# Patient Record
Sex: MALE | Race: WHITE | NOT HISPANIC OR LATINO | URBAN - METROPOLITAN AREA
[De-identification: names, ages, dates, MRNs, and addresses within clinical notes are randomized per-mention and may not be internally consistent; named-entity substitution may affect disease eponyms.]

---

## 2016-02-02 RX ORDER — RISPERIDONE 4 MG/1
1 TABLET ORAL
Qty: 0 | Refills: 0 | COMMUNITY
Start: 2016-02-02

## 2017-06-05 ENCOUNTER — INPATIENT (INPATIENT)
Facility: HOSPITAL | Age: 67
LOS: 13 days | Discharge: NURSING, REHAB OR LONG-TERM CARE | DRG: 190 | End: 2017-06-19
Attending: HOSPITALIST | Admitting: EMERGENCY MEDICINE
Payer: MEDICAID

## 2017-06-05 VITALS
RESPIRATION RATE: 26 BRPM | HEIGHT: 69 IN | HEART RATE: 84 BPM | WEIGHT: 153 LBS | SYSTOLIC BLOOD PRESSURE: 138 MMHG | OXYGEN SATURATION: 92 % | DIASTOLIC BLOOD PRESSURE: 88 MMHG | TEMPERATURE: 98 F

## 2017-06-05 LAB
ALBUMIN SERPL ELPH-MCNC: 4 G/DL — SIGNIFICANT CHANGE UP (ref 3.3–5.2)
ALP SERPL-CCNC: 64 U/L — SIGNIFICANT CHANGE UP (ref 40–120)
ALT FLD-CCNC: 24 U/L — SIGNIFICANT CHANGE UP
ANION GAP SERPL CALC-SCNC: 8 MMOL/L — SIGNIFICANT CHANGE UP (ref 5–17)
APTT BLD: 34.9 SEC — SIGNIFICANT CHANGE UP (ref 27.5–37.4)
AST SERPL-CCNC: 33 U/L — SIGNIFICANT CHANGE UP
BASE EXCESS BLDA CALC-SCNC: 4.6 MMOL/L — HIGH (ref -3–3)
BILIRUB SERPL-MCNC: 0.4 MG/DL — SIGNIFICANT CHANGE UP (ref 0.4–2)
BLOOD GAS COMMENTS ARTERIAL: SIGNIFICANT CHANGE UP
BUN SERPL-MCNC: 11 MG/DL — SIGNIFICANT CHANGE UP (ref 8–20)
CALCIUM SERPL-MCNC: 9.2 MG/DL — SIGNIFICANT CHANGE UP (ref 8.6–10.2)
CHLORIDE SERPL-SCNC: 95 MMOL/L — LOW (ref 98–107)
CO2 SERPL-SCNC: 35 MMOL/L — HIGH (ref 22–29)
CREAT SERPL-MCNC: 0.6 MG/DL — SIGNIFICANT CHANGE UP (ref 0.5–1.3)
GAS PNL BLDA: SIGNIFICANT CHANGE UP
GLUCOSE SERPL-MCNC: 109 MG/DL — SIGNIFICANT CHANGE UP (ref 70–115)
HCO3 BLDA-SCNC: 28 MMOL/L — HIGH (ref 20–26)
HCT VFR BLD CALC: 45.3 % — SIGNIFICANT CHANGE UP (ref 42–52)
HGB BLD-MCNC: 15.2 G/DL — SIGNIFICANT CHANGE UP (ref 14–18)
HOROWITZ INDEX BLDA+IHG-RTO: SIGNIFICANT CHANGE UP
INR BLD: 1.36 RATIO — HIGH (ref 0.88–1.16)
MCHC RBC-ENTMCNC: 33.6 G/DL — SIGNIFICANT CHANGE UP (ref 32–36)
MCHC RBC-ENTMCNC: 34.3 PG — HIGH (ref 27–31)
MCV RBC AUTO: 102.3 FL — HIGH (ref 80–94)
NT-PROBNP SERPL-SCNC: 182 PG/ML — SIGNIFICANT CHANGE UP (ref 0–300)
PCO2 BLDA: 88 MMHG — CRITICAL HIGH (ref 35–45)
PH BLDA: 7.23 — LOW (ref 7.35–7.45)
PLATELET # BLD AUTO: 121 K/UL — LOW (ref 150–400)
PO2 BLDA: 101 MMHG — SIGNIFICANT CHANGE UP (ref 83–108)
POTASSIUM SERPL-MCNC: 4.4 MMOL/L — SIGNIFICANT CHANGE UP (ref 3.5–5.3)
POTASSIUM SERPL-SCNC: 4.4 MMOL/L — SIGNIFICANT CHANGE UP (ref 3.5–5.3)
PROT SERPL-MCNC: 7 G/DL — SIGNIFICANT CHANGE UP (ref 6.6–8.7)
PROTHROM AB SERPL-ACNC: 15 SEC — HIGH (ref 9.8–12.7)
RBC # BLD: 4.43 M/UL — LOW (ref 4.6–6.2)
RBC # FLD: 13.1 % — SIGNIFICANT CHANGE UP (ref 11–15.6)
SAO2 % BLDA: 97 % — SIGNIFICANT CHANGE UP (ref 95–99)
SODIUM SERPL-SCNC: 138 MMOL/L — SIGNIFICANT CHANGE UP (ref 135–145)
WBC # BLD: 4.5 K/UL — LOW (ref 4.8–10.8)
WBC # FLD AUTO: 4.5 K/UL — LOW (ref 4.8–10.8)

## 2017-06-05 PROCEDURE — 71010: CPT | Mod: 26

## 2017-06-05 PROCEDURE — 93010 ELECTROCARDIOGRAM REPORT: CPT

## 2017-06-05 PROCEDURE — 99285 EMERGENCY DEPT VISIT HI MDM: CPT

## 2017-06-05 RX ORDER — MAGNESIUM SULFATE 500 MG/ML
2 VIAL (ML) INJECTION ONCE
Qty: 0 | Refills: 0 | Status: COMPLETED | OUTPATIENT
Start: 2017-06-05 | End: 2017-06-05

## 2017-06-05 RX ORDER — AZITHROMYCIN 500 MG/1
500 TABLET, FILM COATED ORAL ONCE
Qty: 0 | Refills: 0 | Status: COMPLETED | OUTPATIENT
Start: 2017-06-05 | End: 2017-06-05

## 2017-06-05 RX ORDER — SODIUM CHLORIDE 9 MG/ML
1000 INJECTION INTRAMUSCULAR; INTRAVENOUS; SUBCUTANEOUS
Qty: 0 | Refills: 0 | Status: DISCONTINUED | OUTPATIENT
Start: 2017-06-05 | End: 2017-06-06

## 2017-06-05 RX ORDER — IPRATROPIUM BROMIDE 0.2 MG/ML
500 SOLUTION, NON-ORAL INHALATION ONCE
Qty: 0 | Refills: 0 | Status: COMPLETED | OUTPATIENT
Start: 2017-06-05 | End: 2017-06-05

## 2017-06-05 RX ORDER — CEFTRIAXONE 500 MG/1
1 INJECTION, POWDER, FOR SOLUTION INTRAMUSCULAR; INTRAVENOUS ONCE
Qty: 0 | Refills: 0 | Status: COMPLETED | OUTPATIENT
Start: 2017-06-05 | End: 2017-06-05

## 2017-06-05 RX ORDER — ALBUTEROL 90 UG/1
2.5 AEROSOL, METERED ORAL ONCE
Qty: 0 | Refills: 0 | Status: COMPLETED | OUTPATIENT
Start: 2017-06-05 | End: 2017-06-05

## 2017-06-05 RX ADMIN — SODIUM CHLORIDE 500 MILLILITER(S): 9 INJECTION INTRAMUSCULAR; INTRAVENOUS; SUBCUTANEOUS at 22:32

## 2017-06-05 RX ADMIN — AZITHROMYCIN 255 MILLIGRAM(S): 500 TABLET, FILM COATED ORAL at 23:20

## 2017-06-05 RX ADMIN — CEFTRIAXONE 100 GRAM(S): 500 INJECTION, POWDER, FOR SOLUTION INTRAMUSCULAR; INTRAVENOUS at 22:42

## 2017-06-05 RX ADMIN — ALBUTEROL 2.5 MILLIGRAM(S): 90 AEROSOL, METERED ORAL at 22:50

## 2017-06-05 RX ADMIN — Medication 500 MICROGRAM(S): at 22:50

## 2017-06-05 RX ADMIN — Medication 125 MILLIGRAM(S): at 22:13

## 2017-06-05 RX ADMIN — Medication 50 GRAM(S): at 22:13

## 2017-06-05 NOTE — ED ADULT NURSE NOTE - OBJECTIVE STATEMENT
pt with respiratory distress, labored breathing using accessory muscles, abd soft non tender , no edema, contact dermatitis to buttocks

## 2017-06-05 NOTE — ED PROVIDER NOTE - CARE PLAN
Principal Discharge DX:	Chronic obstructive pulmonary disease, unspecified COPD type  Secondary Diagnosis:	Respiratory acidosis

## 2017-06-05 NOTE — ED PROVIDER NOTE - NS ED MD SCRIBE ATTENDING SCRIBE SECTIONS
PROGRESS NOTE/PAST MEDICAL/SURGICAL/SOCIAL HISTORY/VITAL SIGNS( Pullset)/PHYSICAL EXAM/INTAKE ASSESSMENT/SCREENINGS/REVIEW OF SYSTEMS/RESULTS/DISPOSITION/HISTORY OF PRESENT ILLNESS/CONSULTATIONS/SHIFT CHANGE/HIV

## 2017-06-05 NOTE — ED PROVIDER NOTE - OBJECTIVE STATEMENT
67 y/o male, current smoker with a hx of COPD and schizophrenia presents to the ED c/o difficulty  breathing that onset today. Notes that he has had similar sx in the past. Notes productive cough. States that he ran out of his Albuterol. Denies HA, numbness, tingling, fever, chills, CP or leg swelling. No further complaints at this time.

## 2017-06-06 DIAGNOSIS — J96.92 RESPIRATORY FAILURE, UNSPECIFIED WITH HYPERCAPNIA: ICD-10-CM

## 2017-06-06 DIAGNOSIS — G40.909 EPILEPSY, UNSPECIFIED, NOT INTRACTABLE, WITHOUT STATUS EPILEPTICUS: ICD-10-CM

## 2017-06-06 DIAGNOSIS — J44.9 CHRONIC OBSTRUCTIVE PULMONARY DISEASE, UNSPECIFIED: ICD-10-CM

## 2017-06-06 DIAGNOSIS — F20.9 SCHIZOPHRENIA, UNSPECIFIED: ICD-10-CM

## 2017-06-06 LAB
BASE EXCESS BLDA CALC-SCNC: 5 MMOL/L — HIGH (ref -3–3)
BLOOD GAS COMMENTS ARTERIAL: SIGNIFICANT CHANGE UP
GAS PNL BLDA: SIGNIFICANT CHANGE UP
GAS PNL BLDA: SIGNIFICANT CHANGE UP
HCO3 BLDA-SCNC: 29 MMOL/L — HIGH (ref 20–26)
HOROWITZ INDEX BLDA+IHG-RTO: 0.4 — SIGNIFICANT CHANGE UP
HPIV3 RNA SPEC QL NAA+PROBE: DETECTED
PCO2 BLDA: 86 MMHG — CRITICAL HIGH (ref 35–45)
PH BLDA: 7.24 — LOW (ref 7.35–7.45)
PO2 BLDA: 110 MMHG — HIGH (ref 83–108)
RAPID RVP RESULT: DETECTED
SAO2 % BLDA: 98 % — SIGNIFICANT CHANGE UP (ref 95–99)

## 2017-06-06 PROCEDURE — 99233 SBSQ HOSP IP/OBS HIGH 50: CPT

## 2017-06-06 RX ORDER — LISINOPRIL 2.5 MG/1
10 TABLET ORAL DAILY
Qty: 0 | Refills: 0 | Status: DISCONTINUED | OUTPATIENT
Start: 2017-06-06 | End: 2017-06-16

## 2017-06-06 RX ORDER — BENZTROPINE MESYLATE 1 MG
1 TABLET ORAL
Qty: 0 | Refills: 0 | Status: DISCONTINUED | OUTPATIENT
Start: 2017-06-06 | End: 2017-06-19

## 2017-06-06 RX ORDER — DIVALPROEX SODIUM 500 MG/1
1 TABLET, DELAYED RELEASE ORAL
Qty: 0 | Refills: 0 | COMMUNITY

## 2017-06-06 RX ORDER — HALOPERIDOL DECANOATE 100 MG/ML
10 INJECTION INTRAMUSCULAR AT BEDTIME
Qty: 0 | Refills: 0 | Status: DISCONTINUED | OUTPATIENT
Start: 2017-06-06 | End: 2017-06-19

## 2017-06-06 RX ORDER — NYSTATIN CREAM 100000 [USP'U]/G
1 CREAM TOPICAL DAILY
Qty: 0 | Refills: 0 | Status: DISCONTINUED | OUTPATIENT
Start: 2017-06-06 | End: 2017-06-11

## 2017-06-06 RX ORDER — LEVETIRACETAM 250 MG/1
500 TABLET, FILM COATED ORAL EVERY 12 HOURS
Qty: 0 | Refills: 0 | Status: DISCONTINUED | OUTPATIENT
Start: 2017-06-06 | End: 2017-06-09

## 2017-06-06 RX ORDER — ENOXAPARIN SODIUM 100 MG/ML
40 INJECTION SUBCUTANEOUS DAILY
Qty: 0 | Refills: 0 | Status: DISCONTINUED | OUTPATIENT
Start: 2017-06-06 | End: 2017-06-19

## 2017-06-06 RX ORDER — CARVEDILOL PHOSPHATE 80 MG/1
3.12 CAPSULE, EXTENDED RELEASE ORAL EVERY 12 HOURS
Qty: 0 | Refills: 0 | Status: DISCONTINUED | OUTPATIENT
Start: 2017-06-06 | End: 2017-06-19

## 2017-06-06 RX ORDER — ROPINIROLE 8 MG/1
0.5 TABLET, FILM COATED, EXTENDED RELEASE ORAL
Qty: 0 | Refills: 0 | Status: DISCONTINUED | OUTPATIENT
Start: 2017-06-06 | End: 2017-06-19

## 2017-06-06 RX ORDER — AZITHROMYCIN 500 MG/1
500 TABLET, FILM COATED ORAL EVERY 24 HOURS
Qty: 0 | Refills: 0 | Status: DISCONTINUED | OUTPATIENT
Start: 2017-06-06 | End: 2017-06-06

## 2017-06-06 RX ORDER — IPRATROPIUM/ALBUTEROL SULFATE 18-103MCG
3 AEROSOL WITH ADAPTER (GRAM) INHALATION EVERY 6 HOURS
Qty: 0 | Refills: 0 | Status: DISCONTINUED | OUTPATIENT
Start: 2017-06-06 | End: 2017-06-12

## 2017-06-06 RX ORDER — AMLODIPINE BESYLATE 2.5 MG/1
5 TABLET ORAL DAILY
Qty: 0 | Refills: 0 | Status: DISCONTINUED | OUTPATIENT
Start: 2017-06-06 | End: 2017-06-19

## 2017-06-06 RX ORDER — RISPERIDONE 4 MG/1
4 TABLET ORAL DAILY
Qty: 0 | Refills: 0 | Status: DISCONTINUED | OUTPATIENT
Start: 2017-06-06 | End: 2017-06-19

## 2017-06-06 RX ADMIN — Medication 40 MILLIGRAM(S): at 05:31

## 2017-06-06 RX ADMIN — AMLODIPINE BESYLATE 5 MILLIGRAM(S): 2.5 TABLET ORAL at 17:10

## 2017-06-06 RX ADMIN — Medication 40 MILLIGRAM(S): at 21:10

## 2017-06-06 RX ADMIN — Medication 3 MILLILITER(S): at 03:31

## 2017-06-06 RX ADMIN — Medication 40 MILLIGRAM(S): at 15:06

## 2017-06-06 RX ADMIN — LEVETIRACETAM 420 MILLIGRAM(S): 250 TABLET, FILM COATED ORAL at 05:31

## 2017-06-06 RX ADMIN — NYSTATIN CREAM 1 APPLICATION(S): 100000 CREAM TOPICAL at 15:05

## 2017-06-06 RX ADMIN — ENOXAPARIN SODIUM 40 MILLIGRAM(S): 100 INJECTION SUBCUTANEOUS at 11:26

## 2017-06-06 RX ADMIN — Medication 3 MILLILITER(S): at 15:00

## 2017-06-06 RX ADMIN — LEVETIRACETAM 420 MILLIGRAM(S): 250 TABLET, FILM COATED ORAL at 17:14

## 2017-06-06 RX ADMIN — CARVEDILOL PHOSPHATE 3.12 MILLIGRAM(S): 80 CAPSULE, EXTENDED RELEASE ORAL at 17:10

## 2017-06-06 RX ADMIN — Medication 3 MILLILITER(S): at 20:06

## 2017-06-06 RX ADMIN — HALOPERIDOL DECANOATE 10 MILLIGRAM(S): 100 INJECTION INTRAMUSCULAR at 21:10

## 2017-06-06 RX ADMIN — Medication 3 MILLILITER(S): at 08:29

## 2017-06-06 RX ADMIN — ROPINIROLE 0.5 MILLIGRAM(S): 8 TABLET, FILM COATED, EXTENDED RELEASE ORAL at 17:11

## 2017-06-06 RX ADMIN — LISINOPRIL 10 MILLIGRAM(S): 2.5 TABLET ORAL at 17:10

## 2017-06-06 RX ADMIN — Medication 1 MILLIGRAM(S): at 17:10

## 2017-06-06 RX ADMIN — RISPERIDONE 4 MILLIGRAM(S): 4 TABLET ORAL at 17:10

## 2017-06-06 NOTE — H&P ADULT - ATTENDING COMMENTS
I have seen and evaluated the patietn and agree with the assessment and plan with the following additions:    - COPD excerbation secondary to viral pna  - steriods, nebs, bipap prn and bedtime  - restart home psych meds  - stable for transfer to the floor

## 2017-06-06 NOTE — H&P ADULT - PROBLEM SELECTOR PLAN 4
Patient has keppra as a med listed.  ?? sz disorder hx  ?? if used  for his schizophrenia.  Will order for now

## 2017-06-06 NOTE — H&P ADULT - HISTORY OF PRESENT ILLNESS
66M COPD schizophrenia ?? SZ disorder  from adult home admit with SOB.  Was extremely agitated, valium 5mg IV given in ED.  ABG revealed hypercapnia.  Placed on BIPAP, no improvent in ABG.  RVP +

## 2017-06-06 NOTE — PROGRESS NOTE ADULT - SUBJECTIVE AND OBJECTIVE BOX
DAVY HOLDEN Patient is a 66y old  Male who presents with a chief complaint of SOB (06 Jun 2017 02:04)     HPI:  66M COPD schizophrenia ?? SZ disorder  from adult home admit with SOB.  Was extremely agitated, valium 5mg IV given in ED.  ABG revealed hypercapnia.  Placed on BIPAP, no improvent in ABG.  RVP + (06 Jun 2017 02:04)    The patient was seen and evaluated   The patient is in no acute distress.        Height (cm): 175.3 (06-05 @ 21:32)  Weight (kg): 69.4 (06-05 @ 21:32)  BMI (kg/m2): 22.6 (06-05 @ 21:32)  BSA (m2): 1.84 (06-05 @ 21:32)I&O's Summary  I & Os for 24h ending 06 Jun 2017 07:00  =============================================  IN: 100 ml / OUT: 0 ml / NET: 100 ml    I & Os for current day (as of 06 Jun 2017 15:54)  =============================================  IN: 0 ml / OUT: 120 ml / NET: -120 ml    Allergies    No Known Allergies    Intolerances      HEALTH ISSUES - PROBLEM Dx:  Seizure disorder: Seizure disorder  Schizophrenia, unspecified type: Schizophrenia, unspecified type  Chronic obstructive pulmonary disease, unspecified COPD type: Chronic obstructive pulmonary disease, unspecified COPD type  Hypercapnic respiratory failure: Hypercapnic respiratory failure        PAST MEDICAL & SURGICAL HISTORY:  Schizophrenia, unspecified type  Chronic obstructive pulmonary disease, unspecified COPD type  No significant past surgical history          Vital Signs Last 24 Hrs  T(C): 36.5, Max: 36.8 (06-06 @ 02:50)  T(F): 97.7, Max: 98.2 (06-06 @ 02:50)  HR: 128 (56 - 128)  BP: 87/45 (85/55 - 141/82)  BP(mean): 61 (61 - 95)  RR: 30 (23 - 53)  SpO2: 100% (92% - 100%)T(C): 36.5, Max: 36.8 (06-06 @ 02:50)  HR: 128 (56 - 128)  BP: 87/45 (85/55 - 141/82)  RR: 30 (23 - 53)  SpO2: 100% (92% - 100%)  Wt(kg): --    PHYSICAL EXAM:    GENERAL: NAD ,ill appearing   HEAD:  Atraumatic, Normocephalic  EYES: EOMI,  conjunctiva and sclera clear  ENMT:  dry mucous membranes,  No lesions  NECK: Supple,   NERVOUS SYSTEM:  Alert & Oriented ,  Moves upper and lower extremities; CNS-II-XII  CHEST/LUNG: Clear to auscultation bilaterally, wheezing,   HEART: Regular rate and rhythm; No murmurs,   ABDOMEN: Soft, Nontender, Nondistended; Bowel sounds present  EXTREMITIES:  Peripheral Pulses, No  cyanosis, or edema  psychiatry-cant assess  Insight and judgement intact     enoxaparin Injectable 40milliGRAM(s) SubCutaneous daily  methylPREDNISolone sodium succinate Injectable 40milliGRAM(s) IV Push every 8 hours  ALBUTerol/ipratropium for Nebulization 3milliLiter(s) Nebulizer every 6 hours  levETIRAcetam  IVPB 500milliGRAM(s) IV Intermittent every 12 hours  nystatin Cream 1Application(s) Topical daily  lisinopril 10milliGRAM(s) Oral daily  benztropine 1milliGRAM(s) Oral two times a day  rOPINIRole 0.5milliGRAM(s) Oral two times a day  haloperidol     Tablet 10milliGRAM(s) Oral at bedtime  risperiDONE   Tablet 4milliGRAM(s) Oral daily  carvedilol 3.125milliGRAM(s) Oral every 12 hours  amLODIPine   Tablet 5milliGRAM(s) Oral daily      LABS:  ABG - ( 06 Jun 2017 05:00 )  pH: 7.32  /  pCO2: 72    /  pO2: 75    / HCO3: 32    / Base Excess: 8.1   /  SaO2: 95                                      15.2   4.5   )-----------( 121      ( 05 Jun 2017 21:57 )             45.3     06-05    138  |  95<L>  |  11.0  ----------------------------<  109  4.4   |  35.0<H>  |  0.60    Ca    9.2      05 Jun 2017 21:57    TPro  7.0  /  Alb  4.0  /  TBili  0.4  /  DBili  x   /  AST  33  /  ALT  24  /  AlkPhos  64  06-05    LIVER FUNCTIONS - ( 05 Jun 2017 21:57 )  Alb: 4.0 g/dL / Pro: 7.0 g/dL / ALK PHOS: 64 U/L / ALT: 24 U/L / AST: 33 U/L / GGT: x           PT/INR - ( 05 Jun 2017 21:57 )   PT: 15.0 sec;   INR: 1.36 ratio         PTT - ( 05 Jun 2017 21:57 )  PTT:34.9 sec        CAPILLARY BLOOD GLUCOSE      RADIOLOGY & ADDITIONAL TESTS:      Consultant notes reviewed    Case discussed with consultant/provider/ family /patient

## 2017-06-06 NOTE — H&P ADULT - NSHPPHYSICALEXAM_GEN_ALL_CORE
HEENT no JVD  Lungs bilateral BS rhonchi on R bilateral wheezes  CV s1 s2 reg  Abd soft  ext no edema  Neuro moves 4 ext tremors (on cogentin in adult home)  Skin  erythematous scaling rash on buttocks and perineum.  ??? fungal

## 2017-06-06 NOTE — ED ADULT NURSE REASSESSMENT NOTE - NS ED NURSE REASSESS COMMENT FT1
Pt not tolerating his bipap refusing it does not want to keep it on, explained medical benefits still uncooperative with bipap tx, MD came assess pt will give valium 5 mg, will continue to closely monitor.
Pt using all his accessory muscles to breath, coughing, respiratory therapist at bedside putting bipap on, pt with tachy new ekg obtained.
pt after valium appears asleep he is arousable to gentle tactile and verbal stimulation, repiratory therapist at bedside will reapply bipap and continue to monitor closely.
pt tolerating bipap at this time, respiratory viral panel sent, will continue to closely monitor.
Pt belongings, clothing, shoes, and house whitt given to MICU RN.

## 2017-06-07 PROCEDURE — 99233 SBSQ HOSP IP/OBS HIGH 50: CPT

## 2017-06-07 RX ORDER — IPRATROPIUM/ALBUTEROL SULFATE 18-103MCG
3 AEROSOL WITH ADAPTER (GRAM) INHALATION EVERY 4 HOURS
Qty: 0 | Refills: 0 | Status: DISCONTINUED | OUTPATIENT
Start: 2017-06-07 | End: 2017-06-19

## 2017-06-07 RX ADMIN — AMLODIPINE BESYLATE 5 MILLIGRAM(S): 2.5 TABLET ORAL at 05:13

## 2017-06-07 RX ADMIN — Medication 1 MILLIGRAM(S): at 17:56

## 2017-06-07 RX ADMIN — ROPINIROLE 0.5 MILLIGRAM(S): 8 TABLET, FILM COATED, EXTENDED RELEASE ORAL at 05:12

## 2017-06-07 RX ADMIN — Medication 1 MILLIGRAM(S): at 05:12

## 2017-06-07 RX ADMIN — CARVEDILOL PHOSPHATE 3.12 MILLIGRAM(S): 80 CAPSULE, EXTENDED RELEASE ORAL at 05:12

## 2017-06-07 RX ADMIN — HALOPERIDOL DECANOATE 10 MILLIGRAM(S): 100 INJECTION INTRAMUSCULAR at 20:55

## 2017-06-07 RX ADMIN — CARVEDILOL PHOSPHATE 3.12 MILLIGRAM(S): 80 CAPSULE, EXTENDED RELEASE ORAL at 17:56

## 2017-06-07 RX ADMIN — Medication 40 MILLIGRAM(S): at 23:15

## 2017-06-07 RX ADMIN — Medication 3 MILLILITER(S): at 14:57

## 2017-06-07 RX ADMIN — Medication 3 MILLILITER(S): at 03:06

## 2017-06-07 RX ADMIN — NYSTATIN CREAM 1 APPLICATION(S): 100000 CREAM TOPICAL at 14:07

## 2017-06-07 RX ADMIN — ENOXAPARIN SODIUM 40 MILLIGRAM(S): 100 INJECTION SUBCUTANEOUS at 11:59

## 2017-06-07 RX ADMIN — Medication 3 MILLILITER(S): at 07:36

## 2017-06-07 RX ADMIN — Medication 40 MILLIGRAM(S): at 05:13

## 2017-06-07 RX ADMIN — Medication 40 MILLIGRAM(S): at 14:08

## 2017-06-07 RX ADMIN — ROPINIROLE 0.5 MILLIGRAM(S): 8 TABLET, FILM COATED, EXTENDED RELEASE ORAL at 17:55

## 2017-06-07 RX ADMIN — RISPERIDONE 4 MILLIGRAM(S): 4 TABLET ORAL at 12:02

## 2017-06-07 RX ADMIN — LEVETIRACETAM 420 MILLIGRAM(S): 250 TABLET, FILM COATED ORAL at 05:12

## 2017-06-07 RX ADMIN — LISINOPRIL 10 MILLIGRAM(S): 2.5 TABLET ORAL at 05:36

## 2017-06-07 NOTE — PROGRESS NOTE ADULT - SUBJECTIVE AND OBJECTIVE BOX
Patient: DAVY HOLDEN [id number] 66y Male  Internal Medicine Hospitalist Progress Note - Dr. Serg Raymundo    Chief Complaint: Patient is a 66y old  Male who presents with a chief complaint of SOB (2017 02:04)    HPI:  66M COPD schizophrenia, ?seizure disorder, admitted from group home with SOB.  He was extremely agitated, valium 5mg IV given in ED.  ABG revealed hypercapnic respiratory failure, placed on BIPAP.  RVP was positive.  Pt clinically improved, off BIPAP.      He denies specific complaints.  No CP / SOB / Palp.  No fever / chills.     ____________________PHYSICAL EXAM:  Vitals reviewed as indicated below  GENERAL:  NAD Alert and Oriented x 3   HEENT: NCAT  CARDIOVASCULAR:  S1, S2  LUNGS: coarse BS b/l.   ABDOMEN:  soft, (-) tenderness, (-) distension, (+) bowel sounds, (-) guarding, (-) rebound (-) rigidity  EXTREMITIES:  no cyanosis / clubbing / edema.   ____________________    BACKGROUND:  HEALTH ISSUES - PROBLEM Dx:  Seizure disorder: Seizure disorder  Schizophrenia, unspecified type: Schizophrenia, unspecified type  Chronic obstructive pulmonary disease, unspecified COPD type: Chronic obstructive pulmonary disease, unspecified COPD type  Hypercapnic respiratory failure: Hypercapnic respiratory failure        MEDICATIONS  (STANDING):  enoxaparin Injectable 40milliGRAM(s) SubCutaneous daily  methylPREDNISolone sodium succinate Injectable 40milliGRAM(s) IV Push every 8 hours  ALBUTerol/ipratropium for Nebulization 3milliLiter(s) Nebulizer every 6 hours  levETIRAcetam  IVPB 500milliGRAM(s) IV Intermittent every 12 hours  nystatin Cream 1Application(s) Topical daily  lisinopril 10milliGRAM(s) Oral daily  benztropine 1milliGRAM(s) Oral two times a day  rOPINIRole 0.5milliGRAM(s) Oral two times a day  haloperidol     Tablet 10milliGRAM(s) Oral at bedtime  risperiDONE   Tablet 4milliGRAM(s) Oral daily  carvedilol 3.125milliGRAM(s) Oral every 12 hours  amLODIPine   Tablet 5milliGRAM(s) Oral daily    MEDICATIONS  (PRN):  ALBUTerol/ipratropium for Nebulization 3milliLiter(s) Nebulizer every 4 hours PRN Shortness of Breath and/or Wheezing    Allergies    No Known Allergies    Intolerances      PAST MEDICAL & SURGICAL HISTORY:  Schizophrenia, unspecified type  Chronic obstructive pulmonary disease, unspecified COPD type  No significant past surgical history      VITALS:  Vital Signs Last 24 Hrs  T(C): 37.6, Max: 38 ( @ 23:00)  T(F): 99.7, Max: 100.4 ( @ 23:00)  HR: 83 (75 - 128)  BP: 133/96 (87/45 - 166/91)  BP(mean): 107 (61 - 123)  RR: 39 (27 - 57)  SpO2: 94% (88% - 100%) Daily     Daily Weight in k.1 (2017 07:30)  CAPILLARY BLOOD GLUCOSE    I&O's Summary  I & Os for 24h ending 2017 07:00  =============================================  IN: 325 ml / OUT: 820 ml / NET: -495 ml    I & Os for current day (as of 2017 13:37)  =============================================  IN: 0 ml / OUT: 675 ml / NET: -675 ml      LABS:                        15.2   4.5   )-----------( 121      ( 2017 21:57 )             45.3     06-    138  |  95<L>  |  11.0  ----------------------------<  109  4.4   |  35.0<H>  |  0.60    Ca    9.2      2017 21:57    TPro  7.0  /  Alb  4.0  /  TBili  0.4  /  DBili  x   /  AST  33  /  ALT  24  /  AlkPhos  64  06-05    PT/INR - ( 2017 21:57 )   PT: 15.0 sec;   INR: 1.36 ratio         PTT - ( 2017 21:57 )  PTT:34.9 sec  RECENT CULTURES:      LIVER FUNCTIONS - ( 2017 21:57 )  Alb: 4.0 g/dL / Pro: 7.0 g/dL / ALK PHOS: 64 U/L / ALT: 24 U/L / AST: 33 U/L / GGT: x

## 2017-06-08 LAB
ANION GAP SERPL CALC-SCNC: 7 MMOL/L — SIGNIFICANT CHANGE UP (ref 5–17)
BUN SERPL-MCNC: 25 MG/DL — HIGH (ref 8–20)
CALCIUM SERPL-MCNC: 9 MG/DL — SIGNIFICANT CHANGE UP (ref 8.6–10.2)
CHLORIDE SERPL-SCNC: 89 MMOL/L — LOW (ref 98–107)
CO2 SERPL-SCNC: 41 MMOL/L — HIGH (ref 22–29)
CREAT SERPL-MCNC: 0.59 MG/DL — SIGNIFICANT CHANGE UP (ref 0.5–1.3)
GLUCOSE SERPL-MCNC: 104 MG/DL — SIGNIFICANT CHANGE UP (ref 70–115)
HCT VFR BLD CALC: 41 % — LOW (ref 42–52)
HGB BLD-MCNC: 13.3 G/DL — LOW (ref 14–18)
MCHC RBC-ENTMCNC: 32.4 G/DL — SIGNIFICANT CHANGE UP (ref 32–36)
MCHC RBC-ENTMCNC: 33.3 PG — HIGH (ref 27–31)
MCV RBC AUTO: 102.8 FL — HIGH (ref 80–94)
PLATELET # BLD AUTO: 119 K/UL — LOW (ref 150–400)
POTASSIUM SERPL-MCNC: 4.9 MMOL/L — SIGNIFICANT CHANGE UP (ref 3.5–5.3)
POTASSIUM SERPL-SCNC: 4.9 MMOL/L — SIGNIFICANT CHANGE UP (ref 3.5–5.3)
RBC # BLD: 3.99 M/UL — LOW (ref 4.6–6.2)
RBC # FLD: 12.8 % — SIGNIFICANT CHANGE UP (ref 11–15.6)
SODIUM SERPL-SCNC: 137 MMOL/L — SIGNIFICANT CHANGE UP (ref 135–145)
WBC # BLD: 3.2 K/UL — LOW (ref 4.8–10.8)
WBC # FLD AUTO: 3.2 K/UL — LOW (ref 4.8–10.8)

## 2017-06-08 PROCEDURE — 99233 SBSQ HOSP IP/OBS HIGH 50: CPT

## 2017-06-08 RX ADMIN — CARVEDILOL PHOSPHATE 3.12 MILLIGRAM(S): 80 CAPSULE, EXTENDED RELEASE ORAL at 18:52

## 2017-06-08 RX ADMIN — ROPINIROLE 0.5 MILLIGRAM(S): 8 TABLET, FILM COATED, EXTENDED RELEASE ORAL at 18:51

## 2017-06-08 RX ADMIN — Medication 1 MILLIGRAM(S): at 18:52

## 2017-06-08 RX ADMIN — NYSTATIN CREAM 1 APPLICATION(S): 100000 CREAM TOPICAL at 11:30

## 2017-06-08 RX ADMIN — Medication 3 MILLILITER(S): at 08:22

## 2017-06-08 RX ADMIN — Medication 3 MILLILITER(S): at 04:20

## 2017-06-08 RX ADMIN — HALOPERIDOL DECANOATE 10 MILLIGRAM(S): 100 INJECTION INTRAMUSCULAR at 21:31

## 2017-06-08 RX ADMIN — RISPERIDONE 4 MILLIGRAM(S): 4 TABLET ORAL at 11:30

## 2017-06-08 RX ADMIN — Medication 3 MILLILITER(S): at 21:55

## 2017-06-08 RX ADMIN — Medication 3 MILLILITER(S): at 15:16

## 2017-06-08 NOTE — CONSULT NOTE ADULT - ASSESSMENT
-AECOPD  -Parainfluenza infection  -Hypercarbic resp failure  -Hypoxic resp failure  -Current smoking    RECC:  IV steroids. Nebs. O2. BPAP qhs and prn. Smoking cessation. Psych f/u.

## 2017-06-08 NOTE — PROGRESS NOTE ADULT - SUBJECTIVE AND OBJECTIVE BOX
Patient: DAVY HOLDEN [id number] 66y Male  Internal Medicine Hospitalist Progress Note - Dr. Serg Raymundo    Chief Complaint: Patient is a 66y old  Male who presents with a chief complaint of SOB (06 Jun 2017 02:04)    HPI:  66M COPD schizophrenia, ?seizure disorder, admitted from group home with SOB.  He was extremely agitated, valium 5mg IV given in ED.  ABG revealed hypercapnic respiratory failure, placed on BIPAP.  RVP was positive.  Pt clinically improved, off BIPAP.      Seen with 1:1 aide at bedside.  Per RN and aide, pt has refused medications, monitoring.  Pt states "I don't need it".  Periodic agitation, attempting to pull IVs.  Redirectable at times.  Denies specific complaints.      ____________________PHYSICAL EXAM:  Vitals reviewed as indicated below  GENERAL:  NAD Alert and Oriented to person, place.  Tremulous.    HEENT: NCAT  CARDIOVASCULAR:  S1, S2  LUNGS: coarse BS b/l.   ABDOMEN:  soft, (-) tenderness, (-) distension, (+) bowel sounds, (-) guarding, (-) rebound (-) rigidity  EXTREMITIES:  no cyanosis / clubbing / edema.   NEURO: Strength symmetric, + resting tremor.    ____________________      MEDICATIONS:  enoxaparin Injectable 40milliGRAM(s) SubCutaneous daily  methylPREDNISolone sodium succinate Injectable 40milliGRAM(s) IV Push every 8 hours  ALBUTerol/ipratropium for Nebulization 3milliLiter(s) Nebulizer every 6 hours  levETIRAcetam  IVPB 500milliGRAM(s) IV Intermittent every 12 hours  nystatin Cream 1Application(s) Topical daily  lisinopril 10milliGRAM(s) Oral daily  benztropine 1milliGRAM(s) Oral two times a day  rOPINIRole 0.5milliGRAM(s) Oral two times a day  haloperidol     Tablet 10milliGRAM(s) Oral at bedtime  risperiDONE   Tablet 4milliGRAM(s) Oral daily  carvedilol 3.125milliGRAM(s) Oral every 12 hours  amLODIPine   Tablet 5milliGRAM(s) Oral daily  ALBUTerol/ipratropium for Nebulization 3milliLiter(s) Nebulizer every 4 hours PRN      VITALS:  Vital Signs Last 24 Hrs  T(C): 36.3, Max: 37.6 (06-07 @ 11:52)  T(F): 97.3, Max: 99.7 (06-07 @ 11:52)  HR: 74 (67 - 100)  BP: 111/76 (90/62 - 140/82)  BP(mean): 71 (71 - 107)  RR: 20 (19 - 44)  SpO2: 91% (73% - 100%) Daily     Daily   CAPILLARY BLOOD GLUCOSE    I&O's Summary    I & Os for current day (as of 08 Jun 2017 10:46)  =============================================  IN: 0 ml / OUT: 675 ml / NET: -675 ml      LABS:                        13.3   3.2   )-----------( 119      ( 08 Jun 2017 06:43 )             41.0     06-08    137  |  89<L>  |  25.0<H>  ----------------------------<  104  4.9   |  41.0<H>  |  0.59    Ca    9.0      08 Jun 2017 06:43        RECENT CULTURES:  06-05 .Blood Blood-Peripheral XXXX XXXX   No growth at 48 hours

## 2017-06-08 NOTE — CONSULT NOTE ADULT - SUBJECTIVE AND OBJECTIVE BOX
PULMONARY CONSULT NOTE      [id number]    Patient is a 66y old  Male who presents with a chief complaint of SOB (06 Jun 2017 02:04)      HISTORY OF PRESENT ILLNESS: hx COPD, schizophrenia. Not seen in our office. pt poor historian. He says he has a cold. Some cough. Current smoker. No cp, no hemoptysis. Found to have hypercarbia in ER; started on bpap.    MEDICATIONS  (STANDING):  enoxaparin Injectable 40milliGRAM(s) SubCutaneous daily  methylPREDNISolone sodium succinate Injectable 40milliGRAM(s) IV Push every 8 hours  ALBUTerol/ipratropium for Nebulization 3milliLiter(s) Nebulizer every 6 hours  levETIRAcetam  IVPB 500milliGRAM(s) IV Intermittent every 12 hours  nystatin Cream 1Application(s) Topical daily  lisinopril 10milliGRAM(s) Oral daily  benztropine 1milliGRAM(s) Oral two times a day  rOPINIRole 0.5milliGRAM(s) Oral two times a day  haloperidol     Tablet 10milliGRAM(s) Oral at bedtime  risperiDONE   Tablet 4milliGRAM(s) Oral daily  carvedilol 3.125milliGRAM(s) Oral every 12 hours  amLODIPine   Tablet 5milliGRAM(s) Oral daily      MEDICATIONS  (PRN):  ALBUTerol/ipratropium for Nebulization 3milliLiter(s) Nebulizer every 4 hours PRN Shortness of Breath and/or Wheezing      Allergies    No Known Allergies    Intolerances        PAST MEDICAL & SURGICAL HISTORY:  Schizophrenia, unspecified type  Chronic obstructive pulmonary disease, unspecified COPD type  No significant past surgical history      FAMILY HISTORY:  Family history of stroke: as per pt. mother had stroke.      SOCIAL HISTORY  Smoking History: current smoker    REVIEW OF SYSTEMS:    unavailable    Vital Signs Last 24 Hrs  T(C): 36.3, Max: 37 (06-08 @ 06:00)  T(F): 97.3, Max: 98.6 (06-08 @ 06:00)  HR: 74 (67 - 100)  BP: 111/76 (90/62 - 140/82)  BP(mean): 71 (71 - 107)  RR: 20 (19 - 44)  SpO2: 94% (73% - 100%)    PHYSICAL EXAMINATION:    GENERAL: The patient is  in no apparent distress.     HEENT: Head is normocephalic and atraumatic. Extraocular muscles are intact. Mucous membranes are moist.     NECK: Supple.     LUNGS: Decreased at apices, no wheeze now, no crackles, Respirations unlabored    HEART: Regular rate and rhythm      ABDOMEN: Soft, nontender, and nondistended.  No hepatosplenomegaly is noted.    EXTREMITIES: Without any cyanosis, clubbing, rash, lesions or edema.    NEUROLOGIC: Grossly intact. Awake and alert.       LABS:                        13.3   3.2   )-----------( 119      ( 08 Jun 2017 06:43 )             41.0     06-08    137  |  89<L>  |  25.0<H>  ----------------------------<  104  4.9   |  41.0<H>  |  0.59    Ca    9.0      08 Jun 2017 06:43    Rapid Respiratory Viral Panel (06.06.17 @ 00:10)    Parainfluenza 3 (RapRVP): Detected: TYPE:(C=Critical, N=Notification, A=Abnormal) c            RADIOLOGY & ADDITIONAL STUDIES:   EXAM:  CHEST SINGLE VIEW FRONTAL                          PROCEDURE DATE:  06/05/2017        INTERPRETATION:  Portable chest radiograph        CLINICAL INFORMATION:   Short of breath.    TECHNIQUE:  Portable  AP view of the chest was obtained.    COMPARISON: 2/16/2016 available for review.    FINDINGS:   The lungs  are clear of gross airspace consolidations. Chronic right lung   base linear discoid atelectasis noted..  No pleural abnormality is seen.      The heart and mediastinum are within normal limits.         Visualized osseous structures are intact.        IMPRESSION:   No evidence of active chest disease.              LASHELL DOUGLAS M.D., ATTENDING RADIOLOGIST

## 2017-06-09 PROCEDURE — 99223 1ST HOSP IP/OBS HIGH 75: CPT

## 2017-06-09 PROCEDURE — 99233 SBSQ HOSP IP/OBS HIGH 50: CPT

## 2017-06-09 RX ORDER — LEVETIRACETAM 250 MG/1
500 TABLET, FILM COATED ORAL
Qty: 0 | Refills: 0 | Status: DISCONTINUED | OUTPATIENT
Start: 2017-06-09 | End: 2017-06-12

## 2017-06-09 RX ADMIN — Medication 40 MILLIGRAM(S): at 17:31

## 2017-06-09 RX ADMIN — CARVEDILOL PHOSPHATE 3.12 MILLIGRAM(S): 80 CAPSULE, EXTENDED RELEASE ORAL at 17:31

## 2017-06-09 RX ADMIN — Medication 3 MILLILITER(S): at 14:56

## 2017-06-09 RX ADMIN — Medication 1 MILLIGRAM(S): at 17:31

## 2017-06-09 RX ADMIN — Medication 3 MILLILITER(S): at 09:23

## 2017-06-09 RX ADMIN — LISINOPRIL 10 MILLIGRAM(S): 2.5 TABLET ORAL at 05:19

## 2017-06-09 RX ADMIN — NYSTATIN CREAM 1 APPLICATION(S): 100000 CREAM TOPICAL at 11:50

## 2017-06-09 RX ADMIN — Medication 1 MILLIGRAM(S): at 05:20

## 2017-06-09 RX ADMIN — ROPINIROLE 0.5 MILLIGRAM(S): 8 TABLET, FILM COATED, EXTENDED RELEASE ORAL at 17:31

## 2017-06-09 RX ADMIN — CARVEDILOL PHOSPHATE 3.12 MILLIGRAM(S): 80 CAPSULE, EXTENDED RELEASE ORAL at 05:19

## 2017-06-09 RX ADMIN — Medication 3 MILLILITER(S): at 20:28

## 2017-06-09 RX ADMIN — AMLODIPINE BESYLATE 5 MILLIGRAM(S): 2.5 TABLET ORAL at 05:21

## 2017-06-09 RX ADMIN — LEVETIRACETAM 500 MILLIGRAM(S): 250 TABLET, FILM COATED ORAL at 18:57

## 2017-06-09 RX ADMIN — HALOPERIDOL DECANOATE 10 MILLIGRAM(S): 100 INJECTION INTRAMUSCULAR at 22:10

## 2017-06-09 RX ADMIN — ENOXAPARIN SODIUM 40 MILLIGRAM(S): 100 INJECTION SUBCUTANEOUS at 11:50

## 2017-06-09 RX ADMIN — RISPERIDONE 4 MILLIGRAM(S): 4 TABLET ORAL at 11:51

## 2017-06-09 RX ADMIN — ROPINIROLE 0.5 MILLIGRAM(S): 8 TABLET, FILM COATED, EXTENDED RELEASE ORAL at 05:19

## 2017-06-09 NOTE — PHYSICAL THERAPY INITIAL EVALUATION ADULT - GENERAL OBSERVATIONS, REHAB EVAL
pt received supine in bed, O2 line, 1:1 present, cardiac monitor, RN discussion states kept 4L of O2 intact due to pt desaturation on room air

## 2017-06-09 NOTE — BEHAVIORAL HEALTH ASSESSMENT NOTE - RISK ASSESSMENT
high - patient mildly confused attempting to ambulate with unsteady gait and tries to remove O2 requiring redirection.

## 2017-06-09 NOTE — PROGRESS NOTE ADULT - SUBJECTIVE AND OBJECTIVE BOX
PULMONARY PROGRESS NOTE      [id number]    Patient is a 66y old  Male who presents with a chief complaint of SOB (06 Jun 2017 02:04)      INTERVAL HPI/OVERNIGHT EVENTS:  Not cooperative  +cough  Lost IV access    MEDICATIONS  (STANDING):  enoxaparin Injectable 40milliGRAM(s) SubCutaneous daily  methylPREDNISolone sodium succinate Injectable 40milliGRAM(s) IV Push every 8 hours  ALBUTerol/ipratropium for Nebulization 3milliLiter(s) Nebulizer every 6 hours  levETIRAcetam  IVPB 500milliGRAM(s) IV Intermittent every 12 hours  nystatin Cream 1Application(s) Topical daily  lisinopril 10milliGRAM(s) Oral daily  benztropine 1milliGRAM(s) Oral two times a day  rOPINIRole 0.5milliGRAM(s) Oral two times a day  haloperidol     Tablet 10milliGRAM(s) Oral at bedtime  risperiDONE   Tablet 4milliGRAM(s) Oral daily  carvedilol 3.125milliGRAM(s) Oral every 12 hours  amLODIPine   Tablet 5milliGRAM(s) Oral daily      MEDICATIONS  (PRN):  ALBUTerol/ipratropium for Nebulization 3milliLiter(s) Nebulizer every 4 hours PRN Shortness of Breath and/or Wheezing      Allergies    No Known Allergies    Intolerances        PAST MEDICAL & SURGICAL HISTORY:  Schizophrenia, unspecified type  Chronic obstructive pulmonary disease, unspecified COPD type  No significant past surgical history      SOCIAL HISTORY  Smoking History:       REVIEW OF SYSTEMS:    CONSTITUTIONAL:  No distress    HEENT:  Eyes:  No diplopia or blurred vision. ENT:  No earache, sore throat or runny nose.    CARDIOVASCULAR:  No pressure, squeezing, tightness, heaviness or aching about the chest; no palpitations.    RESPIRATORY:  +cough    GASTROINTESTINAL:  No nausea, vomiting or diarrhea.    GENITOURINARY:  No dysuria, frequency or urgency.    MUSCULOSKELETAL:  No joint pain    SKIN:  No new lesions.    NEUROLOGIC:  No paresthesias, fasciculations, seizures or weakness.    PSYCHIATRIC:  No disorder of thought or mood.    ENDOCRINE:  No heat or cold intolerance, polyuria or polydipsia.    HEMATOLOGICAL:  No easy bruising or bleeding.     Vital Signs Last 24 Hrs  T(C): 36.8, Max: 37 (06-08 @ 16:12)  T(F): 98.2, Max: 98.6 (06-08 @ 16:12)  HR: 76 (76 - 88)  BP: 112/86 (112/86 - 137/86)  BP(mean): --  RR: 20 (18 - 20)  SpO2: 92% (89% - 99%)    PHYSICAL EXAMINATION:    GENERAL: The patient is awake and alert in no apparent distress.     HEENT: Head is normocephalic and atraumatic. Extraocular muscles are intact. Mucous membranes are moist.    NECK: Supple.    LUNGS: Clear to auscultation without wheezing, rales or rhonchi; respirations unlabored,decreased diffusely, increasesd E/I    HEART: Regular rate and rhythm without murmur.    ABDOMEN: Soft, nontender, and nondistended.      EXTREMITIES: Without any cyanosis, clubbing, rash, lesions or edema.    NEUROLOGIC: Grossly intact.    SKIN: No ulceration or induration present.      LABS:                        13.3   3.2   )-----------( 119      ( 08 Jun 2017 06:43 )             41.0     06-08    137  |  89<L>  |  25.0<H>  ----------------------------<  104  4.9   |  41.0<H>  |  0.59    Ca    9.0      08 Jun 2017 06:43                          MICROBIOLOGY:    RADIOLOGY & ADDITIONAL STUDIES:

## 2017-06-09 NOTE — PHYSICAL THERAPY INITIAL EVALUATION ADULT - GAIT PATTERN USED, PT EVAL
contact A for safety during turns and obstacle negotiation, decreased gait velocity and desmond step length, max verbal cues to re-direct to task, pt refusing further ambulation without rationale.

## 2017-06-09 NOTE — BEHAVIORAL HEALTH ASSESSMENT NOTE - HPI (INCLUDE ILLNESS QUALITY, SEVERITY, DURATION, TIMING, CONTEXT, MODIFYING FACTORS, ASSOCIATED SIGNS AND SYMPTOMS)
65yo swm, hx of COPD, schizophrenia, SZ disorder from  [facility] adult home admitted to hospital with SOB. During stay he has been non compliant with certain medications but will accept psych meds; also non complaint with oxygen and keeps removing it and he will try and ambulate to bathroom by himself and needs constant redirection. He has a resting tremor in his hands and appears anxious. He denies si/hi/avh or perceptual disturbances and denies that he has refused any meds/treatment. He is not oriented to time or place but can tell his . Recent and remote memory is poor. 1:1 staff reports that he needs redirection at times but denies bizarre behavior.    Staff roberto at [facility] home reports that patient has been stable for the past few months but remains slightly paranoid at baseline refusing meds at times if he doesn't recognize them. He has no hx of behavioral disturbances or aggression.

## 2017-06-09 NOTE — BEHAVIORAL HEALTH ASSESSMENT NOTE - SUMMARY
67yo swm, hx of schizophrenia admitted s/p copd exacerbation refusing some meds but continues to take psych meds. He is not delusional or psychotic at this time. A&Ox1. He is on 1:1 supervision for safety due to trying to ambulate and removing O2 but responds well to redirection. Patient appears to be functioning close to baseline as per group home staff. No changes at this time.    recommend:  1) continue haldol 10mg hs, risperdal 4mg daily, cogentin 1mg bid  2) continue 1:1 supervision for safety  3) psych f/u prn

## 2017-06-09 NOTE — PHYSICAL THERAPY INITIAL EVALUATION ADULT - ADDITIONAL COMMENTS
pt a questionable historian, pt lives in adult home, owns no DME, as per medical chart: 14 steps 1 rail to bedroom, needs to be independent without DME to return back to adult home

## 2017-06-09 NOTE — PROGRESS NOTE ADULT - SUBJECTIVE AND OBJECTIVE BOX
Patient: DAVY HOLDEN [id number] 66y Male  Internal Medicine Hospitalist Progress Note - Dr. Serg Raymundo    Chief Complaint: Patient is a 66y old  Male who presents with a chief complaint of SOB (2017 02:04)    HPI:  66M COPD schizophrenia, ?seizure disorder, admitted from group home with SOB.  He was extremely agitated, valium 5mg IV given in ED.  ABG revealed hypercapnic respiratory failure, placed on BIPAP.  RVP was positive.  Pt clinically improved, off BIPAP.  Treated for COPD exacerbation.  He has refused IV access and medical treatments, placed on 1:1 observation.  Awaiting psychiatry evaluation.     Seen with 1:1 aide at bedside.  He has continued to refuse medications.  Denies specific complaints.  No SOB.  + cough, no fever/ chills.      ____________________PHYSICAL EXAM:  Vitals reviewed as indicated below  GENERAL:  NAD Alert and Oriented to person, place.  Tremulous.    HEENT: NCAT  CARDIOVASCULAR:  S1, S2  LUNGS: coarse BS b/l.   ABDOMEN:  soft, (-) tenderness, (-) distension, (+) bowel sounds, (-) guarding, (-) rebound (-) rigidity  EXTREMITIES:  no cyanosis / clubbing / edema.   NEURO: Strength symmetric, + resting tremor.    ____________________      MEDICATIONS:  enoxaparin Injectable 40milliGRAM(s) SubCutaneous daily  methylPREDNISolone sodium succinate Injectable 40milliGRAM(s) IV Push every 8 hours  ALBUTerol/ipratropium for Nebulization 3milliLiter(s) Nebulizer every 6 hours  levETIRAcetam  IVPB 500milliGRAM(s) IV Intermittent every 12 hours  nystatin Cream 1Application(s) Topical daily  lisinopril 10milliGRAM(s) Oral daily  benztropine 1milliGRAM(s) Oral two times a day  rOPINIRole 0.5milliGRAM(s) Oral two times a day  haloperidol     Tablet 10milliGRAM(s) Oral at bedtime  risperiDONE   Tablet 4milliGRAM(s) Oral daily  carvedilol 3.125milliGRAM(s) Oral every 12 hours  amLODIPine   Tablet 5milliGRAM(s) Oral daily  ALBUTerol/ipratropium for Nebulization 3milliLiter(s) Nebulizer every 4 hours PRN      VITALS:  Vital Signs Last 24 Hrs  T(C): 36.8, Max: 37 ( @ 16:12)  T(F): 98.2, Max: 98.6 ( @ 16:12)  HR: 76 (76 - 88)  BP: 112/86 (112/86 - 137/86)  BP(mean): --  RR: 20 (18 - 20)  SpO2: 92% (89% - 99%) Daily     Daily Weight in k.6 (2017 04:27)  CAPILLARY BLOOD GLUCOSE    I&O's Summary      LABS:                        13.3   3.2   )-----------( 119      ( 2017 06:43 )             41.0         137  |  89<L>  |  25.0<H>  ----------------------------<  104  4.9   |  41.0<H>  |  0.59    Ca    9.0      2017 06:43        RECENT CULTURES:  06-05 .Blood Blood-Peripheral XXXX XXXX   No growth at 48 hours

## 2017-06-10 LAB
CULTURE RESULTS: SIGNIFICANT CHANGE UP
CULTURE RESULTS: SIGNIFICANT CHANGE UP
SPECIMEN SOURCE: SIGNIFICANT CHANGE UP
SPECIMEN SOURCE: SIGNIFICANT CHANGE UP

## 2017-06-10 PROCEDURE — 99233 SBSQ HOSP IP/OBS HIGH 50: CPT

## 2017-06-10 RX ADMIN — NYSTATIN CREAM 1 APPLICATION(S): 100000 CREAM TOPICAL at 11:40

## 2017-06-10 RX ADMIN — Medication 40 MILLIGRAM(S): at 05:46

## 2017-06-10 RX ADMIN — ROPINIROLE 0.5 MILLIGRAM(S): 8 TABLET, FILM COATED, EXTENDED RELEASE ORAL at 17:47

## 2017-06-10 RX ADMIN — LEVETIRACETAM 500 MILLIGRAM(S): 250 TABLET, FILM COATED ORAL at 17:42

## 2017-06-10 RX ADMIN — Medication 1 MILLIGRAM(S): at 05:46

## 2017-06-10 RX ADMIN — CARVEDILOL PHOSPHATE 3.12 MILLIGRAM(S): 80 CAPSULE, EXTENDED RELEASE ORAL at 17:42

## 2017-06-10 RX ADMIN — LISINOPRIL 10 MILLIGRAM(S): 2.5 TABLET ORAL at 05:46

## 2017-06-10 RX ADMIN — Medication 3 MILLILITER(S): at 08:43

## 2017-06-10 RX ADMIN — ENOXAPARIN SODIUM 40 MILLIGRAM(S): 100 INJECTION SUBCUTANEOUS at 11:41

## 2017-06-10 RX ADMIN — RISPERIDONE 4 MILLIGRAM(S): 4 TABLET ORAL at 11:41

## 2017-06-10 RX ADMIN — CARVEDILOL PHOSPHATE 3.12 MILLIGRAM(S): 80 CAPSULE, EXTENDED RELEASE ORAL at 05:46

## 2017-06-10 RX ADMIN — Medication 3 MILLILITER(S): at 20:33

## 2017-06-10 RX ADMIN — HALOPERIDOL DECANOATE 10 MILLIGRAM(S): 100 INJECTION INTRAMUSCULAR at 22:16

## 2017-06-10 RX ADMIN — Medication 3 MILLILITER(S): at 15:34

## 2017-06-10 RX ADMIN — AMLODIPINE BESYLATE 5 MILLIGRAM(S): 2.5 TABLET ORAL at 05:46

## 2017-06-10 RX ADMIN — ROPINIROLE 0.5 MILLIGRAM(S): 8 TABLET, FILM COATED, EXTENDED RELEASE ORAL at 05:46

## 2017-06-10 RX ADMIN — Medication 1 MILLIGRAM(S): at 17:47

## 2017-06-10 NOTE — PROGRESS NOTE ADULT - SUBJECTIVE AND OBJECTIVE BOX
DAVY HOLDEN   ------------------------------  The patient was seen and evaluated for COPD.  The patient is in no acute distress.  Offers no complaints. Noted to be ambulating without difficulty.    Vital Signs Last 24 Hrs  T(C): 37.2, Max: 37.2 (06-10 @ 05:37)  T(F): 99, Max: 99 (06-10 @ 05:37)  HR: 76 (76 - 103)  BP: 99/67 (99/67 - 116/79)  BP(mean): --  RR: 17 (17 - 19)  SpO2: 99% (91% - 99%)    PHYSICAL EXAMINATION:  ----------------------------------------  General appearance: NAD, Awake, Alert  HEENT: NCAT, Conjunctiva clear, EOMI, Pupils reactive  Neck: Supple, No JVD, No tenderness  Lungs: Clear to auscultation, Breath sound equal bilaterally, No rales, Minimal expiratory wheezing  Cardiovascular: S1S2, Regular rhythm  Abdomen: Soft, Nontender, Nondistended, No guarding/rebound, Positive bowel sounds  Extremities: No clubbing, No cyanosis, No edema, No calf tenderness  Neuro: Strength equal bilaterally, Minimal tremors    RECENT CULTURES:  06-05 .Blood Blood-Peripheral XXXX XXXX   No growth at 48 hours    MEDICATIONS  (STANDING):  enoxaparin Injectable 40milliGRAM(s) SubCutaneous daily  ALBUTerol/ipratropium for Nebulization 3milliLiter(s) Nebulizer every 6 hours  nystatin Cream 1Application(s) Topical daily  lisinopril 10milliGRAM(s) Oral daily  benztropine 1milliGRAM(s) Oral two times a day  rOPINIRole 0.5milliGRAM(s) Oral two times a day  haloperidol     Tablet 10milliGRAM(s) Oral at bedtime  risperiDONE   Tablet 4milliGRAM(s) Oral daily  carvedilol 3.125milliGRAM(s) Oral every 12 hours  amLODIPine   Tablet 5milliGRAM(s) Oral daily  predniSONE   Tablet 40milliGRAM(s) Oral daily  levETIRAcetam 500milliGRAM(s) Oral two times a day    MEDICATIONS  (PRN):  ALBUTerol/ipratropium for Nebulization 3milliLiter(s) Nebulizer every 4 hours PRN Shortness of Breath and/or Wheezing      ASSESSMENT / PLAN:  -----------------------------------  COPD exacerbation - Pulmonary follow up noted. On prednisone and inhaled albuterol/ipratropium. Noted to have mild hypoxia with ambulation. Oxygen saturation was 95% at rest on room air.    Parainfluenza - Supportive care.    Schizophrenia - On ropinirole, haloperidol, risperidone, and benztropine.    Seizure disorder - DAVY HOLDEN   ------------------------------  The patient was seen and evaluated for COPD.  The patient is in no acute distress.  Offers no complaints. Noted to be ambulating without difficulty.    Vital Signs Last 24 Hrs  T(C): 37.2, Max: 37.2 (06-10 @ 05:37)  T(F): 99, Max: 99 (06-10 @ 05:37)  HR: 76 (76 - 103)  BP: 99/67 (99/67 - 116/79)  BP(mean): --  RR: 17 (17 - 19)  SpO2: 99% (91% - 99%)    PHYSICAL EXAMINATION:  ----------------------------------------  General appearance: NAD, Awake, Alert  HEENT: NCAT, Conjunctiva clear, EOMI, Pupils reactive  Neck: Supple, No JVD, No tenderness  Lungs: Clear to auscultation, Breath sound equal bilaterally, No rales, Minimal expiratory wheezing  Cardiovascular: S1S2, Regular rhythm  Abdomen: Soft, Nontender, Nondistended, No guarding/rebound, Positive bowel sounds  Extremities: No clubbing, No cyanosis, No edema, No calf tenderness  Neuro: Strength equal bilaterally, Minimal tremors    RECENT CULTURES:  06-05 .Blood Blood-Peripheral XXXX XXXX   No growth at 48 hours    MEDICATIONS  (STANDING):  enoxaparin Injectable 40milliGRAM(s) SubCutaneous daily  ALBUTerol/ipratropium for Nebulization 3milliLiter(s) Nebulizer every 6 hours  nystatin Cream 1Application(s) Topical daily  lisinopril 10milliGRAM(s) Oral daily  benztropine 1milliGRAM(s) Oral two times a day  rOPINIRole 0.5milliGRAM(s) Oral two times a day  haloperidol     Tablet 10milliGRAM(s) Oral at bedtime  risperiDONE   Tablet 4milliGRAM(s) Oral daily  carvedilol 3.125milliGRAM(s) Oral every 12 hours  amLODIPine   Tablet 5milliGRAM(s) Oral daily  predniSONE   Tablet 40milliGRAM(s) Oral daily  levETIRAcetam 500milliGRAM(s) Oral two times a day    MEDICATIONS  (PRN):  ALBUTerol/ipratropium for Nebulization 3milliLiter(s) Nebulizer every 4 hours PRN Shortness of Breath and/or Wheezing      ASSESSMENT / PLAN:  -----------------------------------  COPD exacerbation - Pulmonary follow up noted. On prednisone and inhaled albuterol/ipratropium. Noted to have mild hypoxia with ambulation. Oxygen saturation was 95% at rest on room air.    Parainfluenza - Supportive care.    Schizophrenia - On ropinirole, haloperidol, risperidone, and benztropine. Psychiatry consultation noted. On constant observation.    Seizure disorder - On levetiracetam

## 2017-06-10 NOTE — PROGRESS NOTE ADULT - SUBJECTIVE AND OBJECTIVE BOX
PULMONARY PROGRESS NOTE      [id number]    Patient is a 66y old  Male who presents with a chief complaint of SOB (06 Jun 2017 02:04)      INTERVAL HPI/OVERNIGHT EVENTS:  No distress off O2  Ambulating  Minimal cough    MEDICATIONS  (STANDING):  enoxaparin Injectable 40milliGRAM(s) SubCutaneous daily  ALBUTerol/ipratropium for Nebulization 3milliLiter(s) Nebulizer every 6 hours  nystatin Cream 1Application(s) Topical daily  lisinopril 10milliGRAM(s) Oral daily  benztropine 1milliGRAM(s) Oral two times a day  rOPINIRole 0.5milliGRAM(s) Oral two times a day  haloperidol     Tablet 10milliGRAM(s) Oral at bedtime  risperiDONE   Tablet 4milliGRAM(s) Oral daily  carvedilol 3.125milliGRAM(s) Oral every 12 hours  amLODIPine   Tablet 5milliGRAM(s) Oral daily  predniSONE   Tablet 40milliGRAM(s) Oral daily  levETIRAcetam 500milliGRAM(s) Oral two times a day      MEDICATIONS  (PRN):  ALBUTerol/ipratropium for Nebulization 3milliLiter(s) Nebulizer every 4 hours PRN Shortness of Breath and/or Wheezing      Allergies    No Known Allergies    Intolerances        PAST MEDICAL & SURGICAL HISTORY:  Schizophrenia, unspecified type  Chronic obstructive pulmonary disease, unspecified COPD type  No significant past surgical history      SOCIAL HISTORY  Smoking History:       REVIEW OF SYSTEMS:    CONSTITUTIONAL:  No distress    HEENT:  Eyes:  No diplopia or blurred vision. ENT:  No earache, sore throat or runny nose.    CARDIOVASCULAR:  No pressure, squeezing, tightness, heaviness or aching about the chest; no palpitations.    RESPIRATORY: Per HPI    GASTROINTESTINAL:  No nausea, vomiting or diarrhea.    GENITOURINARY:  No dysuria, frequency or urgency.    MUSCULOSKELETAL:  No joint pain    SKIN:  No new lesions.    NEUROLOGIC:  No paresthesias, fasciculations, seizures or weakness.    PSYCHIATRIC:  Schizophrenia.    ENDOCRINE:  No heat or cold intolerance, polyuria or polydipsia.    HEMATOLOGICAL:  No easy bruising or bleeding.     Vital Signs Last 24 Hrs  T(C): 37.2, Max: 37.2 (06-10 @ 05:37)  T(F): 99, Max: 99 (06-10 @ 05:37)  HR: 76 (76 - 103)  BP: 99/67 (99/67 - 116/79)  BP(mean): --  RR: 17 (17 - 19)  SpO2: 94% (91% - 99%)    PHYSICAL EXAMINATION:    GENERAL: The patient is awake and alert in no apparent distress.     HEENT: Head is normocephalic and atraumatic. Extraocular muscles are intact. Mucous membranes are moist.    NECK: Supple.    LUNGS: Decreased but clear; increased E/I ratio    HEART: Regular rate and rhythm without murmur.    ABDOMEN: Soft, nontender, and nondistended.      EXTREMITIES: Without any cyanosis, clubbing, rash, lesions or edema.    NEUROLOGIC: Grossly intact.    SKIN: No ulceration or induration present.      LABS:                              MICROBIOLOGY:    RADIOLOGY & ADDITIONAL STUDIES:

## 2017-06-11 PROCEDURE — 99233 SBSQ HOSP IP/OBS HIGH 50: CPT

## 2017-06-11 RX ORDER — ZOLPIDEM TARTRATE 10 MG/1
5 TABLET ORAL AT BEDTIME
Qty: 0 | Refills: 0 | Status: DISCONTINUED | OUTPATIENT
Start: 2017-06-11 | End: 2017-06-18

## 2017-06-11 RX ORDER — DIVALPROEX SODIUM 500 MG/1
500 TABLET, DELAYED RELEASE ORAL
Qty: 0 | Refills: 0 | Status: DISCONTINUED | OUTPATIENT
Start: 2017-06-11 | End: 2017-06-12

## 2017-06-11 RX ORDER — SENNA PLUS 8.6 MG/1
1 TABLET ORAL DAILY
Qty: 0 | Refills: 0 | Status: DISCONTINUED | OUTPATIENT
Start: 2017-06-11 | End: 2017-06-19

## 2017-06-11 RX ORDER — KETOCONAZOLE 20 MG/G
1 AEROSOL, FOAM TOPICAL DAILY
Qty: 0 | Refills: 0 | Status: DISCONTINUED | OUTPATIENT
Start: 2017-06-11 | End: 2017-06-19

## 2017-06-11 RX ADMIN — ZOLPIDEM TARTRATE 5 MILLIGRAM(S): 10 TABLET ORAL at 23:01

## 2017-06-11 RX ADMIN — KETOCONAZOLE 1 APPLICATION(S): 20 AEROSOL, FOAM TOPICAL at 11:49

## 2017-06-11 RX ADMIN — RISPERIDONE 4 MILLIGRAM(S): 4 TABLET ORAL at 11:49

## 2017-06-11 RX ADMIN — Medication 1 MILLIGRAM(S): at 18:05

## 2017-06-11 RX ADMIN — LISINOPRIL 10 MILLIGRAM(S): 2.5 TABLET ORAL at 05:32

## 2017-06-11 RX ADMIN — CARVEDILOL PHOSPHATE 3.12 MILLIGRAM(S): 80 CAPSULE, EXTENDED RELEASE ORAL at 05:30

## 2017-06-11 RX ADMIN — LEVETIRACETAM 500 MILLIGRAM(S): 250 TABLET, FILM COATED ORAL at 18:05

## 2017-06-11 RX ADMIN — Medication 3 MILLILITER(S): at 08:42

## 2017-06-11 RX ADMIN — ROPINIROLE 0.5 MILLIGRAM(S): 8 TABLET, FILM COATED, EXTENDED RELEASE ORAL at 18:06

## 2017-06-11 RX ADMIN — Medication 3 MILLILITER(S): at 18:10

## 2017-06-11 RX ADMIN — Medication 200 MILLIGRAM(S): at 20:28

## 2017-06-11 RX ADMIN — Medication 200 MILLIGRAM(S): at 07:56

## 2017-06-11 RX ADMIN — Medication 1 MILLIGRAM(S): at 05:33

## 2017-06-11 RX ADMIN — ROPINIROLE 0.5 MILLIGRAM(S): 8 TABLET, FILM COATED, EXTENDED RELEASE ORAL at 05:33

## 2017-06-11 RX ADMIN — CARVEDILOL PHOSPHATE 3.12 MILLIGRAM(S): 80 CAPSULE, EXTENDED RELEASE ORAL at 18:05

## 2017-06-11 RX ADMIN — SENNA PLUS 1 TABLET(S): 8.6 TABLET ORAL at 11:48

## 2017-06-11 RX ADMIN — DIVALPROEX SODIUM 500 MILLIGRAM(S): 500 TABLET, DELAYED RELEASE ORAL at 18:05

## 2017-06-11 RX ADMIN — HALOPERIDOL DECANOATE 10 MILLIGRAM(S): 100 INJECTION INTRAMUSCULAR at 23:01

## 2017-06-11 RX ADMIN — Medication 40 MILLIGRAM(S): at 05:34

## 2017-06-12 PROCEDURE — 99233 SBSQ HOSP IP/OBS HIGH 50: CPT

## 2017-06-12 RX ORDER — DIVALPROEX SODIUM 500 MG/1
1250 TABLET, DELAYED RELEASE ORAL AT BEDTIME
Qty: 0 | Refills: 0 | Status: DISCONTINUED | OUTPATIENT
Start: 2017-06-12 | End: 2017-06-19

## 2017-06-12 RX ORDER — TIOTROPIUM BROMIDE 18 UG/1
1 CAPSULE ORAL; RESPIRATORY (INHALATION) DAILY
Qty: 0 | Refills: 0 | Status: DISCONTINUED | OUTPATIENT
Start: 2017-06-12 | End: 2017-06-19

## 2017-06-12 RX ORDER — BUDESONIDE AND FORMOTEROL FUMARATE DIHYDRATE 160; 4.5 UG/1; UG/1
2 AEROSOL RESPIRATORY (INHALATION)
Qty: 0 | Refills: 0 | Status: DISCONTINUED | OUTPATIENT
Start: 2017-06-12 | End: 2017-06-19

## 2017-06-12 RX ORDER — DIAZEPAM 5 MG
5 TABLET ORAL DAILY
Qty: 0 | Refills: 0 | Status: DISCONTINUED | OUTPATIENT
Start: 2017-06-12 | End: 2017-06-19

## 2017-06-12 RX ADMIN — ROPINIROLE 0.5 MILLIGRAM(S): 8 TABLET, FILM COATED, EXTENDED RELEASE ORAL at 17:58

## 2017-06-12 RX ADMIN — Medication 1 MILLIGRAM(S): at 05:29

## 2017-06-12 RX ADMIN — CARVEDILOL PHOSPHATE 3.12 MILLIGRAM(S): 80 CAPSULE, EXTENDED RELEASE ORAL at 17:58

## 2017-06-12 RX ADMIN — Medication 200 MILLIGRAM(S): at 22:14

## 2017-06-12 RX ADMIN — Medication 1 MILLIGRAM(S): at 17:58

## 2017-06-12 RX ADMIN — ROPINIROLE 0.5 MILLIGRAM(S): 8 TABLET, FILM COATED, EXTENDED RELEASE ORAL at 05:30

## 2017-06-12 RX ADMIN — HALOPERIDOL DECANOATE 10 MILLIGRAM(S): 100 INJECTION INTRAMUSCULAR at 22:14

## 2017-06-12 RX ADMIN — ENOXAPARIN SODIUM 40 MILLIGRAM(S): 100 INJECTION SUBCUTANEOUS at 11:32

## 2017-06-12 RX ADMIN — RISPERIDONE 4 MILLIGRAM(S): 4 TABLET ORAL at 11:32

## 2017-06-12 RX ADMIN — Medication 40 MILLIGRAM(S): at 05:36

## 2017-06-12 RX ADMIN — KETOCONAZOLE 1 APPLICATION(S): 20 AEROSOL, FOAM TOPICAL at 11:32

## 2017-06-12 RX ADMIN — DIVALPROEX SODIUM 1250 MILLIGRAM(S): 500 TABLET, DELAYED RELEASE ORAL at 22:14

## 2017-06-12 RX ADMIN — SENNA PLUS 1 TABLET(S): 8.6 TABLET ORAL at 11:32

## 2017-06-12 RX ADMIN — Medication 5 MILLIGRAM(S): at 09:40

## 2017-06-12 RX ADMIN — Medication 200 MILLIGRAM(S): at 05:29

## 2017-06-12 RX ADMIN — ZOLPIDEM TARTRATE 5 MILLIGRAM(S): 10 TABLET ORAL at 22:14

## 2017-06-12 NOTE — DIETITIAN INITIAL EVALUATION ADULT. - OTHER INFO
Pt with good po intake at most meals per nsg notes, however pt refused breakfast this morning per 1:1 and is now asleep.

## 2017-06-12 NOTE — PROGRESS NOTE ADULT - SUBJECTIVE AND OBJECTIVE BOX
DAVY HOLDEN   ------------------------------  The patient was seen and evaluated for COPD.  The patient is in no acute distress.  Denied any chest pain, palpitations, or abdominal pain.  Reports minimal dyspnea and persistent cough.    Vital Signs Last 24 Hrs  T(C): 36.4, Max: 36.7 (06-11 @ 15:18)  T(F): 97.6, Max: 98.1 (06-11 @ 15:18)  HR: 84 (63 - 99)  BP: 110/77 (103/69 - 110/77)  BP(mean): --  RR: 22 (20 - 24)  SpO2: 90% (90% - 92%)    PHYSICAL EXAMINATION:  ----------------------------------------  General appearance: NAD, Awake, Alert  HEENT: NCAT, Conjunctiva clear, EOMI, Pupils reactive  Neck: Supple, No JVD, No tenderness  Lungs: Clear to auscultation, Breath sound equal bilaterally, No rales, No wheeze  Cardiovascular: S1S2, Regular rhythm  Abdomen: Soft, Nontender, Nondistended, No guarding/rebound, Positive bowel sounds  Extremities: No clubbing, No cyanosis, No edema, No calf tenderness  Neuro: Strength equal bilaterally, Minimal tremors    RECENT CULTURES:  06-05 .Blood Blood-Peripheral XXXX XXXX   No growth at 5 days.    MEDICATIONS  (STANDING):  enoxaparin Injectable 40milliGRAM(s) SubCutaneous daily  ALBUTerol/ipratropium for Nebulization 3milliLiter(s) Nebulizer every 6 hours  lisinopril 10milliGRAM(s) Oral daily  benztropine 1milliGRAM(s) Oral two times a day  rOPINIRole 0.5milliGRAM(s) Oral two times a day  haloperidol     Tablet 10milliGRAM(s) Oral at bedtime  risperiDONE   Tablet 4milliGRAM(s) Oral daily  carvedilol 3.125milliGRAM(s) Oral every 12 hours  amLODIPine   Tablet 5milliGRAM(s) Oral daily  predniSONE   Tablet 40milliGRAM(s) Oral daily  senna 1Tablet(s) Oral daily  ketoconazole 2% Cream 1Application(s) Topical daily  diVALproex ER 1250milliGRAM(s) Oral at bedtime  diazepam    Tablet 5milliGRAM(s) Oral daily    MEDICATIONS  (PRN):  ALBUTerol/ipratropium for Nebulization 3milliLiter(s) Nebulizer every 4 hours PRN Shortness of Breath and/or Wheezing  guaiFENesin    Syrup 200milliGRAM(s) Oral every 6 hours PRN Cough  zolpidem 5milliGRAM(s) Oral at bedtime PRN Insomnia      ASSESSMENT / PLAN:  -----------------------------------  COPD exacerbation - On prednisone and inhaled albuterol/ipratropium. Oxygen saturation was 90% at rest on room air. The patient is not allowing continuous pulse oximetry monitoring. Encouraged compliance with treatment.    Parainfluenza - Supportive care.    Schizophrenia - On ropinirole, haloperidol, risperidone, and benztropine. On constant observation.    Seizure disorder - On divalproex.

## 2017-06-13 PROCEDURE — 99233 SBSQ HOSP IP/OBS HIGH 50: CPT

## 2017-06-13 RX ADMIN — Medication 200 MILLIGRAM(S): at 08:55

## 2017-06-13 RX ADMIN — CARVEDILOL PHOSPHATE 3.12 MILLIGRAM(S): 80 CAPSULE, EXTENDED RELEASE ORAL at 17:45

## 2017-06-13 RX ADMIN — HALOPERIDOL DECANOATE 10 MILLIGRAM(S): 100 INJECTION INTRAMUSCULAR at 22:06

## 2017-06-13 RX ADMIN — SENNA PLUS 1 TABLET(S): 8.6 TABLET ORAL at 22:06

## 2017-06-13 RX ADMIN — Medication 5 MILLIGRAM(S): at 12:18

## 2017-06-13 RX ADMIN — Medication 200 MILLIGRAM(S): at 22:09

## 2017-06-13 RX ADMIN — RISPERIDONE 4 MILLIGRAM(S): 4 TABLET ORAL at 12:18

## 2017-06-13 RX ADMIN — Medication 1 MILLIGRAM(S): at 17:45

## 2017-06-13 RX ADMIN — KETOCONAZOLE 1 APPLICATION(S): 20 AEROSOL, FOAM TOPICAL at 12:19

## 2017-06-13 RX ADMIN — ROPINIROLE 0.5 MILLIGRAM(S): 8 TABLET, FILM COATED, EXTENDED RELEASE ORAL at 17:45

## 2017-06-13 RX ADMIN — BUDESONIDE AND FORMOTEROL FUMARATE DIHYDRATE 2 PUFF(S): 160; 4.5 AEROSOL RESPIRATORY (INHALATION) at 20:16

## 2017-06-13 RX ADMIN — DIVALPROEX SODIUM 1250 MILLIGRAM(S): 500 TABLET, DELAYED RELEASE ORAL at 22:09

## 2017-06-13 NOTE — PROGRESS NOTE ADULT - SUBJECTIVE AND OBJECTIVE BOX
DAVY HOLDEN   ------------------------------  The patient was seen and evaluated for COPD.  The patient is in no acute distress.  Denied any chest pain, palpitations, or abdominal pain.  Reports minimal dyspnea. Intermittent non-productive cough.    Vital Signs Last 24 Hrs  T(C): 37, Max: 37 (06-13 @ 08:43)  T(F): 98.6, Max: 98.6 (06-13 @ 08:43)  HR: 75 (62 - 76)  BP: 134/69 (108/75 - 134/69)  BP(mean): --  RR: 20 (20 - 22)  SpO2: 95% (90% - 95%)    PHYSICAL EXAMINATION:  ----------------------------------------  General appearance: NAD, Awake, Alert  HEENT: NCAT, Conjunctiva clear, EOMI, Pupils reactive  Neck: Supple, No JVD, No tenderness  Lungs: Clear to auscultation, Breath sound equal bilaterally, No rales, No wheeze  Cardiovascular: S1S2, Regular rhythm  Abdomen: Soft, Nontender, Nondistended, No guarding/rebound, Positive bowel sounds  Extremities: No clubbing, No cyanosis, No edema, No calf tenderness  Neuro: Strength equal bilaterally, Minimal tremors    MEDICATIONS  (STANDING):  enoxaparin Injectable 40milliGRAM(s) SubCutaneous daily  lisinopril 10milliGRAM(s) Oral daily  benztropine 1milliGRAM(s) Oral two times a day  rOPINIRole 0.5milliGRAM(s) Oral two times a day  haloperidol     Tablet 10milliGRAM(s) Oral at bedtime  risperiDONE   Tablet 4milliGRAM(s) Oral daily  carvedilol 3.125milliGRAM(s) Oral every 12 hours  amLODIPine   Tablet 5milliGRAM(s) Oral daily  predniSONE   Tablet 40milliGRAM(s) Oral daily  senna 1Tablet(s) Oral daily  ketoconazole 2% Cream 1Application(s) Topical daily  diVALproex ER 1250milliGRAM(s) Oral at bedtime  diazepam    Tablet 5milliGRAM(s) Oral daily  tiotropium 18 MICROgram(s) Capsule 1Capsule(s) Inhalation daily  buDESOnide  80 MICROgram(s)/formoterol 4.5 MICROgram(s) Inhaler 2Puff(s) Inhalation two times a day    MEDICATIONS  (PRN):  ALBUTerol/ipratropium for Nebulization 3milliLiter(s) Nebulizer every 4 hours PRN Shortness of Breath and/or Wheezing  guaiFENesin    Syrup 200milliGRAM(s) Oral every 6 hours PRN Cough  zolpidem 5milliGRAM(s) Oral at bedtime PRN Insomnia      ASSESSMENT / PLAN:  -----------------------------------  COPD exacerbation - Appears improved. Oxygen saturation was 92% on ambient air at rest. On prednisone and inhaled albuterol/ipratropium. Encouraged compliance with treatment.    Schizophrenia - On ropinirole, haloperidol, risperidone, and benztropine. Constant observation discontinued, on enhanced supervision. No events noted.    Seizure disorder - On divalproex.    Parainfluenza - Supportive care.

## 2017-06-14 PROCEDURE — 99233 SBSQ HOSP IP/OBS HIGH 50: CPT

## 2017-06-14 RX ADMIN — Medication 1 MILLIGRAM(S): at 17:41

## 2017-06-14 RX ADMIN — AMLODIPINE BESYLATE 5 MILLIGRAM(S): 2.5 TABLET ORAL at 06:03

## 2017-06-14 RX ADMIN — Medication 5 MILLIGRAM(S): at 11:18

## 2017-06-14 RX ADMIN — RISPERIDONE 4 MILLIGRAM(S): 4 TABLET ORAL at 11:18

## 2017-06-14 RX ADMIN — ROPINIROLE 0.5 MILLIGRAM(S): 8 TABLET, FILM COATED, EXTENDED RELEASE ORAL at 06:03

## 2017-06-14 RX ADMIN — ENOXAPARIN SODIUM 40 MILLIGRAM(S): 100 INJECTION SUBCUTANEOUS at 11:18

## 2017-06-14 RX ADMIN — Medication 40 MILLIGRAM(S): at 17:40

## 2017-06-14 RX ADMIN — ZOLPIDEM TARTRATE 5 MILLIGRAM(S): 10 TABLET ORAL at 21:20

## 2017-06-14 RX ADMIN — KETOCONAZOLE 1 APPLICATION(S): 20 AEROSOL, FOAM TOPICAL at 11:18

## 2017-06-14 RX ADMIN — BUDESONIDE AND FORMOTEROL FUMARATE DIHYDRATE 2 PUFF(S): 160; 4.5 AEROSOL RESPIRATORY (INHALATION) at 21:04

## 2017-06-14 RX ADMIN — TIOTROPIUM BROMIDE 1 CAPSULE(S): 18 CAPSULE ORAL; RESPIRATORY (INHALATION) at 08:28

## 2017-06-14 RX ADMIN — ROPINIROLE 0.5 MILLIGRAM(S): 8 TABLET, FILM COATED, EXTENDED RELEASE ORAL at 17:40

## 2017-06-14 RX ADMIN — Medication 1 MILLIGRAM(S): at 06:03

## 2017-06-14 RX ADMIN — SENNA PLUS 1 TABLET(S): 8.6 TABLET ORAL at 21:20

## 2017-06-14 RX ADMIN — CARVEDILOL PHOSPHATE 3.12 MILLIGRAM(S): 80 CAPSULE, EXTENDED RELEASE ORAL at 06:04

## 2017-06-14 RX ADMIN — HALOPERIDOL DECANOATE 10 MILLIGRAM(S): 100 INJECTION INTRAMUSCULAR at 21:21

## 2017-06-14 RX ADMIN — LISINOPRIL 10 MILLIGRAM(S): 2.5 TABLET ORAL at 06:04

## 2017-06-14 RX ADMIN — Medication 200 MILLIGRAM(S): at 17:41

## 2017-06-14 RX ADMIN — Medication 40 MILLIGRAM(S): at 06:04

## 2017-06-14 RX ADMIN — DIVALPROEX SODIUM 1250 MILLIGRAM(S): 500 TABLET, DELAYED RELEASE ORAL at 21:20

## 2017-06-14 RX ADMIN — BUDESONIDE AND FORMOTEROL FUMARATE DIHYDRATE 2 PUFF(S): 160; 4.5 AEROSOL RESPIRATORY (INHALATION) at 08:28

## 2017-06-14 NOTE — PROGRESS NOTE ADULT - SUBJECTIVE AND OBJECTIVE BOX
DAVY HOLDEN   ------------------------------  The patient was seen and evaluated for COPD.  The patient is in no acute distress.  Offers no complaints. Not accepting supplemental oxygen therapy.    Vital Signs Last 24 Hrs  T(C): 36.6, Max: 36.6 (06-14 @ 00:00)  T(F): 97.8, Max: 97.8 (06-14 @ 00:00)  HR: 60 (59 - 99)  BP: 95/527 (95/527 - 124/75)  BP(mean): --  RR: 20 (16 - 21)  SpO2: 86% (86% - 87%)    PHYSICAL EXAMINATION:  ----------------------------------------  General appearance: NAD, Awake, Alert  HEENT: NCAT, Conjunctiva clear, EOMI, Pupils reactive  Neck: Supple, No JVD, No tenderness  Lungs: Breath sound equal bilaterally, No rales, Bilateral expiratory wheezes  Cardiovascular: S1S2, Regular rhythm  Abdomen: Soft, Nontender, Nondistended, No guarding/rebound, Positive bowel sounds  Extremities: No clubbing, No cyanosis, No edema, No calf tenderness  Neuro: Strength equal bilaterally, Minimal tremors    MEDICATIONS  (STANDING):  enoxaparin Injectable 40milliGRAM(s) SubCutaneous daily  lisinopril 10milliGRAM(s) Oral daily  benztropine 1milliGRAM(s) Oral two times a day  rOPINIRole 0.5milliGRAM(s) Oral two times a day  haloperidol     Tablet 10milliGRAM(s) Oral at bedtime  risperiDONE   Tablet 4milliGRAM(s) Oral daily  carvedilol 3.125milliGRAM(s) Oral every 12 hours  amLODIPine   Tablet 5milliGRAM(s) Oral daily  senna 1Tablet(s) Oral daily  ketoconazole 2% Cream 1Application(s) Topical daily  diVALproex ER 1250milliGRAM(s) Oral at bedtime  diazepam    Tablet 5milliGRAM(s) Oral daily  tiotropium 18 MICROgram(s) Capsule 1Capsule(s) Inhalation daily  buDESOnide  80 MICROgram(s)/formoterol 4.5 MICROgram(s) Inhaler 2Puff(s) Inhalation two times a day  predniSONE   Tablet 40milliGRAM(s) Oral two times a day    MEDICATIONS  (PRN):  ALBUTerol/ipratropium for Nebulization 3milliLiter(s) Nebulizer every 4 hours PRN Shortness of Breath and/or Wheezing  guaiFENesin    Syrup 200milliGRAM(s) Oral every 6 hours PRN Cough  zolpidem 5milliGRAM(s) Oral at bedtime PRN Insomnia      ASSESSMENT / PLAN:  -----------------------------------  COPD exacerbation - Initially improved but now with recurrent hypoxia. Oxygen saturation was 87% on ambient air at rest. Dose of prednisone increased and on budesonide/formoterol and tiotropium as the patient is not compliant with nebulizer treatments of intravenous access.    Schizophrenia - On ropinirole, haloperidol, risperidone, and benztropine. Constant observation discontinued, on enhanced supervision. No events noted. Continues to be non-compliant with care and medications.    Seizure disorder - On divalproex.    Parainfluenza - Supportive care.

## 2017-06-15 PROCEDURE — 99233 SBSQ HOSP IP/OBS HIGH 50: CPT

## 2017-06-15 RX ADMIN — BUDESONIDE AND FORMOTEROL FUMARATE DIHYDRATE 2 PUFF(S): 160; 4.5 AEROSOL RESPIRATORY (INHALATION) at 20:32

## 2017-06-15 RX ADMIN — ROPINIROLE 0.5 MILLIGRAM(S): 8 TABLET, FILM COATED, EXTENDED RELEASE ORAL at 17:30

## 2017-06-15 RX ADMIN — RISPERIDONE 4 MILLIGRAM(S): 4 TABLET ORAL at 11:53

## 2017-06-15 RX ADMIN — KETOCONAZOLE 1 APPLICATION(S): 20 AEROSOL, FOAM TOPICAL at 11:54

## 2017-06-15 RX ADMIN — Medication 200 MILLIGRAM(S): at 11:53

## 2017-06-15 RX ADMIN — LISINOPRIL 10 MILLIGRAM(S): 2.5 TABLET ORAL at 06:10

## 2017-06-15 RX ADMIN — HALOPERIDOL DECANOATE 10 MILLIGRAM(S): 100 INJECTION INTRAMUSCULAR at 21:36

## 2017-06-15 RX ADMIN — Medication 40 MILLIGRAM(S): at 17:30

## 2017-06-15 RX ADMIN — BUDESONIDE AND FORMOTEROL FUMARATE DIHYDRATE 2 PUFF(S): 160; 4.5 AEROSOL RESPIRATORY (INHALATION) at 08:28

## 2017-06-15 RX ADMIN — ZOLPIDEM TARTRATE 5 MILLIGRAM(S): 10 TABLET ORAL at 21:35

## 2017-06-15 RX ADMIN — Medication 40 MILLIGRAM(S): at 06:10

## 2017-06-15 RX ADMIN — Medication 1 MILLIGRAM(S): at 06:12

## 2017-06-15 RX ADMIN — Medication 1 MILLIGRAM(S): at 17:30

## 2017-06-15 RX ADMIN — DIVALPROEX SODIUM 1250 MILLIGRAM(S): 500 TABLET, DELAYED RELEASE ORAL at 21:36

## 2017-06-15 RX ADMIN — SENNA PLUS 1 TABLET(S): 8.6 TABLET ORAL at 21:36

## 2017-06-15 RX ADMIN — ROPINIROLE 0.5 MILLIGRAM(S): 8 TABLET, FILM COATED, EXTENDED RELEASE ORAL at 06:10

## 2017-06-15 RX ADMIN — Medication 200 MILLIGRAM(S): at 17:31

## 2017-06-15 RX ADMIN — TIOTROPIUM BROMIDE 1 CAPSULE(S): 18 CAPSULE ORAL; RESPIRATORY (INHALATION) at 08:28

## 2017-06-15 RX ADMIN — Medication 5 MILLIGRAM(S): at 11:52

## 2017-06-15 RX ADMIN — CARVEDILOL PHOSPHATE 3.12 MILLIGRAM(S): 80 CAPSULE, EXTENDED RELEASE ORAL at 06:10

## 2017-06-15 RX ADMIN — Medication 3 MILLILITER(S): at 20:31

## 2017-06-15 RX ADMIN — AMLODIPINE BESYLATE 5 MILLIGRAM(S): 2.5 TABLET ORAL at 07:44

## 2017-06-15 RX ADMIN — CARVEDILOL PHOSPHATE 3.12 MILLIGRAM(S): 80 CAPSULE, EXTENDED RELEASE ORAL at 17:31

## 2017-06-15 NOTE — PROGRESS NOTE ADULT - SUBJECTIVE AND OBJECTIVE BOX
DAVY HOLDEN   ------------------------------  The patient was seen and evaluated for COPD.  The patient is in no acute distress.  Denied any chest pain, palpitations, shortness of breath, or abdominal pain.  Not participating with Physical Therapy today despite encouragement.    Vital Signs Last 24 Hrs  T(C): 36.5, Max: 36.5 (06-15 @ 08:41)  T(F): 97.7, Max: 97.7 (06-15 @ 08:41)  HR: 57 (57 - 83)  BP: 110/69 (100/66 - 110/69)  BP(mean): --  RR: 20 (20 - 30)  SpO2: 90% (87% - 90%)    PHYSICAL EXAMINATION:  ----------------------------------------  General appearance: NAD, Awake, Alert  HEENT: NCAT, Conjunctiva clear, EOMI, Pupils reactive  Neck: Supple, No JVD, No tenderness  Lungs: Breath sound equal bilaterally, No rales, Wheezing improved  Cardiovascular: S1S2, Regular rhythm  Abdomen: Soft, Nontender, Nondistended, No guarding/rebound, Positive bowel sounds  Extremities: No clubbing, No cyanosis, No edema, No calf tenderness  Neuro: Strength equal bilaterally, Minimal tremors    MEDICATIONS  (STANDING):  enoxaparin Injectable 40milliGRAM(s) SubCutaneous daily  lisinopril 10milliGRAM(s) Oral daily  benztropine 1milliGRAM(s) Oral two times a day  rOPINIRole 0.5milliGRAM(s) Oral two times a day  haloperidol     Tablet 10milliGRAM(s) Oral at bedtime  risperiDONE   Tablet 4milliGRAM(s) Oral daily  carvedilol 3.125milliGRAM(s) Oral every 12 hours  amLODIPine   Tablet 5milliGRAM(s) Oral daily  senna 1Tablet(s) Oral daily  ketoconazole 2% Cream 1Application(s) Topical daily  diVALproex ER 1250milliGRAM(s) Oral at bedtime  diazepam    Tablet 5milliGRAM(s) Oral daily  tiotropium 18 MICROgram(s) Capsule 1Capsule(s) Inhalation daily  buDESOnide  80 MICROgram(s)/formoterol 4.5 MICROgram(s) Inhaler 2Puff(s) Inhalation two times a day  predniSONE   Tablet 40milliGRAM(s) Oral two times a day    MEDICATIONS  (PRN):  ALBUTerol/ipratropium for Nebulization 3milliLiter(s) Nebulizer every 4 hours PRN Shortness of Breath and/or Wheezing  guaiFENesin    Syrup 200milliGRAM(s) Oral every 6 hours PRN Cough  zolpidem 5milliGRAM(s) Oral at bedtime PRN Insomnia      ASSESSMENT / PLAN:  -----------------------------------  COPD exacerbation - Wheezing improved on increased dose of prednisone. Oxygen saturation 92% on ambient air at rest. Not willing to participate with physical therapy to evaluate oxygen saturation with ambulation. Inhaled bronchodilator therapy.    Schizophrenia - On enhanced supervision. On ropinirole, haloperidol, risperidone, and benztropine. No events noted. Continues to be non-compliant with care and medications.    Seizure disorder - On divalproex.    Parainfluenza - Supportive care.

## 2017-06-16 PROCEDURE — 99233 SBSQ HOSP IP/OBS HIGH 50: CPT

## 2017-06-16 RX ADMIN — ZOLPIDEM TARTRATE 5 MILLIGRAM(S): 10 TABLET ORAL at 22:24

## 2017-06-16 RX ADMIN — Medication 40 MILLIGRAM(S): at 17:10

## 2017-06-16 RX ADMIN — Medication 40 MILLIGRAM(S): at 05:39

## 2017-06-16 RX ADMIN — ENOXAPARIN SODIUM 40 MILLIGRAM(S): 100 INJECTION SUBCUTANEOUS at 11:39

## 2017-06-16 RX ADMIN — Medication 5 MILLIGRAM(S): at 11:39

## 2017-06-16 RX ADMIN — TIOTROPIUM BROMIDE 1 CAPSULE(S): 18 CAPSULE ORAL; RESPIRATORY (INHALATION) at 08:42

## 2017-06-16 RX ADMIN — KETOCONAZOLE 1 APPLICATION(S): 20 AEROSOL, FOAM TOPICAL at 11:39

## 2017-06-16 RX ADMIN — RISPERIDONE 4 MILLIGRAM(S): 4 TABLET ORAL at 11:39

## 2017-06-16 RX ADMIN — BUDESONIDE AND FORMOTEROL FUMARATE DIHYDRATE 2 PUFF(S): 160; 4.5 AEROSOL RESPIRATORY (INHALATION) at 08:40

## 2017-06-16 RX ADMIN — HALOPERIDOL DECANOATE 10 MILLIGRAM(S): 100 INJECTION INTRAMUSCULAR at 22:24

## 2017-06-16 RX ADMIN — CARVEDILOL PHOSPHATE 3.12 MILLIGRAM(S): 80 CAPSULE, EXTENDED RELEASE ORAL at 17:10

## 2017-06-16 RX ADMIN — SENNA PLUS 1 TABLET(S): 8.6 TABLET ORAL at 22:24

## 2017-06-16 RX ADMIN — Medication 1 MILLIGRAM(S): at 17:10

## 2017-06-16 RX ADMIN — ROPINIROLE 0.5 MILLIGRAM(S): 8 TABLET, FILM COATED, EXTENDED RELEASE ORAL at 05:39

## 2017-06-16 RX ADMIN — Medication 1 MILLIGRAM(S): at 05:39

## 2017-06-16 RX ADMIN — ROPINIROLE 0.5 MILLIGRAM(S): 8 TABLET, FILM COATED, EXTENDED RELEASE ORAL at 17:10

## 2017-06-16 RX ADMIN — BUDESONIDE AND FORMOTEROL FUMARATE DIHYDRATE 2 PUFF(S): 160; 4.5 AEROSOL RESPIRATORY (INHALATION) at 19:57

## 2017-06-16 RX ADMIN — DIVALPROEX SODIUM 1250 MILLIGRAM(S): 500 TABLET, DELAYED RELEASE ORAL at 22:24

## 2017-06-16 NOTE — PROGRESS NOTE ADULT - SUBJECTIVE AND OBJECTIVE BOX
DAVY HOLDEN   ------------------------------  The patient was seen and evaluated for COPD.  The patient is in no acute distress.  Denied any chest pain, palpitations, or abdominal pain.  Reports persistent non-productive cough and some dyspnea.    Vital Signs Last 24 Hrs  T(C): 36.1, Max: 36.8 (06-15 @ 23:02)  T(F): 97, Max: 98.3 (06-15 @ 23:02)  HR: 64 (64 - 104)  BP: 95/61 (95/61 - 129/69)  BP(mean): --  RR: 18 (16 - 20)  SpO2: 96% (96% - 96%)    PHYSICAL EXAMINATION:  ----------------------------------------  General appearance: NAD, Awake, Alert  HEENT: NCAT, Conjunctiva clear, EOMI, Pupils reactive  Neck: Supple, No JVD, No tenderness  Lungs: Breath sound equal bilaterally, No rales, Slight expiratory wheeze  Cardiovascular: S1S2, Regular rhythm  Abdomen: Soft, Nontender, Nondistended, No guarding/rebound, Positive bowel sounds  Extremities: No clubbing, No cyanosis, No edema, No calf tenderness  Neuro: Strength equal bilaterally, No tremors    MEDICATIONS  (STANDING):  enoxaparin Injectable 40milliGRAM(s) SubCutaneous daily  benztropine 1milliGRAM(s) Oral two times a day  rOPINIRole 0.5milliGRAM(s) Oral two times a day  haloperidol     Tablet 10milliGRAM(s) Oral at bedtime  risperiDONE   Tablet 4milliGRAM(s) Oral daily  carvedilol 3.125milliGRAM(s) Oral every 12 hours  amLODIPine   Tablet 5milliGRAM(s) Oral daily  senna 1Tablet(s) Oral daily  ketoconazole 2% Cream 1Application(s) Topical daily  diVALproex ER 1250milliGRAM(s) Oral at bedtime  diazepam    Tablet 5milliGRAM(s) Oral daily  tiotropium 18 MICROgram(s) Capsule 1Capsule(s) Inhalation daily  buDESOnide  80 MICROgram(s)/formoterol 4.5 MICROgram(s) Inhaler 2Puff(s) Inhalation two times a day  predniSONE   Tablet 40milliGRAM(s) Oral two times a day    MEDICATIONS  (PRN):  ALBUTerol/ipratropium for Nebulization 3milliLiter(s) Nebulizer every 4 hours PRN Shortness of Breath and/or Wheezing  guaiFENesin    Syrup 200milliGRAM(s) Oral every 6 hours PRN Cough  zolpidem 5milliGRAM(s) Oral at bedtime PRN Insomnia      ASSESSMENT / PLAN:  -----------------------------------  COPD exacerbation - On prednisone. Persistent wheeze with some improvement. Oxygen saturation 92% on ambient air at rest. Encouraged participation with physical therapy to evaluate oxygen saturation with ambulation. Inhaled bronchodilator therapy. Persistent cough noted, to discontinue lisinopril and monitor.    Schizophrenia - On enhanced supervision, no events noted. On ropinirole, haloperidol, risperidone, divalproex, and benztropine. No events noted. Continues to be non-compliant with care and medications.    Parainfluenza - Supportive care.

## 2017-06-17 DIAGNOSIS — J96.21 ACUTE AND CHRONIC RESPIRATORY FAILURE WITH HYPOXIA: ICD-10-CM

## 2017-06-17 DIAGNOSIS — B33.8 OTHER SPECIFIED VIRAL DISEASES: ICD-10-CM

## 2017-06-17 DIAGNOSIS — Z29.9 ENCOUNTER FOR PROPHYLACTIC MEASURES, UNSPECIFIED: ICD-10-CM

## 2017-06-17 PROCEDURE — 99233 SBSQ HOSP IP/OBS HIGH 50: CPT

## 2017-06-17 RX ADMIN — BUDESONIDE AND FORMOTEROL FUMARATE DIHYDRATE 2 PUFF(S): 160; 4.5 AEROSOL RESPIRATORY (INHALATION) at 19:52

## 2017-06-17 RX ADMIN — CARVEDILOL PHOSPHATE 3.12 MILLIGRAM(S): 80 CAPSULE, EXTENDED RELEASE ORAL at 05:12

## 2017-06-17 RX ADMIN — KETOCONAZOLE 1 APPLICATION(S): 20 AEROSOL, FOAM TOPICAL at 11:55

## 2017-06-17 RX ADMIN — ENOXAPARIN SODIUM 40 MILLIGRAM(S): 100 INJECTION SUBCUTANEOUS at 11:55

## 2017-06-17 RX ADMIN — ROPINIROLE 0.5 MILLIGRAM(S): 8 TABLET, FILM COATED, EXTENDED RELEASE ORAL at 17:25

## 2017-06-17 RX ADMIN — Medication 1 MILLIGRAM(S): at 17:25

## 2017-06-17 RX ADMIN — CARVEDILOL PHOSPHATE 3.12 MILLIGRAM(S): 80 CAPSULE, EXTENDED RELEASE ORAL at 17:25

## 2017-06-17 RX ADMIN — TIOTROPIUM BROMIDE 1 CAPSULE(S): 18 CAPSULE ORAL; RESPIRATORY (INHALATION) at 09:36

## 2017-06-17 RX ADMIN — HALOPERIDOL DECANOATE 10 MILLIGRAM(S): 100 INJECTION INTRAMUSCULAR at 21:13

## 2017-06-17 RX ADMIN — AMLODIPINE BESYLATE 5 MILLIGRAM(S): 2.5 TABLET ORAL at 05:12

## 2017-06-17 RX ADMIN — Medication 1 MILLIGRAM(S): at 05:12

## 2017-06-17 RX ADMIN — Medication 5 MILLIGRAM(S): at 11:55

## 2017-06-17 RX ADMIN — Medication 30 MILLIGRAM(S): at 17:25

## 2017-06-17 RX ADMIN — DIVALPROEX SODIUM 1250 MILLIGRAM(S): 500 TABLET, DELAYED RELEASE ORAL at 21:13

## 2017-06-17 RX ADMIN — Medication 40 MILLIGRAM(S): at 05:12

## 2017-06-17 RX ADMIN — ROPINIROLE 0.5 MILLIGRAM(S): 8 TABLET, FILM COATED, EXTENDED RELEASE ORAL at 05:12

## 2017-06-17 RX ADMIN — SENNA PLUS 1 TABLET(S): 8.6 TABLET ORAL at 21:13

## 2017-06-17 RX ADMIN — RISPERIDONE 4 MILLIGRAM(S): 4 TABLET ORAL at 11:55

## 2017-06-17 NOTE — PROGRESS NOTE ADULT - SUBJECTIVE AND OBJECTIVE BOX
HOSPITALIST PROGRESS NOTE    DAVY HOLDEN  [id number]  67yMale    Patient is a 67y old  Male who presents with a chief complaint of SOB (06 Jun 2017 02:04)      SUBJECTIVE:   Chart reviewed since admission. Discussed with Dr Acosta  Patient seen and examined at bedside.      OBJECTIVE:  Vital Signs Last 24 Hrs  T(C): 36.5, Max: 36.7 (06-16 @ 15:42)  T(F): 97.7, Max: 98 (06-16 @ 15:42)  HR: 75 (66 - 75)  BP: 115/76 (113/82 - 120/88)  BP(mean): --  RR: 20 (20 - 20)  SpO2: 96% (96% - 96%)    PHYSICAL EXAMINATION  General: NAD[]   nontoxic[]   ill-appearing[]  Obese[]  HEENT: AT/NC[]  PERRLA[]  EOMI[]   Moist oral mucosa[]  Pharyngeal exudates[]  NECK: Supple[]  JVD[] Carotid bruit[]  CVS: RRR[]  Irregular[]  S1+S2[]   Murmur[]  RESP: Fair air entry bilaterally[]   Clear sounds[]   poor effort []  wheeze[]   Crackles[]  GI: Soft[]  Nondistended[]   Nontender[]   Bowel Sounds[]  Mass[]   HSM[]   Ascites[]  : suprapubic tenderness[]   CVA Tenderness[]   Baird[]  MS: FROM[]   Edema[]  CNS: AAOx3[]    Motor strength /5     DTR +    Sensory    Coordination    Gait  INTEG: Skin is Warm[]  dry[] Lesion[] Decubitus[]  PSYCH: Mood, Affect    MONITOR:  CAPILLARY BLOOD GLUCOSE        I&O's Summary    I & Os for current day (as of 17 Jun 2017 15:17)  =============================================  IN: 1170 ml / OUT: 0 ml / NET: 1170 ml                      Culture:    TTE:    RADIOLOGY        MEDICATIONS  (STANDING):  enoxaparin Injectable 40milliGRAM(s) SubCutaneous daily  benztropine 1milliGRAM(s) Oral two times a day  rOPINIRole 0.5milliGRAM(s) Oral two times a day  haloperidol     Tablet 10milliGRAM(s) Oral at bedtime  risperiDONE   Tablet 4milliGRAM(s) Oral daily  carvedilol 3.125milliGRAM(s) Oral every 12 hours  amLODIPine   Tablet 5milliGRAM(s) Oral daily  senna 1Tablet(s) Oral daily  ketoconazole 2% Cream 1Application(s) Topical daily  diVALproex ER 1250milliGRAM(s) Oral at bedtime  diazepam    Tablet 5milliGRAM(s) Oral daily  tiotropium 18 MICROgram(s) Capsule 1Capsule(s) Inhalation daily  buDESOnide  80 MICROgram(s)/formoterol 4.5 MICROgram(s) Inhaler 2Puff(s) Inhalation two times a day  predniSONE   Tablet 40milliGRAM(s) Oral two times a day      MEDICATIONS  (PRN):  ALBUTerol/ipratropium for Nebulization 3milliLiter(s) Nebulizer every 4 hours PRN Shortness of Breath and/or Wheezing  guaiFENesin    Syrup 200milliGRAM(s) Oral every 6 hours PRN Cough  zolpidem 5milliGRAM(s) Oral at bedtime PRN Insomnia HOSPITALIST PROGRESS NOTE    DAVY HOLDEN  [id number]  67yMale    Patient is a 67y old  Male who presents with a chief complaint of SOB (06 Jun 2017 02:04)      SUBJECTIVE:   Chart reviewed since admission. Discussed with Dr Acosta  Patient seen and examined at bedside.  Denies any dyspnea, chest pain or fever.  Cough - productive of brown phlegm.  Per RN spo2  91-92%      OBJECTIVE:  Vital Signs Last 24 Hrs  T(C): 36.5, Max: 36.7 (06-16 @ 15:42)  T(F): 97.7, Max: 98 (06-16 @ 15:42)  HR: 75 (66 - 75)  BP: 115/76 (113/82 - 120/88)  RR: 20 (20 - 20)  SpO2: 96% (96% - 96%)    PHYSICAL EXAMINATION  General: NAD[+]   nontoxic[+]   ill-appearing[]  Obese[]  HEENT: AT/NC[+]  PERRLA[]  EOMI[]   Moist oral mucosa[+]  Pharyngeal exudates[]  NECK: Supple[+]  JVD[] Carotid bruit[]  CVS: RRR[+]    S1+S2[+]   Murmur[]  RESP: Fair air entry bilaterally[+]   Clear sounds[+]   poor effort []  wheeze[-]   Crackles[-]  GI: Soft[+]  Nondistended[+]   Nontender[+]   Bowel Sounds[+]  Mass[]   HSM[]   Ascites[]  : suprapubic tenderness[-]   CVA Tenderness[]   Baird[-]  MS: FROM[+]   Edema[-]  CNS: AAOx2, somewhat sluggish  INTEG: Skin is Warm[+]  dry[+] Lesion[] Decubitus[]  PSYCH: Fair Mood, Affect      MEDICATIONS  (STANDING):  enoxaparin Injectable 40milliGRAM(s) SubCutaneous daily  benztropine 1milliGRAM(s) Oral two times a day  rOPINIRole 0.5milliGRAM(s) Oral two times a day  haloperidol     Tablet 10milliGRAM(s) Oral at bedtime  risperiDONE   Tablet 4milliGRAM(s) Oral daily  carvedilol 3.125milliGRAM(s) Oral every 12 hours  amLODIPine   Tablet 5milliGRAM(s) Oral daily  senna 1Tablet(s) Oral daily  ketoconazole 2% Cream 1Application(s) Topical daily  diVALproex ER 1250milliGRAM(s) Oral at bedtime  diazepam    Tablet 5milliGRAM(s) Oral daily  tiotropium 18 MICROgram(s) Capsule 1Capsule(s) Inhalation daily  buDESOnide  80 MICROgram(s)/formoterol 4.5 MICROgram(s) Inhaler 2Puff(s) Inhalation two times a day  predniSONE   Tablet 40milliGRAM(s) Oral two times a day      MEDICATIONS  (PRN):  ALBUTerol/ipratropium for Nebulization 3milliLiter(s) Nebulizer every 4 hours PRN Shortness of Breath and/or Wheezing  guaiFENesin    Syrup 200milliGRAM(s) Oral every 6 hours PRN Cough  zolpidem 5milliGRAM(s) Oral at bedtime PRN Insomnia

## 2017-06-18 PROCEDURE — 99233 SBSQ HOSP IP/OBS HIGH 50: CPT

## 2017-06-18 RX ADMIN — ROPINIROLE 0.5 MILLIGRAM(S): 8 TABLET, FILM COATED, EXTENDED RELEASE ORAL at 17:14

## 2017-06-18 RX ADMIN — Medication 30 MILLIGRAM(S): at 06:04

## 2017-06-18 RX ADMIN — CARVEDILOL PHOSPHATE 3.12 MILLIGRAM(S): 80 CAPSULE, EXTENDED RELEASE ORAL at 06:05

## 2017-06-18 RX ADMIN — AMLODIPINE BESYLATE 5 MILLIGRAM(S): 2.5 TABLET ORAL at 06:05

## 2017-06-18 RX ADMIN — RISPERIDONE 4 MILLIGRAM(S): 4 TABLET ORAL at 12:11

## 2017-06-18 RX ADMIN — Medication 1 MILLIGRAM(S): at 17:15

## 2017-06-18 RX ADMIN — CARVEDILOL PHOSPHATE 3.12 MILLIGRAM(S): 80 CAPSULE, EXTENDED RELEASE ORAL at 17:15

## 2017-06-18 RX ADMIN — HALOPERIDOL DECANOATE 10 MILLIGRAM(S): 100 INJECTION INTRAMUSCULAR at 22:06

## 2017-06-18 RX ADMIN — ROPINIROLE 0.5 MILLIGRAM(S): 8 TABLET, FILM COATED, EXTENDED RELEASE ORAL at 06:04

## 2017-06-18 RX ADMIN — Medication 5 MILLIGRAM(S): at 12:11

## 2017-06-18 RX ADMIN — Medication 30 MILLIGRAM(S): at 17:14

## 2017-06-18 RX ADMIN — Medication 200 MILLIGRAM(S): at 22:07

## 2017-06-18 RX ADMIN — BUDESONIDE AND FORMOTEROL FUMARATE DIHYDRATE 2 PUFF(S): 160; 4.5 AEROSOL RESPIRATORY (INHALATION) at 20:46

## 2017-06-18 RX ADMIN — DIVALPROEX SODIUM 1250 MILLIGRAM(S): 500 TABLET, DELAYED RELEASE ORAL at 22:06

## 2017-06-18 RX ADMIN — KETOCONAZOLE 1 APPLICATION(S): 20 AEROSOL, FOAM TOPICAL at 12:11

## 2017-06-18 RX ADMIN — Medication 1 MILLIGRAM(S): at 06:05

## 2017-06-18 RX ADMIN — ENOXAPARIN SODIUM 40 MILLIGRAM(S): 100 INJECTION SUBCUTANEOUS at 12:11

## 2017-06-18 RX ADMIN — ZOLPIDEM TARTRATE 5 MILLIGRAM(S): 10 TABLET ORAL at 22:06

## 2017-06-18 NOTE — PROGRESS NOTE ADULT - SUBJECTIVE AND OBJECTIVE BOX
HOSPITALIST PROGRESS NOTE    DAVY HOLDEN  [id number]  67yMale    Patient is a 67y old  Male who presents with a chief complaint of SOB (06 Jun 2017 02:04)      SUBJECTIVE:   Chart reviewed since last visit.  Patient seen and examined at bedside earlier today for COPD.  Denies any dyspnea, chest pain.      OBJECTIVE:  Vital Signs Last 24 Hrs  T(C): 36.7, Max: 36.7 (06-18 @ 09:42)  T(F): 98.1, Max: 98.1 (06-18 @ 09:42)  HR: 61 (57 - 71)  BP: 109/74 (106/70 - 114/66)  RR: 17 (17 - 20)  SpO2: 96% (92% - 96%)    PHYSICAL EXAMINATION  General: NAD[+]   nontoxic[+]   ill-appearing[]  Obese[]  HEENT: AT/NC[+]  PERRLA[]  EOMI[]   Moist oral mucosa[+]  Pharyngeal exudates[]  NECK: Supple[+]  JVD[] Carotid bruit[]  CVS: RRR[+]    S1+S2[+]   Murmur[]  RESP: Fair air entry bilaterally[+]   Clear sounds[+]   poor effort []  wheeze[-]   Crackles[-]  GI: Soft[+]  Nondistended[+]   Nontender[+]   Bowel Sounds[+]  Mass[]   HSM[]   Ascites[]  : suprapubic tenderness[-]   CVA Tenderness[]   Baird[-]  MS: FROM[+]   Edema[-]  CNS: AAOx2, somewhat sluggish  INTEG: Skin is Warm[+]  dry[+] Lesion[] Decubitus[]  PSYCH: Fair Mood, Affect      I&O's Summary  I & Os for 24h ending 18 Jun 2017 07:00  =============================================  IN: 890 ml / OUT: 670 ml / NET: 220 ml    I & Os for current day (as of 18 Jun 2017 17:23)  =============================================  IN: 720 ml / OUT: 510 ml / NET: 210 ml              MEDICATIONS  (STANDING):  enoxaparin Injectable 40milliGRAM(s) SubCutaneous daily  benztropine 1milliGRAM(s) Oral two times a day  rOPINIRole 0.5milliGRAM(s) Oral two times a day  haloperidol     Tablet 10milliGRAM(s) Oral at bedtime  risperiDONE   Tablet 4milliGRAM(s) Oral daily  carvedilol 3.125milliGRAM(s) Oral every 12 hours  amLODIPine   Tablet 5milliGRAM(s) Oral daily  senna 1Tablet(s) Oral daily  ketoconazole 2% Cream 1Application(s) Topical daily  diVALproex ER 1250milliGRAM(s) Oral at bedtime  diazepam    Tablet 5milliGRAM(s) Oral daily  tiotropium 18 MICROgram(s) Capsule 1Capsule(s) Inhalation daily  buDESOnide  80 MICROgram(s)/formoterol 4.5 MICROgram(s) Inhaler 2Puff(s) Inhalation two times a day  predniSONE   Tablet 30milliGRAM(s) Oral two times a day      MEDICATIONS  (PRN):  ALBUTerol/ipratropium for Nebulization 3milliLiter(s) Nebulizer every 4 hours PRN Shortness of Breath and/or Wheezing  guaiFENesin    Syrup 200milliGRAM(s) Oral every 6 hours PRN Cough  zolpidem 5milliGRAM(s) Oral at bedtime PRN Insomnia

## 2017-06-18 NOTE — PROGRESS NOTE ADULT - PROBLEM SELECTOR PLAN 1
Decrease Prednisone  Continue oxygen, Nebs  Maintain spo2>90%  Patient asked to cooperate with RN, PT for oxygen saturation requirement determination
Decrease Prednisone  Continue oxygen, Nebs  Maintain spo2>90%

## 2017-06-18 NOTE — PROGRESS NOTE ADULT - ASSESSMENT
66M COPD  active smoker schizophrenia Hiatal hernia Seizure disorder with SOB due to COPD exacerbation.      COPD exacerbation - continue IV steroids / nebulizers.  Pulmonary evaluation.  S/p Acute on chronic hypercapnic respiratory failure, Parainfluenza infection - supportive care, now off BIPAP.  Schizophrenia - continue Haldol, mood stable  Seizure disorder - Keppra.    > DVT Prophylaxis - Lovenox subcut
66M presented from the group home for dyspnea. On presentation, WBC(4.5). ABG(7.23/88/101). Bipap was initiated for hypercapnic respiratory failure. Inhaled bronchodilator therapy and intravenous methylprednisolone were initiated for COPD exacerbation. Respiratory panel was positive for parainfluenza. The patient was seen by Pulmonary in consultation. The patient was non-compliant with medications and supplemental oxygen. The patient was seen by Psychiatry in consultation and continued on his psychiatric medications. The patient had improvement in his behavior and constant observation was discontinued. He continued to be poorly compliant with nebulizer treatments. The patient had recurrent episodes of hypoxia and wheezing requiring adjustment in the dose of prednisone.   Doing well on decreased dose of prednisone - however requires evaluation for home oxygen.
COPD  No obvious wheezing at this time    Plan:  Change to po prednisone  Routine nebs>currently PRN  Likely can d/c in next 48 hours >?O2 requirements
66M COPD  active smoker schizophrenia Hiatal hernia Seizure disorder unreliable history from patient      Acute on chronic hypercapnic respiratory failure positive  para- Influenza, initially  ventilator support at [facility] -BIPAP support Steroids/bronchodilator/Azithro/DVT prophylaxis repeat ABG. discussed with pharmacy- Haldol is 10 mg once a day       ·  Problem: Hypercapnic respiratory failure. /with COPD exacerbation      ·  Problem: Schizophrenia, unspecified type.  haldol and psych meds     ·  Problem: Seizure disorder.
66M COPD  active smoker schizophrenia Hiatal hernia Seizure disorder with SOB due to COPD exacerbation.      >COPD exacerbation - taper IV steroids / nebulizers.  Pulmonary evaluation appreciated.  Pt refusing medications.  Awaiting Psychiatry evaluation to assess capacity.    >S/p Acute on chronic hypercapnic respiratory failure, Parainfluenza infection - supportive care, now off BIPAP.  >Schizophrenia - continue Haldol.  Periodic agitation.  Psychiatry evaluation.  Continue Benztropine.   >Seizure disorder - Continue Keppra   > DVT Prophylaxis - Lovenox subcut
66M COPD  active smoker schizophrenia Hiatal hernia Seizure disorder with SOB due to COPD exacerbation.      COPD exacerbation - taper IV steroids / nebulizers.  Pulmonary evaluation.  S/p Acute on chronic hypercapnic respiratory failure, Parainfluenza infection - supportive care, now off BIPAP.  Schizophrenia - continue Haldol.  Periodic agitation.  Psychiatry evaluation.  Continue Benztropine.   Seizure disorder - Patient states no longer taking Keppra.  Will attempt to call group home to confirm.   > DVT Prophylaxis - Lovenox subcut
66M presented from the group home for dyspnea. On presentation, WBC(4.5). ABG(7.23/88/101). Bipap was initiated for hypercapnic respiratory failure. Inhaled bronchodilator therapy and intravenous methylprednisolone were initiated for COPD exacerbation. Respiratory panel was positive for parainfluenza. The patient was seen by Pulmonary in consultation. The patient was non-compliant with medications and supplemental oxygen. The patient was seen by Psychiatry in consultation and continued on his psychiatric medications. The patient had improvement in his behavior and constant observation was discontinued. He continued to be poorly compliant with nebulizer treatments. The patient had recurrent episodes of hypoxia and wheezing requiring adjustment in the dose of prednisone.
66M presented from the group home for dyspnea. On presentation, WBC(4.5). ABG(7.23/88/101). Bipap was initiated for hypercapnic respiratory failure. Inhaled bronchodilator therapy and intravenous methylprednisolone were initiated for COPD exacerbation. Respiratory panel was positive for parainfluenza. The patient was seen by Pulmonary in consultation. The patient was non-compliant with medications and supplemental oxygen. The patient was seen by Psychiatry in consultation and continued on his psychiatric medications. The patient had improvement in his behavior and constant observation was discontinued. He continued to be poorly compliant with nebulizer treatments. The patient had recurrent episodes of hypoxia and wheezing requiring adjustment in the dose of prednisone.
Exacerbation of COPD improved  Parainfluenza infection not an issue at this point  Not requiring O2    Plan:  D/C per medicine  Routine bronchodilators Symbicort/Spiriva with albuterol rescue as outpatient  Taper prednisone as outpatient  Will f/u PRN

## 2017-06-18 NOTE — PROGRESS NOTE ADULT - PROBLEM SELECTOR PROBLEM 3
Acute on chronic respiratory failure with hypoxia and hypercapnia
Acute on chronic respiratory failure with hypoxia and hypercapnia

## 2017-06-18 NOTE — PROGRESS NOTE ADULT - PROBLEM SELECTOR PLAN 4
Continue Risperidone and ?Haldol.  Psychiatry follow up
Continue Risperidone and ?Haldol.  Psychiatry follow up

## 2017-06-19 ENCOUNTER — TRANSCRIPTION ENCOUNTER (OUTPATIENT)
Age: 67
End: 2017-06-19

## 2017-06-19 VITALS
TEMPERATURE: 97 F | OXYGEN SATURATION: 92 % | SYSTOLIC BLOOD PRESSURE: 115 MMHG | DIASTOLIC BLOOD PRESSURE: 77 MMHG | HEART RATE: 106 BPM

## 2017-06-19 PROCEDURE — 94640 AIRWAY INHALATION TREATMENT: CPT

## 2017-06-19 PROCEDURE — 94760 N-INVAS EAR/PLS OXIMETRY 1: CPT

## 2017-06-19 PROCEDURE — 82435 ASSAY OF BLOOD CHLORIDE: CPT

## 2017-06-19 PROCEDURE — 85027 COMPLETE CBC AUTOMATED: CPT

## 2017-06-19 PROCEDURE — 87486 CHLMYD PNEUM DNA AMP PROBE: CPT

## 2017-06-19 PROCEDURE — 87798 DETECT AGENT NOS DNA AMP: CPT

## 2017-06-19 PROCEDURE — 82330 ASSAY OF CALCIUM: CPT

## 2017-06-19 PROCEDURE — 36415 COLL VENOUS BLD VENIPUNCTURE: CPT

## 2017-06-19 PROCEDURE — 82947 ASSAY GLUCOSE BLOOD QUANT: CPT

## 2017-06-19 PROCEDURE — 82803 BLOOD GASES ANY COMBINATION: CPT

## 2017-06-19 PROCEDURE — 93005 ELECTROCARDIOGRAM TRACING: CPT

## 2017-06-19 PROCEDURE — 96375 TX/PRO/DX INJ NEW DRUG ADDON: CPT

## 2017-06-19 PROCEDURE — 84295 ASSAY OF SERUM SODIUM: CPT

## 2017-06-19 PROCEDURE — 97530 THERAPEUTIC ACTIVITIES: CPT

## 2017-06-19 PROCEDURE — 80053 COMPREHEN METABOLIC PANEL: CPT

## 2017-06-19 PROCEDURE — 97116 GAIT TRAINING THERAPY: CPT

## 2017-06-19 PROCEDURE — 71045 X-RAY EXAM CHEST 1 VIEW: CPT

## 2017-06-19 PROCEDURE — 87633 RESP VIRUS 12-25 TARGETS: CPT

## 2017-06-19 PROCEDURE — 96374 THER/PROPH/DIAG INJ IV PUSH: CPT

## 2017-06-19 PROCEDURE — 84132 ASSAY OF SERUM POTASSIUM: CPT

## 2017-06-19 PROCEDURE — 87581 M.PNEUMON DNA AMP PROBE: CPT

## 2017-06-19 PROCEDURE — 85610 PROTHROMBIN TIME: CPT

## 2017-06-19 PROCEDURE — 99285 EMERGENCY DEPT VISIT HI MDM: CPT | Mod: 25

## 2017-06-19 PROCEDURE — 83605 ASSAY OF LACTIC ACID: CPT

## 2017-06-19 PROCEDURE — 83880 ASSAY OF NATRIURETIC PEPTIDE: CPT

## 2017-06-19 PROCEDURE — 99231 SBSQ HOSP IP/OBS SF/LOW 25: CPT

## 2017-06-19 PROCEDURE — 99239 HOSP IP/OBS DSCHRG MGMT >30: CPT

## 2017-06-19 PROCEDURE — 85730 THROMBOPLASTIN TIME PARTIAL: CPT

## 2017-06-19 PROCEDURE — 85014 HEMATOCRIT: CPT

## 2017-06-19 PROCEDURE — 87040 BLOOD CULTURE FOR BACTERIA: CPT

## 2017-06-19 PROCEDURE — 94660 CPAP INITIATION&MGMT: CPT

## 2017-06-19 PROCEDURE — 97110 THERAPEUTIC EXERCISES: CPT

## 2017-06-19 PROCEDURE — 80048 BASIC METABOLIC PNL TOTAL CA: CPT

## 2017-06-19 PROCEDURE — 97163 PT EVAL HIGH COMPLEX 45 MIN: CPT

## 2017-06-19 RX ORDER — FLUTICASONE PROPIONATE 220 MCG
1 AEROSOL WITH ADAPTER (GRAM) INHALATION
Qty: 0 | Refills: 0 | COMMUNITY

## 2017-06-19 RX ORDER — SENNA PLUS 8.6 MG/1
1 TABLET ORAL
Qty: 0 | Refills: 0 | COMMUNITY
Start: 2017-06-19

## 2017-06-19 RX ORDER — KETOCONAZOLE 20 MG/G
1 AEROSOL, FOAM TOPICAL
Qty: 0 | Refills: 0 | COMMUNITY
Start: 2017-06-19

## 2017-06-19 RX ORDER — CARVEDILOL PHOSPHATE 80 MG/1
1 CAPSULE, EXTENDED RELEASE ORAL
Qty: 0 | Refills: 0 | COMMUNITY
Start: 2017-06-19

## 2017-06-19 RX ORDER — CARVEDILOL PHOSPHATE 80 MG/1
1 CAPSULE, EXTENDED RELEASE ORAL
Qty: 0 | Refills: 0 | COMMUNITY

## 2017-06-19 RX ORDER — BUDESONIDE AND FORMOTEROL FUMARATE DIHYDRATE 160; 4.5 UG/1; UG/1
1 AEROSOL RESPIRATORY (INHALATION)
Qty: 0 | Refills: 0 | COMMUNITY
Start: 2017-06-19

## 2017-06-19 RX ORDER — BUDESONIDE AND FORMOTEROL FUMARATE DIHYDRATE 160; 4.5 UG/1; UG/1
2 AEROSOL RESPIRATORY (INHALATION)
Qty: 0 | Refills: 0 | COMMUNITY
Start: 2017-06-19

## 2017-06-19 RX ORDER — AMLODIPINE BESYLATE 2.5 MG/1
1 TABLET ORAL
Qty: 0 | Refills: 0 | COMMUNITY

## 2017-06-19 RX ADMIN — RISPERIDONE 4 MILLIGRAM(S): 4 TABLET ORAL at 11:16

## 2017-06-19 RX ADMIN — Medication 1 MILLIGRAM(S): at 05:42

## 2017-06-19 RX ADMIN — CARVEDILOL PHOSPHATE 3.12 MILLIGRAM(S): 80 CAPSULE, EXTENDED RELEASE ORAL at 05:42

## 2017-06-19 RX ADMIN — AMLODIPINE BESYLATE 5 MILLIGRAM(S): 2.5 TABLET ORAL at 05:42

## 2017-06-19 RX ADMIN — Medication 30 MILLIGRAM(S): at 05:42

## 2017-06-19 RX ADMIN — Medication 5 MILLIGRAM(S): at 11:16

## 2017-06-19 RX ADMIN — CARVEDILOL PHOSPHATE 3.12 MILLIGRAM(S): 80 CAPSULE, EXTENDED RELEASE ORAL at 17:02

## 2017-06-19 RX ADMIN — ROPINIROLE 0.5 MILLIGRAM(S): 8 TABLET, FILM COATED, EXTENDED RELEASE ORAL at 17:00

## 2017-06-19 RX ADMIN — Medication 1 MILLIGRAM(S): at 17:01

## 2017-06-19 RX ADMIN — KETOCONAZOLE 1 APPLICATION(S): 20 AEROSOL, FOAM TOPICAL at 11:16

## 2017-06-19 RX ADMIN — BUDESONIDE AND FORMOTEROL FUMARATE DIHYDRATE 2 PUFF(S): 160; 4.5 AEROSOL RESPIRATORY (INHALATION) at 09:24

## 2017-06-19 RX ADMIN — Medication 30 MILLIGRAM(S): at 17:02

## 2017-06-19 RX ADMIN — ROPINIROLE 0.5 MILLIGRAM(S): 8 TABLET, FILM COATED, EXTENDED RELEASE ORAL at 05:41

## 2017-06-19 NOTE — DISCHARGE NOTE ADULT - PLAN OF CARE
Medications as prescribed.  Follow up with Pulmonology in 2 weeks As above prevent recurrence Continue medications as prescribed.  Follow up with Psychiatry Continue medications as prescribed.  Follow up with Neurology

## 2017-06-19 NOTE — DISCHARGE NOTE ADULT - SECONDARY DIAGNOSIS.
Acute on chronic respiratory failure with hypoxia and hypercapnia Parainfluenza infection Schizophrenia, unspecified type Seizure disorder

## 2017-06-19 NOTE — DISCHARGE NOTE ADULT - CARE PROVIDER_API CALL
Kirill Tang), Internal Medicine; Pulmonary Disease  [place] [address]  Phone: [phone]  Fax: [phone]

## 2017-06-19 NOTE — DISCHARGE NOTE ADULT - PATIENT PORTAL LINK FT
“You can access the Follow[facility] Patient Portal, offered by [facility], by registering with the following website: [email or web address]”

## 2017-06-19 NOTE — DISCHARGE NOTE ADULT - MEDICATION SUMMARY - MEDICATIONS TO TAKE
I will START or STAY ON the medications listed below when I get home from the hospital:    predniSONE 10 mg oral tablet  -- 3 tab(s) by mouth 2 times a day x 5d then 2 tab(s) by mouth 2 times a day x 5d then 1 tab by mouth 2 times a day x 5d then stop  -- Indication: For Chronic obstructive pulmonary disease    lisinopril 10 mg oral tablet  -- 1 tab(s) by mouth once a day  -- Indication: For Hypertension    divalproex sodium 500 mg oral tablet, extended release  -- 2 tab(s) by mouth once a day (at bedtime)  -- Indication: For Schizophrenia, unspecified type    divalproex sodium 250 mg oral tablet, extended release  -- 1 tab(s) by mouth once a day (at bedtime)  -- Indication: For Schizophrenia, unspecified type    rOPINIRole 0.5 mg oral tablet  -- 1 tab(s) by mouth 2 times a day  -- Indication: For Schizophrenia, unspecified type    benztropine 1 mg oral tablet  -- 1 tab(s) by mouth 2 times a day  -- Patient was not taking at [facility] home since it was pending prior authorization  -- Indication: For Schizophrenia, unspecified type    haloperidol 10 mg oral tablet  -- 1 tab(s) by mouth once a day (at bedtime)  -- Indication: For Schizophrenia, unspecified type    zolpidem 5 mg oral tablet  -- 1 tab(s) by mouth once a day (at bedtime)  -- Indication: For Schizophrenia, unspecified type    carvedilol 3.125 mg oral tablet  -- 1 tab(s) by mouth every 12 hours  -- Indication: For Hypertension    budesonide-formoterol 80 mcg-4.5 mcg/inh inhalation aerosol  -- 1 puff(s) inhaled 2 times a day  -- Indication: For Chronic obstructive pulmonary disease    Combivent Respimat CFC free 20 mcg-100 mcg/inh inhalation aerosol  -- 1 puff(s) inhaled every 6 hours, As Needed  -- Indication: For Chronic obstructive pulmonary disease    Incruse Ellipta 62.5 mcg/inh inhalation powder  -- 1 puff(s) inhaled once a day  -- Indication: For Chronic obstructive pulmonary disease    ketoconazole 2% topical cream  -- 1 application on skin once a day  -- Indication: For fungal infection    senna oral tablet  -- 1 tab(s) by mouth once a day  -- Indication: For Constipation

## 2017-06-19 NOTE — DISCHARGE NOTE ADULT - HOSPITAL COURSE
6M presented from the group home for dyspnea. On presentation, WBC(4.5). ABG(7.23/88/101). Bipap was initiated for hypercapnic respiratory failure. Inhaled bronchodilator therapy and intravenous methylprednisolone were initiated for COPD exacerbation. Respiratory panel was positive for parainfluenza. The patient was seen by Pulmonary in consultation. The patient was non-compliant with medications and supplemental oxygen. The patient was seen by Psychiatry in consultation and continued on his psychiatric medications. The patient had improvement in his behavior and constant observation was discontinued. He continued to be poorly compliant with nebulizer treatments. The patient had recurrent episodes of hypoxia and wheezing requiring adjustment in the dose of prednisone.   Doing well on decreased dose of prednisone.    Patient was seen and examined on day of discharge. VSS, chest clear. Ambulated >200 ft with patient, no longer hypoxic requiring oxygen even with exertion.    Discharge home in stable condition.    Discharge time - 35 minutes

## 2017-06-19 NOTE — DISCHARGE NOTE ADULT - CARE PLAN
Principal Discharge DX:	Chronic obstructive pulmonary disease, unspecified COPD type  Goal:	prevent recurrence  Instructions for follow-up, activity and diet:	Medications as prescribed.  Follow up with Pulmonology in 2 weeks  Secondary Diagnosis:	Acute on chronic respiratory failure with hypoxia and hypercapnia  Instructions for follow-up, activity and diet:	As above  Secondary Diagnosis:	Parainfluenza infection  Secondary Diagnosis:	Schizophrenia, unspecified type  Instructions for follow-up, activity and diet:	Continue medications as prescribed.  Follow up with Psychiatry  Secondary Diagnosis:	Seizure disorder  Instructions for follow-up, activity and diet:	Continue medications as prescribed.  Follow up with Neurology

## 2017-06-29 ENCOUNTER — APPOINTMENT (OUTPATIENT)
Dept: PULMONOLOGY | Facility: CLINIC | Age: 67
End: 2017-06-29

## 2017-06-29 VITALS — DIASTOLIC BLOOD PRESSURE: 80 MMHG | SYSTOLIC BLOOD PRESSURE: 116 MMHG

## 2017-06-29 VITALS — WEIGHT: 139 LBS | HEIGHT: 65 IN | BODY MASS INDEX: 23.16 KG/M2

## 2017-06-29 VITALS — OXYGEN SATURATION: 98 %

## 2017-06-29 DIAGNOSIS — Z87.09 PERSONAL HISTORY OF OTHER DISEASES OF THE RESPIRATORY SYSTEM: ICD-10-CM

## 2017-06-29 DIAGNOSIS — F17.200 NICOTINE DEPENDENCE, UNSPECIFIED, UNCOMPLICATED: ICD-10-CM

## 2017-06-29 DIAGNOSIS — J44.9 CHRONIC OBSTRUCTIVE PULMONARY DISEASE, UNSPECIFIED: ICD-10-CM

## 2017-06-29 DIAGNOSIS — Z00.00 ENCOUNTER FOR GENERAL ADULT MEDICAL EXAMINATION W/OUT ABNORMAL FINDINGS: ICD-10-CM

## 2017-06-29 DIAGNOSIS — F99 MENTAL DISORDER, NOT OTHERWISE SPECIFIED: ICD-10-CM

## 2017-06-29 RX ORDER — CARVEDILOL 6.25 MG/1
6.25 TABLET, FILM COATED ORAL
Qty: 60 | Refills: 0 | Status: DISCONTINUED | COMMUNITY
Start: 2017-06-21 | End: 2017-06-29

## 2017-06-29 RX ORDER — PREDNISONE 10 MG/1
10 TABLET ORAL
Qty: 30 | Refills: 0 | Status: DISCONTINUED | COMMUNITY
Start: 2017-01-24

## 2017-06-29 RX ORDER — CARVEDILOL 3.12 MG/1
3.12 TABLET, FILM COATED ORAL
Qty: 60 | Refills: 0 | Status: ACTIVE | COMMUNITY
Start: 2017-01-25

## 2017-06-29 RX ORDER — ROPINIROLE 0.5 MG/1
0.5 TABLET, FILM COATED ORAL
Qty: 60 | Refills: 0 | Status: ACTIVE | COMMUNITY
Start: 2017-05-31

## 2017-06-29 RX ORDER — TRIAMCINOLONE ACETONIDE 1 MG/G
0.1 CREAM TOPICAL
Qty: 80 | Refills: 0 | Status: DISCONTINUED | COMMUNITY
Start: 2017-04-06

## 2017-06-29 RX ORDER — FINASTERIDE 5 MG/1
5 TABLET, FILM COATED ORAL
Qty: 30 | Refills: 0 | Status: DISCONTINUED | COMMUNITY
Start: 2017-01-04

## 2017-06-29 RX ORDER — BETAMETHASONE DIPROPIONATE 0.5 MG/G
0.05 CREAM TOPICAL
Qty: 45 | Refills: 0 | Status: ACTIVE | COMMUNITY
Start: 2017-05-01

## 2017-06-29 RX ORDER — LISINOPRIL 10 MG/1
10 TABLET ORAL
Qty: 30 | Refills: 0 | Status: ACTIVE | COMMUNITY
Start: 2017-01-24

## 2017-06-29 RX ORDER — RANITIDINE 150 MG/1
150 TABLET ORAL
Qty: 30 | Refills: 0 | Status: DISCONTINUED | COMMUNITY
Start: 2017-01-11

## 2017-06-29 RX ORDER — AMLODIPINE BESYLATE 5 MG/1
5 TABLET ORAL
Qty: 30 | Refills: 0 | Status: ACTIVE | COMMUNITY
Start: 2017-01-11

## 2017-06-29 RX ORDER — DIVALPROEX SODIUM 500 MG/1
500 TABLET, DELAYED RELEASE ORAL
Qty: 60 | Refills: 0 | Status: ACTIVE | COMMUNITY
Start: 2017-01-10

## 2017-06-29 RX ORDER — CLOTRIMAZOLE 10 MG/G
1 CREAM TOPICAL
Qty: 30 | Refills: 0 | Status: ACTIVE | COMMUNITY
Start: 2017-05-01

## 2017-06-29 RX ORDER — BUDESONIDE 0.5 MG/2ML
0.5 INHALANT ORAL TWICE DAILY
Qty: 2 | Refills: 5 | Status: ACTIVE | COMMUNITY
Start: 2017-06-29 | End: 1900-01-01

## 2017-06-29 RX ORDER — ALBUTEROL SULFATE 2.5 MG/3ML
(2.5 MG/3ML) SOLUTION RESPIRATORY (INHALATION)
Qty: 90 | Refills: 0 | Status: ACTIVE | COMMUNITY
Start: 2017-01-27

## 2017-06-29 RX ORDER — ZOLPIDEM TARTRATE 5 MG/1
5 TABLET ORAL
Qty: 30 | Refills: 0 | Status: ACTIVE | COMMUNITY
Start: 2017-05-28

## 2017-06-29 RX ORDER — RISPERIDONE 4 MG/1
4 TABLET, FILM COATED ORAL
Qty: 30 | Refills: 0 | Status: DISCONTINUED | COMMUNITY
Start: 2017-01-24

## 2017-06-29 RX ORDER — LEVOFLOXACIN 500 MG/1
500 TABLET, FILM COATED ORAL
Qty: 3 | Refills: 0 | Status: DISCONTINUED | COMMUNITY
Start: 2017-01-24

## 2017-06-29 RX ORDER — RISPERIDONE 4 MG/1
4 TABLET, ORALLY DISINTEGRATING ORAL
Qty: 60 | Refills: 0 | Status: ACTIVE | COMMUNITY
Start: 2017-01-10

## 2017-06-29 RX ORDER — DIVALPROEX SODIUM 250 MG/1
250 TABLET, EXTENDED RELEASE ORAL
Qty: 30 | Refills: 0 | Status: ACTIVE | COMMUNITY
Start: 2017-02-17

## 2017-06-29 RX ORDER — DIVALPROEX SODIUM 500 1/1
500 TABLET, EXTENDED RELEASE ORAL
Qty: 60 | Refills: 0 | Status: DISCONTINUED | COMMUNITY
Start: 2017-02-17 | End: 2017-06-29

## 2017-06-29 RX ORDER — FLUTICASONE FUROATE 100 UG/1
100 POWDER RESPIRATORY (INHALATION)
Qty: 30 | Refills: 0 | Status: DISCONTINUED | COMMUNITY
Start: 2017-01-24 | End: 2017-06-29

## 2017-06-29 RX ORDER — UMECLIDINIUM 62.5 UG/1
62.5 AEROSOL, POWDER ORAL
Qty: 30 | Refills: 0 | Status: ACTIVE | COMMUNITY
Start: 2017-01-24

## 2017-06-29 RX ORDER — IPRATROPIUM BROMIDE AND ALBUTEROL 20; 100 UG/1; UG/1
20-100 SPRAY, METERED RESPIRATORY (INHALATION)
Qty: 4 | Refills: 0 | Status: ACTIVE | COMMUNITY
Start: 2017-05-29

## 2017-06-29 RX ORDER — CARVEDILOL 6.25 MG/1
6.25 TABLET, FILM COATED ORAL
Refills: 0 | Status: ACTIVE | COMMUNITY
Start: 2017-06-29

## 2017-06-29 RX ORDER — HALOPERIDOL 10 MG/1
10 TABLET ORAL
Qty: 30 | Refills: 0 | Status: ACTIVE | COMMUNITY
Start: 2017-01-27

## 2017-06-29 RX ORDER — AMANTADINE HYDROCHLORIDE 100 MG/1
100 CAPSULE ORAL
Qty: 60 | Refills: 0 | Status: DISCONTINUED | COMMUNITY
Start: 2017-01-11

## 2017-08-04 ENCOUNTER — INPATIENT (INPATIENT)
Facility: HOSPITAL | Age: 67
LOS: 9 days | Discharge: NURSING, REHAB OR LONG-TERM CARE | DRG: 192 | End: 2017-08-14
Attending: INTERNAL MEDICINE | Admitting: HOSPITALIST
Payer: MEDICARE

## 2017-08-04 VITALS
WEIGHT: 138.89 LBS | SYSTOLIC BLOOD PRESSURE: 99 MMHG | DIASTOLIC BLOOD PRESSURE: 67 MMHG | TEMPERATURE: 99 F | HEART RATE: 79 BPM | OXYGEN SATURATION: 90 % | HEIGHT: 69 IN | RESPIRATION RATE: 19 BRPM

## 2017-08-04 DIAGNOSIS — R41.82 ALTERED MENTAL STATUS, UNSPECIFIED: ICD-10-CM

## 2017-08-04 DIAGNOSIS — J44.1 CHRONIC OBSTRUCTIVE PULMONARY DISEASE WITH (ACUTE) EXACERBATION: ICD-10-CM

## 2017-08-04 DIAGNOSIS — G20 PARKINSON'S DISEASE: ICD-10-CM

## 2017-08-04 LAB
ALBUMIN SERPL ELPH-MCNC: 3 G/DL — LOW (ref 3.3–5.2)
ALP SERPL-CCNC: 59 U/L — SIGNIFICANT CHANGE UP (ref 40–120)
ALT FLD-CCNC: 19 U/L — SIGNIFICANT CHANGE UP
ANION GAP SERPL CALC-SCNC: 13 MMOL/L — SIGNIFICANT CHANGE UP (ref 5–17)
AST SERPL-CCNC: 62 U/L — HIGH
BASE EXCESS BLDA CALC-SCNC: 5.7 MMOL/L — HIGH (ref -3–3)
BILIRUB SERPL-MCNC: 1.3 MG/DL — SIGNIFICANT CHANGE UP (ref 0.4–2)
BUN SERPL-MCNC: 13 MG/DL — SIGNIFICANT CHANGE UP (ref 8–20)
CALCIUM SERPL-MCNC: 8.5 MG/DL — LOW (ref 8.6–10.2)
CHLORIDE SERPL-SCNC: 96 MMOL/L — LOW (ref 98–107)
CO2 SERPL-SCNC: 28 MMOL/L — SIGNIFICANT CHANGE UP (ref 22–29)
CREAT SERPL-MCNC: 0.79 MG/DL — SIGNIFICANT CHANGE UP (ref 0.5–1.3)
GAS PNL BLDA: SIGNIFICANT CHANGE UP
GLUCOSE SERPL-MCNC: 87 MG/DL — SIGNIFICANT CHANGE UP (ref 70–115)
HCO3 BLDA-SCNC: 30 MMOL/L — HIGH (ref 20–26)
HCT VFR BLD CALC: 37.4 % — LOW (ref 42–52)
HGB BLD-MCNC: 12.5 G/DL — LOW (ref 14–18)
HOROWITZ INDEX BLDA+IHG-RTO: SIGNIFICANT CHANGE UP
LYMPHOCYTES # BLD AUTO: 2.1 K/UL — SIGNIFICANT CHANGE UP (ref 1–4.8)
LYMPHOCYTES # BLD AUTO: 26 % — SIGNIFICANT CHANGE UP (ref 20–55)
MCHC RBC-ENTMCNC: 32.6 PG — HIGH (ref 27–31)
MCHC RBC-ENTMCNC: 33.4 G/DL — SIGNIFICANT CHANGE UP (ref 32–36)
MCV RBC AUTO: 97.4 FL — HIGH (ref 80–94)
MONOCYTES # BLD AUTO: 1.6 K/UL — HIGH (ref 0–0.8)
MONOCYTES NFR BLD AUTO: 16 % — HIGH (ref 3–10)
NEUTROPHILS # BLD AUTO: 4.2 K/UL — SIGNIFICANT CHANGE UP (ref 1.8–8)
NEUTROPHILS NFR BLD AUTO: 54 % — SIGNIFICANT CHANGE UP (ref 37–73)
NEUTS BAND # BLD: 4 % — SIGNIFICANT CHANGE UP (ref 0–8)
PCO2 BLDA: 40 MMHG — SIGNIFICANT CHANGE UP (ref 35–45)
PH BLDA: 7.48 — HIGH (ref 7.35–7.45)
PLAT MORPH BLD: NORMAL — SIGNIFICANT CHANGE UP
PLATELET # BLD AUTO: 100 K/UL — LOW (ref 150–400)
PO2 BLDA: 130 MMHG — HIGH (ref 83–108)
POTASSIUM SERPL-MCNC: 3.9 MMOL/L — SIGNIFICANT CHANGE UP (ref 3.5–5.3)
POTASSIUM SERPL-SCNC: 3.9 MMOL/L — SIGNIFICANT CHANGE UP (ref 3.5–5.3)
PROT SERPL-MCNC: 5.5 G/DL — LOW (ref 6.6–8.7)
RBC # BLD: 3.84 M/UL — LOW (ref 4.6–6.2)
RBC # FLD: 17 % — HIGH (ref 11–15.6)
RBC BLD AUTO: SIGNIFICANT CHANGE UP
SAO2 % BLDA: 100 % — HIGH (ref 95–99)
SODIUM SERPL-SCNC: 137 MMOL/L — SIGNIFICANT CHANGE UP (ref 135–145)
TROPONIN T SERPL-MCNC: <0.01 NG/ML — SIGNIFICANT CHANGE UP (ref 0–0.06)
WBC # BLD: 7.9 K/UL — SIGNIFICANT CHANGE UP (ref 4.8–10.8)
WBC # FLD AUTO: 7.9 K/UL — SIGNIFICANT CHANGE UP (ref 4.8–10.8)

## 2017-08-04 PROCEDURE — 99285 EMERGENCY DEPT VISIT HI MDM: CPT

## 2017-08-04 PROCEDURE — 99223 1ST HOSP IP/OBS HIGH 75: CPT

## 2017-08-04 PROCEDURE — 93010 ELECTROCARDIOGRAM REPORT: CPT

## 2017-08-04 PROCEDURE — 71010: CPT | Mod: 26

## 2017-08-04 PROCEDURE — 70450 CT HEAD/BRAIN W/O DYE: CPT | Mod: 26

## 2017-08-04 RX ORDER — NICOTINE POLACRILEX 2 MG
1 GUM BUCCAL DAILY
Qty: 0 | Refills: 0 | Status: DISCONTINUED | OUTPATIENT
Start: 2017-08-04 | End: 2017-08-14

## 2017-08-04 RX ORDER — DIVALPROEX SODIUM 500 MG/1
1000 TABLET, DELAYED RELEASE ORAL AT BEDTIME
Qty: 0 | Refills: 0 | Status: DISCONTINUED | OUTPATIENT
Start: 2017-08-04 | End: 2017-08-14

## 2017-08-04 RX ORDER — MAGNESIUM SULFATE 500 MG/ML
2 VIAL (ML) INJECTION ONCE
Qty: 0 | Refills: 0 | Status: COMPLETED | OUTPATIENT
Start: 2017-08-04 | End: 2017-08-04

## 2017-08-04 RX ORDER — ENOXAPARIN SODIUM 100 MG/ML
40 INJECTION SUBCUTANEOUS DAILY
Qty: 0 | Refills: 0 | Status: DISCONTINUED | OUTPATIENT
Start: 2017-08-04 | End: 2017-08-14

## 2017-08-04 RX ORDER — MAGNESIUM SULFATE 500 MG/ML
2 VIAL (ML) INJECTION ONCE
Qty: 0 | Refills: 0 | Status: DISCONTINUED | OUTPATIENT
Start: 2017-08-04 | End: 2017-08-04

## 2017-08-04 RX ORDER — AMLODIPINE BESYLATE 2.5 MG/1
5 TABLET ORAL DAILY
Qty: 0 | Refills: 0 | Status: DISCONTINUED | OUTPATIENT
Start: 2017-08-04 | End: 2017-08-12

## 2017-08-04 RX ORDER — ROPINIROLE 8 MG/1
0.5 TABLET, FILM COATED, EXTENDED RELEASE ORAL
Qty: 0 | Refills: 0 | Status: DISCONTINUED | OUTPATIENT
Start: 2017-08-04 | End: 2017-08-14

## 2017-08-04 RX ORDER — IPRATROPIUM/ALBUTEROL SULFATE 18-103MCG
3 AEROSOL WITH ADAPTER (GRAM) INHALATION ONCE
Qty: 0 | Refills: 0 | Status: COMPLETED | OUTPATIENT
Start: 2017-08-04 | End: 2017-08-04

## 2017-08-04 RX ORDER — DIVALPROEX SODIUM 500 MG/1
250 TABLET, DELAYED RELEASE ORAL AT BEDTIME
Qty: 0 | Refills: 0 | Status: DISCONTINUED | OUTPATIENT
Start: 2017-08-04 | End: 2017-08-14

## 2017-08-04 RX ORDER — CARVEDILOL PHOSPHATE 80 MG/1
6.25 CAPSULE, EXTENDED RELEASE ORAL EVERY 12 HOURS
Qty: 0 | Refills: 0 | Status: DISCONTINUED | OUTPATIENT
Start: 2017-08-04 | End: 2017-08-07

## 2017-08-04 RX ORDER — ZOLPIDEM TARTRATE 10 MG/1
1 TABLET ORAL
Qty: 0 | Refills: 0 | COMMUNITY

## 2017-08-04 RX ORDER — NALOXONE HYDROCHLORIDE 4 MG/.1ML
0.4 SPRAY NASAL ONCE
Qty: 0 | Refills: 0 | Status: COMPLETED | OUTPATIENT
Start: 2017-08-04 | End: 2017-08-04

## 2017-08-04 RX ORDER — SODIUM CHLORIDE 9 MG/ML
3 INJECTION INTRAMUSCULAR; INTRAVENOUS; SUBCUTANEOUS ONCE
Qty: 0 | Refills: 0 | Status: COMPLETED | OUTPATIENT
Start: 2017-08-04 | End: 2017-08-04

## 2017-08-04 RX ORDER — LISINOPRIL 2.5 MG/1
1 TABLET ORAL
Qty: 0 | Refills: 0 | COMMUNITY

## 2017-08-04 RX ORDER — IPRATROPIUM/ALBUTEROL SULFATE 18-103MCG
3 AEROSOL WITH ADAPTER (GRAM) INHALATION EVERY 6 HOURS
Qty: 0 | Refills: 0 | Status: DISCONTINUED | OUTPATIENT
Start: 2017-08-04 | End: 2017-08-14

## 2017-08-04 RX ORDER — KETOCONAZOLE 20 MG/G
1 AEROSOL, FOAM TOPICAL DAILY
Qty: 0 | Refills: 0 | Status: DISCONTINUED | OUTPATIENT
Start: 2017-08-04 | End: 2017-08-14

## 2017-08-04 RX ORDER — HALOPERIDOL DECANOATE 100 MG/ML
10 INJECTION INTRAMUSCULAR AT BEDTIME
Qty: 0 | Refills: 0 | Status: DISCONTINUED | OUTPATIENT
Start: 2017-08-04 | End: 2017-08-14

## 2017-08-04 RX ADMIN — Medication 60 MILLIGRAM(S): at 13:17

## 2017-08-04 RX ADMIN — Medication 50 GRAM(S): at 15:56

## 2017-08-04 RX ADMIN — Medication 3 MILLILITER(S): at 19:47

## 2017-08-04 RX ADMIN — NALOXONE HYDROCHLORIDE 0.4 MILLIGRAM(S): 4 SPRAY NASAL at 19:20

## 2017-08-04 RX ADMIN — Medication 3 MILLILITER(S): at 13:18

## 2017-08-04 RX ADMIN — NALOXONE HYDROCHLORIDE 0.4 MILLIGRAM(S): 4 SPRAY NASAL at 16:30

## 2017-08-04 RX ADMIN — Medication 3 MILLILITER(S): at 13:19

## 2017-08-04 NOTE — ED ADULT NURSE NOTE - ED STAT RN HANDOFF DETAILS
Pt alert and oriented, no apparent distress noted at this time. Pt handed off to RN valine in stable condition.

## 2017-08-04 NOTE — ED ADULT NURSE NOTE - OBJECTIVE STATEMENT
67 yom present to ed with difficulty breathing. phx copd, schizophrenia aox3 at this time. wheezes noted bilaterally abd soft nontender moves all extremities. refuses IV

## 2017-08-04 NOTE — ED PROVIDER NOTE - NS ED ROS FT
Review of Systems:  	•	CONSTITUTIONAL - no fever, no diaphoresis, no weight change  	•	SKIN - no rash  	•	HEMATOLOGIC - no bleeding, no bruising  	•	EYES - no eye pain, no blurred vision  	•	ENT - no change in hearing, no pain  	•	RESPIRATORY - + sob/wheezing  	•	CARDIAC - no chest pain  	•	GI - no abd pain, no nausea, no vomiting, no diarrhea, no constipation, no bleeding  	•	GENITO-URINARY - no vaginal bleeding, no discharge, no dysuria; no hematuria, LMP-   	•	ENDO - no polydypsia, no polyurea, no heat/no cold intolerance  	•	MUSCULOSKELETAL - no joint paint, no swelling, no redness  	•	NEUROLOGIC - no weakness, no headache, no anesthesia, no paresthesias  	•	PSYCH - no anxiety, non suicidal, non homicidal, no hallucination, no depression  	•	ALLERGY - no rhinitis

## 2017-08-04 NOTE — ED ADULT TRIAGE NOTE - CHIEF COMPLAINT QUOTE
patient c/o difficulty breathing earlier today, patient is alert and oriented x3. as per NH patient was confused. patient stated with hx of emphysema and COPD. patient in no respiratory distress. mild wheezing b/l

## 2017-08-04 NOTE — H&P ADULT - HISTORY OF PRESENT ILLNESS
66 y/o male with h/o COPD, Parkinson's disease, and schizophrenia was found in bed confused. patient was transferred from [facility] to ER by ambulance. in the ER patient has O2 sat. 88%. constricted pupils. patient was treated for COPD. breathing improved but became more confused. patient came back to normal mentation and alert after 2 doses of narcan. patient is taking no narcotic analgesia or medications.

## 2017-08-05 LAB
AMPHET UR-MCNC: NEGATIVE — SIGNIFICANT CHANGE UP
BARBITURATES UR SCN-MCNC: NEGATIVE — SIGNIFICANT CHANGE UP
BENZODIAZ UR-MCNC: NEGATIVE — SIGNIFICANT CHANGE UP
COCAINE METAB.OTHER UR-MCNC: NEGATIVE — SIGNIFICANT CHANGE UP
METHADONE UR-MCNC: NEGATIVE — SIGNIFICANT CHANGE UP
OPIATES UR-MCNC: NEGATIVE — SIGNIFICANT CHANGE UP
PCP SPEC-MCNC: SIGNIFICANT CHANGE UP
PCP UR-MCNC: NEGATIVE — SIGNIFICANT CHANGE UP
THC UR QL: NEGATIVE — SIGNIFICANT CHANGE UP

## 2017-08-05 PROCEDURE — 99233 SBSQ HOSP IP/OBS HIGH 50: CPT

## 2017-08-05 RX ORDER — BUDESONIDE AND FORMOTEROL FUMARATE DIHYDRATE 160; 4.5 UG/1; UG/1
2 AEROSOL RESPIRATORY (INHALATION)
Qty: 0 | Refills: 0 | Status: DISCONTINUED | OUTPATIENT
Start: 2017-08-05 | End: 2017-08-14

## 2017-08-05 RX ORDER — PANTOPRAZOLE SODIUM 20 MG/1
40 TABLET, DELAYED RELEASE ORAL
Qty: 0 | Refills: 0 | Status: DISCONTINUED | OUTPATIENT
Start: 2017-08-05 | End: 2017-08-14

## 2017-08-05 RX ADMIN — Medication 600 MILLIGRAM(S): at 18:25

## 2017-08-05 RX ADMIN — Medication 1 PATCH: at 13:35

## 2017-08-05 RX ADMIN — Medication 3 MILLILITER(S): at 15:28

## 2017-08-05 RX ADMIN — Medication 40 MILLIGRAM(S): at 21:22

## 2017-08-05 RX ADMIN — ROPINIROLE 0.5 MILLIGRAM(S): 8 TABLET, FILM COATED, EXTENDED RELEASE ORAL at 18:25

## 2017-08-05 RX ADMIN — Medication 3 MILLILITER(S): at 03:39

## 2017-08-05 RX ADMIN — BUDESONIDE AND FORMOTEROL FUMARATE DIHYDRATE 2 PUFF(S): 160; 4.5 AEROSOL RESPIRATORY (INHALATION) at 20:41

## 2017-08-05 RX ADMIN — Medication 3 MILLILITER(S): at 08:09

## 2017-08-05 RX ADMIN — DIVALPROEX SODIUM 1000 MILLIGRAM(S): 500 TABLET, DELAYED RELEASE ORAL at 21:21

## 2017-08-05 RX ADMIN — CARVEDILOL PHOSPHATE 6.25 MILLIGRAM(S): 80 CAPSULE, EXTENDED RELEASE ORAL at 18:25

## 2017-08-05 RX ADMIN — Medication 3 MILLILITER(S): at 20:40

## 2017-08-05 RX ADMIN — DIVALPROEX SODIUM 250 MILLIGRAM(S): 500 TABLET, DELAYED RELEASE ORAL at 21:21

## 2017-08-05 RX ADMIN — CARVEDILOL PHOSPHATE 6.25 MILLIGRAM(S): 80 CAPSULE, EXTENDED RELEASE ORAL at 05:59

## 2017-08-05 RX ADMIN — KETOCONAZOLE 1 APPLICATION(S): 20 AEROSOL, FOAM TOPICAL at 13:35

## 2017-08-05 RX ADMIN — ROPINIROLE 0.5 MILLIGRAM(S): 8 TABLET, FILM COATED, EXTENDED RELEASE ORAL at 05:59

## 2017-08-05 RX ADMIN — Medication 0.5 MILLIGRAM(S): at 22:12

## 2017-08-05 RX ADMIN — Medication 40 MILLIGRAM(S): at 13:43

## 2017-08-05 RX ADMIN — HALOPERIDOL DECANOATE 10 MILLIGRAM(S): 100 INJECTION INTRAMUSCULAR at 21:21

## 2017-08-05 NOTE — PROGRESS NOTE ADULT - SUBJECTIVE AND OBJECTIVE BOX
DAVY HOLDEN    [id number]    67y      Male    Patient is a 67y old  Male who presents with a chief complaint of Confused. Difficulty to breath (04 Aug 2017 21:10)      INTERVAL HPI/OVERNIGHT EVENTS:    Patient is having cough with sob and wheezing, he denies fever, chills, chest pain, nausea, vomiting.     REVIEW OF SYSTEMS:    CONSTITUTIONAL: No fever, no fatigue  RESPIRATORY: has cough, wheezing and shortness of breath  CARDIOVASCULAR: No chest pain, palpitations  GASTROINTESTINAL: No abdominal, No nausea, vomiting  NEUROLOGICAL: No headaches,  loss of strength.  MISCELLANEOUS: No joint swelling or pain       Vital Signs Last 24 Hrs  T(C): 36.6 (05 Aug 2017 09:10), Max: 37.1 (04 Aug 2017 15:20)  T(F): 97.8 (05 Aug 2017 09:10), Max: 98.7 (04 Aug 2017 15:20)  HR: 66 (05 Aug 2017 09:10) (56 - 85)  BP: 97/64 (05 Aug 2017 09:10) (93/6 - 111/72)  RR: 19 (05 Aug 2017 09:10) (19 - 20)  SpO2: 98% (05 Aug 2017 09:10) (88% - 99%)    PHYSICAL EXAM:    GENERAL: Elderly male looking anxious  HEENT: PERRL, +EOMI  NECK: soft, Supple, No JVD,   CHEST/LUNG: b/l decrease air entry with exp wheezing  HEART: S1S2+, Regular rate and rhythm; No murmurs  ABDOMEN: Soft, Nontender, Nondistended; Bowel sounds present  EXTREMITIES:  2+ Peripheral Pulses, No edema  SKIN: No rashes or lesions  NEURO: AAOX3, no focal deficits, no motor r sensory loss has tremors  PSYCH: anxious mood      LABS:                        12.5   7.9   )-----------( 100      ( 04 Aug 2017 13:39 )             37.4     08-04    137  |  96<L>  |  13.0  ----------------------------<  87  3.9   |  28.0  |  0.79    Ca    8.5<L>      04 Aug 2017 13:39    TPro  5.5<L>  /  Alb  3.0<L>  /  TBili  1.3  /  DBili  x   /  AST  62<H>  /  ALT  19  /  AlkPhos  59  08-04      MEDICATIONS  (STANDING):  diVALproex  milliGRAM(s) Oral at bedtime  diVALproex ER 1000 milliGRAM(s) Oral at bedtime  rOPINIRole 0.5 milliGRAM(s) Oral two times a day  haloperidol     Tablet 10 milliGRAM(s) Oral at bedtime  carvedilol 6.25 milliGRAM(s) Oral every 12 hours  amLODIPine   Tablet 5 milliGRAM(s) Oral daily  ketoconazole 2% Cream 1 Application(s) Topical daily  nicotine -  14 mG/24Hr(s) Patch 1 patch Transdermal daily  levoFLOXacin IVPB 750 milliGRAM(s) IV Intermittent every 24 hours  ALBUTerol/ipratropium for Nebulization 3 milliLiter(s) Nebulizer every 6 hours  enoxaparin Injectable 40 milliGRAM(s) SubCutaneous daily  guaiFENesin  milliGRAM(s) Oral every 12 hours  methylPREDNISolone sodium succinate Injectable 40 milliGRAM(s) IV Push every 8 hours  pantoprazole    Tablet 40 milliGRAM(s) Oral before breakfast  buDESOnide  80 MICROgram(s)/formoterol 4.5 MICROgram(s) Inhaler 2 Puff(s) Inhalation two times a day    MEDICATIONS  (PRN):  LORazepam     Tablet 0.5 milliGRAM(s) Oral two times a day PRN Anxiety

## 2017-08-05 NOTE — ED ADULT NURSE REASSESSMENT NOTE - NS ED NURSE REASSESS COMMENT FT1
MD Villalta advised pt is noncompliant with keeping cardiac monitor and pulse ox monitor. awaiting further orders, will continue to monitor.
narcan provided for pinpoint pupils r/o narcotic overdose
pt became increasingly lethargic, originally was refusing iv access, but with explaination and md reassessment iv placed lactate sent abg done per RT.
pt care assumed at 2215, no apparent distress noted at this time, charting as noted. Pt denies any complaints at this time. HR is regular, lung sounds are wheezes b/l with diminished breath sounds in the right lower lobe. pt is 98% oxygen sat on 2L NC. abd is soft and nontender with positive bowel sounds in all four quadrants, skin is warm, dry and intact and appropriate for age and race. plan of care explained, will continue to monitor.
pt is being noncompliant and taking off his pulse ox monitor. pt  is refusing to wear it at this time. will continue to monitor.
received patient at this time awake alert given albuterol tx at this time Dr Villalta @ bedside will be admitted to regular bed with  will continue to follow

## 2017-08-05 NOTE — PROGRESS NOTE ADULT - PROBLEM SELECTOR PLAN 1
Urine drug screening pending, CT brain came negative, looks like mentation is better and back to base line, will continue to monitor

## 2017-08-06 LAB — OB PNL STL: NEGATIVE — SIGNIFICANT CHANGE UP

## 2017-08-06 PROCEDURE — 99233 SBSQ HOSP IP/OBS HIGH 50: CPT

## 2017-08-06 RX ADMIN — Medication 40 MILLIGRAM(S): at 17:46

## 2017-08-06 RX ADMIN — Medication 40 MILLIGRAM(S): at 14:47

## 2017-08-06 RX ADMIN — BUDESONIDE AND FORMOTEROL FUMARATE DIHYDRATE 2 PUFF(S): 160; 4.5 AEROSOL RESPIRATORY (INHALATION) at 20:45

## 2017-08-06 RX ADMIN — CARVEDILOL PHOSPHATE 6.25 MILLIGRAM(S): 80 CAPSULE, EXTENDED RELEASE ORAL at 17:46

## 2017-08-06 RX ADMIN — CARVEDILOL PHOSPHATE 6.25 MILLIGRAM(S): 80 CAPSULE, EXTENDED RELEASE ORAL at 05:03

## 2017-08-06 RX ADMIN — HALOPERIDOL DECANOATE 10 MILLIGRAM(S): 100 INJECTION INTRAMUSCULAR at 21:48

## 2017-08-06 RX ADMIN — Medication 3 MILLILITER(S): at 03:16

## 2017-08-06 RX ADMIN — ROPINIROLE 0.5 MILLIGRAM(S): 8 TABLET, FILM COATED, EXTENDED RELEASE ORAL at 05:03

## 2017-08-06 RX ADMIN — Medication 1 PATCH: at 12:27

## 2017-08-06 RX ADMIN — Medication 600 MILLIGRAM(S): at 17:46

## 2017-08-06 RX ADMIN — Medication 1 PATCH: at 14:46

## 2017-08-06 RX ADMIN — ROPINIROLE 0.5 MILLIGRAM(S): 8 TABLET, FILM COATED, EXTENDED RELEASE ORAL at 17:46

## 2017-08-06 RX ADMIN — Medication 3 MILLILITER(S): at 08:11

## 2017-08-06 RX ADMIN — Medication 0.5 MILLIGRAM(S): at 05:03

## 2017-08-06 RX ADMIN — Medication 40 MILLIGRAM(S): at 05:03

## 2017-08-06 RX ADMIN — BUDESONIDE AND FORMOTEROL FUMARATE DIHYDRATE 2 PUFF(S): 160; 4.5 AEROSOL RESPIRATORY (INHALATION) at 08:12

## 2017-08-06 RX ADMIN — Medication 0.5 MILLIGRAM(S): at 23:19

## 2017-08-06 RX ADMIN — KETOCONAZOLE 1 APPLICATION(S): 20 AEROSOL, FOAM TOPICAL at 12:28

## 2017-08-06 RX ADMIN — Medication 3 MILLILITER(S): at 20:45

## 2017-08-06 RX ADMIN — PANTOPRAZOLE SODIUM 40 MILLIGRAM(S): 20 TABLET, DELAYED RELEASE ORAL at 07:59

## 2017-08-06 RX ADMIN — Medication 600 MILLIGRAM(S): at 05:03

## 2017-08-06 RX ADMIN — Medication 3 MILLILITER(S): at 15:32

## 2017-08-06 RX ADMIN — AMLODIPINE BESYLATE 5 MILLIGRAM(S): 2.5 TABLET ORAL at 07:59

## 2017-08-06 NOTE — PROGRESS NOTE ADULT - ASSESSMENT
68 y/o male with AMS, COPD exacerbation and parkinson's disease  Patient altered mentation is getting better, ct head shows no acute findings, for his acute COPD exacerbation he is on IV steroids and antibiotics, wheezing improving, if feeling better tomorrow steroids could be switched to PO.

## 2017-08-06 NOTE — PROGRESS NOTE ADULT - PROBLEM SELECTOR PLAN 2
Solu medrol. Levofloxacin. Duoneb, inhaler, will continue to monitor, if has no more wheezing could switch steroids to PO.

## 2017-08-06 NOTE — PROGRESS NOTE ADULT - PROBLEM SELECTOR PLAN 1
Urine drug screening pending, CT brain came negative, looks like mentation is better and back to base line, will continue to monitor, could be related to  hypoxemia

## 2017-08-06 NOTE — PROGRESS NOTE ADULT - SUBJECTIVE AND OBJECTIVE BOX
DAVY HOLDEN    [id number]    67y      Male    Patient is a 67y old  Male who presents with a chief complaint of Confused. Difficulty to breath (04 Aug 2017 21:10)      INTERVAL HPI/OVERNIGHT EVENTS:    Patient's cough is better with some improvement of sob and wheezing, he denies fever, chills, chest pain, nausea, vomiting.     REVIEW OF SYSTEMS:    CONSTITUTIONAL: No fever, no fatigue  RESPIRATORY: has cough, wheezing and shortness of breath  CARDIOVASCULAR: No chest pain, palpitations  GASTROINTESTINAL: No abdominal, No nausea, vomiting  NEUROLOGICAL: No headaches,  loss of strength.  MISCELLANEOUS: No joint swelling or pain       Vital Signs Last 24 Hrs  T(C): 36.6 (06 Aug 2017 07:56), Max: 36.7 (05 Aug 2017 21:03)  T(F): 97.8 (06 Aug 2017 07:56), Max: 98.1 (06 Aug 2017 04:20)  HR: 62 (06 Aug 2017 07:56) (62 - 72)  BP: 122/62 (06 Aug 2017 07:56) (110/66 - 122/62)  RR: 18 (06 Aug 2017 07:56) (18 - 18)  SpO2: 94% (06 Aug 2017 07:56) (94% - 97%)    PHYSICAL EXAM:    GENERAL: Elderly male looking comfortable  HEENT: PERRL, +EOMI  NECK: soft, Supple, No JVD,   CHEST/LUNG: b/l decrease air entry with exp wheezing are little better  HEART: S1S2+, Regular rate and rhythm; No murmurs  ABDOMEN: Soft, Nontender, Nondistended; Bowel sounds present  EXTREMITIES:  2+ Peripheral Pulses, No edema  SKIN: No rashes or lesions  NEURO: AAOX2, no focal deficits, no motor r sensory loss has tremors  PSYCH: normal mood      I&O's Summary    05 Aug 2017 07:01  -  06 Aug 2017 07:00  --------------------------------------------------------  IN: 280 mL / OUT: 1250 mL / NET: -970 mL        MEDICATIONS  (STANDING):  diVALproex  milliGRAM(s) Oral at bedtime  diVALproex ER 1000 milliGRAM(s) Oral at bedtime  rOPINIRole 0.5 milliGRAM(s) Oral two times a day  haloperidol     Tablet 10 milliGRAM(s) Oral at bedtime  carvedilol 6.25 milliGRAM(s) Oral every 12 hours  amLODIPine   Tablet 5 milliGRAM(s) Oral daily  ketoconazole 2% Cream 1 Application(s) Topical daily  nicotine -  14 mG/24Hr(s) Patch 1 patch Transdermal daily  levoFLOXacin IVPB 750 milliGRAM(s) IV Intermittent every 24 hours  ALBUTerol/ipratropium for Nebulization 3 milliLiter(s) Nebulizer every 6 hours  enoxaparin Injectable 40 milliGRAM(s) SubCutaneous daily  guaiFENesin  milliGRAM(s) Oral every 12 hours  methylPREDNISolone sodium succinate Injectable 40 milliGRAM(s) IV Push every 8 hours  pantoprazole    Tablet 40 milliGRAM(s) Oral before breakfast  buDESOnide  80 MICROgram(s)/formoterol 4.5 MICROgram(s) Inhaler 2 Puff(s) Inhalation two times a day    MEDICATIONS  (PRN):  LORazepam     Tablet 0.5 milliGRAM(s) Oral two times a day PRN Anxiety

## 2017-08-07 ENCOUNTER — MOBILE ON CALL (OUTPATIENT)
Age: 67
End: 2017-08-07

## 2017-08-07 DIAGNOSIS — F20.9 SCHIZOPHRENIA, UNSPECIFIED: ICD-10-CM

## 2017-08-07 DIAGNOSIS — I10 ESSENTIAL (PRIMARY) HYPERTENSION: ICD-10-CM

## 2017-08-07 PROCEDURE — 99233 SBSQ HOSP IP/OBS HIGH 50: CPT

## 2017-08-07 RX ADMIN — Medication 3 MILLILITER(S): at 15:23

## 2017-08-07 RX ADMIN — Medication 0.5 MILLIGRAM(S): at 06:12

## 2017-08-07 RX ADMIN — Medication 3 MILLILITER(S): at 21:28

## 2017-08-07 RX ADMIN — Medication 1 PATCH: at 17:15

## 2017-08-07 RX ADMIN — Medication 3 MILLILITER(S): at 09:05

## 2017-08-07 RX ADMIN — KETOCONAZOLE 1 APPLICATION(S): 20 AEROSOL, FOAM TOPICAL at 11:47

## 2017-08-07 RX ADMIN — Medication 40 MILLIGRAM(S): at 21:49

## 2017-08-07 RX ADMIN — Medication 40 MILLIGRAM(S): at 13:12

## 2017-08-07 RX ADMIN — Medication 600 MILLIGRAM(S): at 17:15

## 2017-08-07 RX ADMIN — Medication 600 MILLIGRAM(S): at 05:53

## 2017-08-07 RX ADMIN — Medication 0.5 MILLIGRAM(S): at 22:54

## 2017-08-07 RX ADMIN — AMLODIPINE BESYLATE 5 MILLIGRAM(S): 2.5 TABLET ORAL at 05:53

## 2017-08-07 RX ADMIN — CARVEDILOL PHOSPHATE 6.25 MILLIGRAM(S): 80 CAPSULE, EXTENDED RELEASE ORAL at 05:53

## 2017-08-07 RX ADMIN — Medication 1 PATCH: at 11:45

## 2017-08-07 RX ADMIN — DIVALPROEX SODIUM 250 MILLIGRAM(S): 500 TABLET, DELAYED RELEASE ORAL at 21:49

## 2017-08-07 RX ADMIN — ENOXAPARIN SODIUM 40 MILLIGRAM(S): 100 INJECTION SUBCUTANEOUS at 11:47

## 2017-08-07 RX ADMIN — ROPINIROLE 0.5 MILLIGRAM(S): 8 TABLET, FILM COATED, EXTENDED RELEASE ORAL at 17:15

## 2017-08-07 RX ADMIN — Medication 40 MILLIGRAM(S): at 05:53

## 2017-08-07 RX ADMIN — PANTOPRAZOLE SODIUM 40 MILLIGRAM(S): 20 TABLET, DELAYED RELEASE ORAL at 05:53

## 2017-08-07 RX ADMIN — DIVALPROEX SODIUM 1000 MILLIGRAM(S): 500 TABLET, DELAYED RELEASE ORAL at 21:49

## 2017-08-07 RX ADMIN — ROPINIROLE 0.5 MILLIGRAM(S): 8 TABLET, FILM COATED, EXTENDED RELEASE ORAL at 05:53

## 2017-08-07 RX ADMIN — HALOPERIDOL DECANOATE 10 MILLIGRAM(S): 100 INJECTION INTRAMUSCULAR at 21:49

## 2017-08-07 RX ADMIN — BUDESONIDE AND FORMOTEROL FUMARATE DIHYDRATE 2 PUFF(S): 160; 4.5 AEROSOL RESPIRATORY (INHALATION) at 09:05

## 2017-08-07 RX ADMIN — BUDESONIDE AND FORMOTEROL FUMARATE DIHYDRATE 2 PUFF(S): 160; 4.5 AEROSOL RESPIRATORY (INHALATION) at 21:28

## 2017-08-07 NOTE — PROGRESS NOTE ADULT - PROBLEM SELECTOR PLAN 1
still wheezing on exam, cont, soluemdrol, duoneb, oxygen, IS  cont, Levofloxacin   pulmo consult requested

## 2017-08-07 NOTE — CONSULT NOTE ADULT - ASSESSMENT
Severe COPD, now smoke free with exacerbation  complicated by Parkinson's and schizophrenia  no focal infiltrate on CXR  mental status improved  continue medrol, nebs, abx  repeat non contrast CT chest  per nursing , no swallowing issues  prognosis guarded Severe COPD, now smoke free with exacerbation  complicated by Parkinson's and schizophrenia  no focal infiltrate on CXR  mental status improved  continue medrol, nebs, abx  repeat non contrast CT chest  per nursing , no swallowing issues  prognosis guarded  unclear on coreg, no hx CHF or CAD, will discuss with PMD

## 2017-08-07 NOTE — CONSULT NOTE ADULT - SUBJECTIVE AND OBJECTIVE BOX
PULMONARY CONSULT NOTE      [id number]    Patient is a 67y old  Male who presents with a chief complaint of Confused. Difficulty to breath (04 Aug 2017 21:10)  68 y/o male with h/o COPD, Parkinson's disease, and schizophrenia was found in bed confused. patient was transferred from [facility] to ER by ambulance. in the ER patient has O2 sat. 88%. constricted pupils. patient was treated for COPD. breathing improved but became more confused. patient came back to normal mentation and alert after 2 doses of narcan    HISTORY OF PRESENT ILLNESS:    MEDICATIONS  (STANDING):  diVALproex  milliGRAM(s) Oral at bedtime  diVALproex ER 1000 milliGRAM(s) Oral at bedtime  rOPINIRole 0.5 milliGRAM(s) Oral two times a day  haloperidol     Tablet 10 milliGRAM(s) Oral at bedtime  carvedilol 6.25 milliGRAM(s) Oral every 12 hours  amLODIPine   Tablet 5 milliGRAM(s) Oral daily  ketoconazole 2% Cream 1 Application(s) Topical daily  nicotine -  14 mG/24Hr(s) Patch 1 patch Transdermal daily  levoFLOXacin IVPB 750 milliGRAM(s) IV Intermittent every 24 hours  ALBUTerol/ipratropium for Nebulization 3 milliLiter(s) Nebulizer every 6 hours  enoxaparin Injectable 40 milliGRAM(s) SubCutaneous daily  guaiFENesin  milliGRAM(s) Oral every 12 hours  pantoprazole    Tablet 40 milliGRAM(s) Oral before breakfast  buDESOnide  80 MICROgram(s)/formoterol 4.5 MICROgram(s) Inhaler 2 Puff(s) Inhalation two times a day  methylPREDNISolone sodium succinate Injectable 40 milliGRAM(s) IV Push three times a day      MEDICATIONS  (PRN):  LORazepam     Tablet 0.5 milliGRAM(s) Oral two times a day PRN Anxiety      Allergies    No Known Allergies    Intolerances        PAST MEDICAL & SURGICAL HISTORY:  Schizophrenia, unspecified type  Chronic obstructive pulmonary disease, unspecified COPD type  No significant past surgical history      FAMILY HISTORY:  No pertinent family history in first degree relatives      SOCIAL HISTORY  Smoking History:     REVIEW OF SYSTEMS:    CONSTITUTIONAL:  No fevers, chills, sweats    HEENT:  Eyes:  No diplopia or blurred vision. ENT:  No earache, sore throat or runny nose.    CARDIOVASCULAR:  No pressure, squeezing, tightness, or heaviness about the chest; no palpitations.    RESPIRATORY:  No cough, shortness of breath, PND or orthopnea. Mild SOBOE    GASTROINTESTINAL:  No abdominal pain, nausea, vomiting or diarrhea.    GENITOURINARY:  No dysuria, frequency or urgency.    NEUROLOGIC:  No paresthesias, fasciculations, seizures or weakness.    PSYCHIATRIC:  No disorder of thought or mood.    Vital Signs Last 24 Hrs  T(C): 36.8 (07 Aug 2017 09:37), Max: 36.8 (07 Aug 2017 09:37)  T(F): 98.2 (07 Aug 2017 09:37), Max: 98.2 (07 Aug 2017 09:37)  HR: 80 (07 Aug 2017 09:37) (80 - 91)  BP: 117/80 (07 Aug 2017 09:37) (117/80 - 123/78)  BP(mean): --  RR: 20 (07 Aug 2017 09:37) (19 - 20)  SpO2: 98% (07 Aug 2017 04:15) (98% - 99%)    PHYSICAL EXAMINATION:    GENERAL: The patient is a well-developed, well-nourished _____in no apparent distress.     HEENT: Head is normocephalic and atraumatic. Extraocular muscles are intact. Mucous membranes are moist.     NECK: Supple.     LUNGS: Clear to auscultation without wheezing, rales, or rhonchi. Respirations unlabored    HEART: Regular rate and rhythm without murmur.    ABDOMEN: Soft, nontender, and nondistended.  No hepatosplenomegaly is noted.    EXTREMITIES: Without any cyanosis, clubbing, rash, lesions or edema.    NEUROLOGIC: Grossly intact.      LABS:                              MICROBIOLOGY:    RADIOLOGY & ADDITIONAL STUDIES: PULMONARY CONSULT NOTE      DAVY HOLDENTRISTEN-[id number]    Patient is a 67y old  Male who presents with a chief complaint of Confused. Difficulty to breath (04 Aug 2017 21:10)  68 y/o male with h/o COPD, Parkinson's disease, and schizophrenia was found in bed confused. patient was transferred from [facility] to ER by ambulance. in the ER patient has O2 sat. 88%. constricted pupils. patient was treated for COPD. breathing improved but became more confused. patient came back to normal mentation and alert after 2 doses of narcan    HISTORY OF PRESENT ILLNESS:  feels better  no fever, chills,  per pt stopped smoking 3 months ago  MEDICATIONS  (STANDING):  diVALproex  milliGRAM(s) Oral at bedtime  diVALproex ER 1000 milliGRAM(s) Oral at bedtime  rOPINIRole 0.5 milliGRAM(s) Oral two times a day  haloperidol     Tablet 10 milliGRAM(s) Oral at bedtime  carvedilol 6.25 milliGRAM(s) Oral every 12 hours  amLODIPine   Tablet 5 milliGRAM(s) Oral daily  ketoconazole 2% Cream 1 Application(s) Topical daily  nicotine -  14 mG/24Hr(s) Patch 1 patch Transdermal daily  levoFLOXacin IVPB 750 milliGRAM(s) IV Intermittent every 24 hours  ALBUTerol/ipratropium for Nebulization 3 milliLiter(s) Nebulizer every 6 hours  enoxaparin Injectable 40 milliGRAM(s) SubCutaneous daily  guaiFENesin  milliGRAM(s) Oral every 12 hours  pantoprazole    Tablet 40 milliGRAM(s) Oral before breakfast  buDESOnide  80 MICROgram(s)/formoterol 4.5 MICROgram(s) Inhaler 2 Puff(s) Inhalation two times a day  methylPREDNISolone sodium succinate Injectable 40 milliGRAM(s) IV Push three times a day      MEDICATIONS  (PRN):  LORazepam     Tablet 0.5 milliGRAM(s) Oral two times a day PRN Anxiety      Allergies    No Known Allergies    Intolerances        PAST MEDICAL & SURGICAL HISTORY:  Schizophrenia, unspecified type  Chronic obstructive pulmonary disease, unspecified COPD type  No significant past surgical history      FAMILY HISTORY:  No pertinent family history in first degree relatives      SOCIAL HISTORY  Smoking History:     REVIEW OF SYSTEMS:    CONSTITUTIONAL:  No fevers, chills, sweats    HEENT:  Eyes:  No diplopia or blurred vision. ENT:  No earache, sore throat or runny nose.    CARDIOVASCULAR:  No pressure, squeezing, tightness, or heaviness about the chest; no palpitations.    RESPIRATORY:  see hpi    GASTROINTESTINAL:  No abdominal pain, nausea, vomiting or diarrhea.    GENITOURINARY:  No dysuria, frequency or urgency.    NEUROLOGIC:  No paresthesias, fasciculations, seizures or weakness.    PSYCHIATRIC:  No disorder of thought or mood.    Vital Signs Last 24 Hrs  T(C): 36.8 (07 Aug 2017 09:37), Max: 36.8 (07 Aug 2017 09:37)  T(F): 98.2 (07 Aug 2017 09:37), Max: 98.2 (07 Aug 2017 09:37)  HR: 80 (07 Aug 2017 09:37) (80 - 91)  BP: 117/80 (07 Aug 2017 09:37) (117/80 - 123/78)  BP(mean): --  RR: 20 (07 Aug 2017 09:37) (19 - 20)  SpO2: 98% (07 Aug 2017 04:15) (98% - 99%)    PHYSICAL EXAMINATION:    GENERAL: The patient is a well-developed, well-nourished in no apparent distress.     HEENT: Head is normocephalic and atraumatic. Extraocular muscles are intact. Mucous membranes are moist.     NECK: Supple.     LUNGS: moderate air entry bilat with exp rhonchi. Respirations unlabored    HEART: Regular rate and rhythm without murmur.    ABDOMEN: Soft, nontender, and nondistended.  No hepatosplenomegaly is noted.    EXTREMITIES: Without any cyanosis, clubbing, rash, lesions or edema, rest tremor.    NEUROLOGIC: Grossly intact.      LABS:                              MICROBIOLOGY:    RADIOLOGY & ADDITIONAL STUDIES:  cxr and previous CT scan reviewed

## 2017-08-07 NOTE — PROGRESS NOTE ADULT - SUBJECTIVE AND OBJECTIVE BOX
Internal Medicine Hospitalist - Dr. Francesco HOLDEN    [id number]    67y      Male    Patient is a 67y old  Male who presents with a chief complaint of Confused. Difficulty to breath (04 Aug 2017 21:10)    INTERVAL HPI/ OVERNIGHT EVENTS: Patient is seen and examined, pt is still wheezing on exam, requiring additional oxygen to maintain saturation, c/o cough and SOB     REVIEW OF SYSTEMS:    Denied fever, chills, abd. pain, nausea, vomiting, chest pain,  headache, dizziness    PHYSICAL EXAM:    Vital Signs Last 24 Hrs  T(C): 36.6 (06 Aug 2017 21:46), Max: 36.6 (06 Aug 2017 21:46)  T(F): 97.8 (06 Aug 2017 21:46), Max: 97.8 (06 Aug 2017 21:46)  HR: 91 (07 Aug 2017 04:15) (90 - 91)  BP: 123/70 (07 Aug 2017 04:15) (123/70 - 123/78)  BP(mean): --  RR: 19 (07 Aug 2017 04:15) (19 - 20)  SpO2: 98% (07 Aug 2017 04:15) (98% - 99%)    GENERAL: NAD  HEENT: EOMI  Neck: supple  CHEST/LUNG: positive wheezing on exam   HEART: S1S2+ audible  ABDOMEN: Soft, Nontender, Nondistended; Bowel sounds present  EXTREMITIES:  no edema  CNS: Awake   Psychiatry: normal mood    LABS:       MEDICATIONS  (STANDING):  diVALproex  milliGRAM(s) Oral at bedtime  diVALproex ER 1000 milliGRAM(s) Oral at bedtime  rOPINIRole 0.5 milliGRAM(s) Oral two times a day  haloperidol     Tablet 10 milliGRAM(s) Oral at bedtime  carvedilol 6.25 milliGRAM(s) Oral every 12 hours  amLODIPine   Tablet 5 milliGRAM(s) Oral daily  ketoconazole 2% Cream 1 Application(s) Topical daily  nicotine -  14 mG/24Hr(s) Patch 1 patch Transdermal daily  levoFLOXacin IVPB 750 milliGRAM(s) IV Intermittent every 24 hours  ALBUTerol/ipratropium for Nebulization 3 milliLiter(s) Nebulizer every 6 hours  enoxaparin Injectable 40 milliGRAM(s) SubCutaneous daily  guaiFENesin  milliGRAM(s) Oral every 12 hours  pantoprazole    Tablet 40 milliGRAM(s) Oral before breakfast  buDESOnide  80 MICROgram(s)/formoterol 4.5 MICROgram(s) Inhaler 2 Puff(s) Inhalation two times a day  methylPREDNISolone sodium succinate Injectable 40 milliGRAM(s) IV Push three times a day    MEDICATIONS  (PRN):  LORazepam     Tablet 0.5 milliGRAM(s) Oral two times a day PRN Anxiety      RADIOLOGY & ADDITIONAL TEST    < from: CT Head No Cont (08.04.17 @ 22:38) >    IMPRESSION:   No acute territorial infarct, hemorrhage, mass or mass effect.    < end of copied text >

## 2017-08-07 NOTE — PROGRESS NOTE ADULT - PROBLEM SELECTOR PLAN 5
stable, cont, home medication stable, pt has no h/o CAD  will stop coreg due to COPD and wheezing  cont. amlodipine, monitor BP

## 2017-08-07 NOTE — PROGRESS NOTE ADULT - ASSESSMENT
This is 68 y/o male with h/o COPD, Parkinson's disease, and schizophrenia was found in bed confused. patient was transferred from [facility], admitted  for COPD exacerbation, mentation is back to baseline, ct head negative, still wheezing on exam, will cont, soluemdrol, pulmo consult requested

## 2017-08-07 NOTE — PROGRESS NOTE ADULT - PROBLEM SELECTOR PLAN 2
back to baseline   Urine drug screening negative  CT brain negative, mentation improved,  back to base line

## 2017-08-08 LAB
ANION GAP SERPL CALC-SCNC: 10 MMOL/L — SIGNIFICANT CHANGE UP (ref 5–17)
BUN SERPL-MCNC: 21 MG/DL — HIGH (ref 8–20)
CALCIUM SERPL-MCNC: 8.6 MG/DL — SIGNIFICANT CHANGE UP (ref 8.6–10.2)
CHLORIDE SERPL-SCNC: 93 MMOL/L — LOW (ref 98–107)
CO2 SERPL-SCNC: 33 MMOL/L — HIGH (ref 22–29)
CREAT SERPL-MCNC: 0.67 MG/DL — SIGNIFICANT CHANGE UP (ref 0.5–1.3)
GLUCOSE SERPL-MCNC: 92 MG/DL — SIGNIFICANT CHANGE UP (ref 70–115)
HCT VFR BLD CALC: 36.4 % — LOW (ref 42–52)
HGB BLD-MCNC: 12 G/DL — LOW (ref 14–18)
MCHC RBC-ENTMCNC: 32.5 PG — HIGH (ref 27–31)
MCHC RBC-ENTMCNC: 33 G/DL — SIGNIFICANT CHANGE UP (ref 32–36)
MCV RBC AUTO: 98.6 FL — HIGH (ref 80–94)
PLATELET # BLD AUTO: 146 K/UL — LOW (ref 150–400)
POTASSIUM SERPL-MCNC: 4.4 MMOL/L — SIGNIFICANT CHANGE UP (ref 3.5–5.3)
POTASSIUM SERPL-SCNC: 4.4 MMOL/L — SIGNIFICANT CHANGE UP (ref 3.5–5.3)
RBC # BLD: 3.69 M/UL — LOW (ref 4.6–6.2)
RBC # FLD: 15.3 % — SIGNIFICANT CHANGE UP (ref 11–15.6)
SODIUM SERPL-SCNC: 136 MMOL/L — SIGNIFICANT CHANGE UP (ref 135–145)
WBC # BLD: 6 K/UL — SIGNIFICANT CHANGE UP (ref 4.8–10.8)
WBC # FLD AUTO: 6 K/UL — SIGNIFICANT CHANGE UP (ref 4.8–10.8)

## 2017-08-08 PROCEDURE — 71250 CT THORAX DX C-: CPT | Mod: 26

## 2017-08-08 PROCEDURE — 99233 SBSQ HOSP IP/OBS HIGH 50: CPT

## 2017-08-08 RX ADMIN — Medication 40 MILLIGRAM(S): at 13:58

## 2017-08-08 RX ADMIN — ROPINIROLE 0.5 MILLIGRAM(S): 8 TABLET, FILM COATED, EXTENDED RELEASE ORAL at 05:50

## 2017-08-08 RX ADMIN — PANTOPRAZOLE SODIUM 40 MILLIGRAM(S): 20 TABLET, DELAYED RELEASE ORAL at 05:55

## 2017-08-08 RX ADMIN — ENOXAPARIN SODIUM 40 MILLIGRAM(S): 100 INJECTION SUBCUTANEOUS at 11:53

## 2017-08-08 RX ADMIN — Medication 40 MILLIGRAM(S): at 05:50

## 2017-08-08 RX ADMIN — AMLODIPINE BESYLATE 5 MILLIGRAM(S): 2.5 TABLET ORAL at 05:51

## 2017-08-08 RX ADMIN — HALOPERIDOL DECANOATE 10 MILLIGRAM(S): 100 INJECTION INTRAMUSCULAR at 21:47

## 2017-08-08 RX ADMIN — Medication 600 MILLIGRAM(S): at 05:51

## 2017-08-08 RX ADMIN — DIVALPROEX SODIUM 250 MILLIGRAM(S): 500 TABLET, DELAYED RELEASE ORAL at 21:47

## 2017-08-08 RX ADMIN — Medication 3 MILLILITER(S): at 21:40

## 2017-08-08 RX ADMIN — Medication 100 MILLIGRAM(S): at 17:51

## 2017-08-08 RX ADMIN — Medication 1 PATCH: at 11:52

## 2017-08-08 RX ADMIN — BUDESONIDE AND FORMOTEROL FUMARATE DIHYDRATE 2 PUFF(S): 160; 4.5 AEROSOL RESPIRATORY (INHALATION) at 21:40

## 2017-08-08 RX ADMIN — KETOCONAZOLE 1 APPLICATION(S): 20 AEROSOL, FOAM TOPICAL at 11:53

## 2017-08-08 RX ADMIN — Medication 40 MILLIGRAM(S): at 21:47

## 2017-08-08 RX ADMIN — ROPINIROLE 0.5 MILLIGRAM(S): 8 TABLET, FILM COATED, EXTENDED RELEASE ORAL at 17:51

## 2017-08-08 RX ADMIN — Medication 1 PATCH: at 13:58

## 2017-08-08 RX ADMIN — DIVALPROEX SODIUM 1000 MILLIGRAM(S): 500 TABLET, DELAYED RELEASE ORAL at 21:47

## 2017-08-08 RX ADMIN — Medication 3 MILLILITER(S): at 03:54

## 2017-08-08 RX ADMIN — Medication 600 MILLIGRAM(S): at 17:51

## 2017-08-08 NOTE — PROGRESS NOTE ADULT - ASSESSMENT
Severe COPD, now smoke free with improving exacerbation  complicated by Parkinson's and schizophrenia  no focal infiltrate on CXR  mental status improved  continue medrol, nebs, abx  repeat non contrast CT chest pending  per nursing , no swallowing issues  will obtain speech swallow eval  prognosis guarded  now off coreg

## 2017-08-08 NOTE — SWALLOW BEDSIDE ASSESSMENT ADULT - SLP GENERAL OBSERVATIONS
Ptreceived & seen seated at edge of bed, +O2 nasal canula, +awake/alert, +grossly oriented, +baseline cough/wet upper airway breath sounds

## 2017-08-08 NOTE — PROGRESS NOTE ADULT - ASSESSMENT
This is 66 y/o male with h/o COPD, Parkinson's disease, and schizophrenia was found in bed confused. patient was transferred from [facility], admitted  for COPD exacerbation, mentation is back to baseline, ct head negative, still wheezing on exam, will cont, soluemdrol, seen by pulmo, suggest CT chest without contrast

## 2017-08-08 NOTE — SWALLOW BEDSIDE ASSESSMENT ADULT - COMMENTS
Chest x-ray: 8/4/17: clear lungs, no evidence of a pleural effusion NO OVERT s/s of penetration/aspiration observed. Bedside swallow evaluation, however, cannot rule out silent aspiration.

## 2017-08-08 NOTE — PROGRESS NOTE ADULT - SUBJECTIVE AND OBJECTIVE BOX
Internal Medicine Hospitalist - Dr. Francesco HOLDEN    [id number]    67y      Male    Patient is a 67y old  Male who presents with a chief complaint of Confused. Difficulty to breath (04 Aug 2017 21:10)    INTERVAL HPI/ OVERNIGHT EVENTS: Patient is seen and examined, feeling better, still c/o cough, exertional SOB, requiring additional oxygen to maintain saturation    REVIEW OF SYSTEMS:    Denied fever, chills, abd. pain, nausea, vomiting, chest pain, headache, dizziness    PHYSICAL EXAM:    Vital Signs Last 24 Hrs  T(C): 36.6 (08 Aug 2017 04:43), Max: 36.6 (07 Aug 2017 16:34)  T(F): 97.8 (08 Aug 2017 04:43), Max: 97.9 (07 Aug 2017 16:34)  HR: 67 (08 Aug 2017 04:43) (66 - 75)  BP: 102/70 (08 Aug 2017 04:43) (100/65 - 102/70)  BP(mean): --  RR: 19 (08 Aug 2017 04:43) (19 - 20)  SpO2: 99% (08 Aug 2017 04:43) (94% - 99%)    GENERAL: NAD  HEENT: EOMI  Neck: supple  CHEST/LUNG: positive air entry, positive wheezing   HEART: S1S2+ audible  ABDOMEN: Soft, Nontender, Nondistended; Bowel sounds present  EXTREMITIES:  hand tremors noted   CNS: Awake, moving all 4 extremities       LABS:                        12.0   6.0   )-----------( 146      ( 08 Aug 2017 07:10 )             36.4     08-08    136  |  93<L>  |  21.0<H>  ----------------------------<  92  4.4   |  33.0<H>  |  0.67    Ca    8.6      08 Aug 2017 07:10              MEDICATIONS  (STANDING):  diVALproex  milliGRAM(s) Oral at bedtime  diVALproex ER 1000 milliGRAM(s) Oral at bedtime  rOPINIRole 0.5 milliGRAM(s) Oral two times a day  haloperidol     Tablet 10 milliGRAM(s) Oral at bedtime  amLODIPine   Tablet 5 milliGRAM(s) Oral daily  ketoconazole 2% Cream 1 Application(s) Topical daily  nicotine -  14 mG/24Hr(s) Patch 1 patch Transdermal daily  levoFLOXacin IVPB 750 milliGRAM(s) IV Intermittent every 24 hours  ALBUTerol/ipratropium for Nebulization 3 milliLiter(s) Nebulizer every 6 hours  enoxaparin Injectable 40 milliGRAM(s) SubCutaneous daily  guaiFENesin  milliGRAM(s) Oral every 12 hours  pantoprazole    Tablet 40 milliGRAM(s) Oral before breakfast  buDESOnide  80 MICROgram(s)/formoterol 4.5 MICROgram(s) Inhaler 2 Puff(s) Inhalation two times a day  methylPREDNISolone sodium succinate Injectable 40 milliGRAM(s) IV Push three times a day    MEDICATIONS  (PRN):  LORazepam     Tablet 0.5 milliGRAM(s) Oral two times a day PRN Anxiety      RADIOLOGY & ADDITIONAL TEST

## 2017-08-08 NOTE — SWALLOW BEDSIDE ASSESSMENT ADULT - ASR SWALLOW ASPIRATION MONITOR
fever/pneumonia/upper respiratory infection/throat clearing/cough/gurgly voice/oral hygiene/change of breathing pattern/position upright (89Y)

## 2017-08-08 NOTE — PROGRESS NOTE ADULT - SUBJECTIVE AND OBJECTIVE BOX
PULMONARY PROGRESS NOTE      [id number]    Patient is a 67y old  Male who presents with a chief complaint of Confused. Difficulty to breath (04 Aug 2017 21:10)      INTERVAL HPI/OVERNIGHT EVENTS:  difficult by pts hx to appreciate any sig change  appears comfortable, speaking with family, eating  MEDICATIONS  (STANDING):  diVALproex  milliGRAM(s) Oral at bedtime  diVALproex ER 1000 milliGRAM(s) Oral at bedtime  rOPINIRole 0.5 milliGRAM(s) Oral two times a day  haloperidol     Tablet 10 milliGRAM(s) Oral at bedtime  amLODIPine   Tablet 5 milliGRAM(s) Oral daily  ketoconazole 2% Cream 1 Application(s) Topical daily  nicotine -  14 mG/24Hr(s) Patch 1 patch Transdermal daily  ALBUTerol/ipratropium for Nebulization 3 milliLiter(s) Nebulizer every 6 hours  enoxaparin Injectable 40 milliGRAM(s) SubCutaneous daily  guaiFENesin  milliGRAM(s) Oral every 12 hours  pantoprazole    Tablet 40 milliGRAM(s) Oral before breakfast  buDESOnide  80 MICROgram(s)/formoterol 4.5 MICROgram(s) Inhaler 2 Puff(s) Inhalation two times a day  methylPREDNISolone sodium succinate Injectable 40 milliGRAM(s) IV Push three times a day      MEDICATIONS  (PRN):  LORazepam     Tablet 0.5 milliGRAM(s) Oral two times a day PRN Anxiety  guaiFENesin    [place]yrup 100 milliGRAM(s) Oral every 6 hours PRN Cough      Allergies    No Known Allergies    Intolerances        PAST MEDICAL & SURGICAL HISTORY:  Schizophrenia, unspecified type  Chronic obstructive pulmonary disease, unspecified COPD type  No significant past surgical history      SOCIAL HISTORY  Smoking History:       REVIEW OF SYSTEMS:    CONSTITUTIONAL:  No distress    HEENT:  Eyes:  No diplopia or blurred vision. ENT:  No earache, sore throat or runny nose.    CARDIOVASCULAR:  No pressure, squeezing, tightness, heaviness or aching about the chest; no palpitations.    RESPIRATORY:  see hpi    GASTROINTESTINAL:  No nausea, vomiting or diarrhea.    GENITOURINARY:  No dysuria, frequency or urgency.    NEUROLOGIC:  No paresthesias, fasciculations, seizures or weakness.    PSYCHIATRIC:  No disorder of thought or mood.    Vital Signs Last 24 Hrs  T(C): 36.7 (08 Aug 2017 11:23), Max: 36.7 (08 Aug 2017 11:23)  T(F): 98.1 (08 Aug 2017 11:23), Max: 98.1 (08 Aug 2017 11:23)  HR: 70 (08 Aug 2017 11:23) (66 - 75)  BP: 118/77 (08 Aug 2017 11:23) (100/65 - 118/77)  BP(mean): --  RR: 18 (08 Aug 2017 11:23) (18 - 20)  SpO2: 99% (08 Aug 2017 04:43) (94% - 99%)    PHYSICAL EXAMINATION:    GENERAL: The patient is awake and alert in no apparent distress.     HEENT: Head is normocephalic and atraumatic. Extraocular muscles are intact. Mucous membranes are moist.    NECK: Supple.    LUNGS: moderate air entry bilat, faint rhonchi; respirations unlabored    HEART: Regular rate and rhythm without murmur.    ABDOMEN: Soft, nontender, and nondistended.      EXTREMITIES: Without any cyanosis, clubbing, rash, lesions or edema.    NEUROLOGIC: Grossly intact.    LABS:                        12.0   6.0   )-----------( 146      ( 08 Aug 2017 07:10 )             36.4     08-08    136  |  93<L>  |  21.0<H>  ----------------------------<  92  4.4   |  33.0<H>  |  0.67    Ca    8.6      08 Aug 2017 07:10                          MICROBIOLOGY:    RADIOLOGY & ADDITIONAL STUDIES:

## 2017-08-08 NOTE — SWALLOW BEDSIDE ASSESSMENT ADULT - SWALLOW EVAL: DIAGNOSIS
Oral & pharyngeal stage clinically judged to be WFL with NO overt s/s of penetration/aspiration at bedside. Please note that bedside swallow cannot rule out silent aspiration, if suspected

## 2017-08-08 NOTE — SWALLOW BEDSIDE ASSESSMENT ADULT - PHARYNGEAL PHASE
Within functional limits Within functional limits/no change in wet upper airway breath sounds noted at baseline

## 2017-08-08 NOTE — PROGRESS NOTE ADULT - PROBLEM SELECTOR PLAN 1
appreciate pulmo input, slowly improving   still wheezing on exam, cont, soluemdrol, duoneb, oxygen, IS  cont, Levofloxacin   CT chest

## 2017-08-09 PROCEDURE — 99233 SBSQ HOSP IP/OBS HIGH 50: CPT

## 2017-08-09 RX ADMIN — Medication 40 MILLIGRAM(S): at 12:49

## 2017-08-09 RX ADMIN — Medication 600 MILLIGRAM(S): at 06:39

## 2017-08-09 RX ADMIN — ROPINIROLE 0.5 MILLIGRAM(S): 8 TABLET, FILM COATED, EXTENDED RELEASE ORAL at 17:11

## 2017-08-09 RX ADMIN — Medication 1 PATCH: at 10:57

## 2017-08-09 RX ADMIN — BUDESONIDE AND FORMOTEROL FUMARATE DIHYDRATE 2 PUFF(S): 160; 4.5 AEROSOL RESPIRATORY (INHALATION) at 09:22

## 2017-08-09 RX ADMIN — Medication 3 MILLILITER(S): at 20:02

## 2017-08-09 RX ADMIN — KETOCONAZOLE 1 APPLICATION(S): 20 AEROSOL, FOAM TOPICAL at 10:56

## 2017-08-09 RX ADMIN — HALOPERIDOL DECANOATE 10 MILLIGRAM(S): 100 INJECTION INTRAMUSCULAR at 21:42

## 2017-08-09 RX ADMIN — DIVALPROEX SODIUM 1000 MILLIGRAM(S): 500 TABLET, DELAYED RELEASE ORAL at 21:41

## 2017-08-09 RX ADMIN — Medication 3 MILLILITER(S): at 02:40

## 2017-08-09 RX ADMIN — Medication 0.5 MILLIGRAM(S): at 10:53

## 2017-08-09 RX ADMIN — ROPINIROLE 0.5 MILLIGRAM(S): 8 TABLET, FILM COATED, EXTENDED RELEASE ORAL at 06:39

## 2017-08-09 RX ADMIN — BUDESONIDE AND FORMOTEROL FUMARATE DIHYDRATE 2 PUFF(S): 160; 4.5 AEROSOL RESPIRATORY (INHALATION) at 20:04

## 2017-08-09 RX ADMIN — DIVALPROEX SODIUM 250 MILLIGRAM(S): 500 TABLET, DELAYED RELEASE ORAL at 21:42

## 2017-08-09 RX ADMIN — Medication 3 MILLILITER(S): at 15:02

## 2017-08-09 RX ADMIN — Medication 1 PATCH: at 10:53

## 2017-08-09 RX ADMIN — Medication 3 MILLILITER(S): at 09:22

## 2017-08-09 RX ADMIN — Medication 100 MILLIGRAM(S): at 21:42

## 2017-08-09 RX ADMIN — Medication 600 MILLIGRAM(S): at 17:11

## 2017-08-09 RX ADMIN — PANTOPRAZOLE SODIUM 40 MILLIGRAM(S): 20 TABLET, DELAYED RELEASE ORAL at 06:39

## 2017-08-09 RX ADMIN — AMLODIPINE BESYLATE 5 MILLIGRAM(S): 2.5 TABLET ORAL at 06:39

## 2017-08-09 RX ADMIN — Medication 100 MILLIGRAM(S): at 00:58

## 2017-08-09 RX ADMIN — Medication 40 MILLIGRAM(S): at 06:39

## 2017-08-09 RX ADMIN — ENOXAPARIN SODIUM 40 MILLIGRAM(S): 100 INJECTION SUBCUTANEOUS at 10:57

## 2017-08-09 NOTE — PROGRESS NOTE ADULT - ASSESSMENT
This is 66 y/o male with h/o COPD, Parkinson's disease, and schizophrenia was found in bed confused. patient was transferred from [facility], admitted  for COPD exacerbation, started on solumedrol, Duoneb, Abx, mentation is back to baseline, ct head negative, still wheezing on exam, will cont, soluemdrol, seen by pulmo, CT chest showed Small bilateral pleural effusions and mild bibasilar atelectasis. Emphysematous changes.  No evidence of infiltrate/pneumonia.

## 2017-08-09 NOTE — PROGRESS NOTE ADULT - SUBJECTIVE AND OBJECTIVE BOX
Internal Medicine Hospitalist - Dr. Francesco HOLDEN    [id number]    67y      Male    Patient is a 67y old  Male who presents with a chief complaint of Confused. Difficulty to breath (04 Aug 2017 21:10)    INTERVAL HPI/ OVERNIGHT EVENTS: Patient is seen and examined, slowly improving, still wheezing on exam, requiring additional oxygen, denied fever, chills, chest pain.     REVIEW OF SYSTEMS:    Denied fever, chills, abd. pain, nausea, vomiting, chest pain, headache, dizziness    PHYSICAL EXAM:    Vital Signs Last 24 Hrs  T(C): 36.6 (09 Aug 2017 05:00), Max: 36.7 (08 Aug 2017 11:23)  T(F): 97.8 (09 Aug 2017 05:00), Max: 98.1 (08 Aug 2017 11:23)  HR: 67 (08 Aug 2017 21:43) (67 - 81)  BP: 124/77 (09 Aug 2017 05:00) (118/77 - 126/77)  BP(mean): --  RR: 18 (09 Aug 2017 05:00) (18 - 20)  SpO2: 95% (09 Aug 2017 05:00) (95% - 96%)    GENERAL: NAD  HEENT: EOMI  Neck: supple  CHEST/LUNG: improving air entry, positive wheezing   HEART: S1S2+ audible  ABDOMEN: Soft, Nontender, Nondistended; Bowel sounds present  EXTREMITIES:  no edema  CNS: Awake     LABS:                        12.0   6.0   )-----------( 146      ( 08 Aug 2017 07:10 )             36.4     08-08    136  |  93<L>  |  21.0<H>  ----------------------------<  92  4.4   |  33.0<H>  |  0.67    Ca    8.6      08 Aug 2017 07:10    MEDICATIONS  (STANDING):  diVALproex  milliGRAM(s) Oral at bedtime  diVALproex ER 1000 milliGRAM(s) Oral at bedtime  rOPINIRole 0.5 milliGRAM(s) Oral two times a day  haloperidol     Tablet 10 milliGRAM(s) Oral at bedtime  amLODIPine   Tablet 5 milliGRAM(s) Oral daily  ketoconazole 2% Cream 1 Application(s) Topical daily  nicotine -  14 mG/24Hr(s) Patch 1 patch Transdermal daily  ALBUTerol/ipratropium for Nebulization 3 milliLiter(s) Nebulizer every 6 hours  enoxaparin Injectable 40 milliGRAM(s) SubCutaneous daily  guaiFENesin  milliGRAM(s) Oral every 12 hours  pantoprazole    Tablet 40 milliGRAM(s) Oral before breakfast  buDESOnide  80 MICROgram(s)/formoterol 4.5 MICROgram(s) Inhaler 2 Puff(s) Inhalation two times a day  methylPREDNISolone sodium succinate Injectable 40 milliGRAM(s) IV Push three times a day    MEDICATIONS  (PRN):  LORazepam     Tablet 0.5 milliGRAM(s) Oral two times a day PRN Anxiety  guaiFENesin    Syrup 100 milliGRAM(s) Oral every 6 hours PRN Cough      RADIOLOGY & ADDITIONAL TEST  < from: CT Chest No Cont (08.08.17 @ 15:53) >  IMPRESSION:     Small bilateral pleural effusions and mild bibasilar atelectasis.    Emphysematous changes.    No evidence of infiltrate/pneumonia.    Large hiatus hernia with air and fluid-filled dilatation of the thoracic   esophagus..     < end of copied text >

## 2017-08-09 NOTE — PROGRESS NOTE ADULT - SUBJECTIVE AND OBJECTIVE BOX
PULMONARY PROGRESS NOTE      [id number]    Patient is a 67y old  Male who presents with a chief complaint of Confused. Difficulty to breath (04 Aug 2017 21:10)      INTERVAL HPI/OVERNIGHT EVENTS:NAD on NCO2    MEDICATIONS  (STANDING):  diVALproex  milliGRAM(s) Oral at bedtime  diVALproex ER 1000 milliGRAM(s) Oral at bedtime  rOPINIRole 0.5 milliGRAM(s) Oral two times a day  haloperidol     Tablet 10 milliGRAM(s) Oral at bedtime  amLODIPine   Tablet 5 milliGRAM(s) Oral daily  ketoconazole 2% Cream 1 Application(s) Topical daily  nicotine -  14 mG/24Hr(s) Patch 1 patch Transdermal daily  ALBUTerol/ipratropium for Nebulization 3 milliLiter(s) Nebulizer every 6 hours  enoxaparin Injectable 40 milliGRAM(s) SubCutaneous daily  guaiFENesin  milliGRAM(s) Oral every 12 hours  pantoprazole    Tablet 40 milliGRAM(s) Oral before breakfast  buDESOnide  80 MICROgram(s)/formoterol 4.5 MICROgram(s) Inhaler 2 Puff(s) Inhalation two times a day  methylPREDNISolone sodium succinate Injectable 40 milliGRAM(s) IV Push three times a day      MEDICATIONS  (PRN):  LORazepam     Tablet 0.5 milliGRAM(s) Oral two times a day PRN Anxiety  guaiFENesin    Syrup 100 milliGRAM(s) Oral every 6 hours PRN Cough      Allergies    No Known Allergies    Intolerances        PAST MEDICAL & SURGICAL HISTORY:  Schizophrenia, unspecified type  Chronic obstructive pulmonary disease, unspecified COPD type  No significant past surgical history      SOCIAL HISTORY  Smoking History: former      REVIEW OF SYSTEMS:    unable to obtain due to psych disorder    Vital Signs Last 24 Hrs  T(C): 36.6 (09 Aug 2017 10:35), Max: 36.7 (08 Aug 2017 11:23)  T(F): 97.8 (09 Aug 2017 10:35), Max: 98.1 (08 Aug 2017 11:23)  HR: 82 (09 Aug 2017 10:35) (67 - 82)  BP: 119/81 (09 Aug 2017 10:35) (118/77 - 126/77)  BP(mean): --  RR: 18 (09 Aug 2017 10:35) (18 - 20)  SpO2: 95% (09 Aug 2017 05:00) (95% - 96%)    PHYSICAL EXAMINATION:    GENERAL: The patient is awake and alert in no apparent distress.     HEENT: Head is normocephalic and atraumatic. Extraocular muscles are intact. Mucous membranes are moist.    NECK: Supple.    LUNGS: diminished bs bilat    HEART: Regular rate and rhythm without murmur.    ABDOMEN: Soft, nontender, and nondistended.      EXTREMITIES: Without any cyanosis, clubbing, rash, lesions or edema.    NEUROLOGIC: Grossly intact.    SKIN: No ulceration or induration present.      LABS:                        12.0   6.0   )-----------( 146      ( 08 Aug 2017 07:10 )             36.4     08-08    136  |  93<L>  |  21.0<H>  ----------------------------<  92  4.4   |  33.0<H>  |  0.67    Ca    8.6      08 Aug 2017 07:10                          MICROBIOLOGY:    RADIOLOGY & ADDITIONAL STUDIES:< from: CT Chest No Cont (08.08.17 @ 15:53) >     EXAM:  CT CHEST                          PROCEDURE DATE:  08/08/2017          INTERPRETATION:  HISTORY: Shortness of breath. COPD with exacerbation..    Date and Time of Exam: 8/8/2017 3:38 PM    TECHNIQUE:  Sections were obtained from the apices to the diaphragm   without intravenous contrast.    COMPARISON EXAMINATION: 2/5/2016    FINDINGS: No evidence of mediastinal or hilar lymphadenopathy. Small   bilateral pleural effusions. No evidence of a pericardial effusion. No   evidence of axillary adenopathy.    There is a large hiatus hernia. Diffuse air and fluid-filled dilatation   of the thoracic esophagus.    Emphysematous changes noted bilaterally. Calcified granuloma in the right   upper lobe. Bibasilar atelectasis.    Degenerative changes in the spine. Old, healed right rib fractures.      IMPRESSION:     Small bilateral pleural effusions and mild bibasilar atelectasis.    Emphysematous changes.    No evidence of infiltrate/pneumonia.    Large hiatus hernia with air and fluid-filled dilatation of the thoracic   esophagus..                   VALERIANO TEJADA M.D., ATTENDING RADIOLOGIST  This document has been electronically signed. Aug  8 2017  4:09PM        < end of copied text >

## 2017-08-09 NOTE — PROGRESS NOTE ADULT - ASSESSMENT
Imp--COPD PD schizophrenia.  Resp status appears improved.  No evidence pneumonia.  Plan--Lower steroids, continue nebs.  check RA pulse ox.

## 2017-08-09 NOTE — PROGRESS NOTE ADULT - PROBLEM SELECTOR PLAN 1
appreciate pulmo input, still wheezing on exam   cont, soluemdrol, duoneb, oxygen, IS  completed Abx for 5 days, no pneumonia on CT chest

## 2017-08-10 DIAGNOSIS — K21.9 GASTRO-ESOPHAGEAL REFLUX DISEASE WITHOUT ESOPHAGITIS: ICD-10-CM

## 2017-08-10 LAB
ANION GAP SERPL CALC-SCNC: 10 MMOL/L — SIGNIFICANT CHANGE UP (ref 5–17)
BUN SERPL-MCNC: 23 MG/DL — HIGH (ref 8–20)
CALCIUM SERPL-MCNC: 8.4 MG/DL — LOW (ref 8.6–10.2)
CHLORIDE SERPL-SCNC: 94 MMOL/L — LOW (ref 98–107)
CO2 SERPL-SCNC: 34 MMOL/L — HIGH (ref 22–29)
CREAT SERPL-MCNC: 0.56 MG/DL — SIGNIFICANT CHANGE UP (ref 0.5–1.3)
GLUCOSE SERPL-MCNC: 79 MG/DL — SIGNIFICANT CHANGE UP (ref 70–115)
HCT VFR BLD CALC: 35.8 % — LOW (ref 42–52)
HGB BLD-MCNC: 12 G/DL — LOW (ref 14–18)
MCHC RBC-ENTMCNC: 32.5 PG — HIGH (ref 27–31)
MCHC RBC-ENTMCNC: 33.5 G/DL — SIGNIFICANT CHANGE UP (ref 32–36)
MCV RBC AUTO: 97 FL — HIGH (ref 80–94)
PLATELET # BLD AUTO: 139 K/UL — LOW (ref 150–400)
POTASSIUM SERPL-MCNC: 4.4 MMOL/L — SIGNIFICANT CHANGE UP (ref 3.5–5.3)
POTASSIUM SERPL-SCNC: 4.4 MMOL/L — SIGNIFICANT CHANGE UP (ref 3.5–5.3)
RBC # BLD: 3.69 M/UL — LOW (ref 4.6–6.2)
RBC # FLD: 15.2 % — SIGNIFICANT CHANGE UP (ref 11–15.6)
SODIUM SERPL-SCNC: 138 MMOL/L — SIGNIFICANT CHANGE UP (ref 135–145)
WBC # BLD: 5.6 K/UL — SIGNIFICANT CHANGE UP (ref 4.8–10.8)
WBC # FLD AUTO: 5.6 K/UL — SIGNIFICANT CHANGE UP (ref 4.8–10.8)

## 2017-08-10 PROCEDURE — 99222 1ST HOSP IP/OBS MODERATE 55: CPT

## 2017-08-10 PROCEDURE — 99233 SBSQ HOSP IP/OBS HIGH 50: CPT

## 2017-08-10 RX ORDER — FAMOTIDINE 10 MG/ML
40 INJECTION INTRAVENOUS AT BEDTIME
Qty: 0 | Refills: 0 | Status: DISCONTINUED | OUTPATIENT
Start: 2017-08-10 | End: 2017-08-14

## 2017-08-10 RX ADMIN — Medication 0.5 MILLIGRAM(S): at 08:28

## 2017-08-10 RX ADMIN — KETOCONAZOLE 1 APPLICATION(S): 20 AEROSOL, FOAM TOPICAL at 13:40

## 2017-08-10 RX ADMIN — DIVALPROEX SODIUM 250 MILLIGRAM(S): 500 TABLET, DELAYED RELEASE ORAL at 22:29

## 2017-08-10 RX ADMIN — Medication 3 MILLILITER(S): at 15:58

## 2017-08-10 RX ADMIN — Medication 100 MILLIGRAM(S): at 08:32

## 2017-08-10 RX ADMIN — Medication 1 PATCH: at 08:29

## 2017-08-10 RX ADMIN — FAMOTIDINE 40 MILLIGRAM(S): 10 INJECTION INTRAVENOUS at 22:29

## 2017-08-10 RX ADMIN — Medication 600 MILLIGRAM(S): at 17:43

## 2017-08-10 RX ADMIN — ENOXAPARIN SODIUM 40 MILLIGRAM(S): 100 INJECTION SUBCUTANEOUS at 08:31

## 2017-08-10 RX ADMIN — Medication 3 MILLILITER(S): at 08:37

## 2017-08-10 RX ADMIN — ROPINIROLE 0.5 MILLIGRAM(S): 8 TABLET, FILM COATED, EXTENDED RELEASE ORAL at 17:43

## 2017-08-10 RX ADMIN — Medication 1 PATCH: at 08:30

## 2017-08-10 RX ADMIN — BUDESONIDE AND FORMOTEROL FUMARATE DIHYDRATE 2 PUFF(S): 160; 4.5 AEROSOL RESPIRATORY (INHALATION) at 08:37

## 2017-08-10 RX ADMIN — Medication 0.5 MILLIGRAM(S): at 22:30

## 2017-08-10 RX ADMIN — DIVALPROEX SODIUM 1000 MILLIGRAM(S): 500 TABLET, DELAYED RELEASE ORAL at 22:29

## 2017-08-10 RX ADMIN — HALOPERIDOL DECANOATE 10 MILLIGRAM(S): 100 INJECTION INTRAMUSCULAR at 22:29

## 2017-08-10 RX ADMIN — Medication 3 MILLILITER(S): at 03:52

## 2017-08-10 NOTE — CONSULT NOTE ADULT - PROBLEM SELECTOR RECOMMENDATION 9
Patient with hiatal  hernia. NO symptoms. No need for EGD or further work up at this time. Will sign off.

## 2017-08-10 NOTE — PROGRESS NOTE ADULT - SUBJECTIVE AND OBJECTIVE BOX
seen for COPD exacerbation     feels well minimal cough. minimal coughing.  + reflux symptoms  ROS otherwise negative     MEDICATIONS  (STANDING):  diVALproex  milliGRAM(s) Oral at bedtime  diVALproex ER 1000 milliGRAM(s) Oral at bedtime  rOPINIRole 0.5 milliGRAM(s) Oral two times a day  haloperidol     Tablet 10 milliGRAM(s) Oral at bedtime  amLODIPine   Tablet 5 milliGRAM(s) Oral daily  ketoconazole 2% Cream 1 Application(s) Topical daily  nicotine -  14 mG/24Hr(s) Patch 1 patch Transdermal daily  ALBUTerol/ipratropium for Nebulization 3 milliLiter(s) Nebulizer every 6 hours  enoxaparin Injectable 40 milliGRAM(s) SubCutaneous daily  guaiFENesin  milliGRAM(s) Oral every 12 hours  pantoprazole    Tablet 40 milliGRAM(s) Oral before breakfast  buDESOnide  80 MICROgram(s)/formoterol 4.5 MICROgram(s) Inhaler 2 Puff(s) Inhalation two times a day  predniSONE   Tablet   Oral   predniSONE   Tablet 40 milliGRAM(s) Oral daily  famotidine    Tablet 40 milliGRAM(s) Oral at bedtime    MEDICATIONS  (PRN):  LORazepam     Tablet 0.5 milliGRAM(s) Oral two times a day PRN Anxiety  guaiFENesin    Syrup 100 milliGRAM(s) Oral every 6 hours PRN Cough      Allergies    No Known Allergies    Vital Signs Last 24 Hrs  T(C): 36.7 (10 Aug 2017 04:30), Max: 36.9 (09 Aug 2017 16:29)  T(F): 98 (10 Aug 2017 04:30), Max: 98.4 (09 Aug 2017 16:29)  HR: 76 (10 Aug 2017 04:30) (69 - 91)  BP: 114/70 (10 Aug 2017 04:30) (114/70 - 128/79)  BP(mean): --  RR: 18 (10 Aug 2017 04:30) (18 - 19)  SpO2: 96% (10 Aug 2017 04:30) (96% - 96%)    PHYSICAL EXAM:    GENERAL: NAD  CHEST/LUNG: exp wheeze   HEART: Regular rate and rhythm; S1 S2; no murmurs noted  ABDOMEN: Soft, Nontender, Nondistended; Bowel sounds present  EXTREMITIES:  no edema   NERVOUS SYSTEM:   nonfocal, responsive, alert    LABS:                        12.0   5.6   )-----------( 139      ( 10 Aug 2017 08:55 )             35.8     08-10    138  |  94<L>  |  23.0<H>  ----------------------------<  79  4.4   |  34.0<H>  |  0.56    Ca    8.4<L>      10 Aug 2017 08:55            CAPILLARY BLOOD GLUCOSE            RADIOLOGY & ADDITIONAL TESTS:

## 2017-08-10 NOTE — PROGRESS NOTE ADULT - ASSESSMENT
68 y/o male with h/o COPD, Parkinson's disease, and schizophrenia was found in bed confused. patient was transferred from [facility], admitted  for COPD exacerbation, started on solumedrol, Duoneb, Abx, mentation is back to baseline, ct head negative

## 2017-08-10 NOTE — CONSULT NOTE ADULT - SUBJECTIVE AND OBJECTIVE BOX
Patient is a 67y old  Male who presents with a chief complaint of Confused. Difficulty to breath (04 Aug 2017 21:10)      HPI:  66 y/o male with h/o COPD, Parkinson's disease, and schizophrenia admitted with mental status change  and SOB. Admitted from a group home. Head Ct and urine drug screen were negative as per primary MD note. MS now a baseline as per primary MD.      Pt  found to have COPD exacerbation. On steroids, nebs, O2 and levafloxcin. . No PNA.  CT showed large intra thorasic hiatal hernia.  Patient denies abdominal pain, regurgitation, difficulty swallowing, weight loss. No constipation , no diarrhea        REVIEW OF SYSTEMS:  Constitutional: No fever, weight loss or fatigue  ENMT:  No difficulty hearing, tinnitus, vertigo; No sinus or throat pain  Respiratory: No cough, wheezing, chills or hemoptysis  Cardiovascular: No chest pain, palpitations, dizziness or leg swelling  Gastrointestinal: No abdominal or epigastric pain. No nausea, vomiting or hematemesis; No diarrhea or constipation. No melena or hematochezia.  Skin: No itching, burning, rashes or lesions   Musculoskeletal: No joint pain or swelling; No muscle, back or extremity pain    PAST MEDICAL & SURGICAL HISTORY:  Schizophrenia, unspecified type  Chronic obstructive pulmonary disease, unspecified COPD type  No significant past surgical history      FAMILY HISTORY:  No pertinent family history in first degree relatives      SOCIAL HISTORY:  Smoking Status: [ ] Current, [ ] Former, [ ] Never  Pack Years:  [  ] EtOH- denies  [  ] IVDA- denies    MEDICATIONS:  MEDICATIONS  (STANDING):  diVALproex  milliGRAM(s) Oral at bedtime  diVALproex ER 1000 milliGRAM(s) Oral at bedtime  rOPINIRole 0.5 milliGRAM(s) Oral two times a day  haloperidol     Tablet 10 milliGRAM(s) Oral at bedtime  amLODIPine   Tablet 5 milliGRAM(s) Oral daily  ketoconazole 2% Cream 1 Application(s) Topical daily  nicotine -  14 mG/24Hr(s) Patch 1 patch Transdermal daily  ALBUTerol/ipratropium for Nebulization 3 milliLiter(s) Nebulizer every 6 hours  enoxaparin Injectable 40 milliGRAM(s) SubCutaneous daily  guaiFENesin  milliGRAM(s) Oral every 12 hours  pantoprazole    Tablet 40 milliGRAM(s) Oral before breakfast  buDESOnide  80 MICROgram(s)/formoterol 4.5 MICROgram(s) Inhaler 2 Puff(s) Inhalation two times a day  predniSONE   Tablet   Oral   predniSONE   Tablet 40 milliGRAM(s) Oral daily  famotidine    Tablet 40 milliGRAM(s) Oral at bedtime    MEDICATIONS  (PRN):  LORazepam     Tablet 0.5 milliGRAM(s) Oral two times a day PRN Anxiety  guaiFENesin    Syrup 100 milliGRAM(s) Oral every 6 hours PRN Cough      Allergies    No Known Allergies    Intolerances        Vital Signs Last 24 Hrs  T(C): 36.7 (10 Aug 2017 04:30), Max: 36.9 (09 Aug 2017 16:29)  T(F): 98 (10 Aug 2017 04:30), Max: 98.4 (09 Aug 2017 16:29)  HR: 76 (10 Aug 2017 04:30) (69 - 91)  BP: 114/70 (10 Aug 2017 04:30) (114/70 - 128/79)  BP(mean): --  RR: 18 (10 Aug 2017 04:30) (18 - 19)  SpO2: 96% (10 Aug 2017 04:30) (96% - 96%)    08-09 @ 07:01  -  08-10 @ 07:00  --------------------------------------------------------  IN: 960 mL / OUT: 3 mL / NET: 957 mL          PHYSICAL EXAM:    General: Well developed; well nourished; in no acute distress  HEENT: MMM, conjunctiva and sclera clear  H- RRR  L- wheezing  Gastrointestinal: Soft, non-tender non-distended; Normal bowel sounds; No rebound or guarding  Extremities: Normal range of motion, No clubbing, cyanosis or edema  Neurological: Alert and oriented x3  Skin: Warm and dry. No obvious rash      LABS:                        12.0   5.6   )-----------( 139      ( 10 Aug 2017 08:55 )             35.8     10 Aug 2017 08:55    138    |  94     |  23.0   ----------------------------<  79     4.4     |  34.0   |  0.56     Ca    8.4        10 Aug 2017 08:55                RADIOLOGY & ADDITIONAL STUDIES:     < from: CT Chest No Cont (08.08.17 @ 15:53) >  IMPRESSION:     Small bilateral pleural effusions and mild bibasilar atelectasis.    Emphysematous changes.    No evidence of infiltrate/pneumonia.    Large hiatus hernia with air and fluid-filled dilatation of the thoracic   esophagus..     < end of copied text >

## 2017-08-11 ENCOUNTER — TRANSCRIPTION ENCOUNTER (OUTPATIENT)
Age: 67
End: 2017-08-11

## 2017-08-11 LAB
ALBUMIN SERPL ELPH-MCNC: 3.7 G/DL — SIGNIFICANT CHANGE UP (ref 3.3–5.2)
ALP SERPL-CCNC: 71 U/L — SIGNIFICANT CHANGE UP (ref 40–120)
ALT FLD-CCNC: 74 U/L — HIGH
ANION GAP SERPL CALC-SCNC: 10 MMOL/L — SIGNIFICANT CHANGE UP (ref 5–17)
AST SERPL-CCNC: 86 U/L — HIGH
BILIRUB SERPL-MCNC: 1 MG/DL — SIGNIFICANT CHANGE UP (ref 0.4–2)
BUN SERPL-MCNC: 25 MG/DL — HIGH (ref 8–20)
CALCIUM SERPL-MCNC: 8.9 MG/DL — SIGNIFICANT CHANGE UP (ref 8.6–10.2)
CHLORIDE SERPL-SCNC: 88 MMOL/L — LOW (ref 98–107)
CO2 SERPL-SCNC: 35 MMOL/L — HIGH (ref 22–29)
CREAT SERPL-MCNC: 0.71 MG/DL — SIGNIFICANT CHANGE UP (ref 0.5–1.3)
EOSINOPHIL # BLD AUTO: 0 K/UL — SIGNIFICANT CHANGE UP (ref 0–0.5)
EOSINOPHIL NFR BLD AUTO: 0.2 % — SIGNIFICANT CHANGE UP (ref 0–6)
GAS PNL BLDA: SIGNIFICANT CHANGE UP
GLUCOSE SERPL-MCNC: 76 MG/DL — SIGNIFICANT CHANGE UP (ref 70–115)
HCT VFR BLD CALC: 42.2 % — SIGNIFICANT CHANGE UP (ref 42–52)
HGB BLD-MCNC: 13.9 G/DL — LOW (ref 14–18)
LACTATE BLDV-MCNC: 1.5 MMOL/L — SIGNIFICANT CHANGE UP (ref 0.5–2)
LYMPHOCYTES # BLD AUTO: 1.1 K/UL — SIGNIFICANT CHANGE UP (ref 1–4.8)
LYMPHOCYTES # BLD AUTO: 23.8 % — SIGNIFICANT CHANGE UP (ref 20–55)
MAGNESIUM SERPL-MCNC: 2.2 MG/DL — SIGNIFICANT CHANGE UP (ref 1.6–2.6)
MCHC RBC-ENTMCNC: 32.2 PG — HIGH (ref 27–31)
MCHC RBC-ENTMCNC: 32.9 G/DL — SIGNIFICANT CHANGE UP (ref 32–36)
MCV RBC AUTO: 97.7 FL — HIGH (ref 80–94)
MONOCYTES # BLD AUTO: 1 K/UL — HIGH (ref 0–0.8)
MONOCYTES NFR BLD AUTO: 22.1 % — HIGH (ref 3–10)
NEUTROPHILS # BLD AUTO: 2.4 K/UL — SIGNIFICANT CHANGE UP (ref 1.8–8)
NEUTROPHILS NFR BLD AUTO: 52.6 % — SIGNIFICANT CHANGE UP (ref 37–73)
PHOSPHATE SERPL-MCNC: 3.3 MG/DL — SIGNIFICANT CHANGE UP (ref 2.4–4.7)
PLATELET # BLD AUTO: 163 K/UL — SIGNIFICANT CHANGE UP (ref 150–400)
POTASSIUM SERPL-MCNC: 4.8 MMOL/L — SIGNIFICANT CHANGE UP (ref 3.5–5.3)
POTASSIUM SERPL-SCNC: 4.8 MMOL/L — SIGNIFICANT CHANGE UP (ref 3.5–5.3)
PROT SERPL-MCNC: 6.6 G/DL — SIGNIFICANT CHANGE UP (ref 6.6–8.7)
RBC # BLD: 4.32 M/UL — LOW (ref 4.6–6.2)
RBC # FLD: 14.9 % — SIGNIFICANT CHANGE UP (ref 11–15.6)
SODIUM SERPL-SCNC: 133 MMOL/L — LOW (ref 135–145)
TROPONIN T SERPL-MCNC: <0.01 NG/ML — SIGNIFICANT CHANGE UP (ref 0–0.06)
WBC # BLD: 4.6 K/UL — LOW (ref 4.8–10.8)
WBC # FLD AUTO: 4.6 K/UL — LOW (ref 4.8–10.8)

## 2017-08-11 PROCEDURE — 71010: CPT | Mod: 26

## 2017-08-11 PROCEDURE — 99239 HOSP IP/OBS DSCHRG MGMT >30: CPT

## 2017-08-11 PROCEDURE — 93880 EXTRACRANIAL BILAT STUDY: CPT | Mod: 26

## 2017-08-11 PROCEDURE — 70450 CT HEAD/BRAIN W/O DYE: CPT | Mod: 26

## 2017-08-11 PROCEDURE — 93010 ELECTROCARDIOGRAM REPORT: CPT

## 2017-08-11 RX ORDER — CARVEDILOL PHOSPHATE 80 MG/1
1 CAPSULE, EXTENDED RELEASE ORAL
Qty: 0 | Refills: 0 | COMMUNITY

## 2017-08-11 RX ORDER — FAMOTIDINE 10 MG/ML
1 INJECTION INTRAVENOUS
Qty: 0 | Refills: 0 | COMMUNITY
Start: 2017-08-11

## 2017-08-11 RX ORDER — RISPERIDONE 4 MG/1
1 TABLET ORAL
Qty: 0 | Refills: 0 | COMMUNITY
Start: 2017-08-11

## 2017-08-11 RX ORDER — PANTOPRAZOLE SODIUM 20 MG/1
1 TABLET, DELAYED RELEASE ORAL
Qty: 0 | Refills: 0 | COMMUNITY
Start: 2017-08-11

## 2017-08-11 RX ORDER — CARBIDOPA AND LEVODOPA 25; 100 MG/1; MG/1
1 TABLET ORAL
Qty: 0 | Refills: 0 | COMMUNITY
Start: 2017-08-11

## 2017-08-11 RX ORDER — IPRATROPIUM/ALBUTEROL SULFATE 18-103MCG
1 AEROSOL WITH ADAPTER (GRAM) INHALATION
Qty: 0 | Refills: 0 | COMMUNITY

## 2017-08-11 RX ORDER — MIDAZOLAM HYDROCHLORIDE 1 MG/ML
10 INJECTION, SOLUTION INTRAMUSCULAR; INTRAVENOUS ONCE
Qty: 0 | Refills: 0 | Status: DISCONTINUED | OUTPATIENT
Start: 2017-08-11 | End: 2017-08-11

## 2017-08-11 RX ORDER — IPRATROPIUM/ALBUTEROL SULFATE 18-103MCG
3 AEROSOL WITH ADAPTER (GRAM) INHALATION ONCE
Qty: 0 | Refills: 0 | Status: COMPLETED | OUTPATIENT
Start: 2017-08-11 | End: 2017-08-11

## 2017-08-11 RX ORDER — CARBIDOPA AND LEVODOPA 25; 100 MG/1; MG/1
1 TABLET ORAL THREE TIMES A DAY
Qty: 0 | Refills: 0 | Status: DISCONTINUED | OUTPATIENT
Start: 2017-08-11 | End: 2017-08-14

## 2017-08-11 RX ORDER — RISPERIDONE 4 MG/1
4 TABLET ORAL DAILY
Qty: 0 | Refills: 0 | Status: DISCONTINUED | OUTPATIENT
Start: 2017-08-11 | End: 2017-08-14

## 2017-08-11 RX ADMIN — PANTOPRAZOLE SODIUM 40 MILLIGRAM(S): 20 TABLET, DELAYED RELEASE ORAL at 08:09

## 2017-08-11 RX ADMIN — Medication 600 MILLIGRAM(S): at 05:41

## 2017-08-11 RX ADMIN — BUDESONIDE AND FORMOTEROL FUMARATE DIHYDRATE 2 PUFF(S): 160; 4.5 AEROSOL RESPIRATORY (INHALATION) at 21:15

## 2017-08-11 RX ADMIN — Medication 3 MILLILITER(S): at 18:02

## 2017-08-11 RX ADMIN — BUDESONIDE AND FORMOTEROL FUMARATE DIHYDRATE 2 PUFF(S): 160; 4.5 AEROSOL RESPIRATORY (INHALATION) at 08:34

## 2017-08-11 RX ADMIN — Medication 100 MILLIGRAM(S): at 08:09

## 2017-08-11 RX ADMIN — DIVALPROEX SODIUM 250 MILLIGRAM(S): 500 TABLET, DELAYED RELEASE ORAL at 23:09

## 2017-08-11 RX ADMIN — ENOXAPARIN SODIUM 40 MILLIGRAM(S): 100 INJECTION SUBCUTANEOUS at 12:10

## 2017-08-11 RX ADMIN — AMLODIPINE BESYLATE 5 MILLIGRAM(S): 2.5 TABLET ORAL at 05:42

## 2017-08-11 RX ADMIN — Medication 100 MILLIGRAM(S): at 00:24

## 2017-08-11 RX ADMIN — Medication 0.25 MILLIGRAM(S): at 14:29

## 2017-08-11 RX ADMIN — FAMOTIDINE 40 MILLIGRAM(S): 10 INJECTION INTRAVENOUS at 23:10

## 2017-08-11 RX ADMIN — Medication 1 PATCH: at 12:11

## 2017-08-11 RX ADMIN — DIVALPROEX SODIUM 1000 MILLIGRAM(S): 500 TABLET, DELAYED RELEASE ORAL at 23:09

## 2017-08-11 RX ADMIN — KETOCONAZOLE 1 APPLICATION(S): 20 AEROSOL, FOAM TOPICAL at 12:11

## 2017-08-11 RX ADMIN — Medication 3 MILLILITER(S): at 21:13

## 2017-08-11 RX ADMIN — CARBIDOPA AND LEVODOPA 1 TABLET(S): 25; 100 TABLET ORAL at 23:11

## 2017-08-11 RX ADMIN — RISPERIDONE 4 MILLIGRAM(S): 4 TABLET ORAL at 12:10

## 2017-08-11 RX ADMIN — CARBIDOPA AND LEVODOPA 1 TABLET(S): 25; 100 TABLET ORAL at 12:17

## 2017-08-11 RX ADMIN — ROPINIROLE 0.5 MILLIGRAM(S): 8 TABLET, FILM COATED, EXTENDED RELEASE ORAL at 05:42

## 2017-08-11 RX ADMIN — Medication 40 MILLIGRAM(S): at 05:41

## 2017-08-11 RX ADMIN — HALOPERIDOL DECANOATE 10 MILLIGRAM(S): 100 INJECTION INTRAMUSCULAR at 23:08

## 2017-08-11 RX ADMIN — Medication 1 PATCH: at 08:08

## 2017-08-11 RX ADMIN — Medication 0.5 MILLIGRAM(S): at 23:10

## 2017-08-11 NOTE — PROGRESS NOTE ADULT - SUBJECTIVE AND OBJECTIVE BOX
seen for COPD exacerbation    wants ativan.  no cough, sob  ROS negative     MEDICATIONS  (STANDING):  diVALproex  milliGRAM(s) Oral at bedtime  diVALproex ER 1000 milliGRAM(s) Oral at bedtime  rOPINIRole 0.5 milliGRAM(s) Oral two times a day  haloperidol     Tablet 10 milliGRAM(s) Oral at bedtime  amLODIPine   Tablet 5 milliGRAM(s) Oral daily  ketoconazole 2% Cream 1 Application(s) Topical daily  nicotine -  14 mG/24Hr(s) Patch 1 patch Transdermal daily  ALBUTerol/ipratropium for Nebulization 3 milliLiter(s) Nebulizer every 6 hours  enoxaparin Injectable 40 milliGRAM(s) SubCutaneous daily  guaiFENesin  milliGRAM(s) Oral every 12 hours  pantoprazole    Tablet 40 milliGRAM(s) Oral before breakfast  buDESOnide  80 MICROgram(s)/formoterol 4.5 MICROgram(s) Inhaler 2 Puff(s) Inhalation two times a day  predniSONE   Tablet   Oral   famotidine    Tablet 40 milliGRAM(s) Oral at bedtime  risperiDONE   Tablet 4 milliGRAM(s) Oral daily  carbidopa/levodopa  25/100 1 Tablet(s) Oral three times a day    MEDICATIONS  (PRN):  LORazepam     Tablet 0.5 milliGRAM(s) Oral two times a day PRN Anxiety  guaiFENesin    Syrup 100 milliGRAM(s) Oral every 6 hours PRN Cough      Allergies    No Known Allergies    Intolerances  Vital Signs Last 24 Hrs  T(C): 36.7 (11 Aug 2017 08:13), Max: 37 (10 Aug 2017 15:57)  T(F): 98 (11 Aug 2017 08:13), Max: 98.6 (10 Aug 2017 15:57)  HR: 75 (11 Aug 2017 08:13) (75 - 86)  BP: 107/67 (11 Aug 2017 08:13) (107/67 - 125/84)  BP(mean): --  RR: 19 (11 Aug 2017 08:13) (18 - 19)  SpO2: 100% (11 Aug 2017 08:13) (95% - 100%)    PHYSICAL EXAM:    GENERAL: NAD  CHEST/LUNG: no wheezing, dec bs diffusely.  HEART: Regular rate and rhythm; S1 S2  ABDOMEN: Soft, Nontender, Nondistended; Bowel sounds present  EXTREMITIES:  nonfocal  NERVOUS SYSTEM:  Alert & responsive, ambulatory. resting tremor.     LABS:                        12.0   5.6   )-----------( 139      ( 10 Aug 2017 08:55 )             35.8     08-10    138  |  94<L>  |  23.0<H>  ----------------------------<  79  4.4   |  34.0<H>  |  0.56    Ca    8.4<L>      10 Aug 2017 08:55            CAPILLARY BLOOD GLUCOSE            RADIOLOGY & ADDITIONAL TESTS:

## 2017-08-11 NOTE — DISCHARGE NOTE ADULT - MEDICATION SUMMARY - MEDICATIONS TO STOP TAKING
I will STOP taking the medications listed below when I get home from the hospital:    Combivent Respimat CFC free 20 mcg-100 mcg/inh inhalation aerosol  -- 1 puff(s) inhaled every 6 hours, As Needed    carvedilol 6.25 mg oral tablet  -- 1 tab(s) by mouth 2 times a day I will STOP taking the medications listed below when I get home from the hospital:    Combivent Respimat CFC free 20 mcg-100 mcg/inh inhalation aerosol  -- 1 puff(s) inhaled every 6 hours, As Needed    carvedilol 6.25 mg oral tablet  -- 1 tab(s) by mouth 2 times a day    amLODIPine 5 mg oral tablet  -- 1 tab(s) by mouth once a day

## 2017-08-11 NOTE — DISCHARGE NOTE ADULT - CARE PROVIDER_API CALL
Jd Leos), Critical Care Medicine; Internal Medicine; Pulmonary Disease  [address] [place] [address]  Phone: [phone]  Fax: [phone] Jd Leos), Critical Care Medicine; Internal Medicine; Pulmonary Disease  [address] [facility] [address]  [place] [address]  Phone: [phone]  Fax: [phone]    Cindy Waldrop), Gastroenterology; Internal Medicine  [address] [place] [address] [address]  [place] [address]  Phone: [phone]  Fax: [phone]

## 2017-08-11 NOTE — DISCHARGE NOTE ADULT - CARE PLAN
Principal Discharge DX:	Altered mental status, unspecified altered mental status type  Goal:	resolved.  Instructions for follow-up, activity and diet:	improved after COPD treatment.  back to baseline  Secondary Diagnosis:	COPD exacerbation  Instructions for follow-up, activity and diet:	resolving  continue inhalers, and nebulizers as needed  finish course of prednisone  follow up with pulmonology within 1-2 weeks  Secondary Diagnosis:	Hiatal hernia with GERD  Instructions for follow-up, activity and diet:	no intervention per GI.  continue PPI  Secondary Diagnosis:	Schizophrenia, unspecified type  Instructions for follow-up, activity and diet:	home medications  Secondary Diagnosis:	Parkinson disease  Instructions for follow-up, activity and diet:	home medications  Secondary Diagnosis:	Essential hypertension  Instructions for follow-up, activity and diet:	coreg held, continue amlodipine.  Secondary Diagnosis:	Thrombocytopenia  Instructions for follow-up, activity and diet:	chronic, monitor. Principal Discharge DX:	Altered mental status, unspecified altered mental status type  Goal:	resolved.  Instructions for follow-up, activity and diet:	improved after COPD treatment.  back to baseline  Secondary Diagnosis:	COPD exacerbation  Instructions for follow-up, activity and diet:	resolving  continue inhalers, and nebulizers as needed  finish course of prednisone  follow up with pulmonology within 1-2 weeks  Secondary Diagnosis:	Hiatal hernia with GERD  Instructions for follow-up, activity and diet:	no intervention per GI.  continue PPI  Secondary Diagnosis:	Schizophrenia, unspecified type  Instructions for follow-up, activity and diet:	home medications  Secondary Diagnosis:	Parkinson disease  Instructions for follow-up, activity and diet:	home medications  Secondary Diagnosis:	Essential hypertension  Instructions for follow-up, activity and diet:	coreg and norvasc held due to orthostatic hypotension leading to syncope  can resume once stable  Secondary Diagnosis:	Thrombocytopenia  Instructions for follow-up, activity and diet:	chronic, monitor. Principal Discharge DX:	Altered mental status, unspecified altered mental status type  Goal:	resolved.  Instructions for follow-up, activity and diet:	improved after COPD treatment.  back to baseline  Secondary Diagnosis:	COPD exacerbation  Instructions for follow-up, activity and diet:	resolving  continue inhalers, and nebulizers as needed  finish course of prednisone  follow up with pulmonology within 1-2 weeks  Secondary Diagnosis:	Hiatal hernia with GERD  Instructions for follow-up, activity and diet:	no intervention per GI.  continue PPI  Secondary Diagnosis:	Schizophrenia, unspecified type  Instructions for follow-up, activity and diet:	home medications  Secondary Diagnosis:	Parkinson disease  Instructions for follow-up, activity and diet:	home medications  Secondary Diagnosis:	Essential hypertension  Instructions for follow-up, activity and diet:	coreg and norvasc held due to orthostatic hypotension leading to syncope  toprol started  Secondary Diagnosis:	Thrombocytopenia  Instructions for follow-up, activity and diet:	chronic, monitor.

## 2017-08-11 NOTE — PROGRESS NOTE ADULT - ASSESSMENT
68 y/o male with h/o COPD, Parkinson's disease, and schizophrenia was found in bed confused. patient was transferred from [facility], admitted  for COPD exacerbation, started on solumedrol, Duoneb, Abx, mentation is back to baseline, ct head negative

## 2017-08-11 NOTE — DISCHARGE NOTE ADULT - OTHER SIGNIFICANT FINDINGS
ct chest non contrast:  IMPRESSION:     Small bilateral pleural effusions and mild bibasilar atelectasis.    Emphysematous changes.    No evidence of infiltrate/pneumonia.    Large hiatus hernia with air and fluid-filled dilatation of the thoracic   esophagus..

## 2017-08-11 NOTE — DISCHARGE NOTE ADULT - MEDICATION SUMMARY - MEDICATIONS TO TAKE
I will START or STAY ON the medications listed below when I get home from the hospital:    predniSONE  -- TAPER  30mg PO daily x 2 days then  20mg PO daily x 2 days then  10mg PO daily x 2 days then stop     -- Indication: For COPD exacerbation    LORazepam 0.5 mg oral tablet  -- 1 tab(s) by mouth 2 times a day, As needed, Anxiety  -- Indication: For Schizophrenia, unspecified type    divalproex sodium 500 mg oral tablet, extended release  -- 2 tab(s) by mouth once a day (at bedtime)  -- Indication: For Schizophrenia, unspecified type    divalproex sodium 250 mg oral tablet, extended release  -- 1 tab(s) by mouth once a day (at bedtime)  -- Indication: For Schizophrenia, unspecified type    rOPINIRole 0.5 mg oral tablet  -- 1 tab(s) by mouth 2 times a day  -- Indication: For Parkinson disease    carbidopa-levodopa 25 mg-100 mg oral tablet  -- 1 tab(s) by mouth 3 times a day  -- Indication: For Parkinson disease    risperiDONE 4 mg oral tablet  -- 1 tab(s) by mouth once a day  -- Indication: For Schizophrenia, unspecified type    haloperidol 10 mg oral tablet  -- 1 tab(s) by mouth once a day (at bedtime)  -- Indication: For Schizophrenia, unspecified type    Incruse Ellipta 62.5 mcg/inh inhalation powder  -- 1 puff(s) inhaled once a day  -- Indication: For Chronic obstructive pulmonary disease with acute exacerbation    budesonide-formoterol 80 mcg-4.5 mcg/inh inhalation aerosol  -- 1 puff(s) inhaled 2 times a day  -- Indication: For Chronic obstructive pulmonary disease with acute exacerbation    albuterol 2.5 mg/3 mL (0.083%) inhalation solution  -- 3 milliliter(s) inhaled every 6 hours, As Needed  -- Indication: For Chronic obstructive pulmonary disease with acute exacerbation    amLODIPine 5 mg oral tablet  -- 1 tab(s) by mouth once a day  -- Indication: For Hypertension    ketoconazole 2% topical cream  -- 1 application on skin once a day  -- Indication: For fungal rash    famotidine 40 mg oral tablet  -- 1 tab(s) by mouth once a day (at bedtime)  -- Indication: For Hiatal hernia with GERD    pantoprazole 40 mg oral delayed release tablet  -- 1 tab(s) by mouth once a day (before a meal)  -- Indication: For Hiatal hernia with GERD    nicotine 14 mg/24 hr transdermal film, extended release  -- 1 patch by transdermal patch once a day  -- Indication: For Smoker I will START or STAY ON the medications listed below when I get home from the hospital:    predniSONE  -- TAPER  30mg PO daily x 2 days then  20mg PO daily x 2 days then  10mg PO daily x 2 days then stop     -- Indication: For COPD exacerbation    LORazepam 0.5 mg oral tablet  -- 1 tab(s) by mouth 2 times a day, As needed, Anxiety  -- Indication: For Schizophrenia, unspecified type    divalproex sodium 500 mg oral tablet, extended release  -- 2 tab(s) by mouth once a day (at bedtime)  -- Indication: For Schizophrenia, unspecified type    divalproex sodium 250 mg oral tablet, extended release  -- 1 tab(s) by mouth once a day (at bedtime)  -- Indication: For Schizophrenia, unspecified type    rOPINIRole 0.5 mg oral tablet  -- 1 tab(s) by mouth 2 times a day  -- Indication: For Parkinson disease    carbidopa-levodopa 25 mg-100 mg oral tablet  -- 1 tab(s) by mouth 3 times a day  -- Indication: For Parkinson disease    risperiDONE 4 mg oral tablet  -- 1 tab(s) by mouth once a day  -- Indication: For Schizophrenia, unspecified type    haloperidol 10 mg oral tablet  -- 1 tab(s) by mouth once a day (at bedtime)  -- Indication: For Schizophrenia, unspecified type    Incruse Ellipta 62.5 mcg/inh inhalation powder  -- 1 puff(s) inhaled once a day  -- Indication: For Chronic obstructive pulmonary disease with acute exacerbation    budesonide-formoterol 80 mcg-4.5 mcg/inh inhalation aerosol  -- 1 puff(s) inhaled 2 times a day  -- Indication: For Chronic obstructive pulmonary disease with acute exacerbation    albuterol 2.5 mg/3 mL (0.083%) inhalation solution  -- 3 milliliter(s) inhaled every 6 hours, As Needed  -- Indication: For Chronic obstructive pulmonary disease with acute exacerbation    ketoconazole 2% topical cream  -- 1 application on skin once a day  -- Indication: For fungal rash    famotidine 40 mg oral tablet  -- 1 tab(s) by mouth once a day (at bedtime)  -- Indication: For Hiatal hernia with GERD    pantoprazole 40 mg oral delayed release tablet  -- 1 tab(s) by mouth once a day (before a meal)  -- Indication: For Hiatal hernia with GERD    nicotine 14 mg/24 hr transdermal film, extended release  -- 1 patch by transdermal patch once a day  -- Indication: For Smoker I will START or STAY ON the medications listed below when I get home from the hospital:    predniSONE 10 mg oral tablet  -- TAPER 20mg PO daily x 2 days then 10mg PO daily x 2 days   -- It is very important that you take or use this exactly as directed.  Do not skip doses or discontinue unless directed by your doctor.  Obtain medical advice before taking any non-prescription drugs as some may affect the action of this medication.  Take with food or milk.    -- Indication: For COPD exacerbation    LORazepam 0.5 mg oral tablet  -- 1 tab(s) by mouth 2 times a day, As needed, Anxiety  -- Indication: For Schizophrenia, unspecified type    divalproex sodium 500 mg oral tablet, extended release  -- 2 tab(s) by mouth once a day (at bedtime)  -- Indication: For Schizophrenia, unspecified type    divalproex sodium 250 mg oral tablet, extended release  -- 1 tab(s) by mouth once a day (at bedtime)  -- Indication: For Schizophrenia, unspecified type    rOPINIRole 0.5 mg oral tablet  -- 1 tab(s) by mouth 2 times a day  -- Indication: For Parkinson disease    carbidopa-levodopa 25 mg-100 mg oral tablet  -- 1 tab(s) by mouth 3 times a day  -- Indication: For Parkinson disease    risperiDONE 4 mg oral tablet  -- 1 tab(s) by mouth once a day  -- Indication: For Schizophrenia, unspecified type    haloperidol 10 mg oral tablet  -- 1 tab(s) by mouth once a day (at bedtime)  -- Indication: For Schizophrenia, unspecified type    metoprolol succinate 25 mg oral tablet, extended release  -- 1 tab(s) by mouth once a day  -- Indication: For Hypertension    budesonide-formoterol 80 mcg-4.5 mcg/inh inhalation aerosol  -- 2 puff(s) inhaled 2 times a day  -- Indication: For COPD    albuterol 2.5 mg/3 mL (0.083%) inhalation solution  -- 3 milliliter(s) inhaled every 6 hours, As Needed  -- Indication: For Chronic obstructive pulmonary disease with acute exacerbation    Incruse Ellipta 62.5 mcg/inh inhalation powder  -- 1 puff(s) inhaled once a day  -- Indication: For Chronic obstructive pulmonary disease with acute exacerbation    ketoconazole 2% topical cream  -- 1 application on skin once a day  APPLY TO BODY RASH   -- Indication: For fungal rash    famotidine 40 mg oral tablet  -- 1 tab(s) by mouth once a day (at bedtime)  -- Indication: For Hiatal hernia with GERD    pantoprazole 40 mg oral delayed release tablet  -- 1 tab(s) by mouth once a day (before a meal)  -- Indication: For Hiatal hernia with GERD    nicotine 14 mg/24 hr transdermal film, extended release  -- 1 patch by transdermal patch once a day  -- Indication: For Smoker

## 2017-08-11 NOTE — PROGRESS NOTE ADULT - PROBLEM SELECTOR PLAN 1
resolving  c/w nebs, prednisone taper. s/p azithromycin  at rest 92%RA, after ambulation 85% RA improved to 95 on NC 2L (~100ft)

## 2017-08-11 NOTE — DISCHARGE NOTE ADULT - PLAN OF CARE
resolved. improved after COPD treatment.  back to baseline resolving  continue inhalers, and nebulizers as needed  finish course of prednisone  follow up with pulmonology within 1-2 weeks no intervention per GI.  continue PPI home medications coreg held, continue amlodipine. chronic, monitor. coreg and norvasc held due to orthostatic hypotension leading to syncope  can resume once stable coreg and norvasc held due to orthostatic hypotension leading to syncope  toprol started

## 2017-08-11 NOTE — DISCHARGE NOTE ADULT - CARE PROVIDERS DIRECT ADDRESSES
[email or web address]

## 2017-08-11 NOTE — DISCHARGE NOTE ADULT - SECONDARY DIAGNOSIS.
COPD exacerbation Hiatal hernia with GERD Schizophrenia, unspecified type Parkinson disease Essential hypertension Thrombocytopenia

## 2017-08-11 NOTE — PROGRESS NOTE ADULT - ASSESSMENT
Imp--COPD PD schizophrenia.  Resp status appears improved - and at baseline.  No evidence pneumonia.  Plan--Steroid Taper (po OK), continue nebs.  check RA pulse ox.  Back to facility soon - today if arrangements adequate

## 2017-08-11 NOTE — PROGRESS NOTE ADULT - SUBJECTIVE AND OBJECTIVE BOX
PULMONARY PROGRESS NOTE      [id number]    Patient is a 67y old  Male who presents with a chief complaint of Confused. Difficulty to breath (04 Aug 2017 21:10)      INTERVAL HPI/OVERNIGHT EVENTS:  NAD on NCO2  Near baseline    MEDICATIONS  (STANDING):  diVALproex  milliGRAM(s) Oral at bedtime  diVALproex ER 1000 milliGRAM(s) Oral at bedtime  rOPINIRole 0.5 milliGRAM(s) Oral two times a day  haloperidol     Tablet 10 milliGRAM(s) Oral at bedtime  amLODIPine   Tablet 5 milliGRAM(s) Oral daily  ketoconazole 2% Cream 1 Application(s) Topical daily  nicotine -  14 mG/24Hr(s) Patch 1 patch Transdermal daily  ALBUTerol/ipratropium for Nebulization 3 milliLiter(s) Nebulizer every 6 hours  enoxaparin Injectable 40 milliGRAM(s) SubCutaneous daily  guaiFENesin  milliGRAM(s) Oral every 12 hours  pantoprazole    Tablet 40 milliGRAM(s) Oral before breakfast  buDESOnide  80 MICROgram(s)/formoterol 4.5 MICROgram(s) Inhaler 2 Puff(s) Inhalation two times a day  methylPREDNISolone sodium succinate Injectable 40 milliGRAM(s) IV Push three times a day      MEDICATIONS  (PRN):  LORazepam     Tablet 0.5 milliGRAM(s) Oral two times a day PRN Anxiety  guaiFENesin    Syrup 100 milliGRAM(s) Oral every 6 hours PRN Cough      Allergies    No Known Allergies    Intolerances        PAST MEDICAL & SURGICAL HISTORY:  Schizophrenia, unspecified type  Chronic obstructive pulmonary disease, unspecified COPD type  No significant past surgical history      SOCIAL HISTORY  Smoking History: former      REVIEW OF SYSTEMS:    unable to obtain due to psych disorder    Vital Signs Last 24 Hrs  T(C): 36.6 (09 Aug 2017 10:35), Max: 36.7 (08 Aug 2017 11:23)  T(F): 97.8 (09 Aug 2017 10:35), Max: 98.1 (08 Aug 2017 11:23)  HR: 82 (09 Aug 2017 10:35) (67 - 82)  BP: 119/81 (09 Aug 2017 10:35) (118/77 - 126/77)  BP(mean): --  RR: 18 (09 Aug 2017 10:35) (18 - 20)  SpO2: 95% (09 Aug 2017 05:00) (95% - 96%)    PHYSICAL EXAMINATION:    GENERAL: The patient is awake and alert in no apparent distress.     HEENT: Head is normocephalic and atraumatic. Extraocular muscles are intact. Mucous membranes are moist.    NECK: Supple.    LUNGS: diminished bs bilat    HEART: Regular rate and rhythm without murmur.    ABDOMEN: Soft, nontender, and nondistended.      EXTREMITIES: Without any cyanosis, clubbing, rash, lesions or edema.    NEUROLOGIC: Grossly intact.    SKIN: No ulceration or induration present.      LABS:                        12.0   6.0   )-----------( 146      ( 08 Aug 2017 07:10 )             36.4     08-08    136  |  93<L>  |  21.0<H>  ----------------------------<  92  4.4   |  33.0<H>  |  0.67    Ca    8.6      08 Aug 2017 07:10                          MICROBIOLOGY:    RADIOLOGY & ADDITIONAL STUDIES:< from: CT Chest No Cont (08.08.17 @ 15:53) >     EXAM:  CT CHEST                          PROCEDURE DATE:  08/08/2017          INTERPRETATION:  HISTORY: Shortness of breath. COPD with exacerbation..    Date and Time of Exam: 8/8/2017 3:38 PM    TECHNIQUE:  Sections were obtained from the apices to the diaphragm   without intravenous contrast.    COMPARISON EXAMINATION: 2/5/2016    FINDINGS: No evidence of mediastinal or hilar lymphadenopathy. Small   bilateral pleural effusions. No evidence of a pericardial effusion. No   evidence of axillary adenopathy.    There is a large hiatus hernia. Diffuse air and fluid-filled dilatation   of the thoracic esophagus.    Emphysematous changes noted bilaterally. Calcified granuloma in the right   upper lobe. Bibasilar atelectasis.    Degenerative changes in the spine. Old, healed right rib fractures.      IMPRESSION:     Small bilateral pleural effusions and mild bibasilar atelectasis.    Emphysematous changes.    No evidence of infiltrate/pneumonia.    Large hiatus hernia with air and fluid-filled dilatation of the thoracic   esophagus..                   VALERIANO TEJADA M.D., ATTENDING RADIOLOGIST  This document has been electronically signed. Aug  8 2017  4:09PM        < end of copied text >

## 2017-08-11 NOTE — CHART NOTE - NSCHARTNOTEFT_GEN_A_CORE
Went to reassess patient, says he is feeling well. Patient's vitals are stable, no further episodes of syncope. Went to reassess patient, says he is feeling well. Patient is tachypneic and has diffuse wheezing,  no further episodes of syncope. Will inform hospitalist on call.

## 2017-08-11 NOTE — DISCHARGE NOTE ADULT - HOSPITAL COURSE
68 y/o male with h/o COPD, Parkinson's disease, and schizophrenia was found in bed confused. patient was transferred from [facility], admitted  for COPD exacerbation, started on solumedrol, Duoneb, Abx, mentation is back to baseline, ct head negative.  Respiratory status improved with steroids, and transitioned to PO prednisone per pulmonary.  Patient needs home o2 as desaturates to 85% o2 on RA after ambulation Will discharge to [facility] with transition to group home once not requiring oxygen.     d/c planning 35 min. 66 y/o male with h/o COPD, Parkinson's disease, and schizophrenia was found in bed confused. patient was transferred from [facility], admitted  for COPD exacerbation, started on solumedrol, Duoneb, Abx, mentation is back to baseline, ct head negative.  Respiratory status improved with steroids, and transitioned to PO prednisone per pulmonary.  Patient needs home o2 as desaturates to 85% o2 on RA after ambulation Will discharge to [facility] with transition to group home once not requiring oxygen.   patient has episode of syncope while ambulating, negative CT head and carotid ultrasound. no seizure like activity noted. orthostatic positive as HR increased significantly, IVF given and bp meds suspended.   d/c planning 35 min. 68 y/o male with h/o COPD, Parkinson's disease, and schizophrenia was found in bed confused. patient was transferred from [facility], admitted  for COPD exacerbation, started on solumedrol, Duoneb, Abx, mentation is back to baseline, ct head negative.  Respiratory status improved with steroids, and transitioned to PO prednisone per pulmonary.  patient has episode of syncope while ambulating, negative CT head and carotid ultrasound. no seizure like activity noted. orthostatic positive as HR increased significantly, IVF given and bp meds suspended. Patient refusing IVF at times, wearing telemetry and orthostatics.  Patient initially significantly hypoxic post ambulation requiring supplemental oxygen but improved with times. On discharge, patient is not in need for Oxygen therapy and ambulating without difficulties. Also found to have large hiatal hernia on Ct chest, seen by GI with no intervention recommended as patient denies any symptoms    d/c planning 35 min. stable for discharge home with outpatient follow up.

## 2017-08-11 NOTE — CHART NOTE - NSCHARTNOTEFT_GEN_A_CORE
68 y/o male with h/o COPD, Parkinson's disease, and schizophrenia was found in bed confused. patient was transferred from [facility] to ER by ambulance.   Rapid response was called because patient "passed out".   Patient was walking down the hallway by himself when he slumped down to the floor and hit the back of his head. The fall was witnessed by a family member of the patient next door. The patient was unconscious for 1 minute, he did not have tongue biting,  he did not have loss of bowel or bladder control, however he did have slight trembling movements as per nurs (however patient has Parkinson disease). 66 y/o male with h/o COPD, Parkinson's disease, and schizophrenia was found in bed confused. patient was transferred from [facility] to ER by ambulance.   Rapid response was called because patient "passed out".   Patient was walking down the hallway by himself when he slumped down to the floor and hit the back of his head. The fall was witnessed by a family member of the patient next door. The patient was unconscious for 1 minute, he did not have tongue biting,  he did not have loss of bowel or bladder control, however he did have slight trembling movements as per nurs (however patient has Parkinson disease).    Vitals:   BP: 140/80   RR: 16   HR: 87   SpO2: 97% on 2 L of O2   T: 98.7  Blood glucose: 76    Physical exam:   General: NAD, lying comofrtably in bed   HEENT: EOMI/BEVERLY, throat no exudates, no erythema    Neck: No JVD, supple, normal thyroid gland  Cardiac: s1/s2/rrr/no s4 or s3, no murmurs  Pulmonary:   bilateral wheezing expiratory  Abdominal: soft, no guarding or rebound, bowel sounds positive  Extremities: no calf tednerness, no edema, no cyanosis   Neurological: alert and oriented x 3, cn 2-12 intact, motor 5/5, sensations intact       Labs:                         13.9   4.6   )-----------( 163      ( 11 Aug 2017 17:59 )             42.2     08-11    133<L>  |  88<L>  |  25.0<H>  ----------------------------<  76  4.8   |  35.0<H>  |  0.71    Ca    8.9      11 Aug 2017 17:59  Phos  3.3     08-11  Mg     2.2     08-11    TPro  6.6  /  Alb  3.7  /  TBili  1.0  /  DBili  x   /  AST  86<H>  /  ALT  74<H>  /  AlkPhos  71  08-11    Troponins are negative    A/P: 66 y/o male with h/o COPD, Parkinson's disease, and schizophrenia was found in bed confused. patient was transferred from [facility] to ER by ambulance.   Rapid response was called because patient "passed out".    - cause of syncope is most likely either due desaturation while patient is walking (nurses mentioned that patient was desating to 86%) while ambulating  vs. orthostatic syncope. Seizure is unlikely since patient had no postical period, no loss of bowel or bladder control, no tongue biting and no previous history of seizure. he had some trembling movement during episode however he has a history of seizure.   -  CBC/CMP/MAgnesium/Phosphorus  - ecg without acute signs of ischemia   - fall precautions  - may not ambulate without O2   - patient's parkinson medication can cause orthostasis, decreasing dose will be discussed with attending  - ativan prn anxiety will be discontinued  -  CT scan of the head is negative for bleeding  -  TTE/carotid dopplers are pending  - case discussed with Dr. Gilbert and Camila who agree with management

## 2017-08-12 PROCEDURE — 93010 ELECTROCARDIOGRAM REPORT: CPT

## 2017-08-12 PROCEDURE — 99233 SBSQ HOSP IP/OBS HIGH 50: CPT

## 2017-08-12 RX ORDER — SODIUM CHLORIDE 9 MG/ML
1000 INJECTION INTRAMUSCULAR; INTRAVENOUS; SUBCUTANEOUS ONCE
Qty: 0 | Refills: 0 | Status: COMPLETED | OUTPATIENT
Start: 2017-08-12 | End: 2017-08-12

## 2017-08-12 RX ORDER — AMLODIPINE BESYLATE 2.5 MG/1
1 TABLET ORAL
Qty: 0 | Refills: 0 | COMMUNITY

## 2017-08-12 RX ORDER — SODIUM CHLORIDE 9 MG/ML
1000 INJECTION INTRAMUSCULAR; INTRAVENOUS; SUBCUTANEOUS
Qty: 0 | Refills: 0 | Status: COMPLETED | OUTPATIENT
Start: 2017-08-12 | End: 2017-08-12

## 2017-08-12 RX ADMIN — CARBIDOPA AND LEVODOPA 1 TABLET(S): 25; 100 TABLET ORAL at 22:56

## 2017-08-12 RX ADMIN — Medication 0.5 MILLIGRAM(S): at 11:04

## 2017-08-12 RX ADMIN — CARBIDOPA AND LEVODOPA 1 TABLET(S): 25; 100 TABLET ORAL at 17:42

## 2017-08-12 RX ADMIN — Medication 600 MILLIGRAM(S): at 05:52

## 2017-08-12 RX ADMIN — PANTOPRAZOLE SODIUM 40 MILLIGRAM(S): 20 TABLET, DELAYED RELEASE ORAL at 06:59

## 2017-08-12 RX ADMIN — KETOCONAZOLE 1 APPLICATION(S): 20 AEROSOL, FOAM TOPICAL at 11:05

## 2017-08-12 RX ADMIN — Medication 600 MILLIGRAM(S): at 17:48

## 2017-08-12 RX ADMIN — AMLODIPINE BESYLATE 5 MILLIGRAM(S): 2.5 TABLET ORAL at 05:53

## 2017-08-12 RX ADMIN — Medication 1 PATCH: at 12:50

## 2017-08-12 RX ADMIN — Medication 3 MILLILITER(S): at 15:26

## 2017-08-12 RX ADMIN — HALOPERIDOL DECANOATE 10 MILLIGRAM(S): 100 INJECTION INTRAMUSCULAR at 22:54

## 2017-08-12 RX ADMIN — BUDESONIDE AND FORMOTEROL FUMARATE DIHYDRATE 2 PUFF(S): 160; 4.5 AEROSOL RESPIRATORY (INHALATION) at 20:51

## 2017-08-12 RX ADMIN — DIVALPROEX SODIUM 250 MILLIGRAM(S): 500 TABLET, DELAYED RELEASE ORAL at 22:55

## 2017-08-12 RX ADMIN — SODIUM CHLORIDE 1000 MILLILITER(S): 9 INJECTION INTRAMUSCULAR; INTRAVENOUS; SUBCUTANEOUS at 10:55

## 2017-08-12 RX ADMIN — Medication 100 MILLIGRAM(S): at 10:35

## 2017-08-12 RX ADMIN — RISPERIDONE 4 MILLIGRAM(S): 4 TABLET ORAL at 11:06

## 2017-08-12 RX ADMIN — SODIUM CHLORIDE 100 MILLILITER(S): 9 INJECTION INTRAMUSCULAR; INTRAVENOUS; SUBCUTANEOUS at 17:00

## 2017-08-12 RX ADMIN — BUDESONIDE AND FORMOTEROL FUMARATE DIHYDRATE 2 PUFF(S): 160; 4.5 AEROSOL RESPIRATORY (INHALATION) at 08:33

## 2017-08-12 RX ADMIN — ROPINIROLE 0.5 MILLIGRAM(S): 8 TABLET, FILM COATED, EXTENDED RELEASE ORAL at 05:52

## 2017-08-12 RX ADMIN — Medication 100 MILLIGRAM(S): at 01:31

## 2017-08-12 RX ADMIN — CARBIDOPA AND LEVODOPA 1 TABLET(S): 25; 100 TABLET ORAL at 05:52

## 2017-08-12 RX ADMIN — DIVALPROEX SODIUM 1000 MILLIGRAM(S): 500 TABLET, DELAYED RELEASE ORAL at 22:55

## 2017-08-12 RX ADMIN — Medication 30 MILLIGRAM(S): at 05:55

## 2017-08-12 RX ADMIN — Medication 3 MILLILITER(S): at 08:33

## 2017-08-12 RX ADMIN — FAMOTIDINE 40 MILLIGRAM(S): 10 INJECTION INTRAVENOUS at 22:56

## 2017-08-12 RX ADMIN — Medication 3 MILLILITER(S): at 20:49

## 2017-08-12 RX ADMIN — Medication 100 MILLIGRAM(S): at 23:58

## 2017-08-12 RX ADMIN — ROPINIROLE 0.5 MILLIGRAM(S): 8 TABLET, FILM COATED, EXTENDED RELEASE ORAL at 17:48

## 2017-08-12 RX ADMIN — Medication 0.5 MILLIGRAM(S): at 22:56

## 2017-08-12 NOTE — PROGRESS NOTE ADULT - SUBJECTIVE AND OBJECTIVE BOX
PULMONARY PROGRESS NOTE      [id number]    Patient is a 67y old  Male who presents with a chief complaint of Confused. Difficulty to breath (11 Aug 2017 10:26)      INTERVAL HPI/OVERNIGHT EVENTS:    MEDICATIONS  (STANDING):  diVALproex  milliGRAM(s) Oral at bedtime  diVALproex ER 1000 milliGRAM(s) Oral at bedtime  rOPINIRole 0.5 milliGRAM(s) Oral two times a day  haloperidol     Tablet 10 milliGRAM(s) Oral at bedtime  amLODIPine   Tablet 5 milliGRAM(s) Oral daily  ketoconazole 2% Cream 1 Application(s) Topical daily  nicotine -  14 mG/24Hr(s) Patch 1 patch Transdermal daily  ALBUTerol/ipratropium for Nebulization 3 milliLiter(s) Nebulizer every 6 hours  enoxaparin Injectable 40 milliGRAM(s) SubCutaneous daily  guaiFENesin  milliGRAM(s) Oral every 12 hours  pantoprazole    Tablet 40 milliGRAM(s) Oral before breakfast  buDESOnide  80 MICROgram(s)/formoterol 4.5 MICROgram(s) Inhaler 2 Puff(s) Inhalation two times a day  predniSONE   Tablet   Oral   predniSONE   Tablet 30 milliGRAM(s) Oral daily  famotidine    Tablet 40 milliGRAM(s) Oral at bedtime  risperiDONE   Tablet 4 milliGRAM(s) Oral daily  carbidopa/levodopa  25/100 1 Tablet(s) Oral three times a day      MEDICATIONS  (PRN):  LORazepam     Tablet 0.5 milliGRAM(s) Oral two times a day PRN Anxiety  guaiFENesin    Syrup 100 milliGRAM(s) Oral every 6 hours PRN Cough      Allergies    No Known Allergies    Intolerances        PAST MEDICAL & SURGICAL HISTORY:  Schizophrenia, unspecified type  Chronic obstructive pulmonary disease, unspecified COPD type  No significant past surgical history      SOCIAL HISTORY  Smoking History:       REVIEW OF SYSTEMS:    CONSTITUTIONAL:  No distress    HEENT:  Eyes:  No diplopia or blurred vision. ENT:  No earache, sore throat or runny nose.    CARDIOVASCULAR:  No pressure, squeezing, tightness, heaviness or aching about the chest; no palpitations.    RESPIRATORY:  No cough, shortness of breath, PND or orthopnea. Mild SOBOE    GASTROINTESTINAL:  No nausea, vomiting or diarrhea.    GENITOURINARY:  No dysuria, frequency or urgency.    NEUROLOGIC:  No paresthesias, fasciculations, seizures or weakness.    PSYCHIATRIC:  No disorder of thought or mood.    Vital Signs Last 24 Hrs  T(C): 36.6 (12 Aug 2017 05:50), Max: 36.9 (11 Aug 2017 16:27)  T(F): 97.8 (12 Aug 2017 05:50), Max: 98.4 (11 Aug 2017 16:27)  HR: 80 (12 Aug 2017 05:50) (75 - 110)  BP: 120/78 (12 Aug 2017 05:50) (100/73 - 120/78)  BP(mean): --  RR: 18 (12 Aug 2017 05:50) (18 - 18)  SpO2: 100% (12 Aug 2017 05:50) (91% - 100%)    PHYSICAL EXAMINATION:    GENERAL: The patient is awake and alert in no apparent distress.     HEENT: Head is normocephalic and atraumatic. Extraocular muscles are intact. Mucous membranes are moist.    NECK: Supple.    LUNGS: Clear to auscultation without wheezing, rales or rhonchi; respirations unlabored    HEART: Regular rate and rhythm without murmur.    ABDOMEN: Soft, nontender, and nondistended.      EXTREMITIES: Without any cyanosis, clubbing, rash, lesions or edema.    NEUROLOGIC: Grossly intact.    LABS:                        13.9   4.6   )-----------( 163      ( 11 Aug 2017 17:59 )             42.2     08-11    133<L>  |  88<L>  |  25.0<H>  ----------------------------<  76  4.8   |  35.0<H>  |  0.71    Ca    8.9      11 Aug 2017 17:59  Phos  3.3     08-11  Mg     2.2     08-11    TPro  6.6  /  Alb  3.7  /  TBili  1.0  /  DBili  x   /  AST  86<H>  /  ALT  74<H>  /  AlkPhos  71  08-11        ABG - ( 11 Aug 2017 17:54 )  pH: 7.47  /  pCO2: 51    /  pO2: 64    / HCO3: 35    / Base Excess: 11.7  /  SaO2: 93                CARDIAC MARKERS ( 11 Aug 2017 17:59 )  x     / <0.01 ng/mL / x     / x     / x

## 2017-08-12 NOTE — PROGRESS NOTE ADULT - ASSESSMENT
Imp--COPD PD schizophrenia.  Resp status appears improved - Near baseline.  No evidence pneumonia. Still requiring 02 for exertion  Plan--Steroid Taper (po OK), continue nebs.  02 as Needed  To [facility]  Back to facility when off 02 Imp--COPD PD schizophrenia.  Resp status appears improved - Near baseline.  No evidence pneumonia. Still requiring 02 for exertion  Plan--Steroid Taper (po OK), continue nebs.  02 as Needed  To [facility]  Back to facility when off 02  OP FU with Dr Tang

## 2017-08-12 NOTE — PROGRESS NOTE ADULT - ASSESSMENT
66 y/o male with h/o COPD, Parkinson's disease, and schizophrenia was found in bed confused. patient was transferred from [facility], admitted  for COPD exacerbation, started on solumedrol, Duoneb, Abx, mentation is back to baseline, ct head negative  patient has episode of syncope, likely orthostatic hypotension induced (+ orthostatics based on HR).  IVF given and norvasc discontinued.

## 2017-08-12 NOTE — PROGRESS NOTE ADULT - SUBJECTIVE AND OBJECTIVE BOX
seen for COPD exacerbation    patient has syncopal episode yesterday, d/c cancelled  currently patient has no complaints.  ROS negative     MEDICATIONS  (STANDING):  diVALproex  milliGRAM(s) Oral at bedtime  diVALproex ER 1000 milliGRAM(s) Oral at bedtime  rOPINIRole 0.5 milliGRAM(s) Oral two times a day  haloperidol     Tablet 10 milliGRAM(s) Oral at bedtime  ketoconazole 2% Cream 1 Application(s) Topical daily  nicotine -  14 mG/24Hr(s) Patch 1 patch Transdermal daily  ALBUTerol/ipratropium for Nebulization 3 milliLiter(s) Nebulizer every 6 hours  enoxaparin Injectable 40 milliGRAM(s) SubCutaneous daily  guaiFENesin  milliGRAM(s) Oral every 12 hours  pantoprazole    Tablet 40 milliGRAM(s) Oral before breakfast  buDESOnide  80 MICROgram(s)/formoterol 4.5 MICROgram(s) Inhaler 2 Puff(s) Inhalation two times a day  predniSONE   Tablet   Oral   predniSONE   Tablet 30 milliGRAM(s) Oral daily  famotidine    Tablet 40 milliGRAM(s) Oral at bedtime  risperiDONE   Tablet 4 milliGRAM(s) Oral daily  carbidopa/levodopa  25/100 1 Tablet(s) Oral three times a day    MEDICATIONS  (PRN):  LORazepam     Tablet 0.5 milliGRAM(s) Oral two times a day PRN Anxiety  guaiFENesin    Syrup 100 milliGRAM(s) Oral every 6 hours PRN Cough      Allergies    No Known Allergies    Vital Signs Last 24 Hrs  T(C): 36.2 (12 Aug 2017 10:02), Max: 36.9 (11 Aug 2017 16:27)  T(F): 97.2 (12 Aug 2017 10:02), Max: 98.4 (11 Aug 2017 16:27)  HR: 129 (12 Aug 2017 10:07) (75 - 129)  BP: 107/72 (12 Aug 2017 10:07) (98/67 - 120/78)  BP(mean): --  RR: 18 (12 Aug 2017 10:02) (18 - 18)  SpO2: 100% (12 Aug 2017 05:50) (91% - 100%)    PHYSICAL EXAM:    GENERAL: NAD  CHEST/LUNG: ctab  HEART: Regular rate and rhythm; S1 S2; no murmurs noted  ABDOMEN: Soft, Nontender, Nondistended; Bowel sounds present  EXTREMITIES:  no edema       LABS:                        13.9   4.6   )-----------( 163      ( 11 Aug 2017 17:59 )             42.2     08-11    133<L>  |  88<L>  |  25.0<H>  ----------------------------<  76  4.8   |  35.0<H>  |  0.71    Ca    8.9      11 Aug 2017 17:59  Phos  3.3     08-11  Mg     2.2     08-11    TPro  6.6  /  Alb  3.7  /  TBili  1.0  /  DBili  x   /  AST  86<H>  /  ALT  74<H>  /  AlkPhos  71  08-11          CAPILLARY BLOOD GLUCOSE            RADIOLOGY & ADDITIONAL TESTS:

## 2017-08-13 LAB — PROLACTIN SERPL-MCNC: 31.6 NG/ML — HIGH (ref 4.1–18.4)

## 2017-08-13 PROCEDURE — 99233 SBSQ HOSP IP/OBS HIGH 50: CPT

## 2017-08-13 RX ORDER — SODIUM CHLORIDE 9 MG/ML
1000 INJECTION INTRAMUSCULAR; INTRAVENOUS; SUBCUTANEOUS
Qty: 0 | Refills: 0 | Status: DISCONTINUED | OUTPATIENT
Start: 2017-08-13 | End: 2017-08-14

## 2017-08-13 RX ORDER — METOPROLOL TARTRATE 50 MG
25 TABLET ORAL DAILY
Qty: 0 | Refills: 0 | Status: DISCONTINUED | OUTPATIENT
Start: 2017-08-13 | End: 2017-08-14

## 2017-08-13 RX ORDER — ALPRAZOLAM 0.25 MG
0.25 TABLET ORAL EVERY 12 HOURS
Qty: 0 | Refills: 0 | Status: DISCONTINUED | OUTPATIENT
Start: 2017-08-13 | End: 2017-08-14

## 2017-08-13 RX ADMIN — CARBIDOPA AND LEVODOPA 1 TABLET(S): 25; 100 TABLET ORAL at 14:41

## 2017-08-13 RX ADMIN — Medication 100 MILLIGRAM(S): at 05:26

## 2017-08-13 RX ADMIN — Medication 600 MILLIGRAM(S): at 05:26

## 2017-08-13 RX ADMIN — HALOPERIDOL DECANOATE 10 MILLIGRAM(S): 100 INJECTION INTRAMUSCULAR at 21:25

## 2017-08-13 RX ADMIN — ROPINIROLE 0.5 MILLIGRAM(S): 8 TABLET, FILM COATED, EXTENDED RELEASE ORAL at 18:08

## 2017-08-13 RX ADMIN — Medication 600 MILLIGRAM(S): at 18:08

## 2017-08-13 RX ADMIN — PANTOPRAZOLE SODIUM 40 MILLIGRAM(S): 20 TABLET, DELAYED RELEASE ORAL at 06:46

## 2017-08-13 RX ADMIN — Medication 25 MILLIGRAM(S): at 18:08

## 2017-08-13 RX ADMIN — Medication 3 MILLILITER(S): at 20:33

## 2017-08-13 RX ADMIN — Medication 3 MILLILITER(S): at 02:59

## 2017-08-13 RX ADMIN — KETOCONAZOLE 1 APPLICATION(S): 20 AEROSOL, FOAM TOPICAL at 15:13

## 2017-08-13 RX ADMIN — DIVALPROEX SODIUM 250 MILLIGRAM(S): 500 TABLET, DELAYED RELEASE ORAL at 21:25

## 2017-08-13 RX ADMIN — ROPINIROLE 0.5 MILLIGRAM(S): 8 TABLET, FILM COATED, EXTENDED RELEASE ORAL at 05:26

## 2017-08-13 RX ADMIN — RISPERIDONE 4 MILLIGRAM(S): 4 TABLET ORAL at 12:20

## 2017-08-13 RX ADMIN — BUDESONIDE AND FORMOTEROL FUMARATE DIHYDRATE 2 PUFF(S): 160; 4.5 AEROSOL RESPIRATORY (INHALATION) at 07:35

## 2017-08-13 RX ADMIN — Medication 0.25 MILLIGRAM(S): at 14:41

## 2017-08-13 RX ADMIN — DIVALPROEX SODIUM 1000 MILLIGRAM(S): 500 TABLET, DELAYED RELEASE ORAL at 21:25

## 2017-08-13 RX ADMIN — Medication 1 PATCH: at 12:20

## 2017-08-13 RX ADMIN — FAMOTIDINE 40 MILLIGRAM(S): 10 INJECTION INTRAVENOUS at 21:25

## 2017-08-13 RX ADMIN — Medication 30 MILLIGRAM(S): at 05:25

## 2017-08-13 RX ADMIN — CARBIDOPA AND LEVODOPA 1 TABLET(S): 25; 100 TABLET ORAL at 21:25

## 2017-08-13 RX ADMIN — Medication 100 MILLIGRAM(S): at 21:24

## 2017-08-13 RX ADMIN — Medication 100 MILLIGRAM(S): at 12:30

## 2017-08-13 RX ADMIN — BUDESONIDE AND FORMOTEROL FUMARATE DIHYDRATE 2 PUFF(S): 160; 4.5 AEROSOL RESPIRATORY (INHALATION) at 20:33

## 2017-08-13 RX ADMIN — Medication 3 MILLILITER(S): at 15:36

## 2017-08-13 RX ADMIN — CARBIDOPA AND LEVODOPA 1 TABLET(S): 25; 100 TABLET ORAL at 06:46

## 2017-08-13 RX ADMIN — Medication 3 MILLILITER(S): at 07:35

## 2017-08-13 NOTE — PROGRESS NOTE ADULT - PROBLEM SELECTOR PLAN 1
c/w nebs, prednisone taper. s/p azithromycin  at rest 92%RA, after ambulation 85% RA improved to 95 on NC 2L (~100ft)

## 2017-08-13 NOTE — PROGRESS NOTE ADULT - ATTENDING COMMENTS
d/c to [facility] as requires oxygen, likely short term o2 .    d/c to [facility] if no longer orthostatic
d/c to [facility] as requires oxygen, likely short term o2 .    d/c to [facility] if no longer orthostatic
d/c to [facility] as requires oxygen, likely short term o2 .

## 2017-08-13 NOTE — PROGRESS NOTE ADULT - SUBJECTIVE AND OBJECTIVE BOX
seen for COPD exac, orthostatic hypotension syncope    no acute complaints   ROS negative.  refusing monitor and certain meds/tests    MEDICATIONS  (STANDING):  diVALproex  milliGRAM(s) Oral at bedtime  diVALproex ER 1000 milliGRAM(s) Oral at bedtime  rOPINIRole 0.5 milliGRAM(s) Oral two times a day  haloperidol     Tablet 10 milliGRAM(s) Oral at bedtime  ketoconazole 2% Cream 1 Application(s) Topical daily  nicotine -  14 mG/24Hr(s) Patch 1 patch Transdermal daily  ALBUTerol/ipratropium for Nebulization 3 milliLiter(s) Nebulizer every 6 hours  enoxaparin Injectable 40 milliGRAM(s) SubCutaneous daily  guaiFENesin  milliGRAM(s) Oral every 12 hours  pantoprazole    Tablet 40 milliGRAM(s) Oral before breakfast  buDESOnide  80 MICROgram(s)/formoterol 4.5 MICROgram(s) Inhaler 2 Puff(s) Inhalation two times a day  predniSONE   Tablet   Oral   famotidine    Tablet 40 milliGRAM(s) Oral at bedtime  risperiDONE   Tablet 4 milliGRAM(s) Oral daily  carbidopa/levodopa  25/100 1 Tablet(s) Oral three times a day  sodium chloride 0.9%. 1000 milliLiter(s) (75 mL/Hr) IV Continuous <Continuous>    MEDICATIONS  (PRN):  guaiFENesin    Syrup 100 milliGRAM(s) Oral every 6 hours PRN Cough  ALPRAZolam 0.25 milliGRAM(s) Oral every 12 hours PRN anxiety      Allergies    No Known Allergies    Vital Signs Last 24 Hrs  T(C): 36.5 (13 Aug 2017 08:00), Max: 36.6 (12 Aug 2017 17:29)  T(F): 97.7 (13 Aug 2017 08:00), Max: 97.8 (12 Aug 2017 17:29)  HR: 90 (13 Aug 2017 08:00) (78 - 104)  BP: 113/74 (13 Aug 2017 08:00) (103/70 - 131/70)  BP(mean): --  RR: 20 (13 Aug 2017 08:00) (17 - 20)  SpO2: 94% (13 Aug 2017 08:15) (94% - 99%)    PHYSICAL EXAM:    GENERAL: NAD  CHEST/LUNG: ctab  HEART: Regular rate and rhythm; S1 S2; no murmurs noted  ABDOMEN: Soft, Nontender, Nondistended; Bowel sounds present  EXTREMITIES:  no edema.     LABS:                        13.9   4.6   )-----------( 163      ( 11 Aug 2017 17:59 )             42.2     08-11    133<L>  |  88<L>  |  25.0<H>  ----------------------------<  76  4.8   |  35.0<H>  |  0.71    Ca    8.9      11 Aug 2017 17:59  Phos  3.3     08-11  Mg     2.2     08-11    TPro  6.6  /  Alb  3.7  /  TBili  1.0  /  DBili  x   /  AST  86<H>  /  ALT  74<H>  /  AlkPhos  71  08-11          CAPILLARY BLOOD GLUCOSE            RADIOLOGY & ADDITIONAL TESTS:

## 2017-08-13 NOTE — PROGRESS NOTE ADULT - ASSESSMENT
68 y/o male with h/o COPD, Parkinson's disease, and schizophrenia was found in bed confused. patient was transferred from [facility], admitted  for COPD exacerbation, started on solumedrol, Duoneb, Abx, mentation is back to baseline, ct head negative  patient has episode of syncope, likely orthostatic hypotension induced (+ orthostatics based on HR).  IVF given and norvasc discontinued.

## 2017-08-14 VITALS — TEMPERATURE: 98 F | SYSTOLIC BLOOD PRESSURE: 109 MMHG | HEART RATE: 75 BPM | DIASTOLIC BLOOD PRESSURE: 74 MMHG

## 2017-08-14 DIAGNOSIS — Z29.9 ENCOUNTER FOR PROPHYLACTIC MEASURES, UNSPECIFIED: ICD-10-CM

## 2017-08-14 DIAGNOSIS — J90 PLEURAL EFFUSION, NOT ELSEWHERE CLASSIFIED: ICD-10-CM

## 2017-08-14 DIAGNOSIS — R09.02 HYPOXEMIA: ICD-10-CM

## 2017-08-14 DIAGNOSIS — F17.200 NICOTINE DEPENDENCE, UNSPECIFIED, UNCOMPLICATED: ICD-10-CM

## 2017-08-14 PROCEDURE — 82435 ASSAY OF BLOOD CHLORIDE: CPT

## 2017-08-14 PROCEDURE — 82330 ASSAY OF CALCIUM: CPT

## 2017-08-14 PROCEDURE — 36415 COLL VENOUS BLD VENIPUNCTURE: CPT

## 2017-08-14 PROCEDURE — 82272 OCCULT BLD FECES 1-3 TESTS: CPT

## 2017-08-14 PROCEDURE — 84100 ASSAY OF PHOSPHORUS: CPT

## 2017-08-14 PROCEDURE — 96375 TX/PRO/DX INJ NEW DRUG ADDON: CPT

## 2017-08-14 PROCEDURE — 99285 EMERGENCY DEPT VISIT HI MDM: CPT | Mod: 25

## 2017-08-14 PROCEDURE — 82947 ASSAY GLUCOSE BLOOD QUANT: CPT

## 2017-08-14 PROCEDURE — 85014 HEMATOCRIT: CPT

## 2017-08-14 PROCEDURE — 71250 CT THORAX DX C-: CPT

## 2017-08-14 PROCEDURE — 96374 THER/PROPH/DIAG INJ IV PUSH: CPT

## 2017-08-14 PROCEDURE — 70450 CT HEAD/BRAIN W/O DYE: CPT

## 2017-08-14 PROCEDURE — 80307 DRUG TEST PRSMV CHEM ANLYZR: CPT

## 2017-08-14 PROCEDURE — 83605 ASSAY OF LACTIC ACID: CPT

## 2017-08-14 PROCEDURE — 96376 TX/PRO/DX INJ SAME DRUG ADON: CPT

## 2017-08-14 PROCEDURE — 71045 X-RAY EXAM CHEST 1 VIEW: CPT

## 2017-08-14 PROCEDURE — 80053 COMPREHEN METABOLIC PANEL: CPT

## 2017-08-14 PROCEDURE — 93880 EXTRACRANIAL BILAT STUDY: CPT

## 2017-08-14 PROCEDURE — 92610 EVALUATE SWALLOWING FUNCTION: CPT

## 2017-08-14 PROCEDURE — 83735 ASSAY OF MAGNESIUM: CPT

## 2017-08-14 PROCEDURE — 80048 BASIC METABOLIC PNL TOTAL CA: CPT

## 2017-08-14 PROCEDURE — 94640 AIRWAY INHALATION TREATMENT: CPT

## 2017-08-14 PROCEDURE — 84295 ASSAY OF SERUM SODIUM: CPT

## 2017-08-14 PROCEDURE — 36600 WITHDRAWAL OF ARTERIAL BLOOD: CPT

## 2017-08-14 PROCEDURE — 93005 ELECTROCARDIOGRAM TRACING: CPT

## 2017-08-14 PROCEDURE — 97163 PT EVAL HIGH COMPLEX 45 MIN: CPT

## 2017-08-14 PROCEDURE — 84146 ASSAY OF PROLACTIN: CPT

## 2017-08-14 PROCEDURE — 94760 N-INVAS EAR/PLS OXIMETRY 1: CPT

## 2017-08-14 PROCEDURE — 84132 ASSAY OF SERUM POTASSIUM: CPT

## 2017-08-14 PROCEDURE — 82803 BLOOD GASES ANY COMBINATION: CPT

## 2017-08-14 PROCEDURE — 99232 SBSQ HOSP IP/OBS MODERATE 35: CPT

## 2017-08-14 PROCEDURE — 85027 COMPLETE CBC AUTOMATED: CPT

## 2017-08-14 PROCEDURE — 84484 ASSAY OF TROPONIN QUANT: CPT

## 2017-08-14 RX ORDER — ALPRAZOLAM 0.25 MG
0.25 TABLET ORAL ONCE
Qty: 0 | Refills: 0 | Status: DISCONTINUED | OUTPATIENT
Start: 2017-08-14 | End: 2017-08-14

## 2017-08-14 RX ORDER — METOPROLOL TARTRATE 50 MG
1 TABLET ORAL
Qty: 30 | Refills: 0 | OUTPATIENT
Start: 2017-08-14 | End: 2017-09-13

## 2017-08-14 RX ORDER — FAMOTIDINE 10 MG/ML
1 INJECTION INTRAVENOUS
Qty: 30 | Refills: 0 | OUTPATIENT
Start: 2017-08-14 | End: 2017-09-13

## 2017-08-14 RX ORDER — BUDESONIDE AND FORMOTEROL FUMARATE DIHYDRATE 160; 4.5 UG/1; UG/1
2 AEROSOL RESPIRATORY (INHALATION)
Qty: 1 | Refills: 0 | OUTPATIENT
Start: 2017-08-14 | End: 2017-09-13

## 2017-08-14 RX ADMIN — ROPINIROLE 0.5 MILLIGRAM(S): 8 TABLET, FILM COATED, EXTENDED RELEASE ORAL at 17:00

## 2017-08-14 RX ADMIN — Medication 600 MILLIGRAM(S): at 17:01

## 2017-08-14 RX ADMIN — Medication 1 PATCH: at 12:41

## 2017-08-14 RX ADMIN — ROPINIROLE 0.5 MILLIGRAM(S): 8 TABLET, FILM COATED, EXTENDED RELEASE ORAL at 05:41

## 2017-08-14 RX ADMIN — ENOXAPARIN SODIUM 40 MILLIGRAM(S): 100 INJECTION SUBCUTANEOUS at 12:44

## 2017-08-14 RX ADMIN — BUDESONIDE AND FORMOTEROL FUMARATE DIHYDRATE 2 PUFF(S): 160; 4.5 AEROSOL RESPIRATORY (INHALATION) at 08:38

## 2017-08-14 RX ADMIN — Medication 600 MILLIGRAM(S): at 05:40

## 2017-08-14 RX ADMIN — RISPERIDONE 4 MILLIGRAM(S): 4 TABLET ORAL at 12:43

## 2017-08-14 RX ADMIN — Medication 20 MILLIGRAM(S): at 05:41

## 2017-08-14 RX ADMIN — PANTOPRAZOLE SODIUM 40 MILLIGRAM(S): 20 TABLET, DELAYED RELEASE ORAL at 05:41

## 2017-08-14 RX ADMIN — KETOCONAZOLE 1 APPLICATION(S): 20 AEROSOL, FOAM TOPICAL at 12:44

## 2017-08-14 RX ADMIN — Medication 0.25 MILLIGRAM(S): at 15:11

## 2017-08-14 RX ADMIN — Medication 0.25 MILLIGRAM(S): at 05:41

## 2017-08-14 RX ADMIN — Medication 100 MILLIGRAM(S): at 05:40

## 2017-08-14 RX ADMIN — Medication 1 PATCH: at 12:30

## 2017-08-14 RX ADMIN — Medication 3 MILLILITER(S): at 08:38

## 2017-08-14 RX ADMIN — CARBIDOPA AND LEVODOPA 1 TABLET(S): 25; 100 TABLET ORAL at 13:48

## 2017-08-14 RX ADMIN — Medication 25 MILLIGRAM(S): at 05:41

## 2017-08-14 RX ADMIN — Medication 3 MILLILITER(S): at 15:19

## 2017-08-14 RX ADMIN — CARBIDOPA AND LEVODOPA 1 TABLET(S): 25; 100 TABLET ORAL at 05:40

## 2017-08-14 NOTE — PROGRESS NOTE ADULT - PROBLEM SELECTOR PROBLEM 3
Altered mental status, unspecified altered mental status type
Parkinson disease
Hypoxia

## 2017-08-14 NOTE — PROGRESS NOTE ADULT - PROBLEM SELECTOR PROBLEM 2
Altered mental status, unspecified altered mental status type
COPD exacerbation
COPD exacerbation
Hiatal hernia with GERD
Schizophrenia, unspecified type

## 2017-08-14 NOTE — PROGRESS NOTE ADULT - PROBLEM SELECTOR PROBLEM 4
Parkinson disease
Schizophrenia, unspecified type
Schizophrenia, unspecified type
Pleural effusion, bilateral
Schizophrenia, unspecified type

## 2017-08-14 NOTE — PROGRESS NOTE ADULT - PROBLEM SELECTOR PROBLEM 1
Altered mental status, unspecified altered mental status type
Altered mental status, unspecified altered mental status type
R/O COPD exacerbation

## 2017-08-14 NOTE — PROGRESS NOTE ADULT - PROBLEM SELECTOR PROBLEM 5
HTN (hypertension)
Schizophrenia, unspecified type
HTN (hypertension)
Prophylactic measure
HTN (hypertension)

## 2017-08-14 NOTE — PROGRESS NOTE ADULT - SUBJECTIVE AND OBJECTIVE BOX
PULMONARY PROGRESS NOTE      DAVY HOLDEN  MRN-[id number]    Patient is a 67y old  Male who presents with a chief complaint of Confused. Difficulty to breath (11 Aug 2017 10:26)      INTERVAL HPI/OVERNIGHT EVENTS:    Patient is awake and alert  Comfortable    MEDICATIONS  (STANDING):  diVALproex  milliGRAM(s) Oral at bedtime  diVALproex ER 1000 milliGRAM(s) Oral at bedtime  rOPINIRole 0.5 milliGRAM(s) Oral two times a day  haloperidol     Tablet 10 milliGRAM(s) Oral at bedtime  ketoconazole 2% Cream 1 Application(s) Topical daily  nicotine -  14 mG/24Hr(s) Patch 1 patch Transdermal daily  ALBUTerol/ipratropium for Nebulization 3 milliLiter(s) Nebulizer every 6 hours  enoxaparin Injectable 40 milliGRAM(s) SubCutaneous daily  guaiFENesin  milliGRAM(s) Oral every 12 hours  pantoprazole    Tablet 40 milliGRAM(s) Oral before breakfast  buDESOnide  80 MICROgram(s)/formoterol 4.5 MICROgram(s) Inhaler 2 Puff(s) Inhalation two times a day  predniSONE   Tablet   Oral   predniSONE   Tablet 20 milliGRAM(s) Oral daily  famotidine    Tablet 40 milliGRAM(s) Oral at bedtime  risperiDONE   Tablet 4 milliGRAM(s) Oral daily  carbidopa/levodopa  25/100 1 Tablet(s) Oral three times a day  sodium chloride 0.9%. 1000 milliLiter(s) (75 mL/Hr) IV Continuous <Continuous>  metoprolol succinate ER 25 milliGRAM(s) Oral daily      MEDICATIONS  (PRN):  guaiFENesin    Syrup 100 milliGRAM(s) Oral every 6 hours PRN Cough  ALPRAZolam 0.25 milliGRAM(s) Oral every 12 hours PRN anxiety      Allergies    No Known Allergies    Intolerances        PAST MEDICAL & SURGICAL HISTORY:  Schizophrenia, unspecified type  Chronic obstructive pulmonary disease, unspecified COPD type  No significant past surgical history        REVIEW OF SYSTEMS:    CONSTITUTIONAL:  No distress    HEENT:  Eyes:  No diplopia or blurred vision. ENT:  No earache, sore throat or runny nose.    CARDIOVASCULAR:  No pressure, squeezing, tightness, heaviness or aching about the chest; no palpitations.    RESPIRATORY:  No cough, shortness of breath, PND or orthopnea. Mild SOBOE    GASTROINTESTINAL:  No nausea, vomiting or diarrhea.    GENITOURINARY:  No dysuria, frequency or urgency.    NEUROLOGIC:  No paresthesias, fasciculations, seizures or weakness.    PSYCHIATRIC:  No disorder of thought or mood.    Vital Signs Last 24 Hrs  T(C): 36.4 (14 Aug 2017 08:51), Max: 36.8 (13 Aug 2017 16:49)  T(F): 97.6 (14 Aug 2017 08:51), Max: 98.3 (14 Aug 2017 04:57)  HR: 74 (14 Aug 2017 08:51) (54 - 99)  BP: 118/74 (14 Aug 2017 08:51) (98/62 - 128/82)  BP(mean): --  RR: 18 (14 Aug 2017 08:51) (18 - 20)  SpO2: 96% (14 Aug 2017 08:54) (96% - 99%)    PHYSICAL EXAMINATION:    GENERAL: The patient is awake and alert in no apparent distress.     HEENT: Head is normocephalic and atraumatic. Extraocular muscles are intact. Mucous membranes are moist.    NECK: Supple.    LUNGS: Clear to auscultation without wheezing, rales or rhonchi; respirations unlabored    HEART: Regular rate and rhythm without murmur.    ABDOMEN: Soft, nontender, and nondistended.      EXTREMITIES: Without any cyanosis, clubbing, rash, lesions or edema.    NEUROLOGIC: Grossly intact.      RADIOLOGY & ADDITIONAL STUDIES:     EXAM:  CT CHEST                          PROCEDURE DATE:  08/08/2017          INTERPRETATION:  HISTORY: Shortness of breath. COPD with exacerbation..    Date and Time of Exam: 8/8/2017 3:38 PM    TECHNIQUE:  Sections were obtained from the apices to the diaphragm   without intravenous contrast.    COMPARISON EXAMINATION: 2/5/2016    FINDINGS: No evidence of mediastinal or hilar lymphadenopathy. Small   bilateral pleural effusions. No evidence of a pericardial effusion. No   evidence of axillary adenopathy.    There is a large hiatus hernia. Diffuse air and fluid-filled dilatation   of the thoracic esophagus.    Emphysematous changes noted bilaterally. Calcified granuloma in the right   upper lobe. Bibasilar atelectasis.    Degenerative changes in the spine. Old, healed right rib fractures.      IMPRESSION:     Small bilateral pleural effusions and mild bibasilar atelectasis.    Emphysematous changes.    No evidence of infiltrate/pneumonia.    Large hiatus hernia with air and fluid-filled dilatation of the thoracic   esophagus..           VALERIANO FAGELMAN M.D., ATTENDING RADIOLOGIST  This document has been electronically signed. Aug  8 2017  4:09PM

## 2017-08-14 NOTE — PROGRESS NOTE ADULT - PROBLEM SELECTOR PLAN 6
Smoking cessation  On NRT
d/c norvasc as + orthostatics
d/c norvasc as + orthostatics  IV F   monitor orthostatics
stable, pt has no h/o CAD stop coreg due to COPD and wheezing  cont. amlodipine, monitor BP
stable, pt has no h/o CAD stop coreg due to COPD and wheezing  cont. amlodipine, monitor BP

## 2017-08-14 NOTE — PROGRESS NOTE ADULT - PROBLEM SELECTOR PLAN 4
Follow for resolution; too small to drain at this point
cont. home medication
cont. home medication
continue Levodopa/carbidopa
cont. home medication

## 2017-08-22 ENCOUNTER — INPATIENT (INPATIENT)
Facility: HOSPITAL | Age: 67
LOS: 5 days | Discharge: NURSING, REHAB OR LONG-TERM CARE | DRG: 190 | End: 2017-08-28
Attending: INTERNAL MEDICINE | Admitting: FAMILY MEDICINE
Payer: MEDICARE

## 2017-08-22 VITALS
WEIGHT: 139.99 LBS | HEIGHT: 65 IN | DIASTOLIC BLOOD PRESSURE: 75 MMHG | RESPIRATION RATE: 34 BRPM | HEART RATE: 140 BPM | SYSTOLIC BLOOD PRESSURE: 106 MMHG | OXYGEN SATURATION: 99 %

## 2017-08-22 LAB
ALBUMIN SERPL ELPH-MCNC: 3.4 G/DL — SIGNIFICANT CHANGE UP (ref 3.3–5.2)
ALP SERPL-CCNC: 61 U/L — SIGNIFICANT CHANGE UP (ref 40–120)
ALT FLD-CCNC: 14 U/L — SIGNIFICANT CHANGE UP
ANION GAP SERPL CALC-SCNC: 17 MMOL/L — SIGNIFICANT CHANGE UP (ref 5–17)
ANISOCYTOSIS BLD QL: SLIGHT — SIGNIFICANT CHANGE UP
AST SERPL-CCNC: 63 U/L — HIGH
BASE EXCESS BLDA CALC-SCNC: 1.6 MMOL/L — SIGNIFICANT CHANGE UP (ref -3–3)
BILIRUB SERPL-MCNC: 1.1 MG/DL — SIGNIFICANT CHANGE UP (ref 0.4–2)
BLOOD GAS COMMENTS ARTERIAL: SIGNIFICANT CHANGE UP
BUN SERPL-MCNC: 13 MG/DL — SIGNIFICANT CHANGE UP (ref 8–20)
CALCIUM SERPL-MCNC: 8.4 MG/DL — LOW (ref 8.6–10.2)
CHLORIDE SERPL-SCNC: 96 MMOL/L — LOW (ref 98–107)
CK SERPL-CCNC: 74 U/L — SIGNIFICANT CHANGE UP (ref 30–200)
CO2 SERPL-SCNC: 21 MMOL/L — LOW (ref 22–29)
CREAT SERPL-MCNC: 0.77 MG/DL — SIGNIFICANT CHANGE UP (ref 0.5–1.3)
ELLIPTOCYTES BLD QL SMEAR: SLIGHT — SIGNIFICANT CHANGE UP
GAS PNL BLDA: SIGNIFICANT CHANGE UP
GLUCOSE SERPL-MCNC: 86 MG/DL — SIGNIFICANT CHANGE UP (ref 70–115)
HCO3 BLDA-SCNC: 26 MMOL/L — SIGNIFICANT CHANGE UP (ref 20–26)
HCT VFR BLD CALC: 37.7 % — LOW (ref 42–52)
HGB BLD-MCNC: 12.6 G/DL — LOW (ref 14–18)
HOROWITZ INDEX BLDA+IHG-RTO: 0.21 — SIGNIFICANT CHANGE UP
LIDOCAIN IGE QN: 19 U/L — LOW (ref 22–51)
LYMPHOCYTES # BLD AUTO: 4 % — LOW (ref 20–55)
MACROCYTES BLD QL: SLIGHT — SIGNIFICANT CHANGE UP
MCHC RBC-ENTMCNC: 33.4 G/DL — SIGNIFICANT CHANGE UP (ref 32–36)
MCHC RBC-ENTMCNC: 33.4 PG — HIGH (ref 27–31)
MCV RBC AUTO: 100 FL — HIGH (ref 80–94)
MICROCYTES BLD QL: SLIGHT — SIGNIFICANT CHANGE UP
MONOCYTES NFR BLD AUTO: 4 % — SIGNIFICANT CHANGE UP (ref 3–10)
NEUTROPHILS NFR BLD AUTO: 84 % — HIGH (ref 37–73)
NEUTS BAND # BLD: 8 % — SIGNIFICANT CHANGE UP (ref 0–8)
OVALOCYTES BLD QL SMEAR: SLIGHT — SIGNIFICANT CHANGE UP
PCO2 BLDA: 36 MMHG — SIGNIFICANT CHANGE UP (ref 35–45)
PH BLDA: 7.45 — SIGNIFICANT CHANGE UP (ref 7.35–7.45)
PLAT MORPH BLD: NORMAL — SIGNIFICANT CHANGE UP
PLATELET # BLD AUTO: 189 K/UL — SIGNIFICANT CHANGE UP (ref 150–400)
PO2 BLDA: 66 MMHG — LOW (ref 83–108)
POIKILOCYTOSIS BLD QL AUTO: SLIGHT — SIGNIFICANT CHANGE UP
POLYCHROMASIA BLD QL SMEAR: SLIGHT — SIGNIFICANT CHANGE UP
POTASSIUM SERPL-MCNC: 5.1 MMOL/L — SIGNIFICANT CHANGE UP (ref 3.5–5.3)
POTASSIUM SERPL-SCNC: 5.1 MMOL/L — SIGNIFICANT CHANGE UP (ref 3.5–5.3)
PROT SERPL-MCNC: 6.2 G/DL — LOW (ref 6.6–8.7)
RBC # BLD: 3.77 M/UL — LOW (ref 4.6–6.2)
RBC # FLD: 17.2 % — HIGH (ref 11–15.6)
RBC BLD AUTO: ABNORMAL
SAO2 % BLDA: 93 % — LOW (ref 95–99)
SODIUM SERPL-SCNC: 134 MMOL/L — LOW (ref 135–145)
TROPONIN T SERPL-MCNC: <0.01 NG/ML — SIGNIFICANT CHANGE UP (ref 0–0.06)
WBC # BLD: 9.4 K/UL — SIGNIFICANT CHANGE UP (ref 4.8–10.8)
WBC # FLD AUTO: 9.4 K/UL — SIGNIFICANT CHANGE UP (ref 4.8–10.8)

## 2017-08-22 PROCEDURE — 93010 ELECTROCARDIOGRAM REPORT: CPT

## 2017-08-22 PROCEDURE — 71010: CPT | Mod: 26

## 2017-08-22 RX ORDER — SODIUM CHLORIDE 9 MG/ML
500 INJECTION INTRAMUSCULAR; INTRAVENOUS; SUBCUTANEOUS
Qty: 0 | Refills: 0 | Status: COMPLETED | OUTPATIENT
Start: 2017-08-22 | End: 2017-08-22

## 2017-08-22 RX ORDER — VANCOMYCIN HCL 1 G
1000 VIAL (EA) INTRAVENOUS ONCE
Qty: 0 | Refills: 0 | Status: COMPLETED | OUTPATIENT
Start: 2017-08-22 | End: 2017-08-22

## 2017-08-22 RX ORDER — SODIUM CHLORIDE 9 MG/ML
3 INJECTION INTRAMUSCULAR; INTRAVENOUS; SUBCUTANEOUS ONCE
Qty: 0 | Refills: 0 | Status: COMPLETED | OUTPATIENT
Start: 2017-08-22 | End: 2017-08-22

## 2017-08-22 RX ORDER — CEFEPIME 1 G/1
2000 INJECTION, POWDER, FOR SOLUTION INTRAMUSCULAR; INTRAVENOUS ONCE
Qty: 0 | Refills: 0 | Status: COMPLETED | OUTPATIENT
Start: 2017-08-22 | End: 2017-08-22

## 2017-08-22 RX ADMIN — Medication 125 MILLIGRAM(S): at 22:20

## 2017-08-22 RX ADMIN — SODIUM CHLORIDE 2000 MILLILITER(S): 9 INJECTION INTRAMUSCULAR; INTRAVENOUS; SUBCUTANEOUS at 22:15

## 2017-08-22 RX ADMIN — SODIUM CHLORIDE 2000 MILLILITER(S): 9 INJECTION INTRAMUSCULAR; INTRAVENOUS; SUBCUTANEOUS at 23:20

## 2017-08-22 RX ADMIN — CEFEPIME 100 MILLIGRAM(S): 1 INJECTION, POWDER, FOR SOLUTION INTRAMUSCULAR; INTRAVENOUS at 22:28

## 2017-08-22 RX ADMIN — SODIUM CHLORIDE 2000 MILLILITER(S): 9 INJECTION INTRAMUSCULAR; INTRAVENOUS; SUBCUTANEOUS at 23:00

## 2017-08-22 RX ADMIN — Medication 250 MILLIGRAM(S): at 23:23

## 2017-08-22 RX ADMIN — SODIUM CHLORIDE 2000 MILLILITER(S): 9 INJECTION INTRAMUSCULAR; INTRAVENOUS; SUBCUTANEOUS at 22:21

## 2017-08-22 RX ADMIN — SODIUM CHLORIDE 3 MILLILITER(S): 9 INJECTION INTRAMUSCULAR; INTRAVENOUS; SUBCUTANEOUS at 22:21

## 2017-08-22 NOTE — ED ADULT TRIAGE NOTE - CHIEF COMPLAINT QUOTE
pt BIBA from [facility] for respiratory distress, pt found by EMS in bed with diff breathing, pt awake and alert on arrival with CPAP in place by EMP. rec'd 1 inline resp treatment en route, pt with hx COPD. RT and ER MD brought to bedside immediately on arrival.

## 2017-08-23 DIAGNOSIS — J44.9 CHRONIC OBSTRUCTIVE PULMONARY DISEASE, UNSPECIFIED: ICD-10-CM

## 2017-08-23 DIAGNOSIS — J18.9 PNEUMONIA, UNSPECIFIED ORGANISM: ICD-10-CM

## 2017-08-23 DIAGNOSIS — F20.3 UNDIFFERENTIATED SCHIZOPHRENIA: ICD-10-CM

## 2017-08-23 DIAGNOSIS — F20.9 SCHIZOPHRENIA, UNSPECIFIED: ICD-10-CM

## 2017-08-23 DIAGNOSIS — G20 PARKINSON'S DISEASE: ICD-10-CM

## 2017-08-23 DIAGNOSIS — J44.1 CHRONIC OBSTRUCTIVE PULMONARY DISEASE WITH (ACUTE) EXACERBATION: ICD-10-CM

## 2017-08-23 LAB
APPEARANCE UR: CLEAR — SIGNIFICANT CHANGE UP
BILIRUB UR-MCNC: NEGATIVE — SIGNIFICANT CHANGE UP
COLOR SPEC: YELLOW — SIGNIFICANT CHANGE UP
DIFF PNL FLD: NEGATIVE — SIGNIFICANT CHANGE UP
GLUCOSE UR QL: NEGATIVE MG/DL — SIGNIFICANT CHANGE UP
KETONES UR-MCNC: ABNORMAL
LACTATE BLDV-MCNC: 1.5 MMOL/L — SIGNIFICANT CHANGE UP (ref 0.5–2)
LEUKOCYTE ESTERASE UR-ACNC: NEGATIVE — SIGNIFICANT CHANGE UP
NITRITE UR-MCNC: NEGATIVE — SIGNIFICANT CHANGE UP
PH UR: 5 — SIGNIFICANT CHANGE UP (ref 5–8)
PROT UR-MCNC: NEGATIVE MG/DL — SIGNIFICANT CHANGE UP
SP GR SPEC: 1.01 — SIGNIFICANT CHANGE UP (ref 1.01–1.02)
UROBILINOGEN FLD QL: NEGATIVE MG/DL — SIGNIFICANT CHANGE UP

## 2017-08-23 PROCEDURE — 99223 1ST HOSP IP/OBS HIGH 75: CPT

## 2017-08-23 PROCEDURE — 12345: CPT | Mod: NC

## 2017-08-23 PROCEDURE — 99285 EMERGENCY DEPT VISIT HI MDM: CPT

## 2017-08-23 RX ORDER — ALBUTEROL 90 UG/1
2.5 AEROSOL, METERED ORAL
Qty: 0 | Refills: 0 | Status: DISCONTINUED | OUTPATIENT
Start: 2017-08-23 | End: 2017-08-28

## 2017-08-23 RX ORDER — IPRATROPIUM/ALBUTEROL SULFATE 18-103MCG
3 AEROSOL WITH ADAPTER (GRAM) INHALATION EVERY 6 HOURS
Qty: 0 | Refills: 0 | Status: DISCONTINUED | OUTPATIENT
Start: 2017-08-23 | End: 2017-08-28

## 2017-08-23 RX ORDER — HALOPERIDOL DECANOATE 100 MG/ML
10 INJECTION INTRAMUSCULAR AT BEDTIME
Qty: 0 | Refills: 0 | Status: DISCONTINUED | OUTPATIENT
Start: 2017-08-23 | End: 2017-08-28

## 2017-08-23 RX ORDER — ALBUTEROL 90 UG/1
1 AEROSOL, METERED ORAL EVERY 4 HOURS
Qty: 0 | Refills: 0 | Status: DISCONTINUED | OUTPATIENT
Start: 2017-08-23 | End: 2017-08-23

## 2017-08-23 RX ORDER — PIPERACILLIN AND TAZOBACTAM 4; .5 G/20ML; G/20ML
3.38 INJECTION, POWDER, LYOPHILIZED, FOR SOLUTION INTRAVENOUS ONCE
Qty: 0 | Refills: 0 | Status: COMPLETED | OUTPATIENT
Start: 2017-08-23 | End: 2017-08-23

## 2017-08-23 RX ORDER — RISPERIDONE 4 MG/1
4 TABLET ORAL DAILY
Qty: 0 | Refills: 0 | Status: DISCONTINUED | OUTPATIENT
Start: 2017-08-23 | End: 2017-08-28

## 2017-08-23 RX ORDER — ENOXAPARIN SODIUM 100 MG/ML
40 INJECTION SUBCUTANEOUS DAILY
Qty: 0 | Refills: 0 | Status: DISCONTINUED | OUTPATIENT
Start: 2017-08-23 | End: 2017-08-28

## 2017-08-23 RX ORDER — METOPROLOL TARTRATE 50 MG
25 TABLET ORAL DAILY
Qty: 0 | Refills: 0 | Status: DISCONTINUED | OUTPATIENT
Start: 2017-08-23 | End: 2017-08-28

## 2017-08-23 RX ORDER — VANCOMYCIN HCL 1 G
1000 VIAL (EA) INTRAVENOUS EVERY 12 HOURS
Qty: 0 | Refills: 0 | Status: DISCONTINUED | OUTPATIENT
Start: 2017-08-23 | End: 2017-08-23

## 2017-08-23 RX ORDER — PIPERACILLIN AND TAZOBACTAM 4; .5 G/20ML; G/20ML
3.38 INJECTION, POWDER, LYOPHILIZED, FOR SOLUTION INTRAVENOUS EVERY 8 HOURS
Qty: 0 | Refills: 0 | Status: DISCONTINUED | OUTPATIENT
Start: 2017-08-23 | End: 2017-08-28

## 2017-08-23 RX ORDER — CARBIDOPA AND LEVODOPA 25; 100 MG/1; MG/1
1 TABLET ORAL THREE TIMES A DAY
Qty: 0 | Refills: 0 | Status: DISCONTINUED | OUTPATIENT
Start: 2017-08-23 | End: 2017-08-28

## 2017-08-23 RX ORDER — IPRATROPIUM/ALBUTEROL SULFATE 18-103MCG
3 AEROSOL WITH ADAPTER (GRAM) INHALATION EVERY 4 HOURS
Qty: 0 | Refills: 0 | Status: DISCONTINUED | OUTPATIENT
Start: 2017-08-23 | End: 2017-08-23

## 2017-08-23 RX ORDER — DIVALPROEX SODIUM 500 MG/1
1000 TABLET, DELAYED RELEASE ORAL AT BEDTIME
Qty: 0 | Refills: 0 | Status: DISCONTINUED | OUTPATIENT
Start: 2017-08-23 | End: 2017-08-28

## 2017-08-23 RX ORDER — TIOTROPIUM BROMIDE 18 UG/1
1 CAPSULE ORAL; RESPIRATORY (INHALATION) DAILY
Qty: 0 | Refills: 0 | Status: DISCONTINUED | OUTPATIENT
Start: 2017-08-23 | End: 2017-08-23

## 2017-08-23 RX ORDER — VANCOMYCIN HCL 1 G
1250 VIAL (EA) INTRAVENOUS EVERY 12 HOURS
Qty: 0 | Refills: 0 | Status: DISCONTINUED | OUTPATIENT
Start: 2017-08-23 | End: 2017-08-24

## 2017-08-23 RX ORDER — ROPINIROLE 8 MG/1
0.5 TABLET, FILM COATED, EXTENDED RELEASE ORAL
Qty: 0 | Refills: 0 | Status: DISCONTINUED | OUTPATIENT
Start: 2017-08-23 | End: 2017-08-28

## 2017-08-23 RX ORDER — ACETAMINOPHEN 500 MG
650 TABLET ORAL EVERY 6 HOURS
Qty: 0 | Refills: 0 | Status: DISCONTINUED | OUTPATIENT
Start: 2017-08-23 | End: 2017-08-28

## 2017-08-23 RX ADMIN — Medication 3 MILLILITER(S): at 03:24

## 2017-08-23 RX ADMIN — CARBIDOPA AND LEVODOPA 1 TABLET(S): 25; 100 TABLET ORAL at 23:24

## 2017-08-23 RX ADMIN — Medication 40 MILLIGRAM(S): at 17:22

## 2017-08-23 RX ADMIN — CARBIDOPA AND LEVODOPA 1 TABLET(S): 25; 100 TABLET ORAL at 11:33

## 2017-08-23 RX ADMIN — Medication 3 MILLILITER(S): at 08:27

## 2017-08-23 RX ADMIN — DIVALPROEX SODIUM 1000 MILLIGRAM(S): 500 TABLET, DELAYED RELEASE ORAL at 23:24

## 2017-08-23 RX ADMIN — ROPINIROLE 0.5 MILLIGRAM(S): 8 TABLET, FILM COATED, EXTENDED RELEASE ORAL at 05:40

## 2017-08-23 RX ADMIN — ROPINIROLE 0.5 MILLIGRAM(S): 8 TABLET, FILM COATED, EXTENDED RELEASE ORAL at 17:25

## 2017-08-23 RX ADMIN — Medication 166.67 MILLIGRAM(S): at 17:23

## 2017-08-23 RX ADMIN — ALBUTEROL 2.5 MILLIGRAM(S): 90 AEROSOL, METERED ORAL at 16:09

## 2017-08-23 RX ADMIN — CARBIDOPA AND LEVODOPA 1 TABLET(S): 25; 100 TABLET ORAL at 05:40

## 2017-08-23 RX ADMIN — HALOPERIDOL DECANOATE 10 MILLIGRAM(S): 100 INJECTION INTRAMUSCULAR at 23:24

## 2017-08-23 RX ADMIN — SODIUM CHLORIDE 2000 MILLILITER(S): 9 INJECTION INTRAMUSCULAR; INTRAVENOUS; SUBCUTANEOUS at 00:55

## 2017-08-23 RX ADMIN — PIPERACILLIN AND TAZOBACTAM 200 GRAM(S): 4; .5 INJECTION, POWDER, LYOPHILIZED, FOR SOLUTION INTRAVENOUS at 16:26

## 2017-08-23 RX ADMIN — RISPERIDONE 4 MILLIGRAM(S): 4 TABLET ORAL at 11:29

## 2017-08-23 RX ADMIN — Medication 40 MILLIGRAM(S): at 11:28

## 2017-08-23 RX ADMIN — Medication 40 MILLIGRAM(S): at 05:35

## 2017-08-23 NOTE — SWALLOW BEDSIDE ASSESSMENT ADULT - SWALLOW EVAL: RECOMMENDED FEEDING/EATING TECHNIQUES
position upright (90 degrees)/maintain upright posture during/after eating for 30 mins/small sips/bites

## 2017-08-23 NOTE — H&P ADULT - NSHPLABSRESULTS_GEN_ALL_CORE
cxr reviewed by me, possible left lower lobe infiltrate, no pl. effusion noted.  ekg is sinus tach 140, no st elevation noted.

## 2017-08-23 NOTE — H&P ADULT - PROBLEM SELECTOR PLAN 1
pt. was recently discharged from [facility] so will treat as HCAP. will keep on vanco and levaquin. folow blood and sputum cx.

## 2017-08-23 NOTE — CONSULT NOTE ADULT - ASSESSMENT
severe COPD complicated by Parkinsons disease and schizophrenia  Dilated esophagus on previous CT scan  new RUL infiltrate on CXR, rx as HCAP given recent admission  IV medrol/nebs/abx  keep HOB elevated  swallow evaluation   prognosis guarded

## 2017-08-23 NOTE — BEHAVIORAL HEALTH ASSESSMENT NOTE - SUMMARY
67 year old man, history of schizophrenia, domiciled in group home, remote hx of ETOH abuse and marijuana use, admitted with pneumonia. Patient appears to be at his psychiatric baseline

## 2017-08-23 NOTE — SWALLOW BEDSIDE ASSESSMENT ADULT - SWALLOW EVAL: DIAGNOSIS
Overall functional oral & pharyngeal stage of swallow for puree, solids & thin fluids with NO overt s/s of penetration/aspiration. Please note that a bedside swallow evaluation cannot rule out silent aspiration. Therefore, if suspected, would recommend objective assessment via MBS

## 2017-08-23 NOTE — BEHAVIORAL HEALTH ASSESSMENT NOTE - RISK ASSESSMENT
Patient is at chronic risk due to history of schizophrenia and hx of substance abuse. Acute risk factors include worsened medical illness and sleep difficulty. However patient denies current suicidal ideation plan or intent, denies prior suicide attempts, is not acutely psychotic, is compliant with treatment. Patient assessed to not be at imminent risk of harm to self or others.

## 2017-08-23 NOTE — SWALLOW BEDSIDE ASSESSMENT ADULT - ASR SWALLOW ASPIRATION MONITOR
cough/gurgly voice/pneumonia/upper respiratory infection/change of breathing pattern/position upright (90Y)/throat clearing/oral hygiene/fever

## 2017-08-23 NOTE — BEHAVIORAL HEALTH ASSESSMENT NOTE - NSBHCONSULTRECOMMENDOTHER_PSY_A_CORE FT
continue medications as prescribed at group home depakote ER 1250 mg qhs, haldol 10 mg qhs,  nicotine patch 21 mg daily  risperidone 4 mg daily  ropinrole 0.5 mg BID

## 2017-08-23 NOTE — H&P ADULT - HISTORY OF PRESENT ILLNESS
pt. was BIBA  from his group home ( [facility] in [place] ) for sob. pt. was put on cpap initially but later came off and feeling much better. pt.reports that his copd, cough / phlegm was increased during last few days. pt. denies any sick contact. T amx in .2. No cp, no abd. pain,  no n/v reported. pt. was BIBA  from his group home ( [facility] in [place] ) for sob. pt. was put on cpap initially but later came off and feeling much better. pt.reports that his copd, cough / phlegm was increased during last few days. pt. denies any sick contact. T amx in .2. No cp, no abd. pain,  no n/v reported.  pt. was discharged from [facility] on august 8th, 2017 after being admitted for copd exac. That chart is coming under Mr # [id number].

## 2017-08-23 NOTE — PROGRESS NOTE ADULT - SUBJECTIVE AND OBJECTIVE BOX
INTERVAL HPI/OVERNIGHT EVENTS:    CC: COPD exacerbation, schizophrenia, Parkinson's disease    Chart and course reviewed. Less short of breath at present.    Vital Signs Last 24 Hrs  T(C): 36.5 (23 Aug 2017 08:51), Max: 39 (22 Aug 2017 22:09)  T(F): 97.7 (23 Aug 2017 08:51), Max: 102.2 (22 Aug 2017 22:09)  HR: 83 (23 Aug 2017 08:51) (66 - 140)  BP: 93/68 (23 Aug 2017 08:51) (93/68 - 124/85)  BP(mean): --  RR: 18 (23 Aug 2017 08:51) (18 - 38)  SpO2: 97% (23 Aug 2017 08:51) (93% - 100%)    PHYSICAL EXAM:    GENERAL: Not in distress, alert  CHEST/LUNG: b/l coarse breath sounds  HEART: Regular   ABDOMEN: Soft, BS+  EXTREMITIES: no edema, tenderness    MEDICATIONS  (STANDING):  haloperidol     Tablet 10 milliGRAM(s) Oral at bedtime  diVALproex ER 1000 milliGRAM(s) Oral at bedtime  carbidopa/levodopa  25/100 1 Tablet(s) Oral three times a day  rOPINIRole 0.5 milliGRAM(s) Oral two times a day  risperiDONE   Tablet 4 milliGRAM(s) Oral daily  metoprolol succinate ER 25 milliGRAM(s) Oral daily  methylPREDNISolone sodium succinate Injectable 40 milliGRAM(s) IV Push every 6 hours  enoxaparin Injectable 40 milliGRAM(s) SubCutaneous daily  vancomycin  IVPB 1250 milliGRAM(s) IV Intermittent every 12 hours  ALBUTerol/ipratropium for Nebulization 3 milliLiter(s) Nebulizer every 6 hours  piperacillin/tazobactam IVPB. 3.375 Gram(s) IV Intermittent once  piperacillin/tazobactam IVPB. 3.375 Gram(s) IV Intermittent every 8 hours    MEDICATIONS  (PRN):  ALBUTerol    0.083% 2.5 milliGRAM(s) Nebulizer every 2 hours PRN Shortness of Breath and/or Wheezing  acetaminophen   Tablet 650 milliGRAM(s) Oral every 6 hours PRN For Temp greater than or equal to 38 C (100.4 F)      Allergies    Allergy Status Unknown    Intolerances          LABS:                          12.6   9.4   )-----------( 189      ( 22 Aug 2017 22:07 )             37.7     08-    134<L>  |  96<L>  |  13.0  ----------------------------<  86  5.1   |  21.0<L>  |  0.77    Ca    8.4<L>      22 Aug 2017 22:07    TPro  6.2<L>  /  Alb  3.4  /  TBili  1.1  /  DBili  x   /  AST  63<H>  /  ALT  14  /  AlkPhos  61  08-      Urinalysis Basic - ( 23 Aug 2017 00:14 )    Color: Yellow / Appearance: Clear / S.010 / pH: x  Gluc: x / Ketone: Trace  / Bili: Negative / Urobili: Negative mg/dL   Blood: x / Protein: Negative mg/dL / Nitrite: Negative   Leuk Esterase: Negative / RBC: x / WBC x   Sq Epi: x / Non Sq Epi: x / Bacteria: x        RADIOLOGY & ADDITIONAL TESTS:

## 2017-08-23 NOTE — SWALLOW BEDSIDE ASSESSMENT ADULT - SLP GENERAL OBSERVATIONS
Pt received & seen seated upright in bed, +awake/alert, +oriented, +reduced cognition, +baseline cough

## 2017-08-23 NOTE — ED PROVIDER NOTE - OBJECTIVE STATEMENT
67 year old male, with hx of COPD and emphysema as per EMS, presenting to the ED BIB EMS for difficulty breathing. As per EMS, pt is in a group assisted home and was sitting in bed refusing to visit the hospital today. EMS states that the pt was found have diminished breath sounds, was coughing, and was using his accessory muscles to breath. As per EMS, pt is able to talk at baseline but is unable to hold full conversations. CPAP placed by EMS. No further complaints at this time. 67 year old male, with hx of COPD and emphysema as per EMS, presenting to the ED BIB EMS for difficulty breathing. As per EMS, pt is in a group assisted home and was sitting in bed refusing to visit the hospital today. EMS states that the pt was found have diminished breath sounds, was coughing,  hypoxic, and was using his accessory muscles to breath. As per EMS, pt is able to talk at baseline but is unable to hold full conversations. CPAP placed by EMS. No further complaints at this time.

## 2017-08-23 NOTE — BEHAVIORAL HEALTH ASSESSMENT NOTE - HPI (INCLUDE ILLNESS QUALITY, SEVERITY, DURATION, TIMING, CONTEXT, MODIFYING FACTORS, ASSOCIATED SIGNS AND SYMPTOMS)
67 year old man, history of schizophrenia, domiciled in group home, remote hx of ETOH abuse and marijuana use, admitted with pneumonia.  Spoke to patient who states he has felt depressed ever  since living in his group home. He denies suicidal and homicidal ideation, paranoia, AH and VH. He reports he does not think being in the hospital is helpful and wants to go home, does not think treatment he is getting is beneficial  because he does not feel any better after the medication he was given today.  He reports “stable mood” reports being anxious about being “locked up “ in the hospital and requests Ativan.  He reports he has not been sleeping well but eats well.  •	Contacted [facility]  [phone], staff states patient has been at his psychiatric baseline recently, is seen by Shima Bauer NP who comes to the home. Staff states patient “gets nasty when his oxygen gets low”, that patient should  have gone to [place] rehab after recent medical hospitalization but was told his insurance was not accepted. Staff states patient has not appeared medically well recently.

## 2017-08-23 NOTE — CONSULT NOTE ADULT - SUBJECTIVE AND OBJECTIVE BOX
PULMONARY CONSULT NOTE      [id number]    Patient is a 67y old  Male who presents with a chief complaint of cough, sob (23 Aug 2017 03:10)  pt. was BIBA  from his group home ( [facility] in [place] ) for sob. pt. was put on cpap initially but later came off and feeling much better. pt.reports that his copd, cough / phlegm was increased during last few days. pt. denies any sick contact. T amx in .2. No cp, no abd. pain,  no n/v reported.  pt. was discharged from [facility] on 2017 after being admitted for copd exac.    HISTORY OF PRESENT ILLNESS:    MEDICATIONS  (STANDING):  ALBUTerol/ipratropium for Nebulization 3 milliLiter(s) Nebulizer every 4 hours  ALBUTerol    90 MICROgram(s) HFA Inhaler 1 Puff(s) Inhalation every 4 hours  tiotropium 18 MICROgram(s) Capsule 1 Capsule(s) Inhalation daily  haloperidol     Tablet 10 milliGRAM(s) Oral at bedtime  diVALproex ER 1000 milliGRAM(s) Oral at bedtime  carbidopa/levodopa  25/100 1 Tablet(s) Oral three times a day  rOPINIRole 0.5 milliGRAM(s) Oral two times a day  risperiDONE   Tablet 4 milliGRAM(s) Oral daily  metoprolol succinate ER 25 milliGRAM(s) Oral daily  levoFLOXacin IVPB 500 milliGRAM(s) IV Intermittent every 24 hours  methylPREDNISolone sodium succinate Injectable 40 milliGRAM(s) IV Push every 6 hours  enoxaparin Injectable 40 milliGRAM(s) SubCutaneous daily  vancomycin  IVPB 1250 milliGRAM(s) IV Intermittent every 12 hours      MEDICATIONS  (PRN):  ALBUTerol    0.083% 2.5 milliGRAM(s) Nebulizer every 2 hours PRN Shortness of Breath and/or Wheezing  acetaminophen   Tablet 650 milliGRAM(s) Oral every 6 hours PRN For Temp greater than or equal to 38 C (100.4 F)      Allergies    Allergy Status Unknown    Intolerances        PAST MEDICAL & SURGICAL HISTORY:  Parkinson disease  Chronic obstructive pulmonary disease, unspecified COPD type  Schizophrenia  No significant past surgical history      FAMILY HISTORY:  No pertinent family history      SOCIAL HISTORY  Smoking History:     REVIEW OF SYSTEMS:    CONSTITUTIONAL:  No fevers, chills, sweats    HEENT:  Eyes:  No diplopia or blurred vision. ENT:  No earache, sore throat or runny nose.    CARDIOVASCULAR:  No pressure, squeezing, tightness, or heaviness about the chest; no palpitations.    RESPIRATORY:  No cough, shortness of breath, PND or orthopnea. Mild SOBOE    GASTROINTESTINAL:  No abdominal pain, nausea, vomiting or diarrhea.    GENITOURINARY:  No dysuria, frequency or urgency.    NEUROLOGIC:  No paresthesias, fasciculations, seizures or weakness.    PSYCHIATRIC:  No disorder of thought or mood.    Vital Signs Last 24 Hrs  T(C): 36.5 (23 Aug 2017 08:51), Max: 39 (22 Aug 2017 22:09)  T(F): 97.7 (23 Aug 2017 08:51), Max: 102.2 (22 Aug 2017 22:09)  HR: 83 (23 Aug 2017 08:51) (66 - 140)  BP: 93/68 (23 Aug 2017 08:51) (93/68 - 124/85)  BP(mean): --  RR: 18 (23 Aug 2017 08:51) (18 - 38)  SpO2: 97% (23 Aug 2017 08:51) (93% - 100%)    PHYSICAL EXAMINATION:    GENERAL: The patient is a well-developed, well-nourished _____in no apparent distress.     HEENT: Head is normocephalic and atraumatic. Extraocular muscles are intact. Mucous membranes are moist.     NECK: Supple.     LUNGS: Clear to auscultation without wheezing, rales, or rhonchi. Respirations unlabored    HEART: Regular rate and rhythm without murmur.    ABDOMEN: Soft, nontender, and nondistended.  No hepatosplenomegaly is noted.    EXTREMITIES: Without any cyanosis, clubbing, rash, lesions or edema.    NEUROLOGIC: Grossly intact.      LABS:                        12.6   9.4   )-----------( 189      ( 22 Aug 2017 22:07 )             37.7     08-22    134<L>  |  96<L>  |  13.0  ----------------------------<  86  5.1   |  21.0<L>  |  0.77    Ca    8.4<L>      22 Aug 2017 22:07    TPro  6.2<L>  /  Alb  3.4  /  TBili  1.1  /  DBili  x   /  AST  63<H>  /  ALT  14  /  AlkPhos  61  08-22      Urinalysis Basic - ( 23 Aug 2017 00:14 )    Color: Yellow / Appearance: Clear / S.010 / pH: x  Gluc: x / Ketone: Trace  / Bili: Negative / Urobili: Negative mg/dL   Blood: x / Protein: Negative mg/dL / Nitrite: Negative   Leuk Esterase: Negative / RBC: x / WBC x   Sq Epi: x / Non Sq Epi: x / Bacteria: x      ABG - ( 22 Aug 2017 22:13 )  pH: 7.45  /  pCO2: 36    /  pO2: 66    / HCO3: 26    / Base Excess: 1.6   /  SaO2: 93                CARDIAC MARKERS ( 22 Aug 2017 22:07 )  x     / <0.01 ng/mL / 74 U/L / x     / x                    MICROBIOLOGY:    RADIOLOGY & ADDITIONAL STUDIES: PULMONARY CONSULT NOTE      [facility][id number]    Patient is a 67y old  Male who presents with a chief complaint of cough, sob (23 Aug 2017 03:10)  pt. was BIBA  from his group home ( [facility] in [place] ) for sob. pt. was put on cpap initially but later came off and feeling much better. pt.reports that his copd, cough / phlegm was increased during last few days. pt. denies any sick contact. T amx in .2. No cp, no abd. pain,  no n/v reported.  pt. was discharged from [facility] on 2017 after being admitted for copd exac.    HISTORY OF PRESENT ILLNESS:  no reports of cp or sob  MEDICATIONS  (STANDING):  ALBUTerol/ipratropium for Nebulization 3 milliLiter(s) Nebulizer every 4 hours  ALBUTerol    90 MICROgram(s) HFA Inhaler 1 Puff(s) Inhalation every 4 hours  tiotropium 18 MICROgram(s) Capsule 1 Capsule(s) Inhalation daily  haloperidol     Tablet 10 milliGRAM(s) Oral at bedtime  diVALproex ER 1000 milliGRAM(s) Oral at bedtime  carbidopa/levodopa  25/100 1 Tablet(s) Oral three times a day  rOPINIRole 0.5 milliGRAM(s) Oral two times a day  risperiDONE   Tablet 4 milliGRAM(s) Oral daily  metoprolol succinate ER 25 milliGRAM(s) Oral daily  levoFLOXacin IVPB 500 milliGRAM(s) IV Intermittent every 24 hours  methylPREDNISolone sodium succinate Injectable 40 milliGRAM(s) IV Push every 6 hours  enoxaparin Injectable 40 milliGRAM(s) SubCutaneous daily  vancomycin  IVPB 1250 milliGRAM(s) IV Intermittent every 12 hours      MEDICATIONS  (PRN):  ALBUTerol    0.083% 2.5 milliGRAM(s) Nebulizer every 2 hours PRN Shortness of Breath and/or Wheezing  acetaminophen   Tablet 650 milliGRAM(s) Oral every 6 hours PRN For Temp greater than or equal to 38 C (100.4 F)      Allergies    Allergy Status Unknown    Intolerances        PAST MEDICAL & SURGICAL HISTORY:  Parkinson disease  Chronic obstructive pulmonary disease, unspecified COPD type  Schizophrenia  No significant past surgical history      FAMILY HISTORY:  No pertinent family history      SOCIAL HISTORY  Smoking History:     REVIEW OF SYSTEMS:    CONSTITUTIONAL:  No fevers, chills, sweats    HEENT:  Eyes:  No diplopia or blurred vision. ENT:  No earache, sore throat or runny nose.    CARDIOVASCULAR:  No pressure, squeezing, tightness, or heaviness about the chest; no palpitations.    RESPIRATORY:  see hpi    GASTROINTESTINAL:  No abdominal pain, nausea, vomiting or diarrhea.    GENITOURINARY:  No dysuria, frequency or urgency.    NEUROLOGIC:  No paresthesias, fasciculations, seizures or weakness.    PSYCHIATRIC:  No disorder of thought or mood.    Vital Signs Last 24 Hrs  T(C): 36.5 (23 Aug 2017 08:51), Max: 39 (22 Aug 2017 22:09)  T(F): 97.7 (23 Aug 2017 08:51), Max: 102.2 (22 Aug 2017 22:09)  HR: 83 (23 Aug 2017 08:51) (66 - 140)  BP: 93/68 (23 Aug 2017 08:51) (93/68 - 124/85)  BP(mean): --  RR: 18 (23 Aug 2017 08:51) (18 - 38)  SpO2: 97% (23 Aug 2017 08:51) (93% - 100%)    PHYSICAL EXAMINATION:    GENERAL: The patient is a well-developed, well-nourished _in no apparent distress.     HEENT: Head is normocephalic and atraumatic. Extraocular muscles are intact. Mucous membranes are moist.     NECK: Supple.     LUNGS: moderate air entry with few crackles R base . Respirations unlabored    HEART: Regular rate and rhythm without murmur.    ABDOMEN: Soft, nontender, and nondistended.  No hepatosplenomegaly is noted.    EXTREMITIES: Without any cyanosis, clubbing, rash, lesions or edema.    NEUROLOGIC: Grossly intact, rest tremor      LABS:                        12.6   9.4   )-----------( 189      ( 22 Aug 2017 22:07 )             37.7     08-22    134<L>  |  96<L>  |  13.0  ----------------------------<  86  5.1   |  21.0<L>  |  0.77    Ca    8.4<L>      22 Aug 2017 22:07    TPro  6.2<L>  /  Alb  3.4  /  TBili  1.1  /  DBili  x   /  AST  63<H>  /  ALT  14  /  AlkPhos  61  08-22      Urinalysis Basic - ( 23 Aug 2017 00:14 )    Color: Yellow / Appearance: Clear / S.010 / pH: x  Gluc: x / Ketone: Trace  / Bili: Negative / Urobili: Negative mg/dL   Blood: x / Protein: Negative mg/dL / Nitrite: Negative   Leuk Esterase: Negative / RBC: x / WBC x   Sq Epi: x / Non Sq Epi: x / Bacteria: x      ABG - ( 22 Aug 2017 22:13 )  pH: 7.45  /  pCO2: 36    /  pO2: 66    / HCO3: 26    / Base Excess: 1.6   /  SaO2: 93                CARDIAC MARKERS ( 22 Aug 2017 22:07 )  x     / <0.01 ng/mL / 74 U/L / x     / x                    MICROBIOLOGY:    RADIOLOGY & ADDITIONAL STUDIES:  cxr and previous CT scans reviewed

## 2017-08-23 NOTE — H&P ADULT - NEUROLOGICAL DETAILS
alert and oriented x 3/responds to verbal commands/deep reflexes intact/sensation intact/cranial nerves intact

## 2017-08-23 NOTE — ED PROVIDER NOTE - CARE PLAN
Principal Discharge DX:	PNA (pneumonia)  Secondary Diagnosis:	COPD (chronic obstructive pulmonary disease)

## 2017-08-23 NOTE — ED ADULT NURSE REASSESSMENT NOTE - NS ED NURSE REASSESS COMMENT FT1
CODE SEPSIS initiated, pt BIBA altered mental status, placed on cm, vitals per flowsheet. Pt tachypneic RR34 saO2 on bipap 97%, received in resus room on BiPap with RT on standby. Pt agitated, and uncooperative, MAEx4. Abd soft, nontender. Bruising noted to BUE, pt reports recent stay @ [facility] for PNA. #18G IV placed to Right forearm, bloodwork drawn per protocol and ran to lab, IVF infusing per md orders. #20G IV noted to Left forearm by EMS, IVF infusing per MD orders. Rectal temp 102.2F. Code team 1 bedside, MD Garcia bedside. Pt updated on POC, will continue to monitor and reassess
Pt baseline mental status, more alert and awake, vitals per flowsheet, PO fluids encouraged. Pt medicated per MD orders, updated on POC, awaiting bed assignment @ this time, will continue to monitor and reassess
Pt reports improvement in breathing s/p duoneb tx per MD Peres orders, saO2 98% on room air. Warm blankets provided, awaiting bed assignment @ this time, will continue to monitor and reassess
Pt received @ 0730, A&OX3, denies any pain.  VSS.  CM in place.  Diminished bsb, productive cough noted, yellow mucous noted.  95% on RA.  Abd soft nondistended, nontender, moving all ext well, mild tremors noted (baseline).  Report given to Leida KIDD @ 0800.

## 2017-08-24 LAB
CULTURE RESULTS: NO GROWTH — SIGNIFICANT CHANGE UP
SPECIMEN SOURCE: SIGNIFICANT CHANGE UP

## 2017-08-24 PROCEDURE — 99233 SBSQ HOSP IP/OBS HIGH 50: CPT

## 2017-08-24 RX ORDER — ALBUTEROL 90 UG/1
1 AEROSOL, METERED ORAL EVERY 4 HOURS
Qty: 0 | Refills: 0 | Status: COMPLETED | OUTPATIENT
Start: 2017-08-24 | End: 2018-07-23

## 2017-08-24 RX ORDER — VANCOMYCIN HCL 1 G
1250 VIAL (EA) INTRAVENOUS
Qty: 0 | Refills: 0 | Status: DISCONTINUED | OUTPATIENT
Start: 2017-08-24 | End: 2017-08-25

## 2017-08-24 RX ORDER — ALBUTEROL 90 UG/1
1 AEROSOL, METERED ORAL EVERY 4 HOURS
Qty: 0 | Refills: 0 | Status: DISCONTINUED | OUTPATIENT
Start: 2017-08-24 | End: 2017-08-28

## 2017-08-24 RX ORDER — BUDESONIDE AND FORMOTEROL FUMARATE DIHYDRATE 160; 4.5 UG/1; UG/1
2 AEROSOL RESPIRATORY (INHALATION)
Qty: 0 | Refills: 0 | Status: DISCONTINUED | OUTPATIENT
Start: 2017-08-24 | End: 2017-08-28

## 2017-08-24 RX ADMIN — Medication 3 MILLILITER(S): at 08:23

## 2017-08-24 RX ADMIN — Medication 3 MILLILITER(S): at 03:23

## 2017-08-24 RX ADMIN — Medication 50 MILLIGRAM(S): at 17:03

## 2017-08-24 RX ADMIN — DIVALPROEX SODIUM 1000 MILLIGRAM(S): 500 TABLET, DELAYED RELEASE ORAL at 22:55

## 2017-08-24 RX ADMIN — CARBIDOPA AND LEVODOPA 1 TABLET(S): 25; 100 TABLET ORAL at 22:55

## 2017-08-24 RX ADMIN — CARBIDOPA AND LEVODOPA 1 TABLET(S): 25; 100 TABLET ORAL at 06:10

## 2017-08-24 RX ADMIN — ROPINIROLE 0.5 MILLIGRAM(S): 8 TABLET, FILM COATED, EXTENDED RELEASE ORAL at 17:02

## 2017-08-24 RX ADMIN — RISPERIDONE 4 MILLIGRAM(S): 4 TABLET ORAL at 13:34

## 2017-08-24 RX ADMIN — CARBIDOPA AND LEVODOPA 1 TABLET(S): 25; 100 TABLET ORAL at 13:35

## 2017-08-24 RX ADMIN — PIPERACILLIN AND TAZOBACTAM 25 GRAM(S): 4; .5 INJECTION, POWDER, LYOPHILIZED, FOR SOLUTION INTRAVENOUS at 00:26

## 2017-08-24 RX ADMIN — ROPINIROLE 0.5 MILLIGRAM(S): 8 TABLET, FILM COATED, EXTENDED RELEASE ORAL at 06:06

## 2017-08-24 RX ADMIN — Medication 3 MILLILITER(S): at 20:54

## 2017-08-24 RX ADMIN — BUDESONIDE AND FORMOTEROL FUMARATE DIHYDRATE 2 PUFF(S): 160; 4.5 AEROSOL RESPIRATORY (INHALATION) at 08:24

## 2017-08-24 RX ADMIN — Medication 166.67 MILLIGRAM(S): at 10:27

## 2017-08-24 RX ADMIN — HALOPERIDOL DECANOATE 10 MILLIGRAM(S): 100 INJECTION INTRAMUSCULAR at 22:55

## 2017-08-24 RX ADMIN — Medication 3 MILLILITER(S): at 15:37

## 2017-08-24 RX ADMIN — Medication 25 MILLIGRAM(S): at 06:06

## 2017-08-24 RX ADMIN — Medication 40 MILLIGRAM(S): at 00:30

## 2017-08-24 NOTE — PROGRESS NOTE ADULT - SUBJECTIVE AND OBJECTIVE BOX
PULMONARY PROGRESS NOTE      [id number]    Patient is a 67y old  Male who presents with a chief complaint of cough, sob (23 Aug 2017 03:10)      INTERVAL HPI/OVERNIGHT EVENTS:    MEDICATIONS  (STANDING):  haloperidol     Tablet 10 milliGRAM(s) Oral at bedtime  diVALproex ER 1000 milliGRAM(s) Oral at bedtime  carbidopa/levodopa  25/100 1 Tablet(s) Oral three times a day  rOPINIRole 0.5 milliGRAM(s) Oral two times a day  risperiDONE   Tablet 4 milliGRAM(s) Oral daily  metoprolol succinate ER 25 milliGRAM(s) Oral daily  methylPREDNISolone sodium succinate Injectable 40 milliGRAM(s) IV Push every 6 hours  enoxaparin Injectable 40 milliGRAM(s) SubCutaneous daily  vancomycin  IVPB 1250 milliGRAM(s) IV Intermittent every 12 hours  ALBUTerol/ipratropium for Nebulization 3 milliLiter(s) Nebulizer every 6 hours  piperacillin/tazobactam IVPB. 3.375 Gram(s) IV Intermittent every 8 hours  buDESOnide 160 MICROgram(s)/formoterol 4.5 MICROgram(s) Inhaler 2 Puff(s) Inhalation two times a day      MEDICATIONS  (PRN):  ALBUTerol    0.083% 2.5 milliGRAM(s) Nebulizer every 2 hours PRN Shortness of Breath and/or Wheezing  acetaminophen   Tablet 650 milliGRAM(s) Oral every 6 hours PRN For Temp greater than or equal to 38 C (100.4 F)  ALBUTerol    90 MICROgram(s) HFA Inhaler 1 Puff(s) Inhalation every 4 hours PRN Shortness of Breath and/or Wheezing      Allergies    Allergy Status Unknown    Intolerances        PAST MEDICAL & SURGICAL HISTORY:  Parkinson disease  Chronic obstructive pulmonary disease, unspecified COPD type  Schizophrenia  No significant past surgical history      SOCIAL HISTORY  Smoking History:       REVIEW OF SYSTEMS:    CONSTITUTIONAL:  No distress    HEENT:  Eyes:  No diplopia or blurred vision. ENT:  No earache, sore throat or runny nose.    CARDIOVASCULAR:  No pressure, squeezing, tightness, heaviness or aching about the chest; no palpitations.    RESPIRATORY:  No cough, shortness of breath, PND or orthopnea. Mild SOBOE    GASTROINTESTINAL:  No nausea, vomiting or diarrhea.    GENITOURINARY:  No dysuria, frequency or urgency.    NEUROLOGIC:  No paresthesias, fasciculations, seizures or weakness.    PSYCHIATRIC:  No disorder of thought or mood.    Vital Signs Last 24 Hrs  T(C): 36.8 (24 Aug 2017 08:55), Max: 36.8 (24 Aug 2017 05:18)  T(F): 98.2 (24 Aug 2017 08:55), Max: 98.2 (24 Aug 2017 05:18)  HR: 66 (24 Aug 2017 08:55) (66 - 90)  BP: 129/76 (24 Aug 2017 08:55) (109/72 - 129/76)  BP(mean): --  RR: 18 (24 Aug 2017 08:55) (18 - 19)  SpO2: 92% (24 Aug 2017 08:55) (92% - 96%)    PHYSICAL EXAMINATION:    GENERAL: The patient is awake and alert in no apparent distress.     HEENT: Head is normocephalic and atraumatic. Extraocular muscles are intact. Mucous membranes are moist.    NECK: Supple.    LUNGS: Clear to auscultation without wheezing, rales or rhonchi; respirations unlabored    HEART: Regular rate and rhythm without murmur.    ABDOMEN: Soft, nontender, and nondistended.      EXTREMITIES: Without any cyanosis, clubbing, rash, lesions or edema.    NEUROLOGIC: Grossly intact.    LABS:                        12.6   9.4   )-----------( 189      ( 22 Aug 2017 22:07 )             37.7     08-22    134<L>  |  96<L>  |  13.0  ----------------------------<  86  5.1   |  21.0<L>  |  0.77    Ca    8.4<L>      22 Aug 2017 22:07    TPro  6.2<L>  /  Alb  3.4  /  TBili  1.1  /  DBili  x   /  AST  63<H>  /  ALT  14  /  AlkPhos  61  08-22      Urinalysis Basic - ( 23 Aug 2017 00:14 )    Color: Yellow / Appearance: Clear / S.010 / pH: x  Gluc: x / Ketone: Trace  / Bili: Negative / Urobili: Negative mg/dL   Blood: x / Protein: Negative mg/dL / Nitrite: Negative   Leuk Esterase: Negative / RBC: x / WBC x   Sq Epi: x / Non Sq Epi: x / Bacteria: x      ABG - ( 22 Aug 2017 22:13 )  pH: 7.45  /  pCO2: 36    /  pO2: 66    / HCO3: 26    / Base Excess: 1.6   /  SaO2: 93                CARDIAC MARKERS ( 22 Aug 2017 22:07 )  x     / <0.01 ng/mL / 74 U/L / x     / x                    MICROBIOLOGY:    RADIOLOGY & ADDITIONAL STUDIES: PULMONARY PROGRESS NOTE      [id number]    Patient is a 67y old  Male who presents with a chief complaint of cough, sob (23 Aug 2017 03:10)      INTERVAL HPI/OVERNIGHT EVENTS:  feels better  no chest pain  cough improved per pt  eating without a problem  MEDICATIONS  (STANDING):  haloperidol     Tablet 10 milliGRAM(s) Oral at bedtime  diVALproex ER 1000 milliGRAM(s) Oral at bedtime  carbidopa/levodopa  25/100 1 Tablet(s) Oral three times a day  rOPINIRole 0.5 milliGRAM(s) Oral two times a day  risperiDONE   Tablet 4 milliGRAM(s) Oral daily  metoprolol succinate ER 25 milliGRAM(s) Oral daily  methylPREDNISolone sodium succinate Injectable 40 milliGRAM(s) IV Push every 6 hours  enoxaparin Injectable 40 milliGRAM(s) SubCutaneous daily  vancomycin  IVPB 1250 milliGRAM(s) IV Intermittent every 12 hours  ALBUTerol/ipratropium for Nebulization 3 milliLiter(s) Nebulizer every 6 hours  piperacillin/tazobactam IVPB. 3.375 Gram(s) IV Intermittent every 8 hours  buDESOnide 160 MICROgram(s)/formoterol 4.5 MICROgram(s) Inhaler 2 Puff(s) Inhalation two times a day      MEDICATIONS  (PRN):  ALBUTerol    0.083% 2.5 milliGRAM(s) Nebulizer every 2 hours PRN Shortness of Breath and/or Wheezing  acetaminophen   Tablet 650 milliGRAM(s) Oral every 6 hours PRN For Temp greater than or equal to 38 C (100.4 F)  ALBUTerol    90 MICROgram(s) HFA Inhaler 1 Puff(s) Inhalation every 4 hours PRN Shortness of Breath and/or Wheezing      Allergies    Allergy Status Unknown    Intolerances        PAST MEDICAL & SURGICAL HISTORY:  Parkinson disease  Chronic obstructive pulmonary disease, unspecified COPD type  Schizophrenia  No significant past surgical history      SOCIAL HISTORY  Smoking History:       REVIEW OF SYSTEMS:    CONSTITUTIONAL:  No distress    HEENT:  Eyes:  No diplopia or blurred vision. ENT:  No earache, sore throat or runny nose.    CARDIOVASCULAR:  No pressure, squeezing, tightness, heaviness or aching about the chest; no palpitations.    RESPIRATORY:  see hpi    GASTROINTESTINAL:  No nausea, vomiting or diarrhea.    GENITOURINARY:  No dysuria, frequency or urgency.    NEUROLOGIC:  No paresthesias, fasciculations, seizures or weakness.    PSYCHIATRIC:  No disorder of thought or mood.    Vital Signs Last 24 Hrs  T(C): 36.8 (24 Aug 2017 08:55), Max: 36.8 (24 Aug 2017 05:18)  T(F): 98.2 (24 Aug 2017 08:55), Max: 98.2 (24 Aug 2017 05:18)  HR: 66 (24 Aug 2017 08:55) (66 - 90)  BP: 129/76 (24 Aug 2017 08:55) (109/72 - 129/76)  BP(mean): --  RR: 18 (24 Aug 2017 08:55) (18 - 19)  SpO2: 92% (24 Aug 2017 08:55) (92% - 96%)    PHYSICAL EXAMINATION:    GENERAL: The patient is awake and alert in no apparent distress.     HEENT: Head is normocephalic and atraumatic. Extraocular muscles are intact. Mucous membranes are moist.    NECK: Supple.    LUNGS: moderate air entry with mild exp rhonchi; respirations unlabored    HEART: Regular rate and rhythm without murmur.    ABDOMEN: Soft, nontender, and nondistended.      EXTREMITIES: Without any cyanosis, clubbing, rash, lesions or edema.    NEUROLOGIC: Grossly intact.    LABS:                        12.6   9.4   )-----------( 189      ( 22 Aug 2017 22:07 )             37.7     08-22    134<L>  |  96<L>  |  13.0  ----------------------------<  86  5.1   |  21.0<L>  |  0.77    Ca    8.4<L>      22 Aug 2017 22:07    TPro  6.2<L>  /  Alb  3.4  /  TBili  1.1  /  DBili  x   /  AST  63<H>  /  ALT  14  /  AlkPhos  61  08-22      Urinalysis Basic - ( 23 Aug 2017 00:14 )    Color: Yellow / Appearance: Clear / S.010 / pH: x  Gluc: x / Ketone: Trace  / Bili: Negative / Urobili: Negative mg/dL   Blood: x / Protein: Negative mg/dL / Nitrite: Negative   Leuk Esterase: Negative / RBC: x / WBC x   Sq Epi: x / Non Sq Epi: x / Bacteria: x      ABG - ( 22 Aug 2017 22:13 )  pH: 7.45  /  pCO2: 36    /  pO2: 66    / HCO3: 26    / Base Excess: 1.6   /  SaO2: 93                CARDIAC MARKERS ( 22 Aug 2017 22:07 )  x     / <0.01 ng/mL / 74 U/L / x     / x                    MICROBIOLOGY:    RADIOLOGY & ADDITIONAL STUDIES:  CXR reviewed

## 2017-08-24 NOTE — PROGRESS NOTE ADULT - SUBJECTIVE AND OBJECTIVE BOX
INTERVAL HPI/OVERNIGHT EVENTS:    CC: COPD exacerbation, schizophrenia, Parkinson's disease    No overnight events, non compliant with medications occasionally. Less short of breath.     Vital Signs Last 24 Hrs  T(C): 36.6 (24 Aug 2017 12:23), Max: 36.8 (24 Aug 2017 05:18)  T(F): 97.8 (24 Aug 2017 12:23), Max: 98.2 (24 Aug 2017 05:18)  HR: 77 (24 Aug 2017 12:23) (66 - 90)  BP: 120/85 (24 Aug 2017 12:23) (109/72 - 129/76)  BP(mean): --  RR: 18 (24 Aug 2017 12:23) (18 - 19)  SpO2: 96% (24 Aug 2017 12:23) (92% - 96%)    PHYSICAL EXAM:    GENERAL: Not in distress, alert  CHEST/LUNG: b/l air entry, wheezing.  HEART: Regular   ABDOMEN: Soft, BS+  EXTREMITIES:  No edema, tenderness    MEDICATIONS  (STANDING):  haloperidol     Tablet 10 milliGRAM(s) Oral at bedtime  diVALproex ER 1000 milliGRAM(s) Oral at bedtime  carbidopa/levodopa  25/100 1 Tablet(s) Oral three times a day  rOPINIRole 0.5 milliGRAM(s) Oral two times a day  risperiDONE   Tablet 4 milliGRAM(s) Oral daily  metoprolol succinate ER 25 milliGRAM(s) Oral daily  enoxaparin Injectable 40 milliGRAM(s) SubCutaneous daily  ALBUTerol/ipratropium for Nebulization 3 milliLiter(s) Nebulizer every 6 hours  piperacillin/tazobactam IVPB. 3.375 Gram(s) IV Intermittent every 8 hours  buDESOnide 160 MICROgram(s)/formoterol 4.5 MICROgram(s) Inhaler 2 Puff(s) Inhalation two times a day  vancomycin  IVPB 1250 milliGRAM(s) IV Intermittent <User Schedule>  predniSONE   Tablet 50 milliGRAM(s) Oral daily    MEDICATIONS  (PRN):  ALBUTerol    0.083% 2.5 milliGRAM(s) Nebulizer every 2 hours PRN Shortness of Breath and/or Wheezing  acetaminophen   Tablet 650 milliGRAM(s) Oral every 6 hours PRN For Temp greater than or equal to 38 C (100.4 F)  ALBUTerol    90 MICROgram(s) HFA Inhaler 1 Puff(s) Inhalation every 4 hours PRN Shortness of Breath and/or Wheezing      Allergies    Allergy Status Unknown    Intolerances          LABS:                          12.6   9.4   )-----------( 189      ( 22 Aug 2017 22:07 )             37.7     08-    134<L>  |  96<L>  |  13.0  ----------------------------<  86  5.1   |  21.0<L>  |  0.77    Ca    8.4<L>      22 Aug 2017 22:07    TPro  6.2<L>  /  Alb  3.4  /  TBili  1.1  /  DBili  x   /  AST  63<H>  /  ALT  14  /  AlkPhos  61        Urinalysis Basic - ( 23 Aug 2017 00:14 )    Color: Yellow / Appearance: Clear / S.010 / pH: x  Gluc: x / Ketone: Trace  / Bili: Negative / Urobili: Negative mg/dL   Blood: x / Protein: Negative mg/dL / Nitrite: Negative   Leuk Esterase: Negative / RBC: x / WBC x   Sq Epi: x / Non Sq Epi: x / Bacteria: x        RADIOLOGY & ADDITIONAL TESTS:

## 2017-08-25 PROCEDURE — 99233 SBSQ HOSP IP/OBS HIGH 50: CPT

## 2017-08-25 PROCEDURE — 74230 X-RAY XM SWLNG FUNCJ C+: CPT | Mod: 26

## 2017-08-25 RX ORDER — HALOPERIDOL DECANOATE 100 MG/ML
1 INJECTION INTRAMUSCULAR EVERY 6 HOURS
Qty: 0 | Refills: 0 | Status: DISCONTINUED | OUTPATIENT
Start: 2017-08-25 | End: 2017-08-28

## 2017-08-25 RX ORDER — LANOLIN ALCOHOL/MO/W.PET/CERES
3 CREAM (GRAM) TOPICAL AT BEDTIME
Qty: 0 | Refills: 0 | Status: DISCONTINUED | OUTPATIENT
Start: 2017-08-25 | End: 2017-08-28

## 2017-08-25 RX ADMIN — Medication 40 MILLIGRAM(S): at 22:55

## 2017-08-25 RX ADMIN — ROPINIROLE 0.5 MILLIGRAM(S): 8 TABLET, FILM COATED, EXTENDED RELEASE ORAL at 18:15

## 2017-08-25 RX ADMIN — HALOPERIDOL DECANOATE 10 MILLIGRAM(S): 100 INJECTION INTRAMUSCULAR at 22:57

## 2017-08-25 RX ADMIN — Medication 3 MILLIGRAM(S): at 22:57

## 2017-08-25 RX ADMIN — PIPERACILLIN AND TAZOBACTAM 25 GRAM(S): 4; .5 INJECTION, POWDER, LYOPHILIZED, FOR SOLUTION INTRAVENOUS at 09:55

## 2017-08-25 RX ADMIN — CARBIDOPA AND LEVODOPA 1 TABLET(S): 25; 100 TABLET ORAL at 15:30

## 2017-08-25 RX ADMIN — ROPINIROLE 0.5 MILLIGRAM(S): 8 TABLET, FILM COATED, EXTENDED RELEASE ORAL at 06:42

## 2017-08-25 RX ADMIN — CARBIDOPA AND LEVODOPA 1 TABLET(S): 25; 100 TABLET ORAL at 22:57

## 2017-08-25 RX ADMIN — Medication 3 MILLILITER(S): at 20:17

## 2017-08-25 RX ADMIN — Medication 25 MILLIGRAM(S): at 06:42

## 2017-08-25 RX ADMIN — Medication 3 MILLILITER(S): at 15:43

## 2017-08-25 RX ADMIN — RISPERIDONE 4 MILLIGRAM(S): 4 TABLET ORAL at 12:49

## 2017-08-25 RX ADMIN — Medication 40 MILLIGRAM(S): at 14:30

## 2017-08-25 RX ADMIN — DIVALPROEX SODIUM 1000 MILLIGRAM(S): 500 TABLET, DELAYED RELEASE ORAL at 22:57

## 2017-08-25 RX ADMIN — Medication 3 MILLILITER(S): at 02:06

## 2017-08-25 RX ADMIN — Medication 50 MILLIGRAM(S): at 06:42

## 2017-08-25 RX ADMIN — BUDESONIDE AND FORMOTEROL FUMARATE DIHYDRATE 2 PUFF(S): 160; 4.5 AEROSOL RESPIRATORY (INHALATION) at 08:09

## 2017-08-25 RX ADMIN — Medication 3 MILLILITER(S): at 08:08

## 2017-08-25 RX ADMIN — PIPERACILLIN AND TAZOBACTAM 25 GRAM(S): 4; .5 INJECTION, POWDER, LYOPHILIZED, FOR SOLUTION INTRAVENOUS at 18:15

## 2017-08-25 RX ADMIN — CARBIDOPA AND LEVODOPA 1 TABLET(S): 25; 100 TABLET ORAL at 06:42

## 2017-08-25 NOTE — SWALLOW VFSS/MBS ASSESSMENT ADULT - SLP PERTINENT HISTORY OF CURRENT PROBLEM
Pt seen for bedside swallow eavl 8/23/17: Overall functional oral & pharyngeal stage of swallow for puree, solids & thin fluids with NO overt s/s of penetration/aspiration. Please note that a bedside swallow evaluation cannot rule out silent aspiration. Therefore, if suspected, would recommend objective assessment via MBS.  Dr. Shah suspecting silent aspiration.  Chest xray 8/22: "Patchy consolidation in the right lung, suspicious for pneumonia.  No pleural effusion or pneumothorax."

## 2017-08-25 NOTE — SWALLOW VFSS/MBS ASSESSMENT ADULT - NS SWALLOW VFSS REC ASPIR MON
change of breathing pattern/oral hygiene/upper respiratory infection/position upright (90Y)/cough/gurgly voice/fever/pneumonia/throat clearing

## 2017-08-25 NOTE — PROGRESS NOTE ADULT - SUBJECTIVE AND OBJECTIVE BOX
INTERVAL HPI/OVERNIGHT EVENTS:    CC: pneumonia, COPD exacerbation, schizophrenia, Parkinson's disease    No overnight events, occasionally refusing treatment.       Vital Signs Last 24 Hrs  T(C): 36.8 (25 Aug 2017 05:02), Max: 36.8 (25 Aug 2017 05:02)  T(F): 98.3 (25 Aug 2017 05:02), Max: 98.3 (25 Aug 2017 05:02)  HR: 83 (25 Aug 2017 05:02) (77 - 91)  BP: 120/74 (25 Aug 2017 05:02) (116/76 - 126/85)  BP(mean): --  RR: 18 (25 Aug 2017 05:02) (18 - 18)  SpO2: 93% (25 Aug 2017 07:41) (93% - 96%)    PHYSICAL EXAM:    GENERAL: Not in distress  CHEST/LUNG: b/l air entry, coarse, wheezing  HEART: regular  ABDOMEN: Soft, BS+  EXTREMITIES:  No edema    MEDICATIONS  (STANDING):  haloperidol     Tablet 10 milliGRAM(s) Oral at bedtime  diVALproex ER 1000 milliGRAM(s) Oral at bedtime  carbidopa/levodopa  25/100 1 Tablet(s) Oral three times a day  rOPINIRole 0.5 milliGRAM(s) Oral two times a day  risperiDONE   Tablet 4 milliGRAM(s) Oral daily  metoprolol succinate ER 25 milliGRAM(s) Oral daily  enoxaparin Injectable 40 milliGRAM(s) SubCutaneous daily  ALBUTerol/ipratropium for Nebulization 3 milliLiter(s) Nebulizer every 6 hours  piperacillin/tazobactam IVPB. 3.375 Gram(s) IV Intermittent every 8 hours  buDESOnide 160 MICROgram(s)/formoterol 4.5 MICROgram(s) Inhaler 2 Puff(s) Inhalation two times a day  vancomycin  IVPB 1250 milliGRAM(s) IV Intermittent <User Schedule>  methylPREDNISolone sodium succinate Injectable 40 milliGRAM(s) IV Push every 8 hours    MEDICATIONS  (PRN):  ALBUTerol    0.083% 2.5 milliGRAM(s) Nebulizer every 2 hours PRN Shortness of Breath and/or Wheezing  acetaminophen   Tablet 650 milliGRAM(s) Oral every 6 hours PRN For Temp greater than or equal to 38 C (100.4 F)  ALBUTerol    90 MICROgram(s) HFA Inhaler 1 Puff(s) Inhalation every 4 hours PRN Shortness of Breath and/or Wheezing      Allergies    Allergy Status Unknown    Intolerances          LABS:                  RADIOLOGY & ADDITIONAL TESTS:

## 2017-08-25 NOTE — SWALLOW VFSS/MBS ASSESSMENT ADULT - ORAL PHASE
Uncontrolled bolus / spillover in aris-pharynx Uncontrolled bolus / spillover in aris-pharynx/Uncontrolled bolus / spillover in hypopharynx

## 2017-08-25 NOTE — SWALLOW VFSS/MBS ASSESSMENT ADULT - DIAGNOSTIC IMPRESSIONS
Mild oral dysphagia marked by reduced lingual coordination; Pharyngeal stage WFL, no penetration, no aspiration observed.  Pt noted with +coughing during thin trials, however, no PO observed in airway.

## 2017-08-26 DIAGNOSIS — J18.9 PNEUMONIA, UNSPECIFIED ORGANISM: ICD-10-CM

## 2017-08-26 DIAGNOSIS — F20.0 PARANOID SCHIZOPHRENIA: ICD-10-CM

## 2017-08-26 LAB
GRAM STN FLD: SIGNIFICANT CHANGE UP
MRSA PCR RESULT.: SIGNIFICANT CHANGE UP
S AUREUS DNA NOSE QL NAA+PROBE: SIGNIFICANT CHANGE UP
SPECIMEN SOURCE: SIGNIFICANT CHANGE UP

## 2017-08-26 PROCEDURE — 99232 SBSQ HOSP IP/OBS MODERATE 35: CPT

## 2017-08-26 PROCEDURE — 99233 SBSQ HOSP IP/OBS HIGH 50: CPT

## 2017-08-26 PROCEDURE — 71020: CPT | Mod: 26

## 2017-08-26 RX ORDER — BUDESONIDE AND FORMOTEROL FUMARATE DIHYDRATE 160; 4.5 UG/1; UG/1
1 AEROSOL RESPIRATORY (INHALATION)
Qty: 0 | Refills: 0 | COMMUNITY
Start: 2017-08-26

## 2017-08-26 RX ORDER — IPRATROPIUM/ALBUTEROL SULFATE 18-103MCG
3 AEROSOL WITH ADAPTER (GRAM) INHALATION
Qty: 0 | Refills: 0 | COMMUNITY
Start: 2017-08-26

## 2017-08-26 RX ORDER — LANOLIN ALCOHOL/MO/W.PET/CERES
1 CREAM (GRAM) TOPICAL
Qty: 0 | Refills: 0 | COMMUNITY
Start: 2017-08-26

## 2017-08-26 RX ADMIN — CARBIDOPA AND LEVODOPA 1 TABLET(S): 25; 100 TABLET ORAL at 23:31

## 2017-08-26 RX ADMIN — PIPERACILLIN AND TAZOBACTAM 25 GRAM(S): 4; .5 INJECTION, POWDER, LYOPHILIZED, FOR SOLUTION INTRAVENOUS at 01:03

## 2017-08-26 RX ADMIN — Medication 25 MILLIGRAM(S): at 05:06

## 2017-08-26 RX ADMIN — Medication 3 MILLIGRAM(S): at 23:31

## 2017-08-26 RX ADMIN — CARBIDOPA AND LEVODOPA 1 TABLET(S): 25; 100 TABLET ORAL at 14:25

## 2017-08-26 RX ADMIN — ALBUTEROL 2.5 MILLIGRAM(S): 90 AEROSOL, METERED ORAL at 05:23

## 2017-08-26 RX ADMIN — Medication 40 MILLIGRAM(S): at 05:06

## 2017-08-26 RX ADMIN — HALOPERIDOL DECANOATE 10 MILLIGRAM(S): 100 INJECTION INTRAMUSCULAR at 23:31

## 2017-08-26 RX ADMIN — RISPERIDONE 4 MILLIGRAM(S): 4 TABLET ORAL at 12:46

## 2017-08-26 RX ADMIN — PIPERACILLIN AND TAZOBACTAM 25 GRAM(S): 4; .5 INJECTION, POWDER, LYOPHILIZED, FOR SOLUTION INTRAVENOUS at 09:54

## 2017-08-26 RX ADMIN — BUDESONIDE AND FORMOTEROL FUMARATE DIHYDRATE 2 PUFF(S): 160; 4.5 AEROSOL RESPIRATORY (INHALATION) at 21:08

## 2017-08-26 RX ADMIN — ROPINIROLE 0.5 MILLIGRAM(S): 8 TABLET, FILM COATED, EXTENDED RELEASE ORAL at 18:04

## 2017-08-26 RX ADMIN — Medication 40 MILLIGRAM(S): at 23:31

## 2017-08-26 RX ADMIN — Medication 3 MILLILITER(S): at 08:30

## 2017-08-26 RX ADMIN — Medication 3 MILLILITER(S): at 21:07

## 2017-08-26 RX ADMIN — DIVALPROEX SODIUM 1000 MILLIGRAM(S): 500 TABLET, DELAYED RELEASE ORAL at 23:31

## 2017-08-26 RX ADMIN — PIPERACILLIN AND TAZOBACTAM 25 GRAM(S): 4; .5 INJECTION, POWDER, LYOPHILIZED, FOR SOLUTION INTRAVENOUS at 18:03

## 2017-08-26 RX ADMIN — CARBIDOPA AND LEVODOPA 1 TABLET(S): 25; 100 TABLET ORAL at 05:06

## 2017-08-26 RX ADMIN — Medication 3 MILLILITER(S): at 14:27

## 2017-08-26 RX ADMIN — ROPINIROLE 0.5 MILLIGRAM(S): 8 TABLET, FILM COATED, EXTENDED RELEASE ORAL at 05:06

## 2017-08-26 NOTE — PROGRESS NOTE BEHAVIORAL HEALTH - NSBHFUPINTERVALHXFT_PSY_A_CORE
Patient a 66 y/o  male, domiciled in an Group/Adult Home, PPH of Schizophrenia, medically has Parkinson's Disease was BIB/EMS due to Pneumonia with COPD Exacerbation. Patient is compliant with meds, knows his meds, willing to take meds and taking his meds now. Mood is cheerful and at times cracks jokes that he needs meds for stability. He has fair sleep for which he is on Melatonin, takes his parkinson's meds as prescribed and also on IV antibiotic for Pneumonia. he denied A/H or other paranoid beliefs. He is somewhat pre-occupied to go home, other wise improving.

## 2017-08-26 NOTE — PROGRESS NOTE ADULT - SUBJECTIVE AND OBJECTIVE BOX
Patient: DAVY HOLDEN [id number] 67y Male                 Internal Medicine Hospitalist Progress Note - Dr. Serg Raymundo    Chief Complaint: Patient is a 67y old  Male who presents with a chief complaint of cough, sob (23 Aug 2017 03:10)    HPI:  67 yr old male with COPD, schizophrenia and Parkinson's disease admitted with complaints of worsening shortness of breath and cough for few days. In ED, initially required BiPAP, which was later discontinued. He was started on steroids and antibiotics, pulmonary consulted. Speech and swallow consulted as he was noted to have dilated esophagus on prior CT scan. Speech and swallow evaluation was done, advised regular with thin. Given concerns for silent aspiration, MBS was ordered, which was negative for aspiration.  Per S&S recommendations, resumed on regular consistency diet.    Denies complaints.  Denies SOB / COugh/ fever / chills.  Wished to leave AMA.    ____________________PHYSICAL EXAM:  Vitals reviewed as indicated below  GENERAL:  NAD Alert and Oriented x 3   HEENT: NCAT  CARDIOVASCULAR:  S1, S2  LUNGS: coarse BS b/l, + wheeze.   ABDOMEN:  soft, (-) tenderness, (-) distension, (+) bowel sounds, (-) guarding, (-) rebound (-) rigidity  EXTREMITIES:  no cyanosis / clubbing / edema.   ____________________    BACKGROUND:  HEALTH ISSUES - PROBLEM Dx:  Undifferentiated schizophrenia  Schizophrenia: Schizophrenia  Chronic obstructive pulmonary disease, unspecified COPD type: Chronic obstructive pulmonary disease, unspecified COPD type  Schizophrenia, unspecified type: Schizophrenia, unspecified type  Parkinson disease: Parkinson disease  COPD exacerbation: COPD exacerbation  Pneumonia due to infectious organism, unspecified laterality, unspecified part of lung: Pneumonia due to infectious organism, unspecified laterality, unspecified part of lung        Allergies    Allergy Status Unknown    Intolerances      PAST MEDICAL & SURGICAL HISTORY:  Parkinson disease  Chronic obstructive pulmonary disease, unspecified COPD type  Schizophrenia  No significant past surgical history      VITALS:  Vital Signs Last 24 Hrs  T(C): 36.6 (26 Aug 2017 08:10), Max: 36.7 (25 Aug 2017 12:35)  T(F): 97.8 (26 Aug 2017 08:10), Max: 98.1 (25 Aug 2017 12:35)  HR: 79 (26 Aug 2017 08:10) (66 - 81)  BP: 128/77 (26 Aug 2017 08:10) (112/68 - 128/77)  BP(mean): --  RR: 16 (26 Aug 2017 08:10) (16 - 18)  SpO2: 98% (26 Aug 2017 04:38) (96% - 98%) Daily     Daily   CAPILLARY BLOOD GLUCOSE        I&O's Summary    25 Aug 2017 07:01  -  26 Aug 2017 07:00  --------------------------------------------------------  IN: 280 mL / OUT: 350 mL / NET: -70 mL        LABS:                      MEDICATIONS:  MEDICATIONS  (STANDING):  haloperidol     Tablet 10 milliGRAM(s) Oral at bedtime  diVALproex ER 1000 milliGRAM(s) Oral at bedtime  carbidopa/levodopa  25/100 1 Tablet(s) Oral three times a day  rOPINIRole 0.5 milliGRAM(s) Oral two times a day  risperiDONE   Tablet 4 milliGRAM(s) Oral daily  metoprolol succinate ER 25 milliGRAM(s) Oral daily  enoxaparin Injectable 40 milliGRAM(s) SubCutaneous daily  ALBUTerol/ipratropium for Nebulization 3 milliLiter(s) Nebulizer every 6 hours  piperacillin/tazobactam IVPB. 3.375 Gram(s) IV Intermittent every 8 hours  buDESOnide 160 MICROgram(s)/formoterol 4.5 MICROgram(s) Inhaler 2 Puff(s) Inhalation two times a day  melatonin 3 milliGRAM(s) Oral at bedtime  methylPREDNISolone sodium succinate Injectable 40 milliGRAM(s) IV Push every 12 hours    MEDICATIONS  (PRN):  ALBUTerol    0.083% 2.5 milliGRAM(s) Nebulizer every 2 hours PRN Shortness of Breath and/or Wheezing  acetaminophen   Tablet 650 milliGRAM(s) Oral every 6 hours PRN For Temp greater than or equal to 38 C (100.4 F)  ALBUTerol    90 MICROgram(s) HFA Inhaler 1 Puff(s) Inhalation every 4 hours PRN Shortness of Breath and/or Wheezing  haloperidol    Injectable 1 milliGRAM(s) IntraMuscular every 6 hours PRN Agitation

## 2017-08-26 NOTE — PROGRESS NOTE BEHAVIORAL HEALTH - SUMMARY
Patient is alert, oriented to time and place, knows his meds and has no meds induced s/e. He has hx of parkinson's disease and taking his meds as prescribed. He has no issues and wants to get better soon so he can go home fast.    1. Patient has Capacity and willing to take meds as prescribed  2. Patient has capacity to understand the risks and Benefits

## 2017-08-27 PROCEDURE — 99233 SBSQ HOSP IP/OBS HIGH 50: CPT

## 2017-08-27 RX ADMIN — BUDESONIDE AND FORMOTEROL FUMARATE DIHYDRATE 2 PUFF(S): 160; 4.5 AEROSOL RESPIRATORY (INHALATION) at 20:24

## 2017-08-27 RX ADMIN — PIPERACILLIN AND TAZOBACTAM 25 GRAM(S): 4; .5 INJECTION, POWDER, LYOPHILIZED, FOR SOLUTION INTRAVENOUS at 10:21

## 2017-08-27 RX ADMIN — Medication 40 MILLIGRAM(S): at 05:22

## 2017-08-27 RX ADMIN — Medication 3 MILLIGRAM(S): at 21:56

## 2017-08-27 RX ADMIN — Medication 40 MILLIGRAM(S): at 18:20

## 2017-08-27 RX ADMIN — CARBIDOPA AND LEVODOPA 1 TABLET(S): 25; 100 TABLET ORAL at 05:22

## 2017-08-27 RX ADMIN — RISPERIDONE 4 MILLIGRAM(S): 4 TABLET ORAL at 13:05

## 2017-08-27 RX ADMIN — CARBIDOPA AND LEVODOPA 1 TABLET(S): 25; 100 TABLET ORAL at 21:56

## 2017-08-27 RX ADMIN — BUDESONIDE AND FORMOTEROL FUMARATE DIHYDRATE 2 PUFF(S): 160; 4.5 AEROSOL RESPIRATORY (INHALATION) at 08:52

## 2017-08-27 RX ADMIN — CARBIDOPA AND LEVODOPA 1 TABLET(S): 25; 100 TABLET ORAL at 13:05

## 2017-08-27 RX ADMIN — DIVALPROEX SODIUM 1000 MILLIGRAM(S): 500 TABLET, DELAYED RELEASE ORAL at 21:56

## 2017-08-27 RX ADMIN — HALOPERIDOL DECANOATE 10 MILLIGRAM(S): 100 INJECTION INTRAMUSCULAR at 21:56

## 2017-08-27 RX ADMIN — PIPERACILLIN AND TAZOBACTAM 25 GRAM(S): 4; .5 INJECTION, POWDER, LYOPHILIZED, FOR SOLUTION INTRAVENOUS at 01:27

## 2017-08-27 RX ADMIN — Medication 3 MILLILITER(S): at 08:35

## 2017-08-27 RX ADMIN — Medication 25 MILLIGRAM(S): at 05:22

## 2017-08-27 RX ADMIN — Medication 3 MILLILITER(S): at 20:23

## 2017-08-27 RX ADMIN — Medication 30 MILLILITER(S): at 18:13

## 2017-08-27 RX ADMIN — ROPINIROLE 0.5 MILLIGRAM(S): 8 TABLET, FILM COATED, EXTENDED RELEASE ORAL at 18:11

## 2017-08-27 RX ADMIN — ROPINIROLE 0.5 MILLIGRAM(S): 8 TABLET, FILM COATED, EXTENDED RELEASE ORAL at 05:22

## 2017-08-27 NOTE — PROGRESS NOTE ADULT - SUBJECTIVE AND OBJECTIVE BOX
Patient: DAVY HOLDEN [id number] 67y Male                 Internal Medicine Hospitalist Progress Note - Dr. Serg Raymundo    Chief Complaint: Patient is a 67y old  Male who presents with a chief complaint of cough, sob (23 Aug 2017 03:10)    HPI:  67 yr old male with COPD, schizophrenia and Parkinson's disease admitted with complaints of worsening shortness of breath and cough for few days. In ED, initially required BiPAP, which was later discontinued. He was started on steroids and antibiotics, pulmonary consulted. Speech and swallow consulted as he was noted to have dilated esophagus on prior CT scan. Speech and swallow evaluation was done, advised regular with thin. Given concerns for silent aspiration, MBS was ordered, which was negative for aspiration.  Per S&S recommendations, resumed on regular consistency diet.    Denies complaints.  Reports feeling better.  Denies SOB / COugh/ fever / chills.      ____________________PHYSICAL EXAM:  Vitals reviewed as indicated below  GENERAL:  NAD Alert and Oriented x 3   HEENT: NCAT  CARDIOVASCULAR:  S1, S2  LUNGS: coarse BS b/l, + wheeze.   ABDOMEN:  soft, (-) tenderness, (-) distension, (+) bowel sounds, (-) guarding, (-) rebound (-) rigidity  EXTREMITIES:  no cyanosis / clubbing / edema.   ____________________    VITALS:  Vital Signs Last 24 Hrs  T(C): 36.8 (27 Aug 2017 08:15), Max: 36.9 (27 Aug 2017 04:48)  T(F): 98.2 (27 Aug 2017 08:15), Max: 98.4 (27 Aug 2017 04:48)  HR: 68 (27 Aug 2017 08:15) (63 - 91)  BP: 123/76 (27 Aug 2017 08:15) (117/71 - 131/82)  BP(mean): --  RR: 14 (27 Aug 2017 08:15) (14 - 20)  SpO2: 88% (27 Aug 2017 04:48) (88% - 97%) Daily     Daily   CAPILLARY BLOOD GLUCOSE        I&O's Summary    26 Aug 2017 07:01  -  27 Aug 2017 07:00  --------------------------------------------------------  IN: 390 mL / OUT: 0 mL / NET: 390 mL        LABS:                      MEDICATIONS:  ALBUTerol    0.083% 2.5 milliGRAM(s) Nebulizer every 2 hours PRN  haloperidol     Tablet 10 milliGRAM(s) Oral at bedtime  diVALproex ER 1000 milliGRAM(s) Oral at bedtime  carbidopa/levodopa  25/100 1 Tablet(s) Oral three times a day  rOPINIRole 0.5 milliGRAM(s) Oral two times a day  risperiDONE   Tablet 4 milliGRAM(s) Oral daily  metoprolol succinate ER 25 milliGRAM(s) Oral daily  acetaminophen   Tablet 650 milliGRAM(s) Oral every 6 hours PRN  enoxaparin Injectable 40 milliGRAM(s) SubCutaneous daily  ALBUTerol/ipratropium for Nebulization 3 milliLiter(s) Nebulizer every 6 hours  piperacillin/tazobactam IVPB. 3.375 Gram(s) IV Intermittent every 8 hours  buDESOnide 160 MICROgram(s)/formoterol 4.5 MICROgram(s) Inhaler 2 Puff(s) Inhalation two times a day  ALBUTerol    90 MICROgram(s) HFA Inhaler 1 Puff(s) Inhalation every 4 hours PRN  melatonin 3 milliGRAM(s) Oral at bedtime  haloperidol    Injectable 1 milliGRAM(s) IntraMuscular every 6 hours PRN  methylPREDNISolone sodium succinate Injectable 40 milliGRAM(s) IV Push every 12 hours

## 2017-08-28 ENCOUNTER — TRANSCRIPTION ENCOUNTER (OUTPATIENT)
Age: 67
End: 2017-08-28

## 2017-08-28 ENCOUNTER — APPOINTMENT (OUTPATIENT)
Dept: PULMONOLOGY | Facility: CLINIC | Age: 67
End: 2017-08-28

## 2017-08-28 VITALS
RESPIRATION RATE: 20 BRPM | DIASTOLIC BLOOD PRESSURE: 80 MMHG | SYSTOLIC BLOOD PRESSURE: 125 MMHG | TEMPERATURE: 98 F | HEART RATE: 73 BPM | OXYGEN SATURATION: 91 %

## 2017-08-28 LAB
CULTURE RESULTS: SIGNIFICANT CHANGE UP
SPECIMEN SOURCE: SIGNIFICANT CHANGE UP

## 2017-08-28 PROCEDURE — 99233 SBSQ HOSP IP/OBS HIGH 50: CPT

## 2017-08-28 RX ADMIN — Medication 30 MILLILITER(S): at 11:52

## 2017-08-28 RX ADMIN — CARBIDOPA AND LEVODOPA 1 TABLET(S): 25; 100 TABLET ORAL at 06:11

## 2017-08-28 RX ADMIN — Medication 3 MILLILITER(S): at 09:14

## 2017-08-28 RX ADMIN — Medication 3 MILLILITER(S): at 03:30

## 2017-08-28 RX ADMIN — Medication 30 MILLILITER(S): at 02:24

## 2017-08-28 RX ADMIN — Medication 3 MILLILITER(S): at 14:40

## 2017-08-28 RX ADMIN — BUDESONIDE AND FORMOTEROL FUMARATE DIHYDRATE 2 PUFF(S): 160; 4.5 AEROSOL RESPIRATORY (INHALATION) at 09:16

## 2017-08-28 RX ADMIN — ROPINIROLE 0.5 MILLIGRAM(S): 8 TABLET, FILM COATED, EXTENDED RELEASE ORAL at 06:12

## 2017-08-28 RX ADMIN — RISPERIDONE 4 MILLIGRAM(S): 4 TABLET ORAL at 11:52

## 2017-08-28 RX ADMIN — CARBIDOPA AND LEVODOPA 1 TABLET(S): 25; 100 TABLET ORAL at 15:02

## 2017-08-28 RX ADMIN — Medication 25 MILLIGRAM(S): at 06:12

## 2017-08-28 RX ADMIN — PIPERACILLIN AND TAZOBACTAM 25 GRAM(S): 4; .5 INJECTION, POWDER, LYOPHILIZED, FOR SOLUTION INTRAVENOUS at 02:24

## 2017-08-28 NOTE — PROGRESS NOTE ADULT - PROBLEM SELECTOR PLAN 1
Continue IV Abx.  CXR PA / Lat for f/u.
Continue IV Abx.  CXR PA / Lat for f/u.
Repeat CXR showing improvement.  Pt refusing IV medications despite acknowledging risks / benefits / alternatives.  Change to po antibiotics.
Restart IV steroids given wheezing, continue Duoneb, supplemental oxygen.
Will start PO steroids as patient refusing IV, continue Duoneb, pulmonary following.
Continue IV steroids, Duoneb, supplemental oxygen as needed.

## 2017-08-28 NOTE — PROGRESS NOTE ADULT - ASSESSMENT
66 yr old male with COPD, schizophrenia and Parkinson's disease admitted with complaints of worsening shortness of breath and cough for few days. In ED, initially required BiPAP, which was later discontinued. He was started on steroids and antibiotics, pulmonary consulted. Speech and swallow consulted as he was noted to have dilated esophagus on prior CT scan.
66 yr old male with COPD, schizophrenia and Parkinson's disease admitted with complaints of worsening shortness of breath and cough for few days. In ED, initially required BiPAP, which was later discontinued. He was started on steroids and antibiotics, pulmonary consulted. Speech and swallow consulted as he was noted to have dilated esophagus on prior CT scan. Speech and swallow evaluation was done, advised regular with thin. Given concerns for silent aspiration, MBS was ordered, which was negative for aspiration
66 yr old male with COPD, schizophrenia and Parkinson's disease admitted with complaints of worsening shortness of breath and cough for few days. In ED, initially required BiPAP, which was later discontinued. He was started on steroids and antibiotics, pulmonary consulted. Speech and swallow consulted as he was noted to have dilated esophagus on prior CT scan. Speech and swallow evaluation was done, advised regular with thin. Given concerns for silent aspiration, MBS was ordered, which was negative for aspiration.
66 yr old male with COPD, schizophrenia and Parkinson's disease admitted with complaints of worsening shortness of breath and cough for few days. In ED, initially required BiPAP, which was later discontinued. He was started on steroids and antibiotics, pulmonary consulted. Speech and swallow consulted as he was noted to have dilated esophagus on prior CT scan. Speech and swallow evaluation was done, advised regular with thin. Given concerns for silent aspiration, MBS was ordered, which was negative for aspiration.
66 yr old male with COPD, schizophrenia and Parkinson's disease admitted with complaints of worsening shortness of breath and cough for few days. In ED, initially required BiPAP, which was later discontinued. He was started on steroids and antibiotics, pulmonary consulted. Speech and swallow consulted as he was noted to have dilated esophagus on prior CT scan. Speech and swallow evaluation was done, advised regular with thin. Given concerns for silent aspiration, MBS was ordered, which was negative for aspiration.    Discussed risks / benefits / alternatives with patient including, but not limited to, further deterioration, disability and death.  Patient acknowledges understanding, and able to demonstrate insight into leaving.  He agreed to remain hospitalized at present.  D/w his sister Cindy [phone] in agreement.
severe COPD complicated by Parkinsons disease and schizophrenia  Dilated esophagus on previous CT scan ( pt logistics called to fix old medical record)  new RUL infiltrate on CXR, rx as HCAP given recent admission  improved  wean medrol to po prednisone  has neb in home, duo neb 3x daily as out pt  check MRSA  nebs  continue abx, de escalate as stabilizes  keep HOB elevated  swallow evaluation   repeat CXR  prognosis guarded  will follow up prn
66 yr old male with COPD, schizophrenia and Parkinson's disease admitted with complaints of worsening shortness of breath and cough for few days. In ED, initially required BiPAP, which was later discontinued. He was started on steroids and antibiotics, pulmonary consulted. Speech and swallow consulted as he was noted to have dilated esophagus on prior CT scan. Speech and swallow evaluation was done, advised regular with thin. Given concerns for silent aspiration, MBS was ordered.

## 2017-08-28 NOTE — DISCHARGE NOTE ADULT - PLAN OF CARE
stable for discharge It is important to see your primary physician as well as the physicians noted below within the next week to perform a comprehensive medical review.  Call their offices for an appointment as soon as you leave the hospital.  If you do not have a primary physician, contact the [facility] [place] [phone] located on [address] [place] [address].  Your medical issues appear to be stable at this time, but if your symptoms recur or worsen, contact your physicians and/or return to the hospital if necessary.  If you encounter any issues or questions with your medication, call your physicians before stopping the medication.  Do not drive.  Limit your diet to 2 grams of sodium daily.

## 2017-08-28 NOTE — PROGRESS NOTE ADULT - SUBJECTIVE AND OBJECTIVE BOX
Patient: DAVY HOLDEN [id number] 67y Male                 Internal Medicine Hospitalist Progress Note - Dr. Serg Raymundo    Chief Complaint: Patient is a 67y old  Male who presents with a chief complaint of cough, sob (23 Aug 2017 03:10)    HPI:  67 yr old male with COPD, schizophrenia and Parkinson's disease admitted with complaints of worsening shortness of breath and cough for few days. In ED, initially required BiPAP, which was later discontinued. He was started on steroids and antibiotics, pulmonary consulted. Speech and swallow consulted as he was noted to have dilated esophagus on prior CT scan. Speech and swallow evaluation was done, advised regular with thin. Given concerns for silent aspiration, MBS was ordered, which was negative for aspiration.  Per S&S recommendations, resumed on regular consistency diet.    Denies complaints.  Denies SOB / COugh/ fever / chills.  Has been refusing IV medications, wishes to take only po med.      ____________________PHYSICAL EXAM:  Vitals reviewed as indicated below  GENERAL:  NAD Alert and Oriented x 3   HEENT: NCAT  CARDIOVASCULAR:  S1, S2  LUNGS: coarse BS b/l, + wheeze.   ABDOMEN:  soft, (-) tenderness, (-) distension, (+) bowel sounds, (-) guarding, (-) rebound (-) rigidity  EXTREMITIES:  no cyanosis / clubbing / edema.   ____________________    VITALS:  Vital Signs Last 24 Hrs  T(C): 36.8 (27 Aug 2017 08:15), Max: 36.9 (27 Aug 2017 04:48)  T(F): 98.2 (27 Aug 2017 08:15), Max: 98.4 (27 Aug 2017 04:48)  HR: 68 (27 Aug 2017 08:15) (63 - 91)  BP: 123/76 (27 Aug 2017 08:15) (117/71 - 131/82)  BP(mean): --  RR: 14 (27 Aug 2017 08:15) (14 - 20)  SpO2: 88% (27 Aug 2017 04:48) (88% - 97%) Daily     Daily   CAPILLARY BLOOD GLUCOSE        I&O's Summary    26 Aug 2017 07:01  -  27 Aug 2017 07:00  --------------------------------------------------------  IN: 390 mL / OUT: 0 mL / NET: 390 mL        LABS:                      MEDICATIONS:  ALBUTerol    0.083% 2.5 milliGRAM(s) Nebulizer every 2 hours PRN  haloperidol     Tablet 10 milliGRAM(s) Oral at bedtime  diVALproex ER 1000 milliGRAM(s) Oral at bedtime  carbidopa/levodopa  25/100 1 Tablet(s) Oral three times a day  rOPINIRole 0.5 milliGRAM(s) Oral two times a day  risperiDONE   Tablet 4 milliGRAM(s) Oral daily  metoprolol succinate ER 25 milliGRAM(s) Oral daily  acetaminophen   Tablet 650 milliGRAM(s) Oral every 6 hours PRN  enoxaparin Injectable 40 milliGRAM(s) SubCutaneous daily  ALBUTerol/ipratropium for Nebulization 3 milliLiter(s) Nebulizer every 6 hours  piperacillin/tazobactam IVPB. 3.375 Gram(s) IV Intermittent every 8 hours  buDESOnide 160 MICROgram(s)/formoterol 4.5 MICROgram(s) Inhaler 2 Puff(s) Inhalation two times a day  ALBUTerol    90 MICROgram(s) HFA Inhaler 1 Puff(s) Inhalation every 4 hours PRN  melatonin 3 milliGRAM(s) Oral at bedtime  haloperidol    Injectable 1 milliGRAM(s) IntraMuscular every 6 hours PRN  methylPREDNISolone sodium succinate Injectable 40 milliGRAM(s) IV Push every 12 hours

## 2017-08-28 NOTE — PROGRESS NOTE ADULT - PROBLEM SELECTOR PLAN 2
Clinically improving.  Change to oral steroids.
Clinically improving.  Taper IV Steroids.
Not ready for IV steroid taper.  Discussed need for continued hospitalization.    I extensively discussed with patient the continued medical issues.  The patient is not stable for discharge home and requires further hospitalization and monitoring.
On Haldol and Risperidone. Psychiatry evaluation appreciated.
On Haldol and Risperidone. Psychiatry evaluation appreciated.
On Haldol and Risperidone. Psychiatry evaluation.

## 2017-08-28 NOTE — PROGRESS NOTE ADULT - PROBLEM SELECTOR PLAN 3
On Carbidopa-levodopa. MBS noted.
On Carbidopa-levodopa. Will check MBS.
On Haldol and Risperidone.
On Carbidopa-levodopa.

## 2017-08-28 NOTE — DISCHARGE NOTE ADULT - MEDICATION SUMMARY - MEDICATIONS TO TAKE
I will START or STAY ON the medications listed below when I get home from the hospital:    predniSONE 10 mg oral tablet  -- 4 tab(s) by mouth once a day. Taper by 1 tab every 3 days  -- Indication: For COPD (chronic obstructive pulmonary disease)    divalproex sodium 500 mg oral tablet, extended release  -- 1000 milligram(s) by mouth once a day (at bedtime)  -- Indication: For Schizophrenia    rOPINIRole 0.5 mg oral tablet  -- 1 tab(s) by mouth 2 times a day  -- Indication: For Parkinson disease    carbidopa-levodopa 25 mg-100 mg oral tablet  -- 1 tab(s) by mouth 3 times a day  -- Indication: For Parkinson disease    haloperidol 10 mg oral tablet  -- 1 tab(s) by mouth once a day  -- Indication: For Schizophrenia    risperiDONE 4 mg oral tablet  -- 4 milligram(s) by mouth once a day  -- Indication: For Schizophrenia    Toprol-XL 25 mg oral tablet, extended release  -- 1 tab(s) by mouth once a day  -- Indication: For Hypertension    albuterol-ipratropium 2.5 mg-0.5 mg/3 mL inhalation solution  -- 3 milliliter(s) inhaled every 6 hours  -- Indication: For Chronic obstructive pulmonary disease, unspecified COPD type    budesonide-formoterol 160 mcg-4.5 mcg/inh inhalation aerosol  -- 1 dose(s) inhaled every 6 hours, As Needed SOB  -- Indication: For Chronic obstructive pulmonary disease, unspecified COPD type    melatonin 3 mg oral tablet  -- 1 tab(s) by mouth once a day (at bedtime)  -- Indication: For Supplement    Augmentin 875 mg-125 mg oral tablet  -- 1 tab(s) by mouth every 12 hours Stop after 9/2/17  -- Indication: For Pneumonia    Protonix 40 mg oral delayed release tablet  -- 1 tab(s) by mouth once a day  -- Indication: For Gastritis

## 2017-08-28 NOTE — DISCHARGE NOTE ADULT - PATIENT PORTAL LINK FT
“You can access the [employer] Patient Portal, offered by [facility], by registering with the following website: [email or web address]”

## 2017-08-28 NOTE — PROGRESS NOTE ADULT - PROBLEM SELECTOR PLAN 4
On Carbidopa-levodopa. MBS noted. Tolerating po

## 2017-08-28 NOTE — PROGRESS NOTE ADULT - PROBLEM SELECTOR PROBLEM 1
COPD exacerbation
COPD exacerbation
PNA (pneumonia)
COPD exacerbation

## 2017-08-28 NOTE — DISCHARGE NOTE ADULT - HOSPITAL COURSE
Patient: DAVY HOLDEN [id number]                 Internal Medicine Hospitalist Discharge Note - Dr. Serg Raymundo      67 yr old male with COPD, schizophrenia and Parkinson's disease admitted with complaints of worsening shortness of breath and cough for few days. In ED, initially required BiPAP, which was later discontinued. He was started on steroids and antibiotics, pulmonary consulted. Speech and swallow consulted as he was noted to have dilated esophagus on prior CT scan. Speech and swallow evaluation was done, advised regular with thin. Given concerns for silent aspiration, MBS was ordered, which was negative for aspiration.  Per S&S recommendations, resumed on regular consistency diet.    Denies complaints.  Denies SOB / COugh/ fever / chills.  Has been refusing IV medications, wishes to take only po med.    66 yr old male with COPD, schizophrenia and Parkinson's disease admitted with complaints of worsening shortness of breath and cough for few days. In ED, initially required BiPAP, which was later discontinued. He was started on steroids and antibiotics, pulmonary consulted. Speech and swallow consulted as he was noted to have dilated esophagus on prior CT scan. Speech and swallow evaluation was done, advised regular with thin. Given concerns for silent aspiration, MBS was ordered, which was negative for aspiration.      Problem/Plan - 1:  ·  Problem: PNA (pneumonia).  Plan: Repeat CXR showing improvement.  Pt refusing IV medications despite acknowledging risks / benefits / alternatives.  Change to po antibiotics.     Problem/Plan - 2:  ·  Problem: COPD exacerbation.  Plan: Clinically improving.  Change to oral steroids.     Problem/Plan - 3:  ·  Problem: Schizophrenia.  Plan: On Haldol and Risperidone.     Problem/Plan - 4:  ·  Problem: Parkinson disease.  Plan: On Carbidopa-levodopa. MBS noted. Tolerating po.         Disposition: stable for discharge.  Outpatient followup as above.  Patient was advised to return if they experience any recurrence or worsening of symptoms.  Total time spent on discharge was 35 minutes.  -Serg Raymundo D.O., Hospitalist, [facility]

## 2017-08-28 NOTE — DISCHARGE NOTE ADULT - CARE PLAN
Principal Discharge DX:	Pneumonia due to infectious organism, unspecified laterality, unspecified part of lung  Goal:	stable for discharge  Instructions for follow-up, activity and diet:	It is important to see your primary physician as well as the physicians noted below within the next week to perform a comprehensive medical review.  Call their offices for an appointment as soon as you leave the hospital.  If you do not have a primary physician, contact the [facility] [phone] located on [address] [place] [address], [place] [address].  Your medical issues appear to be stable at this time, but if your symptoms recur or worsen, contact your physicians and/or return to the hospital if necessary.  If you encounter any issues or questions with your medication, call your physicians before stopping the medication.  Do not drive.  Limit your diet to 2 grams of sodium daily.  Secondary Diagnosis:	Other schizophrenia  Secondary Diagnosis:	Parkinson disease  Secondary Diagnosis:	COPD exacerbation Principal Discharge DX:	Pneumonia due to infectious organism, unspecified laterality, unspecified part of lung  Goal:	stable for discharge  Instructions for follow-up, activity and diet:	It is important to see your primary physician as well as the physicians noted below within the next week to perform a comprehensive medical review.  Call their offices for an appointment as soon as you leave the hospital.  If you do not have a primary physician, contact the [facility] [place] [phone] located on [address] [place] [address], [place] [address].  Your medical issues appear to be stable at this time, but if your symptoms recur or worsen, contact your physicians and/or return to the hospital if necessary.  If you encounter any issues or questions with your medication, call your physicians before stopping the medication.  Do not drive.  Limit your diet to 2 grams of sodium daily.  Secondary Diagnosis:	Other schizophrenia  Secondary Diagnosis:	Parkinson disease  Secondary Diagnosis:	COPD exacerbation

## 2017-10-05 ENCOUNTER — INPATIENT (INPATIENT)
Facility: HOSPITAL | Age: 67
LOS: 3 days | Discharge: NURSING, REHAB OR LONG-TERM CARE | DRG: 872 | End: 2017-10-09
Attending: INTERNAL MEDICINE | Admitting: HOSPITALIST
Payer: MEDICARE

## 2017-10-05 VITALS
HEART RATE: 126 BPM | HEIGHT: 68 IN | WEIGHT: 147.93 LBS | OXYGEN SATURATION: 92 % | DIASTOLIC BLOOD PRESSURE: 86 MMHG | TEMPERATURE: 102 F | RESPIRATION RATE: 24 BRPM | SYSTOLIC BLOOD PRESSURE: 141 MMHG

## 2017-10-05 DIAGNOSIS — J44.1 CHRONIC OBSTRUCTIVE PULMONARY DISEASE WITH (ACUTE) EXACERBATION: ICD-10-CM

## 2017-10-05 LAB
ALBUMIN SERPL ELPH-MCNC: 3.5 G/DL — SIGNIFICANT CHANGE UP (ref 3.3–5.2)
ALP SERPL-CCNC: 76 U/L — SIGNIFICANT CHANGE UP (ref 40–120)
ALT FLD-CCNC: 14 U/L — SIGNIFICANT CHANGE UP
ANION GAP SERPL CALC-SCNC: 12 MMOL/L — SIGNIFICANT CHANGE UP (ref 5–17)
APTT BLD: 30.8 SEC — SIGNIFICANT CHANGE UP (ref 27.5–37.4)
AST SERPL-CCNC: 46 U/L — HIGH
BILIRUB SERPL-MCNC: 0.7 MG/DL — SIGNIFICANT CHANGE UP (ref 0.4–2)
BLD GP AB SCN SERPL QL: SIGNIFICANT CHANGE UP
BUN SERPL-MCNC: 11 MG/DL — SIGNIFICANT CHANGE UP (ref 8–20)
CALCIUM SERPL-MCNC: 9 MG/DL — SIGNIFICANT CHANGE UP (ref 8.6–10.2)
CHLORIDE SERPL-SCNC: 97 MMOL/L — LOW (ref 98–107)
CO2 SERPL-SCNC: 30 MMOL/L — HIGH (ref 22–29)
CREAT SERPL-MCNC: 0.69 MG/DL — SIGNIFICANT CHANGE UP (ref 0.5–1.3)
EOSINOPHIL # BLD AUTO: 0 K/UL — SIGNIFICANT CHANGE UP (ref 0–0.5)
EOSINOPHIL NFR BLD AUTO: 0.2 % — SIGNIFICANT CHANGE UP (ref 0–5)
GLUCOSE SERPL-MCNC: 90 MG/DL — SIGNIFICANT CHANGE UP (ref 70–115)
HCT VFR BLD CALC: 39.4 % — LOW (ref 42–52)
HGB BLD-MCNC: 13.3 G/DL — LOW (ref 14–18)
INR BLD: 1.45 RATIO — HIGH (ref 0.88–1.16)
LACTATE BLDV-MCNC: 1.1 MMOL/L — SIGNIFICANT CHANGE UP (ref 0.5–2)
LACTATE BLDV-MCNC: 2.6 MMOL/L — HIGH (ref 0.5–2)
LDH SERPL L TO P-CCNC: 239 U/L — HIGH (ref 98–192)
LYMPHOCYTES # BLD AUTO: 0.8 K/UL — LOW (ref 1–4.8)
LYMPHOCYTES # BLD AUTO: 14.4 % — LOW (ref 20–55)
MAGNESIUM SERPL-MCNC: 2 MG/DL — SIGNIFICANT CHANGE UP (ref 1.6–2.6)
MCHC RBC-ENTMCNC: 33.6 PG — HIGH (ref 27–31)
MCHC RBC-ENTMCNC: 33.8 G/DL — SIGNIFICANT CHANGE UP (ref 32–36)
MCV RBC AUTO: 99.5 FL — HIGH (ref 80–94)
MONOCYTES # BLD AUTO: 0.8 K/UL — SIGNIFICANT CHANGE UP (ref 0–0.8)
MONOCYTES NFR BLD AUTO: 14.6 % — HIGH (ref 3–10)
NEUTROPHILS # BLD AUTO: 4.1 K/UL — SIGNIFICANT CHANGE UP (ref 1.8–8)
NEUTROPHILS NFR BLD AUTO: 70.6 % — SIGNIFICANT CHANGE UP (ref 37–73)
NT-PROBNP SERPL-SCNC: 871 PG/ML — HIGH (ref 0–300)
PHOSPHATE SERPL-MCNC: 2.4 MG/DL — SIGNIFICANT CHANGE UP (ref 2.4–4.7)
PLATELET # BLD AUTO: 139 K/UL — LOW (ref 150–400)
POTASSIUM SERPL-MCNC: 4 MMOL/L — SIGNIFICANT CHANGE UP (ref 3.5–5.3)
POTASSIUM SERPL-SCNC: 4 MMOL/L — SIGNIFICANT CHANGE UP (ref 3.5–5.3)
PROT SERPL-MCNC: 7.1 G/DL — SIGNIFICANT CHANGE UP (ref 6.6–8.7)
PROTHROM AB SERPL-ACNC: 16.1 SEC — HIGH (ref 9.8–12.7)
RBC # BLD: 3.96 M/UL — LOW (ref 4.6–6.2)
RBC # FLD: 13.1 % — SIGNIFICANT CHANGE UP (ref 11–15.6)
SODIUM SERPL-SCNC: 139 MMOL/L — SIGNIFICANT CHANGE UP (ref 135–145)
TROPONIN T SERPL-MCNC: <0.01 NG/ML — SIGNIFICANT CHANGE UP (ref 0–0.06)
TSH SERPL-MCNC: 3.6 UIU/ML — SIGNIFICANT CHANGE UP (ref 0.27–4.2)
TYPE + AB SCN PNL BLD: SIGNIFICANT CHANGE UP
WBC # BLD: 5.8 K/UL — SIGNIFICANT CHANGE UP (ref 4.8–10.8)
WBC # FLD AUTO: 5.8 K/UL — SIGNIFICANT CHANGE UP (ref 4.8–10.8)

## 2017-10-05 PROCEDURE — 71010: CPT | Mod: 26

## 2017-10-05 PROCEDURE — 93010 ELECTROCARDIOGRAM REPORT: CPT

## 2017-10-05 PROCEDURE — 99223 1ST HOSP IP/OBS HIGH 75: CPT | Mod: AI

## 2017-10-05 PROCEDURE — 99285 EMERGENCY DEPT VISIT HI MDM: CPT

## 2017-10-05 RX ORDER — CEFTRIAXONE 500 MG/1
1 INJECTION, POWDER, FOR SOLUTION INTRAMUSCULAR; INTRAVENOUS EVERY 24 HOURS
Qty: 0 | Refills: 0 | Status: DISCONTINUED | OUTPATIENT
Start: 2017-10-06 | End: 2017-10-09

## 2017-10-05 RX ORDER — ENOXAPARIN SODIUM 100 MG/ML
40 INJECTION SUBCUTANEOUS DAILY
Qty: 0 | Refills: 0 | Status: DISCONTINUED | OUTPATIENT
Start: 2017-10-05 | End: 2017-10-09

## 2017-10-05 RX ORDER — DIVALPROEX SODIUM 500 MG/1
2 TABLET, DELAYED RELEASE ORAL
Qty: 0 | Refills: 0 | COMMUNITY

## 2017-10-05 RX ORDER — AZITHROMYCIN 500 MG/1
500 TABLET, FILM COATED ORAL EVERY 24 HOURS
Qty: 0 | Refills: 0 | Status: DISCONTINUED | OUTPATIENT
Start: 2017-10-06 | End: 2017-10-07

## 2017-10-05 RX ORDER — NICOTINE POLACRILEX 2 MG
1 GUM BUCCAL
Qty: 0 | Refills: 0 | COMMUNITY

## 2017-10-05 RX ORDER — SODIUM CHLORIDE 9 MG/ML
999 INJECTION INTRAMUSCULAR; INTRAVENOUS; SUBCUTANEOUS ONCE
Qty: 0 | Refills: 0 | Status: COMPLETED | OUTPATIENT
Start: 2017-10-05 | End: 2017-10-05

## 2017-10-05 RX ORDER — RISPERIDONE 4 MG/1
4 TABLET ORAL DAILY
Qty: 0 | Refills: 0 | Status: DISCONTINUED | OUTPATIENT
Start: 2017-10-05 | End: 2017-10-09

## 2017-10-05 RX ORDER — AZITHROMYCIN 500 MG/1
500 TABLET, FILM COATED ORAL ONCE
Qty: 0 | Refills: 0 | Status: COMPLETED | OUTPATIENT
Start: 2017-10-05 | End: 2017-10-05

## 2017-10-05 RX ORDER — ALBUTEROL 90 UG/1
3 AEROSOL, METERED ORAL
Qty: 0 | Refills: 0 | COMMUNITY

## 2017-10-05 RX ORDER — DIVALPROEX SODIUM 500 MG/1
1000 TABLET, DELAYED RELEASE ORAL AT BEDTIME
Qty: 0 | Refills: 0 | Status: DISCONTINUED | OUTPATIENT
Start: 2017-10-05 | End: 2017-10-09

## 2017-10-05 RX ORDER — METOPROLOL TARTRATE 50 MG
25 TABLET ORAL DAILY
Qty: 0 | Refills: 0 | Status: DISCONTINUED | OUTPATIENT
Start: 2017-10-05 | End: 2017-10-09

## 2017-10-05 RX ORDER — DIVALPROEX SODIUM 500 MG/1
1 TABLET, DELAYED RELEASE ORAL
Qty: 0 | Refills: 0 | COMMUNITY

## 2017-10-05 RX ORDER — SODIUM CHLORIDE 9 MG/ML
1000 INJECTION INTRAMUSCULAR; INTRAVENOUS; SUBCUTANEOUS
Qty: 0 | Refills: 0 | Status: DISCONTINUED | OUTPATIENT
Start: 2017-10-05 | End: 2017-10-06

## 2017-10-05 RX ORDER — ROPINIROLE 8 MG/1
0.5 TABLET, FILM COATED, EXTENDED RELEASE ORAL
Qty: 0 | Refills: 0 | Status: DISCONTINUED | OUTPATIENT
Start: 2017-10-05 | End: 2017-10-09

## 2017-10-05 RX ORDER — CEFTRIAXONE 500 MG/1
1 INJECTION, POWDER, FOR SOLUTION INTRAMUSCULAR; INTRAVENOUS ONCE
Qty: 0 | Refills: 0 | Status: COMPLETED | OUTPATIENT
Start: 2017-10-05 | End: 2017-10-05

## 2017-10-05 RX ORDER — HALOPERIDOL DECANOATE 100 MG/ML
10 INJECTION INTRAMUSCULAR AT BEDTIME
Qty: 0 | Refills: 0 | Status: DISCONTINUED | OUTPATIENT
Start: 2017-10-05 | End: 2017-10-09

## 2017-10-05 RX ORDER — BUDESONIDE AND FORMOTEROL FUMARATE DIHYDRATE 160; 4.5 UG/1; UG/1
2 AEROSOL RESPIRATORY (INHALATION)
Qty: 0 | Refills: 0 | Status: DISCONTINUED | OUTPATIENT
Start: 2017-10-05 | End: 2017-10-09

## 2017-10-05 RX ORDER — IPRATROPIUM/ALBUTEROL SULFATE 18-103MCG
3 AEROSOL WITH ADAPTER (GRAM) INHALATION EVERY 6 HOURS
Qty: 0 | Refills: 0 | Status: DISCONTINUED | OUTPATIENT
Start: 2017-10-05 | End: 2017-10-09

## 2017-10-05 RX ORDER — CARBIDOPA AND LEVODOPA 25; 100 MG/1; MG/1
1 TABLET ORAL THREE TIMES A DAY
Qty: 0 | Refills: 0 | Status: DISCONTINUED | OUTPATIENT
Start: 2017-10-05 | End: 2017-10-09

## 2017-10-05 RX ORDER — ACETAMINOPHEN 500 MG
650 TABLET ORAL ONCE
Qty: 0 | Refills: 0 | Status: COMPLETED | OUTPATIENT
Start: 2017-10-05 | End: 2017-10-05

## 2017-10-05 RX ORDER — FAMOTIDINE 10 MG/ML
20 INJECTION INTRAVENOUS
Qty: 0 | Refills: 0 | Status: DISCONTINUED | OUTPATIENT
Start: 2017-10-05 | End: 2017-10-09

## 2017-10-05 RX ORDER — ENOXAPARIN SODIUM 100 MG/ML
40 INJECTION SUBCUTANEOUS DAILY
Qty: 0 | Refills: 0 | Status: DISCONTINUED | OUTPATIENT
Start: 2017-10-05 | End: 2017-10-05

## 2017-10-05 RX ORDER — ACETAMINOPHEN 500 MG
650 TABLET ORAL EVERY 6 HOURS
Qty: 0 | Refills: 0 | Status: DISCONTINUED | OUTPATIENT
Start: 2017-10-05 | End: 2017-10-09

## 2017-10-05 RX ORDER — UMECLIDINIUM 62.5 UG/1
1 AEROSOL, POWDER ORAL
Qty: 0 | Refills: 0 | COMMUNITY

## 2017-10-05 RX ADMIN — ENOXAPARIN SODIUM 40 MILLIGRAM(S): 100 INJECTION SUBCUTANEOUS at 12:21

## 2017-10-05 RX ADMIN — Medication 125 MILLIGRAM(S): at 04:00

## 2017-10-05 RX ADMIN — AZITHROMYCIN 255 MILLIGRAM(S): 500 TABLET, FILM COATED ORAL at 05:20

## 2017-10-05 RX ADMIN — HALOPERIDOL DECANOATE 10 MILLIGRAM(S): 100 INJECTION INTRAMUSCULAR at 21:42

## 2017-10-05 RX ADMIN — CEFTRIAXONE 100 GRAM(S): 500 INJECTION, POWDER, FOR SOLUTION INTRAMUSCULAR; INTRAVENOUS at 04:00

## 2017-10-05 RX ADMIN — SODIUM CHLORIDE 999 MILLILITER(S): 9 INJECTION INTRAMUSCULAR; INTRAVENOUS; SUBCUTANEOUS at 04:00

## 2017-10-05 RX ADMIN — Medication 25 MILLIGRAM(S): at 17:33

## 2017-10-05 RX ADMIN — SODIUM CHLORIDE 200 MILLILITER(S): 9 INJECTION INTRAMUSCULAR; INTRAVENOUS; SUBCUTANEOUS at 16:26

## 2017-10-05 RX ADMIN — CARBIDOPA AND LEVODOPA 1 TABLET(S): 25; 100 TABLET ORAL at 12:20

## 2017-10-05 RX ADMIN — DIVALPROEX SODIUM 1000 MILLIGRAM(S): 500 TABLET, DELAYED RELEASE ORAL at 21:41

## 2017-10-05 RX ADMIN — SODIUM CHLORIDE 999 MILLILITER(S): 9 INJECTION INTRAMUSCULAR; INTRAVENOUS; SUBCUTANEOUS at 03:45

## 2017-10-05 RX ADMIN — RISPERIDONE 4 MILLIGRAM(S): 4 TABLET ORAL at 12:20

## 2017-10-05 RX ADMIN — Medication 650 MILLIGRAM(S): at 04:42

## 2017-10-05 RX ADMIN — BUDESONIDE AND FORMOTEROL FUMARATE DIHYDRATE 2 PUFF(S): 160; 4.5 AEROSOL RESPIRATORY (INHALATION) at 20:58

## 2017-10-05 RX ADMIN — SODIUM CHLORIDE 200 MILLILITER(S): 9 INJECTION INTRAMUSCULAR; INTRAVENOUS; SUBCUTANEOUS at 06:04

## 2017-10-05 RX ADMIN — CARBIDOPA AND LEVODOPA 1 TABLET(S): 25; 100 TABLET ORAL at 21:41

## 2017-10-05 RX ADMIN — ROPINIROLE 0.5 MILLIGRAM(S): 8 TABLET, FILM COATED, EXTENDED RELEASE ORAL at 21:42

## 2017-10-05 RX ADMIN — FAMOTIDINE 20 MILLIGRAM(S): 10 INJECTION INTRAVENOUS at 17:33

## 2017-10-05 NOTE — ED ADULT NURSE NOTE - OBJECTIVE STATEMENT
PAtient presents to ER complaining of difficulty breathing and fever, as per EMS patient received bombi TX and IV during transports,. patient tolerated well, wheezing noted in triage, code sepsis called at [phone], will monitor patient.

## 2017-10-05 NOTE — ED PROVIDER NOTE - CARE PLAN
Principal Discharge DX:	COPD exacerbation  Secondary Diagnosis:	SIRS (systemic inflammatory response syndrome)

## 2017-10-05 NOTE — H&P ADULT - NSHPPHYSICALEXAM_GEN_ALL_CORE
General appearance: NAD, somnolent but easily awakened  HEENT: NCAT, Conjunctiva clear, EOMI, Pupils reactive  Neck: Supple, No JVD, No tenderness  Lungs: Clear to auscultation, Breath sound equal bilaterally, No wheezes, Mild rales  Cardiovascular: S1S2, Regular rhythm  Abdomen: Soft, Nontender, Nondistended, No guarding/rebound, Positive bowel sounds  Extremities: No clubbing, No cyanosis, No edema, No calf tenderness  Neuro: Strength equal bilaterally, No tremors  Psychiatric: Somnolent, Cooperative

## 2017-10-05 NOTE — H&P ADULT - ASSESSMENT
67M with dyspnea, cough, and fever    Sepsis - Empiric antibiotics were initiated. Culture results to be followed.    COPD - Inhaled bronchodilator therapy. Monitoring oxygen saturation.    Schizophrenia - On haloperidol and risperidone.    Parkinson's disease - Ambulation with assistance. On cabidopa/levodopa and ropinirole.

## 2017-10-05 NOTE — ED PROVIDER NOTE - NS ED ROS FT
no weight change, no fever or chills  no rash, no bruises  no visual changes no eye discharge  has cough cold or congestion,   hpi  no orthopnea, no pnd  no abd pain, no n/v/d  no hematuria, no change in urinary habits  no joint pain, no deformity  no headache, no paresthesia, has gait instability

## 2017-10-05 NOTE — ED PROVIDER NOTE - MEDICAL DECISION MAKING DETAILS
67y odl with parkinsons, from assisted living, fever, sepsis , protocol followed, fluids, steroids, resp traetmenst, plan to admit

## 2017-10-05 NOTE — ED PROVIDER NOTE - PHYSICAL EXAMINATION
Constitutional : Appears uncomfortably, talking in short sentences  Head :NC AT , no swelling, tachypnea, retractions  Eyes :eomi, no swelling  Mouth :mm moist,   Neck : supple, trachea in midline  Chest :Yoel air entry, symm chest expansion, mild yo lod distress, wheezing  Heart :S1 S2 distant  Abdomen :abd soft, non tender  Musc/Skel :ext no swelling, no deformity, no spine tenderness, distal pulses present, resting tremors  Neuro  :AAO 3 difficult to access

## 2017-10-05 NOTE — H&P ADULT - HISTORY OF PRESENT ILLNESS
67M presented from [facility] with cough, dyspnea, and fevers. The patient was somnolent on interview but easily awakened by voice. He was unable to provide any further detail into his condition. He denied any chest pain or palpitations. There was no hemoptysis. He denied any abdominal pain, diarrhea, or dysuria. Review of the transfer documentation noted a history of Parkinson's and schizophrenia. Further information is not obtainable.

## 2017-10-05 NOTE — ED PROVIDER NOTE - OBJECTIVE STATEMENT
67y old with h/o parkinson, presents with fever, cough 67y old with h/o parkinson, presents with fever, cough, prof, gradual, constant, aggravated with sob, no releif with meds, previous h/o same, from facility

## 2017-10-05 NOTE — ED ADULT NURSE NOTE - PMH
Chronic obstructive pulmonary disease, unspecified COPD type    Parkinson disease    Schizophrenia, unspecified type

## 2017-10-06 LAB
ANION GAP SERPL CALC-SCNC: 10 MMOL/L — SIGNIFICANT CHANGE UP (ref 5–17)
BUN SERPL-MCNC: 9 MG/DL — SIGNIFICANT CHANGE UP (ref 8–20)
CALCIUM SERPL-MCNC: 7.9 MG/DL — LOW (ref 8.6–10.2)
CHLORIDE SERPL-SCNC: 103 MMOL/L — SIGNIFICANT CHANGE UP (ref 98–107)
CO2 SERPL-SCNC: 28 MMOL/L — SIGNIFICANT CHANGE UP (ref 22–29)
CREAT SERPL-MCNC: 0.45 MG/DL — LOW (ref 0.5–1.3)
GLUCOSE SERPL-MCNC: 84 MG/DL — SIGNIFICANT CHANGE UP (ref 70–115)
HCT VFR BLD CALC: 34.9 % — LOW (ref 42–52)
HGB BLD-MCNC: 10.9 G/DL — LOW (ref 14–18)
MCHC RBC-ENTMCNC: 31.2 G/DL — LOW (ref 32–36)
MCHC RBC-ENTMCNC: 32.2 PG — HIGH (ref 27–31)
MCV RBC AUTO: 102.9 FL — HIGH (ref 80–94)
PLATELET # BLD AUTO: 119 K/UL — LOW (ref 150–400)
POTASSIUM SERPL-MCNC: 3.6 MMOL/L — SIGNIFICANT CHANGE UP (ref 3.5–5.3)
POTASSIUM SERPL-SCNC: 3.6 MMOL/L — SIGNIFICANT CHANGE UP (ref 3.5–5.3)
RBC # BLD: 3.32 M/UL — LOW (ref 4.6–6.2)
RBC # FLD: 12.8 % — SIGNIFICANT CHANGE UP (ref 11–15.6)
SODIUM SERPL-SCNC: 141 MMOL/L — SIGNIFICANT CHANGE UP (ref 135–145)
WBC # BLD: 4.6 K/UL — LOW (ref 4.8–10.8)
WBC # FLD AUTO: 4.6 K/UL — LOW (ref 4.8–10.8)

## 2017-10-06 PROCEDURE — 99233 SBSQ HOSP IP/OBS HIGH 50: CPT

## 2017-10-06 RX ADMIN — Medication 25 MILLIGRAM(S): at 05:09

## 2017-10-06 RX ADMIN — ENOXAPARIN SODIUM 40 MILLIGRAM(S): 100 INJECTION SUBCUTANEOUS at 13:39

## 2017-10-06 RX ADMIN — BUDESONIDE AND FORMOTEROL FUMARATE DIHYDRATE 2 PUFF(S): 160; 4.5 AEROSOL RESPIRATORY (INHALATION) at 08:56

## 2017-10-06 RX ADMIN — Medication 3 MILLILITER(S): at 20:40

## 2017-10-06 RX ADMIN — RISPERIDONE 4 MILLIGRAM(S): 4 TABLET ORAL at 17:10

## 2017-10-06 RX ADMIN — ROPINIROLE 0.5 MILLIGRAM(S): 8 TABLET, FILM COATED, EXTENDED RELEASE ORAL at 05:10

## 2017-10-06 RX ADMIN — FAMOTIDINE 20 MILLIGRAM(S): 10 INJECTION INTRAVENOUS at 05:09

## 2017-10-06 RX ADMIN — FAMOTIDINE 20 MILLIGRAM(S): 10 INJECTION INTRAVENOUS at 17:09

## 2017-10-06 RX ADMIN — CARBIDOPA AND LEVODOPA 1 TABLET(S): 25; 100 TABLET ORAL at 13:39

## 2017-10-06 RX ADMIN — BUDESONIDE AND FORMOTEROL FUMARATE DIHYDRATE 2 PUFF(S): 160; 4.5 AEROSOL RESPIRATORY (INHALATION) at 20:40

## 2017-10-06 RX ADMIN — AZITHROMYCIN 255 MILLIGRAM(S): 500 TABLET, FILM COATED ORAL at 05:08

## 2017-10-06 RX ADMIN — CARBIDOPA AND LEVODOPA 1 TABLET(S): 25; 100 TABLET ORAL at 21:05

## 2017-10-06 RX ADMIN — Medication 3 MILLILITER(S): at 05:53

## 2017-10-06 RX ADMIN — Medication 3 MILLILITER(S): at 12:31

## 2017-10-06 RX ADMIN — CEFTRIAXONE 100 GRAM(S): 500 INJECTION, POWDER, FOR SOLUTION INTRAMUSCULAR; INTRAVENOUS at 05:08

## 2017-10-06 RX ADMIN — DIVALPROEX SODIUM 1000 MILLIGRAM(S): 500 TABLET, DELAYED RELEASE ORAL at 21:04

## 2017-10-06 RX ADMIN — CARBIDOPA AND LEVODOPA 1 TABLET(S): 25; 100 TABLET ORAL at 05:09

## 2017-10-06 RX ADMIN — HALOPERIDOL DECANOATE 10 MILLIGRAM(S): 100 INJECTION INTRAMUSCULAR at 21:05

## 2017-10-06 RX ADMIN — ROPINIROLE 0.5 MILLIGRAM(S): 8 TABLET, FILM COATED, EXTENDED RELEASE ORAL at 17:09

## 2017-10-06 RX ADMIN — SODIUM CHLORIDE 200 MILLILITER(S): 9 INJECTION INTRAMUSCULAR; INTRAVENOUS; SUBCUTANEOUS at 05:08

## 2017-10-06 NOTE — PROGRESS NOTE ADULT - SUBJECTIVE AND OBJECTIVE BOX
DAVY HOLDEN  ----------------------------------------  The patient was seen abd evalauted for pneumonia.  The patient is in no acute distress.  Denied any chest pain, palpitations, dyspnea, or abdominal pain.    Vital Signs Last 24 Hrs  T(C): 36.8 (06 Oct 2017 07:41), Max: 37.2 (05 Oct 2017 09:24)  T(F): 98.2 (06 Oct 2017 07:41), Max: 98.9 (05 Oct 2017 09:24)  HR: 57 (06 Oct 2017 07:41) (49 - 82)  BP: 108/69 (06 Oct 2017 07:41) (108/69 - 137/88)  BP(mean): --  RR: 18 (06 Oct 2017 07:41) (18 - 19)  SpO2: 98% (06 Oct 2017 00:05) (95% - 99%)    PHYSICAL EXAMINATION:  ----------------------------------------  General appearance: NAD, somnolent but easily awakened  HEENT: NCAT, Conjunctiva clear, EOMI, Pupils reactive  Neck: Supple, No JVD, No tenderness  Lungs: Clear to auscultation, Breath sound equal bilaterally, No wheezes, Mild rales  Cardiovascular: S1S2, Regular rhythm  Abdomen: Soft, Nontender, Nondistended, No guarding/rebound, Positive bowel sounds  Extremities: No cyanosis, No edema, No calf tenderness  Neuro: Strength equal bilaterally, Positive tremors  Psychiatric: Somnolent, Cooperative    LABORATORY STUDIES:  ----------------------------------------             13.3   5.8   )-----------( 139      ( 05 Oct 2017 04:16 )             39.4     10-05    139  |  97<L>  |  11.0  ----------------------------<  90  4.0   |  30.0<H>  |  0.69    Ca    9.0      05 Oct 2017 04:16  Phos  2.4     10-05  Mg     2.0     10-05    TPro  7.1  /  Alb  3.5  /  TBili  0.7  /  DBili  x   /  AST  46<H>  /  ALT  14  /  AlkPhos  76  10-05    LIVER FUNCTIONS - ( 05 Oct 2017 04:16 )  Alb: 3.5 g/dL / Pro: 7.1 g/dL / ALK PHOS: 76 U/L / ALT: 14 U/L / AST: 46 U/L / GGT: x           PT/INR - ( 05 Oct 2017 04:16 )   PT: 16.1 sec;   INR: 1.45 ratio       PTT - ( 05 Oct 2017 04:16 )  PTT:30.8 sec  CARDIAC MARKERS ( 05 Oct 2017 04:16 )  x     / <0.01 ng/mL / x     / x     / x        MEDICATIONS:  ----------------------------------------  MEDICATIONS  (STANDING):  azithromycin  IVPB 500 milliGRAM(s) IV Intermittent every 24 hours  buDESOnide 160 MICROgram(s)/formoterol 4.5 MICROgram(s) Inhaler 2 Puff(s) Inhalation two times a day  carbidopa/levodopa  25/100 1 Tablet(s) Oral three times a day  cefTRIAXone   IVPB 1 Gram(s) IV Intermittent every 24 hours  diVALproex ER 1000 milliGRAM(s) Oral at bedtime  enoxaparin Injectable 40 milliGRAM(s) SubCutaneous daily  famotidine    Tablet 20 milliGRAM(s) Oral two times a day  haloperidol     Tablet 10 milliGRAM(s) Oral at bedtime  metoprolol succinate ER 25 milliGRAM(s) Oral daily  risperiDONE   Tablet 4 milliGRAM(s) Oral daily  rOPINIRole 0.5 milliGRAM(s) Oral two times a day    MEDICATIONS  (PRN):  acetaminophen   Tablet 650 milliGRAM(s) Oral every 6 hours PRN For Temp greater than 38 C (100.4 F)  ALBUTerol/ipratropium for Nebulization 3 milliLiter(s) Nebulizer every 6 hours PRN Respiratory Distress      ASSESSMENT / PLAN:  ----------------------------------------  Sepsis - Afebrile. On intravenous antibiotics. Culture results to be followed.    COPD - Inhaled bronchodilator therapy. Monitoring oxygen saturation.    Schizophrenia - On haloperidol and risperidone. Cooperative with examination.    Parkinson's disease - Ambulation with assistance. On carbidopa/levodopa and ropinirole.

## 2017-10-07 LAB
ANION GAP SERPL CALC-SCNC: 8 MMOL/L — SIGNIFICANT CHANGE UP (ref 5–17)
BUN SERPL-MCNC: 10 MG/DL — SIGNIFICANT CHANGE UP (ref 8–20)
CALCIUM SERPL-MCNC: 8.3 MG/DL — LOW (ref 8.6–10.2)
CHLORIDE SERPL-SCNC: 100 MMOL/L — SIGNIFICANT CHANGE UP (ref 98–107)
CO2 SERPL-SCNC: 33 MMOL/L — HIGH (ref 22–29)
CREAT SERPL-MCNC: 0.55 MG/DL — SIGNIFICANT CHANGE UP (ref 0.5–1.3)
GLUCOSE SERPL-MCNC: 77 MG/DL — SIGNIFICANT CHANGE UP (ref 70–115)
HCT VFR BLD CALC: 36.3 % — LOW (ref 42–52)
HGB BLD-MCNC: 11.6 G/DL — LOW (ref 14–18)
MCHC RBC-ENTMCNC: 32 G/DL — SIGNIFICANT CHANGE UP (ref 32–36)
MCHC RBC-ENTMCNC: 32.8 PG — HIGH (ref 27–31)
MCV RBC AUTO: 102.5 FL — HIGH (ref 80–94)
PLATELET # BLD AUTO: 125 K/UL — LOW (ref 150–400)
POTASSIUM SERPL-MCNC: 3.6 MMOL/L — SIGNIFICANT CHANGE UP (ref 3.5–5.3)
POTASSIUM SERPL-SCNC: 3.6 MMOL/L — SIGNIFICANT CHANGE UP (ref 3.5–5.3)
RBC # BLD: 3.54 M/UL — LOW (ref 4.6–6.2)
RBC # FLD: 12.7 % — SIGNIFICANT CHANGE UP (ref 11–15.6)
SODIUM SERPL-SCNC: 141 MMOL/L — SIGNIFICANT CHANGE UP (ref 135–145)
WBC # BLD: 3.8 K/UL — LOW (ref 4.8–10.8)
WBC # FLD AUTO: 3.8 K/UL — LOW (ref 4.8–10.8)

## 2017-10-07 PROCEDURE — 99233 SBSQ HOSP IP/OBS HIGH 50: CPT

## 2017-10-07 RX ORDER — AZITHROMYCIN 500 MG/1
250 TABLET, FILM COATED ORAL DAILY
Qty: 0 | Refills: 0 | Status: DISCONTINUED | OUTPATIENT
Start: 2017-10-08 | End: 2017-10-09

## 2017-10-07 RX ADMIN — BUDESONIDE AND FORMOTEROL FUMARATE DIHYDRATE 2 PUFF(S): 160; 4.5 AEROSOL RESPIRATORY (INHALATION) at 20:30

## 2017-10-07 RX ADMIN — ROPINIROLE 0.5 MILLIGRAM(S): 8 TABLET, FILM COATED, EXTENDED RELEASE ORAL at 18:01

## 2017-10-07 RX ADMIN — DIVALPROEX SODIUM 1000 MILLIGRAM(S): 500 TABLET, DELAYED RELEASE ORAL at 21:04

## 2017-10-07 RX ADMIN — CARBIDOPA AND LEVODOPA 1 TABLET(S): 25; 100 TABLET ORAL at 21:04

## 2017-10-07 RX ADMIN — Medication 3 MILLILITER(S): at 15:39

## 2017-10-07 RX ADMIN — Medication 25 MILLIGRAM(S): at 05:06

## 2017-10-07 RX ADMIN — Medication 40 MILLIGRAM(S): at 12:37

## 2017-10-07 RX ADMIN — RISPERIDONE 4 MILLIGRAM(S): 4 TABLET ORAL at 12:38

## 2017-10-07 RX ADMIN — AZITHROMYCIN 255 MILLIGRAM(S): 500 TABLET, FILM COATED ORAL at 05:06

## 2017-10-07 RX ADMIN — CARBIDOPA AND LEVODOPA 1 TABLET(S): 25; 100 TABLET ORAL at 14:44

## 2017-10-07 RX ADMIN — BUDESONIDE AND FORMOTEROL FUMARATE DIHYDRATE 2 PUFF(S): 160; 4.5 AEROSOL RESPIRATORY (INHALATION) at 08:53

## 2017-10-07 RX ADMIN — FAMOTIDINE 20 MILLIGRAM(S): 10 INJECTION INTRAVENOUS at 18:01

## 2017-10-07 RX ADMIN — ENOXAPARIN SODIUM 40 MILLIGRAM(S): 100 INJECTION SUBCUTANEOUS at 12:38

## 2017-10-07 RX ADMIN — CARBIDOPA AND LEVODOPA 1 TABLET(S): 25; 100 TABLET ORAL at 05:06

## 2017-10-07 RX ADMIN — CEFTRIAXONE 100 GRAM(S): 500 INJECTION, POWDER, FOR SOLUTION INTRAMUSCULAR; INTRAVENOUS at 05:06

## 2017-10-07 RX ADMIN — FAMOTIDINE 20 MILLIGRAM(S): 10 INJECTION INTRAVENOUS at 05:06

## 2017-10-07 RX ADMIN — Medication 3 MILLILITER(S): at 20:30

## 2017-10-07 RX ADMIN — ROPINIROLE 0.5 MILLIGRAM(S): 8 TABLET, FILM COATED, EXTENDED RELEASE ORAL at 05:07

## 2017-10-07 RX ADMIN — HALOPERIDOL DECANOATE 10 MILLIGRAM(S): 100 INJECTION INTRAMUSCULAR at 21:04

## 2017-10-07 RX ADMIN — Medication 3 MILLILITER(S): at 08:53

## 2017-10-07 NOTE — PROGRESS NOTE ADULT - SUBJECTIVE AND OBJECTIVE BOX
DAVY JOHNSONJE  ----------------------------------------  The patient was seen abd evaluated for pneumonia.  The patient is in no acute distress.  Denied any chest pain, palpitations, or abdominal pain.  Reports some dyspnea and wheeazing.    Vital Signs Last 24 Hrs  T(C): 37.1 (07 Oct 2017 08:33), Max: 37.1 (07 Oct 2017 08:33)  T(F): 98.8 (07 Oct 2017 08:33), Max: 98.8 (07 Oct 2017 08:33)  HR: 80 (07 Oct 2017 08:33) (77 - 80)  BP: 137/80 (07 Oct 2017 08:33) (137/80 - 141/84)  BP(mean): --  RR: 18 (07 Oct 2017 08:33) (18 - 20)  SpO2: 97% (07 Oct 2017 08:33) (91% - 97%)    PHYSICAL EXAMINATION:  ----------------------------------------  General appearance: NAD, awake, Alert  HEENT: NCAT, Conjunctiva clear, EOMI, Pupils reactive  Neck: Supple, No JVD, No tenderness  Lungs: Clear to auscultation, Breath sound equal bilaterally, Mild rales, Expiratory wheezes  Cardiovascular: S1S2, Regular rhythm  Abdomen: Soft, Nontender, Nondistended, No guarding/rebound, Positive bowel sounds  Extremities: No cyanosis, No edema, No calf tenderness  Neuro: Strength equal bilaterally, Positive tremors  Psychiatric: Somnolent, Cooperative    LABORATORY STUDIES:  ----------------------------------------             11.6   3.8   )-----------( 125      ( 07 Oct 2017 07:46 )             36.3     10-07    141  |  100  |  10.0  ----------------------------<  77  3.6   |  33.0<H>  |  0.55    Ca    8.3<L>      07 Oct 2017 07:46    Culture - Blood (collected 05 Oct 2017 04:16)  Source: .Blood Blood-Peripheral  Preliminary Report (07 Oct 2017 05:01):    No growth at 48 hours    Culture - Blood (collected 05 Oct 2017 04:16)  Source: .Blood Blood-Peripheral  Preliminary Report (07 Oct 2017 05:01):    No growth at 48 hours    MEDICATIONS  (STANDING):  azithromycin  IVPB 500 milliGRAM(s) IV Intermittent every 24 hours  buDESOnide 160 MICROgram(s)/formoterol 4.5 MICROgram(s) Inhaler 2 Puff(s) Inhalation two times a day  carbidopa/levodopa  25/100 1 Tablet(s) Oral three times a day  cefTRIAXone   IVPB 1 Gram(s) IV Intermittent every 24 hours  diVALproex ER 1000 milliGRAM(s) Oral at bedtime  enoxaparin Injectable 40 milliGRAM(s) SubCutaneous daily  famotidine    Tablet 20 milliGRAM(s) Oral two times a day  haloperidol     Tablet 10 milliGRAM(s) Oral at bedtime  metoprolol succinate ER 25 milliGRAM(s) Oral daily  risperiDONE   Tablet 4 milliGRAM(s) Oral daily  rOPINIRole 0.5 milliGRAM(s) Oral two times a day    MEDICATIONS  (PRN):  acetaminophen   Tablet 650 milliGRAM(s) Oral every 6 hours PRN For Temp greater than 38 C (100.4 F)  ALBUTerol/ipratropium for Nebulization 3 milliLiter(s) Nebulizer every 6 hours PRN Respiratory Distress      ASSESSMENT / PLAN:  ----------------------------------------  Sepsis - Afebrile. On intravenous antibiotics. Culture results without growth so far.    COPD - Inhaled bronchodilator therapy. Monitoring oxygen saturation. Prednisone initiated.    Schizophrenia - On haloperidol and risperidone. Cooperative with examination.    Parkinson's disease - Ambulation with assistance. On carbidopa/levodopa and ropinirole. DAVY JOHNSONJE  ----------------------------------------  The patient was seen abd evaluated for pneumonia.  The patient is in no acute distress.  Denied any chest pain, palpitations, or abdominal pain.  Reports some dyspnea and wheeazing.    Vital Signs Last 24 Hrs  T(C): 37.1 (07 Oct 2017 08:33), Max: 37.1 (07 Oct 2017 08:33)  T(F): 98.8 (07 Oct 2017 08:33), Max: 98.8 (07 Oct 2017 08:33)  HR: 80 (07 Oct 2017 08:33) (77 - 80)  BP: 137/80 (07 Oct 2017 08:33) (137/80 - 141/84)  BP(mean): --  RR: 18 (07 Oct 2017 08:33) (18 - 20)  SpO2: 97% (07 Oct 2017 08:33) (91% - 97%)    PHYSICAL EXAMINATION:  ----------------------------------------  General appearance: NAD, awake, Alert  HEENT: NCAT, Conjunctiva clear, EOMI, Pupils reactive  Neck: Supple, No JVD, No tenderness  Lungs: Clear to auscultation, Breath sound equal bilaterally, Mild rales, Expiratory wheezes  Cardiovascular: S1S2, Regular rhythm  Abdomen: Soft, Nontender, Nondistended, No guarding/rebound, Positive bowel sounds  Extremities: No cyanosis, No edema, No calf tenderness  Neuro: Strength equal bilaterally, Positive tremors  Psychiatric: Somnolent, Cooperative    LABORATORY STUDIES:  ----------------------------------------             11.6   3.8   )-----------( 125      ( 07 Oct 2017 07:46 )             36.3     10-07    141  |  100  |  10.0  ----------------------------<  77  3.6   |  33.0<H>  |  0.55    Ca    8.3<L>      07 Oct 2017 07:46    Culture - Blood (collected 05 Oct 2017 04:16)  Source: .Blood Blood-Peripheral  Preliminary Report (07 Oct 2017 05:01):    No growth at 48 hours    Culture - Blood (collected 05 Oct 2017 04:16)  Source: .Blood Blood-Peripheral  Preliminary Report (07 Oct 2017 05:01):    No growth at 48 hours    MEDICATIONS  (STANDING):  azithromycin  IVPB 500 milliGRAM(s) IV Intermittent every 24 hours  buDESOnide 160 MICROgram(s)/formoterol 4.5 MICROgram(s) Inhaler 2 Puff(s) Inhalation two times a day  carbidopa/levodopa  25/100 1 Tablet(s) Oral three times a day  cefTRIAXone   IVPB 1 Gram(s) IV Intermittent every 24 hours  diVALproex ER 1000 milliGRAM(s) Oral at bedtime  enoxaparin Injectable 40 milliGRAM(s) SubCutaneous daily  famotidine    Tablet 20 milliGRAM(s) Oral two times a day  haloperidol     Tablet 10 milliGRAM(s) Oral at bedtime  metoprolol succinate ER 25 milliGRAM(s) Oral daily  risperiDONE   Tablet 4 milliGRAM(s) Oral daily  rOPINIRole 0.5 milliGRAM(s) Oral two times a day    MEDICATIONS  (PRN):  acetaminophen   Tablet 650 milliGRAM(s) Oral every 6 hours PRN For Temp greater than 38 C (100.4 F)  ALBUTerol/ipratropium for Nebulization 3 milliLiter(s) Nebulizer every 6 hours PRN Respiratory Distress      ASSESSMENT / PLAN:  ----------------------------------------  Sepsis - Afebrile. On intravenous antibiotics. Culture results without growth so far.    COPD - Inhaled bronchodilator therapy. Monitoring oxygen saturation. Prednisone initiated.    Schizophrenia - On haloperidol and risperidone. Cooperative with examination.    Parkinson's disease - Ambulation with assistance. On carbidopa/levodopa and ropinirole.    Pancytopenia - Monitoring CBC. DAVY JOHNSONJE  ----------------------------------------  The patient was seen abd evaluated for pneumonia.  The patient is in no acute distress.  Denied any chest pain, palpitations, or abdominal pain.  Reports some dyspnea and wheeazing.    Vital Signs Last 24 Hrs  T(C): 37.1 (07 Oct 2017 08:33), Max: 37.1 (07 Oct 2017 08:33)  T(F): 98.8 (07 Oct 2017 08:33), Max: 98.8 (07 Oct 2017 08:33)  HR: 80 (07 Oct 2017 08:33) (77 - 80)  BP: 137/80 (07 Oct 2017 08:33) (137/80 - 141/84)  BP(mean): --  RR: 18 (07 Oct 2017 08:33) (18 - 20)  SpO2: 97% (07 Oct 2017 08:33) (91% - 97%)    PHYSICAL EXAMINATION:  ----------------------------------------  General appearance: NAD, awake, Alert  HEENT: NCAT, Conjunctiva clear, EOMI, Pupils reactive  Neck: Supple, No JVD, No tenderness  Lungs: Clear to auscultation, Breath sound equal bilaterally, Mild rales, Expiratory wheezes  Cardiovascular: S1S2, Regular rhythm  Abdomen: Soft, Nontender, Nondistended, No guarding/rebound, Positive bowel sounds  Extremities: No cyanosis, No edema, No calf tenderness  Neuro: Strength equal bilaterally, Positive tremors  Psychiatric: Somnolent, Cooperative    LABORATORY STUDIES:  ----------------------------------------             11.6   3.8   )-----------( 125      ( 07 Oct 2017 07:46 )             36.3     10-07    141  |  100  |  10.0  ----------------------------<  77  3.6   |  33.0<H>  |  0.55    Ca    8.3<L>      07 Oct 2017 07:46    Culture - Blood (collected 05 Oct 2017 04:16)  Source: .Blood Blood-Peripheral  Preliminary Report (07 Oct 2017 05:01):    No growth at 48 hours    Culture - Blood (collected 05 Oct 2017 04:16)  Source: .Blood Blood-Peripheral  Preliminary Report (07 Oct 2017 05:01):    No growth at 48 hours    MEDICATIONS  (STANDING):  azithromycin  IVPB 500 milliGRAM(s) IV Intermittent every 24 hours  buDESOnide 160 MICROgram(s)/formoterol 4.5 MICROgram(s) Inhaler 2 Puff(s) Inhalation two times a day  carbidopa/levodopa  25/100 1 Tablet(s) Oral three times a day  cefTRIAXone   IVPB 1 Gram(s) IV Intermittent every 24 hours  diVALproex ER 1000 milliGRAM(s) Oral at bedtime  enoxaparin Injectable 40 milliGRAM(s) SubCutaneous daily  famotidine    Tablet 20 milliGRAM(s) Oral two times a day  haloperidol     Tablet 10 milliGRAM(s) Oral at bedtime  metoprolol succinate ER 25 milliGRAM(s) Oral daily  risperiDONE   Tablet 4 milliGRAM(s) Oral daily  rOPINIRole 0.5 milliGRAM(s) Oral two times a day    MEDICATIONS  (PRN):  acetaminophen   Tablet 650 milliGRAM(s) Oral every 6 hours PRN For Temp greater than 38 C (100.4 F)  ALBUTerol/ipratropium for Nebulization 3 milliLiter(s) Nebulizer every 6 hours PRN Respiratory Distress      ASSESSMENT / PLAN:  ----------------------------------------  67M with a prior admission for COPD presented with cough and fever. Empiric antibiotics were initiated for sepsis and the patient was also continued on inhaled bronchodilator therapy for COPD.    Sepsis - Afebrile. On intravenous antibiotics. Culture results without growth so far.    COPD - Inhaled bronchodilator therapy. Monitoring oxygen saturation. Prednisone initiated.    Schizophrenia - On haloperidol and risperidone. Cooperative with examination.    Parkinson's disease - Ambulation with assistance. On carbidopa/levodopa and ropinirole.    Pancytopenia - Monitoring CBC.    Lung nodule - Will require repeat imaging to follow.

## 2017-10-08 DIAGNOSIS — F20.9 SCHIZOPHRENIA, UNSPECIFIED: ICD-10-CM

## 2017-10-08 DIAGNOSIS — J44.1 CHRONIC OBSTRUCTIVE PULMONARY DISEASE WITH (ACUTE) EXACERBATION: ICD-10-CM

## 2017-10-08 DIAGNOSIS — G20 PARKINSON'S DISEASE: ICD-10-CM

## 2017-10-08 DIAGNOSIS — R65.10 SYSTEMIC INFLAMMATORY RESPONSE SYNDROME (SIRS) OF NON-INFECTIOUS ORIGIN WITHOUT ACUTE ORGAN DYSFUNCTION: ICD-10-CM

## 2017-10-08 PROCEDURE — 99233 SBSQ HOSP IP/OBS HIGH 50: CPT

## 2017-10-08 RX ADMIN — CARBIDOPA AND LEVODOPA 1 TABLET(S): 25; 100 TABLET ORAL at 21:47

## 2017-10-08 RX ADMIN — FAMOTIDINE 20 MILLIGRAM(S): 10 INJECTION INTRAVENOUS at 05:22

## 2017-10-08 RX ADMIN — Medication 650 MILLIGRAM(S): at 23:51

## 2017-10-08 RX ADMIN — Medication 25 MILLIGRAM(S): at 05:22

## 2017-10-08 RX ADMIN — Medication 3 MILLILITER(S): at 15:28

## 2017-10-08 RX ADMIN — BUDESONIDE AND FORMOTEROL FUMARATE DIHYDRATE 2 PUFF(S): 160; 4.5 AEROSOL RESPIRATORY (INHALATION) at 20:29

## 2017-10-08 RX ADMIN — RISPERIDONE 4 MILLIGRAM(S): 4 TABLET ORAL at 12:46

## 2017-10-08 RX ADMIN — BUDESONIDE AND FORMOTEROL FUMARATE DIHYDRATE 2 PUFF(S): 160; 4.5 AEROSOL RESPIRATORY (INHALATION) at 08:29

## 2017-10-08 RX ADMIN — CARBIDOPA AND LEVODOPA 1 TABLET(S): 25; 100 TABLET ORAL at 05:22

## 2017-10-08 RX ADMIN — FAMOTIDINE 20 MILLIGRAM(S): 10 INJECTION INTRAVENOUS at 17:56

## 2017-10-08 RX ADMIN — DIVALPROEX SODIUM 1000 MILLIGRAM(S): 500 TABLET, DELAYED RELEASE ORAL at 21:47

## 2017-10-08 RX ADMIN — CARBIDOPA AND LEVODOPA 1 TABLET(S): 25; 100 TABLET ORAL at 14:23

## 2017-10-08 RX ADMIN — Medication 3 MILLILITER(S): at 20:29

## 2017-10-08 RX ADMIN — ROPINIROLE 0.5 MILLIGRAM(S): 8 TABLET, FILM COATED, EXTENDED RELEASE ORAL at 17:56

## 2017-10-08 RX ADMIN — Medication 3 MILLILITER(S): at 08:26

## 2017-10-08 RX ADMIN — ROPINIROLE 0.5 MILLIGRAM(S): 8 TABLET, FILM COATED, EXTENDED RELEASE ORAL at 05:22

## 2017-10-08 RX ADMIN — HALOPERIDOL DECANOATE 10 MILLIGRAM(S): 100 INJECTION INTRAMUSCULAR at 21:47

## 2017-10-08 RX ADMIN — CEFTRIAXONE 100 GRAM(S): 500 INJECTION, POWDER, FOR SOLUTION INTRAMUSCULAR; INTRAVENOUS at 05:21

## 2017-10-08 RX ADMIN — AZITHROMYCIN 250 MILLIGRAM(S): 500 TABLET, FILM COATED ORAL at 12:46

## 2017-10-08 RX ADMIN — Medication 40 MILLIGRAM(S): at 05:22

## 2017-10-08 NOTE — PROGRESS NOTE ADULT - ASSESSMENT
67M with a prior admission for COPD presented with cough and fever. Empiric antibiotics were initiated for sepsis and the patient was also continued on inhaled bronchodilator therapy for COPD.

## 2017-10-08 NOTE — PROGRESS NOTE ADULT - SUBJECTIVE AND OBJECTIVE BOX
DAVY HOLDEN     Chief Complaint: Patient is a 67y old  Male who presents with a chief complaint of Coughing and shortness of breath (05 Oct 2017 08:32)      PAST MEDICAL & SURGICAL HISTORY:  Parkinson disease  Schizophrenia, unspecified type  Chronic obstructive pulmonary disease, unspecified COPD type  No significant past surgical history      HPI/OVERNIGHT EVENTS: Patient sleeping and he is arousable.    MEDICATIONS  (STANDING):  azithromycin   Tablet 250 milliGRAM(s) Oral daily  buDESOnide 160 MICROgram(s)/formoterol 4.5 MICROgram(s) Inhaler 2 Puff(s) Inhalation two times a day  carbidopa/levodopa  25/100 1 Tablet(s) Oral three times a day  cefTRIAXone   IVPB 1 Gram(s) IV Intermittent every 24 hours  diVALproex ER 1000 milliGRAM(s) Oral at bedtime  enoxaparin Injectable 40 milliGRAM(s) SubCutaneous daily  famotidine    Tablet 20 milliGRAM(s) Oral two times a day  haloperidol     Tablet 10 milliGRAM(s) Oral at bedtime  metoprolol succinate ER 25 milliGRAM(s) Oral daily  predniSONE   Tablet 40 milliGRAM(s) Oral daily  risperiDONE   Tablet 4 milliGRAM(s) Oral daily  rOPINIRole 0.5 milliGRAM(s) Oral two times a day      Vital Signs Last 24 Hrs  T(C): 36.4 (08 Oct 2017 07:59), Max: 37 (07 Oct 2017 23:31)  T(F): 97.6 (08 Oct 2017 07:59), Max: 98.6 (07 Oct 2017 23:31)  HR: 60 (08 Oct 2017 07:59) (60 - 113)  BP: 148/86 (08 Oct 2017 07:59) (129/91 - 148/86)  BP(mean): --  RR: 18 (08 Oct 2017 07:59) (18 - 18)  SpO2: 95% (08 Oct 2017 08:29) (95% - 99%)    PHYSICAL EXAM:  Constitutional: NAD, well-groomed, well-developed  HEENT: PERRLA, EOMI, Normal Hearing, MMM  Neck: No LAD, No JVD  Back: Normal spine flexure, No CVA tenderness  Respiratory: CTAB Cardiovascular: S1 and S2, RRR, no M/G/R  Gastrointestinal: BS+, soft, NT/ND  Extremities: No peripheral edema  Vascular: 2+ peripheral pulses  Neurological: A/O x 3, no focal deficits  Psychiatric: Normal mood, normal affect  Musculoskeletal: 5/5 strength b/l upper and lower extremities  Skin: No rashes    CAPILLARY BLOOD GLUCOSE    LABS:                        11.6   3.8   )-----------( 125      ( 07 Oct 2017 07:46 )             36.3     10-07    141  |  100  |  10.0  ----------------------------<  77  3.6   |  33.0<H>  |  0.55    Ca    8.3<L>      07 Oct 2017 07:46            RADIOLOGY & ADDITIONAL TESTS:

## 2017-10-09 ENCOUNTER — TRANSCRIPTION ENCOUNTER (OUTPATIENT)
Age: 67
End: 2017-10-09

## 2017-10-09 VITALS
TEMPERATURE: 98 F | DIASTOLIC BLOOD PRESSURE: 82 MMHG | HEART RATE: 82 BPM | RESPIRATION RATE: 18 BRPM | SYSTOLIC BLOOD PRESSURE: 138 MMHG | OXYGEN SATURATION: 95 %

## 2017-10-09 PROCEDURE — 83735 ASSAY OF MAGNESIUM: CPT

## 2017-10-09 PROCEDURE — 99239 HOSP IP/OBS DSCHRG MGMT >30: CPT

## 2017-10-09 PROCEDURE — 83605 ASSAY OF LACTIC ACID: CPT

## 2017-10-09 PROCEDURE — 71045 X-RAY EXAM CHEST 1 VIEW: CPT

## 2017-10-09 PROCEDURE — 97163 PT EVAL HIGH COMPLEX 45 MIN: CPT

## 2017-10-09 PROCEDURE — 96374 THER/PROPH/DIAG INJ IV PUSH: CPT

## 2017-10-09 PROCEDURE — 85027 COMPLETE CBC AUTOMATED: CPT

## 2017-10-09 PROCEDURE — 86850 RBC ANTIBODY SCREEN: CPT

## 2017-10-09 PROCEDURE — 84443 ASSAY THYROID STIM HORMONE: CPT

## 2017-10-09 PROCEDURE — 85610 PROTHROMBIN TIME: CPT

## 2017-10-09 PROCEDURE — 84484 ASSAY OF TROPONIN QUANT: CPT

## 2017-10-09 PROCEDURE — 96375 TX/PRO/DX INJ NEW DRUG ADDON: CPT

## 2017-10-09 PROCEDURE — 80048 BASIC METABOLIC PNL TOTAL CA: CPT

## 2017-10-09 PROCEDURE — 86900 BLOOD TYPING SEROLOGIC ABO: CPT

## 2017-10-09 PROCEDURE — 83615 LACTATE (LD) (LDH) ENZYME: CPT

## 2017-10-09 PROCEDURE — 87040 BLOOD CULTURE FOR BACTERIA: CPT

## 2017-10-09 PROCEDURE — 83880 ASSAY OF NATRIURETIC PEPTIDE: CPT

## 2017-10-09 PROCEDURE — 85730 THROMBOPLASTIN TIME PARTIAL: CPT

## 2017-10-09 PROCEDURE — 86901 BLOOD TYPING SEROLOGIC RH(D): CPT

## 2017-10-09 PROCEDURE — 84100 ASSAY OF PHOSPHORUS: CPT

## 2017-10-09 PROCEDURE — 80053 COMPREHEN METABOLIC PANEL: CPT

## 2017-10-09 PROCEDURE — 99285 EMERGENCY DEPT VISIT HI MDM: CPT | Mod: 25

## 2017-10-09 PROCEDURE — 93005 ELECTROCARDIOGRAM TRACING: CPT

## 2017-10-09 PROCEDURE — 94640 AIRWAY INHALATION TREATMENT: CPT

## 2017-10-09 PROCEDURE — 36415 COLL VENOUS BLD VENIPUNCTURE: CPT

## 2017-10-09 RX ORDER — IPRATROPIUM/ALBUTEROL SULFATE 18-103MCG
3 AEROSOL WITH ADAPTER (GRAM) INHALATION
Qty: 0 | Refills: 0 | DISCHARGE
Start: 2017-10-09

## 2017-10-09 RX ORDER — BUDESONIDE AND FORMOTEROL FUMARATE DIHYDRATE 160; 4.5 UG/1; UG/1
1 AEROSOL RESPIRATORY (INHALATION)
Qty: 0 | Refills: 0 | COMMUNITY

## 2017-10-09 RX ORDER — METOPROLOL TARTRATE 50 MG
1 TABLET ORAL
Qty: 0 | Refills: 0 | COMMUNITY
Start: 2017-10-09

## 2017-10-09 RX ORDER — BUDESONIDE AND FORMOTEROL FUMARATE DIHYDRATE 160; 4.5 UG/1; UG/1
2 AEROSOL RESPIRATORY (INHALATION)
Qty: 0 | Refills: 0 | DISCHARGE
Start: 2017-10-09

## 2017-10-09 RX ORDER — DIVALPROEX SODIUM 500 MG/1
2 TABLET, DELAYED RELEASE ORAL
Qty: 0 | Refills: 0 | COMMUNITY
Start: 2017-10-09

## 2017-10-09 RX ORDER — RISPERIDONE 4 MG/1
1 TABLET ORAL
Qty: 0 | Refills: 0 | COMMUNITY
Start: 2017-10-09

## 2017-10-09 RX ORDER — AZITHROMYCIN 500 MG/1
1 TABLET, FILM COATED ORAL
Qty: 0 | Refills: 0 | COMMUNITY
Start: 2017-10-09

## 2017-10-09 RX ORDER — HALOPERIDOL DECANOATE 100 MG/ML
1 INJECTION INTRAMUSCULAR
Qty: 0 | Refills: 0 | COMMUNITY

## 2017-10-09 RX ORDER — CARBIDOPA AND LEVODOPA 25; 100 MG/1; MG/1
1 TABLET ORAL
Qty: 0 | Refills: 0 | COMMUNITY
Start: 2017-10-09

## 2017-10-09 RX ORDER — DIVALPROEX SODIUM 500 MG/1
2 TABLET, DELAYED RELEASE ORAL
Qty: 0 | Refills: 0 | COMMUNITY

## 2017-10-09 RX ORDER — HALOPERIDOL DECANOATE 100 MG/ML
1 INJECTION INTRAMUSCULAR
Qty: 0 | Refills: 0 | COMMUNITY
Start: 2017-10-09

## 2017-10-09 RX ORDER — ROPINIROLE 8 MG/1
1 TABLET, FILM COATED, EXTENDED RELEASE ORAL
Qty: 0 | Refills: 0 | COMMUNITY

## 2017-10-09 RX ORDER — ROPINIROLE 8 MG/1
1 TABLET, FILM COATED, EXTENDED RELEASE ORAL
Qty: 0 | Refills: 0 | COMMUNITY
Start: 2017-10-09

## 2017-10-09 RX ADMIN — Medication 25 MILLIGRAM(S): at 06:59

## 2017-10-09 RX ADMIN — ROPINIROLE 0.5 MILLIGRAM(S): 8 TABLET, FILM COATED, EXTENDED RELEASE ORAL at 06:59

## 2017-10-09 RX ADMIN — FAMOTIDINE 20 MILLIGRAM(S): 10 INJECTION INTRAVENOUS at 06:59

## 2017-10-09 RX ADMIN — BUDESONIDE AND FORMOTEROL FUMARATE DIHYDRATE 2 PUFF(S): 160; 4.5 AEROSOL RESPIRATORY (INHALATION) at 08:45

## 2017-10-09 RX ADMIN — RISPERIDONE 4 MILLIGRAM(S): 4 TABLET ORAL at 12:33

## 2017-10-09 RX ADMIN — AZITHROMYCIN 250 MILLIGRAM(S): 500 TABLET, FILM COATED ORAL at 12:33

## 2017-10-09 RX ADMIN — Medication 40 MILLIGRAM(S): at 06:59

## 2017-10-09 RX ADMIN — CARBIDOPA AND LEVODOPA 1 TABLET(S): 25; 100 TABLET ORAL at 13:40

## 2017-10-09 RX ADMIN — CARBIDOPA AND LEVODOPA 1 TABLET(S): 25; 100 TABLET ORAL at 06:59

## 2017-10-09 RX ADMIN — ENOXAPARIN SODIUM 40 MILLIGRAM(S): 100 INJECTION SUBCUTANEOUS at 12:33

## 2017-10-09 RX ADMIN — Medication 3 MILLILITER(S): at 08:43

## 2017-10-09 NOTE — DISCHARGE NOTE ADULT - PATIENT PORTAL LINK FT
“You can access the [facility] Patient Portal, offered by [facility], by registering with the following website: [email or web address]”

## 2017-10-09 NOTE — PHYSICAL THERAPY INITIAL EVALUATION ADULT - ADDITIONAL COMMENTS
Pt lives in a group home. 8 steps to enter with handrails, no steps inside. Pt was independent PTA without assist device. Pt does not own DME.

## 2017-10-09 NOTE — DISCHARGE NOTE ADULT - HOSPITAL COURSE
67M with a prior admission for COPD presented with cough and fever. Empiric antibiotics were initiated for sepsis and the patient was also continued on inhaled bronchodilator therapy for COPD. On 10/9 patient is doing well, no shortness of breath.     Problem/Plan - 1:  ·  Problem: COPD exacerbation.  Plan: Respirations stable on 10/8. Continue prednisone.      Problem/Plan - 2:  ·  Problem: SIRS (systemic inflammatory response syndrome).  Plan: Continue antibiotics. Day four of rocephin and azithro. On 10/9 he may change to oral antibiotics.      Problem/Plan - 3:  ·  Problem: Parkinson disease.  Plan: Supportive care.      Problem/Plan - 4:  ·  Problem: Schizophrenia, unspecified type.  Plan: Continue risperidone.     Attending Attestation:   Stable for discharge.     I was physically present for the key portions of the evaluation and management (E/M) service provided.  I agree with the above history, physical, and plan which I have reviewed and edited where appropriate.     45 minutes spent on total encounter; more than 50% of the visit was spent counseling and/or coordinating care by the attending physician.

## 2017-10-09 NOTE — DISCHARGE NOTE ADULT - MEDICATION SUMMARY - MEDICATIONS TO TAKE
I will START or STAY ON the medications listed below when I get home from the hospital:    predniSONE 10 mg oral tablet  -- 4 tab(s) by mouth once a day for 7 days then  3 tabs a day for 7 days then  2 tabs a day for 7 days then  1 tab a day for 7 days  then stop  -- Indication: For Chronic obstructive pulmonary disease with acute exacerbation    divalproex sodium 500 mg oral tablet, extended release  -- 2 tab(s) by mouth once a day (at bedtime)  -- Indication: For Chronic obstructive pulmonary disease with acute exacerbation    rOPINIRole 0.5 mg oral tablet  -- 1 tab(s) by mouth 2 times a day  -- Indication: For Parkinson disease    Sinemet 25 mg-100 mg oral tablet  -- 1 tab(s) by mouth 3 times a day  -- Indication: For Parkinson disease    risperiDONE 4 mg oral tablet  -- 1 tab(s) by mouth once a day  -- Indication: For Schizophrenia, unspecified type    haloperidol 10 mg oral tablet  -- 1 tab(s) by mouth once a day (at bedtime)  -- Indication: For Schizophrenia, unspecified type    metoprolol succinate 25 mg oral tablet, extended release  -- 1 tab(s) by mouth once a day  -- Indication: For HTN    Ventolin HFA 90 mcg/inh inhalation aerosol  -- 1 puff(s) inhaled 4 times a day  -- Indication: For Chronic obstructive pulmonary disease with acute exacerbation    ipratropium-albuterol 0.5 mg-2.5 mg/3 mLinhalation solution  -- 3 milliliter(s) inhaled every 6 hours, As needed, Respiratory Distress  -- Indication: For COPD exacerbation    budesonide-formoterol 160 mcg-4.5 mcg/inh inhalation aerosol  -- 2 puff(s) inhaled 2 times a day  -- Indication: For COPD exacerbation    raNITIdine 150 mg oral tablet  -- 1 tab(s) by mouth once a day (at bedtime)  -- Indication: For GERD    azithromycin 250 mg oral tablet  -- 1 tab(s) by mouth once a day  -- Indication: For COPD exacerbation

## 2017-10-09 NOTE — PROGRESS NOTE ADULT - SUBJECTIVE AND OBJECTIVE BOX
DAVY MADYSONS     Chief Complaint: Patient is a 67y old  Male who presents with a chief complaint of Coughing and shortness of breath (05 Oct 2017 08:32)      PAST MEDICAL & SURGICAL HISTORY:  Parkinson disease  Schizophrenia, unspecified type  Chronic obstructive pulmonary disease, unspecified COPD type  No significant past surgical history      HPI/OVERNIGHT EVENTS: Patient is doing well. No shortness of breath.    MEDICATIONS  (STANDING):  azithromycin   Tablet 250 milliGRAM(s) Oral daily  buDESOnide 160 MICROgram(s)/formoterol 4.5 MICROgram(s) Inhaler 2 Puff(s) Inhalation two times a day  carbidopa/levodopa  25/100 1 Tablet(s) Oral three times a day  cefTRIAXone   IVPB 1 Gram(s) IV Intermittent every 24 hours  diVALproex ER 1000 milliGRAM(s) Oral at bedtime  enoxaparin Injectable 40 milliGRAM(s) SubCutaneous daily  famotidine    Tablet 20 milliGRAM(s) Oral two times a day  haloperidol     Tablet 10 milliGRAM(s) Oral at bedtime  metoprolol succinate ER 25 milliGRAM(s) Oral daily  predniSONE   Tablet 40 milliGRAM(s) Oral daily  risperiDONE   Tablet 4 milliGRAM(s) Oral daily  rOPINIRole 0.5 milliGRAM(s) Oral two times a day      Vital Signs Last 24 Hrs  T(C): 37 (09 Oct 2017 09:28), Max: 37 (09 Oct 2017 09:28)  T(F): 98.6 (09 Oct 2017 09:28), Max: 98.6 (09 Oct 2017 09:28)  HR: 85 (09 Oct 2017 09:28) (85 - 100)  BP: 143/87 (09 Oct 2017 09:28) (116/67 - 143/87)  BP(mean): --  RR: 18 (09 Oct 2017 09:28) (16 - 18)  SpO2: 95% (09 Oct 2017 09:28) (94% - 100%)    PHYSICAL EXAM:  Constitutional: NAD, well-groomed, well-developed  HEENT: PERRLA, EOMI, Normal Hearing, MMM  Neck: No LAD, No JVD  Back: Normal spine flexure, No CVA tenderness  Respiratory: CTAB Cardiovascular: S1 and S2, RRR, no M/G/R  Gastrointestinal: BS+, soft, NT/ND  Extremities: No peripheral edema  Vascular: 2+ peripheral pulses  Neurological: A/O x 3, no focal deficits  Psychiatric: Normal mood, normal affect  Musculoskeletal: 5/5 strength b/l upper and lower extremities  Skin: No rashes    CAPILLARY BLOOD GLUCOSE    LABS:                RADIOLOGY & ADDITIONAL TESTS:

## 2017-10-09 NOTE — DISCHARGE NOTE ADULT - CARE PLAN
Principal Discharge DX:	Chronic obstructive pulmonary disease, unspecified COPD type  Goal:	Stable respiration  Instructions for follow-up, activity and diet:	Long prednisone taper, continue nebulizer,  Complete course of azithromycin.  Secondary Diagnosis:	Parkinson disease  Goal:	Supportive care and Sinemet  Secondary Diagnosis:	Schizophrenia, unspecified type  Goal:	Continue supportive care  Secondary Diagnosis:	SIRS (systemic inflammatory response syndrome)  Goal:	Resolved

## 2017-10-09 NOTE — PROGRESS NOTE ADULT - ASSESSMENT
67M with a prior admission for COPD presented with cough and fever. Empiric antibiotics were initiated for sepsis and the patient was also continued on inhaled bronchodilator therapy for COPD. On 10/9 patient is doing well, no shortness of breath.

## 2017-10-09 NOTE — DISCHARGE NOTE ADULT - PLAN OF CARE
Stable respiration Long prednisone taper, continue nebulizer,  Complete course of azithromycin. Supportive care and Sinemet Continue supportive care Resolved

## 2017-10-09 NOTE — DISCHARGE NOTE ADULT - MEDICATION SUMMARY - MEDICATIONS TO STOP TAKING
I will STOP taking the medications listed below when I get home from the hospital:    betamethasone dipropionate 0.05% topical cream  -- Apply on skin to affected area 2 times a day    ketoconazole 2% topical cream  -- Apply on skin to affected area 2 times a day

## 2018-01-09 ENCOUNTER — INPATIENT (INPATIENT)
Facility: HOSPITAL | Age: 68
LOS: 6 days | Discharge: HOME | DRG: 190 | End: 2018-01-16
Attending: INTERNAL MEDICINE | Admitting: INTERNAL MEDICINE
Payer: MEDICARE

## 2018-01-09 VITALS
OXYGEN SATURATION: 91 % | TEMPERATURE: 100 F | RESPIRATION RATE: 30 BRPM | WEIGHT: 130.07 LBS | SYSTOLIC BLOOD PRESSURE: 144 MMHG | HEIGHT: 69 IN | HEART RATE: 134 BPM | DIASTOLIC BLOOD PRESSURE: 98 MMHG

## 2018-01-09 DIAGNOSIS — J44.9 CHRONIC OBSTRUCTIVE PULMONARY DISEASE, UNSPECIFIED: ICD-10-CM

## 2018-01-09 PROBLEM — G20 PARKINSON'S DISEASE: Chronic | Status: ACTIVE | Noted: 2017-10-05

## 2018-01-09 LAB
ALBUMIN SERPL ELPH-MCNC: 4 G/DL — SIGNIFICANT CHANGE UP (ref 3.3–5.2)
ALP SERPL-CCNC: 98 U/L — SIGNIFICANT CHANGE UP (ref 40–120)
ALT FLD-CCNC: 15 U/L — SIGNIFICANT CHANGE UP
ANION GAP SERPL CALC-SCNC: 16 MMOL/L — SIGNIFICANT CHANGE UP (ref 5–17)
APTT BLD: 33.8 SEC — SIGNIFICANT CHANGE UP (ref 27.5–37.4)
AST SERPL-CCNC: 27 U/L — SIGNIFICANT CHANGE UP
BILIRUB SERPL-MCNC: 0.7 MG/DL — SIGNIFICANT CHANGE UP (ref 0.4–2)
BUN SERPL-MCNC: 17 MG/DL — SIGNIFICANT CHANGE UP (ref 8–20)
CALCIUM SERPL-MCNC: 9.3 MG/DL — SIGNIFICANT CHANGE UP (ref 8.6–10.2)
CHLORIDE SERPL-SCNC: 98 MMOL/L — SIGNIFICANT CHANGE UP (ref 98–107)
CO2 SERPL-SCNC: 28 MMOL/L — SIGNIFICANT CHANGE UP (ref 22–29)
CREAT SERPL-MCNC: 0.9 MG/DL — SIGNIFICANT CHANGE UP (ref 0.5–1.3)
GLUCOSE SERPL-MCNC: 97 MG/DL — SIGNIFICANT CHANGE UP (ref 70–115)
HCT VFR BLD CALC: 41.8 % — LOW (ref 42–52)
HGB BLD-MCNC: 13.7 G/DL — LOW (ref 14–18)
INR BLD: 1.47 RATIO — HIGH (ref 0.88–1.16)
LACTATE BLDV-MCNC: 1.7 MMOL/L — SIGNIFICANT CHANGE UP (ref 0.5–2)
LIDOCAIN IGE QN: 13 U/L — LOW (ref 22–51)
LYMPHOCYTES # BLD AUTO: 0.8 K/UL — LOW (ref 1–4.8)
LYMPHOCYTES # BLD AUTO: 6 % — LOW (ref 20–55)
MAGNESIUM SERPL-MCNC: 2 MG/DL — SIGNIFICANT CHANGE UP (ref 1.8–2.6)
MCHC RBC-ENTMCNC: 32.4 PG — HIGH (ref 27–31)
MCHC RBC-ENTMCNC: 32.8 G/DL — SIGNIFICANT CHANGE UP (ref 32–36)
MCV RBC AUTO: 98.8 FL — HIGH (ref 80–94)
MONOCYTES # BLD AUTO: 2.3 K/UL — HIGH (ref 0–0.8)
MONOCYTES NFR BLD AUTO: 15 % — HIGH (ref 3–10)
NEUTROPHILS # BLD AUTO: 9 K/UL — HIGH (ref 1.8–8)
NEUTROPHILS NFR BLD AUTO: 75 % — HIGH (ref 37–73)
NEUTS BAND # BLD: 4 % — SIGNIFICANT CHANGE UP (ref 0–8)
NT-PROBNP SERPL-SCNC: 822 PG/ML — HIGH (ref 0–300)
PLAT MORPH BLD: NORMAL — SIGNIFICANT CHANGE UP
PLATELET # BLD AUTO: 203 K/UL — SIGNIFICANT CHANGE UP (ref 150–400)
POTASSIUM SERPL-MCNC: 4.3 MMOL/L — SIGNIFICANT CHANGE UP (ref 3.5–5.3)
POTASSIUM SERPL-SCNC: 4.3 MMOL/L — SIGNIFICANT CHANGE UP (ref 3.5–5.3)
PROT SERPL-MCNC: 7.7 G/DL — SIGNIFICANT CHANGE UP (ref 6.6–8.7)
PROTHROM AB SERPL-ACNC: 16.3 SEC — HIGH (ref 9.8–12.7)
RBC # BLD: 4.23 M/UL — LOW (ref 4.6–6.2)
RBC # FLD: 14.7 % — SIGNIFICANT CHANGE UP (ref 11–15.6)
RBC BLD AUTO: SIGNIFICANT CHANGE UP
SODIUM SERPL-SCNC: 142 MMOL/L — SIGNIFICANT CHANGE UP (ref 135–145)
TROPONIN T SERPL-MCNC: <0.01 NG/ML — SIGNIFICANT CHANGE UP (ref 0–0.06)
TSH SERPL-MCNC: 2.8 UIU/ML — SIGNIFICANT CHANGE UP (ref 0.27–4.2)
WBC # BLD: 12.1 K/UL — HIGH (ref 4.8–10.8)
WBC # FLD AUTO: 12.1 K/UL — HIGH (ref 4.8–10.8)

## 2018-01-09 PROCEDURE — 71045 X-RAY EXAM CHEST 1 VIEW: CPT | Mod: 26

## 2018-01-09 PROCEDURE — 99223 1ST HOSP IP/OBS HIGH 75: CPT | Mod: AI

## 2018-01-09 PROCEDURE — 99291 CRITICAL CARE FIRST HOUR: CPT

## 2018-01-09 PROCEDURE — 93010 ELECTROCARDIOGRAM REPORT: CPT

## 2018-01-09 RX ORDER — DIVALPROEX SODIUM 500 MG/1
1000 TABLET, DELAYED RELEASE ORAL AT BEDTIME
Qty: 0 | Refills: 0 | Status: DISCONTINUED | OUTPATIENT
Start: 2018-01-09 | End: 2018-01-16

## 2018-01-09 RX ORDER — BUDESONIDE AND FORMOTEROL FUMARATE DIHYDRATE 160; 4.5 UG/1; UG/1
2 AEROSOL RESPIRATORY (INHALATION)
Qty: 0 | Refills: 0 | Status: DISCONTINUED | OUTPATIENT
Start: 2018-01-09 | End: 2018-01-16

## 2018-01-09 RX ORDER — HALOPERIDOL DECANOATE 100 MG/ML
5 INJECTION INTRAMUSCULAR DAILY
Qty: 0 | Refills: 0 | Status: DISCONTINUED | OUTPATIENT
Start: 2018-01-09 | End: 2018-01-16

## 2018-01-09 RX ORDER — ALBUTEROL 90 UG/1
2.5 AEROSOL, METERED ORAL ONCE
Qty: 0 | Refills: 0 | Status: COMPLETED | OUTPATIENT
Start: 2018-01-09 | End: 2018-01-09

## 2018-01-09 RX ORDER — IPRATROPIUM/ALBUTEROL SULFATE 18-103MCG
3 AEROSOL WITH ADAPTER (GRAM) INHALATION EVERY 6 HOURS
Qty: 0 | Refills: 0 | Status: DISCONTINUED | OUTPATIENT
Start: 2018-01-09 | End: 2018-01-11

## 2018-01-09 RX ORDER — SODIUM CHLORIDE 9 MG/ML
2000 INJECTION INTRAMUSCULAR; INTRAVENOUS; SUBCUTANEOUS ONCE
Qty: 0 | Refills: 0 | Status: COMPLETED | OUTPATIENT
Start: 2018-01-09 | End: 2018-01-09

## 2018-01-09 RX ORDER — MAGNESIUM SULFATE 500 MG/ML
2 VIAL (ML) INJECTION ONCE
Qty: 0 | Refills: 0 | Status: COMPLETED | OUTPATIENT
Start: 2018-01-09 | End: 2018-01-09

## 2018-01-09 RX ORDER — METOPROLOL TARTRATE 50 MG
25 TABLET ORAL DAILY
Qty: 0 | Refills: 0 | Status: DISCONTINUED | OUTPATIENT
Start: 2018-01-09 | End: 2018-01-16

## 2018-01-09 RX ORDER — CEFTRIAXONE 500 MG/1
1 INJECTION, POWDER, FOR SOLUTION INTRAMUSCULAR; INTRAVENOUS ONCE
Qty: 0 | Refills: 0 | Status: COMPLETED | OUTPATIENT
Start: 2018-01-09 | End: 2018-01-09

## 2018-01-09 RX ORDER — IPRATROPIUM/ALBUTEROL SULFATE 18-103MCG
3 AEROSOL WITH ADAPTER (GRAM) INHALATION ONCE
Qty: 0 | Refills: 0 | Status: COMPLETED | OUTPATIENT
Start: 2018-01-09 | End: 2018-01-09

## 2018-01-09 RX ORDER — ROPINIROLE 8 MG/1
0.5 TABLET, FILM COATED, EXTENDED RELEASE ORAL
Qty: 0 | Refills: 0 | Status: DISCONTINUED | OUTPATIENT
Start: 2018-01-09 | End: 2018-01-16

## 2018-01-09 RX ORDER — HALOPERIDOL DECANOATE 100 MG/ML
1 INJECTION INTRAMUSCULAR
Qty: 0 | Refills: 0 | COMMUNITY

## 2018-01-09 RX ORDER — KETOROLAC TROMETHAMINE 0.5 %
1 DROPS OPHTHALMIC (EYE)
Qty: 0 | Refills: 0 | Status: DISCONTINUED | OUTPATIENT
Start: 2018-01-09 | End: 2018-01-16

## 2018-01-09 RX ORDER — ENOXAPARIN SODIUM 100 MG/ML
40 INJECTION SUBCUTANEOUS DAILY
Qty: 0 | Refills: 0 | Status: DISCONTINUED | OUTPATIENT
Start: 2018-01-09 | End: 2018-01-16

## 2018-01-09 RX ORDER — AZITHROMYCIN 500 MG/1
500 TABLET, FILM COATED ORAL ONCE
Qty: 0 | Refills: 0 | Status: COMPLETED | OUTPATIENT
Start: 2018-01-09 | End: 2018-01-09

## 2018-01-09 RX ORDER — AZITHROMYCIN 500 MG/1
500 TABLET, FILM COATED ORAL DAILY
Qty: 0 | Refills: 0 | Status: DISCONTINUED | OUTPATIENT
Start: 2018-01-10 | End: 2018-01-16

## 2018-01-09 RX ORDER — RISPERIDONE 4 MG/1
4 TABLET ORAL DAILY
Qty: 0 | Refills: 0 | Status: DISCONTINUED | OUTPATIENT
Start: 2018-01-09 | End: 2018-01-16

## 2018-01-09 RX ORDER — OFLOXACIN 0.3 %
1 DROPS OPHTHALMIC (EYE)
Qty: 0 | Refills: 0 | Status: DISCONTINUED | OUTPATIENT
Start: 2018-01-09 | End: 2018-01-16

## 2018-01-09 RX ORDER — CARBIDOPA AND LEVODOPA 25; 100 MG/1; MG/1
1 TABLET ORAL THREE TIMES A DAY
Qty: 0 | Refills: 0 | Status: DISCONTINUED | OUTPATIENT
Start: 2018-01-09 | End: 2018-01-16

## 2018-01-09 RX ORDER — HALOPERIDOL DECANOATE 100 MG/ML
10 INJECTION INTRAMUSCULAR AT BEDTIME
Qty: 0 | Refills: 0 | Status: DISCONTINUED | OUTPATIENT
Start: 2018-01-09 | End: 2018-01-16

## 2018-01-09 RX ORDER — PANTOPRAZOLE SODIUM 20 MG/1
40 TABLET, DELAYED RELEASE ORAL
Qty: 0 | Refills: 0 | Status: DISCONTINUED | OUTPATIENT
Start: 2018-01-09 | End: 2018-01-16

## 2018-01-09 RX ADMIN — Medication 3 MILLILITER(S): at 07:45

## 2018-01-09 RX ADMIN — RISPERIDONE 4 MILLIGRAM(S): 4 TABLET ORAL at 15:49

## 2018-01-09 RX ADMIN — Medication 3 MILLILITER(S): at 20:20

## 2018-01-09 RX ADMIN — ALBUTEROL 2.5 MILLIGRAM(S): 90 AEROSOL, METERED ORAL at 07:45

## 2018-01-09 RX ADMIN — Medication 40 MILLIGRAM(S): at 18:17

## 2018-01-09 RX ADMIN — AZITHROMYCIN 255 MILLIGRAM(S): 500 TABLET, FILM COATED ORAL at 08:45

## 2018-01-09 RX ADMIN — Medication 25 MILLIGRAM(S): at 15:49

## 2018-01-09 RX ADMIN — SODIUM CHLORIDE 4000 MILLILITER(S): 9 INJECTION INTRAMUSCULAR; INTRAVENOUS; SUBCUTANEOUS at 08:05

## 2018-01-09 RX ADMIN — BUDESONIDE AND FORMOTEROL FUMARATE DIHYDRATE 2 PUFF(S): 160; 4.5 AEROSOL RESPIRATORY (INHALATION) at 21:58

## 2018-01-09 RX ADMIN — CARBIDOPA AND LEVODOPA 1 TABLET(S): 25; 100 TABLET ORAL at 15:50

## 2018-01-09 RX ADMIN — CEFTRIAXONE 100 GRAM(S): 500 INJECTION, POWDER, FOR SOLUTION INTRAMUSCULAR; INTRAVENOUS at 07:19

## 2018-01-09 RX ADMIN — Medication 600 MILLIGRAM(S): at 18:16

## 2018-01-09 RX ADMIN — Medication 50 GRAM(S): at 08:07

## 2018-01-09 RX ADMIN — Medication 125 MILLIGRAM(S): at 08:07

## 2018-01-09 RX ADMIN — Medication 3 MILLILITER(S): at 15:10

## 2018-01-09 RX ADMIN — ROPINIROLE 0.5 MILLIGRAM(S): 8 TABLET, FILM COATED, EXTENDED RELEASE ORAL at 18:17

## 2018-01-09 RX ADMIN — ENOXAPARIN SODIUM 40 MILLIGRAM(S): 100 INJECTION SUBCUTANEOUS at 15:50

## 2018-01-09 NOTE — H&P ADULT - NSHPSOCIALHISTORY_GEN_ALL_CORE
Lives at [facility]   Quit smoking cigarettes few years ago, and also quit drinking alcohol few years ago  denies using IV Recreational drugs

## 2018-01-09 NOTE — INPATIENT CERTIFICATION FOR MEDICARE PATIENTS - RISKS OF ADVERSE EVENTS
Concern for cardiopulmonary deterioration Concern for cardiopulmonary deterioration/Concern for delay in diagnosis and treatment

## 2018-01-09 NOTE — ED PROVIDER NOTE - MEDICAL DECISION MAKING DETAILS
need for immediate NIPPV to prevent inutbation marked improvement with NIPPV admitted to hsopital for pulm toilet further treatment pt agrees to plan of care

## 2018-01-09 NOTE — ED ADULT NURSE NOTE - PMH
Chronic obstructive pulmonary disease, unspecified COPD type    Chronic obstructive pulmonary disease, unspecified COPD type    Parkinson disease    Parkinson disease    Schizophrenia    Schizophrenia, unspecified type

## 2018-01-09 NOTE — H&P ADULT - HISTORY OF PRESENT ILLNESS
67M presented from [facility] with cough, dyspnea, and fevers.  history of Parkinson's and schizophrenia. 67M presented from [facility] with PMH of COPD on Home Oxygen 2 L, Schizophrenia, Parkinson disease, Hypertension transferred to ER because of  cough, dyspnea, and fevers. In ER, he was founf to be in respiratory distress tachypneic requiring BIPAP initially. X ray chest Neg for PNA. RVP Pending. WBC counts 12.1, with Normal serum lactate levels. After gicing IV steroids, antibiotics, and few rounds of Nebulizing treatments, Hospitalist consulted for admission for COPD Exacerbation  Pt is very poor historian and no family at bed side to provide detailed history 67 M presented from [facility] with PMH of COPD on Home Oxygen 2 L, Schizophrenia, Parkinson disease, Hypertension transferred to ER because of  cough, dyspnea, and fevers. In ER, he was found to be in respiratory distress tachypneic requiring BiPAP initially. X ray chest Neg for PNA. RVP Pending. WBC counts 12.1, with Normal serum lactate levels. After giving IV steroids, antibiotics, and few rounds of Nebulizing treatments, Hospitalist consulted for admission for COPD Exacerbation  Pt is very poor historian and no family at bed side to provide detailed history

## 2018-01-09 NOTE — ED PROVIDER NOTE - CARE PLAN
1 WEEK  CHECK    Nursing notes reviewed and accepted.    Gerry Kinsey is a 4 day old female who presents for a 1 week well child exam.  Patient presents with Father and Mother.    Concerns raised today include: none    Feeding:  10 minutes every 2-3 hours  Umbilical stump: normal  Urinary stream unknown strong.  Water Supply:  None  Sleeping: parent's room, bassinet and on back  Elimination:  Normal wet diapers and bowel movements. Stools transitioning.    BIRTH HX:  Birth History   • Birth     Length: 20\" (50.8 cm)     Weight: 2.948 kg     HC 33 cm (12.99\")   • Apgar     One: 8     Five: 9   • Delivery Method: , Low Transverse   • Gestation Age: 37 2/7 wks     Hep B given:  Yes    FAMILY HX:  Family History   Problem Relation Age of Onset   • High blood pressure Mother    • Kidney disease Mother      Polycyctic Kidney    • Cancer Maternal Grandfather      Liver Cancer     Review of patient's family status indicates:    Mother                         Alive                     Father                         Alive                     Maternal Grandfather                                       SOCIAL:  Primary caretakers: mom and dad  : none  How many people live in your household? 3  Do you have any pets? No  What year was your home built?    Does your child use a car seat at all times?  Yes  Does your home have a smoke detector? Yes  Does your home contain a gun? Yes  Does anyone smoke in the home? No    DEVELOPMENT:  responds to bright light, responds to voice, moves arms and legs equally, raises head slightly when prone and cries to display discomfort    Birth history, medical history, surgical history, and family history reviewed and updated.    PHYSICAL EXAM:  Temperature 98.5 °F (36.9 °C), temperature source Axillary, height 19.88\" (50.5 cm), weight 2.835 kg, head circumference 32.5 cm (12.8\").  -4%  GEN:  Well appearing female infant , nontoxic, no acute distress.  Alert and  interactive.  SKIN: Warm, normal turgor.  No cyanosis.  No bruises or lesions.  HEAD:  Normocephalic, atraumatic.  Anterior fontanelle open, soft and flat.  EYES:  Conjunctiva appear normal with neither icterus nor subconjunctival hemorrhage.  PERRL, EOMI, positive red reflex bilaterally.  NOSE:  Appears normal, no flaring.  EARS:  Normal pinnae, no preauricular skin tags or pit.    THROAT:  Oropharynx with moist mucous membranes and no lesions.  NECK:  Supple, no lymphadenopathy or masses.  HEART:  Regular rate and rhythm.  Quiet precordium.  Normal S1, S2.  No murmurs, rubs, gallops. Equal femoral pulses.  LUNGS:  Clear to auscultation bilaterally.  No wheezes, rales, rhonchi.  Normal work of breathing.  ABD:  Umbilical stump is normal.  Soft, nontender.  No hepatosplenomegaly or masses.  :  normal female genitalia, no labial adhesions or lesions  MSK:  Hips within normal range of motion.  Negative Medina, Ortolani.  Spine straight.  Normal sacrum.  EXT:  Warm, dry, without abnormalities.  NEURO:  Normal tone, bulk, strength.    ASSESSMENT:  4 day old female well infant.    PLAN:  All parental concerns and questions discussed.  Anticipatory guidance provided, handout given.              Car seat use              Feeding   Vitamin D discussed:  recommended              Normal  behaviors   Umbilical/foreskin care   Support for caregivers/PPD              Accident prevention              SIDS prevention/back to sleep              Fever management  Questions addressed.  Return for 2 week well infant exam or sooner for illness/concerns.        Thais Ness MD, IBCLC  2017  9:46 AM           Principal Discharge DX:	Chronic obstructive pulmonary disease, unspecified COPD type Principal Discharge DX:	Chronic obstructive pulmonary disease, unspecified COPD type  Secondary Diagnosis:	Respiratory failure with hypoxia

## 2018-01-09 NOTE — ED PROVIDER NOTE - OBJECTIVE STATEMENT
67 M presented from [facility] with PMH of COPD on Home Oxygen 2 L, Schizophrenia, Parkinson disease, Hypertension transferred to ER because of  cough, dyspnea, and fevers. denies HA or neck pain + sob. no abd pain. no n/v/d. no urinary f/u/d. no back pain. no motor or sensory deficits. denies illicit drug use. no recent travel. no rash. no other acute issues symptoms or concerns

## 2018-01-09 NOTE — ED ADULT NURSE REASSESSMENT NOTE - NS ED NURSE REASSESS COMMENT FT1
Patient noted while in ambulance triage, accessory muscle use, tachypneic RR 36. Effort and work for breathing escalated to MD Morgan. recommend BIPAP. ED respiratory therapist to come to bedside to initiate.
Report recvd from off going RN, pt recvd sitting on stretcher, offer no complaints, tachypneic, sat 96% on 4L-NC, RT in route to bedside for respiratory treatment, POC discussed with pt who verbalized eye-drops order request are to begin one week prior to schedule cataract sx for 01/17/2018. Admitting physician made aware. Pt informed of admission status and awaiting bed assignment at this time, pt verbalize understanding, safety measures maintained.
admiting Md made aware ot patients request for haldol and his eye drops.
pt received on report as a sepsis patient, no sepsis sheet initiated. pt reportedly refusing rectal temp, is tachycardic and tachypneic. pt was on bipap settings 12/5 35%. pt tolerated bipap and ed interventions well. pt weaned off bipap and is breathing well. no acute distress noted at this time. Per Dr. herrera pt to be admitted. will continue to monitor awaiting admission orders

## 2018-01-10 DIAGNOSIS — B34.2 CORONAVIRUS INFECTION, UNSPECIFIED: ICD-10-CM

## 2018-01-10 DIAGNOSIS — J21.0 ACUTE BRONCHIOLITIS DUE TO RESPIRATORY SYNCYTIAL VIRUS: ICD-10-CM

## 2018-01-10 DIAGNOSIS — J44.9 CHRONIC OBSTRUCTIVE PULMONARY DISEASE, UNSPECIFIED: ICD-10-CM

## 2018-01-10 DIAGNOSIS — G20 PARKINSON'S DISEASE: ICD-10-CM

## 2018-01-10 DIAGNOSIS — F20.9 SCHIZOPHRENIA, UNSPECIFIED: ICD-10-CM

## 2018-01-10 DIAGNOSIS — Z29.9 ENCOUNTER FOR PROPHYLACTIC MEASURES, UNSPECIFIED: ICD-10-CM

## 2018-01-10 LAB
HCOV NL63 RNA SPEC QL NAA+PROBE: DETECTED
RAPID RVP RESULT: DETECTED
RSV RNA SPEC QL NAA+PROBE: DETECTED

## 2018-01-10 PROCEDURE — 99233 SBSQ HOSP IP/OBS HIGH 50: CPT

## 2018-01-10 RX ADMIN — Medication 40 MILLIGRAM(S): at 17:49

## 2018-01-10 RX ADMIN — Medication 1 DROP(S): at 13:53

## 2018-01-10 RX ADMIN — Medication 1 DROP(S): at 13:54

## 2018-01-10 RX ADMIN — Medication 600 MILLIGRAM(S): at 05:54

## 2018-01-10 RX ADMIN — Medication 3 MILLILITER(S): at 03:20

## 2018-01-10 RX ADMIN — HALOPERIDOL DECANOATE 10 MILLIGRAM(S): 100 INJECTION INTRAMUSCULAR at 00:22

## 2018-01-10 RX ADMIN — Medication 1 DROP(S): at 05:55

## 2018-01-10 RX ADMIN — DIVALPROEX SODIUM 1000 MILLIGRAM(S): 500 TABLET, DELAYED RELEASE ORAL at 21:34

## 2018-01-10 RX ADMIN — PANTOPRAZOLE SODIUM 40 MILLIGRAM(S): 20 TABLET, DELAYED RELEASE ORAL at 05:55

## 2018-01-10 RX ADMIN — Medication 40 MILLIGRAM(S): at 05:51

## 2018-01-10 RX ADMIN — ROPINIROLE 0.5 MILLIGRAM(S): 8 TABLET, FILM COATED, EXTENDED RELEASE ORAL at 17:49

## 2018-01-10 RX ADMIN — CARBIDOPA AND LEVODOPA 1 TABLET(S): 25; 100 TABLET ORAL at 13:52

## 2018-01-10 RX ADMIN — CARBIDOPA AND LEVODOPA 1 TABLET(S): 25; 100 TABLET ORAL at 05:54

## 2018-01-10 RX ADMIN — HALOPERIDOL DECANOATE 10 MILLIGRAM(S): 100 INJECTION INTRAMUSCULAR at 21:34

## 2018-01-10 RX ADMIN — RISPERIDONE 4 MILLIGRAM(S): 4 TABLET ORAL at 13:54

## 2018-01-10 RX ADMIN — Medication 1 DROP(S): at 00:24

## 2018-01-10 RX ADMIN — Medication 3 MILLILITER(S): at 21:49

## 2018-01-10 RX ADMIN — Medication 3 MILLILITER(S): at 08:37

## 2018-01-10 RX ADMIN — BUDESONIDE AND FORMOTEROL FUMARATE DIHYDRATE 2 PUFF(S): 160; 4.5 AEROSOL RESPIRATORY (INHALATION) at 21:49

## 2018-01-10 RX ADMIN — AZITHROMYCIN 500 MILLIGRAM(S): 500 TABLET, FILM COATED ORAL at 13:50

## 2018-01-10 RX ADMIN — ROPINIROLE 0.5 MILLIGRAM(S): 8 TABLET, FILM COATED, EXTENDED RELEASE ORAL at 05:54

## 2018-01-10 RX ADMIN — CARBIDOPA AND LEVODOPA 1 TABLET(S): 25; 100 TABLET ORAL at 21:34

## 2018-01-10 RX ADMIN — Medication 1 DROP(S): at 17:50

## 2018-01-10 RX ADMIN — HALOPERIDOL DECANOATE 5 MILLIGRAM(S): 100 INJECTION INTRAMUSCULAR at 13:53

## 2018-01-10 RX ADMIN — Medication 600 MILLIGRAM(S): at 17:50

## 2018-01-10 RX ADMIN — Medication 25 MILLIGRAM(S): at 05:54

## 2018-01-10 RX ADMIN — Medication 1 DROP(S): at 00:25

## 2018-01-10 RX ADMIN — BUDESONIDE AND FORMOTEROL FUMARATE DIHYDRATE 2 PUFF(S): 160; 4.5 AEROSOL RESPIRATORY (INHALATION) at 08:36

## 2018-01-10 NOTE — PROGRESS NOTE ADULT - ASSESSMENT
67 M presented from [facility] with PMH of COPD on Home Oxygen 2 L, Schizophrenia, Parkinson disease, admitted for further evaluation and treatment cough, dyspnea, and fevers. In ER, he was found to be in respiratory distress tachypneic requiring BiPAP initially. X ray chest Neg for PNA. RPV + for RSV and coronavirus.   COPD with acute exacerbation -   Acute Respiratory distress: secondary to COPD exacerbation  Culture results to be followed.  X ray chest Neg for PNA  Check RVP    Essential Hypertension : continue Home medications    Schizophrenia - On haloperidol and risperidone.    Parkinson's disease - Ambulation with assistance. On Carbidopa-Levodopa and ropinirole.    DVT Prophylaxis : SQ Lovenox 67 M presented from [facility] with PMH of COPD on Home Oxygen 2 L, Schizophrenia, Parkinson disease, admitted for further evaluation and treatment cough, dyspnea, and fevers. In ER, he was found to be in respiratory distress tachypneic requiring BiPAP initially. X ray chest Neg for PNA. RPV + for RSV and coronavirus.

## 2018-01-10 NOTE — PROGRESS NOTE ADULT - PROBLEM SELECTOR PLAN 1
present on admission  on Home Oxygen 2 L  started Azithromycin, Systemic and Inhaled steroids, DUO NEBS Q 6   bronchodilator therapy. Monitoring oxygen saturation.   Culture results to be followed.  X ray chest Neg for PNA  Check RVP Pt on Home Oxygen 2 L. acute exacerbation due to viral URI, +RSV and corona. Droplet precaution. Cont empiric Azithromycin for secondary infection, cont IV steroid 40 mg bid as pt still w sig wheezing, cont Duonebs q6h and Symbicort 160 mg bid.

## 2018-01-10 NOTE — PROGRESS NOTE ADULT - ATTENDING COMMENTS
I have seen and examined the patient, reviewed the charts, Labs and I agree with assessment and plan as above with physician assistant Cassy Crooks (PA)    still wheezing diffusely. continue systemic and Inhaled steroids, scheduled DUO NEBS  back to home Oxygen 2 L requirement  RSV and corona virus Positive on RVP    Continue all Home medications for schizophrenia and Parkinson's

## 2018-01-10 NOTE — PROGRESS NOTE ADULT - SUBJECTIVE AND OBJECTIVE BOX
CC:   HPI:  67 M presented from [facility] with PMH of COPD on Home Oxygen 2 L, Schizophrenia, Parkinson disease, Hypertension transferred to ER because of  cough, dyspnea, and fevers. In ER, he was found to be in respiratory distress tachypneic requiring BiPAP initially. X ray chest Neg for PNA. RVP Pending. WBC counts 12.1, with Normal serum lactate levels. After giving IV steroids, antibiotics, and few rounds of Nebulizing treatments, Hospitalist consulted for admission for COPD Exacerbation  Pt is very poor historian and no family at bed side to provide detailed history (09 Jan 2018 10:34)    REVIEW OF SYSTEMS:    Patient denied fever, chills, abdominal pain, nausea, vomiting, cough, shortness of breath, chest pain or palpitations    Vital Signs Last 24 Hrs  T(C): 36.4 (10 Maciej 2018 11:40), Max: 36.4 (10 Maciej 2018 04:14)  T(F): 97.5 (10 Maciej 2018 11:40), Max: 97.5 (10 Maciej 2018 04:14)  HR: 114 (10 Maciej 2018 11:40) (91 - 114)  BP: 146/85 (10 Maciej 2018 11:40) (132/68 - 150/96)  BP(mean): --  RR: 23 (10 Maciej 2018 11:40) (23 - 30)  SpO2: 95% (10 Maciej 2018 11:40) (91% - 97%)I&O's Summary  CAPILLARY BLOOD GLUCOSE    PHYSICAL EXAM:  GENERAL: NAD, well-groomed  HEENT: PERRL, +EOMI, anicteric, no [place]  NECK: Supple, No JVD   CHEST/LUNG: CTA bilaterally; Normal effort  HEART: S1S2 Normal intensity, no murmurs, gallops or rubs noted  ABDOMEN: Soft, BS Normoactive, NT, ND, no HSM noted  EXTREMITIES:  2+ radial and DP pulses noted, no clubbing, cyanosis, or edema noted, FROM x 4  SKIN: No rashes or lesions noted  NEURO: A&Ox3, no focal deficits noted, CN II-XII intact  PSYCH: normal mood and affect; insight/judgement appropriate  LABS:                        13.7   12.1  )-----------( 203      ( 09 Jan 2018 07:21 )             41.8     01-09    142  |  98  |  17.0  ----------------------------<  97  4.3   |  28.0  |  0.90    Ca    9.3      09 Jan 2018 07:21  Mg     2.0     01-09    TPro  7.7  /  Alb  4.0  /  TBili  0.7  /  DBili  x   /  AST  27  /  ALT  15  /  AlkPhos  98  01-09    PT/INR - ( 09 Jan 2018 07:21 )   PT: 16.3 sec;   INR: 1.47 ratio         PTT - ( 09 Jan 2018 07:21 )  PTT:33.8 sec    RADIOLOGY & ADDITIONAL TESTS:    MEDICATIONS:  MEDICATIONS  (STANDING):  ALBUTerol/ipratropium for Nebulization 3 milliLiter(s) Nebulizer every 6 hours  azithromycin   Tablet 500 milliGRAM(s) Oral daily  buDESOnide 160 MICROgram(s)/formoterol 4.5 MICROgram(s) Inhaler 2 Puff(s) Inhalation two times a day  carbidopa/levodopa  25/100 1 Tablet(s) Oral three times a day  diVALproex DR 1000 milliGRAM(s) Oral at bedtime  enoxaparin Injectable 40 milliGRAM(s) SubCutaneous daily  guaiFENesin  milliGRAM(s) Oral every 12 hours  haloperidol     Tablet 10 milliGRAM(s) Oral at bedtime  haloperidol     Tablet 5 milliGRAM(s) Oral daily  ketorolac 0.5% Ophthalmic Solution 1 Drop(s) Both EYES four times a day  methylPREDNISolone sodium succinate Injectable 40 milliGRAM(s) IV Push two times a day  metoprolol succinate ER 25 milliGRAM(s) Oral daily  ofloxacin 0.3% Solution 1 Drop(s) Both EYES four times a day  pantoprazole    Tablet 40 milliGRAM(s) Oral before breakfast  risperiDONE   Tablet 4 milliGRAM(s) Oral daily  rOPINIRole 0.5 milliGRAM(s) Oral two times a day    MEDICATIONS  (PRN): iNTERVAL/HPI: nO ACUTE OVERNIGHT EVENTS. 67 M  from [facility] with PMH of COPD on Home Oxygen 2 L, Schizophrenia, Parkinson disease, admitted for further evaluation/treatment cough, dyspnea, and fevers. In ER, he was found to be in respiratory distress tachypneic requiring BiPAP initially. X ray chest Neg for PNA. RVP + corona and RSV.  Pt seen/examined by me in ERHR. Pt is pleasant, alert and states he is feeling better. O2 sat now 97-98% on 2L O2 via NC. Pt denies chest pain, sob, palpitations, weakness, abdominal pain, N/V/D, headache dizziness.      REVIEW OF SYSTEMS:    As per HPI    Vital Signs Last 24 Hrs  T(C): 36.4 (10 Maciej 2018 11:40), Max: 36.4 (10 Maciej 2018 04:14)  T(F): 97.5 (10 Maciej 2018 11:40), Max: 97.5 (10 Maciej 2018 04:14)  HR: 114 (10 Maciej 2018 11:40) (91 - 114)  BP: 146/85 (10 Maciej 2018 11:40) (132/68 - 150/96)  BP(mean): --  RR: 23 (10 Maciej 2018 11:40) (23 - 30)  SpO2: 95% (10 Maciej 2018 11:40) (91% - 97%)I&O's Summary      PHYSICAL EXAM:  GENERAL: NAD, well-groomed  HEENT: PERRL, +EOMI, anicteric, no [place]  NECK: Supple, No JVD   CHEST/LUNG: b/l inspiratory and expiratory wheeze. Normal effort, no retractions/accessory muscle use.   HEART: S1S2 Normal intensity, no murmurs, gallops or rubs noted  ABDOMEN: Soft, BS Normoactive, NT, ND, no HSM noted  EXTREMITIES:  2+ radial and DP pulses noted, no clubbing, cyanosis, or edema noted, FROM x 4  SKIN: No rashes or lesions noted  NEURO: resting tremor, cogwheel rigidity. A&Ox3, CN II-XII intact  PSYCH: childllike affect; insight/judgement appropriate    LABS:                        13.7   12.1  )-----------( 203      ( 09 Jan 2018 07:21 )             41.8     01-09    142  |  98  |  17.0  ----------------------------<  97  4.3   |  28.0  |  0.90    Ca    9.3      09 Jan 2018 07:21  Mg     2.0     01-09    TPro  7.7  /  Alb  4.0  /  TBili  0.7  /  DBili  x   /  AST  27  /  ALT  15  /  AlkPhos  98  01-09    PT/INR - ( 09 Jan 2018 07:21 )   PT: 16.3 sec;   INR: 1.47 ratio         PTT - ( 09 Jan 2018 07:21 )  PTT:33.8 sec    RADIOLOGY & ADDITIONAL TESTS:    CXR 1/9/18  Impression: No acute pulmonary disease. No interval change.      MEDICATIONS:  MEDICATIONS  (STANDING):  ALBUTerol/ipratropium for Nebulization 3 milliLiter(s) Nebulizer every 6 hours  azithromycin   Tablet 500 milliGRAM(s) Oral daily  buDESOnide 160 MICROgram(s)/formoterol 4.5 MICROgram(s) Inhaler 2 Puff(s) Inhalation two times a day  carbidopa/levodopa  25/100 1 Tablet(s) Oral three times a day  diVALproex DR 1000 milliGRAM(s) Oral at bedtime  enoxaparin Injectable 40 milliGRAM(s) SubCutaneous daily  guaiFENesin  milliGRAM(s) Oral every 12 hours  haloperidol     Tablet 10 milliGRAM(s) Oral at bedtime  haloperidol     Tablet 5 milliGRAM(s) Oral daily  ketorolac 0.5% Ophthalmic Solution 1 Drop(s) Both EYES four times a day  methylPREDNISolone sodium succinate Injectable 40 milliGRAM(s) IV Push two times a day  metoprolol succinate ER 25 milliGRAM(s) Oral daily  ofloxacin 0.3% Solution 1 Drop(s) Both EYES four times a day  pantoprazole    Tablet 40 milliGRAM(s) Oral before breakfast  risperiDONE   Tablet 4 milliGRAM(s) Oral daily  rOPINIRole 0.5 milliGRAM(s) Oral two times a day    MEDICATIONS  (PRN):

## 2018-01-11 PROCEDURE — 99233 SBSQ HOSP IP/OBS HIGH 50: CPT

## 2018-01-11 PROCEDURE — 99222 1ST HOSP IP/OBS MODERATE 55: CPT

## 2018-01-11 RX ORDER — IPRATROPIUM/ALBUTEROL SULFATE 18-103MCG
3 AEROSOL WITH ADAPTER (GRAM) INHALATION EVERY 4 HOURS
Qty: 0 | Refills: 0 | Status: DISCONTINUED | OUTPATIENT
Start: 2018-01-11 | End: 2018-01-16

## 2018-01-11 RX ORDER — TIOTROPIUM BROMIDE 18 UG/1
1 CAPSULE ORAL; RESPIRATORY (INHALATION) DAILY
Qty: 0 | Refills: 0 | Status: DISCONTINUED | OUTPATIENT
Start: 2018-01-11 | End: 2018-01-16

## 2018-01-11 RX ORDER — ALBUTEROL 90 UG/1
1 AEROSOL, METERED ORAL EVERY 4 HOURS
Qty: 0 | Refills: 0 | Status: DISCONTINUED | OUTPATIENT
Start: 2018-01-11 | End: 2018-01-16

## 2018-01-11 RX ORDER — ACETAMINOPHEN 500 MG
650 TABLET ORAL ONCE
Qty: 0 | Refills: 0 | Status: COMPLETED | OUTPATIENT
Start: 2018-01-11 | End: 2018-01-11

## 2018-01-11 RX ADMIN — Medication 40 MILLIGRAM(S): at 23:04

## 2018-01-11 RX ADMIN — Medication 600 MILLIGRAM(S): at 05:33

## 2018-01-11 RX ADMIN — Medication 3 MILLILITER(S): at 19:48

## 2018-01-11 RX ADMIN — Medication 100 MILLIGRAM(S): at 20:21

## 2018-01-11 RX ADMIN — Medication 25 MILLIGRAM(S): at 05:34

## 2018-01-11 RX ADMIN — Medication 3 MILLILITER(S): at 23:55

## 2018-01-11 RX ADMIN — AZITHROMYCIN 500 MILLIGRAM(S): 500 TABLET, FILM COATED ORAL at 14:25

## 2018-01-11 RX ADMIN — Medication 40 MILLIGRAM(S): at 05:33

## 2018-01-11 RX ADMIN — Medication 3 MILLILITER(S): at 15:48

## 2018-01-11 RX ADMIN — CARBIDOPA AND LEVODOPA 1 TABLET(S): 25; 100 TABLET ORAL at 14:25

## 2018-01-11 RX ADMIN — ROPINIROLE 0.5 MILLIGRAM(S): 8 TABLET, FILM COATED, EXTENDED RELEASE ORAL at 05:34

## 2018-01-11 RX ADMIN — Medication 600 MILLIGRAM(S): at 18:29

## 2018-01-11 RX ADMIN — ROPINIROLE 0.5 MILLIGRAM(S): 8 TABLET, FILM COATED, EXTENDED RELEASE ORAL at 18:29

## 2018-01-11 RX ADMIN — PANTOPRAZOLE SODIUM 40 MILLIGRAM(S): 20 TABLET, DELAYED RELEASE ORAL at 05:34

## 2018-01-11 RX ADMIN — CARBIDOPA AND LEVODOPA 1 TABLET(S): 25; 100 TABLET ORAL at 23:04

## 2018-01-11 RX ADMIN — Medication 3 MILLILITER(S): at 05:07

## 2018-01-11 RX ADMIN — Medication 1 DROP(S): at 00:59

## 2018-01-11 RX ADMIN — RISPERIDONE 4 MILLIGRAM(S): 4 TABLET ORAL at 14:25

## 2018-01-11 RX ADMIN — DIVALPROEX SODIUM 1000 MILLIGRAM(S): 500 TABLET, DELAYED RELEASE ORAL at 23:04

## 2018-01-11 RX ADMIN — Medication 1 DROP(S): at 05:33

## 2018-01-11 RX ADMIN — HALOPERIDOL DECANOATE 5 MILLIGRAM(S): 100 INJECTION INTRAMUSCULAR at 14:25

## 2018-01-11 RX ADMIN — Medication 40 MILLIGRAM(S): at 18:02

## 2018-01-11 RX ADMIN — ENOXAPARIN SODIUM 40 MILLIGRAM(S): 100 INJECTION SUBCUTANEOUS at 14:25

## 2018-01-11 RX ADMIN — Medication 650 MILLIGRAM(S): at 14:25

## 2018-01-11 RX ADMIN — Medication 1 DROP(S): at 05:34

## 2018-01-11 RX ADMIN — HALOPERIDOL DECANOATE 10 MILLIGRAM(S): 100 INJECTION INTRAMUSCULAR at 23:03

## 2018-01-11 RX ADMIN — CARBIDOPA AND LEVODOPA 1 TABLET(S): 25; 100 TABLET ORAL at 05:33

## 2018-01-11 RX ADMIN — Medication 3 MILLILITER(S): at 09:50

## 2018-01-11 RX ADMIN — BUDESONIDE AND FORMOTEROL FUMARATE DIHYDRATE 2 PUFF(S): 160; 4.5 AEROSOL RESPIRATORY (INHALATION) at 09:50

## 2018-01-11 NOTE — PROGRESS NOTE ADULT - SUBJECTIVE AND OBJECTIVE BOX
DAVY HOLDEN  ----------------------------------------    CC : FOLLOW UP VISIT FOR RESPIRATORY FAILURE, COPD FLARE    C/O COUGH with sputum production  + headaches  Oxygen requirement Increased to 4 L this am, and so consulted Pulmonary team    Vital Signs Last 24 Hrs    T(C): 37.3 (11 Jan 2018 11:55), Max: 37.3 (11 Jan 2018 11:55)  T(F): 99.1 (11 Jan 2018 11:55), Max: 99.1 (11 Jan 2018 11:55)  HR: 96 (11 Jan 2018 11:55) (82 - 117)  BP: 129/82 (11 Jan 2018 11:55) (129/82 - 168/94)  BP(mean): --  RR: 20 (11 Jan 2018 11:55) (20 - 22)  SpO2: 97% (11 Jan 2018 11:55) (92% - 98%)    CAPILLARY BLOOD GLUCOSE        PHYSICAL EXAMINATION:  ----------------------------------------    General appearance: NAD, Awake, Alert  HEENT: NCAT, Conjunctiva clear, EOMI  Neck: Supple, No JVD  Lungs: decreased breath sounds  bilaterally with diffuse B/L WHEEZING  Cardiovascular: S1S2, Regular rhythm  Abdomen: Soft, Nontender, Positive bowel sounds  Extremities: No clubbing, No cyanosis, No edema  CNS: alert and oriented times 3      LABORATORY STUDIES:  ----------------------------------------    Culture - Blood (collected 09 Jan 2018 07:25)  Source: .Blood Blood  Preliminary Report (11 Jan 2018 09:02):    No growth at 48 hours    Culture - Blood (collected 09 Jan 2018 07:24)  Source: .Blood Blood  Preliminary Report (11 Jan 2018 09:02):    No growth at 48 hours      MEDICATIONS  (STANDING):  acetaminophen   Tablet 650 milliGRAM(s) Oral once  ALBUTerol/ipratropium for Nebulization 3 milliLiter(s) Nebulizer every 6 hours  azithromycin   Tablet 500 milliGRAM(s) Oral daily  buDESOnide 160 MICROgram(s)/formoterol 4.5 MICROgram(s) Inhaler 2 Puff(s) Inhalation two times a day  carbidopa/levodopa  25/100 1 Tablet(s) Oral three times a day  diVALproex DR 1000 milliGRAM(s) Oral at bedtime  enoxaparin Injectable 40 milliGRAM(s) SubCutaneous daily  guaiFENesin  milliGRAM(s) Oral every 12 hours  haloperidol     Tablet 10 milliGRAM(s) Oral at bedtime  haloperidol     Tablet 5 milliGRAM(s) Oral daily  ketorolac 0.5% Ophthalmic Solution 1 Drop(s) Both EYES four times a day  methylPREDNISolone sodium succinate Injectable 40 milliGRAM(s) IV Push two times a day  metoprolol succinate ER 25 milliGRAM(s) Oral daily  ofloxacin 0.3% Solution 1 Drop(s) Both EYES four times a day  pantoprazole    Tablet 40 milliGRAM(s) Oral before breakfast  risperiDONE   Tablet 4 milliGRAM(s) Oral daily  rOPINIRole 0.5 milliGRAM(s) Oral two times a day    MEDICATIONS  (PRN):      ASSESSMENT / PLAN:  ----------------------------------------

## 2018-01-11 NOTE — CONSULT NOTE ADULT - SUBJECTIVE AND OBJECTIVE BOX
PULMONARY CONSULT NOTE      [id number]    Patient is a 67y old  Male who presents with a chief complaint of shortness of breath (09 Jan 2018 10:34)      HISTORY OF PRESENT ILLNESS:  67 M presented from [facility] with PMH of COPD on Home Oxygen 2 L, Schizophrenia, Parkinson disease, Hypertension transferred to ER because of  cough, dyspnea, and fevers. In ER, he was found to be in respiratory distress tachypneic requiring BiPAP initially. X ray chest Neg for PNA. RVP Pending. WBC counts 12.1, with Normal serum lactate levels. After giving IV steroids, antibiotics, and few rounds of Nebulizing treatments, Hospitalist consulted for admission for COPD Exacerbation    Not seen in the office in some time  Still smoking in 2017  Severe COPD.  Good BD response.  FEV1 post BD = 1 liter        MEDICATIONS  (STANDING):  ALBUTerol/ipratropium for Nebulization 3 milliLiter(s) Nebulizer every 6 hours  azithromycin   Tablet 500 milliGRAM(s) Oral daily  buDESOnide 160 MICROgram(s)/formoterol 4.5 MICROgram(s) Inhaler 2 Puff(s) Inhalation two times a day  carbidopa/levodopa  25/100 1 Tablet(s) Oral three times a day  diVALproex DR 1000 milliGRAM(s) Oral at bedtime  enoxaparin Injectable 40 milliGRAM(s) SubCutaneous daily  guaiFENesin  milliGRAM(s) Oral every 12 hours  haloperidol     Tablet 10 milliGRAM(s) Oral at bedtime  haloperidol     Tablet 5 milliGRAM(s) Oral daily  ketorolac 0.5% Ophthalmic Solution 1 Drop(s) Both EYES four times a day  methylPREDNISolone sodium succinate Injectable 40 milliGRAM(s) IV Push two times a day  metoprolol succinate ER 25 milliGRAM(s) Oral daily  ofloxacin 0.3% Solution 1 Drop(s) Both EYES four times a day  pantoprazole    Tablet 40 milliGRAM(s) Oral before breakfast  risperiDONE   Tablet 4 milliGRAM(s) Oral daily  rOPINIRole 0.5 milliGRAM(s) Oral two times a day      MEDICATIONS  (PRN):  guaiFENesin    Syrup 100 milliGRAM(s) Oral every 6 hours PRN Cough      Allergies    Allergy Status Unknown  No Known Allergies    Intolerances        PAST MEDICAL & SURGICAL HISTORY:  Parkinson disease  Parkinson disease  Chronic obstructive pulmonary disease, unspecified COPD type  Schizophrenia  Schizophrenia, unspecified type  Chronic obstructive pulmonary disease, unspecified COPD type  No significant past surgical history  No significant past surgical history      FAMILY HISTORY:  No pertinent family history  No pertinent family history in first degree relatives      SOCIAL HISTORY  Smoking History:     REVIEW OF SYSTEMS:    Poor informant    Vital Signs Last 24 Hrs  T(C): 37.3 (11 Jan 2018 11:55), Max: 37.3 (11 Jan 2018 11:55)  T(F): 99.1 (11 Jan 2018 11:55), Max: 99.1 (11 Jan 2018 11:55)  HR: 96 (11 Jan 2018 11:55) (82 - 117)  BP: 129/82 (11 Jan 2018 11:55) (129/82 - 168/94)  BP(mean): --  RR: 20 (11 Jan 2018 11:55) (20 - 22)  SpO2: 97% (11 Jan 2018 11:55) (92% - 98%)    PHYSICAL EXAMINATION:    GENERAL: The patient is a well-developed, well-nourished male in no mild respiratory distress.     HEENT: Head is normocephalic and atraumatic. Extraocular muscles are intact. Mucous membranes are moist.     NECK: Supple.     LUNGS: Exp wz and rhonchi to auscultation. Respirations unlabored    HEART: Regular rate and rhythm without murmur.    ABDOMEN: Soft, nontender, and nondistended.  No hepatosplenomegaly is noted.    EXTREMITIES: Without any cyanosis, clubbing, rash, lesions or edema.    NEUROLOGIC: Grossly intact.      LABS:    No eosinophilia    Serum Pro-Brain Natriuretic Peptide: 822 pg/mL (01-09-18 @ 07:21)          MICROBIOLOGY: RVP pos    RADIOLOGY & ADDITIONAL STUDIES:    EXAM:  XR CHEST PORTABLE  ROUTINE 1V                          PROCEDURE DATE:  01/09/2018          INTERPRETATION:  History: Wheezing. Dyspnea    Technique:  AP portable    Comparisons:  Chest x-ray dated 10/5/2017    Findings: The lungs are clear. There are no infiltrates, congestion or   pleural effusions. The pulmonary vasculature and aorta are normal for   age. Heart size is unremarkable. The thorax is normal for age.    Impression: No acute pulmonary disease. No interval change.        JOSE MARTIN ACE M.D., ATTENDING RADIOLOGIST  This document has been electronically signed. Jan 9 2018  7:52AM

## 2018-01-11 NOTE — CONSULT NOTE ADULT - SUBJECTIVE AND OBJECTIVE BOX
PULMONARY CONSULT NOTE      [id number]    Patient is a 67y old  Male who presents with a chief complaint of shortness of breath (09 Jan 2018 10:34)      HISTORY OF PRESENT ILLNESS:      MEDICATIONS  (STANDING):  ALBUTerol    90 MICROgram(s) HFA Inhaler 1 Puff(s) Inhalation every 4 hours  ALBUTerol/ipratropium for Nebulization 3 milliLiter(s) Nebulizer every 4 hours  azithromycin   Tablet 500 milliGRAM(s) Oral daily  buDESOnide 160 MICROgram(s)/formoterol 4.5 MICROgram(s) Inhaler 2 Puff(s) Inhalation two times a day  carbidopa/levodopa  25/100 1 Tablet(s) Oral three times a day  diVALproex DR 1000 milliGRAM(s) Oral at bedtime  enoxaparin Injectable 40 milliGRAM(s) SubCutaneous daily  guaiFENesin  milliGRAM(s) Oral every 12 hours  haloperidol     Tablet 10 milliGRAM(s) Oral at bedtime  haloperidol     Tablet 5 milliGRAM(s) Oral daily  ketorolac 0.5% Ophthalmic Solution 1 Drop(s) Both EYES four times a day  methylPREDNISolone sodium succinate Injectable 40 milliGRAM(s) IV Push every 6 hours  metoprolol succinate ER 25 milliGRAM(s) Oral daily  ofloxacin 0.3% Solution 1 Drop(s) Both EYES four times a day  pantoprazole    Tablet 40 milliGRAM(s) Oral before breakfast  risperiDONE   Tablet 4 milliGRAM(s) Oral daily  rOPINIRole 0.5 milliGRAM(s) Oral two times a day  tiotropium 18 MICROgram(s) Capsule 1 Capsule(s) Inhalation daily      MEDICATIONS  (PRN):  guaiFENesin    Syrup 100 milliGRAM(s) Oral every 6 hours PRN Cough      Allergies    Allergy Status Unknown  No Known Allergies    Intolerances        PAST MEDICAL & SURGICAL HISTORY:  Parkinson disease  Parkinson disease  Chronic obstructive pulmonary disease, unspecified COPD type  Schizophrenia  Schizophrenia, unspecified type  Chronic obstructive pulmonary disease, unspecified COPD type  No significant past surgical history  No significant past surgical history      FAMILY HISTORY:  No pertinent family history  No pertinent family history in first degree relatives      SOCIAL HISTORY  Smoking History:     REVIEW OF SYSTEMS:    CONSTITUTIONAL:  No fevers, chills, sweats    HEENT:  Eyes:  No diplopia or blurred vision. ENT:  No earache, sore throat or runny nose.    CARDIOVASCULAR:  No pressure, squeezing, tightness, or heaviness about the chest; no palpitations.    RESPIRATORY:  No cough, shortness of breath, PND or orthopnea. Mild SOBOE    GASTROINTESTINAL:  No abdominal pain, nausea, vomiting or diarrhea.    GENITOURINARY:  No dysuria, frequency or urgency.    NEUROLOGIC:  No paresthesias, fasciculations, seizures or weakness.    PSYCHIATRIC:  No disorder of thought or mood.    Vital Signs Last 24 Hrs  T(C): 37.3 (11 Jan 2018 11:55), Max: 37.3 (11 Jan 2018 11:55)  T(F): 99.1 (11 Jan 2018 11:55), Max: 99.1 (11 Jan 2018 11:55)  HR: 96 (11 Jan 2018 11:55) (82 - 117)  BP: 129/82 (11 Jan 2018 11:55) (129/82 - 168/94)  BP(mean): --  RR: 20 (11 Jan 2018 11:55) (20 - 22)  SpO2: 97% (11 Jan 2018 11:55) (92% - 98%)    PHYSICAL EXAMINATION:    GENERAL: The patient is a well-developed, well-nourished _____in no apparent distress.     HEENT: Head is normocephalic and atraumatic. Extraocular muscles are intact. Mucous membranes are moist.     NECK: Supple.     LUNGS: Clear to auscultation without wheezing, rales, or rhonchi. Respirations unlabored    HEART: Regular rate and rhythm without murmur.    ABDOMEN: Soft, nontender, and nondistended.  No hepatosplenomegaly is noted.    EXTREMITIES: Without any cyanosis, clubbing, rash, lesions or edema.    NEUROLOGIC: Grossly intact.      LABS:                      Serum Pro-Brain Natriuretic Peptide: 822 pg/mL (01-09-18 @ 07:21)          MICROBIOLOGY:    RADIOLOGY & ADDITIONAL STUDIES:

## 2018-01-11 NOTE — PROGRESS NOTE ADULT - PROBLEM SELECTOR PLAN 1
Pt on Home Oxygen 2 L. acute exacerbation due to viral URI, +RSV and corona. Droplet precaution. Cont empiric Azithromycin   , cont IV steroid 40 mg bid as pt still wheezing DIFFUSELY  Consult Pulmonary, as Pt's symptoms didn't improve inspite of standard Rx in 48 hrs  cont Duoneb q6h and Symbicort 160 mg bid.  Blood cx Neg. sputum cx pending

## 2018-01-11 NOTE — CONSULT NOTE ADULT - ASSESSMENT
Assess    Severe COPD with an acute exacerbation likely due to viral URI  Nicotine dependence  Hypoxia but no eosinophilia or hypercarbia    Rec    Azithro  Methylprednisolone  DuoNeb and Budesonide  02  DVT prophylaxis

## 2018-01-11 NOTE — PROGRESS NOTE ADULT - ASSESSMENT
67 M presented from [facility] with PMH of COPD on Home Oxygen 2 L, Schizophrenia, Parkinson disease, admitted for further evaluation and treatment cough, dyspnea, and fevers. In ER, he was found to be in respiratory distress tachypneic requiring BiPAP initially. X ray chest Neg for PNA. RPV + for RSV and coronavirus. Pt on Home Oxygen 2 L. Acute exacerbation due to viral URI, +RSV and corona. Droplet precaution. Cont empiric Azithromycin  , cont IV steroid 40 mg bid as pt still wheezing DIFFUSELY  Consulted Pulmonary, as Pt's symptoms didn't improve in spite of standard Rx in 48 hrs. continue  Duoneb q6h and Symbicort 160 mg bid. Blood cx Neg. Sputum cx pending

## 2018-01-12 DIAGNOSIS — F20.9 SCHIZOPHRENIA, UNSPECIFIED: ICD-10-CM

## 2018-01-12 PROCEDURE — 99233 SBSQ HOSP IP/OBS HIGH 50: CPT

## 2018-01-12 RX ADMIN — Medication 100 MILLIGRAM(S): at 18:15

## 2018-01-12 RX ADMIN — CARBIDOPA AND LEVODOPA 1 TABLET(S): 25; 100 TABLET ORAL at 13:24

## 2018-01-12 RX ADMIN — Medication 600 MILLIGRAM(S): at 18:15

## 2018-01-12 RX ADMIN — HALOPERIDOL DECANOATE 10 MILLIGRAM(S): 100 INJECTION INTRAMUSCULAR at 22:53

## 2018-01-12 RX ADMIN — BUDESONIDE AND FORMOTEROL FUMARATE DIHYDRATE 2 PUFF(S): 160; 4.5 AEROSOL RESPIRATORY (INHALATION) at 20:10

## 2018-01-12 RX ADMIN — ROPINIROLE 0.5 MILLIGRAM(S): 8 TABLET, FILM COATED, EXTENDED RELEASE ORAL at 18:15

## 2018-01-12 RX ADMIN — DIVALPROEX SODIUM 1000 MILLIGRAM(S): 500 TABLET, DELAYED RELEASE ORAL at 22:53

## 2018-01-12 RX ADMIN — Medication 3 MILLILITER(S): at 12:45

## 2018-01-12 RX ADMIN — CARBIDOPA AND LEVODOPA 1 TABLET(S): 25; 100 TABLET ORAL at 05:42

## 2018-01-12 RX ADMIN — Medication 40 MILLIGRAM(S): at 05:42

## 2018-01-12 RX ADMIN — PANTOPRAZOLE SODIUM 40 MILLIGRAM(S): 20 TABLET, DELAYED RELEASE ORAL at 05:43

## 2018-01-12 RX ADMIN — Medication 3 MILLILITER(S): at 03:10

## 2018-01-12 RX ADMIN — AZITHROMYCIN 500 MILLIGRAM(S): 500 TABLET, FILM COATED ORAL at 13:23

## 2018-01-12 RX ADMIN — Medication 40 MILLIGRAM(S): at 18:15

## 2018-01-12 RX ADMIN — HALOPERIDOL DECANOATE 5 MILLIGRAM(S): 100 INJECTION INTRAMUSCULAR at 13:23

## 2018-01-12 RX ADMIN — Medication 3 MILLILITER(S): at 20:11

## 2018-01-12 RX ADMIN — Medication 40 MILLIGRAM(S): at 13:23

## 2018-01-12 RX ADMIN — CARBIDOPA AND LEVODOPA 1 TABLET(S): 25; 100 TABLET ORAL at 22:53

## 2018-01-12 RX ADMIN — ENOXAPARIN SODIUM 40 MILLIGRAM(S): 100 INJECTION SUBCUTANEOUS at 13:23

## 2018-01-12 RX ADMIN — RISPERIDONE 4 MILLIGRAM(S): 4 TABLET ORAL at 13:23

## 2018-01-12 RX ADMIN — ROPINIROLE 0.5 MILLIGRAM(S): 8 TABLET, FILM COATED, EXTENDED RELEASE ORAL at 05:43

## 2018-01-12 RX ADMIN — Medication 100 MILLIGRAM(S): at 05:44

## 2018-01-12 RX ADMIN — Medication 600 MILLIGRAM(S): at 05:43

## 2018-01-12 NOTE — PROGRESS NOTE ADULT - SUBJECTIVE AND OBJECTIVE BOX
DAVY HOLDEN     Chief Complaint: Patient is a 67y old  Male who presents with a chief complaint of shortness of breath (09 Jan 2018 10:34)      PAST MEDICAL & SURGICAL HISTORY:  Parkinson disease  Parkinson disease  Chronic obstructive pulmonary disease, unspecified COPD type  Schizophrenia  Schizophrenia, unspecified type  Chronic obstructive pulmonary disease, unspecified COPD type  No significant past surgical history  No significant past surgical history      HPI/OVERNIGHT EVENTS: Patient lying in bed. He is less short of breath but still rhonchorous.    MEDICATIONS  (STANDING):  ALBUTerol    90 MICROgram(s) HFA Inhaler 1 Puff(s) Inhalation every 4 hours  ALBUTerol/ipratropium for Nebulization 3 milliLiter(s) Nebulizer every 4 hours  azithromycin   Tablet 500 milliGRAM(s) Oral daily  buDESOnide 160 MICROgram(s)/formoterol 4.5 MICROgram(s) Inhaler 2 Puff(s) Inhalation two times a day  carbidopa/levodopa  25/100 1 Tablet(s) Oral three times a day  diVALproex DR 1000 milliGRAM(s) Oral at bedtime  enoxaparin Injectable 40 milliGRAM(s) SubCutaneous daily  guaiFENesin  milliGRAM(s) Oral every 12 hours  haloperidol     Tablet 10 milliGRAM(s) Oral at bedtime  haloperidol     Tablet 5 milliGRAM(s) Oral daily  ketorolac 0.5% Ophthalmic Solution 1 Drop(s) Both EYES four times a day  methylPREDNISolone sodium succinate Injectable 40 milliGRAM(s) IV Push two times a day  metoprolol succinate ER 25 milliGRAM(s) Oral daily  ofloxacin 0.3% Solution 1 Drop(s) Both EYES four times a day  pantoprazole    Tablet 40 milliGRAM(s) Oral before breakfast  risperiDONE   Tablet 4 milliGRAM(s) Oral daily  rOPINIRole 0.5 milliGRAM(s) Oral two times a day  tiotropium 18 MICROgram(s) Capsule 1 Capsule(s) Inhalation daily      Vital Signs Last 24 Hrs  T(C): 36.9 (12 Jan 2018 08:37), Max: 36.9 (12 Jan 2018 08:37)  T(F): 98.5 (12 Jan 2018 08:37), Max: 98.5 (12 Jan 2018 08:37)  HR: 77 (12 Jan 2018 08:37) (69 - 96)  BP: 146/72 (12 Jan 2018 08:37) (133/87 - 149/88)  BP(mean): --  RR: 18 (12 Jan 2018 08:37) (18 - 22)  SpO2: 90% (12 Jan 2018 13:44) (90% - 96%)    PHYSICAL EXAM:  Constitutional: NAD, well-groomed, well-developed  HEENT: PERRLA, EOMI, Normal Hearing, MMM  Neck: No LAD, No JVD  Back: Normal spine flexure, No CVA tenderness  Respiratory: scattered rhonchi Cardiovascular: S1 and S2, RRR, no M/G/R  Gastrointestinal: BS+, soft, NT/ND  Extremities: No peripheral edema  Vascular: 2+ peripheral pulses  Neurological: A/O x 3, no focal deficits  Psychiatric: Normal mood, normal affect  Musculoskeletal: 5/5 strength b/l upper and lower extremities  Skin: No rashes    CAPILLARY BLOOD GLUCOSE    LABS:                RADIOLOGY & ADDITIONAL TESTS:

## 2018-01-12 NOTE — PROGRESS NOTE ADULT - ASSESSMENT
67 M presented from [facility] with PMH of COPD on Home Oxygen 2 L, Schizophrenia, Parkinson disease, admitted for further evaluation and treatment cough, dyspnea, and fevers. In ER, he was found to be in respiratory distress tachypneic requiring BiPAP initially. X ray chest Neg for PNA. RPV + for RSV and coronavirus. Pt on Home Oxygen 2 L. Acute exacerbation due to viral URI, +RSV and corona. Droplet precaution. Cont empiric Azithromycin  , cont IV steroid 40 mg bid as pt still wheezing DIFFUSELY  Consulted Pulmonary, as Pt's symptoms didn't improve in spite of standard Rx in 48 hrs. continue  Duoneb q6h and Symbicort 160 mg bid. Blood cx Neg. Sputum cx pending

## 2018-01-12 NOTE — PROGRESS NOTE ADULT - SUBJECTIVE AND OBJECTIVE BOX
PULMONARY PROGRESS NOTE      [id number]    Patient is a 67y old  Male who presents with a chief complaint of shortness of breath (09 Jan 2018 10:34)      INTERVAL HPI/OVERNIGHT EVENTS:  still with cough and wheeze  no chest pain reported  MEDICATIONS  (STANDING):  ALBUTerol    90 MICROgram(s) HFA Inhaler 1 Puff(s) Inhalation every 4 hours  ALBUTerol/ipratropium for Nebulization 3 milliLiter(s) Nebulizer every 4 hours  azithromycin   Tablet 500 milliGRAM(s) Oral daily  buDESOnide 160 MICROgram(s)/formoterol 4.5 MICROgram(s) Inhaler 2 Puff(s) Inhalation two times a day  carbidopa/levodopa  25/100 1 Tablet(s) Oral three times a day  diVALproex DR 1000 milliGRAM(s) Oral at bedtime  enoxaparin Injectable 40 milliGRAM(s) SubCutaneous daily  guaiFENesin  milliGRAM(s) Oral every 12 hours  haloperidol     Tablet 10 milliGRAM(s) Oral at bedtime  haloperidol     Tablet 5 milliGRAM(s) Oral daily  ketorolac 0.5% Ophthalmic Solution 1 Drop(s) Both EYES four times a day  methylPREDNISolone sodium succinate Injectable 40 milliGRAM(s) IV Push two times a day  metoprolol succinate ER 25 milliGRAM(s) Oral daily  ofloxacin 0.3% Solution 1 Drop(s) Both EYES four times a day  pantoprazole    Tablet 40 milliGRAM(s) Oral before breakfast  risperiDONE   Tablet 4 milliGRAM(s) Oral daily  rOPINIRole 0.5 milliGRAM(s) Oral two times a day  tiotropium 18 MICROgram(s) Capsule 1 Capsule(s) Inhalation daily      MEDICATIONS  (PRN):  guaiFENesin    Syrup 100 milliGRAM(s) Oral every 6 hours PRN Cough      Allergies    Allergy Status Unknown  No Known Allergies    Intolerances        PAST MEDICAL & SURGICAL HISTORY:  Parkinson disease  Parkinson disease  Chronic obstructive pulmonary disease, unspecified COPD type  Schizophrenia  Schizophrenia, unspecified type  Chronic obstructive pulmonary disease, unspecified COPD type  No significant past surgical history  No significant past surgical history      SOCIAL HISTORY  Smoking History:       REVIEW OF SYSTEMS:    CONSTITUTIONAL:  No distress    HEENT:  Eyes:  No diplopia or blurred vision. ENT:  No earache, sore throat or runny nose.    CARDIOVASCULAR:  No pressure, squeezing, tightness, heaviness or aching about the chest; no palpitations.    RESPIRATORY:  see hpi    GASTROINTESTINAL:  No nausea, vomiting or diarrhea.    GENITOURINARY:  No dysuria, frequency or urgency.    NEUROLOGIC:  No paresthesias, fasciculations, seizures or weakness.    PSYCHIATRIC:  No disorder of thought or mood.    Vital Signs Last 24 Hrs  T(C): 36.9 (12 Jan 2018 08:37), Max: 36.9 (12 Jan 2018 08:37)  T(F): 98.5 (12 Jan 2018 08:37), Max: 98.5 (12 Jan 2018 08:37)  HR: 77 (12 Jan 2018 08:37) (69 - 96)  BP: 146/72 (12 Jan 2018 08:37) (133/87 - 149/88)  BP(mean): --  RR: 18 (12 Jan 2018 08:37) (18 - 22)  SpO2: 90% (12 Jan 2018 13:44) (90% - 96%)    PHYSICAL EXAMINATION:    GENERAL: The patient is awake and alert in no apparent distress.     HEENT: Head is normocephalic and atraumatic. Extraocular muscles are intact. Mucous membranes are moist.    NECK: Supple.    LUNGS: moderate air entry bilat with  rhonchi; respirations unlabored, no paradox    HEART: Regular rate and rhythm without murmur.    ABDOMEN: Soft, nontender, and nondistended.      EXTREMITIES: Without any cyanosis, clubbing, rash, lesions or edema.    NEUROLOGIC: Grossly intact.    LABS:                              MICROBIOLOGY:    RADIOLOGY & ADDITIONAL STUDIES:

## 2018-01-12 NOTE — PROGRESS NOTE ADULT - ASSESSMENT
severe  COPD with exacerbation, RSV/Coronavirus positive  dilated esophagus onn prior CT scan  schizophrenia and parkinson's  continue medrol, nebs, abx  HOB elevated  prognosis guarded  discussed with pt duo neb ATC once discharged to facility

## 2018-01-13 LAB
ANION GAP SERPL CALC-SCNC: 10 MMOL/L — SIGNIFICANT CHANGE UP (ref 5–17)
BUN SERPL-MCNC: 23 MG/DL — HIGH (ref 8–20)
CALCIUM SERPL-MCNC: 9.2 MG/DL — SIGNIFICANT CHANGE UP (ref 8.6–10.2)
CHLORIDE SERPL-SCNC: 97 MMOL/L — LOW (ref 98–107)
CO2 SERPL-SCNC: 34 MMOL/L — HIGH (ref 22–29)
CREAT SERPL-MCNC: 0.62 MG/DL — SIGNIFICANT CHANGE UP (ref 0.5–1.3)
GLUCOSE SERPL-MCNC: 98 MG/DL — SIGNIFICANT CHANGE UP (ref 70–115)
HCT VFR BLD CALC: 35.7 % — LOW (ref 42–52)
HGB BLD-MCNC: 11.5 G/DL — LOW (ref 14–18)
MAGNESIUM SERPL-MCNC: 2.2 MG/DL — SIGNIFICANT CHANGE UP (ref 1.6–2.6)
MCHC RBC-ENTMCNC: 32.1 PG — HIGH (ref 27–31)
MCHC RBC-ENTMCNC: 32.2 G/DL — SIGNIFICANT CHANGE UP (ref 32–36)
MCV RBC AUTO: 99.7 FL — HIGH (ref 80–94)
PHOSPHATE SERPL-MCNC: 3.9 MG/DL — SIGNIFICANT CHANGE UP (ref 2.4–4.7)
PLATELET # BLD AUTO: 213 K/UL — SIGNIFICANT CHANGE UP (ref 150–400)
POTASSIUM SERPL-MCNC: 4.6 MMOL/L — SIGNIFICANT CHANGE UP (ref 3.5–5.3)
POTASSIUM SERPL-SCNC: 4.6 MMOL/L — SIGNIFICANT CHANGE UP (ref 3.5–5.3)
RBC # BLD: 3.58 M/UL — LOW (ref 4.6–6.2)
RBC # FLD: 13.9 % — SIGNIFICANT CHANGE UP (ref 11–15.6)
SODIUM SERPL-SCNC: 141 MMOL/L — SIGNIFICANT CHANGE UP (ref 135–145)
WBC # BLD: 6.5 K/UL — SIGNIFICANT CHANGE UP (ref 4.8–10.8)
WBC # FLD AUTO: 6.5 K/UL — SIGNIFICANT CHANGE UP (ref 4.8–10.8)

## 2018-01-13 PROCEDURE — 99233 SBSQ HOSP IP/OBS HIGH 50: CPT

## 2018-01-13 RX ORDER — ACETAMINOPHEN 500 MG
650 TABLET ORAL EVERY 6 HOURS
Qty: 0 | Refills: 0 | Status: DISCONTINUED | OUTPATIENT
Start: 2018-01-13 | End: 2018-01-16

## 2018-01-13 RX ADMIN — Medication 3 MILLILITER(S): at 11:58

## 2018-01-13 RX ADMIN — Medication 100 MILLIGRAM(S): at 11:50

## 2018-01-13 RX ADMIN — Medication 100 MILLIGRAM(S): at 06:21

## 2018-01-13 RX ADMIN — Medication 3 MILLILITER(S): at 15:51

## 2018-01-13 RX ADMIN — CARBIDOPA AND LEVODOPA 1 TABLET(S): 25; 100 TABLET ORAL at 21:24

## 2018-01-13 RX ADMIN — Medication 3 MILLILITER(S): at 04:53

## 2018-01-13 RX ADMIN — HALOPERIDOL DECANOATE 5 MILLIGRAM(S): 100 INJECTION INTRAMUSCULAR at 11:48

## 2018-01-13 RX ADMIN — ROPINIROLE 0.5 MILLIGRAM(S): 8 TABLET, FILM COATED, EXTENDED RELEASE ORAL at 18:07

## 2018-01-13 RX ADMIN — PANTOPRAZOLE SODIUM 40 MILLIGRAM(S): 20 TABLET, DELAYED RELEASE ORAL at 06:21

## 2018-01-13 RX ADMIN — Medication 3 MILLILITER(S): at 09:35

## 2018-01-13 RX ADMIN — BUDESONIDE AND FORMOTEROL FUMARATE DIHYDRATE 2 PUFF(S): 160; 4.5 AEROSOL RESPIRATORY (INHALATION) at 09:38

## 2018-01-13 RX ADMIN — Medication 40 MILLIGRAM(S): at 18:07

## 2018-01-13 RX ADMIN — Medication 650 MILLIGRAM(S): at 21:39

## 2018-01-13 RX ADMIN — ROPINIROLE 0.5 MILLIGRAM(S): 8 TABLET, FILM COATED, EXTENDED RELEASE ORAL at 06:20

## 2018-01-13 RX ADMIN — AZITHROMYCIN 500 MILLIGRAM(S): 500 TABLET, FILM COATED ORAL at 11:48

## 2018-01-13 RX ADMIN — CARBIDOPA AND LEVODOPA 1 TABLET(S): 25; 100 TABLET ORAL at 06:21

## 2018-01-13 RX ADMIN — Medication 3 MILLILITER(S): at 20:12

## 2018-01-13 RX ADMIN — DIVALPROEX SODIUM 1000 MILLIGRAM(S): 500 TABLET, DELAYED RELEASE ORAL at 21:25

## 2018-01-13 RX ADMIN — Medication 650 MILLIGRAM(S): at 22:39

## 2018-01-13 RX ADMIN — Medication 40 MILLIGRAM(S): at 06:20

## 2018-01-13 RX ADMIN — HALOPERIDOL DECANOATE 10 MILLIGRAM(S): 100 INJECTION INTRAMUSCULAR at 21:24

## 2018-01-13 RX ADMIN — Medication 3 MILLILITER(S): at 00:04

## 2018-01-13 RX ADMIN — Medication 25 MILLIGRAM(S): at 06:20

## 2018-01-13 RX ADMIN — Medication 600 MILLIGRAM(S): at 06:21

## 2018-01-13 RX ADMIN — CARBIDOPA AND LEVODOPA 1 TABLET(S): 25; 100 TABLET ORAL at 11:48

## 2018-01-13 RX ADMIN — BUDESONIDE AND FORMOTEROL FUMARATE DIHYDRATE 2 PUFF(S): 160; 4.5 AEROSOL RESPIRATORY (INHALATION) at 20:12

## 2018-01-13 RX ADMIN — RISPERIDONE 4 MILLIGRAM(S): 4 TABLET ORAL at 11:47

## 2018-01-13 NOTE — PROGRESS NOTE ADULT - PROBLEM SELECTOR PLAN 1
Taper steroids to solu medrol 40 bid  continue nebulizers On 1/13 Patient doing well consider oral prednisone.

## 2018-01-13 NOTE — PROGRESS NOTE ADULT - SUBJECTIVE AND OBJECTIVE BOX
DAVY HOLDEN     Chief Complaint: Patient is a 67y old  Male who presents with a chief complaint of shortness of breath (09 Jan 2018 10:34)      PAST MEDICAL & SURGICAL HISTORY:  Parkinson disease  Parkinson disease  Chronic obstructive pulmonary disease, unspecified COPD type  Schizophrenia  Schizophrenia, unspecified type  Chronic obstructive pulmonary disease, unspecified COPD type  No significant past surgical history  No significant past surgical history      HPI/OVERNIGHT EVENTS: Patient sleeping, arousable.    MEDICATIONS  (STANDING):  ALBUTerol    90 MICROgram(s) HFA Inhaler 1 Puff(s) Inhalation every 4 hours  ALBUTerol/ipratropium for Nebulization 3 milliLiter(s) Nebulizer every 4 hours  azithromycin   Tablet 500 milliGRAM(s) Oral daily  buDESOnide 160 MICROgram(s)/formoterol 4.5 MICROgram(s) Inhaler 2 Puff(s) Inhalation two times a day  carbidopa/levodopa  25/100 1 Tablet(s) Oral three times a day  diVALproex DR 1000 milliGRAM(s) Oral at bedtime  enoxaparin Injectable 40 milliGRAM(s) SubCutaneous daily  haloperidol     Tablet 10 milliGRAM(s) Oral at bedtime  haloperidol     Tablet 5 milliGRAM(s) Oral daily  ketorolac 0.5% Ophthalmic Solution 1 Drop(s) Both EYES four times a day  methylPREDNISolone sodium succinate Injectable 40 milliGRAM(s) IV Push two times a day  metoprolol succinate ER 25 milliGRAM(s) Oral daily  ofloxacin 0.3% Solution 1 Drop(s) Both EYES four times a day  pantoprazole    Tablet 40 milliGRAM(s) Oral before breakfast  risperiDONE   Tablet 4 milliGRAM(s) Oral daily  rOPINIRole 0.5 milliGRAM(s) Oral two times a day  tiotropium 18 MICROgram(s) Capsule 1 Capsule(s) Inhalation daily      Vital Signs Last 24 Hrs  T(C): 36.6 (13 Jan 2018 11:41), Max: 36.9 (13 Jan 2018 06:15)  T(F): 97.9 (13 Jan 2018 11:41), Max: 98.5 (13 Jan 2018 06:15)  HR: 99 (13 Jan 2018 11:41) (79 - 118)  BP: 138/89 (13 Jan 2018 11:41) (133/78 - 145/96)  BP(mean): --  RR: 18 (13 Jan 2018 11:41) (18 - 20)  SpO2: 93% (13 Jan 2018 11:41) (89% - 95%)    PHYSICAL EXAM:  Constitutional: NAD, well-groomed, well-developed  HEENT: PERRLA, EOMI, Normal Hearing, MMM  Neck: No LAD, No JVD  Back: Normal spine flexure, No CVA tenderness  Respiratory: CTAB Cardiovascular: S1 and S2, RRR, no M/G/R  Gastrointestinal: BS+, soft, NT/ND  Extremities: No peripheral edema  Vascular: 2+ peripheral pulses  Neurological: A/O x 3, no focal deficits  Psychiatric: Normal mood, normal affect  Musculoskeletal: 5/5 strength b/l upper and lower extremities  Skin: No rashes    CAPILLARY BLOOD GLUCOSE    LABS:                        11.5   6.5   )-----------( 213      ( 13 Jan 2018 08:26 )             35.7     01-13    141  |  97<L>  |  23.0<H>  ----------------------------<  98  4.6   |  34.0<H>  |  0.62    Ca    9.2      13 Jan 2018 08:26  Phos  3.9     01-13  Mg     2.2     01-13            RADIOLOGY & ADDITIONAL TESTS:

## 2018-01-13 NOTE — PROGRESS NOTE ADULT - ASSESSMENT
67 M presented from [facility] with PMH of COPD on Home Oxygen 2 L, Schizophrenia, Parkinson disease, admitted for further evaluation and treatment cough, dyspnea, and fevers. In ER, he was found to be in respiratory distress tachypneic requiring BiPAP initially. X ray chest Neg for PNA. RPV + for RSV and coronavirus. Pt on Home Oxygen 2 L. Acute exacerbation due to viral URI, +RSV and corona. Droplet precaution. Cont empiric Azithromycin  , cont IV steroid 40 mg bid as pt still wheezing DIFFUSELY  Consulted Pulmonary, as Pt's symptoms didn't improve in spite of standard Rx in 48 hrs. continue  Duoneb q6h and Symbicort 160 mg bid. Blood cx Neg. Sputum cx pending

## 2018-01-14 LAB
ANION GAP SERPL CALC-SCNC: 10 MMOL/L — SIGNIFICANT CHANGE UP (ref 5–17)
APPEARANCE UR: CLEAR — SIGNIFICANT CHANGE UP
BACTERIA # UR AUTO: ABNORMAL
BILIRUB UR-MCNC: NEGATIVE — SIGNIFICANT CHANGE UP
BUN SERPL-MCNC: 22 MG/DL — HIGH (ref 8–20)
CALCIUM SERPL-MCNC: 9 MG/DL — SIGNIFICANT CHANGE UP (ref 8.6–10.2)
CHLORIDE SERPL-SCNC: 96 MMOL/L — LOW (ref 98–107)
CO2 SERPL-SCNC: 33 MMOL/L — HIGH (ref 22–29)
COLOR SPEC: YELLOW — SIGNIFICANT CHANGE UP
CREAT SERPL-MCNC: 0.58 MG/DL — SIGNIFICANT CHANGE UP (ref 0.5–1.3)
CULTURE RESULTS: SIGNIFICANT CHANGE UP
CULTURE RESULTS: SIGNIFICANT CHANGE UP
DIFF PNL FLD: NEGATIVE — SIGNIFICANT CHANGE UP
EPI CELLS # UR: SIGNIFICANT CHANGE UP
GLUCOSE SERPL-MCNC: 100 MG/DL — SIGNIFICANT CHANGE UP (ref 70–115)
GLUCOSE UR QL: NEGATIVE MG/DL — SIGNIFICANT CHANGE UP
HCT VFR BLD CALC: 35 % — LOW (ref 42–52)
HGB BLD-MCNC: 11.3 G/DL — LOW (ref 14–18)
KETONES UR-MCNC: NEGATIVE — SIGNIFICANT CHANGE UP
LEUKOCYTE ESTERASE UR-ACNC: ABNORMAL
MAGNESIUM SERPL-MCNC: 2 MG/DL — SIGNIFICANT CHANGE UP (ref 1.6–2.6)
MCHC RBC-ENTMCNC: 32 PG — HIGH (ref 27–31)
MCHC RBC-ENTMCNC: 32.3 G/DL — SIGNIFICANT CHANGE UP (ref 32–36)
MCV RBC AUTO: 99.2 FL — HIGH (ref 80–94)
NITRITE UR-MCNC: NEGATIVE — SIGNIFICANT CHANGE UP
PH UR: 7 — SIGNIFICANT CHANGE UP (ref 5–8)
PHOSPHATE SERPL-MCNC: 3.6 MG/DL — SIGNIFICANT CHANGE UP (ref 2.4–4.7)
PLATELET # BLD AUTO: 195 K/UL — SIGNIFICANT CHANGE UP (ref 150–400)
POTASSIUM SERPL-MCNC: 4.4 MMOL/L — SIGNIFICANT CHANGE UP (ref 3.5–5.3)
POTASSIUM SERPL-SCNC: 4.4 MMOL/L — SIGNIFICANT CHANGE UP (ref 3.5–5.3)
PROT UR-MCNC: NEGATIVE MG/DL — SIGNIFICANT CHANGE UP
RBC # BLD: 3.53 M/UL — LOW (ref 4.6–6.2)
RBC # FLD: 13.7 % — SIGNIFICANT CHANGE UP (ref 11–15.6)
RBC CASTS # UR COMP ASSIST: SIGNIFICANT CHANGE UP /HPF (ref 0–4)
SODIUM SERPL-SCNC: 139 MMOL/L — SIGNIFICANT CHANGE UP (ref 135–145)
SP GR SPEC: 1 — LOW (ref 1.01–1.02)
SPECIMEN SOURCE: SIGNIFICANT CHANGE UP
SPECIMEN SOURCE: SIGNIFICANT CHANGE UP
UROBILINOGEN FLD QL: 1 MG/DL
WBC # BLD: 5.6 K/UL — SIGNIFICANT CHANGE UP (ref 4.8–10.8)
WBC # FLD AUTO: 5.6 K/UL — SIGNIFICANT CHANGE UP (ref 4.8–10.8)
WBC UR QL: SIGNIFICANT CHANGE UP

## 2018-01-14 PROCEDURE — 99233 SBSQ HOSP IP/OBS HIGH 50: CPT

## 2018-01-14 RX ADMIN — CARBIDOPA AND LEVODOPA 1 TABLET(S): 25; 100 TABLET ORAL at 23:22

## 2018-01-14 RX ADMIN — HALOPERIDOL DECANOATE 10 MILLIGRAM(S): 100 INJECTION INTRAMUSCULAR at 23:22

## 2018-01-14 RX ADMIN — BUDESONIDE AND FORMOTEROL FUMARATE DIHYDRATE 2 PUFF(S): 160; 4.5 AEROSOL RESPIRATORY (INHALATION) at 10:44

## 2018-01-14 RX ADMIN — PANTOPRAZOLE SODIUM 40 MILLIGRAM(S): 20 TABLET, DELAYED RELEASE ORAL at 06:00

## 2018-01-14 RX ADMIN — Medication 1 DROP(S): at 06:02

## 2018-01-14 RX ADMIN — Medication 3 MILLILITER(S): at 00:08

## 2018-01-14 RX ADMIN — BUDESONIDE AND FORMOTEROL FUMARATE DIHYDRATE 2 PUFF(S): 160; 4.5 AEROSOL RESPIRATORY (INHALATION) at 21:13

## 2018-01-14 RX ADMIN — AZITHROMYCIN 500 MILLIGRAM(S): 500 TABLET, FILM COATED ORAL at 13:17

## 2018-01-14 RX ADMIN — Medication 40 MILLIGRAM(S): at 05:59

## 2018-01-14 RX ADMIN — Medication 20 MILLIGRAM(S): at 18:04

## 2018-01-14 RX ADMIN — HALOPERIDOL DECANOATE 5 MILLIGRAM(S): 100 INJECTION INTRAMUSCULAR at 13:17

## 2018-01-14 RX ADMIN — CARBIDOPA AND LEVODOPA 1 TABLET(S): 25; 100 TABLET ORAL at 13:17

## 2018-01-14 RX ADMIN — Medication 650 MILLIGRAM(S): at 18:04

## 2018-01-14 RX ADMIN — CARBIDOPA AND LEVODOPA 1 TABLET(S): 25; 100 TABLET ORAL at 06:00

## 2018-01-14 RX ADMIN — ENOXAPARIN SODIUM 40 MILLIGRAM(S): 100 INJECTION SUBCUTANEOUS at 13:17

## 2018-01-14 RX ADMIN — Medication 3 MILLILITER(S): at 10:44

## 2018-01-14 RX ADMIN — Medication 3 MILLILITER(S): at 13:22

## 2018-01-14 RX ADMIN — Medication 25 MILLIGRAM(S): at 05:59

## 2018-01-14 RX ADMIN — Medication 100 MILLIGRAM(S): at 18:04

## 2018-01-14 RX ADMIN — ROPINIROLE 0.5 MILLIGRAM(S): 8 TABLET, FILM COATED, EXTENDED RELEASE ORAL at 06:00

## 2018-01-14 RX ADMIN — Medication 3 MILLILITER(S): at 16:47

## 2018-01-14 RX ADMIN — ROPINIROLE 0.5 MILLIGRAM(S): 8 TABLET, FILM COATED, EXTENDED RELEASE ORAL at 18:04

## 2018-01-14 RX ADMIN — DIVALPROEX SODIUM 1000 MILLIGRAM(S): 500 TABLET, DELAYED RELEASE ORAL at 23:22

## 2018-01-14 RX ADMIN — RISPERIDONE 4 MILLIGRAM(S): 4 TABLET ORAL at 13:17

## 2018-01-14 RX ADMIN — Medication 650 MILLIGRAM(S): at 06:52

## 2018-01-14 RX ADMIN — Medication 3 MILLILITER(S): at 21:12

## 2018-01-14 NOTE — PROGRESS NOTE ADULT - ASSESSMENT
67 M presented from [facility] with PMH of COPD on Home Oxygen 2 L, Schizophrenia, Parkinson disease, admitted for further evaluation and treatment cough, dyspnea, and fevers. In ER, he was found to be in respiratory distress tachypneic requiring BiPAP initially. X ray chest Neg for PNA. RPV + for RSV and coronavirus. Pt on Home Oxygen 2 L. Acute exacerbation due to viral URI, +RSV and corona. Droplet precaution. Cont empiric Azithromycin  , cont IV steroid 40 mg bid as pt still wheezing DIFFUSELY  Consulted Pulmonary, as Pt's symptoms didn't improve in spite of standard Rx in 48 hrs. continue  Duoneb q6h and Symbicort 160 mg bid. Blood cx Neg. Sputum cx pending. Patient stable.

## 2018-01-14 NOTE — PROGRESS NOTE ADULT - PROBLEM SELECTOR PLAN 1
Taper steroids to solu medrol 40 bid  continue nebulizers On 1/13 Patient doing well consider oral prednisone. 1/14 Patient stable convert to oral agents.

## 2018-01-14 NOTE — PROGRESS NOTE ADULT - SUBJECTIVE AND OBJECTIVE BOX
DAVY HOLDEN     Chief Complaint: Patient is a 67y old  Male who presents with a chief complaint of shortness of breath (2018 10:34)      PAST MEDICAL & SURGICAL HISTORY:  Parkinson disease  Parkinson disease  Chronic obstructive pulmonary disease, unspecified COPD type  Schizophrenia  Schizophrenia, unspecified type  Chronic obstructive pulmonary disease, unspecified COPD type  No significant past surgical history  No significant past surgical history      HPI/OVERNIGHT EVENTS: Patient in no distress    MEDICATIONS  (STANDING):  ALBUTerol    90 MICROgram(s) HFA Inhaler 1 Puff(s) Inhalation every 4 hours  ALBUTerol/ipratropium for Nebulization 3 milliLiter(s) Nebulizer every 4 hours  azithromycin   Tablet 500 milliGRAM(s) Oral daily  buDESOnide 160 MICROgram(s)/formoterol 4.5 MICROgram(s) Inhaler 2 Puff(s) Inhalation two times a day  carbidopa/levodopa  25/100 1 Tablet(s) Oral three times a day  diVALproex DR 1000 milliGRAM(s) Oral at bedtime  enoxaparin Injectable 40 milliGRAM(s) SubCutaneous daily  haloperidol     Tablet 10 milliGRAM(s) Oral at bedtime  haloperidol     Tablet 5 milliGRAM(s) Oral daily  ketorolac 0.5% Ophthalmic Solution 1 Drop(s) Both EYES four times a day  methylPREDNISolone sodium succinate Injectable 40 milliGRAM(s) IV Push two times a day  metoprolol succinate ER 25 milliGRAM(s) Oral daily  ofloxacin 0.3% Solution 1 Drop(s) Both EYES four times a day  pantoprazole    Tablet 40 milliGRAM(s) Oral before breakfast  risperiDONE   Tablet 4 milliGRAM(s) Oral daily  rOPINIRole 0.5 milliGRAM(s) Oral two times a day  tiotropium 18 MICROgram(s) Capsule 1 Capsule(s) Inhalation daily      Vital Signs Last 24 Hrs  T(C): 36.9 (2018 10:03), Max: 36.9 (2018 10:03)  T(F): 98.5 (2018 10:03), Max: 98.5 (2018 10:03)  HR: 65 (2018 10:03) (65 - 115)  BP: 144/74 (2018 10:03) (130/89 - 151/89)  BP(mean): --  RR: 16 (2018 10:03) (16 - 20)  SpO2: 97% (2018 10:03) (91% - 97%)    PHYSICAL EXAM:  Constitutional: NAD, well-groomed, well-developed  HEENT: PERRLA, EOMI, Normal Hearing, MMM  Neck: No LAD, No JVD  Back: Normal spine flexure, No CVA tenderness  Respiratory: CTAB Cardiovascular: S1 and S2, RRR, no M/G/R  Gastrointestinal: BS+, soft, NT/ND  Extremities: No peripheral edema  Vascular: 2+ peripheral pulses  Neurological: A/O x 3, no focal deficits  Psychiatric: Normal mood, normal affect  Musculoskeletal: 5/5 strength b/l upper and lower extremities  Skin: No rashes    CAPILLARY BLOOD GLUCOSE    LABS:                        11.3   5.6   )-----------( 195      ( 2018 08:15 )             35.0     01-14    139  |  96<L>  |  22.0<H>  ----------------------------<  100  4.4   |  33.0<H>  |  0.58    Ca    9.0      2018 08:15  Phos  3.6     -14  Mg     2.0     -14        Urinalysis Basic - ( 2018 15:18 )    Color: Yellow / Appearance: Clear / S.005 / pH: x  Gluc: x / Ketone: Negative  / Bili: Negative / Urobili: 1 mg/dL   Blood: x / Protein: Negative mg/dL / Nitrite: Negative   Leuk Esterase: Trace / RBC: x / WBC x   Sq Epi: x / Non Sq Epi: x / Bacteria: x        RADIOLOGY & ADDITIONAL TESTS:

## 2018-01-15 PROCEDURE — 99233 SBSQ HOSP IP/OBS HIGH 50: CPT

## 2018-01-15 PROCEDURE — 99232 SBSQ HOSP IP/OBS MODERATE 35: CPT

## 2018-01-15 RX ADMIN — Medication 650 MILLIGRAM(S): at 23:40

## 2018-01-15 RX ADMIN — Medication 20 MILLIGRAM(S): at 05:53

## 2018-01-15 RX ADMIN — RISPERIDONE 4 MILLIGRAM(S): 4 TABLET ORAL at 12:46

## 2018-01-15 RX ADMIN — HALOPERIDOL DECANOATE 5 MILLIGRAM(S): 100 INJECTION INTRAMUSCULAR at 12:46

## 2018-01-15 RX ADMIN — Medication 25 MILLIGRAM(S): at 05:54

## 2018-01-15 RX ADMIN — ROPINIROLE 0.5 MILLIGRAM(S): 8 TABLET, FILM COATED, EXTENDED RELEASE ORAL at 16:55

## 2018-01-15 RX ADMIN — PANTOPRAZOLE SODIUM 40 MILLIGRAM(S): 20 TABLET, DELAYED RELEASE ORAL at 05:53

## 2018-01-15 RX ADMIN — DIVALPROEX SODIUM 1000 MILLIGRAM(S): 500 TABLET, DELAYED RELEASE ORAL at 22:40

## 2018-01-15 RX ADMIN — Medication 3 MILLILITER(S): at 16:33

## 2018-01-15 RX ADMIN — CARBIDOPA AND LEVODOPA 1 TABLET(S): 25; 100 TABLET ORAL at 12:46

## 2018-01-15 RX ADMIN — Medication 650 MILLIGRAM(S): at 22:40

## 2018-01-15 RX ADMIN — Medication 100 MILLIGRAM(S): at 12:47

## 2018-01-15 RX ADMIN — BUDESONIDE AND FORMOTEROL FUMARATE DIHYDRATE 2 PUFF(S): 160; 4.5 AEROSOL RESPIRATORY (INHALATION) at 21:15

## 2018-01-15 RX ADMIN — AZITHROMYCIN 500 MILLIGRAM(S): 500 TABLET, FILM COATED ORAL at 12:46

## 2018-01-15 RX ADMIN — ROPINIROLE 0.5 MILLIGRAM(S): 8 TABLET, FILM COATED, EXTENDED RELEASE ORAL at 05:54

## 2018-01-15 RX ADMIN — Medication 100 MILLIGRAM(S): at 05:54

## 2018-01-15 RX ADMIN — Medication 3 MILLILITER(S): at 21:12

## 2018-01-15 RX ADMIN — Medication 3 MILLILITER(S): at 12:27

## 2018-01-15 RX ADMIN — CARBIDOPA AND LEVODOPA 1 TABLET(S): 25; 100 TABLET ORAL at 05:54

## 2018-01-15 RX ADMIN — Medication 3 MILLILITER(S): at 08:42

## 2018-01-15 RX ADMIN — BUDESONIDE AND FORMOTEROL FUMARATE DIHYDRATE 2 PUFF(S): 160; 4.5 AEROSOL RESPIRATORY (INHALATION) at 08:43

## 2018-01-15 RX ADMIN — HALOPERIDOL DECANOATE 10 MILLIGRAM(S): 100 INJECTION INTRAMUSCULAR at 22:40

## 2018-01-15 RX ADMIN — ENOXAPARIN SODIUM 40 MILLIGRAM(S): 100 INJECTION SUBCUTANEOUS at 12:46

## 2018-01-15 RX ADMIN — Medication 100 MILLIGRAM(S): at 16:55

## 2018-01-15 NOTE — PROGRESS NOTE ADULT - ASSESSMENT
67 M presented from [facility] Home with PMH of COPD on Home Oxygen 2 L, Schizophrenia, Parkinson disease, admitted for further evaluation and treatment cough, dyspnea, and fevers. In ER, he was found to be in respiratory distress tachypneic requiring BiPAP initially. X ray chest Neg for PNA. RPV + for RSV and coronavirus. Pt on Home Oxygen 2 L. Acute exacerbation due to viral URI, +RSV and corona. Droplet precaution. Cont empiric Azithromycin  , cont IV steroid 40 mg bid as pt still wheezing DIFFUSELY  Consulted Pulmonary, as Pt's symptoms didn't improve in spite of standard Rx in 48 hrs. continue  Duoneb q6h and Symbicort 160 mg bid. Blood cx Neg. Sputum cx pending. Patient stable. Anticipate transfer back to home on 1/16.

## 2018-01-15 NOTE — PROGRESS NOTE ADULT - SUBJECTIVE AND OBJECTIVE BOX
DAVY HOLDEN     Chief Complaint: Patient is a 67y old  Male who presents with a chief complaint of shortness of breath (2018 10:34)      PAST MEDICAL & SURGICAL HISTORY:  Parkinson disease  Parkinson disease  Chronic obstructive pulmonary disease, unspecified COPD type  Schizophrenia  Schizophrenia, unspecified type  Chronic obstructive pulmonary disease, unspecified COPD type  No significant past surgical history  No significant past surgical history      HPI/OVERNIGHT EVENTS: patient in no distress. He makes multiple requests for x ray chest and x ray head. I tried to educate him that he is improving and does not need these tests at this time.    MEDICATIONS  (STANDING):  ALBUTerol    90 MICROgram(s) HFA Inhaler 1 Puff(s) Inhalation every 4 hours  ALBUTerol/ipratropium for Nebulization 3 milliLiter(s) Nebulizer every 4 hours  azithromycin   Tablet 500 milliGRAM(s) Oral daily  buDESOnide 160 MICROgram(s)/formoterol 4.5 MICROgram(s) Inhaler 2 Puff(s) Inhalation two times a day  carbidopa/levodopa  25/100 1 Tablet(s) Oral three times a day  diVALproex DR 1000 milliGRAM(s) Oral at bedtime  enoxaparin Injectable 40 milliGRAM(s) SubCutaneous daily  haloperidol     Tablet 10 milliGRAM(s) Oral at bedtime  haloperidol     Tablet 5 milliGRAM(s) Oral daily  ketorolac 0.5% Ophthalmic Solution 1 Drop(s) Both EYES four times a day  metoprolol succinate ER 25 milliGRAM(s) Oral daily  ofloxacin 0.3% Solution 1 Drop(s) Both EYES four times a day  pantoprazole    Tablet 40 milliGRAM(s) Oral before breakfast  predniSONE   Tablet 20 milliGRAM(s) Oral daily  risperiDONE   Tablet 4 milliGRAM(s) Oral daily  rOPINIRole 0.5 milliGRAM(s) Oral two times a day  tiotropium 18 MICROgram(s) Capsule 1 Capsule(s) Inhalation daily      Vital Signs Last 24 Hrs  T(C): 36.7 (15 Maciej 2018 09:38), Max: 36.7 (15 Maciej 2018 05:49)  T(F): 98 (15 Maciej 2018 09:38), Max: 98.1 (15 Maciej 2018 05:49)  HR: 79 (15 Maciej 2018 09:38) (79 - 90)  BP: 118/75 (15 Maciej 2018 09:38) (112/70 - 138/76)  BP(mean): --  RR: 18 (15 Maciej 2018 09:38) (18 - 20)  SpO2: 92% (15 Maciej 2018 09:38) (92% - 95%)    PHYSICAL EXAM:  Constitutional: NAD, well-groomed, well-developed  HEENT: PERRLA, EOMI, Normal Hearing, MMM  Neck: No LAD, No JVD  Back: Normal spine flexure, No CVA tenderness  Respiratory: CTAB Cardiovascular: S1 and S2, RRR, no M/G/R  Gastrointestinal: BS+, soft, NT/ND  Extremities: No peripheral edema  Vascular: 2+ peripheral pulses  Neurological:  mentally challenged.  Psychiatric: Normal mood, normal affect  Musculoskeletal: 5/5 strength b/l upper and lower extremities  Skin: No rashes    CAPILLARY BLOOD GLUCOSE    LABS:                        11.3   5.6   )-----------( 195      ( 2018 08:15 )             35.0     01-14    139  |  96<L>  |  22.0<H>  ----------------------------<  100  4.4   |  33.0<H>  |  0.58    Ca    9.0      2018 08:15  Phos  3.6     01-14  Mg     2.0     01-14        Urinalysis Basic - ( 2018 15:18 )    Color: Yellow / Appearance: Clear / S.005 / pH: x  Gluc: x / Ketone: Negative  / Bili: Negative / Urobili: 1 mg/dL   Blood: x / Protein: Negative mg/dL / Nitrite: Negative   Leuk Esterase: Trace / RBC: 0-2 /HPF / WBC 0-2   Sq Epi: x / Non Sq Epi: Occasional / Bacteria: Occasional        RADIOLOGY & ADDITIONAL TESTS:

## 2018-01-15 NOTE — PROGRESS NOTE ADULT - SUBJECTIVE AND OBJECTIVE BOX
PULMONARY PROGRESS NOTE      [id number]    Patient is a 67y old  Male who presents with a chief complaint of shortness of breath (2018 10:34)      INTERVAL HPI/OVERNIGHT EVENTS:  AECOPD  comfortable at rest  No cough or sputum    MEDICATIONS  (STANDING):  ALBUTerol    90 MICROgram(s) HFA Inhaler 1 Puff(s) Inhalation every 4 hours  ALBUTerol/ipratropium for Nebulization 3 milliLiter(s) Nebulizer every 4 hours  azithromycin   Tablet 500 milliGRAM(s) Oral daily  buDESOnide 160 MICROgram(s)/formoterol 4.5 MICROgram(s) Inhaler 2 Puff(s) Inhalation two times a day  carbidopa/levodopa  25/100 1 Tablet(s) Oral three times a day  diVALproex DR 1000 milliGRAM(s) Oral at bedtime  enoxaparin Injectable 40 milliGRAM(s) SubCutaneous daily  haloperidol     Tablet 10 milliGRAM(s) Oral at bedtime  haloperidol     Tablet 5 milliGRAM(s) Oral daily  ketorolac 0.5% Ophthalmic Solution 1 Drop(s) Both EYES four times a day  metoprolol succinate ER 25 milliGRAM(s) Oral daily  ofloxacin 0.3% Solution 1 Drop(s) Both EYES four times a day  pantoprazole    Tablet 40 milliGRAM(s) Oral before breakfast  predniSONE   Tablet 20 milliGRAM(s) Oral daily  risperiDONE   Tablet 4 milliGRAM(s) Oral daily  rOPINIRole 0.5 milliGRAM(s) Oral two times a day  tiotropium 18 MICROgram(s) Capsule 1 Capsule(s) Inhalation daily      MEDICATIONS  (PRN):  acetaminophen   Tablet. 650 milliGRAM(s) Oral every 6 hours PRN Mild Pain (1 - 3)  guaiFENesin    Syrup 100 milliGRAM(s) Oral every 6 hours PRN Cough      Allergies    Allergy Status Unknown  No Known Allergies    Intolerances        PAST MEDICAL & SURGICAL HISTORY:  Parkinson disease  Parkinson disease  Chronic obstructive pulmonary disease, unspecified COPD type  Schizophrenia  Schizophrenia, unspecified type  Chronic obstructive pulmonary disease, unspecified COPD type  No significant past surgical history  No significant past surgical history      SOCIAL HISTORY  Smoking History:       REVIEW OF SYSTEMS:    CONSTITUTIONAL:  No distress    HEENT:  Eyes:  No diplopia or blurred vision. ENT:  No earache, sore throat or runny nose.    CARDIOVASCULAR:  No pressure, squeezing, tightness, heaviness or aching about the chest; no palpitations.    RESPIRATORY:  above    GASTROINTESTINAL:  No nausea, vomiting or diarrhea.    GENITOURINARY:  No dysuria, frequency or urgency.    NEUROLOGIC:  No paresthesias, fasciculations, seizures or weakness.    Extremities: No cyanosis, clubbing or edema    PSYCHIATRIC:  No disorder of thought or mood.    Vital Signs Last 24 Hrs  T(C): 36.7 (15 Maciej 2018 09:38), Max: 36.7 (15 Maciej 2018 05:49)  T(F): 98 (15 Maciej 2018 09:38), Max: 98.1 (15 Maciej 2018 05:49)  HR: 79 (15 Maciej 2018 09:38) (79 - 90)  BP: 118/75 (15 Maciej 2018 09:38) (112/70 - 138/76)  BP(mean): --  RR: 18 (15 Maciej 2018 09:38) (18 - 20)  SpO2: 92% (15 Maciej 2018 09:38) (92% - 95%)    PHYSICAL EXAMINATION:    GENERAL: The patient is awake and alert in no apparent distress.     HEENT: Head is normocephalic and atraumatic. Extraocular muscles are intact. Mucous membranes are moist.    NECK: Supple.    LUNGS: end-expiratory wheezes desmond respirations unlabored    HEART: Regular rate and rhythm without murmur.    ABDOMEN: Soft, nontender, and nondistended.      EXTREMITIES: Without any cyanosis, clubbing, rash, lesions or edema.    NEUROLOGIC: Grossly intact.    LABS:                        11.3   5.6   )-----------( 195      ( 2018 08:15 )             35.0     -14    139  |  96<L>  |  22.0<H>  ----------------------------<  100  4.4   |  33.0<H>  |  0.58    Ca    9.0      2018 08:15  Phos  3.6     -14  Mg     2.0     -14        Urinalysis Basic - ( 2018 15:18 )    Color: Yellow / Appearance: Clear / S.005 / pH: x  Gluc: x / Ketone: Negative  / Bili: Negative / Urobili: 1 mg/dL   Blood: x / Protein: Negative mg/dL / Nitrite: Negative   Leuk Esterase: Trace / RBC: 0-2 /HPF / WBC 0-2   Sq Epi: x / Non Sq Epi: Occasional / Bacteria: Occasional                      MICROBIOLOGY:    RADIOLOGY & ADDITIONAL STUDIES:  < from: Xray Chest 1 View AP -PORTABLE-Routine (18 @ 07:44) >   EXAM:  XR CHEST PORTABLE  ROUTINE 1V                          PROCEDURE DATE:  2018          INTERPRETATION:  History: Wheezing. Dyspnea    Technique:  AP portable    Comparisons:  Chest x-ray dated 10/5/2017    Findings: The lungs are clear. There are no infiltrates, congestion or   pleural effusions. The pulmonary vasculature and aorta are normal for   age. Heart size is unremarkable. The thorax is normal for age.    Impression: No acute pulmonary disease. No interval change.                JOSE MARTIN ACE M.D., ATTENDING RADIOLOGIST  This document has been electronically signed. 2018  7:52AM    < end of copied text >

## 2018-01-16 ENCOUNTER — TRANSCRIPTION ENCOUNTER (OUTPATIENT)
Age: 68
End: 2018-01-16

## 2018-01-16 VITALS
RESPIRATION RATE: 18 BRPM | HEART RATE: 74 BPM | TEMPERATURE: 98 F | DIASTOLIC BLOOD PRESSURE: 78 MMHG | OXYGEN SATURATION: 96 % | SYSTOLIC BLOOD PRESSURE: 126 MMHG

## 2018-01-16 PROCEDURE — 87633 RESP VIRUS 12-25 TARGETS: CPT

## 2018-01-16 PROCEDURE — 83735 ASSAY OF MAGNESIUM: CPT

## 2018-01-16 PROCEDURE — 87581 M.PNEUMON DNA AMP PROBE: CPT

## 2018-01-16 PROCEDURE — 80307 DRUG TEST PRSMV CHEM ANLYZR: CPT

## 2018-01-16 PROCEDURE — 84484 ASSAY OF TROPONIN QUANT: CPT

## 2018-01-16 PROCEDURE — 85027 COMPLETE CBC AUTOMATED: CPT

## 2018-01-16 PROCEDURE — 93005 ELECTROCARDIOGRAM TRACING: CPT

## 2018-01-16 PROCEDURE — 96374 THER/PROPH/DIAG INJ IV PUSH: CPT

## 2018-01-16 PROCEDURE — 87486 CHLMYD PNEUM DNA AMP PROBE: CPT

## 2018-01-16 PROCEDURE — 96375 TX/PRO/DX INJ NEW DRUG ADDON: CPT

## 2018-01-16 PROCEDURE — 84100 ASSAY OF PHOSPHORUS: CPT

## 2018-01-16 PROCEDURE — 99291 CRITICAL CARE FIRST HOUR: CPT | Mod: 25

## 2018-01-16 PROCEDURE — 83880 ASSAY OF NATRIURETIC PEPTIDE: CPT

## 2018-01-16 PROCEDURE — 85730 THROMBOPLASTIN TIME PARTIAL: CPT

## 2018-01-16 PROCEDURE — 87798 DETECT AGENT NOS DNA AMP: CPT

## 2018-01-16 PROCEDURE — 87040 BLOOD CULTURE FOR BACTERIA: CPT

## 2018-01-16 PROCEDURE — 80053 COMPREHEN METABOLIC PANEL: CPT

## 2018-01-16 PROCEDURE — 71045 X-RAY EXAM CHEST 1 VIEW: CPT

## 2018-01-16 PROCEDURE — 36415 COLL VENOUS BLD VENIPUNCTURE: CPT

## 2018-01-16 PROCEDURE — 85610 PROTHROMBIN TIME: CPT

## 2018-01-16 PROCEDURE — 84443 ASSAY THYROID STIM HORMONE: CPT

## 2018-01-16 PROCEDURE — 99239 HOSP IP/OBS DSCHRG MGMT >30: CPT

## 2018-01-16 PROCEDURE — 94640 AIRWAY INHALATION TREATMENT: CPT

## 2018-01-16 PROCEDURE — 81001 URINALYSIS AUTO W/SCOPE: CPT

## 2018-01-16 PROCEDURE — 83605 ASSAY OF LACTIC ACID: CPT

## 2018-01-16 PROCEDURE — 83690 ASSAY OF LIPASE: CPT

## 2018-01-16 PROCEDURE — 80048 BASIC METABOLIC PNL TOTAL CA: CPT

## 2018-01-16 PROCEDURE — 94660 CPAP INITIATION&MGMT: CPT

## 2018-01-16 RX ORDER — OFLOXACIN 0.3 %
1 DROPS OPHTHALMIC (EYE)
Qty: 0 | Refills: 0 | DISCHARGE
Start: 2018-01-16

## 2018-01-16 RX ORDER — ACETAMINOPHEN 500 MG
0 TABLET ORAL
Qty: 0 | Refills: 0 | COMMUNITY

## 2018-01-16 RX ORDER — KETOROLAC TROMETHAMINE 0.5 %
1 DROPS OPHTHALMIC (EYE)
Qty: 0 | Refills: 0 | COMMUNITY

## 2018-01-16 RX ORDER — KETOROLAC TROMETHAMINE 0.5 %
1 DROPS OPHTHALMIC (EYE)
Qty: 0 | Refills: 0 | DISCHARGE
Start: 2018-01-16

## 2018-01-16 RX ORDER — CARBIDOPA AND LEVODOPA 25; 100 MG/1; MG/1
1 TABLET ORAL
Qty: 0 | Refills: 0 | COMMUNITY
Start: 2018-01-16

## 2018-01-16 RX ORDER — OFLOXACIN 0.3 %
1 DROPS OPHTHALMIC (EYE)
Qty: 0 | Refills: 0 | COMMUNITY

## 2018-01-16 RX ORDER — ROPINIROLE 8 MG/1
0 TABLET, FILM COATED, EXTENDED RELEASE ORAL
Qty: 0 | Refills: 0 | COMMUNITY

## 2018-01-16 RX ORDER — RISPERIDONE 4 MG/1
1 TABLET ORAL
Qty: 0 | Refills: 0 | COMMUNITY
Start: 2018-01-16

## 2018-01-16 RX ORDER — KETOCONAZOLE 20 MG/G
0 AEROSOL, FOAM TOPICAL
Qty: 0 | Refills: 0 | COMMUNITY

## 2018-01-16 RX ORDER — HALOPERIDOL DECANOATE 100 MG/ML
1 INJECTION INTRAMUSCULAR
Qty: 0 | Refills: 0 | COMMUNITY

## 2018-01-16 RX ORDER — ALBUTEROL 90 UG/1
1 AEROSOL, METERED ORAL
Qty: 0 | Refills: 0 | COMMUNITY

## 2018-01-16 RX ORDER — PANTOPRAZOLE SODIUM 20 MG/1
1 TABLET, DELAYED RELEASE ORAL
Qty: 0 | Refills: 0 | COMMUNITY
Start: 2018-01-16

## 2018-01-16 RX ORDER — METOPROLOL TARTRATE 50 MG
1 TABLET ORAL
Qty: 0 | Refills: 0 | COMMUNITY
Start: 2018-01-16

## 2018-01-16 RX ORDER — HALOPERIDOL DECANOATE 100 MG/ML
1 INJECTION INTRAMUSCULAR
Qty: 0 | Refills: 0 | COMMUNITY
Start: 2018-01-16

## 2018-01-16 RX ORDER — CARBIDOPA AND LEVODOPA 25; 100 MG/1; MG/1
1 TABLET ORAL
Qty: 0 | Refills: 0 | COMMUNITY

## 2018-01-16 RX ORDER — RISPERIDONE 4 MG/1
4 TABLET ORAL
Qty: 0 | Refills: 0 | COMMUNITY

## 2018-01-16 RX ORDER — METOPROLOL TARTRATE 50 MG
1 TABLET ORAL
Qty: 0 | Refills: 0 | COMMUNITY

## 2018-01-16 RX ORDER — DIVALPROEX SODIUM 500 MG/1
1000 TABLET, DELAYED RELEASE ORAL
Qty: 0 | Refills: 0 | COMMUNITY

## 2018-01-16 RX ORDER — TIOTROPIUM BROMIDE 18 UG/1
1 CAPSULE ORAL; RESPIRATORY (INHALATION)
Qty: 0 | Refills: 0 | DISCHARGE
Start: 2018-01-16

## 2018-01-16 RX ORDER — PANTOPRAZOLE SODIUM 20 MG/1
1 TABLET, DELAYED RELEASE ORAL
Qty: 0 | Refills: 0 | COMMUNITY

## 2018-01-16 RX ORDER — RANITIDINE HYDROCHLORIDE 150 MG/1
1 TABLET, FILM COATED ORAL
Qty: 0 | Refills: 0 | COMMUNITY

## 2018-01-16 RX ORDER — DIVALPROEX SODIUM 500 MG/1
2 TABLET, DELAYED RELEASE ORAL
Qty: 0 | Refills: 0 | COMMUNITY
Start: 2018-01-16

## 2018-01-16 RX ADMIN — BUDESONIDE AND FORMOTEROL FUMARATE DIHYDRATE 2 PUFF(S): 160; 4.5 AEROSOL RESPIRATORY (INHALATION) at 08:26

## 2018-01-16 RX ADMIN — PANTOPRAZOLE SODIUM 40 MILLIGRAM(S): 20 TABLET, DELAYED RELEASE ORAL at 05:48

## 2018-01-16 RX ADMIN — Medication 3 MILLILITER(S): at 16:35

## 2018-01-16 RX ADMIN — RISPERIDONE 4 MILLIGRAM(S): 4 TABLET ORAL at 12:06

## 2018-01-16 RX ADMIN — Medication 3 MILLILITER(S): at 08:25

## 2018-01-16 RX ADMIN — HALOPERIDOL DECANOATE 5 MILLIGRAM(S): 100 INJECTION INTRAMUSCULAR at 12:06

## 2018-01-16 RX ADMIN — CARBIDOPA AND LEVODOPA 1 TABLET(S): 25; 100 TABLET ORAL at 05:48

## 2018-01-16 RX ADMIN — AZITHROMYCIN 500 MILLIGRAM(S): 500 TABLET, FILM COATED ORAL at 12:05

## 2018-01-16 RX ADMIN — ROPINIROLE 0.5 MILLIGRAM(S): 8 TABLET, FILM COATED, EXTENDED RELEASE ORAL at 17:20

## 2018-01-16 RX ADMIN — ROPINIROLE 0.5 MILLIGRAM(S): 8 TABLET, FILM COATED, EXTENDED RELEASE ORAL at 05:48

## 2018-01-16 RX ADMIN — Medication 20 MILLIGRAM(S): at 05:48

## 2018-01-16 RX ADMIN — Medication 100 MILLIGRAM(S): at 03:56

## 2018-01-16 RX ADMIN — Medication 25 MILLIGRAM(S): at 05:48

## 2018-01-16 RX ADMIN — Medication 100 MILLIGRAM(S): at 12:06

## 2018-01-16 NOTE — PROGRESS NOTE ADULT - PROBLEM SELECTOR PLAN 4
Supportive care
Ambulation with assistance.   Cont at home dosing Carbidopa-Levodopa and ropinirole.
Supportive care
Ambulation with assistance. Cont at home dosing Carbidopa-Levodopa and ropinirole.

## 2018-01-16 NOTE — PROGRESS NOTE ADULT - PROBLEM SELECTOR PROBLEM 3
Parkinson disease
Coronavirus infection
Coronavirus infection
Parkinson disease

## 2018-01-16 NOTE — DISCHARGE NOTE ADULT - MEDICATION SUMMARY - MEDICATIONS TO CHANGE
I will SWITCH the dose or number of times a day I take the medications listed below when I get home from the hospital:    predniSONE 10 mg oral tablet  -- 4 tab(s) by mouth once a day. Taper by 1 tab every 3 days

## 2018-01-16 NOTE — DIETITIAN INITIAL EVALUATION ADULT. - OTHER INFO
BMI 19.2. Pt does not know of any weight changes. Pt with some confusion. As per assessment in August pt was 150#.

## 2018-01-16 NOTE — PROGRESS NOTE ADULT - PROVIDER SPECIALTY LIST ADULT
Hospitalist
Pulmonology
Pulmonology
Hospitalist

## 2018-01-16 NOTE — DISCHARGE NOTE ADULT - MEDICATION SUMMARY - MEDICATIONS TO TAKE
I will START or STAY ON the medications listed below when I get home from the hospital:    predniSONE 5 mg oral tablet  -- 4 tab(s) by mouth once a day for 7 days  3 tab a day for 7 days  2 tabs a day for 7 days  1 tab a day for 7 days then stop  -- Indication: For Chronic obstructive pulmonary disease    divalproex sodium 500 mg oral delayed release tablet  -- 2 tab(s) by mouth once a day (at bedtime)  -- Indication: For Seizure    rOPINIRole 0.5 mg oral tablet  -- 1 tab(s) by mouth 2 times a day  -- Indication: For Parkinson's    carbidopa-levodopa 25 mg-100 mg oral tablet  -- 1 tab(s) by mouth 3 times a day  -- Indication: For Parkinsons    risperiDONE 4 mg oral tablet  -- 1 tab(s) by mouth once a day  -- Indication: For Schizophrenia    haloperidol 5 mg oral tablet  -- 1 tab(s) by mouth once a day  -- Indication: For Schizophrenia    metoprolol succinate 25 mg oral tablet, extended release  -- 1 tab(s) by mouth once a day  -- Indication: For Hypertension    ipratropium-albuterol 0.5 mg-2.5 mg/3 mLinhalation solution  -- 3 milliliter(s) inhaled every 6 hours, As needed, Respiratory Distress  -- Indication: For Chronic obstructive pulmonary disease    budesonide-formoterol 160 mcg-4.5 mcg/inh inhalation aerosol  -- 2 puff(s) inhaled 2 times a day  -- Indication: For Chronic obstructive pulmonary disease    tiotropium 18 mcg inhalation capsule  -- 1 cap(s) inhaled once a day  -- Indication: For Chronic obstructive pulmonary disease    melatonin 3 mg oral tablet  -- 1 tab(s) by mouth once a day (at bedtime)  -- Indication: For Insomnia    ofloxacin 0.3% ophthalmic solution  -- 1 drop(s) to each affected eye once a day  -- Indication: For glaucoma    ketorolac 0.5% ophthalmic solution  -- 1 drop(s) to each affected eye 4 times a day  -- Indication: For glaucoma    pantoprazole 40 mg oral delayed release tablet  -- 1 tab(s) by mouth once a day (before a meal)  -- Indication: For gerd

## 2018-01-16 NOTE — DISCHARGE NOTE ADULT - CARE PROVIDER_API CALL
Kirill Tang), Internal Medicine; Pulmonary Disease  [place] [address]  Phone: [phone]  Fax: [phone]

## 2018-01-16 NOTE — PROGRESS NOTE ADULT - PROBLEM SELECTOR PROBLEM 1
Chronic obstructive pulmonary disease, unspecified COPD type

## 2018-01-16 NOTE — PROGRESS NOTE ADULT - PROBLEM SELECTOR PLAN 2
Supportive care
Plan as above
Supportive care
Plan as above

## 2018-01-16 NOTE — DISCHARGE NOTE ADULT - PLAN OF CARE
Stable respiration Patient completed cycle of antibiotics and was prescribed a slow prednisone taper Stable neural function Supportive care

## 2018-01-16 NOTE — DISCHARGE NOTE ADULT - PATIENT PORTAL LINK FT
“You can access the Follow[facility] Patient Portal, offered by [facility], by registering with the following website: [email or web address]”

## 2018-01-16 NOTE — PROGRESS NOTE ADULT - SUBJECTIVE AND OBJECTIVE BOX
DAVY HOLDEN     Chief Complaint: Patient is a 67y old  Male who presents with a chief complaint of shortness of breath (09 Jan 2018 10:34)      PAST MEDICAL & SURGICAL HISTORY:  Parkinson disease  Parkinson disease  Chronic obstructive pulmonary disease, unspecified COPD type  Schizophrenia  Schizophrenia, unspecified type  Chronic obstructive pulmonary disease, unspecified COPD type  No significant past surgical history  No significant past surgical history      HPI/OVERNIGHT EVENTS: patient in no distress    MEDICATIONS  (STANDING):  ALBUTerol    90 MICROgram(s) HFA Inhaler 1 Puff(s) Inhalation every 4 hours  ALBUTerol/ipratropium for Nebulization 3 milliLiter(s) Nebulizer every 4 hours  azithromycin   Tablet 500 milliGRAM(s) Oral daily  buDESOnide 160 MICROgram(s)/formoterol 4.5 MICROgram(s) Inhaler 2 Puff(s) Inhalation two times a day  carbidopa/levodopa  25/100 1 Tablet(s) Oral three times a day  diVALproex DR 1000 milliGRAM(s) Oral at bedtime  enoxaparin Injectable 40 milliGRAM(s) SubCutaneous daily  haloperidol     Tablet 10 milliGRAM(s) Oral at bedtime  haloperidol     Tablet 5 milliGRAM(s) Oral daily  ketorolac 0.5% Ophthalmic Solution 1 Drop(s) Both EYES four times a day  metoprolol succinate ER 25 milliGRAM(s) Oral daily  ofloxacin 0.3% Solution 1 Drop(s) Both EYES four times a day  pantoprazole    Tablet 40 milliGRAM(s) Oral before breakfast  predniSONE   Tablet 20 milliGRAM(s) Oral daily  risperiDONE   Tablet 4 milliGRAM(s) Oral daily  rOPINIRole 0.5 milliGRAM(s) Oral two times a day  tiotropium 18 MICROgram(s) Capsule 1 Capsule(s) Inhalation daily      Vital Signs Last 24 Hrs  T(C): 36.8 (16 Jan 2018 15:30), Max: 36.9 (16 Jan 2018 04:47)  T(F): 98.2 (16 Jan 2018 15:30), Max: 98.5 (16 Jan 2018 04:47)  HR: 88 (16 Jan 2018 15:30) (67 - 88)  BP: 116/88 (16 Jan 2018 15:30) (116/88 - 124/81)  BP(mean): --  RR: 18 (16 Jan 2018 04:47) (18 - 20)  SpO2: 93% (15 Maciej 2018 21:05) (93% - 93%)    PHYSICAL EXAM:  Constitutional: NAD, well-groomed, well-developed  HEENT: PERRLA, EOMI, Normal Hearing, MMM  Neck: No LAD, No JVD  Back: Normal spine flexure, No CVA tenderness  Respiratory: CTAB Cardiovascular: S1 and S2, RRR, no M/G/R  Gastrointestinal: BS+, soft, NT/ND  Extremities: No peripheral edema  Vascular: 2+ peripheral pulses  Neurological: A/O x 3, no focal deficits  Psychiatric: Normal mood, normal affect  Musculoskeletal: 5/5 strength b/l upper and lower extremities  Skin: No rashes    CAPILLARY BLOOD GLUCOSE    LABS:                RADIOLOGY & ADDITIONAL TESTS:

## 2018-01-16 NOTE — PROGRESS NOTE ADULT - PROBLEM SELECTOR PROBLEM 4
RSV (acute bronchiolitis due to respiratory syncytial virus)
Parkinson disease
Parkinson disease
RSV (acute bronchiolitis due to respiratory syncytial virus)

## 2018-01-16 NOTE — CHART NOTE - NSCHARTNOTEFT_GEN_A_CORE
Upon Nutritional Assessment by the Registered Dietitian your patient was determined to meet criteria / has evidence of the following diagnosis/diagnoses:          [ ]  Mild Protein Calorie Malnutrition        [ ]  Moderate Protein Calorie Malnutrition        [x ] Severe Protein Calorie Malnutrition        [ ] Unspecified Protein Calorie Malnutrition        [ ] Underweight / BMI <19        [ ] Morbid Obesity / BMI > 40      Findings as based on:  •  Comprehensive nutrition assessment and consultation  •  Calorie counts (nutrient intake analysis)  •  Food acceptance and intake status from observations by staff  •  Follow up  •  Patient education  •  Intervention secondary to interdisciplinary rounds  •   concerns      Treatment:    The following diet has been recommended:  Rec Ensure TID      PROVIDER Section:     By signing this assessment you are acknowledging and agree with the diagnosis/diagnoses assigned by the Registered Dietitian    Comments:

## 2018-01-16 NOTE — DISCHARGE NOTE ADULT - CARE PLAN
Principal Discharge DX:	Chronic obstructive pulmonary disease, unspecified COPD type  Goal:	Stable respiration  Assessment and plan of treatment:	Patient completed cycle of antibiotics and was prescribed a slow prednisone taper  Secondary Diagnosis:	Parkinson disease  Goal:	Stable neural function  Secondary Diagnosis:	Schizophrenia, unspecified type  Goal:	Supportive care

## 2018-01-16 NOTE — PROGRESS NOTE ADULT - ASSESSMENT
67 M presented from [facility] with PMH of COPD on Home Oxygen 2 L, Schizophrenia, Parkinson disease, admitted for further evaluation and treatment cough, dyspnea, and fevers. In ER, he was found to be in respiratory distress tachypneic requiring BiPAP initially. X ray chest Neg for PNA. RPV + for RSV and coronavirus. Pt on Home Oxygen 2 L. Acute exacerbation due to viral URI, +RSV and corona. Droplet precaution. Cont empiric Azithromycin  , cont IV steroid 40 mg bid as pt still wheezing DIFFUSELY  Consulted Pulmonary, as Pt's symptoms didn't improve in spite of standard Rx in 48 hrs. continue  Duoneb q6h and Symbicort 160 mg bid. Blood cx Neg. Sputum cx pending. Patient stable. Anticipate transfer back to home on 1/16. dc to group [place] on 1/16.

## 2018-01-16 NOTE — PROGRESS NOTE ADULT - PROBLEM SELECTOR PLAN 3
Continue sinemet
Plan as above
Plan as above

## 2018-01-16 NOTE — DISCHARGE NOTE ADULT - MEDICATION SUMMARY - MEDICATIONS TO STOP TAKING
I will STOP taking the medications listed below when I get home from the hospital:    Augmentin 875 mg-125 mg oral tablet  -- 1 tab(s) by mouth every 12 hours Stop after 9/2/17    azithromycin 250 mg oral tablet  -- 1 tab(s) by mouth once a day

## 2018-01-16 NOTE — DISCHARGE NOTE ADULT - HOSPITAL COURSE
67 M presented from [facility] with PMH of COPD on Home Oxygen 2 L, Schizophrenia, Parkinson disease, admitted for further evaluation and treatment cough, dyspnea, and fevers. In ER, he was found to be in respiratory distress tachypneic requiring BiPAP initially. X ray chest Neg for PNA. RPV + for RSV and coronavirus. Pt on Home Oxygen 2 L. Acute exacerbation due to viral URI, +RSV and corona. Droplet precaution. Cont empiric Azithromycin  , cont IV steroid 40 mg bid as pt still wheezing DIFFUSELY  Consulted Pulmonary, as Pt's symptoms didn't improve in spite of standard Rx in 48 hrs. continue  Duoneb q6h and Symbicort 160 mg bid. Blood cx Neg. Sputum cx pending. Patient stable. Anticipate transfer back to home on 1/16. dc to group [place] on 1/16.     Problem/Plan - 1:  ·  Problem: Chronic obstructive pulmonary disease, unspecified COPD type.  Plan: Taper steroids to solu medrol 40 bid  continue nebulizers On 1/13 Patient doing well consider oral prednisone. 1/14 Patient stable convert to oral agents.      Problem/Plan - 2:  ·  Problem: Schizophrenia, unspecified type.  Plan: Supportive care.      Problem/Plan - 3:  ·  Problem: Parkinson disease.  Plan: Continue sinemet.      Problem/Plan - 4:  ·  Problem: RSV (acute bronchiolitis due to respiratory syncytial virus).  Plan: Supportive care.     Attending Attestation:   I was physically present for the key portions of the evaluation and management (E/M) service provided.  I agree with the above history, physical, and plan which I have reviewed and edited where appropriate.     45 minutes spent on total encounter; more than 50% of the visit was spent counseling and/or coordinating care by the attending physician.

## 2018-01-16 NOTE — PROGRESS NOTE ADULT - PROBLEM SELECTOR PROBLEM 2
Schizophrenia, unspecified type
RSV (acute bronchiolitis due to respiratory syncytial virus)
Schizophrenia, unspecified type
RSV (acute bronchiolitis due to respiratory syncytial virus)

## 2018-02-15 ENCOUNTER — EMERGENCY (EMERGENCY)
Facility: HOSPITAL | Age: 68
LOS: 1 days | Discharge: HOME | End: 2018-02-15
Attending: EMERGENCY MEDICINE
Payer: MEDICARE

## 2018-02-15 VITALS
DIASTOLIC BLOOD PRESSURE: 91 MMHG | SYSTOLIC BLOOD PRESSURE: 145 MMHG | HEART RATE: 79 BPM | RESPIRATION RATE: 18 BRPM | OXYGEN SATURATION: 97 %

## 2018-02-15 VITALS
TEMPERATURE: 98 F | OXYGEN SATURATION: 100 % | DIASTOLIC BLOOD PRESSURE: 80 MMHG | RESPIRATION RATE: 16 BRPM | HEART RATE: 86 BPM | WEIGHT: 169.98 LBS | SYSTOLIC BLOOD PRESSURE: 119 MMHG

## 2018-02-15 LAB
ALBUMIN SERPL ELPH-MCNC: 3.4 G/DL — SIGNIFICANT CHANGE UP (ref 3.3–5.2)
ALP SERPL-CCNC: 90 U/L — SIGNIFICANT CHANGE UP (ref 40–120)
ALT FLD-CCNC: 10 U/L — SIGNIFICANT CHANGE UP
AMPHET UR-MCNC: NEGATIVE — SIGNIFICANT CHANGE UP
ANION GAP SERPL CALC-SCNC: 10 MMOL/L — SIGNIFICANT CHANGE UP (ref 5–17)
APAP SERPL-MCNC: <7.5 UG/ML — LOW (ref 10–26)
APPEARANCE UR: CLEAR — SIGNIFICANT CHANGE UP
AST SERPL-CCNC: 21 U/L — SIGNIFICANT CHANGE UP
BARBITURATES UR SCN-MCNC: NEGATIVE — SIGNIFICANT CHANGE UP
BASE EXCESS BLDA CALC-SCNC: 5.3 MMOL/L — HIGH (ref -2–2)
BASOPHILS # BLD AUTO: 0 K/UL — SIGNIFICANT CHANGE UP (ref 0–0.2)
BASOPHILS NFR BLD AUTO: 0.5 % — SIGNIFICANT CHANGE UP (ref 0–2)
BENZODIAZ UR-MCNC: NEGATIVE — SIGNIFICANT CHANGE UP
BILIRUB SERPL-MCNC: 0.4 MG/DL — SIGNIFICANT CHANGE UP (ref 0.4–2)
BILIRUB UR-MCNC: NEGATIVE — SIGNIFICANT CHANGE UP
BLOOD GAS COMMENTS ARTERIAL: SIGNIFICANT CHANGE UP
BUN SERPL-MCNC: 12 MG/DL — SIGNIFICANT CHANGE UP (ref 8–20)
CALCIUM SERPL-MCNC: 8.8 MG/DL — SIGNIFICANT CHANGE UP (ref 8.6–10.2)
CHLORIDE SERPL-SCNC: 100 MMOL/L — SIGNIFICANT CHANGE UP (ref 98–107)
CO2 SERPL-SCNC: 29 MMOL/L — SIGNIFICANT CHANGE UP (ref 22–29)
COCAINE METAB.OTHER UR-MCNC: NEGATIVE — SIGNIFICANT CHANGE UP
COLOR SPEC: YELLOW — SIGNIFICANT CHANGE UP
COMMENT - URINE: SIGNIFICANT CHANGE UP
CREAT SERPL-MCNC: 0.6 MG/DL — SIGNIFICANT CHANGE UP (ref 0.5–1.3)
DIFF PNL FLD: ABNORMAL
EOSINOPHIL # BLD AUTO: 0 K/UL — SIGNIFICANT CHANGE UP (ref 0–0.5)
EOSINOPHIL NFR BLD AUTO: 1.2 % — SIGNIFICANT CHANGE UP (ref 0–6)
ETHANOL SERPL-MCNC: <10 MG/DL — SIGNIFICANT CHANGE UP
GAS PNL BLDA: SIGNIFICANT CHANGE UP
GAS PNL BLDV: SIGNIFICANT CHANGE UP
GLUCOSE SERPL-MCNC: 87 MG/DL — SIGNIFICANT CHANGE UP (ref 70–115)
GLUCOSE UR QL: NEGATIVE MG/DL — SIGNIFICANT CHANGE UP
HCO3 BLDA-SCNC: 29 MMOL/L — HIGH (ref 20–26)
HCT VFR BLD CALC: 38.6 % — LOW (ref 42–52)
HGB BLD-MCNC: 12.4 G/DL — LOW (ref 14–18)
HOROWITZ INDEX BLDA+IHG-RTO: SIGNIFICANT CHANGE UP
KETONES UR-MCNC: NEGATIVE — SIGNIFICANT CHANGE UP
LEUKOCYTE ESTERASE UR-ACNC: NEGATIVE — SIGNIFICANT CHANGE UP
LYMPHOCYTES # BLD AUTO: 1.7 K/UL — SIGNIFICANT CHANGE UP (ref 1–4.8)
LYMPHOCYTES # BLD AUTO: 41.4 % — SIGNIFICANT CHANGE UP (ref 20–55)
MCHC RBC-ENTMCNC: 32 PG — HIGH (ref 27–31)
MCHC RBC-ENTMCNC: 32.1 G/DL — SIGNIFICANT CHANGE UP (ref 32–36)
MCV RBC AUTO: 99.7 FL — HIGH (ref 80–94)
METHADONE UR-MCNC: NEGATIVE — SIGNIFICANT CHANGE UP
MONOCYTES # BLD AUTO: 0.6 K/UL — SIGNIFICANT CHANGE UP (ref 0–0.8)
MONOCYTES NFR BLD AUTO: 14 % — HIGH (ref 3–10)
NEUTROPHILS # BLD AUTO: 1.7 K/UL — LOW (ref 1.8–8)
NEUTROPHILS NFR BLD AUTO: 42.7 % — SIGNIFICANT CHANGE UP (ref 37–73)
NITRITE UR-MCNC: NEGATIVE — SIGNIFICANT CHANGE UP
OPIATES UR-MCNC: NEGATIVE — SIGNIFICANT CHANGE UP
PCO2 BLDA: 50 MMHG — HIGH (ref 35–45)
PCP SPEC-MCNC: SIGNIFICANT CHANGE UP
PCP UR-MCNC: NEGATIVE — SIGNIFICANT CHANGE UP
PH BLDA: 7.4 — SIGNIFICANT CHANGE UP (ref 7.35–7.45)
PH UR: 6.5 — SIGNIFICANT CHANGE UP (ref 5–8)
PLATELET # BLD AUTO: 238 K/UL — SIGNIFICANT CHANGE UP (ref 150–400)
PO2 BLDA: 81 MMHG — LOW (ref 83–108)
POTASSIUM SERPL-MCNC: 3.7 MMOL/L — SIGNIFICANT CHANGE UP (ref 3.5–5.3)
POTASSIUM SERPL-SCNC: 3.7 MMOL/L — SIGNIFICANT CHANGE UP (ref 3.5–5.3)
PROT SERPL-MCNC: 6.3 G/DL — LOW (ref 6.6–8.7)
PROT UR-MCNC: NEGATIVE MG/DL — SIGNIFICANT CHANGE UP
RBC # BLD: 3.87 M/UL — LOW (ref 4.6–6.2)
RBC # FLD: 13.5 % — SIGNIFICANT CHANGE UP (ref 11–15.6)
RBC CASTS # UR COMP ASSIST: SIGNIFICANT CHANGE UP /HPF (ref 0–4)
SALICYLATES SERPL-MCNC: <0.6 MG/DL — LOW (ref 10–20)
SAO2 % BLDA: 96 % — SIGNIFICANT CHANGE UP (ref 95–99)
SODIUM SERPL-SCNC: 139 MMOL/L — SIGNIFICANT CHANGE UP (ref 135–145)
SP GR SPEC: 1.01 — SIGNIFICANT CHANGE UP (ref 1.01–1.02)
THC UR QL: NEGATIVE — SIGNIFICANT CHANGE UP
TSH SERPL-MCNC: 3.46 UIU/ML — SIGNIFICANT CHANGE UP (ref 0.27–4.2)
UROBILINOGEN FLD QL: 4 MG/DL
VALPROATE SERPL-MCNC: 68.5 UG/ML — SIGNIFICANT CHANGE UP (ref 50–100)
WBC # BLD: 4.1 K/UL — LOW (ref 4.8–10.8)
WBC # FLD AUTO: 4.1 K/UL — LOW (ref 4.8–10.8)
WBC UR QL: SIGNIFICANT CHANGE UP

## 2018-02-15 PROCEDURE — 82947 ASSAY GLUCOSE BLOOD QUANT: CPT

## 2018-02-15 PROCEDURE — 99284 EMERGENCY DEPT VISIT MOD MDM: CPT

## 2018-02-15 PROCEDURE — 93010 ELECTROCARDIOGRAM REPORT: CPT

## 2018-02-15 PROCEDURE — 71045 X-RAY EXAM CHEST 1 VIEW: CPT | Mod: 26

## 2018-02-15 PROCEDURE — 36415 COLL VENOUS BLD VENIPUNCTURE: CPT

## 2018-02-15 PROCEDURE — 81001 URINALYSIS AUTO W/SCOPE: CPT

## 2018-02-15 PROCEDURE — 82435 ASSAY OF BLOOD CHLORIDE: CPT

## 2018-02-15 PROCEDURE — 83605 ASSAY OF LACTIC ACID: CPT

## 2018-02-15 PROCEDURE — 70450 CT HEAD/BRAIN W/O DYE: CPT

## 2018-02-15 PROCEDURE — 93005 ELECTROCARDIOGRAM TRACING: CPT

## 2018-02-15 PROCEDURE — 85014 HEMATOCRIT: CPT

## 2018-02-15 PROCEDURE — 82803 BLOOD GASES ANY COMBINATION: CPT

## 2018-02-15 PROCEDURE — 51701 INSERT BLADDER CATHETER: CPT

## 2018-02-15 PROCEDURE — 71045 X-RAY EXAM CHEST 1 VIEW: CPT

## 2018-02-15 PROCEDURE — 82962 GLUCOSE BLOOD TEST: CPT

## 2018-02-15 PROCEDURE — 80307 DRUG TEST PRSMV CHEM ANLYZR: CPT

## 2018-02-15 PROCEDURE — 82330 ASSAY OF CALCIUM: CPT

## 2018-02-15 PROCEDURE — 84443 ASSAY THYROID STIM HORMONE: CPT

## 2018-02-15 PROCEDURE — 84132 ASSAY OF SERUM POTASSIUM: CPT

## 2018-02-15 PROCEDURE — 80053 COMPREHEN METABOLIC PANEL: CPT

## 2018-02-15 PROCEDURE — 70450 CT HEAD/BRAIN W/O DYE: CPT | Mod: 26

## 2018-02-15 PROCEDURE — 84295 ASSAY OF SERUM SODIUM: CPT

## 2018-02-15 PROCEDURE — 85027 COMPLETE CBC AUTOMATED: CPT

## 2018-02-15 PROCEDURE — 99284 EMERGENCY DEPT VISIT MOD MDM: CPT | Mod: 25

## 2018-02-15 PROCEDURE — 80164 ASSAY DIPROPYLACETIC ACD TOT: CPT

## 2018-02-15 NOTE — ED PROVIDER NOTE - OBJECTIVE STATEMENT
67 y/o m with hx of psych disease lives at  group home presents with AMS/confusion was given narcan 2 mg pre-hospital no sign change pinpoint pupils fixed upward gaze does respond to painful stim but not following commands very groggy per EMS found by group home staff to be acutely altered this am does have hx in the past of etoh use no external signs of trauma

## 2018-02-15 NOTE — ED ADULT NURSE REASSESSMENT NOTE - NS ED NURSE REASSESS COMMENT FT1
sleeping,arousable to voice, nonverbal at this time, unable to follow commands, no family or visitor with patient,hob up 30 degrees,sr up, placed on cm in sr with vr=70's, no ectopy noted at this time, patient has hx of slurred speech for several days pta, resp even and unlabored with po=97% on 2L o2 by nc, stretcher in lowest position with wheels locked for safety, abd soft,nontender, positive bs noted, patient safety maintained,  will continue to monitor
straight cathed to obtain urine samples, obtained and sent to lab, urine is clear yellow in appearance
awake and conversant,  called to arrange return transportation
patient remains drowsy but able to answer questions, hob up 30 degrees,sr up,states he is willing to try to eat lunch, made aware that he is being discharged,no family or visitor with patient

## 2018-02-15 NOTE — ED PROVIDER NOTE - MEDICAL DECISION MAKING DETAILS
presents with AMS hx of prior OD/AMS secondary to ETOH and or drug use - cleared sensorium here w/u wnl will d/c back to facility with report of w/u done here

## 2018-02-15 NOTE — ED PROVIDER NOTE - PROGRESS NOTE DETAILS
pt feeling well - awake and alert nad will have pt seen by sw for transport back to group home - appears to be somewhat close to baseline - called attempted to facility-  no answer

## 2018-02-15 NOTE — ED ADULT TRIAGE NOTE - CHIEF COMPLAINT QUOTE
late entry triage: patient comes to ed from [facility]. ems reports that patient was found unresponsive. patient was given 2 mg narcan with minimal effect per ems. per ems patient with garbled speech x 3 days. dr muro to bedside; patient minimally responsive on o2 at time of arrival. priority ct per bhaskar

## 2018-02-15 NOTE — ED ADULT NURSE NOTE - CHIEF COMPLAINT QUOTE
late entry triage: patient comes to ed from [facility]. ems reports that patient was found unresponsive. patient was given 2 mg narcan with minimal effect per ems. per ems patient with garbled speech x 3 days. dr muro to bedside; patient minimally responsive on o2 at time of arrival. priority ct per bhaskar

## 2018-02-16 RX ORDER — HALOPERIDOL DECANOATE 100 MG/ML
0 INJECTION INTRAMUSCULAR
Qty: 0 | Refills: 0 | COMMUNITY

## 2018-06-14 NOTE — DISCHARGE NOTE ADULT - IF YOU ARE A SMOKER, IT IS IMPORTANT FOR YOUR HEALTH TO STOP SMOKING. PLEASE BE AWARE THAT SECOND HAND SMOKE IS ALSO HARMFUL.
Schedule appointment with Dr. Oliveros to discuss surgical options.    It was a pleasure meeting with you today.  Thank you for allowing me and my team the privilege of caring for you today.  YOU are the reason we are here, and I truly hope we provided you with the excellent service you deserve.  Please let us know if there is anything else we can do for you so that we can be sure you are leaving completely satisfied with your care experience.        OVIDIO Boswell  
Statement Selected

## 2018-06-26 NOTE — ED ADULT NURSE NOTE - NEURO MENTATION
Potassium reflex Magnesium 4.4 3.5 - 5.0 mmol/L    Chloride 106 98 - 107 mmol/L    CO2 17 (L) 22 - 29 mmol/L    Anion Gap 16 7 - 16 mmol/L    Glucose 149 (H) 74 - 109 mg/dL    BUN 76 (H) 8 - 23 mg/dL    CREATININE 3.7 (H) 0.7 - 1.2 mg/dL    GFR Non-African American 16 >=60 mL/min/1.73    GFR African American 19     Calcium 6.5 (L) 8.6 - 10.2 mg/dL   CBC   Result Value Ref Range    WBC 7.9 4.5 - 11.5 E9/L    RBC 3.55 (L) 3.80 - 5.80 E12/L    Hemoglobin 9.6 (L) 12.5 - 16.5 g/dL    Hematocrit 31.6 (L) 37.0 - 54.0 %    MCV 89.0 80.0 - 99.9 fL    MCH 27.0 26.0 - 35.0 pg    MCHC 30.4 (L) 32.0 - 34.5 %    RDW 17.2 (H) 11.5 - 15.0 fL    Platelets 044 061 - 560 E9/L    MPV 10.0 7.0 - 12.0 fL   Troponin   Result Value Ref Range    Troponin 0.05 (H) 0.00 - 0.03 ng/mL   CEA   Result Value Ref Range    CEA 59.8 (H) 0.0 - 5.2 ng/mL   Urine electrolytes   Result Value Ref Range    Sodium, Ur 42 Not Established mmol/L    Potassium, Ur 19.7 Not Established mmol/L    Chloride 29 Not Established mmol/L   Urea nitrogen, urine   Result Value Ref Range    Urea Nitrogen, Ur 585 (L) 800 - 1666 mg/dL   CK   Result Value Ref Range    Total  20 - 200 U/L   CK   Result Value Ref Range    Total CK 31 20 - 200 U/L   CK-MB   Result Value Ref Range    CK-MB 2.2 0.0 - 7.7 ng/mL   Troponin   Result Value Ref Range    Troponin 0.08 (H) 0.00 - 0.03 ng/mL   EKG 12 Lead   Result Value Ref Range    Ventricular Rate 72 BPM    Atrial Rate 72 BPM    QRS Duration 64 ms    Q-T Interval 428 ms    QTc Calculation (Bazett) 468 ms    R Axis 20 degrees    T Axis 6 degrees       Radiology  US ABDOMEN COMPLETE   Final Result   Solid mass in the left kidney neoplasm cannot be excluded   Dilated biliary ducts   Cholelithiasis, I cannot exclude cholecystitis although the   gallbladder appears to be completely filled with calculi         US DUP ABD PEL RETRO SCROT COMPLETE   Final Result   Solid mass in the left kidney neoplasm cannot be excluded   Dilated biliary disposition: Admit to telemetry  Patient's condition is serious.        Rut Tarylor DO  Resident  06/28/18 9138 confused

## 2018-06-28 NOTE — ED ADULT NURSE NOTE - CAS ELECT INFOMATION PROVIDED
----- Message from Randall Castaneda sent at 6/28/2018  7:02 AM CDT -----  Regarding: Rx  Contact: [phone]  Hello,  The pharmacist said that the last script was missing the karen code. I just wanted to make sure that they had contacted your office on this issue.    Randall BEVERLY    
DC instructions

## 2018-08-21 LAB
BASE EXCESS BLDV CALC-SCNC: 5.3 MMOL/L — HIGH (ref -2–2)
CA-I SERPL-SCNC: 0.96 MMOL/L — LOW (ref 1.15–1.33)
CHLORIDE BLDV-SCNC: 103 MMOL/L — SIGNIFICANT CHANGE UP (ref 98–107)
GAS PNL BLDV: 138 MMOL/L — SIGNIFICANT CHANGE UP (ref 135–145)
GAS PNL BLDV: SIGNIFICANT CHANGE UP
GLUCOSE BLDV-MCNC: 100 MG/DL — HIGH (ref 70–99)
HCO3 BLDV-SCNC: 29 MMOL/L — SIGNIFICANT CHANGE UP (ref 21–29)
HCT VFR BLDA CALC: 38 — LOW (ref 39–50)
HGB BLD CALC-MCNC: 12.4 G/DL — LOW (ref 13–17)
LACTATE BLDV-MCNC: 1 MMOL/L — SIGNIFICANT CHANGE UP (ref 0.5–2)
OTHER CELLS CSF MANUAL: 17 ML/DL — LOW (ref 18–22)
PCO2 BLDV: 50 MMHG — SIGNIFICANT CHANGE UP (ref 35–50)
PH BLDV: 7.4 — SIGNIFICANT CHANGE UP (ref 7.32–7.43)
PO2 BLDV: 81 MMHG — HIGH (ref 25–45)
POTASSIUM BLDV-SCNC: 3.4 MMOL/L — SIGNIFICANT CHANGE UP (ref 3.4–4.5)
SAO2 % BLDV: 96 % — SIGNIFICANT CHANGE UP

## 2018-11-09 NOTE — BEHAVIORAL HEALTH ASSESSMENT NOTE - NSBHLEGAL_PSY_A_CORE
pts daughter returned the call. The patient has been notified of this information and all questions answered.     No

## 2019-02-12 NOTE — ED PROVIDER NOTE - PMH
Chronic obstructive pulmonary disease, unspecified COPD type    Schizophrenia, unspecified type 82 84

## 2019-04-06 ENCOUNTER — INPATIENT (INPATIENT)
Facility: HOSPITAL | Age: 69
LOS: 3 days | Discharge: HOME | DRG: 189 | End: 2019-04-10
Attending: HOSPITALIST | Admitting: HOSPITALIST
Payer: MEDICARE

## 2019-04-06 VITALS
WEIGHT: 171.96 LBS | DIASTOLIC BLOOD PRESSURE: 85 MMHG | SYSTOLIC BLOOD PRESSURE: 120 MMHG | OXYGEN SATURATION: 95 % | TEMPERATURE: 100 F | HEIGHT: 69 IN | HEART RATE: 115 BPM | RESPIRATION RATE: 34 BRPM

## 2019-04-06 DIAGNOSIS — J44.9 CHRONIC OBSTRUCTIVE PULMONARY DISEASE, UNSPECIFIED: ICD-10-CM

## 2019-04-06 DIAGNOSIS — R06.02 SHORTNESS OF BREATH: ICD-10-CM

## 2019-04-06 DIAGNOSIS — F20.0 PARANOID SCHIZOPHRENIA: ICD-10-CM

## 2019-04-06 LAB
ALBUMIN SERPL ELPH-MCNC: 3.5 G/DL — SIGNIFICANT CHANGE UP (ref 3.3–5.2)
ALP SERPL-CCNC: 74 U/L — SIGNIFICANT CHANGE UP (ref 40–120)
ALT FLD-CCNC: 24 U/L — SIGNIFICANT CHANGE UP
ANION GAP SERPL CALC-SCNC: 13 MMOL/L — SIGNIFICANT CHANGE UP (ref 5–17)
AST SERPL-CCNC: 42 U/L — HIGH
BASE EXCESS BLDA CALC-SCNC: 9.3 MMOL/L — HIGH (ref -2–2)
BASOPHILS # BLD AUTO: 0 K/UL — SIGNIFICANT CHANGE UP (ref 0–0.2)
BASOPHILS NFR BLD AUTO: 0 % — SIGNIFICANT CHANGE UP (ref 0–2)
BILIRUB SERPL-MCNC: 0.7 MG/DL — SIGNIFICANT CHANGE UP (ref 0.4–2)
BLOOD GAS COMMENTS ARTERIAL: SIGNIFICANT CHANGE UP
BUN SERPL-MCNC: 17 MG/DL — SIGNIFICANT CHANGE UP (ref 8–20)
CALCIUM SERPL-MCNC: 9.1 MG/DL — SIGNIFICANT CHANGE UP (ref 8.6–10.2)
CHLORIDE SERPL-SCNC: 97 MMOL/L — LOW (ref 98–107)
CK SERPL-CCNC: 61 U/L — SIGNIFICANT CHANGE UP (ref 30–200)
CO2 SERPL-SCNC: 33 MMOL/L — HIGH (ref 22–29)
CREAT SERPL-MCNC: 0.72 MG/DL — SIGNIFICANT CHANGE UP (ref 0.5–1.3)
EOSINOPHIL # BLD AUTO: 0 K/UL — SIGNIFICANT CHANGE UP (ref 0–0.5)
EOSINOPHIL NFR BLD AUTO: 1 % — SIGNIFICANT CHANGE UP (ref 0–6)
GAS PNL BLDA: SIGNIFICANT CHANGE UP
GLUCOSE BLDC GLUCOMTR-MCNC: 124 MG/DL — HIGH (ref 70–99)
GLUCOSE SERPL-MCNC: 97 MG/DL — SIGNIFICANT CHANGE UP (ref 70–115)
HCO3 BLDA-SCNC: 33 MMOL/L — HIGH (ref 20–26)
HCT VFR BLD CALC: 38.7 % — LOW (ref 42–52)
HGB BLD-MCNC: 12.4 G/DL — LOW (ref 14–18)
HOROWITZ INDEX BLDA+IHG-RTO: 40 — SIGNIFICANT CHANGE UP
LYMPHOCYTES # BLD AUTO: 1 K/UL — SIGNIFICANT CHANGE UP (ref 1–4.8)
LYMPHOCYTES # BLD AUTO: 11 % — LOW (ref 20–55)
MCHC RBC-ENTMCNC: 32 G/DL — SIGNIFICANT CHANGE UP (ref 32–36)
MCHC RBC-ENTMCNC: 32 PG — HIGH (ref 27–31)
MCV RBC AUTO: 100 FL — HIGH (ref 80–94)
MONOCYTES # BLD AUTO: 1.9 K/UL — HIGH (ref 0–0.8)
MONOCYTES NFR BLD AUTO: 22 % — HIGH (ref 3–10)
NEUTROPHILS # BLD AUTO: 4.8 K/UL — SIGNIFICANT CHANGE UP (ref 1.8–8)
NEUTROPHILS NFR BLD AUTO: 64 % — SIGNIFICANT CHANGE UP (ref 37–73)
PCO2 BLDA: 63 MMHG — HIGH (ref 35–45)
PH BLDA: 7.37 — SIGNIFICANT CHANGE UP (ref 7.35–7.45)
PLATELET # BLD AUTO: 212 K/UL — SIGNIFICANT CHANGE UP (ref 150–400)
PO2 BLDA: 90 MMHG — SIGNIFICANT CHANGE UP (ref 83–108)
POTASSIUM SERPL-MCNC: 4 MMOL/L — SIGNIFICANT CHANGE UP (ref 3.5–5.3)
POTASSIUM SERPL-SCNC: 4 MMOL/L — SIGNIFICANT CHANGE UP (ref 3.5–5.3)
PROT SERPL-MCNC: 7.2 G/DL — SIGNIFICANT CHANGE UP (ref 6.6–8.7)
RAPID RVP RESULT: SIGNIFICANT CHANGE UP
RBC # BLD: 3.87 M/UL — LOW (ref 4.6–6.2)
RBC # FLD: 14.1 % — SIGNIFICANT CHANGE UP (ref 11–15.6)
SAO2 % BLDA: 97 % — SIGNIFICANT CHANGE UP (ref 95–99)
SODIUM SERPL-SCNC: 143 MMOL/L — SIGNIFICANT CHANGE UP (ref 135–145)
TROPONIN T SERPL-MCNC: <0.01 NG/ML — SIGNIFICANT CHANGE UP (ref 0–0.06)
WBC # BLD: 7.8 K/UL — SIGNIFICANT CHANGE UP (ref 4.8–10.8)
WBC # FLD AUTO: 7.8 K/UL — SIGNIFICANT CHANGE UP (ref 4.8–10.8)

## 2019-04-06 PROCEDURE — 71045 X-RAY EXAM CHEST 1 VIEW: CPT | Mod: 26

## 2019-04-06 PROCEDURE — 93010 ELECTROCARDIOGRAM REPORT: CPT

## 2019-04-06 PROCEDURE — 99291 CRITICAL CARE FIRST HOUR: CPT

## 2019-04-06 PROCEDURE — 99223 1ST HOSP IP/OBS HIGH 75: CPT | Mod: AI

## 2019-04-06 PROCEDURE — 99292 CRITICAL CARE ADDL 30 MIN: CPT

## 2019-04-06 RX ORDER — IPRATROPIUM/ALBUTEROL SULFATE 18-103MCG
3 AEROSOL WITH ADAPTER (GRAM) INHALATION ONCE
Qty: 0 | Refills: 0 | Status: DISCONTINUED | OUTPATIENT
Start: 2019-04-06 | End: 2019-04-06

## 2019-04-06 RX ORDER — ROPINIROLE 8 MG/1
0.5 TABLET, FILM COATED, EXTENDED RELEASE ORAL
Qty: 0 | Refills: 0 | Status: DISCONTINUED | OUTPATIENT
Start: 2019-04-06 | End: 2019-04-10

## 2019-04-06 RX ORDER — PANTOPRAZOLE SODIUM 20 MG/1
40 TABLET, DELAYED RELEASE ORAL
Qty: 0 | Refills: 0 | Status: DISCONTINUED | OUTPATIENT
Start: 2019-04-06 | End: 2019-04-10

## 2019-04-06 RX ORDER — METOPROLOL TARTRATE 50 MG
25 TABLET ORAL DAILY
Qty: 0 | Refills: 0 | Status: DISCONTINUED | OUTPATIENT
Start: 2019-04-06 | End: 2019-04-10

## 2019-04-06 RX ORDER — LANOLIN ALCOHOL/MO/W.PET/CERES
3 CREAM (GRAM) TOPICAL AT BEDTIME
Qty: 0 | Refills: 0 | Status: DISCONTINUED | OUTPATIENT
Start: 2019-04-06 | End: 2019-04-10

## 2019-04-06 RX ORDER — DEXTROSE 50 % IN WATER 50 %
12.5 SYRINGE (ML) INTRAVENOUS ONCE
Qty: 0 | Refills: 0 | Status: DISCONTINUED | OUTPATIENT
Start: 2019-04-06 | End: 2019-04-10

## 2019-04-06 RX ORDER — INSULIN LISPRO 100/ML
VIAL (ML) SUBCUTANEOUS
Qty: 0 | Refills: 0 | Status: DISCONTINUED | OUTPATIENT
Start: 2019-04-06 | End: 2019-04-06

## 2019-04-06 RX ORDER — ENOXAPARIN SODIUM 100 MG/ML
40 INJECTION SUBCUTANEOUS EVERY 24 HOURS
Qty: 0 | Refills: 0 | Status: DISCONTINUED | OUTPATIENT
Start: 2019-04-06 | End: 2019-04-10

## 2019-04-06 RX ORDER — DEXTROSE 50 % IN WATER 50 %
25 SYRINGE (ML) INTRAVENOUS ONCE
Qty: 0 | Refills: 0 | Status: DISCONTINUED | OUTPATIENT
Start: 2019-04-06 | End: 2019-04-10

## 2019-04-06 RX ORDER — HALOPERIDOL DECANOATE 100 MG/ML
5 INJECTION INTRAMUSCULAR
Qty: 0 | Refills: 0 | Status: DISCONTINUED | OUTPATIENT
Start: 2019-04-06 | End: 2019-04-10

## 2019-04-06 RX ORDER — GLUCAGON INJECTION, SOLUTION 0.5 MG/.1ML
1 INJECTION, SOLUTION SUBCUTANEOUS ONCE
Qty: 0 | Refills: 0 | Status: DISCONTINUED | OUTPATIENT
Start: 2019-04-06 | End: 2019-04-10

## 2019-04-06 RX ORDER — AZITHROMYCIN 500 MG/1
500 TABLET, FILM COATED ORAL EVERY 24 HOURS
Qty: 0 | Refills: 0 | Status: DISCONTINUED | OUTPATIENT
Start: 2019-04-06 | End: 2019-04-10

## 2019-04-06 RX ORDER — DIVALPROEX SODIUM 500 MG/1
1000 TABLET, DELAYED RELEASE ORAL AT BEDTIME
Qty: 0 | Refills: 0 | Status: DISCONTINUED | OUTPATIENT
Start: 2019-04-06 | End: 2019-04-06

## 2019-04-06 RX ORDER — CARBIDOPA AND LEVODOPA 25; 100 MG/1; MG/1
1 TABLET ORAL THREE TIMES A DAY
Qty: 0 | Refills: 0 | Status: DISCONTINUED | OUTPATIENT
Start: 2019-04-06 | End: 2019-04-06

## 2019-04-06 RX ORDER — HALOPERIDOL DECANOATE 100 MG/ML
10 INJECTION INTRAMUSCULAR AT BEDTIME
Qty: 0 | Refills: 0 | Status: DISCONTINUED | OUTPATIENT
Start: 2019-04-06 | End: 2019-04-10

## 2019-04-06 RX ORDER — SODIUM CHLORIDE 9 MG/ML
1000 INJECTION, SOLUTION INTRAVENOUS
Qty: 0 | Refills: 0 | Status: DISCONTINUED | OUTPATIENT
Start: 2019-04-06 | End: 2019-04-10

## 2019-04-06 RX ORDER — BUDESONIDE AND FORMOTEROL FUMARATE DIHYDRATE 160; 4.5 UG/1; UG/1
2 AEROSOL RESPIRATORY (INHALATION)
Qty: 0 | Refills: 0 | Status: DISCONTINUED | OUTPATIENT
Start: 2019-04-06 | End: 2019-04-10

## 2019-04-06 RX ORDER — ROPINIROLE 8 MG/1
0.5 TABLET, FILM COATED, EXTENDED RELEASE ORAL
Qty: 0 | Refills: 0 | Status: DISCONTINUED | OUTPATIENT
Start: 2019-04-06 | End: 2019-04-06

## 2019-04-06 RX ORDER — DIVALPROEX SODIUM 500 MG/1
500 TABLET, DELAYED RELEASE ORAL AT BEDTIME
Qty: 0 | Refills: 0 | Status: DISCONTINUED | OUTPATIENT
Start: 2019-04-06 | End: 2019-04-09

## 2019-04-06 RX ORDER — DEXTROSE 50 % IN WATER 50 %
15 SYRINGE (ML) INTRAVENOUS ONCE
Qty: 0 | Refills: 0 | Status: DISCONTINUED | OUTPATIENT
Start: 2019-04-06 | End: 2019-04-10

## 2019-04-06 RX ORDER — PANTOPRAZOLE SODIUM 20 MG/1
40 TABLET, DELAYED RELEASE ORAL
Qty: 0 | Refills: 0 | Status: DISCONTINUED | OUTPATIENT
Start: 2019-04-06 | End: 2019-04-06

## 2019-04-06 RX ORDER — IPRATROPIUM/ALBUTEROL SULFATE 18-103MCG
3 AEROSOL WITH ADAPTER (GRAM) INHALATION EVERY 6 HOURS
Qty: 0 | Refills: 0 | Status: DISCONTINUED | OUTPATIENT
Start: 2019-04-06 | End: 2019-04-10

## 2019-04-06 RX ADMIN — HALOPERIDOL DECANOATE 5 MILLIGRAM(S): 100 INJECTION INTRAMUSCULAR at 19:10

## 2019-04-06 RX ADMIN — ROPINIROLE 0.5 MILLIGRAM(S): 8 TABLET, FILM COATED, EXTENDED RELEASE ORAL at 21:06

## 2019-04-06 RX ADMIN — Medication 25 MILLIGRAM(S): at 19:10

## 2019-04-06 RX ADMIN — HALOPERIDOL DECANOATE 10 MILLIGRAM(S): 100 INJECTION INTRAMUSCULAR at 21:06

## 2019-04-06 RX ADMIN — AZITHROMYCIN 255 MILLIGRAM(S): 500 TABLET, FILM COATED ORAL at 14:54

## 2019-04-06 RX ADMIN — Medication 3 MILLILITER(S): at 21:07

## 2019-04-06 RX ADMIN — Medication 3 MILLIGRAM(S): at 21:07

## 2019-04-06 RX ADMIN — BUDESONIDE AND FORMOTEROL FUMARATE DIHYDRATE 2 PUFF(S): 160; 4.5 AEROSOL RESPIRATORY (INHALATION) at 21:06

## 2019-04-06 RX ADMIN — DIVALPROEX SODIUM 500 MILLIGRAM(S): 500 TABLET, DELAYED RELEASE ORAL at 19:10

## 2019-04-06 RX ADMIN — Medication 60 MILLIGRAM(S): at 19:09

## 2019-04-06 RX ADMIN — Medication 3 MILLILITER(S): at 15:17

## 2019-04-06 NOTE — ED ADULT NURSE REASSESSMENT NOTE - NS ED NURSE REASSESS COMMENT FT1
Respiratory called for duoneb administration. Pt taken off bipap by chad Mcdonald reassessment of room air trial, pt with oxygen saturation dropping into 80s (see flowsheet).  pt denies shortness of breath.  duoneb initiated at this time.  Dr. Collins at bedside to william.

## 2019-04-06 NOTE — ED PROVIDER NOTE - OBJECTIVE STATEMENT
This patient is a 68 year old man hx of COPD presents to the ER from [facility] for SOB.  Patient states that he has a chronic cough for many years.  He denies fever.   As per EMS patient was hypoxic to 71% on arrival.  He was given 2 puffs of his inhaler at the facility.  EMS administered duoneb and 125 mg of solumedrol.

## 2019-04-06 NOTE — ED ADULT NURSE REASSESSMENT NOTE - NS ED NURSE REASSESS COMMENT FT1
Spoke with ELLA Fonseca regarding admission orders, as per ELLA Fonseca to watch pt overnight then decide if he needs a higher level of care for admission to the hospital.

## 2019-04-06 NOTE — ED ADULT TRIAGE NOTE - CHIEF COMPLAINT QUOTE
BIBA from group home, pt with HX of COPD on duoneb, received 125mg solumedrol, as per EMS spo2 71% on RA with RR 40

## 2019-04-06 NOTE — ED ADULT NURSE REASSESSMENT NOTE - NS ED NURSE REASSESS COMMENT FT1
assumed care of pt pt sitting up in stretcher RN noted pt tachypnic and using accessory muscles to help himself breath. pt remains on 4l nc skin color pink mucous membranes moist pt denies pain ctm

## 2019-04-06 NOTE — ED ADULT NURSE REASSESSMENT NOTE - NS ED NURSE REASSESS COMMENT FT1
pt found to be tachypneic - RT called RN to bedside to further evaluate. Pt breathing rate  in the 40's with O2 sat at 84%. RT placed patient back on bipap. Breathing normalized and O2 increased. Primary RN made aware.

## 2019-04-06 NOTE — H&P ADULT - HISTORY OF PRESENT ILLNESS
is from a group home, he has a PMHx of COPD, hx of schizophrenia, who presents with acute respiratory failure, shortness of breath. He was wheezing and it got worse at the group home, and in the ambulance on the way to the hospital, he received 125mg of methylprednisolone. He has diffuse wheezing on ausculation of the lungs, and he was weaned off of bipap after receiving nebulizers in the ER. I've ordered nasal cannula for him and I've ordered a flu swab. He appears nontoxic, afebrile, is mentating (sometimes not very clearly). His medicine reconciliation reveals he's on BOTH haldol and risperdal (I've d/cd risperdal for now). He is going to receive nebs Q6H, methylpred 60mg IV Q6H, and azithromycin 500mg IV qdaily. His RVP is pending.

## 2019-04-06 NOTE — ED ADULT NURSE NOTE - OBJECTIVE STATEMENT
68 year old male smoker, PMHx of psychiatric problems and COPD. presents to the ED with increased SOB since this morning. Per EMS patient ambulated outside and had an oxygen saturation of 70%. Patient was given 1 duoneb and 125mg solumedrol PTA. Patient has wheezing noted throughout all lung fields. Patient is tachypneic at this time. Patient denies any CP.

## 2019-04-06 NOTE — H&P ADULT - NSICDXPASTMEDICALHX_GEN_ALL_CORE_FT
PAST MEDICAL HISTORY:  Chronic obstructive pulmonary disease, unspecified COPD type     Chronic obstructive pulmonary disease, unspecified COPD type     Parkinson disease     Parkinson disease     Schizophrenia     Schizophrenia, unspecified type

## 2019-04-06 NOTE — ED PROVIDER NOTE - CLINICAL SUMMARY MEDICAL DECISION MAKING FREE TEXT BOX
68 year old hx of COPD in respiratory distress placed on BIPAP.  CXR negative for PNA.  Patient was re-evaluated after a few hours on BiPAP no severe hypoxia or hypercapnea.  Patient maintained his saturations at 94% off bipap and was admitted.

## 2019-04-06 NOTE — ED ADULT NURSE REASSESSMENT NOTE - NS ED NURSE REASSESS COMMENT FT1
Assumed pt care at 1200.  Report received from previous RN Rylee.  Pt received resting in bed with comfortable appearance.  Pt remains tachypneic, on bipap.  Pt states he is feeling better since arrival to ED.  Will continue to monitor and reassess.  Oxygen saturation 97% at this time on bipap.

## 2019-04-06 NOTE — ED ADULT NURSE REASSESSMENT NOTE - NS ED NURSE REASSESS COMMENT FT1
Made aware of patients difficulty breathing while this RN was off unit.  Pt on bi-pap upon arriving back to  unit with no signs of acute distress.  Dr. Pate called to make aware of event.  Telephone orders to remove bi-pap at this time and place pt on nasal cannula.  Will continue to monitor and reassess.

## 2019-04-06 NOTE — H&P ADULT - NSHPPHYSICALEXAM_GEN_ALL_CORE
Gen: mild distress, venti-mask on, awake, alert, comfortable  HEENT: dry MM  NECK: supple  CVS: S1S2 RRR  PULM: diffuse wheezing  ABD: soft, nontender, no rebound, no guarding  EXTREM: no edema  NEURO: intact  PSYCH: mentating  but +schizophrenia  SKIN: warm, dry

## 2019-04-07 LAB
GLUCOSE BLDC GLUCOMTR-MCNC: 109 MG/DL — HIGH (ref 70–99)
GLUCOSE BLDC GLUCOMTR-MCNC: 112 MG/DL — HIGH (ref 70–99)
GLUCOSE BLDC GLUCOMTR-MCNC: 124 MG/DL — HIGH (ref 70–99)
GRAM STN FLD: SIGNIFICANT CHANGE UP
HCV AB S/CO SERPL IA: 12.7 S/CO — HIGH (ref 0–0.99)
HCV AB SERPL-IMP: REACTIVE
SPECIMEN SOURCE: SIGNIFICANT CHANGE UP

## 2019-04-07 PROCEDURE — 99233 SBSQ HOSP IP/OBS HIGH 50: CPT

## 2019-04-07 RX ADMIN — AZITHROMYCIN 255 MILLIGRAM(S): 500 TABLET, FILM COATED ORAL at 13:57

## 2019-04-07 RX ADMIN — HALOPERIDOL DECANOATE 5 MILLIGRAM(S): 100 INJECTION INTRAMUSCULAR at 17:30

## 2019-04-07 RX ADMIN — Medication 3 MILLILITER(S): at 11:01

## 2019-04-07 RX ADMIN — Medication 25 MILLIGRAM(S): at 09:17

## 2019-04-07 RX ADMIN — ROPINIROLE 0.5 MILLIGRAM(S): 8 TABLET, FILM COATED, EXTENDED RELEASE ORAL at 09:17

## 2019-04-07 RX ADMIN — Medication 3 MILLIGRAM(S): at 21:29

## 2019-04-07 RX ADMIN — HALOPERIDOL DECANOATE 10 MILLIGRAM(S): 100 INJECTION INTRAMUSCULAR at 21:28

## 2019-04-07 RX ADMIN — HALOPERIDOL DECANOATE 5 MILLIGRAM(S): 100 INJECTION INTRAMUSCULAR at 09:17

## 2019-04-07 RX ADMIN — Medication 1 APPLICATION(S): at 06:58

## 2019-04-07 RX ADMIN — Medication 200 MILLIGRAM(S): at 17:29

## 2019-04-07 RX ADMIN — Medication 60 MILLIGRAM(S): at 03:02

## 2019-04-07 RX ADMIN — Medication 3 MILLILITER(S): at 03:42

## 2019-04-07 RX ADMIN — ROPINIROLE 0.5 MILLIGRAM(S): 8 TABLET, FILM COATED, EXTENDED RELEASE ORAL at 17:30

## 2019-04-07 RX ADMIN — Medication 3 MILLILITER(S): at 21:12

## 2019-04-07 RX ADMIN — Medication 200 MILLIGRAM(S): at 11:40

## 2019-04-07 RX ADMIN — Medication 60 MILLIGRAM(S): at 21:28

## 2019-04-07 RX ADMIN — DIVALPROEX SODIUM 500 MILLIGRAM(S): 500 TABLET, DELAYED RELEASE ORAL at 21:29

## 2019-04-07 RX ADMIN — BUDESONIDE AND FORMOTEROL FUMARATE DIHYDRATE 2 PUFF(S): 160; 4.5 AEROSOL RESPIRATORY (INHALATION) at 11:01

## 2019-04-07 NOTE — PROGRESS NOTE ADULT - SUBJECTIVE AND OBJECTIVE BOX
is recovering from his COPD exacerbation and is doing better today.      Summary:   REVIEW OF SYSTEMS      General:	    Skin/Breast:  	  Ophthalmologic:  	  ENMT:	    Respiratory and Thorax:  	  Cardiovascular:	    Gastrointestinal:	    Genitourinary:	    Musculoskeletal:	    Neurological:	    Psychiatric:	    Hematology/Lymphatics:	    Endocrine:	    Allergic/Immunologic:	  Vital Signs Last 24 Hrs  T(C): 36.6 (07 Apr 2019 16:04), Max: 36.8 (06 Apr 2019 20:00)  T(F): 97.8 (07 Apr 2019 16:04), Max: 98.3 (06 Apr 2019 20:00)  HR: 69 (07 Apr 2019 16:04) (44 - 93)  BP: 130/82 (07 Apr 2019 16:04) (101/69 - 130/82)  BP(mean): --  RR: 22 (07 Apr 2019 16:04) (22 - 40)  SpO2: 98% (07 Apr 2019 16:04) (93% - 98%)  PHYSICAL EXAM:  GEN: thin male, NAD, sitting up in bed, nontoxic, awake  HEENT: dry MM  CVS: S1S2 RRR  PULM: diffusely wheezing, +coughing  ABD: thin, soft, nontender  EXTREM: thin, no edema  NEURO: intact  PSYCH: difficult to understand at times  SKIN: warm, dry                        12.4   7.8   )-----------( 212      ( 06 Apr 2019 09:40 )             38.7     04-06    143  |  97<L>  |  17.0  ----------------------------<  97  4.0   |  33.0<H>  |  0.72    Ca    9.1      06 Apr 2019 09:40    TPro  7.2  /  Alb  3.5  /  TBili  0.7  /  DBili  x   /  AST  42<H>  /  ALT  24  /  AlkPhos  74  04-06    LIVER FUNCTIONS - ( 06 Apr 2019 09:40 )  Alb: 3.5 g/dL / Pro: 7.2 g/dL / ALK PHOS: 74 U/L / ALT: 24 U/L / AST: 42 U/L / GGT: x           Radiology:     MEDICATIONS  (STANDING):  ALBUTerol/ipratropium for Nebulization 3 milliLiter(s) Nebulizer every 6 hours  azithromycin  IVPB 500 milliGRAM(s) IV Intermittent every 24 hours  betamethasone valerate 0.1% Cream 1 Application(s) Topical two times a day  buDESOnide 160 MICROgram(s)/formoterol 4.5 MICROgram(s) Inhaler 2 Puff(s) Inhalation two times a day  dextrose 5%. 1000 milliLiter(s) (50 mL/Hr) IV Continuous <Continuous>  dextrose 50% Injectable 12.5 Gram(s) IV Push once  dextrose 50% Injectable 25 Gram(s) IV Push once  dextrose 50% Injectable 25 Gram(s) IV Push once  diVALproex  milliGRAM(s) Oral at bedtime  enoxaparin Injectable 40 milliGRAM(s) SubCutaneous every 24 hours  haloperidol     Tablet 5 milliGRAM(s) Oral two times a day  haloperidol     Tablet 10 milliGRAM(s) Oral at bedtime  melatonin 3 milliGRAM(s) Oral at bedtime  methylPREDNISolone sodium succinate Injectable 60 milliGRAM(s) IV Push every 6 hours  metoprolol succinate ER 25 milliGRAM(s) Oral daily  pantoprazole    Tablet 40 milliGRAM(s) Oral before breakfast  rOPINIRole 0.5 milliGRAM(s) Oral two times a day    MEDICATIONS  (PRN):  dextrose 40% Gel 15 Gram(s) Oral once PRN Blood Glucose LESS THAN 70 milliGRAM(s)/deciliter  glucagon  Injectable 1 milliGRAM(s) IntraMuscular once PRN Glucose LESS THAN 70 milligrams/deciliter  guaiFENesin    Syrup 200 milliGRAM(s) Oral every 6 hours PRN Cough

## 2019-04-07 NOTE — PATIENT PROFILE ADULT - HAS THE PATIENT BEEN ADMITTED FROM SHORT TERM REHAB?
Problem: Perioperative Period (Adult)  Goal: Signs and Symptoms of Listed Potential Problems Will be Absent or Manageable (Perioperative Period)  Outcome: Ongoing (interventions implemented as appropriate)    04/13/17 1128   Perioperative Period   Problems Assessed (Perioperative Period) all   Problems Present (Perioperative Period) none            no

## 2019-04-07 NOTE — ED ADULT NURSE REASSESSMENT NOTE - NS ED NURSE REASSESS COMMENT FT1
Pt refusing to wear bipap, tolerated well while sleeping prior to RN assessment, placed on 3L nasal cannula, sitting up at side of bed eating breakfast, , coughing occasionally, sat @ ~89% on cannula, will continue to monitor. Pt refusing to wear bipap, tolerated well while sleeping prior to RN assessment, placed on 3L nasal cannula, sitting up at side of bed eating breakfast, , tolerating food well, coughing occasionally, sat @ ~89% on cannula, will continue to monitor.

## 2019-04-08 PROCEDURE — 99233 SBSQ HOSP IP/OBS HIGH 50: CPT

## 2019-04-08 RX ORDER — ACETAMINOPHEN 500 MG
650 TABLET ORAL ONCE
Qty: 0 | Refills: 0 | Status: COMPLETED | OUTPATIENT
Start: 2019-04-08 | End: 2019-04-08

## 2019-04-08 RX ADMIN — Medication 3 MILLILITER(S): at 21:13

## 2019-04-08 RX ADMIN — Medication 3 MILLILITER(S): at 09:43

## 2019-04-08 RX ADMIN — Medication 200 MILLIGRAM(S): at 09:00

## 2019-04-08 RX ADMIN — Medication 200 MILLIGRAM(S): at 22:00

## 2019-04-08 RX ADMIN — Medication 25 MILLIGRAM(S): at 06:05

## 2019-04-08 RX ADMIN — BUDESONIDE AND FORMOTEROL FUMARATE DIHYDRATE 2 PUFF(S): 160; 4.5 AEROSOL RESPIRATORY (INHALATION) at 21:14

## 2019-04-08 RX ADMIN — Medication 650 MILLIGRAM(S): at 21:03

## 2019-04-08 RX ADMIN — Medication 650 MILLIGRAM(S): at 22:03

## 2019-04-08 RX ADMIN — PANTOPRAZOLE SODIUM 40 MILLIGRAM(S): 20 TABLET, DELAYED RELEASE ORAL at 06:05

## 2019-04-08 RX ADMIN — Medication 200 MILLIGRAM(S): at 00:43

## 2019-04-08 RX ADMIN — BUDESONIDE AND FORMOTEROL FUMARATE DIHYDRATE 2 PUFF(S): 160; 4.5 AEROSOL RESPIRATORY (INHALATION) at 09:43

## 2019-04-08 RX ADMIN — ROPINIROLE 0.5 MILLIGRAM(S): 8 TABLET, FILM COATED, EXTENDED RELEASE ORAL at 06:05

## 2019-04-08 RX ADMIN — Medication 60 MILLIGRAM(S): at 15:56

## 2019-04-08 RX ADMIN — HALOPERIDOL DECANOATE 5 MILLIGRAM(S): 100 INJECTION INTRAMUSCULAR at 06:05

## 2019-04-08 RX ADMIN — Medication 3 MILLILITER(S): at 15:44

## 2019-04-08 RX ADMIN — ROPINIROLE 0.5 MILLIGRAM(S): 8 TABLET, FILM COATED, EXTENDED RELEASE ORAL at 17:44

## 2019-04-08 RX ADMIN — HALOPERIDOL DECANOATE 5 MILLIGRAM(S): 100 INJECTION INTRAMUSCULAR at 17:44

## 2019-04-08 RX ADMIN — AZITHROMYCIN 255 MILLIGRAM(S): 500 TABLET, FILM COATED ORAL at 15:55

## 2019-04-08 RX ADMIN — HALOPERIDOL DECANOATE 10 MILLIGRAM(S): 100 INJECTION INTRAMUSCULAR at 21:03

## 2019-04-08 RX ADMIN — DIVALPROEX SODIUM 500 MILLIGRAM(S): 500 TABLET, DELAYED RELEASE ORAL at 21:03

## 2019-04-08 RX ADMIN — Medication 3 MILLILITER(S): at 04:08

## 2019-04-08 RX ADMIN — Medication 60 MILLIGRAM(S): at 03:14

## 2019-04-08 RX ADMIN — Medication 200 MILLIGRAM(S): at 15:57

## 2019-04-08 RX ADMIN — Medication 3 MILLIGRAM(S): at 21:03

## 2019-04-08 NOTE — PROGRESS NOTE ADULT - PROBLEM SELECTOR PLAN 1
-nebs Q6H  -sent sputum cx, RVP negative  -cont azithromycin  -cont steroids, decreased to Q8H today

## 2019-04-08 NOTE — PROVIDER CONTACT NOTE (OTHER) - ACTION/TREATMENT ORDERED:
Tylenol ordered. PA made aware patient is refusing IV access at this time. Will continue to encourage patient to allow IV access. Patient education rendered.

## 2019-04-08 NOTE — PROGRESS NOTE ADULT - SUBJECTIVE AND OBJECTIVE BOX
is recovering from his COPD exacerbation and is doing better today. He is going to have steriods weaned down today, and PT consult has been placed. He is pleasant and appears much more comfortable.     Summary:   REVIEW OF SYSTEMS    General:	"I'm doing good."    Skin/Breast:  	  Ophthalmologic:  	  ENMT:	    Respiratory and Thorax:  	  Cardiovascular:	    Gastrointestinal:	    Genitourinary:	    Musculoskeletal:	    Neurological:	    Psychiatric:	    Hematology/Lymphatics:	    Endocrine:	    Allergic/Immunologic:	     Vital Signs Last 24 Hrs  T(C): 36.8 (08 Apr 2019 08:00), Max: 37.2 (07 Apr 2019 17:01)  T(F): 98.2 (08 Apr 2019 08:00), Max: 98.9 (07 Apr 2019 17:01)  HR: 81 (08 Apr 2019 09:44) (61 - 81)  BP: 124/85 (08 Apr 2019 08:00) (119/75 - 130/82)  BP(mean): --  RR: 18 (08 Apr 2019 08:00) (18 - 22)  SpO2: 94% (08 Apr 2019 08:00) (94% - 98%)  PHYSICAL EXAM:  GEN: thin male, NAD, sitting up in bed, nontoxic, awake  HEENT: dry MM  CVS: S1S2 RRR  PULM: diffusely wheezing, +coughing  ABD: thin, soft, nontender  EXTREM: thin, no edema  NEURO: intact  PSYCH: difficult to understand at times  SKIN: warm, dry         Radiology:     MEDICATIONS  (STANDING):  ALBUTerol/ipratropium for Nebulization 3 milliLiter(s) Nebulizer every 6 hours  azithromycin  IVPB 500 milliGRAM(s) IV Intermittent every 24 hours  betamethasone valerate 0.1% Cream 1 Application(s) Topical two times a day  buDESOnide 160 MICROgram(s)/formoterol 4.5 MICROgram(s) Inhaler 2 Puff(s) Inhalation two times a day  dextrose 5%. 1000 milliLiter(s) (50 mL/Hr) IV Continuous <Continuous>  dextrose 50% Injectable 12.5 Gram(s) IV Push once  dextrose 50% Injectable 25 Gram(s) IV Push once  dextrose 50% Injectable 25 Gram(s) IV Push once  diVALproex  milliGRAM(s) Oral at bedtime  enoxaparin Injectable 40 milliGRAM(s) SubCutaneous every 24 hours  haloperidol     Tablet 5 milliGRAM(s) Oral two times a day  haloperidol     Tablet 10 milliGRAM(s) Oral at bedtime  melatonin 3 milliGRAM(s) Oral at bedtime  methylPREDNISolone sodium succinate Injectable 60 milliGRAM(s) IV Push every 8 hours  metoprolol succinate ER 25 milliGRAM(s) Oral daily  pantoprazole    Tablet 40 milliGRAM(s) Oral before breakfast  rOPINIRole 0.5 milliGRAM(s) Oral two times a day    MEDICATIONS  (PRN):  dextrose 40% Gel 15 Gram(s) Oral once PRN Blood Glucose LESS THAN 70 milliGRAM(s)/deciliter  glucagon  Injectable 1 milliGRAM(s) IntraMuscular once PRN Glucose LESS THAN 70 milligrams/deciliter  guaiFENesin    Syrup 200 milliGRAM(s) Oral every 6 hours PRN Cough

## 2019-04-09 ENCOUNTER — TRANSCRIPTION ENCOUNTER (OUTPATIENT)
Age: 69
End: 2019-04-09

## 2019-04-09 LAB
ANION GAP SERPL CALC-SCNC: 9 MMOL/L — SIGNIFICANT CHANGE UP (ref 5–17)
BUN SERPL-MCNC: 25 MG/DL — HIGH (ref 8–20)
CALCIUM SERPL-MCNC: 9 MG/DL — SIGNIFICANT CHANGE UP (ref 8.6–10.2)
CHLORIDE SERPL-SCNC: 100 MMOL/L — SIGNIFICANT CHANGE UP (ref 98–107)
CO2 SERPL-SCNC: 36 MMOL/L — HIGH (ref 22–29)
CREAT SERPL-MCNC: 0.62 MG/DL — SIGNIFICANT CHANGE UP (ref 0.5–1.3)
CULTURE RESULTS: SIGNIFICANT CHANGE UP
GLUCOSE BLDC GLUCOMTR-MCNC: 101 MG/DL — HIGH (ref 70–99)
GLUCOSE SERPL-MCNC: 97 MG/DL — SIGNIFICANT CHANGE UP (ref 70–115)
HCT VFR BLD CALC: 38.4 % — LOW (ref 42–52)
HCV RNA FLD QL NAA+PROBE: SIGNIFICANT CHANGE UP
HCV RNA SPEC QL PROBE+SIG AMP: DETECTED
HGB BLD-MCNC: 12.4 G/DL — LOW (ref 14–18)
MAGNESIUM SERPL-MCNC: 2.5 MG/DL — SIGNIFICANT CHANGE UP (ref 1.8–2.6)
MCHC RBC-ENTMCNC: 32.1 PG — HIGH (ref 27–31)
MCHC RBC-ENTMCNC: 32.3 G/DL — SIGNIFICANT CHANGE UP (ref 32–36)
MCV RBC AUTO: 99.5 FL — HIGH (ref 80–94)
PHOSPHATE SERPL-MCNC: 3.1 MG/DL — SIGNIFICANT CHANGE UP (ref 2.4–4.7)
PLATELET # BLD AUTO: 253 K/UL — SIGNIFICANT CHANGE UP (ref 150–400)
POTASSIUM SERPL-MCNC: 4.5 MMOL/L — SIGNIFICANT CHANGE UP (ref 3.5–5.3)
POTASSIUM SERPL-SCNC: 4.5 MMOL/L — SIGNIFICANT CHANGE UP (ref 3.5–5.3)
RBC # BLD: 3.86 M/UL — LOW (ref 4.6–6.2)
RBC # FLD: 13.7 % — SIGNIFICANT CHANGE UP (ref 11–15.6)
SODIUM SERPL-SCNC: 145 MMOL/L — SIGNIFICANT CHANGE UP (ref 135–145)
SPECIMEN SOURCE: SIGNIFICANT CHANGE UP
WBC # BLD: 7.3 K/UL — SIGNIFICANT CHANGE UP (ref 4.8–10.8)
WBC # FLD AUTO: 7.3 K/UL — SIGNIFICANT CHANGE UP (ref 4.8–10.8)

## 2019-04-09 PROCEDURE — 99232 SBSQ HOSP IP/OBS MODERATE 35: CPT

## 2019-04-09 RX ORDER — ACETAMINOPHEN 500 MG
1 TABLET ORAL
Qty: 0 | Refills: 0 | COMMUNITY

## 2019-04-09 RX ORDER — HALOPERIDOL DECANOATE 100 MG/ML
1 INJECTION INTRAMUSCULAR
Qty: 0 | Refills: 0 | DISCHARGE
Start: 2019-04-09

## 2019-04-09 RX ORDER — DIVALPROEX SODIUM 500 MG/1
1000 TABLET, DELAYED RELEASE ORAL AT BEDTIME
Qty: 0 | Refills: 0 | Status: DISCONTINUED | OUTPATIENT
Start: 2019-04-09 | End: 2019-04-10

## 2019-04-09 RX ORDER — HALOPERIDOL DECANOATE 100 MG/ML
2 INJECTION INTRAMUSCULAR
Qty: 0 | Refills: 0 | COMMUNITY

## 2019-04-09 RX ORDER — DIVALPROEX SODIUM 500 MG/1
1 TABLET, DELAYED RELEASE ORAL
Qty: 0 | Refills: 0 | COMMUNITY
Start: 2019-04-09

## 2019-04-09 RX ORDER — DIVALPROEX SODIUM 500 MG/1
2 TABLET, DELAYED RELEASE ORAL
Qty: 0 | Refills: 0 | COMMUNITY

## 2019-04-09 RX ORDER — RISPERIDONE 4 MG/1
0 TABLET ORAL
Qty: 0 | Refills: 0 | COMMUNITY

## 2019-04-09 RX ORDER — BUDESONIDE AND FORMOTEROL FUMARATE DIHYDRATE 160; 4.5 UG/1; UG/1
1 AEROSOL RESPIRATORY (INHALATION)
Qty: 0 | Refills: 0 | COMMUNITY

## 2019-04-09 RX ORDER — METOPROLOL TARTRATE 50 MG
1 TABLET ORAL
Qty: 0 | Refills: 0 | DISCHARGE
Start: 2019-04-09

## 2019-04-09 RX ORDER — ROPINIROLE 8 MG/1
1 TABLET, FILM COATED, EXTENDED RELEASE ORAL
Qty: 0 | Refills: 0 | DISCHARGE
Start: 2019-04-09

## 2019-04-09 RX ORDER — HALOPERIDOL DECANOATE 100 MG/ML
1 INJECTION INTRAMUSCULAR
Qty: 0 | Refills: 0 | COMMUNITY

## 2019-04-09 RX ORDER — ROPINIROLE 8 MG/1
0 TABLET, FILM COATED, EXTENDED RELEASE ORAL
Qty: 0 | Refills: 0 | COMMUNITY

## 2019-04-09 RX ORDER — METOPROLOL TARTRATE 50 MG
1 TABLET ORAL
Qty: 0 | Refills: 0 | COMMUNITY

## 2019-04-09 RX ORDER — CARBIDOPA AND LEVODOPA 25; 100 MG/1; MG/1
1 TABLET ORAL
Qty: 0 | Refills: 0 | COMMUNITY

## 2019-04-09 RX ORDER — ROPINIROLE 8 MG/1
1 TABLET, FILM COATED, EXTENDED RELEASE ORAL
Qty: 0 | Refills: 0 | COMMUNITY

## 2019-04-09 RX ORDER — LANOLIN ALCOHOL/MO/W.PET/CERES
1 CREAM (GRAM) TOPICAL
Qty: 0 | Refills: 0 | DISCHARGE
Start: 2019-04-09

## 2019-04-09 RX ORDER — DIVALPROEX SODIUM 500 MG/1
2 TABLET, DELAYED RELEASE ORAL
Qty: 0 | Refills: 0 | DISCHARGE
Start: 2019-04-09

## 2019-04-09 RX ORDER — PANTOPRAZOLE SODIUM 20 MG/1
1 TABLET, DELAYED RELEASE ORAL
Qty: 30 | Refills: 0
Start: 2019-04-09 | End: 2019-05-08

## 2019-04-09 RX ADMIN — Medication 200 MILLIGRAM(S): at 12:01

## 2019-04-09 RX ADMIN — Medication 200 MILLIGRAM(S): at 18:23

## 2019-04-09 RX ADMIN — ENOXAPARIN SODIUM 40 MILLIGRAM(S): 100 INJECTION SUBCUTANEOUS at 11:59

## 2019-04-09 RX ADMIN — BUDESONIDE AND FORMOTEROL FUMARATE DIHYDRATE 2 PUFF(S): 160; 4.5 AEROSOL RESPIRATORY (INHALATION) at 09:27

## 2019-04-09 RX ADMIN — Medication 1 APPLICATION(S): at 18:20

## 2019-04-09 RX ADMIN — AZITHROMYCIN 255 MILLIGRAM(S): 500 TABLET, FILM COATED ORAL at 12:03

## 2019-04-09 RX ADMIN — Medication 25 MILLIGRAM(S): at 05:12

## 2019-04-09 RX ADMIN — ROPINIROLE 0.5 MILLIGRAM(S): 8 TABLET, FILM COATED, EXTENDED RELEASE ORAL at 18:21

## 2019-04-09 RX ADMIN — PANTOPRAZOLE SODIUM 40 MILLIGRAM(S): 20 TABLET, DELAYED RELEASE ORAL at 05:12

## 2019-04-09 RX ADMIN — ROPINIROLE 0.5 MILLIGRAM(S): 8 TABLET, FILM COATED, EXTENDED RELEASE ORAL at 05:12

## 2019-04-09 RX ADMIN — Medication 3 MILLILITER(S): at 09:27

## 2019-04-09 RX ADMIN — HALOPERIDOL DECANOATE 5 MILLIGRAM(S): 100 INJECTION INTRAMUSCULAR at 05:12

## 2019-04-09 RX ADMIN — DIVALPROEX SODIUM 1000 MILLIGRAM(S): 500 TABLET, DELAYED RELEASE ORAL at 21:21

## 2019-04-09 RX ADMIN — Medication 40 MILLIGRAM(S): at 11:59

## 2019-04-09 RX ADMIN — Medication 3 MILLIGRAM(S): at 21:21

## 2019-04-09 RX ADMIN — Medication 3 MILLILITER(S): at 15:54

## 2019-04-09 RX ADMIN — HALOPERIDOL DECANOATE 10 MILLIGRAM(S): 100 INJECTION INTRAMUSCULAR at 21:21

## 2019-04-09 RX ADMIN — HALOPERIDOL DECANOATE 5 MILLIGRAM(S): 100 INJECTION INTRAMUSCULAR at 18:21

## 2019-04-09 RX ADMIN — Medication 3 MILLILITER(S): at 02:43

## 2019-04-09 NOTE — PROCEDURE NOTE - NSPROCDETAILS_GEN_ALL_CORE
dressing applied/blood seen on insertion/location identified, draped/prepped, sterile technique used/flushes easily/secured in place/sterile technique, catheter placed

## 2019-04-09 NOTE — DISCHARGE NOTE PROVIDER - PROVIDER TOKENS
PROVIDER:[TOKEN:[[id number]]] PROVIDER:[TOKEN:[[id number]]],PROVIDER:[TOKEN:[[id number]]]

## 2019-04-09 NOTE — DISCHARGE NOTE PROVIDER - CARE PROVIDER_API CALL
Cindy Waldrop (DO)  Gastroenterology  [address], [address]  [place] [address]  Phone: [phone]  Fax: [phone]  Follow Up Time: Cindy Waldrop ()  Gastroenterology  [address], [address]  [place] [address]  Phone: [phone]  Fax: [phone]  Follow Up Time:     Anderson Hernandez)  Gastroenterology; Internal Medicine  [address] [place] [address]  Phone: [phone]  Fax: [phone]  Follow Up Time:

## 2019-04-09 NOTE — DISCHARGE NOTE PROVIDER - HOSPITAL COURSE
is from a group home, he has a PMHx of COPD, hx of schizophrenia, who presents with acute respiratory failure, shortness of breath. He was wheezing and it got worse at the group home, and in the ambulance on the way to the hospital, he received IV methylprednisolone. He has diffuse wheezing on ausculation of the lungs, and he was weaned off of bipap after receiving nebulizers in the ER. I've ordered nasal cannula for him and I've ordered a flu swab. He appears nontoxic, afebrile, is mentating (sometimes not very clearly). His medicine reconciliation reveals he's on BOTH haldol and risperdal (I've d/cd risperdal for now). He is going to receive nebs Q6H, methylpred 60mg IV Q6H, and azithromycin 500mg IV qdaily. His viral panel is negative. On routine screening during hospital admission, he was found to have a positive hep C. He can be discharged with outpatient GI f/u and I've referred him to GI as an outpatient. He can be d/cd on a po prednisone taper and he's doing well, back to baseline.

## 2019-04-09 NOTE — PROGRESS NOTE ADULT - SUBJECTIVE AND OBJECTIVE BOX
is recovering from his COPD exacerbation and is doing better today. He is going to have steriods weaned down today, and PT consult has been placed. He is pleasant and appears much more comfortable. He is still requiring 2L nasal cannula today. I've explained to case management that he could go to rehab (he does not want to at this time). Our goal is to wean down the oxygen and discharge back to his group home, PT consult is pending.     Summary:   REVIEW OF SYSTEMS    General:	"I'm doing good."    Skin/Breast:  	  Ophthalmologic:  	  ENMT:	    Respiratory and Thorax:  	  Cardiovascular:	    Gastrointestinal:	    Genitourinary:	    Musculoskeletal:	    Neurological:	    Psychiatric:	    Hematology/Lymphatics:	    Endocrine:	    Allergic/Immunologic:	  Vital Signs Last 24 Hrs  T(C): 36.7 (09 Apr 2019 08:48), Max: 36.9 (08 Apr 2019 23:15)  T(F): 98 (09 Apr 2019 08:48), Max: 98.5 (08 Apr 2019 23:15)  HR: 78 (09 Apr 2019 09:31) (65 - 84)  BP: 130/75 (09 Apr 2019 08:48) (101/52 - 130/75)  BP(mean): --  RR: 18 (09 Apr 2019 08:48) (18 - 18)  SpO2: 95% (09 Apr 2019 08:48) (93% - 100%)   PHYSICAL EXAM:  GEN: thin male, NAD, sitting up in bed, nontoxic, awake  HEENT: dry MM  CVS: S1S2 RRR  PULM: diffusely wheezing, +coughing  ABD: thin, soft, nontender  EXTREM: thin, no edema  NEURO: intact  PSYCH: difficult to understand at times  SKIN: warm, dry                        12.4   7.3   )-----------( 253      ( 09 Apr 2019 06:53 )             38.4     04-09    145  |  100  |  25.0<H>  ----------------------------<  97  4.5   |  36.0<H>  |  0.62    Ca    9.0      09 Apr 2019 06:53  Phos  3.1     04-09  Mg     2.5     04-09    Radiology:     MEDICATIONS  (STANDING):  ALBUTerol/ipratropium for Nebulization 3 milliLiter(s) Nebulizer every 6 hours  azithromycin  IVPB 500 milliGRAM(s) IV Intermittent every 24 hours  betamethasone valerate 0.1% Cream 1 Application(s) Topical two times a day  buDESOnide 160 MICROgram(s)/formoterol 4.5 MICROgram(s) Inhaler 2 Puff(s) Inhalation two times a day  dextrose 5%. 1000 milliLiter(s) (50 mL/Hr) IV Continuous <Continuous>  dextrose 50% Injectable 12.5 Gram(s) IV Push once  dextrose 50% Injectable 25 Gram(s) IV Push once  dextrose 50% Injectable 25 Gram(s) IV Push once  diVALproex DR 1000 milliGRAM(s) Oral at bedtime  enoxaparin Injectable 40 milliGRAM(s) SubCutaneous every 24 hours  haloperidol     Tablet 5 milliGRAM(s) Oral two times a day  haloperidol     Tablet 10 milliGRAM(s) Oral at bedtime  melatonin 3 milliGRAM(s) Oral at bedtime  metoprolol succinate ER 25 milliGRAM(s) Oral daily  pantoprazole    Tablet 40 milliGRAM(s) Oral before breakfast  predniSONE   Tablet 40 milliGRAM(s) Oral daily  rOPINIRole 0.5 milliGRAM(s) Oral two times a day    MEDICATIONS  (PRN):  dextrose 40% Gel 15 Gram(s) Oral once PRN Blood Glucose LESS THAN 70 milliGRAM(s)/deciliter  glucagon  Injectable 1 milliGRAM(s) IntraMuscular once PRN Glucose LESS THAN 70 milligrams/deciliter  guaiFENesin    Syrup 200 milliGRAM(s) Oral every 6 hours PRN Cough

## 2019-04-09 NOTE — DISCHARGE NOTE PROVIDER - CARE PROVIDERS DIRECT ADDRESSES
[email or web address]

## 2019-04-09 NOTE — PHYSICAL THERAPY INITIAL EVALUATION ADULT - ADDITIONAL COMMENTS
Pt states he lives in a home (group home) with other individuals. Staff there provides medication otherwise he is independent. States he doesn't have to climb stairs.

## 2019-04-10 ENCOUNTER — TRANSCRIPTION ENCOUNTER (OUTPATIENT)
Age: 69
End: 2019-04-10

## 2019-04-10 VITALS — OXYGEN SATURATION: 96 %

## 2019-04-10 LAB — GLUCOSE BLDC GLUCOMTR-MCNC: 81 MG/DL — SIGNIFICANT CHANGE UP (ref 70–99)

## 2019-04-10 PROCEDURE — 83735 ASSAY OF MAGNESIUM: CPT

## 2019-04-10 PROCEDURE — 99291 CRITICAL CARE FIRST HOUR: CPT

## 2019-04-10 PROCEDURE — 86803 HEPATITIS C AB TEST: CPT

## 2019-04-10 PROCEDURE — 82803 BLOOD GASES ANY COMBINATION: CPT

## 2019-04-10 PROCEDURE — 87486 CHLMYD PNEUM DNA AMP PROBE: CPT

## 2019-04-10 PROCEDURE — 97163 PT EVAL HIGH COMPLEX 45 MIN: CPT

## 2019-04-10 PROCEDURE — 94640 AIRWAY INHALATION TREATMENT: CPT

## 2019-04-10 PROCEDURE — 87070 CULTURE OTHR SPECIMN AEROBIC: CPT

## 2019-04-10 PROCEDURE — 87633 RESP VIRUS 12-25 TARGETS: CPT

## 2019-04-10 PROCEDURE — 87521 HEPATITIS C PROBE&RVRS TRNSC: CPT

## 2019-04-10 PROCEDURE — 71045 X-RAY EXAM CHEST 1 VIEW: CPT

## 2019-04-10 PROCEDURE — 80048 BASIC METABOLIC PNL TOTAL CA: CPT

## 2019-04-10 PROCEDURE — 87581 M.PNEUMON DNA AMP PROBE: CPT

## 2019-04-10 PROCEDURE — 94660 CPAP INITIATION&MGMT: CPT

## 2019-04-10 PROCEDURE — 36415 COLL VENOUS BLD VENIPUNCTURE: CPT

## 2019-04-10 PROCEDURE — 82962 GLUCOSE BLOOD TEST: CPT

## 2019-04-10 PROCEDURE — 85027 COMPLETE CBC AUTOMATED: CPT

## 2019-04-10 PROCEDURE — 80053 COMPREHEN METABOLIC PANEL: CPT

## 2019-04-10 PROCEDURE — 82550 ASSAY OF CK (CPK): CPT

## 2019-04-10 PROCEDURE — 84100 ASSAY OF PHOSPHORUS: CPT

## 2019-04-10 PROCEDURE — 93005 ELECTROCARDIOGRAM TRACING: CPT

## 2019-04-10 PROCEDURE — 99292 CRITICAL CARE ADDL 30 MIN: CPT

## 2019-04-10 PROCEDURE — 87798 DETECT AGENT NOS DNA AMP: CPT

## 2019-04-10 PROCEDURE — 84484 ASSAY OF TROPONIN QUANT: CPT

## 2019-04-10 PROCEDURE — 99231 SBSQ HOSP IP/OBS SF/LOW 25: CPT

## 2019-04-10 RX ADMIN — ROPINIROLE 0.5 MILLIGRAM(S): 8 TABLET, FILM COATED, EXTENDED RELEASE ORAL at 06:06

## 2019-04-10 RX ADMIN — Medication 3 MILLILITER(S): at 09:36

## 2019-04-10 RX ADMIN — Medication 40 MILLIGRAM(S): at 06:06

## 2019-04-10 RX ADMIN — PANTOPRAZOLE SODIUM 40 MILLIGRAM(S): 20 TABLET, DELAYED RELEASE ORAL at 06:06

## 2019-04-10 RX ADMIN — Medication 1 APPLICATION(S): at 06:06

## 2019-04-10 RX ADMIN — HALOPERIDOL DECANOATE 5 MILLIGRAM(S): 100 INJECTION INTRAMUSCULAR at 06:06

## 2019-04-10 RX ADMIN — Medication 25 MILLIGRAM(S): at 06:06

## 2019-04-10 RX ADMIN — BUDESONIDE AND FORMOTEROL FUMARATE DIHYDRATE 2 PUFF(S): 160; 4.5 AEROSOL RESPIRATORY (INHALATION) at 09:36

## 2019-04-10 NOTE — PROGRESS NOTE ADULT - SUBJECTIVE AND OBJECTIVE BOX
is recovering from his COPD exacerbation and is doing better today. He is going to have steriods weaned down today, and PT consult has been placed. He is pleasant and appears much more comfortable. He is still requiring 2L nasal cannula today. I've explained to case management that he could go to rehab (he does not want to at this time). Our goal is to wean down the oxygen and discharge back to his group home, PT consult states that he's independent.    Summary:   REVIEW OF SYSTEMS    General:	"I'm doing good."    Skin/Breast:  	  Ophthalmologic:  	  ENMT:	    Respiratory and Thorax:  	  Cardiovascular:	    Gastrointestinal:	    Genitourinary:	    Musculoskeletal:	    Neurological:	    Psychiatric:	    Hematology/Lymphatics:	    Endocrine:	    Allergic/Immunologic:	  Vital Signs Last 24 Hrs  T(C): 36.3 (10 Apr 2019 09:19), Max: 36.7 (09 Apr 2019 16:10)  T(F): 97.4 (10 Apr 2019 09:19), Max: 98 (09 Apr 2019 16:10)  HR: 83 (10 Apr 2019 09:36) (61 - 97)  BP: 153/97 (10 Apr 2019 09:19) (141/78 - 153/97)  BP(mean): --  RR: 18 (10 Apr 2019 09:19) (18 - 18)  SpO2: 96% (10 Apr 2019 09:36) (92% - 96%)  PHYSICAL EXAM:  GEN: thin male, NAD, sitting up in bed, nontoxic, awake  HEENT: dry MM  CVS: S1S2 RRR  PULM: diffusely wheezing, +coughing  ABD: thin, soft, nontender  EXTREM: thin, no edema  NEURO: intact  PSYCH: difficult to understand at times  SKIN: warm, dry                        12.4   7.3   )-----------( 253      ( 09 Apr 2019 06:53 )             38.4     04-09    145  |  100  |  25.0<H>  ----------------------------<  97  4.5   |  36.0<H>  |  0.62    Ca    9.0      09 Apr 2019 06:53  Phos  3.1     04-09  Mg     2.5     04-09    Radiology:     MEDICATIONS  (STANDING):  ALBUTerol/ipratropium for Nebulization 3 milliLiter(s) Nebulizer every 6 hours  azithromycin  IVPB 500 milliGRAM(s) IV Intermittent every 24 hours  betamethasone valerate 0.1% Cream 1 Application(s) Topical two times a day  buDESOnide 160 MICROgram(s)/formoterol 4.5 MICROgram(s) Inhaler 2 Puff(s) Inhalation two times a day  dextrose 5%. 1000 milliLiter(s) (50 mL/Hr) IV Continuous <Continuous>  dextrose 50% Injectable 12.5 Gram(s) IV Push once  dextrose 50% Injectable 25 Gram(s) IV Push once  dextrose 50% Injectable 25 Gram(s) IV Push once  diVALproex DR 1000 milliGRAM(s) Oral at bedtime  enoxaparin Injectable 40 milliGRAM(s) SubCutaneous every 24 hours  haloperidol     Tablet 5 milliGRAM(s) Oral two times a day  haloperidol     Tablet 10 milliGRAM(s) Oral at bedtime  melatonin 3 milliGRAM(s) Oral at bedtime  metoprolol succinate ER 25 milliGRAM(s) Oral daily  pantoprazole    Tablet 40 milliGRAM(s) Oral before breakfast  predniSONE   Tablet 40 milliGRAM(s) Oral daily  rOPINIRole 0.5 milliGRAM(s) Oral two times a day    MEDICATIONS  (PRN):  dextrose 40% Gel 15 Gram(s) Oral once PRN Blood Glucose LESS THAN 70 milliGRAM(s)/deciliter  glucagon  Injectable 1 milliGRAM(s) IntraMuscular once PRN Glucose LESS THAN 70 milligrams/deciliter  guaiFENesin    Syrup 200 milliGRAM(s) Oral every 6 hours PRN Cough

## 2019-04-10 NOTE — PROGRESS NOTE ADULT - PROBLEM SELECTOR PLAN 1
-nebs Q6H  -sent sputum cx, RVP negative  -s/p 5 dyas of azithromycin  -cont steroids, on po prednisone now

## 2019-04-10 NOTE — DISCHARGE NOTE NURSING/CASE MANAGEMENT/SOCIAL WORK - NSDCDPATPORTLINK_GEN_ALL_CORE
You can access the [facility] Portal, offered by [facility], by registering with the following website: [email or web address]

## 2019-06-27 ENCOUNTER — EMERGENCY (EMERGENCY)
Facility: HOSPITAL | Age: 69
LOS: 1 days | Discharge: HOME | End: 2019-06-27
Attending: STUDENT IN AN ORGANIZED HEALTH CARE EDUCATION/TRAINING PROGRAM
Payer: MEDICARE

## 2019-06-27 VITALS
WEIGHT: 128.09 LBS | HEART RATE: 78 BPM | RESPIRATION RATE: 19 BRPM | SYSTOLIC BLOOD PRESSURE: 159 MMHG | TEMPERATURE: 99 F | OXYGEN SATURATION: 98 % | HEIGHT: 69 IN | DIASTOLIC BLOOD PRESSURE: 93 MMHG

## 2019-06-27 VITALS
RESPIRATION RATE: 18 BRPM | DIASTOLIC BLOOD PRESSURE: 83 MMHG | SYSTOLIC BLOOD PRESSURE: 158 MMHG | TEMPERATURE: 98 F | HEART RATE: 59 BPM | OXYGEN SATURATION: 97 %

## 2019-06-27 LAB
ALBUMIN SERPL ELPH-MCNC: 3.8 G/DL — SIGNIFICANT CHANGE UP (ref 3.3–5.2)
ALP SERPL-CCNC: 69 U/L — SIGNIFICANT CHANGE UP (ref 40–120)
ALT FLD-CCNC: 17 U/L — SIGNIFICANT CHANGE UP
ANION GAP SERPL CALC-SCNC: 12 MMOL/L — SIGNIFICANT CHANGE UP (ref 5–17)
AST SERPL-CCNC: 27 U/L — SIGNIFICANT CHANGE UP
BILIRUB SERPL-MCNC: 0.6 MG/DL — SIGNIFICANT CHANGE UP (ref 0.4–2)
BUN SERPL-MCNC: 9 MG/DL — SIGNIFICANT CHANGE UP (ref 8–20)
CALCIUM SERPL-MCNC: 9.3 MG/DL — SIGNIFICANT CHANGE UP (ref 8.6–10.2)
CHLORIDE SERPL-SCNC: 90 MMOL/L — LOW (ref 98–107)
CK SERPL-CCNC: 51 U/L — SIGNIFICANT CHANGE UP (ref 30–200)
CO2 SERPL-SCNC: 30 MMOL/L — HIGH (ref 22–29)
CREAT SERPL-MCNC: 0.61 MG/DL — SIGNIFICANT CHANGE UP (ref 0.5–1.3)
GLUCOSE SERPL-MCNC: 82 MG/DL — SIGNIFICANT CHANGE UP (ref 70–115)
HCT VFR BLD CALC: 40.7 % — SIGNIFICANT CHANGE UP (ref 39–50)
HGB BLD-MCNC: 13.3 G/DL — SIGNIFICANT CHANGE UP (ref 13–17)
MCHC RBC-ENTMCNC: 32 PG — SIGNIFICANT CHANGE UP (ref 27–34)
MCHC RBC-ENTMCNC: 32.7 GM/DL — SIGNIFICANT CHANGE UP (ref 32–36)
MCV RBC AUTO: 97.8 FL — SIGNIFICANT CHANGE UP (ref 80–100)
PLATELET # BLD AUTO: 238 K/UL — SIGNIFICANT CHANGE UP (ref 150–400)
POTASSIUM SERPL-MCNC: 4.6 MMOL/L — SIGNIFICANT CHANGE UP (ref 3.5–5.3)
POTASSIUM SERPL-SCNC: 4.6 MMOL/L — SIGNIFICANT CHANGE UP (ref 3.5–5.3)
PROT SERPL-MCNC: 6.9 G/DL — SIGNIFICANT CHANGE UP (ref 6.6–8.7)
RBC # BLD: 4.16 M/UL — LOW (ref 4.2–5.8)
RBC # FLD: 13.2 % — SIGNIFICANT CHANGE UP (ref 10.3–14.5)
SODIUM SERPL-SCNC: 132 MMOL/L — LOW (ref 135–145)
TROPONIN T SERPL-MCNC: <0.01 NG/ML — SIGNIFICANT CHANGE UP (ref 0–0.06)
WBC # BLD: 5.5 K/UL — SIGNIFICANT CHANGE UP (ref 3.8–10.5)
WBC # FLD AUTO: 5.5 K/UL — SIGNIFICANT CHANGE UP (ref 3.8–10.5)

## 2019-06-27 PROCEDURE — 85027 COMPLETE CBC AUTOMATED: CPT

## 2019-06-27 PROCEDURE — 71046 X-RAY EXAM CHEST 2 VIEWS: CPT | Mod: 26

## 2019-06-27 PROCEDURE — 82550 ASSAY OF CK (CPK): CPT

## 2019-06-27 PROCEDURE — 80053 COMPREHEN METABOLIC PANEL: CPT

## 2019-06-27 PROCEDURE — 99283 EMERGENCY DEPT VISIT LOW MDM: CPT

## 2019-06-27 PROCEDURE — 36415 COLL VENOUS BLD VENIPUNCTURE: CPT

## 2019-06-27 PROCEDURE — 93005 ELECTROCARDIOGRAM TRACING: CPT

## 2019-06-27 PROCEDURE — 71046 X-RAY EXAM CHEST 2 VIEWS: CPT

## 2019-06-27 PROCEDURE — 93010 ELECTROCARDIOGRAM REPORT: CPT

## 2019-06-27 PROCEDURE — 99285 EMERGENCY DEPT VISIT HI MDM: CPT

## 2019-06-27 PROCEDURE — 84484 ASSAY OF TROPONIN QUANT: CPT

## 2019-06-27 NOTE — ED ADULT TRIAGE NOTE - CHIEF COMPLAINT QUOTE
"I get dizzy after I get up to quickly and I have shortness of breath."  Pt. BIBA complaining of dizziness on standing and sob for "a while".  Pt. states symptoms worsened today which caused him to come to the ED.  Pt. presents in NAD with VS wnl on room air.

## 2019-06-27 NOTE — ED PROVIDER NOTE - PRO INTERPRETER NEED 2
Discussion/Summary   Please inform the patient that laboratory studies were negative for celiac sprue  Please make sure that she has office follow-up in 1 to 2 months to discuss if she has continued diarrhea and possible treatment options  Please have her call with any questions or concerns  Verified Results  ([address] [place] [address]) Celiac Disease Panel [id number] 10:22AM Venson Boeck     Test Name Result Flag Reference   Endomysial Antibody IgA Negative  Negative   t-Transglutaminase (tTG) IgA <2 U/mL  0-3   Negative        0 -  3                                               Weak Positive   4 - 10                                               Positive           >10                  Tissue Transglutaminase (tTG) has been identified                  as the endomysial antigen  Studies have demonstr-                  ated that endomysial IgA antibodies have over 99%                  specificity for gluten sensitive enteropathy     Immunoglobulin A, Qn, Serum 280 mg/dL   English

## 2019-06-27 NOTE — ED ADULT NURSE NOTE - NSIMPLEMENTINTERV_GEN_ALL_ED
Implemented All Fall with Harm Risk Interventions:  [place] to call system. Call bell, personal items and telephone within reach. Instruct patient to call for assistance. Room bathroom lighting operational. Non-slip footwear when patient is off stretcher. Physically safe environment: no spills, clutter or unnecessary equipment. Stretcher in lowest position, wheels locked, appropriate side rails in place. Provide visual cue, wrist band, yellow gown, etc. Monitor gait and stability. Monitor for mental status changes and reorient to person, place, and time. Review medications for side effects contributing to fall risk. Reinforce activity limits and safety measures with patient and family. Provide visual clues: red socks.

## 2019-06-27 NOTE — ED PROVIDER NOTE - OBJECTIVE STATEMENT
70 yo male with Parkinsons and copd presenting for evaluation due to feeling generally fatigued the past couple of days. Denies fever, chills, nausea, vomiting, diarrhea, headache, chest pain, or sick contacts.

## 2019-06-27 NOTE — ED ADULT NURSE NOTE - OBJECTIVE STATEMENT
Pt presents to ED for "weakness" Pt states it has been coming and going for a while now. Pt states about "a couple weeks" Pt denies pain at this time. C/O some wheezing. States he is a daily smoker. States he is not eating like he usually does. Decreased PO intake.

## 2019-06-27 NOTE — ED ADULT NURSE REASSESSMENT NOTE - NS ED NURSE REASSESS COMMENT FT1
pt. received from night RN. pt. a&ox3. pt. states he came in for dizziness, states he does not feel dizzy now. pt. denies chest pain or sob. informed on plan of care.

## 2019-06-27 NOTE — ED PROVIDER NOTE - CHPI ED SYMPTOMS NEG
no fever/no chills/no decreased eating/drinking/no numbness/no pain/no nausea/no tingling/no dizziness/no weakness

## 2019-06-28 NOTE — ED POST DISCHARGE NOTE - DETAILS
Discussed findings with pt and  ( Clementina ) at [facility]. Pt to follow up with primary MD for CT scan

## 2019-07-15 NOTE — DIETITIAN INITIAL EVALUATION ADULT. - SOURCE
Liz is here today for her annua gyne exam  Denies known Latex allergy or symptoms of Latex sensitivity.  Pt was given the option of a chaperone and declines.     EMR/other (specify)

## 2019-10-02 NOTE — ED ADULT NURSE NOTE - EXTENSIONS OF SELF_ADULT
[facility]    Name:  Prashant Way  MR#:  [id number]  :  1995  ACCOUNT #:  [de-identified]  ADMIT DATE:  2019      INITIAL PSYCHIATRIC EVALUATION    DATE OF SERVICE:  10/01/2019    CHIEF COMPLAINT:  Depression. HISTORY OF PRESENTING ILLNESS:  The patient is a 80-year-old 4-month pregnant female who is admitted at [address] [place] [address] on a voluntary basis. She states that she has suffered from depression off and on over the years. She was previously admitted in a psychiatric facility for suicidal thoughts. She has been feeling hopeless and helpless. She has been crying inconsolably been severely depressed. She feels alone and has nobody to talk to. She was having suicidal statements on admission. According to her therapist, the patient seems to be minimizing the severity of her depression. She is supposed to be taking Zoloft 50 mg, but does not know who the prescriber is, she has only been partially compliant with her medication. She stated she does not want to be here anymore. She states that when she is off her medications she gets really depressed to the point that she does not want to be around anybody, she gets very irritated. Her urine drug screen is negative. She is admitted to the Inpatient Psychiatric Unit for further evaluation and treatment. PAST MEDICAL HISTORY:  See H and P. PAST PSYCHIATRIC HOSPITALIZATION:  This is her second psychiatric inpatient admission. She is being partially compliant with her Zoloft. She cannot recall the name of the prescribing provider. PSYCHOSOCIAL HISTORY:  She is single. She has three children. She is 4 months' pregnant. She finished high school. She is currently unemployed. She is currently in a [employer] program with [employer]. She lives with her kid. MENTAL STATUS EXAMINATION:  She is alert and oriented in all spheres.   She is dressed in street clothes. Mood is dysphoric. Affect is blunted. Speech, normal rate and rhythm. Thought process, logical and goal-directed. She is endorsing passive suicidal ideation, but denies homicidal ideation. No perceptual abnormalities. No paranoia or delusion. Memory seems intact. Intelligence seems average. Insight is poor. Judgment is poor. DIAGNOSIS:  Major depressive disorder, recurrent, severe, without psychotic features. TREATMENT PLANNING:  I will continue her inpatient stay. She will be provided with support and encouraged to attend groups. Her safety will be monitored. Her medications will be modified and assessed. Case Management will work on discharge planning. ASSETS AND STRENGTHS:  She is willing to seek help. She is willing to take medications. ESTIMATED LENGTH OF STAY:  Five to seven days.         VIANEY CHRISTENSEN NP      [id number]  D:  10/01/2019 20:12  T:  10/02/2019 0:24  JOB #:  [id number] None

## 2019-10-07 NOTE — ED ADULT TRIAGE NOTE - ARRIVAL INFO ADDITIONAL COMMENTS
October 7, 2019      José Manuel Buchanan MD  [address]  [facility] [address]           [facility] Hematology Oncology  [address]  [facility] [address]  Phone: [phone]          Patient: Alexi Noguera   MR Number: [id number]   YOB: 1957   Date of Visit: 10/7/2019       Dear Dr. José Manuel Buchanan:    Thank you for referring Alexi Noguera to me for evaluation. Attached you will find relevant portions of my assessment and plan of care.    If you have questions, please do not hesitate to call me. I look forward to following Alexi Noguera along with you.    Sincerely,    Acosta Pham MD    Enclosure  CC:  No Recipients    If you would like to receive this communication electronically, please contact [email or web address] or [phone] to request more information on [employer] access.    For providers and/or their staff who would like to refer a patient to [facility], please contact us through our one-stop-shop provider referral line, [facility] , at [phone].    If you feel you have received this communication in error or would no longer like to receive these types of communications, please e-mail [email or web address]          code sepsis called on arrival [phone]

## 2019-11-10 ENCOUNTER — EMERGENCY (EMERGENCY)
Facility: HOSPITAL | Age: 69
LOS: 1 days | Discharge: HOME | End: 2019-11-10
Attending: EMERGENCY MEDICINE
Payer: MEDICARE

## 2019-11-10 VITALS
TEMPERATURE: 98 F | SYSTOLIC BLOOD PRESSURE: 133 MMHG | OXYGEN SATURATION: 95 % | HEART RATE: 74 BPM | DIASTOLIC BLOOD PRESSURE: 77 MMHG | RESPIRATION RATE: 18 BRPM

## 2019-11-10 VITALS
TEMPERATURE: 98 F | HEART RATE: 85 BPM | DIASTOLIC BLOOD PRESSURE: 88 MMHG | RESPIRATION RATE: 18 BRPM | HEIGHT: 69 IN | WEIGHT: 130.07 LBS | OXYGEN SATURATION: 93 % | SYSTOLIC BLOOD PRESSURE: 164 MMHG

## 2019-11-10 LAB
FLU A RESULT: SIGNIFICANT CHANGE UP
FLU A RESULT: SIGNIFICANT CHANGE UP
FLUAV AG NPH QL: SIGNIFICANT CHANGE UP
FLUBV AG NPH QL: SIGNIFICANT CHANGE UP
RSV RESULT: SIGNIFICANT CHANGE UP
RSV RNA RESP QL NAA+PROBE: SIGNIFICANT CHANGE UP

## 2019-11-10 PROCEDURE — 87631 RESP VIRUS 3-5 TARGETS: CPT

## 2019-11-10 PROCEDURE — 94640 AIRWAY INHALATION TREATMENT: CPT

## 2019-11-10 PROCEDURE — 71046 X-RAY EXAM CHEST 2 VIEWS: CPT

## 2019-11-10 PROCEDURE — 71046 X-RAY EXAM CHEST 2 VIEWS: CPT | Mod: 26

## 2019-11-10 PROCEDURE — 99284 EMERGENCY DEPT VISIT MOD MDM: CPT | Mod: 25

## 2019-11-10 PROCEDURE — 99284 EMERGENCY DEPT VISIT MOD MDM: CPT

## 2019-11-10 RX ORDER — IPRATROPIUM/ALBUTEROL SULFATE 18-103MCG
3 AEROSOL WITH ADAPTER (GRAM) INHALATION ONCE
Refills: 0 | Status: COMPLETED | OUTPATIENT
Start: 2019-11-10 | End: 2019-11-10

## 2019-11-10 RX ORDER — AZITHROMYCIN 500 MG/1
500 TABLET, FILM COATED ORAL ONCE
Refills: 0 | Status: COMPLETED | OUTPATIENT
Start: 2019-11-10 | End: 2019-11-10

## 2019-11-10 RX ORDER — AZITHROMYCIN 500 MG/1
1 TABLET, FILM COATED ORAL
Qty: 4 | Refills: 0
Start: 2019-11-10 | End: 2019-11-13

## 2019-11-10 RX ADMIN — Medication 40 MILLIGRAM(S): at 01:33

## 2019-11-10 RX ADMIN — Medication 3 MILLILITER(S): at 01:34

## 2019-11-10 RX ADMIN — AZITHROMYCIN 500 MILLIGRAM(S): 500 TABLET, FILM COATED ORAL at 01:34

## 2019-11-10 NOTE — ED PROVIDER NOTE - OBJECTIVE STATEMENT
68 y/o M with significant PMHx of COPD, parkinson's disease, and schizophrenia presents to the ED from [facility] c/o a worsening cough. Pt states he has had a worsening productive cough with white mucous for multiple days. Pt notes that when he coughs it causes pain in his chest bilaterally. Pt recently saw a doctor and was told to take cough medicine but "could not make it to [facility]" and so he is looking for cough medicine from the ED. Pt reports that he is not too SOB to walk and can move without assistance. Pt denies any n/v, fever, or chills. Pt denies taking any ABx or steroids currently. Pt is a reported smoker. NKDA. No further complaints at this time.

## 2019-11-10 NOTE — ED ADULT NURSE NOTE - OBJECTIVE STATEMENT
pt reports he has had a productive cough, was seen by a DR and told to take cough medicine. " I wasn't able to get to [facility] to get Robitussin, im here for cough medicine" Pt c/o productive cough with white mucous. Denies fevers. C/o abdominal pain with coughing. HX parkinsons and COPD. + smoker.     Pt comes to ED with c/o productive, hacking cough for 30 days, seen by PMD and told to take over the counter cough medication. Pt states "I didn't go to [facility] to buy medication, I wanted to be seen here." Pt states no pain at this time, only shortness of breath. Pt has history of COPD and current smoker - 3 cig/day. Ambulatory and independently. Call bell within reach.

## 2019-11-10 NOTE — ED PROVIDER NOTE - CLINICAL SUMMARY MEDICAL DECISION MAKING FREE TEXT BOX
Cough with coarse breath sounds but no hypoxia or increased work of breathing to suggest acute respiratory failure. CXR showed no focal infiltrate, lung exam improved with nebs and remained stable to the q4h alessandra. Will dc w/ prednisone and azithromycin.

## 2019-11-10 NOTE — ED ADULT TRIAGE NOTE - CHIEF COMPLAINT QUOTE
pt reports he has had a productive cough, was seen by a DR and told to take cough medicine. " I wasn't able to get to [facility] to get Robitussin, im here for cough medicine" Pt c/o productive cough with white mucous. Denies fevers. C/o abdominal pain with coughing. HX parkinsons and COPD. + smoker.

## 2019-11-10 NOTE — ED ADULT NURSE REASSESSMENT NOTE - NS ED NURSE REASSESS COMMENT FT1
Pt sleeping comfortably at this time. No signs of acute distress or pain noted, cough has subsided at this time. Call bell within reach. Awaiting further MD orders.

## 2019-11-10 NOTE — ED PROVIDER NOTE - CARE PLAN
Principal Discharge DX:	Bronchitis  Secondary Diagnosis:	Chronic obstructive pulmonary disease, unspecified COPD type

## 2019-11-10 NOTE — ED ADULT NURSE NOTE - CHIEF COMPLAINT QUOTE
pt reports he has had a productive cough, was seen by a DR and told to take cough medicine. " I wasn't able to get to [facility] to get Robitussin, im here for cough medicine" Pt c/o productive cough with white mucous. Denies fevers. C/o abdominal pain with coughing. HX parkinsons and COPD. + smoker.

## 2019-11-10 NOTE — ED ADULT NURSE REASSESSMENT NOTE - NS ED NURSE REASSESS COMMENT FT1
Pt awaiting CXR, pt aware of plan of care. RVp sent to lab. Nebulizer going. Call bell within reach.

## 2019-11-10 NOTE — ED PROVIDER NOTE - NSFOLLOWUPINSTRUCTIONS_ED_ALL_ED_FT
Continue azithromycin 250mg once daily for 4 more days.  Continue prednisone 40mg for 5 days.  Take your home medications as prescribed.  Return to the ER for any new symptoms.

## 2019-11-10 NOTE — ED ADULT NURSE NOTE - NSIMPLEMENTINTERV_GEN_ALL_ED
Implemented All Universal Safety Interventions:  [place] to call system. Call bell, personal items and telephone within reach. Instruct patient to call for assistance. Room bathroom lighting operational. Non-slip footwear when patient is off stretcher. Physically safe environment: no spills, clutter or unnecessary equipment. Stretcher in lowest position, wheels locked, appropriate side rails in place.

## 2019-11-27 NOTE — ED PROVIDER NOTE - PRINCIPAL DIAGNOSIS
Chart reviewed by Clinical Sleep Educator. Patient currently a patient of Dr. Bala Rodríguez. Patient has picked  his PAP equipment, however, he has been admitted to the hospital and has not used it every day. Patient currently scheduled for follow up appointment on 1/23/20. Patient and wife will call if any issues after discharge.     Giles Jones,RRT,RPSGT, CSE
Fatigue

## 2019-12-02 ENCOUNTER — INPATIENT (INPATIENT)
Facility: HOSPITAL | Age: 69
LOS: 9 days | Discharge: HOME | DRG: 193 | End: 2019-12-12
Attending: HOSPITALIST | Admitting: HOSPITALIST
Payer: MEDICARE

## 2019-12-02 VITALS
SYSTOLIC BLOOD PRESSURE: 151 MMHG | WEIGHT: 128.09 LBS | DIASTOLIC BLOOD PRESSURE: 89 MMHG | RESPIRATION RATE: 20 BRPM | OXYGEN SATURATION: 93 % | HEART RATE: 99 BPM | HEIGHT: 69 IN | TEMPERATURE: 99 F

## 2019-12-02 DIAGNOSIS — J96.91 RESPIRATORY FAILURE, UNSPECIFIED WITH HYPOXIA: ICD-10-CM

## 2019-12-02 DIAGNOSIS — J44.1 CHRONIC OBSTRUCTIVE PULMONARY DISEASE WITH (ACUTE) EXACERBATION: ICD-10-CM

## 2019-12-02 DIAGNOSIS — G20 PARKINSON'S DISEASE: ICD-10-CM

## 2019-12-02 DIAGNOSIS — J44.9 CHRONIC OBSTRUCTIVE PULMONARY DISEASE, UNSPECIFIED: ICD-10-CM

## 2019-12-02 DIAGNOSIS — J18.1 LOBAR PNEUMONIA, UNSPECIFIED ORGANISM: ICD-10-CM

## 2019-12-02 DIAGNOSIS — J96.01 ACUTE RESPIRATORY FAILURE WITH HYPOXIA: ICD-10-CM

## 2019-12-02 DIAGNOSIS — I10 ESSENTIAL (PRIMARY) HYPERTENSION: ICD-10-CM

## 2019-12-02 DIAGNOSIS — F20.9 SCHIZOPHRENIA, UNSPECIFIED: ICD-10-CM

## 2019-12-02 DIAGNOSIS — Z29.9 ENCOUNTER FOR PROPHYLACTIC MEASURES, UNSPECIFIED: ICD-10-CM

## 2019-12-02 LAB
ALBUMIN SERPL ELPH-MCNC: 3.7 G/DL — SIGNIFICANT CHANGE UP (ref 3.3–5.2)
ALP SERPL-CCNC: 70 U/L — SIGNIFICANT CHANGE UP (ref 40–120)
ALT FLD-CCNC: 19 U/L — SIGNIFICANT CHANGE UP
ANION GAP SERPL CALC-SCNC: 12 MMOL/L — SIGNIFICANT CHANGE UP (ref 5–17)
AST SERPL-CCNC: 28 U/L — SIGNIFICANT CHANGE UP
BASE EXCESS BLDA CALC-SCNC: 8.7 MMOL/L — HIGH (ref -2–2)
BASOPHILS # BLD AUTO: 0.01 K/UL — SIGNIFICANT CHANGE UP (ref 0–0.2)
BASOPHILS NFR BLD AUTO: 0.2 % — SIGNIFICANT CHANGE UP (ref 0–2)
BILIRUB SERPL-MCNC: 0.6 MG/DL — SIGNIFICANT CHANGE UP (ref 0.4–2)
BLOOD GAS COMMENTS ARTERIAL: SIGNIFICANT CHANGE UP
BUN SERPL-MCNC: 11 MG/DL — SIGNIFICANT CHANGE UP (ref 8–20)
CALCIUM SERPL-MCNC: 9.1 MG/DL — SIGNIFICANT CHANGE UP (ref 8.6–10.2)
CHLORIDE SERPL-SCNC: 91 MMOL/L — LOW (ref 98–107)
CO2 SERPL-SCNC: 30 MMOL/L — HIGH (ref 22–29)
CREAT SERPL-MCNC: 0.56 MG/DL — SIGNIFICANT CHANGE UP (ref 0.5–1.3)
EOSINOPHIL # BLD AUTO: 0 K/UL — SIGNIFICANT CHANGE UP (ref 0–0.5)
EOSINOPHIL NFR BLD AUTO: 0 % — SIGNIFICANT CHANGE UP (ref 0–6)
GAS PNL BLDA: SIGNIFICANT CHANGE UP
GLUCOSE SERPL-MCNC: 93 MG/DL — SIGNIFICANT CHANGE UP (ref 70–115)
HCO3 BLDA-SCNC: 32 MMOL/L — HIGH (ref 20–26)
HCT VFR BLD CALC: 40.1 % — SIGNIFICANT CHANGE UP (ref 39–50)
HGB BLD-MCNC: 12.9 G/DL — LOW (ref 13–17)
HOROWITZ INDEX BLDA+IHG-RTO: SIGNIFICANT CHANGE UP
IMM GRANULOCYTES NFR BLD AUTO: 0.2 % — SIGNIFICANT CHANGE UP (ref 0–1.5)
LYMPHOCYTES # BLD AUTO: 0.8 K/UL — LOW (ref 1–3.3)
LYMPHOCYTES # BLD AUTO: 14 % — SIGNIFICANT CHANGE UP (ref 13–44)
MCHC RBC-ENTMCNC: 31.6 PG — SIGNIFICANT CHANGE UP (ref 27–34)
MCHC RBC-ENTMCNC: 32.2 GM/DL — SIGNIFICANT CHANGE UP (ref 32–36)
MCV RBC AUTO: 98.3 FL — SIGNIFICANT CHANGE UP (ref 80–100)
MONOCYTES # BLD AUTO: 0.97 K/UL — HIGH (ref 0–0.9)
MONOCYTES NFR BLD AUTO: 17 % — HIGH (ref 2–14)
NEUTROPHILS # BLD AUTO: 3.93 K/UL — SIGNIFICANT CHANGE UP (ref 1.8–7.4)
NEUTROPHILS NFR BLD AUTO: 68.6 % — SIGNIFICANT CHANGE UP (ref 43–77)
PCO2 BLDA: 62 MMHG — HIGH (ref 35–45)
PH BLDA: 7.37 — SIGNIFICANT CHANGE UP (ref 7.35–7.45)
PLATELET # BLD AUTO: 221 K/UL — SIGNIFICANT CHANGE UP (ref 150–400)
PO2 BLDA: 63 MMHG — LOW (ref 83–108)
POTASSIUM SERPL-MCNC: 4.5 MMOL/L — SIGNIFICANT CHANGE UP (ref 3.5–5.3)
POTASSIUM SERPL-SCNC: 4.5 MMOL/L — SIGNIFICANT CHANGE UP (ref 3.5–5.3)
PROCALCITONIN SERPL-MCNC: 0.09 NG/ML — SIGNIFICANT CHANGE UP (ref 0.02–0.1)
PROT SERPL-MCNC: 7 G/DL — SIGNIFICANT CHANGE UP (ref 6.6–8.7)
RBC # BLD: 4.08 M/UL — LOW (ref 4.2–5.8)
RBC # FLD: 13.3 % — SIGNIFICANT CHANGE UP (ref 10.3–14.5)
SAO2 % BLDA: 92 % — LOW (ref 95–99)
SODIUM SERPL-SCNC: 133 MMOL/L — LOW (ref 135–145)
TROPONIN T SERPL-MCNC: <0.01 NG/ML — SIGNIFICANT CHANGE UP (ref 0–0.06)
WBC # BLD: 5.72 K/UL — SIGNIFICANT CHANGE UP (ref 3.8–10.5)
WBC # FLD AUTO: 5.72 K/UL — SIGNIFICANT CHANGE UP (ref 3.8–10.5)

## 2019-12-02 PROCEDURE — 99222 1ST HOSP IP/OBS MODERATE 55: CPT

## 2019-12-02 PROCEDURE — 99232 SBSQ HOSP IP/OBS MODERATE 35: CPT

## 2019-12-02 PROCEDURE — 99285 EMERGENCY DEPT VISIT HI MDM: CPT

## 2019-12-02 PROCEDURE — 93010 ELECTROCARDIOGRAM REPORT: CPT

## 2019-12-02 PROCEDURE — 99223 1ST HOSP IP/OBS HIGH 75: CPT

## 2019-12-02 PROCEDURE — 71045 X-RAY EXAM CHEST 1 VIEW: CPT | Mod: 26

## 2019-12-02 RX ORDER — RISPERIDONE 4 MG/1
4 TABLET ORAL DAILY
Refills: 0 | Status: DISCONTINUED | OUTPATIENT
Start: 2019-12-02 | End: 2019-12-12

## 2019-12-02 RX ORDER — HALOPERIDOL DECANOATE 100 MG/ML
5 INJECTION INTRAMUSCULAR
Refills: 0 | Status: DISCONTINUED | OUTPATIENT
Start: 2019-12-02 | End: 2019-12-12

## 2019-12-02 RX ORDER — CEFTRIAXONE 500 MG/1
INJECTION, POWDER, FOR SOLUTION INTRAMUSCULAR; INTRAVENOUS
Refills: 0 | Status: COMPLETED | OUTPATIENT
Start: 2019-12-02 | End: 2019-12-08

## 2019-12-02 RX ORDER — IPRATROPIUM/ALBUTEROL SULFATE 18-103MCG
3 AEROSOL WITH ADAPTER (GRAM) INHALATION EVERY 6 HOURS
Refills: 0 | Status: DISCONTINUED | OUTPATIENT
Start: 2019-12-02 | End: 2019-12-11

## 2019-12-02 RX ORDER — PANTOPRAZOLE SODIUM 20 MG/1
40 TABLET, DELAYED RELEASE ORAL
Refills: 0 | Status: DISCONTINUED | OUTPATIENT
Start: 2019-12-02 | End: 2019-12-12

## 2019-12-02 RX ORDER — IPRATROPIUM/ALBUTEROL SULFATE 18-103MCG
3 AEROSOL WITH ADAPTER (GRAM) INHALATION ONCE
Refills: 0 | Status: COMPLETED | OUTPATIENT
Start: 2019-12-02 | End: 2019-12-02

## 2019-12-02 RX ORDER — LANOLIN ALCOHOL/MO/W.PET/CERES
3 CREAM (GRAM) TOPICAL AT BEDTIME
Refills: 0 | Status: DISCONTINUED | OUTPATIENT
Start: 2019-12-02 | End: 2019-12-12

## 2019-12-02 RX ORDER — MAGNESIUM SULFATE 500 MG/ML
2 VIAL (ML) INJECTION ONCE
Refills: 0 | Status: COMPLETED | OUTPATIENT
Start: 2019-12-02 | End: 2019-12-02

## 2019-12-02 RX ORDER — ROPINIROLE 8 MG/1
0.5 TABLET, FILM COATED, EXTENDED RELEASE ORAL
Refills: 0 | Status: DISCONTINUED | OUTPATIENT
Start: 2019-12-02 | End: 2019-12-12

## 2019-12-02 RX ORDER — ALBUTEROL 90 UG/1
2 AEROSOL, METERED ORAL
Qty: 0 | Refills: 0 | DISCHARGE

## 2019-12-02 RX ORDER — KETOCONAZOLE 20 MG/G
0 AEROSOL, FOAM TOPICAL
Qty: 0 | Refills: 0 | DISCHARGE

## 2019-12-02 RX ORDER — BUDESONIDE AND FORMOTEROL FUMARATE DIHYDRATE 160; 4.5 UG/1; UG/1
2 AEROSOL RESPIRATORY (INHALATION)
Refills: 0 | Status: COMPLETED | OUTPATIENT
Start: 2019-12-02 | End: 2020-10-30

## 2019-12-02 RX ORDER — ACETYLCYSTEINE 200 MG/ML
3 VIAL (ML) MISCELLANEOUS THREE TIMES A DAY
Refills: 0 | Status: DISCONTINUED | OUTPATIENT
Start: 2019-12-02 | End: 2019-12-10

## 2019-12-02 RX ORDER — METOPROLOL TARTRATE 50 MG
25 TABLET ORAL DAILY
Refills: 0 | Status: DISCONTINUED | OUTPATIENT
Start: 2019-12-02 | End: 2019-12-12

## 2019-12-02 RX ORDER — ALBUTEROL 90 UG/1
2.5 AEROSOL, METERED ORAL ONCE
Refills: 0 | Status: COMPLETED | OUTPATIENT
Start: 2019-12-02 | End: 2019-12-02

## 2019-12-02 RX ORDER — CEFTRIAXONE 500 MG/1
1000 INJECTION, POWDER, FOR SOLUTION INTRAMUSCULAR; INTRAVENOUS ONCE
Refills: 0 | Status: COMPLETED | OUTPATIENT
Start: 2019-12-02 | End: 2019-12-02

## 2019-12-02 RX ORDER — AZITHROMYCIN 500 MG/1
500 TABLET, FILM COATED ORAL EVERY 24 HOURS
Refills: 0 | Status: DISCONTINUED | OUTPATIENT
Start: 2019-12-03 | End: 2019-12-07

## 2019-12-02 RX ORDER — ENOXAPARIN SODIUM 100 MG/ML
40 INJECTION SUBCUTANEOUS DAILY
Refills: 0 | Status: DISCONTINUED | OUTPATIENT
Start: 2019-12-02 | End: 2019-12-12

## 2019-12-02 RX ORDER — CEFTRIAXONE 500 MG/1
1000 INJECTION, POWDER, FOR SOLUTION INTRAMUSCULAR; INTRAVENOUS EVERY 24 HOURS
Refills: 0 | Status: COMPLETED | OUTPATIENT
Start: 2019-12-03 | End: 2019-12-07

## 2019-12-02 RX ORDER — DIVALPROEX SODIUM 500 MG/1
1000 TABLET, DELAYED RELEASE ORAL AT BEDTIME
Refills: 0 | Status: DISCONTINUED | OUTPATIENT
Start: 2019-12-02 | End: 2019-12-12

## 2019-12-02 RX ORDER — AZITHROMYCIN 500 MG/1
500 TABLET, FILM COATED ORAL ONCE
Refills: 0 | Status: COMPLETED | OUTPATIENT
Start: 2019-12-02 | End: 2019-12-02

## 2019-12-02 RX ORDER — RANITIDINE HYDROCHLORIDE 150 MG/1
0 TABLET, FILM COATED ORAL
Qty: 0 | Refills: 0 | DISCHARGE

## 2019-12-02 RX ORDER — ALBUTEROL 90 UG/1
1 AEROSOL, METERED ORAL EVERY 4 HOURS
Refills: 0 | Status: DISCONTINUED | OUTPATIENT
Start: 2019-12-02 | End: 2019-12-11

## 2019-12-02 RX ORDER — BENZTROPINE MESYLATE 1 MG
1 TABLET ORAL
Refills: 0 | Status: DISCONTINUED | OUTPATIENT
Start: 2019-12-02 | End: 2019-12-12

## 2019-12-02 RX ORDER — ACETAMINOPHEN 500 MG
650 TABLET ORAL ONCE
Refills: 0 | Status: COMPLETED | OUTPATIENT
Start: 2019-12-02 | End: 2019-12-02

## 2019-12-02 RX ORDER — HALOPERIDOL DECANOATE 100 MG/ML
10 INJECTION INTRAMUSCULAR AT BEDTIME
Refills: 0 | Status: DISCONTINUED | OUTPATIENT
Start: 2019-12-02 | End: 2019-12-12

## 2019-12-02 RX ORDER — TIOTROPIUM BROMIDE 18 UG/1
1 CAPSULE ORAL; RESPIRATORY (INHALATION) DAILY
Refills: 0 | Status: COMPLETED | OUTPATIENT
Start: 2019-12-02 | End: 2020-10-30

## 2019-12-02 RX ORDER — AZITHROMYCIN 500 MG/1
TABLET, FILM COATED ORAL
Refills: 0 | Status: DISCONTINUED | OUTPATIENT
Start: 2019-12-02 | End: 2019-12-07

## 2019-12-02 RX ADMIN — Medication 1 MILLIGRAM(S): at 18:52

## 2019-12-02 RX ADMIN — HALOPERIDOL DECANOATE 5 MILLIGRAM(S): 100 INJECTION INTRAMUSCULAR at 12:39

## 2019-12-02 RX ADMIN — Medication 3 MILLILITER(S): at 22:07

## 2019-12-02 RX ADMIN — Medication 40 MILLIGRAM(S): at 13:46

## 2019-12-02 RX ADMIN — Medication 3 MILLILITER(S): at 10:08

## 2019-12-02 RX ADMIN — Medication 125 MILLIGRAM(S): at 06:52

## 2019-12-02 RX ADMIN — Medication 3 MILLIGRAM(S): at 21:14

## 2019-12-02 RX ADMIN — HALOPERIDOL DECANOATE 10 MILLIGRAM(S): 100 INJECTION INTRAMUSCULAR at 21:15

## 2019-12-02 RX ADMIN — Medication 200 MILLIGRAM(S): at 21:57

## 2019-12-02 RX ADMIN — Medication 50 GRAM(S): at 06:51

## 2019-12-02 RX ADMIN — DIVALPROEX SODIUM 1000 MILLIGRAM(S): 500 TABLET, DELAYED RELEASE ORAL at 21:14

## 2019-12-02 RX ADMIN — ALBUTEROL 2.5 MILLIGRAM(S): 90 AEROSOL, METERED ORAL at 03:44

## 2019-12-02 RX ADMIN — Medication 200 MILLIGRAM(S): at 13:46

## 2019-12-02 RX ADMIN — Medication 25 MILLIGRAM(S): at 12:39

## 2019-12-02 RX ADMIN — Medication 3 MILLILITER(S): at 15:14

## 2019-12-02 RX ADMIN — Medication 650 MILLIGRAM(S): at 22:50

## 2019-12-02 RX ADMIN — ROPINIROLE 0.5 MILLIGRAM(S): 8 TABLET, FILM COATED, EXTENDED RELEASE ORAL at 18:52

## 2019-12-02 RX ADMIN — AZITHROMYCIN 255 MILLIGRAM(S): 500 TABLET, FILM COATED ORAL at 09:40

## 2019-12-02 RX ADMIN — Medication 3 MILLILITER(S): at 22:03

## 2019-12-02 RX ADMIN — CEFTRIAXONE 100 MILLIGRAM(S): 500 INJECTION, POWDER, FOR SOLUTION INTRAMUSCULAR; INTRAVENOUS at 09:40

## 2019-12-02 RX ADMIN — ENOXAPARIN SODIUM 40 MILLIGRAM(S): 100 INJECTION SUBCUTANEOUS at 12:25

## 2019-12-02 RX ADMIN — Medication 40 MILLIGRAM(S): at 21:15

## 2019-12-02 RX ADMIN — RISPERIDONE 4 MILLIGRAM(S): 4 TABLET ORAL at 12:25

## 2019-12-02 RX ADMIN — Medication 650 MILLIGRAM(S): at 21:56

## 2019-12-02 RX ADMIN — Medication 3 MILLILITER(S): at 06:53

## 2019-12-02 NOTE — ED PROVIDER NOTE - ATTENDING CONTRIBUTION TO CARE
68 yo M with hx of COPD, still smoking c/o increased SOB which started earlier tonite. + non-prod cough, no fever,  On exam pt awake and alert, no acute resp distress, Neck supple, Cor Reg, Lungs with scattered exp wheezes with decreased BS b/l bases.  Abd soft, NT, Ext no edema.  Rx Duonebs and IV steroids, check CXR and re-eval

## 2019-12-02 NOTE — H&P ADULT - PROBLEM SELECTOR PLAN 5
cont with Depakote 1000mg daily, haldol, Risperdal 4mg cont with benztropine 1mg daily, Requip 0.5mg bid

## 2019-12-02 NOTE — H&P ADULT - PROBLEM SELECTOR PLAN 2
likely due to RLL pneumonia   cont with Duonebs q6hrs, Solumedrol 60mg q8hrs, oxygen maintain SpO2 > 88%  RVP negative, CXR showed Patchy opacity at the right lung base consistent with pneumonia.  started on ceftriaxone and Zithromax for CAP  cont with Mucomyst and Robitussin for cough likely due to RLL pneumonia   cont with Duonebs q6hrs, Solumedrol 60mg q8hrs, oxygen maintain SpO2 > 88%  RVP negative, CXR showed Patchy opacity at the right lung base consistent with pneumonia.  started on ceftriaxone and Zithromax for CAP  cont with Mucomyst and Robitussin for cough  -[facility] pulm consult placed

## 2019-12-02 NOTE — ED ADULT NURSE NOTE - OBJECTIVE STATEMENT
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  Status of goals: per status in last daily treatment note    Physical Therapy Daily Treatment    Visit Count: 7  Plan of Care: 8/14/2019 Through: 9/25/2019  Insurance Information: Therapy Benefits  Payor: [facility]  Authorization Needed: after eval [phone]  Maximum Visit Limit Per Year: Medical Necessity  CoPay: $0  Call Ref #: Online 07/10/2019 - JR   Authorized     Auth #: [id number]     Date Span: 08/14/2019 - 11/11/2019     Number of Visits: 12 visits     Hyper link to: insurance WIKI        Precautions: None    SUBJECTIVE   The pt states foot sensation is improved.   Current Pain (0-10 scale): 0   Functional Change: Intermittent improvements of \"wrinkled sock\" sensation    OBJECTIVE   Joint play: Hypomobile midfoot (R)    Palpation: Trigger points medial/lateral gastroc/soleus complex    Treatment   Therapeutic Exercise:   -Toe yoga*: rev  -Closed chain ankle DF stretch*: Rev  -Gastroc stretch, leaning: Review  -heel raises off step Rev  -single leg balance on airex Rev    -Great toe extension stretch: Review and reinforce   -Self STM medial foot     Manual Therapy:   Prone: STM gastroc/soleus mm, plantar aspect foot, with passive ankle DF and great toe extension stretch, with/without mobility tool  Lateral rearfoot mobilization (grade IV), forefoot splaying to improve mobility and decrease pain    Skilled input: verbal instruction/cues, tactile instruction/cues    Home Program:   Noted above by *    Writer verbally educated the patient and received verbal consent from the patient on hand  placement, positioning of patient, and techniques to be performed today including hand placement and palpation for techniques as described above and how they are pertinent to the patient's plan of care.      Suggestions for next session as indicated: progress per plan of care, balance, progress ankle mobility, heel raises from elevated surface    ASSESSMENT   The patient reports less numbness following treatment. Demonstrates good understanding of HEP as well as self soft tissue mobilization techniques.   Pain after treatment (patient reported, 0-10 scale): 0  Result of above outlined education: Verbalizes understanding and Demonstrates understanding    THERAPY DAILY BILLING   Insurance: [employer] 2. N/A    Evaluation Procedures:  No evaluation codes were used on this date of service    Timed Procedures:  Manual Therapy, 25 minutes  Therapeutic Exercise, 5 minutes    Untimed Procedures:  No untimed codes were used on this date of service    Total Treatment Time: 30 minutes   Pt BIBA from adult home for SOB and difficulty breathing. As per EMS, pt was in the 70's RA. Pt states intermittent difficulty breathing for about 10 days with it progressively getting worse and more consistent. Pt also has had a cough and brown colored phlegm. Pt PMHx emphysema. Pt breathing labored and tachypneic. Sinus tach on CM. Pt satting well on 3L NC. Pt does not use O2 at home.

## 2019-12-02 NOTE — ED PROVIDER NOTE - NS ED ROS FT
ROS: CONTUSIONAL: Denies fever, chills, fatigue, wt loss. HEAD: Denies trauma, HA, Dizziness. EYE: Denies Acute visual changes, diplopia. ENMT: Denies change in hearing, tinnitus, epistaxis, difficulty swallowing, sore throat. CARDIO: Denies CP, palpitations, edema. RESP: + Cough, + SOB , + Diff breathing, denies hemoptysis. GI: Denies N/V, ABD pain, change in bowel movement. URINARY: Denies difficulty urinating, pelvic pain. MS:  Denies joint pain, back pain, weakness, decreased ROM, swelling. NEURO: Denies change in gait, seizures, loss of sensation, dizziness, confusion LOC.  PSY: NO SI/HI.

## 2019-12-02 NOTE — CONSULT NOTE ADULT - SUBJECTIVE AND OBJECTIVE BOX
REASON FOR CONSULT: ***    CONSULT REQUESTED BY: ***    Patient is a 69y old  Male who presents with a chief complaint of     BRIEF HOSPITAL COURSE: ***    Events last 24 hours: ***    PAST MEDICAL & SURGICAL HISTORY:  Parkinson disease  Chronic obstructive pulmonary disease, unspecified COPD type  Schizophrenia, unspecified type  No significant past surgical history    Allergies    Allergy Status Unknown  No Known Allergies    Intolerances      FAMILY HISTORY:  No pertinent family history  No pertinent family history in first degree relatives    Review of Systems:  CONSTITUTIONAL: No fever, chills, or fatigue  EYES: No eye pain, visual disturbances, or discharge  ENMT:  No difficulty hearing, tinnitus, vertigo; No sinus or throat pain  NECK: No pain or stiffness  RESPIRATORY: (+) SOB, wheezing, cough. No cough, wheezing, chills or hemoptysis; No shortness of breath  CARDIOVASCULAR: No chest pain, palpitations, dizziness, or leg swelling  GASTROINTESTINAL: No abdominal or epigastric pain. No nausea, vomiting, or hematemesis; No diarrhea or constipation. No melena or hematochezia.  GENITOURINARY: No dysuria, frequency, hematuria, or incontinence  NEUROLOGICAL: No headaches, memory loss, loss of strength, numbness, or tremors  SKIN: No itching, burning, rashes, or lesions   MUSCULOSKELETAL: No joint pain or swelling; No muscle, back, or extremity pain  PSYCHIATRIC: No depression, anxiety, mood swings, or difficulty sleeping    Medications:    ICU Vital Signs Last 24 Hrs  T(C): 37 (02 Dec 2019 03:06), Max: 37 (02 Dec 2019 03:06)  T(F): 98.6 (02 Dec 2019 03:06), Max: 98.6 (02 Dec 2019 03:06)  HR: 106 (02 Dec 2019 06:27) (99 - 106)  BP: 151/89 (02 Dec 2019 03:06) (151/89 - 151/89)  BP(mean): --  ABP: --  ABP(mean): --  RR: 25 (02 Dec 2019 06:27) (20 - 25)  SpO2: 88% (02 Dec 2019 06:27) (88% - 93%)    Vital Signs Last 24 Hrs  T(C): 37 (02 Dec 2019 03:06), Max: 37 (02 Dec 2019 03:06)  T(F): 98.6 (02 Dec 2019 03:06), Max: 98.6 (02 Dec 2019 03:06)  HR: 106 (02 Dec 2019 06:27) (99 - 106)  BP: 151/89 (02 Dec 2019 03:06) (151/89 - 151/89)  BP(mean): --  RR: 25 (02 Dec 2019 06:27) (20 - 25)  SpO2: 88% (02 Dec 2019 06:27) (88% - 93%)    ABG - ( 02 Dec 2019 06:42 )  pH, Arterial: 7.37  pH, Blood: x     /  pCO2: 62    /  pO2: 63    / HCO3: 32    / Base Excess: 8.7   /  SaO2: 92        I&O's Detail    LABS:                        12.9   5.72  )-----------( 221      ( 02 Dec 2019 04:10 )             40.1     12-02    133<L>  |  91<L>  |  11.0  ----------------------------<  93  4.5   |  30.0<H>  |  0.56    Ca    9.1      02 Dec 2019 04:10    TPro  7.0  /  Alb  3.7  /  TBili  0.6  /  DBili  x   /  AST  28  /  ALT  19  /  AlkPhos  70  12-02    CARDIAC MARKERS ( 02 Dec 2019 04:10 )  x     / <0.01 ng/mL / x     / x     / x        CAPILLARY BLOOD GLUCOSE    CULTURES:    Physical Examination:    General: Lying on stretcher, in no acute distress, on 3L nasal cannula.    HEENT: Pupils equal, reactive to light. Symmetric.    PULM: Scattered expiratory wheezes, diminished breath sounds B/L. Cough producing yellow/white sputum.    CVS: Tachycardic, regular rhythm. No murmurs, rubs, or gallops.    ABD: Soft, nondistended, nontender, normoactive bowel sounds, no masses.    EXT: No edema, nontender.    SKIN: Warm and well perfused, no rashes noted.    NEURO: A&Ox3, interactive w/ interview, moves all extremities, CN II-XII grossly intact.    RADIOLOGY:  Pending CXR. Patient is a 69y old Male who presents with a chief complaint of SOB.    BRIEF HOSPITAL COURSE: 68 y/o M from [facility] w/ a PMHx of COPD (not on home O2), Parkinson's disease, schizophrenia who presented to the ED c/o SOB. Per pt, SOB began 10 days ago. He was treated outpt for for PNA w/ abx and prednisone. Pt states that he finished course of abx, but is unsure of what date. Pt also admits to productive cough, white/yellow sputum. Upon arrival to ED, pt received Duoneb, mag, and Solumedrol. ABG 7.37/62/63/32 on RA. Pt placed on nasal cannula. MICU consult called.    Events last 24 hours: Pt seen and examined at the bedside in the ED. Upon my evaluation, pt on 3L nasal cannula w/ SpO2 95%. Pt states that SOB has improved. Per ED nurse, pt refused to wear BiPAP. At this time, pt does not require ICU level of care.    PAST MEDICAL & SURGICAL HISTORY:  Parkinson disease  Chronic obstructive pulmonary disease, unspecified COPD type  Schizophrenia, unspecified type  No significant past surgical history    Allergies  Allergy Status Unknown  No Known Allergies    FAMILY HISTORY:  No pertinent family history  No pertinent family history in first degree relatives    SOCIAL HISTORY:  Hx of tobacco abuse (5-10 cigarettes/day x53 years). Denies hx of EtOH and illicit drug abuse.    Review of Systems:  CONSTITUTIONAL: No fever, chills, or fatigue  EYES: No eye pain, visual disturbances, or discharge  ENMT:  No difficulty hearing, tinnitus, vertigo; No sinus or throat pain  NECK: No pain or stiffness  RESPIRATORY: (+) SOB, wheezing, cough. No chills or hemoptysis  CARDIOVASCULAR: No chest pain, palpitations, dizziness, or leg swelling  GASTROINTESTINAL: No abdominal or epigastric pain. No nausea, vomiting, or hematemesis; No diarrhea or constipation. No melena or hematochezia.  GENITOURINARY: No dysuria, frequency, hematuria, or incontinence  NEUROLOGICAL: No headaches, memory loss, loss of strength, numbness, or tremors  SKIN: No itching, burning, rashes, or lesions   MUSCULOSKELETAL: No joint pain or swelling; No muscle, back, or extremity pain  PSYCHIATRIC: No depression, anxiety, mood swings, or difficulty sleeping    Medications:    ICU Vital Signs Last 24 Hrs  T(C): 37 (02 Dec 2019 03:06), Max: 37 (02 Dec 2019 03:06)  T(F): 98.6 (02 Dec 2019 03:06), Max: 98.6 (02 Dec 2019 03:06)  HR: 106 (02 Dec 2019 06:27) (99 - 106)  BP: 151/89 (02 Dec 2019 03:06) (151/89 - 151/89)  BP(mean): --  ABP: --  ABP(mean): --  RR: 25 (02 Dec 2019 06:27) (20 - 25)  SpO2: 88% (02 Dec 2019 06:27) (88% - 93%)    Vital Signs Last 24 Hrs  T(C): 37 (02 Dec 2019 03:06), Max: 37 (02 Dec 2019 03:06)  T(F): 98.6 (02 Dec 2019 03:06), Max: 98.6 (02 Dec 2019 03:06)  HR: 106 (02 Dec 2019 06:27) (99 - 106)  BP: 151/89 (02 Dec 2019 03:06) (151/89 - 151/89)  BP(mean): --  RR: 25 (02 Dec 2019 06:27) (20 - 25)  SpO2: 88% (02 Dec 2019 06:27) (88% - 93%)    ABG - ( 02 Dec 2019 06:42 )  pH, Arterial: 7.37  pH, Blood: x     /  pCO2: 62    /  pO2: 63    / HCO3: 32    / Base Excess: 8.7   /  SaO2: 92        I&O's Detail    LABS:                        12.9   5.72  )-----------( 221      ( 02 Dec 2019 04:10 )             40.1     12-02    133<L>  |  91<L>  |  11.0  ----------------------------<  93  4.5   |  30.0<H>  |  0.56    Ca    9.1      02 Dec 2019 04:10    TPro  7.0  /  Alb  3.7  /  TBili  0.6  /  DBili  x   /  AST  28  /  ALT  19  /  AlkPhos  70  12-02    CARDIAC MARKERS ( 02 Dec 2019 04:10 )  x     / <0.01 ng/mL / x     / x     / x        CAPILLARY BLOOD GLUCOSE    CULTURES:    Physical Examination:    General: Lying on stretcher, in no acute distress, on 3L nasal cannula.    HEENT: Pupils equal, reactive to light. Symmetric.    PULM: Scattered expiratory wheezes, diminished breath sounds B/L. Cough producing yellow/white sputum.    CVS: Tachycardic, regular rhythm. No murmurs, rubs, or gallops.    ABD: Soft, nondistended, nontender, normoactive bowel sounds, no masses.    EXT: No edema, nontender.    SKIN: Warm and well perfused, no rashes noted.    NEURO: A&Ox3, interactive w/ interview, moves all extremities, CN II-XII grossly intact.    RADIOLOGY:  Pending CXR.

## 2019-12-02 NOTE — H&P ADULT - ASSESSMENT
70yo M with pmh of COPD, Parkinson's disease, schizophrenia current smoker 1/2ppd daily presents to ED c/o SOB and difficulty breathing that began today. Pt poor historian, states that he has been productive cough x2days admitted for copd exacerbation/pneumonia.

## 2019-12-02 NOTE — ED PROVIDER NOTE - OBJECTIVE STATEMENT
68 y/o M pt BIBA with hx of COPD, Parkinson's disease, and schizophrenia presents to ED c/o SOB and difficulty breathing that began today. Pt also notes cough.  Pt has hx of PNA which was treated with Prednisone and antibiotics which helped initially but then his cough returned. Pt is not on home O2. Denies fever, chills, CP, abd pain, and n/v/d. No further complaints at this time.

## 2019-12-02 NOTE — CONSULT NOTE ADULT - PROBLEM SELECTOR RECOMMENDATION 2
Duonebs q6hrs, Solumedrol 60mg q8hrs. Maintain SpO2 > 88%. Treated outpt for PNA, finished course of abx and prednisone. Afebrile, WBC count WNL, RVP (-). PNA likely resolved, however, CXR revealing "right lung base consistent w/ PNA". CXR appears unchanged from CXR performed on 11/10.

## 2019-12-02 NOTE — H&P ADULT - NSICDXPASTMEDICALHX_GEN_ALL_CORE_FT
PAST MEDICAL HISTORY:  Chronic obstructive pulmonary disease, unspecified COPD type     Parkinson disease     Schizophrenia, unspecified type

## 2019-12-02 NOTE — ED PROVIDER NOTE - CLINICAL SUMMARY MEDICAL DECISION MAKING FREE TEXT BOX
PT not on supplemental O2 regularly, requiring 4L nasal canula, retracting, belly breathing, tachypneic, hospitalist called for admission, wants to have pt admitted for further evaluation and possible surgical intervention, pt made aware of need for admission and possible further treatments, pt states that they agree with need for admission and they understand all results and possible treatments. PT will be admitted to hospitalist team and care will be transferred to admitting team.

## 2019-12-02 NOTE — CONSULT NOTE ADULT - ATTENDING COMMENTS
69M w/ PMHx COPD, parkinson's disease, schizophrenia and a recent PNA was sent over form his adult home for SOB likely sec to COPD exacerbation. ABG consistent w/ chronic hypoxic hypercapnic failure and saturating in high 90s on minimal support. Medically treat w/ douneb, steroids +/- Abx. No indication for ICU level care at this time

## 2019-12-02 NOTE — CONSULT NOTE ADULT - ASSESSMENT
70 y/o M from [facility] w/ a PMHx of COPD (not on home O2), Parkinson's disease, schizophrenia w/ acute hypoxic respiratory failure 2/2 COPD exacerbation.    At this time, pt does not require ICU level of care. Please reconsult should pt's condition deteriorate.    Case discussed w/ ICU attending Dr. Davis.
Stage 4 COPD with exacerbation  Chronic hypercapnic resp failure stable but has refuse BIPAP in past    Plan:  iv solumedrol  Drug neb  empiric abx  O2

## 2019-12-02 NOTE — H&P ADULT - PROBLEM SELECTOR PLAN 3
possibly failed outpatient abx   Patchy opacity at the right lung base consistent with pneumonia  started on ceftriaxone 1g Q24hrs and Zithromax 500mg Q25  f/u procalcitonin

## 2019-12-02 NOTE — ED ADULT TRIAGE NOTE - CHIEF COMPLAINT QUOTE
Pt A&Ox4 states "I couldn't breath."  BIBA c/o SOB COPD exasperation was 70% on RA. Patient is not O2 dependent current everyday smoker, received 1 combi treatment and 125 mg of solumedrol IV, #20 to [facility] from EMS.

## 2019-12-02 NOTE — CONSULT NOTE ADULT - PROBLEM SELECTOR RECOMMENDATION 9
2/2 acute COPD exacerbation. ABG 7.37/62/63/32 on RA. Improvement in respiratory status w/ initiation of 3L nasal cannula, SpO2 95%. Refused BiPAP. Recommend BiPAP PRN if pt is agreeable.

## 2019-12-02 NOTE — H&P ADULT - PROBLEM SELECTOR PLAN 1
-hypercapnic and hypoxic respiratory failure due to COPD exacerbation/ pneumonia  -ABG 7.37/62/63/32 with hypoxia and hypercapnia  -sating 88% on ED presentation, placed on 3L NC pt refused BiPAP, sating 95%, sob improved    -s/p Duoneb, Solu-medrol and mg sulfate with improvement  -cont with nebs, Solu-medrol, inhalers, o2 supplement as needed

## 2019-12-02 NOTE — CONSULT NOTE ADULT - SUBJECTIVE AND OBJECTIVE BOX
PULMONARY CONSULT NOTE      [id number]    Patient is a 69y old  Male who presents with a chief complaint of Acute hypoxic respiratory failure  COPD exacerbation, Pneumonia (02 Dec 2019 07:58)      HISTORY OF PRESENT ILLNESS:  ED:  70yo M with pmh of COPD, Parkinson's disease, schizophrenia current smoker 1/2ppd daily presents to ED c/o SOB and difficulty breathing that began today. Pt poor historian, states that he has been productive cough x2days and he was having difficulty breathing. Pt states that he was recently treated with Prednisone and antibiotics for possible pneumonia, as per his med review he completed course of doxycyline and prednisone 2wka ago. Pt is not on home O2. Denies fever, chills, CP, abd pain, and n/v/d. In the ED hypoxic 88%, ABG pertinent for hypercapnia and hypoxia placed on 3L NC, pt refused BiPAp, RVP neg, cxr Patchy opacity at the right lung base consistent with pneumonia.      Last seen on our office by Dr Tang 2017 with FEV1 30% predicted.    MEDICATIONS  (STANDING):  acetylcysteine 10%  Inhalation 3 milliLiter(s) Inhalation three times a day  ALBUTerol    90 MICROgram(s) HFA Inhaler 1 Puff(s) Inhalation every 4 hours  albuterol/ipratropium for Nebulization 3 milliLiter(s) Nebulizer every 6 hours  azithromycin  IVPB      benztropine 1 milliGRAM(s) Oral two times a day  budesonide 160 MICROgram(s)/formoterol 4.5 MICROgram(s) Inhaler 2 Puff(s) Inhalation two times a day  cefTRIAXone   IVPB      diVALproex DR 1000 milliGRAM(s) Oral at bedtime  enoxaparin Injectable 40 milliGRAM(s) SubCutaneous daily  haloperidol     Tablet 10 milliGRAM(s) Oral at bedtime  haloperidol     Tablet 5 milliGRAM(s) Oral <User Schedule>  melatonin 3 milliGRAM(s) Oral at bedtime  methylPREDNISolone sodium succinate Injectable 40 milliGRAM(s) IV Push every 8 hours  metoprolol succinate ER 25 milliGRAM(s) Oral daily  pantoprazole    Tablet 40 milliGRAM(s) Oral before breakfast  risperiDONE   Tablet 4 milliGRAM(s) Oral daily  rOPINIRole 0.5 milliGRAM(s) Oral two times a day  tiotropium 18 MICROgram(s) Capsule 1 Capsule(s) Inhalation daily      MEDICATIONS  (PRN):  guaiFENesin    Syrup 200 milliGRAM(s) Oral every 6 hours PRN Cough      Allergies    Allergy Status Unknown  No Known Allergies    Intolerances        PAST MEDICAL & SURGICAL HISTORY:  Parkinson disease  Chronic obstructive pulmonary disease, unspecified COPD type  Schizophrenia, unspecified type  No significant past surgical history      FAMILY HISTORY:  No pertinent family history  No pertinent family history in first degree relatives      SOCIAL HISTORY  Smoking History:     REVIEW OF SYSTEMS:    CONSTITUTIONAL:  No fevers, chills, sweats    HEENT:  Eyes:  No diplopia or blurred vision. ENT:  No earache, sore throat or runny nose. sinus headache or postnasl drip    CARDIOVASCULAR:  No pressure, squeezing, tightness, or heaviness about the chest; no palpitations, leg swelling, orthopnea or PND    RESPIRATORY:  No cough, shortness of breath, PND or orthopnea. Mild SOBOE    GASTROINTESTINAL:  No abdominal pain, nausea, vomiting or diarrhea.    GENITOURINARY:  No dysuria, frequency or urgency.    NEUROLOGIC:  No paresthesias, fasciculations, seizures or weakness.    PSYCHIATRIC:  No disorder of thought or mood.    Vital Signs Last 24 Hrs  T(C): 36.5 (02 Dec 2019 11:29), Max: 37 (02 Dec 2019 03:06)  T(F): 97.7 (02 Dec 2019 11:29), Max: 98.6 (02 Dec 2019 03:06)  HR: 102 (02 Dec 2019 11:29) (60 - 106)  BP: 125/80 (02 Dec 2019 11:29) (124/72 - 151/89)  BP(mean): --  RR: 24 (02 Dec 2019 07:58) (20 - 25)  SpO2: 96% (02 Dec 2019 07:58) (88% - 96%)    PHYSICAL EXAMINATION:    GENERAL: The patient is a well-developed, well-nourished _____in no apparent distress.     HEENT: Head is normocephalic and atraumatic. Extraocular muscles are intact. Mucous membranes are moist.     NECK: Supple.     LUNGS: Clear to auscultation without wheezing, rales, or rhonchi. Respirations unlabored    HEART: Regular rate and rhythm without murmur.    ABDOMEN: Soft, nontender, and nondistended.  No hepatosplenomegaly is noted.    EXTREMITIES: Without any cyanosis, clubbing, rash, lesions or edema.    NEUROLOGIC: Grossly intact.      LABS:                        12.9   5.72  )-----------( 221      ( 02 Dec 2019 04:10 )             40.1     12-02    133<L>  |  91<L>  |  11.0  ----------------------------<  93  4.5   |  30.0<H>  |  0.56    Ca    9.1      02 Dec 2019 04:10    TPro  7.0  /  Alb  3.7  /  TBili  0.6  /  DBili  x   /  AST  28  /  ALT  19  /  AlkPhos  70  12-02        ABG - ( 02 Dec 2019 06:42 )  pH, Arterial: 7.37  pH, Blood: x     /  pCO2: 62    /  pO2: 63    / HCO3: 32    / Base Excess: 8.7   /  SaO2: 92                CARDIAC MARKERS ( 02 Dec 2019 04:10 )  x     / <0.01 ng/mL / x     / x     / x                Procalcitonin, Serum: 0.09 ng/mL (12-02-19 @ 11:55)      MICROBIOLOGY:    RADIOLOGY & ADDITIONAL STUDIES:  < from: Xray Chest 1 View- PORTABLE-Urgent (12.02.19 @ 03:45) >     EXAM:  XR CHEST PORTABLE URGENT 1V                          PROCEDURE DATE:  12/02/2019          INTERPRETATION:  Clinical indication:  Cough    Technique: XR CHEST URGENT portable AP    Comparison:11/10/2019    Findings:  Lines: None    Heart/Mediastinum/Lungs: The heart size is normal.The lungs are   clear.There are no pleural effusions. Stable old fracture deformity of   right-sided ribs. Old fracture deformity of left sixth rib.    Impression:    As above.                NEISHA NIEVES M.D. ATTENDING RADIOLOGIST  This document has been electronically signed. Dec  2 2019  9:03AM    < end of copied text > PULMONARY CONSULT NOTE      [id number]    Patient is a 69y old  Male who presents with a chief complaint of Acute hypoxic respiratory failure  COPD exacerbation, Pneumonia (02 Dec 2019 07:58)      HISTORY OF PRESENT ILLNESS:  ED:  68yo M with pmh of COPD, Parkinson's disease, schizophrenia current smoker 1/2ppd daily presents to ED c/o SOB and difficulty breathing that began today. Pt poor historian, states that he has been productive cough x2days and he was having difficulty breathing. Pt states that he was recently treated with Prednisone and antibiotics for possible pneumonia, as per his med review he completed course of doxycyline and prednisone 2wka ago. Pt is not on home O2. Denies fever, chills, CP, abd pain, and n/v/d. In the ED hypoxic 88%, ABG pertinent for hypercapnia and hypoxia placed on 3L NC, pt refused BiPAp, RVP neg, cxr Patchy opacity at the right lung base consistent with pneumonia.      Last seen on our office by Dr Tang 2017 with FEV1 30% predicted. Poorly compliant with regimen    MEDICATIONS  (STANDING):  acetylcysteine 10%  Inhalation 3 milliLiter(s) Inhalation three times a day  ALBUTerol    90 MICROgram(s) HFA Inhaler 1 Puff(s) Inhalation every 4 hours  albuterol/ipratropium for Nebulization 3 milliLiter(s) Nebulizer every 6 hours  azithromycin  IVPB      benztropine 1 milliGRAM(s) Oral two times a day  budesonide 160 MICROgram(s)/formoterol 4.5 MICROgram(s) Inhaler 2 Puff(s) Inhalation two times a day  cefTRIAXone   IVPB      diVALproex DR 1000 milliGRAM(s) Oral at bedtime  enoxaparin Injectable 40 milliGRAM(s) SubCutaneous daily  haloperidol     Tablet 10 milliGRAM(s) Oral at bedtime  haloperidol     Tablet 5 milliGRAM(s) Oral <User Schedule>  melatonin 3 milliGRAM(s) Oral at bedtime  methylPREDNISolone sodium succinate Injectable 40 milliGRAM(s) IV Push every 8 hours  metoprolol succinate ER 25 milliGRAM(s) Oral daily  pantoprazole    Tablet 40 milliGRAM(s) Oral before breakfast  risperiDONE   Tablet 4 milliGRAM(s) Oral daily  rOPINIRole 0.5 milliGRAM(s) Oral two times a day  tiotropium 18 MICROgram(s) Capsule 1 Capsule(s) Inhalation daily      MEDICATIONS  (PRN):  guaiFENesin    Syrup 200 milliGRAM(s) Oral every 6 hours PRN Cough      Allergies    Allergy Status Unknown  No Known Allergies    Intolerances        PAST MEDICAL & SURGICAL HISTORY:  Parkinson disease  Chronic obstructive pulmonary disease, unspecified COPD type  Schizophrenia, unspecified type  No significant past surgical history      FAMILY HISTORY:  No pertinent family history  No pertinent family history in first degree relatives      SOCIAL HISTORY  Smoking History:     REVIEW OF SYSTEMS:    CONSTITUTIONAL:  No fevers, chills, sweats    HEENT:  Eyes:  No diplopia or blurred vision. ENT:  No earache, sore throat or runny nose. sinus headache or postnasl drip    CARDIOVASCULAR:  No pressure, squeezing, tightness, or heaviness about the chest; no palpitations, leg swelling, orthopnea or PND    RESPIRATORY:  above    GASTROINTESTINAL:  No abdominal pain, nausea, vomiting or diarrhea.    GENITOURINARY:  No dysuria, frequency or urgency.    NEUROLOGIC:  No paresthesias, fasciculations, seizures or weakness.    PSYCHIATRIC:  No disorder of thought or mood.    Vital Signs Last 24 Hrs  T(C): 36.5 (02 Dec 2019 11:29), Max: 37 (02 Dec 2019 03:06)  T(F): 97.7 (02 Dec 2019 11:29), Max: 98.6 (02 Dec 2019 03:06)  HR: 102 (02 Dec 2019 11:29) (60 - 106)  BP: 125/80 (02 Dec 2019 11:29) (124/72 - 151/89)  BP(mean): --  RR: 24 (02 Dec 2019 07:58) (20 - 25)  SpO2: 96% (02 Dec 2019 07:58) (88% - 96%)    PHYSICAL EXAMINATION:    GENERAL: The patient is a well-developed, well-nourished _____in no apparent distress.     HEENT: Head is normocephalic and atraumatic. Extraocular muscles are intact. Mucous membranes are moist.     NECK: Supple.     LUNGS: decreased bs desmond with expiratory wheezes    HEART: Regular rate and rhythm without murmur.    ABDOMEN: Soft, nontender, and nondistended.  No hepatosplenomegaly is noted.    EXTREMITIES: Without any cyanosis, clubbing, rash, lesions or edema.    NEUROLOGIC: Grossly intact.      LABS:                        12.9   5.72  )-----------( 221      ( 02 Dec 2019 04:10 )             40.1     12-02    133<L>  |  91<L>  |  11.0  ----------------------------<  93  4.5   |  30.0<H>  |  0.56    Ca    9.1      02 Dec 2019 04:10    TPro  7.0  /  Alb  3.7  /  TBili  0.6  /  DBili  x   /  AST  28  /  ALT  19  /  AlkPhos  70  12-02        ABG - ( 02 Dec 2019 06:42 )  pH, Arterial: 7.37  pH, Blood: x     /  pCO2: 62    /  pO2: 63    / HCO3: 32    / Base Excess: 8.7   /  SaO2: 92                CARDIAC MARKERS ( 02 Dec 2019 04:10 )  x     / <0.01 ng/mL / x     / x     / x                Procalcitonin, Serum: 0.09 ng/mL (12-02-19 @ 11:55)      MICROBIOLOGY:    RADIOLOGY & ADDITIONAL STUDIES:  < from: Xray Chest 1 View- PORTABLE-Urgent (12.02.19 @ 03:45) >     EXAM:  XR CHEST PORTABLE URGENT 1V                          PROCEDURE DATE:  12/02/2019          INTERPRETATION:  Clinical indication:  Cough    Technique: XR CHEST URGENT portable AP    Comparison:11/10/2019    Findings:  Lines: None    Heart/Mediastinum/Lungs: The heart size is normal.The lungs are   clear.There are no pleural effusions. Stable old fracture deformity of   right-sided ribs. Old fracture deformity of left sixth rib.    Impression:    As above.                NEISHA NIEVES M.D. ATTENDING RADIOLOGIST  This document has been electronically signed. Dec  2 2019  9:03AM    < end of copied text >

## 2019-12-02 NOTE — H&P ADULT - NSHPPHYSICALEXAM_GEN_ALL_CORE
T(C): 37 (12-02-19 @ 03:06), Max: 37 (12-02-19 @ 03:06)  HR: 101 (12-02-19 @ 07:58) (99 - 106)  BP: 124/72 (12-02-19 @ 07:58) (124/72 - 151/89)  RR: 24 (12-02-19 @ 07:58) (20 - 25)  SpO2: 96% (12-02-19 @ 07:58) (88% - 96%)    GENERAL: patient appears well, no acute distress, appropriate, pleasant  EYES: sclera clear, no exudates  ENMT: oropharynx clear without erythema, no exudates, moist mucous membranes  NECK: supple, soft, no thyromegaly noted  LUNGS: diffuses wheezing, course breath sounds, no rhonchi appreciated  HEART: soft S1/S2, regular rate and rhythm, no murmurs noted, no lower extremity edema  GASTROINTESTINAL: abdomen is soft, nontender, nondistended, normoactive bowel sounds, no palpable masses  INTEGUMENT: good skin turgor, warm skin, appears well perfused  MUSCULOSKELETAL: no clubbing or cyanosis, no obvious deformity  NEUROLOGIC: awake, alert, oriented x2, good muscle tone in 4 extremities, no obvious sensory deficits  PSYCHIATRIC: labile   HEME/LYMPH: no palpable supraclavicular nodules, no obvious ecchymosis or petechiae

## 2019-12-02 NOTE — H&P ADULT - HISTORY OF PRESENT ILLNESS
70yo M with pmh of COPD, Parkinson's disease, schizophrenia current smoker 1/2ppd daily presents to ED c/o SOB and difficulty breathing that began today. Pt poor historian, states that he has been productive cough x2days and he was having difficulty breathing. Pt states that he was recently treated with Prednisone and antibiotics for possible pneumonia, as per his med review he completed course of doxycyline and prednisone 2wka ago. Pt is not on home O2. Denies fever, chills, CP, abd pain, and n/v/d. 70yo M with pmh of COPD, Parkinson's disease, schizophrenia current smoker 1/2ppd daily presents to ED c/o SOB and difficulty breathing that began today. Pt poor historian, states that he has been productive cough x2days and he was having difficulty breathing. Pt states that he was recently treated with Prednisone and antibiotics for possible pneumonia, as per his med review he completed course of doxycyline and prednisone 2wka ago. Pt is not on home O2. Denies fever, chills, CP, abd pain, and n/v/d. In the ED hypoxic 88%, ABG pertinent for hypercapnia and hypoxia placed on 3L NC, pt refused BiPA, RVP neg, cxr Patchy opacity at the right lung base consistent with pneumonia.

## 2019-12-02 NOTE — ED ADULT NURSE REASSESSMENT NOTE - NS ED NURSE REASSESS COMMENT FT1
pt care assumed at 0745, no apparent distress noted at this time, charting as noted. pt received Alert and Oriented to person, place, situation and time sitting in bed. pt took neb tx off. pt is 3L NC. pt refusing bipap at this time. pt 96% on 3L NC. pt is sinus tach on cardiac monitor. ICU PA at bedside for Eval. MD scott was also at bedside for eval. pt educated on plan of care, pt able to successfully teach back plan of care to RN, RN will continue to reeducate pt during hospital stay.

## 2019-12-03 LAB
ANION GAP SERPL CALC-SCNC: 11 MMOL/L — SIGNIFICANT CHANGE UP (ref 5–17)
BUN SERPL-MCNC: 16 MG/DL — SIGNIFICANT CHANGE UP (ref 8–20)
CALCIUM SERPL-MCNC: 9.2 MG/DL — SIGNIFICANT CHANGE UP (ref 8.6–10.2)
CHLORIDE SERPL-SCNC: 90 MMOL/L — LOW (ref 98–107)
CO2 SERPL-SCNC: 34 MMOL/L — HIGH (ref 22–29)
CREAT SERPL-MCNC: 0.51 MG/DL — SIGNIFICANT CHANGE UP (ref 0.5–1.3)
GLUCOSE SERPL-MCNC: 112 MG/DL — SIGNIFICANT CHANGE UP (ref 70–115)
HCT VFR BLD CALC: 39.5 % — SIGNIFICANT CHANGE UP (ref 39–50)
HGB BLD-MCNC: 12.4 G/DL — LOW (ref 13–17)
MCHC RBC-ENTMCNC: 31.3 PG — SIGNIFICANT CHANGE UP (ref 27–34)
MCHC RBC-ENTMCNC: 31.4 GM/DL — LOW (ref 32–36)
MCV RBC AUTO: 99.7 FL — SIGNIFICANT CHANGE UP (ref 80–100)
PLATELET # BLD AUTO: 242 K/UL — SIGNIFICANT CHANGE UP (ref 150–400)
POTASSIUM SERPL-MCNC: 4.9 MMOL/L — SIGNIFICANT CHANGE UP (ref 3.5–5.3)
POTASSIUM SERPL-SCNC: 4.9 MMOL/L — SIGNIFICANT CHANGE UP (ref 3.5–5.3)
RBC # BLD: 3.96 M/UL — LOW (ref 4.2–5.8)
RBC # FLD: 12.8 % — SIGNIFICANT CHANGE UP (ref 10.3–14.5)
SODIUM SERPL-SCNC: 135 MMOL/L — SIGNIFICANT CHANGE UP (ref 135–145)
WBC # BLD: 6.98 K/UL — SIGNIFICANT CHANGE UP (ref 3.8–10.5)
WBC # FLD AUTO: 6.98 K/UL — SIGNIFICANT CHANGE UP (ref 3.8–10.5)

## 2019-12-03 PROCEDURE — 99233 SBSQ HOSP IP/OBS HIGH 50: CPT

## 2019-12-03 PROCEDURE — 99232 SBSQ HOSP IP/OBS MODERATE 35: CPT

## 2019-12-03 PROCEDURE — 71250 CT THORAX DX C-: CPT | Mod: 26

## 2019-12-03 RX ORDER — ACETAMINOPHEN 500 MG
325 TABLET ORAL EVERY 6 HOURS
Refills: 0 | Status: DISCONTINUED | OUTPATIENT
Start: 2019-12-03 | End: 2019-12-07

## 2019-12-03 RX ORDER — INFLUENZA VIRUS VACCINE 15; 15; 15; 15 UG/.5ML; UG/.5ML; UG/.5ML; UG/.5ML
0.5 SUSPENSION INTRAMUSCULAR ONCE
Refills: 0 | Status: DISCONTINUED | OUTPATIENT
Start: 2019-12-03 | End: 2019-12-12

## 2019-12-03 RX ADMIN — Medication 40 MILLIGRAM(S): at 21:26

## 2019-12-03 RX ADMIN — HALOPERIDOL DECANOATE 5 MILLIGRAM(S): 100 INJECTION INTRAMUSCULAR at 09:50

## 2019-12-03 RX ADMIN — Medication 3 MILLILITER(S): at 14:41

## 2019-12-03 RX ADMIN — Medication 3 MILLILITER(S): at 20:41

## 2019-12-03 RX ADMIN — ENOXAPARIN SODIUM 40 MILLIGRAM(S): 100 INJECTION SUBCUTANEOUS at 12:35

## 2019-12-03 RX ADMIN — ROPINIROLE 0.5 MILLIGRAM(S): 8 TABLET, FILM COATED, EXTENDED RELEASE ORAL at 05:38

## 2019-12-03 RX ADMIN — HALOPERIDOL DECANOATE 10 MILLIGRAM(S): 100 INJECTION INTRAMUSCULAR at 21:27

## 2019-12-03 RX ADMIN — PANTOPRAZOLE SODIUM 40 MILLIGRAM(S): 20 TABLET, DELAYED RELEASE ORAL at 05:39

## 2019-12-03 RX ADMIN — DIVALPROEX SODIUM 1000 MILLIGRAM(S): 500 TABLET, DELAYED RELEASE ORAL at 21:27

## 2019-12-03 RX ADMIN — Medication 1 MILLIGRAM(S): at 17:46

## 2019-12-03 RX ADMIN — Medication 200 MILLIGRAM(S): at 12:35

## 2019-12-03 RX ADMIN — RISPERIDONE 4 MILLIGRAM(S): 4 TABLET ORAL at 12:35

## 2019-12-03 RX ADMIN — Medication 3 MILLILITER(S): at 20:46

## 2019-12-03 RX ADMIN — Medication 1 MILLIGRAM(S): at 05:38

## 2019-12-03 RX ADMIN — AZITHROMYCIN 255 MILLIGRAM(S): 500 TABLET, FILM COATED ORAL at 09:50

## 2019-12-03 RX ADMIN — Medication 3 MILLIGRAM(S): at 21:27

## 2019-12-03 RX ADMIN — Medication 200 MILLIGRAM(S): at 05:37

## 2019-12-03 RX ADMIN — Medication 40 MILLIGRAM(S): at 05:38

## 2019-12-03 RX ADMIN — Medication 3 MILLILITER(S): at 14:42

## 2019-12-03 RX ADMIN — CEFTRIAXONE 100 MILLIGRAM(S): 500 INJECTION, POWDER, FOR SOLUTION INTRAMUSCULAR; INTRAVENOUS at 09:50

## 2019-12-03 RX ADMIN — Medication 40 MILLIGRAM(S): at 12:35

## 2019-12-03 RX ADMIN — Medication 3 MILLILITER(S): at 07:42

## 2019-12-03 RX ADMIN — Medication 3 MILLILITER(S): at 07:44

## 2019-12-03 RX ADMIN — ROPINIROLE 0.5 MILLIGRAM(S): 8 TABLET, FILM COATED, EXTENDED RELEASE ORAL at 17:46

## 2019-12-03 RX ADMIN — Medication 25 MILLIGRAM(S): at 05:39

## 2019-12-03 RX ADMIN — Medication 3 MILLILITER(S): at 03:50

## 2019-12-03 NOTE — PROGRESS NOTE ADULT - SUBJECTIVE AND OBJECTIVE BOX
PULMONARY PROGRESS NOTE      [id number]    Patient is a 69y old  Male who presents with a chief complaint of Acute hypoxic respiratory failure  COPD exacerbation, Pneumonia (02 Dec 2019 15:30)      INTERVAL HPI/OVERNIGHT EVENTS:    MEDICATIONS  (STANDING):  acetylcysteine 10%  Inhalation 3 milliLiter(s) Inhalation three times a day  ALBUTerol    90 MICROgram(s) HFA Inhaler 1 Puff(s) Inhalation every 4 hours  albuterol/ipratropium for Nebulization 3 milliLiter(s) Nebulizer every 6 hours  azithromycin  IVPB 500 milliGRAM(s) IV Intermittent every 24 hours  azithromycin  IVPB      benztropine 1 milliGRAM(s) Oral two times a day  budesonide 160 MICROgram(s)/formoterol 4.5 MICROgram(s) Inhaler 2 Puff(s) Inhalation two times a day  cefTRIAXone   IVPB 1000 milliGRAM(s) IV Intermittent every 24 hours  cefTRIAXone   IVPB      diVALproex DR 1000 milliGRAM(s) Oral at bedtime  enoxaparin Injectable 40 milliGRAM(s) SubCutaneous daily  haloperidol     Tablet 10 milliGRAM(s) Oral at bedtime  haloperidol     Tablet 5 milliGRAM(s) Oral <User Schedule>  influenza   Vaccine 0.5 milliLiter(s) IntraMuscular once  melatonin 3 milliGRAM(s) Oral at bedtime  methylPREDNISolone sodium succinate Injectable 40 milliGRAM(s) IV Push every 8 hours  metoprolol succinate ER 25 milliGRAM(s) Oral daily  pantoprazole    Tablet 40 milliGRAM(s) Oral before breakfast  risperiDONE   Tablet 4 milliGRAM(s) Oral daily  rOPINIRole 0.5 milliGRAM(s) Oral two times a day  tiotropium 18 MICROgram(s) Capsule 1 Capsule(s) Inhalation daily      MEDICATIONS  (PRN):  guaiFENesin    Syrup 200 milliGRAM(s) Oral every 6 hours PRN Cough      Allergies    Allergy Status Unknown  No Known Allergies    Intolerances        PAST MEDICAL & SURGICAL HISTORY:  Parkinson disease  Chronic obstructive pulmonary disease, unspecified COPD type  Schizophrenia, unspecified type  No significant past surgical history      SOCIAL HISTORY  Smoking History:       REVIEW OF SYSTEMS:    CONSTITUTIONAL:  No distress    HEENT:  Eyes:  No diplopia or blurred vision. ENT:  No earache, sore throat or runny nose.    CARDIOVASCULAR:  No pressure, squeezing, tightness, heaviness or aching about the chest; no palpitations.    RESPIRATORY:  No cough, shortness of breath, PND or orthopnea. Mild SOBOE    GASTROINTESTINAL:  No nausea, vomiting or diarrhea.    GENITOURINARY:  No dysuria, frequency or urgency.    MUSCULOSKELETAL:  No joint pain    SKIN:  No new lesions.    NEUROLOGIC:  No paresthesias, fasciculations, seizures or weakness.    PSYCHIATRIC:  No disorder of thought or mood.    ENDOCRINE:  No heat or cold intolerance, polyuria or polydipsia.    HEMATOLOGICAL:  No easy bruising or bleeding.     Vital Signs Last 24 Hrs  T(C): 36.6 (03 Dec 2019 07:51), Max: 36.8 (02 Dec 2019 15:33)  T(F): 97.8 (03 Dec 2019 07:51), Max: 98.3 (02 Dec 2019 15:33)  HR: 83 (03 Dec 2019 07:51) (76 - 102)  BP: 118/81 (03 Dec 2019 07:51) (100/77 - 141/91)  BP(mean): --  RR: 19 (03 Dec 2019 07:51) (18 - 20)  SpO2: 98% (03 Dec 2019 07:51) (95% - 100%)    PHYSICAL EXAMINATION:    GENERAL: The patient is awake and alert in no apparent distress.     HEENT: Head is normocephalic and atraumatic. Extraocular muscles are intact. Mucous membranes are moist.    NECK: Supple.    LUNGS: Clear to auscultation without wheezing, rales or rhonchi; respirations unlabored    HEART: Regular rate and rhythm without murmur.    ABDOMEN: Soft, nontender, and nondistended.      EXTREMITIES: Without any cyanosis, clubbing, rash, lesions or edema.    NEUROLOGIC: Grossly intact.    SKIN: No ulceration or induration present.      LABS:                        12.4   6.98  )-----------( 242      ( 03 Dec 2019 07:54 )             39.5     12-03    135  |  90<L>  |  16.0  ----------------------------<  112  4.9   |  34.0<H>  |  0.51    Ca    9.2      03 Dec 2019 07:54    TPro  7.0  /  Alb  3.7  /  TBili  0.6  /  DBili  x   /  AST  28  /  ALT  19  /  AlkPhos  70  12-02        ABG - ( 02 Dec 2019 06:42 )  pH, Arterial: 7.37  pH, Blood: x     /  pCO2: 62    /  pO2: 63    / HCO3: 32    / Base Excess: 8.7   /  SaO2: 92                CARDIAC MARKERS ( 02 Dec 2019 04:10 )  x     / <0.01 ng/mL / x     / x     / x                Procalcitonin, Serum: 0.09 ng/mL (12-02-19 @ 11:55)      MICROBIOLOGY:    RADIOLOGY & ADDITIONAL STUDIES:< from: Xray Chest 1 View- PORTABLE-Urgent (12.02.19 @ 03:45) >      INTERPRETATION:  Clinical indication:  Cough    Technique: XR CHEST URGENT portable AP    Comparison:11/10/2019    Findings:  Lines: None    Heart/Mediastinum/Lungs: The heart size is normal.The lungs are   clear.There are no pleural effusions. Stable old fracture deformity of   right-sided ribs. Old fracture deformity of left sixth rib.    Impression:    As above.            < end of copied text >

## 2019-12-03 NOTE — CHART NOTE - NSCHARTNOTEFT_GEN_A_CORE
Called by RN for witnessed fall. Patient with history of Parkinson's and Schizophrenia tried to get up to go to the bathroom and fell onto his knees.   Patient denies any head trauma or any pain or discomfort at this time. Patient states that he didn't feel a physical was necessary but reluctantly agreed.  BP: 165/104  HR: 110  O2: 94% on 3L NC  Neuro: Alert, Oriented to place, name and situation, unsure of the year  PERLL, CN 2-12 grossly intact, moves all extremities without difficulty, speech clear  Head: NC/AT, no evidence of trauma, no lumps, bumps or ecchymotic areas  Neck: supple, non-tender  Back: non-tender, no evidence of trauma  Chest: non-tender chest wall and clavicles, no ecchymosis, Wheezes b/l  Hips: non-tender to cradle rock, positive abduction and adduction without discomfort, no leg length discrepancy, pt able to weight bear without difficulty  Coccyx: non-tender, no evidence of trauma  Extremities: non-tender over all long bones and joints, knees non-tender, no erythema, no evidence of trauma, full range of motion  No obvious injury, atraumatic event  Call bell placed in patient's hand and patient instructed not to get out of bed  Enhanced supervision ordered  Call PA with any changes in patient's condition   continue to monitor

## 2019-12-03 NOTE — PROGRESS NOTE ADULT - PROBLEM SELECTOR PLAN 2
likely due to RLL pneumonia   cont with Duonebs q6hrs, Solumedrol 60mg q8hrs, oxygen maintain SpO2 > 88%  RVP negative, CXR showed Patchy opacity at the right lung base consistent with pneumonia.  started on ceftriaxone and Zithromax for CAP  cont with Mucomyst and Robitussin for cough    - CT chest

## 2019-12-03 NOTE — PROGRESS NOTE ADULT - PROBLEM SELECTOR PLAN 3
possibly failed outpatient abx   Patchy opacity at the right lung base consistent with pneumonia  started on ceftriaxone 1g Q24hrs and Zithromax 500mg Q25  f/u procalcitonin  CT chest

## 2019-12-03 NOTE — PROGRESS NOTE ADULT - SUBJECTIVE AND OBJECTIVE BOX
HOSPITALIST PROGRESS NOTE    DAVY HOLDEN  [id number]  69yMale    Patient is a 69y old  Male who presents with a chief complaint of Acute hypoxic respiratory failure  COPD exacerbation, Pneumonia (03 Dec 2019 11:09)      SUBJECTIVE:   Chart reviewed since admission.  Patient seen and examined at bedside for COPD, hypoxia, pneumonia and parkinsons disease.  Feels better - less dyspneic. Cough with scant phlegm.   Denies any chills or fever.  Wants Acetaminophen for chronic neck pain      OBJECTIVE:  Vital Signs Last 24 Hrs  T(C): 36.6 (03 Dec 2019 12:09), Max: 36.8 (02 Dec 2019 15:33)  T(F): 97.8 (03 Dec 2019 12:09), Max: 98.3 (02 Dec 2019 15:33)  HR: 84 (03 Dec 2019 12:09) (76 - 92)  BP: 138/83 (03 Dec 2019 12:09) (100/77 - 141/91)   RR: 18 (03 Dec 2019 12:09) (18 - 20)  SpO2: 95% (03 Dec 2019 12:09) (95% - 100%)    PHYSICAL EXAMINATION  General: Lying in stretcher, NAD  HEENT:  extraocular movements intact. NC in place  NECK:  supple  CVS: regular rate and rhythm S1 S2  RESP:  Poor air flow, mild wheeze  GI:  Soft nondistended nontender BS+  : No suprapubic tenderness  MSK:  FROM  CNS:  tremors, bradykinesia  INTEG:  warm dry skin  PSYCH:  slow to respond    MONITOR:  CAPILLARY BLOOD GLUCOSE            I&O's Summary                          12.4   6.98  )-----------( 242      ( 03 Dec 2019 07:54 )             39.5       12-03    135  |  90<L>  |  16.0  ----------------------------<  112  4.9   |  34.0<H>  |  0.51    Ca    9.2      03 Dec 2019 07:54    TPro  7.0  /  Alb  3.7  /  TBili  0.6  /  DBili  x   /  AST  28  /  ALT  19  /  AlkPhos  70  12-02    CARDIAC MARKERS ( 02 Dec 2019 04:10 )  x     / <0.01 ng/mL / x     / x     / x              Culture:    TTE:    RADIOLOGY        MEDICATIONS  (STANDING):  acetylcysteine 10%  Inhalation 3 milliLiter(s) Inhalation three times a day  ALBUTerol    90 MICROgram(s) HFA Inhaler 1 Puff(s) Inhalation every 4 hours  albuterol/ipratropium for Nebulization 3 milliLiter(s) Nebulizer every 6 hours  azithromycin  IVPB 500 milliGRAM(s) IV Intermittent every 24 hours  azithromycin  IVPB      benztropine 1 milliGRAM(s) Oral two times a day  budesonide 160 MICROgram(s)/formoterol 4.5 MICROgram(s) Inhaler 2 Puff(s) Inhalation two times a day  cefTRIAXone   IVPB 1000 milliGRAM(s) IV Intermittent every 24 hours  cefTRIAXone   IVPB      diVALproex DR 1000 milliGRAM(s) Oral at bedtime  enoxaparin Injectable 40 milliGRAM(s) SubCutaneous daily  haloperidol     Tablet 10 milliGRAM(s) Oral at bedtime  haloperidol     Tablet 5 milliGRAM(s) Oral <User Schedule>  influenza   Vaccine 0.5 milliLiter(s) IntraMuscular once  melatonin 3 milliGRAM(s) Oral at bedtime  methylPREDNISolone sodium succinate Injectable 40 milliGRAM(s) IV Push every 8 hours  metoprolol succinate ER 25 milliGRAM(s) Oral daily  pantoprazole    Tablet 40 milliGRAM(s) Oral before breakfast  risperiDONE   Tablet 4 milliGRAM(s) Oral daily  rOPINIRole 0.5 milliGRAM(s) Oral two times a day  tiotropium 18 MICROgram(s) Capsule 1 Capsule(s) Inhalation daily      MEDICATIONS  (PRN):  guaiFENesin    Syrup 200 milliGRAM(s) Oral every 6 hours PRN Cough 98

## 2019-12-03 NOTE — PROGRESS NOTE ADULT - ASSESSMENT
68yo M with pmh of COPD, Parkinson's disease, schizophrenia current smoker 1/2ppd daily presents to ED c/o SOB and difficulty breathing that began today. Pt poor historian, states that he has been productive cough x2days admitted for copd exacerbation/pneumonia.     Feels better. Afebrile without leukocytosis  Still with hypoxia requiring supplemental Oxygen. Chronic compensated hypercapnia.

## 2019-12-04 PROCEDURE — 99232 SBSQ HOSP IP/OBS MODERATE 35: CPT

## 2019-12-04 RX ORDER — SACCHAROMYCES BOULARDII 250 MG
250 POWDER IN PACKET (EA) ORAL
Refills: 0 | Status: DISCONTINUED | OUTPATIENT
Start: 2019-12-04 | End: 2019-12-11

## 2019-12-04 RX ADMIN — Medication 200 MILLIGRAM(S): at 05:19

## 2019-12-04 RX ADMIN — ROPINIROLE 0.5 MILLIGRAM(S): 8 TABLET, FILM COATED, EXTENDED RELEASE ORAL at 17:30

## 2019-12-04 RX ADMIN — Medication 325 MILLIGRAM(S): at 05:29

## 2019-12-04 RX ADMIN — Medication 3 MILLILITER(S): at 09:19

## 2019-12-04 RX ADMIN — Medication 200 MILLIGRAM(S): at 14:57

## 2019-12-04 RX ADMIN — Medication 40 MILLIGRAM(S): at 14:57

## 2019-12-04 RX ADMIN — Medication 3 MILLILITER(S): at 14:47

## 2019-12-04 RX ADMIN — Medication 325 MILLIGRAM(S): at 21:35

## 2019-12-04 RX ADMIN — Medication 1 MILLIGRAM(S): at 17:30

## 2019-12-04 RX ADMIN — Medication 3 MILLILITER(S): at 20:38

## 2019-12-04 RX ADMIN — HALOPERIDOL DECANOATE 5 MILLIGRAM(S): 100 INJECTION INTRAMUSCULAR at 07:47

## 2019-12-04 RX ADMIN — Medication 40 MILLIGRAM(S): at 21:41

## 2019-12-04 RX ADMIN — ROPINIROLE 0.5 MILLIGRAM(S): 8 TABLET, FILM COATED, EXTENDED RELEASE ORAL at 05:17

## 2019-12-04 RX ADMIN — AZITHROMYCIN 255 MILLIGRAM(S): 500 TABLET, FILM COATED ORAL at 10:41

## 2019-12-04 RX ADMIN — PANTOPRAZOLE SODIUM 40 MILLIGRAM(S): 20 TABLET, DELAYED RELEASE ORAL at 05:17

## 2019-12-04 RX ADMIN — Medication 3 MILLILITER(S): at 02:19

## 2019-12-04 RX ADMIN — Medication 25 MILLIGRAM(S): at 05:17

## 2019-12-04 RX ADMIN — Medication 1 MILLIGRAM(S): at 05:17

## 2019-12-04 RX ADMIN — Medication 200 MILLIGRAM(S): at 21:42

## 2019-12-04 RX ADMIN — RISPERIDONE 4 MILLIGRAM(S): 4 TABLET ORAL at 11:47

## 2019-12-04 RX ADMIN — Medication 40 MILLIGRAM(S): at 05:14

## 2019-12-04 RX ADMIN — Medication 3 MILLIGRAM(S): at 21:42

## 2019-12-04 RX ADMIN — CEFTRIAXONE 100 MILLIGRAM(S): 500 INJECTION, POWDER, FOR SOLUTION INTRAMUSCULAR; INTRAVENOUS at 09:24

## 2019-12-04 RX ADMIN — Medication 325 MILLIGRAM(S): at 06:29

## 2019-12-04 RX ADMIN — Medication 3 MILLILITER(S): at 20:37

## 2019-12-04 RX ADMIN — ENOXAPARIN SODIUM 40 MILLIGRAM(S): 100 INJECTION SUBCUTANEOUS at 11:47

## 2019-12-04 RX ADMIN — Medication 325 MILLIGRAM(S): at 20:35

## 2019-12-04 RX ADMIN — Medication 250 MILLIGRAM(S): at 17:29

## 2019-12-04 RX ADMIN — DIVALPROEX SODIUM 1000 MILLIGRAM(S): 500 TABLET, DELAYED RELEASE ORAL at 21:42

## 2019-12-04 RX ADMIN — HALOPERIDOL DECANOATE 10 MILLIGRAM(S): 100 INJECTION INTRAMUSCULAR at 21:42

## 2019-12-04 NOTE — PROGRESS NOTE ADULT - ASSESSMENT
70yo M with pmh of COPD, Parkinson's disease, schizophrenia current smoker 1/2ppd daily presents to ED c/o SOB and difficulty breathing that began today. Pt poor historian, states that he has been productive cough x2days admitted for copd exacerbation/pneumonia.     Feels better. Afebrile without leukocytosis  Still with hypoxia requiring supplemental Oxygen. Chronic compensated hypercapnia.  CT with RLL infiltrate (Recurrent vs persistent)

## 2019-12-04 NOTE — PROGRESS NOTE ADULT - SUBJECTIVE AND OBJECTIVE BOX
HOSPITALIST PROGRESS NOTE    DAVY HOLDEN  [id number]  69yMale    Patient is a 69y old  Male who presents with a chief complaint of Acute hypoxic respiratory failure  COPD exacerbation, Pneumonia (03 Dec 2019 14:24)      SUBJECTIVE:   Chart reviewed since last visit.  Patient seen and examined at bedside for respiratory failure, COPD, Pneumonia  feels slightly better - less dyspneic.  Coughing, unable to expectorate much  Denies any fever, chills, dizzines      OBJECTIVE:  Vital Signs Last 24 Hrs  T(C): 36.3 (04 Dec 2019 09:10), Max: 36.9 (03 Dec 2019 23:26)  T(F): 97.4 (04 Dec 2019 09:10), Max: 98.4 (03 Dec 2019 23:26)  HR: 90 (04 Dec 2019 11:55) (80 - 124)  BP: 156/94 (04 Dec 2019 09:10) (124/80 - 165/104)   RR: 16 (04 Dec 2019 11:55) (16 - 20)  SpO2: 86% (04 Dec 2019 11:55) (86% - 95%)    PHYSICAL EXAMINATION  General: Sitting in bed, NAD  HEENT:  extraocular movements intact. NC in place  NECK:  supple  CVS: regular rate and rhythm S1 S2  RESP:  Poor air flow,    GI:  Soft nondistended nontender BS+  : No suprapubic tenderness  MSK:  FROM  CNS:  tremors, bradykinesia  INTEG:  warm dry skin  PSYCH:  slow to respond      MONITOR:  CAPILLARY BLOOD GLUCOSE            I&O's Summary                          12.4   6.98  )-----------( 242      ( 03 Dec 2019 07:54 )             39.5       12-03    135  |  90<L>  |  16.0  ----------------------------<  112  4.9   |  34.0<H>  |  0.51    Ca    9.2      03 Dec 2019 07:54              Culture:    TTE:    RADIOLOGY  < from: CT Chest No Cont (12.03.19 @ 18:41) >     EXAM:  CT CHEST                          PROCEDURE DATE:  12/03/2019          INTERPRETATION:  CLINICAL INFORMATION: COPD. Right lower lobe infiltrate.   Please compare to prior.    COMPARISON: 8/8/2017    PROCEDURE:   CT of the Chest was performed without intravenous contrast.  Sagittal and coronal reformats were performed.      FINDINGS:    Hiatus hernia. Diffuse dilatation of the thoracic esophagus, presumably   secondary to reflux. These findings are unchanged since 8/8/2017.    No evidence of mediastinal or hilar lymphadenopathy. Small right pleural   effusion. No evidence of a left pleural effusion. No evidence of a   pericardial effusion. No evidence of cardiomegaly. Coronary artery   calcifications are noted.    Images through the upper abdomen reveal no significant abnormality.    Emphysematous changes diffusely as previously noted. The left lung is   otherwise clear. There is a region of atelectasis or fibrosis in the   right apex which is new since 8/8/2017. There is a zone of consolidation   at the right lower lobe with air bronchograms more extensive since the   prior study. No underlying endobronchial lesion is demonstrated.    There are degenerative changes in the spine. Old healed right rib   fractures, unchanged.    IMPRESSION:     Right lower lobe consolidation more extensive since 8/8/2017. No   underlying endobronchial lesion.    There is a region of atelectasis or fibrosis in the right apex which is   new since the prior study. Cannot exclude ongoing infection at this site.    Small right pleural effusion.    Extensive emphysematous changes.                  VALERIANO TEJADA M.D., ATTENDING RADIOLOGIST  This document has been electronically signed. Dec  3 2019  6:57PM        < end of copied text >        MEDICATIONS  (STANDING):  acetylcysteine 10%  Inhalation 3 milliLiter(s) Inhalation three times a day  ALBUTerol    90 MICROgram(s) HFA Inhaler 1 Puff(s) Inhalation every 4 hours  albuterol/ipratropium for Nebulization 3 milliLiter(s) Nebulizer every 6 hours  azithromycin  IVPB 500 milliGRAM(s) IV Intermittent every 24 hours  azithromycin  IVPB      benztropine 1 milliGRAM(s) Oral two times a day  budesonide 160 MICROgram(s)/formoterol 4.5 MICROgram(s) Inhaler 2 Puff(s) Inhalation two times a day  cefTRIAXone   IVPB 1000 milliGRAM(s) IV Intermittent every 24 hours  cefTRIAXone   IVPB      diVALproex DR 1000 milliGRAM(s) Oral at bedtime  enoxaparin Injectable 40 milliGRAM(s) SubCutaneous daily  haloperidol     Tablet 10 milliGRAM(s) Oral at bedtime  haloperidol     Tablet 5 milliGRAM(s) Oral <User Schedule>  influenza   Vaccine 0.5 milliLiter(s) IntraMuscular once  melatonin 3 milliGRAM(s) Oral at bedtime  methylPREDNISolone sodium succinate Injectable 40 milliGRAM(s) IV Push every 8 hours  metoprolol succinate ER 25 milliGRAM(s) Oral daily  pantoprazole    Tablet 40 milliGRAM(s) Oral before breakfast  risperiDONE   Tablet 4 milliGRAM(s) Oral daily  rOPINIRole 0.5 milliGRAM(s) Oral two times a day  saccharomyces boulardii 250 milliGRAM(s) Oral two times a day  tiotropium 18 MICROgram(s) Capsule 1 Capsule(s) Inhalation daily      MEDICATIONS  (PRN):  acetaminophen   Tablet .. 325 milliGRAM(s) Oral every 6 hours PRN Mild Pain (1 - 3)  guaiFENesin    Syrup 200 milliGRAM(s) Oral every 6 hours PRN Cough

## 2019-12-05 PROCEDURE — 99232 SBSQ HOSP IP/OBS MODERATE 35: CPT

## 2019-12-05 RX ORDER — ACETAMINOPHEN 500 MG
650 TABLET ORAL ONCE
Refills: 0 | Status: COMPLETED | OUTPATIENT
Start: 2019-12-05 | End: 2019-12-05

## 2019-12-05 RX ADMIN — ROPINIROLE 0.5 MILLIGRAM(S): 8 TABLET, FILM COATED, EXTENDED RELEASE ORAL at 17:41

## 2019-12-05 RX ADMIN — Medication 3 MILLILITER(S): at 17:01

## 2019-12-05 RX ADMIN — CEFTRIAXONE 100 MILLIGRAM(S): 500 INJECTION, POWDER, FOR SOLUTION INTRAMUSCULAR; INTRAVENOUS at 13:44

## 2019-12-05 RX ADMIN — Medication 25 MILLIGRAM(S): at 05:48

## 2019-12-05 RX ADMIN — ENOXAPARIN SODIUM 40 MILLIGRAM(S): 100 INJECTION SUBCUTANEOUS at 12:50

## 2019-12-05 RX ADMIN — Medication 1 MILLIGRAM(S): at 05:48

## 2019-12-05 RX ADMIN — Medication 250 MILLIGRAM(S): at 05:48

## 2019-12-05 RX ADMIN — Medication 650 MILLIGRAM(S): at 05:48

## 2019-12-05 RX ADMIN — Medication 3 MILLILITER(S): at 21:02

## 2019-12-05 RX ADMIN — Medication 3 MILLIGRAM(S): at 21:36

## 2019-12-05 RX ADMIN — Medication 650 MILLIGRAM(S): at 06:36

## 2019-12-05 RX ADMIN — Medication 3 MILLILITER(S): at 08:58

## 2019-12-05 RX ADMIN — PANTOPRAZOLE SODIUM 40 MILLIGRAM(S): 20 TABLET, DELAYED RELEASE ORAL at 05:48

## 2019-12-05 RX ADMIN — AZITHROMYCIN 255 MILLIGRAM(S): 500 TABLET, FILM COATED ORAL at 12:50

## 2019-12-05 RX ADMIN — HALOPERIDOL DECANOATE 5 MILLIGRAM(S): 100 INJECTION INTRAMUSCULAR at 12:44

## 2019-12-05 RX ADMIN — Medication 1 MILLIGRAM(S): at 17:41

## 2019-12-05 RX ADMIN — HALOPERIDOL DECANOATE 10 MILLIGRAM(S): 100 INJECTION INTRAMUSCULAR at 21:36

## 2019-12-05 RX ADMIN — Medication 3 MILLILITER(S): at 02:09

## 2019-12-05 RX ADMIN — DIVALPROEX SODIUM 1000 MILLIGRAM(S): 500 TABLET, DELAYED RELEASE ORAL at 21:36

## 2019-12-05 RX ADMIN — Medication 200 MILLIGRAM(S): at 21:36

## 2019-12-05 RX ADMIN — RISPERIDONE 4 MILLIGRAM(S): 4 TABLET ORAL at 12:50

## 2019-12-05 RX ADMIN — Medication 40 MILLIGRAM(S): at 05:47

## 2019-12-05 RX ADMIN — Medication 250 MILLIGRAM(S): at 17:40

## 2019-12-05 RX ADMIN — ROPINIROLE 0.5 MILLIGRAM(S): 8 TABLET, FILM COATED, EXTENDED RELEASE ORAL at 05:48

## 2019-12-05 NOTE — SWALLOW BEDSIDE ASSESSMENT ADULT - ASR SWALLOW ASPIRATION MONITOR
fever/throat clearing/upper respiratory infection/gurgly voice/pneumonia/oral hygiene/position upright (90Y)/change of breathing pattern/cough

## 2019-12-05 NOTE — PROGRESS NOTE ADULT - PROBLEM SELECTOR PLAN 2
likely due to RLL pneumonia   cont with Duonebs q6hrs, Solumedrol 60mg q8hrs, oxygen maintain SpO2 > 88%  RVP negative, CXR showed Patchy opacity at the right lung base consistent with pneumonia.  started on ceftriaxone and Zithromax for CAP  cont with Mucomyst and Robitussin for cough  - Change Steroids to PO, slow taper

## 2019-12-05 NOTE — SWALLOW BEDSIDE ASSESSMENT ADULT - ADDITIONAL RECOMMENDATIONS
No further follow-up warranted at bedside. If there are concerns with silent aspiration, consider MBS

## 2019-12-05 NOTE — PROGRESS NOTE ADULT - SUBJECTIVE AND OBJECTIVE BOX
HOSPITALIST PROGRESS NOTE    DAVY HOLDEN  [id number]  69yMale    Patient is a 69y old  Male who presents with a chief complaint of Acute hypoxic respiratory failure  COPD exacerbation, Pneumonia (04 Dec 2019 14:56)      SUBJECTIVE:   Chart reviewed since last visit.  Patient seen and examined at bedside for COPD, RLL pneumonia.  Still feels slightly dyspneic.  Denies and fever, chills, chest palpitations.        OBJECTIVE:  Vital Signs Last 24 Hrs  T(C): 36.3 (05 Dec 2019 08:45), Max: 36.6 (04 Dec 2019 16:20)  T(F): 97.3 (05 Dec 2019 08:45), Max: 97.9 (04 Dec 2019 16:20)  HR: 75 (05 Dec 2019 08:45) (75 - 90)  BP: 134/85 (05 Dec 2019 08:45) (128/82 - 144/87)   RR: 19 (05 Dec 2019 08:45) (16 - 20)  SpO2: 95% (05 Dec 2019 05:32) (86% - 96%)    PHYSICAL EXAMINATION  General: Sitting in bed, NAD  HEENT:  extraocular movements intact. NC in place  NECK:  supple  CVS: regular rate and rhythm S1 S2  RESP:  Poor air flow,    GI:  Soft nondistended nontender BS+  : No suprapubic tenderness  MSK:  FROM  CNS:  tremors, bradykinesia  INTEG:  warm dry skin  PSYCH:  slow to respond      MONITOR:  CAPILLARY BLOOD GLUCOSE            I&O's Summary    04 Dec 2019 07:01  -  05 Dec 2019 07:00  --------------------------------------------------------  IN: 0 mL / OUT: 450 mL / NET: -450 mL                        Culture:    TTE:    RADIOLOGY        MEDICATIONS  (STANDING):  acetylcysteine 10%  Inhalation 3 milliLiter(s) Inhalation three times a day  ALBUTerol    90 MICROgram(s) HFA Inhaler 1 Puff(s) Inhalation every 4 hours  albuterol/ipratropium for Nebulization 3 milliLiter(s) Nebulizer every 6 hours  azithromycin  IVPB 500 milliGRAM(s) IV Intermittent every 24 hours  azithromycin  IVPB      benztropine 1 milliGRAM(s) Oral two times a day  budesonide 160 MICROgram(s)/formoterol 4.5 MICROgram(s) Inhaler 2 Puff(s) Inhalation two times a day  cefTRIAXone   IVPB 1000 milliGRAM(s) IV Intermittent every 24 hours  cefTRIAXone   IVPB      diVALproex DR 1000 milliGRAM(s) Oral at bedtime  enoxaparin Injectable 40 milliGRAM(s) SubCutaneous daily  haloperidol     Tablet 10 milliGRAM(s) Oral at bedtime  haloperidol     Tablet 5 milliGRAM(s) Oral <User Schedule>  influenza   Vaccine 0.5 milliLiter(s) IntraMuscular once  melatonin 3 milliGRAM(s) Oral at bedtime  metoprolol succinate ER 25 milliGRAM(s) Oral daily  pantoprazole    Tablet 40 milliGRAM(s) Oral before breakfast  risperiDONE   Tablet 4 milliGRAM(s) Oral daily  rOPINIRole 0.5 milliGRAM(s) Oral two times a day  saccharomyces boulardii 250 milliGRAM(s) Oral two times a day  tiotropium 18 MICROgram(s) Capsule 1 Capsule(s) Inhalation daily      MEDICATIONS  (PRN):  acetaminophen   Tablet .. 325 milliGRAM(s) Oral every 6 hours PRN Mild Pain (1 - 3)  guaiFENesin    Syrup 200 milliGRAM(s) Oral every 6 hours PRN Cough

## 2019-12-05 NOTE — SWALLOW BEDSIDE ASSESSMENT ADULT - SWALLOW EVAL: DIAGNOSIS
Oral stage WFL. Pharyngeal stage clinically unremarkable for solids & thin fluids via cup sips. Pharyngeal dysphagia suspected for thin fluids via straw with +coughing post intake

## 2019-12-05 NOTE — PROGRESS NOTE ADULT - PROBLEM SELECTOR PLAN 1
-hypercapnic and hypoxic respiratory failure due to COPD exacerbation/ pneumonia  -ABG 7.37/62/63/32 with hypoxia and hypercapnia  -sating 88% on ED presentation, placed on 3L NC pt refused BiPAP, sating 95%, sob improved    -s/p Duoneb, Solu-medrol and mg sulfate with improvement  -cont with nebs, Solu-medrol, inhalers, o2 supplement as needed  - Change Steroids to PO, slow taper

## 2019-12-05 NOTE — PROGRESS NOTE ADULT - ASSESSMENT
68yo M with pmh of COPD, Parkinson's disease, schizophrenia current smoker 1/2ppd daily presents to ED c/o SOB and difficulty breathing that began today. Pt poor historian, states that he has been productive cough x2days admitted for copd exacerbation/pneumonia.     Feels better. Afebrile without leukocytosis  Still with hypoxia requiring supplemental Oxygen. Chronic compensated hypercapnia.  CT with RLL infiltrate (Recurrent vs persistent)

## 2019-12-05 NOTE — SWALLOW BEDSIDE ASSESSMENT ADULT - COMMENTS
As per MD note:   "8yo M with pmh of COPD, Parkinson's disease, schizophrenia current smoker 1/2ppd daily presents to ED c/o SOB and difficulty breathing that began today. Pt poor historian, states that he has been productive cough x2days admitted for copd exacerbation/pneumonia."

## 2019-12-05 NOTE — SWALLOW BEDSIDE ASSESSMENT ADULT - SLP PERTINENT HISTORY OF CURRENT PROBLEM
Pt known to this dept & was last seen in 2017.  MBS in August 2017 was negative for stasis or aspiration

## 2019-12-05 NOTE — SWALLOW BEDSIDE ASSESSMENT ADULT - SLP GENERAL OBSERVATIONS
Pt received & seen seated upright in bed, +awake/alert, +O2 NC, +reduced cognition, +self-feeding breakfast, 0/10 pain

## 2019-12-05 NOTE — PHYSICAL THERAPY INITIAL EVALUATION ADULT - ADDITIONAL COMMENTS
Pt is a questionable historian, reports living in a Group Home, unclear about need to do steps.  Independent with all PTA, no devices.

## 2019-12-05 NOTE — PHYSICAL THERAPY INITIAL EVALUATION ADULT - GAIT DEVIATIONS NOTED, PT EVAL
decreased stride length/Pt desatted to 84% on 6LNCO2 during amb./decreased step length/decreased jenny

## 2019-12-05 NOTE — PROGRESS NOTE ADULT - PROBLEM SELECTOR PLAN 3
possibly failed outpatient abx   Patchy opacity at the right lung base consistent with pneumonia  started on ceftriaxone 1g Q24hrs and Zithromax 500mg Q25  f/u procalcitonin  Antibiotics x 1 more day then discontinue  Will need follow up CT in 4-6 weeks

## 2019-12-05 NOTE — SWALLOW BEDSIDE ASSESSMENT ADULT - SWALLOW EVAL: RECOMMENDED FEEDING/EATING TECHNIQUES
oral hygiene/no straws/position upright (90 degrees)/small sips/bites/maintain upright posture during/after eating for 30 mins

## 2019-12-06 PROCEDURE — 99233 SBSQ HOSP IP/OBS HIGH 50: CPT

## 2019-12-06 PROCEDURE — 99232 SBSQ HOSP IP/OBS MODERATE 35: CPT

## 2019-12-06 RX ADMIN — Medication 325 MILLIGRAM(S): at 05:08

## 2019-12-06 RX ADMIN — Medication 325 MILLIGRAM(S): at 12:00

## 2019-12-06 RX ADMIN — Medication 3 MILLILITER(S): at 20:03

## 2019-12-06 RX ADMIN — RISPERIDONE 4 MILLIGRAM(S): 4 TABLET ORAL at 11:21

## 2019-12-06 RX ADMIN — Medication 3 MILLILITER(S): at 02:22

## 2019-12-06 RX ADMIN — Medication 200 MILLIGRAM(S): at 17:30

## 2019-12-06 RX ADMIN — Medication 200 MILLIGRAM(S): at 05:08

## 2019-12-06 RX ADMIN — ROPINIROLE 0.5 MILLIGRAM(S): 8 TABLET, FILM COATED, EXTENDED RELEASE ORAL at 17:26

## 2019-12-06 RX ADMIN — Medication 250 MILLIGRAM(S): at 17:26

## 2019-12-06 RX ADMIN — Medication 50 MILLIGRAM(S): at 18:51

## 2019-12-06 RX ADMIN — Medication 200 MILLIGRAM(S): at 11:22

## 2019-12-06 RX ADMIN — Medication 25 MILLIGRAM(S): at 05:08

## 2019-12-06 RX ADMIN — AZITHROMYCIN 255 MILLIGRAM(S): 500 TABLET, FILM COATED ORAL at 09:17

## 2019-12-06 RX ADMIN — Medication 325 MILLIGRAM(S): at 06:09

## 2019-12-06 RX ADMIN — HALOPERIDOL DECANOATE 5 MILLIGRAM(S): 100 INJECTION INTRAMUSCULAR at 08:17

## 2019-12-06 RX ADMIN — Medication 325 MILLIGRAM(S): at 11:22

## 2019-12-06 RX ADMIN — Medication 325 MILLIGRAM(S): at 17:30

## 2019-12-06 RX ADMIN — Medication 250 MILLIGRAM(S): at 05:08

## 2019-12-06 RX ADMIN — Medication 1 MILLIGRAM(S): at 17:27

## 2019-12-06 RX ADMIN — CEFTRIAXONE 100 MILLIGRAM(S): 500 INJECTION, POWDER, FOR SOLUTION INTRAMUSCULAR; INTRAVENOUS at 11:30

## 2019-12-06 RX ADMIN — Medication 3 MILLILITER(S): at 15:45

## 2019-12-06 RX ADMIN — PANTOPRAZOLE SODIUM 40 MILLIGRAM(S): 20 TABLET, DELAYED RELEASE ORAL at 05:08

## 2019-12-06 RX ADMIN — Medication 3 MILLILITER(S): at 10:03

## 2019-12-06 RX ADMIN — Medication 1 MILLIGRAM(S): at 05:08

## 2019-12-06 RX ADMIN — HALOPERIDOL DECANOATE 10 MILLIGRAM(S): 100 INJECTION INTRAMUSCULAR at 21:59

## 2019-12-06 RX ADMIN — Medication 3 MILLIGRAM(S): at 21:59

## 2019-12-06 RX ADMIN — Medication 325 MILLIGRAM(S): at 18:00

## 2019-12-06 RX ADMIN — ROPINIROLE 0.5 MILLIGRAM(S): 8 TABLET, FILM COATED, EXTENDED RELEASE ORAL at 05:08

## 2019-12-06 RX ADMIN — Medication 3 MILLILITER(S): at 20:09

## 2019-12-06 RX ADMIN — Medication 3 MILLILITER(S): at 10:04

## 2019-12-06 RX ADMIN — Medication 3 MILLILITER(S): at 15:44

## 2019-12-06 RX ADMIN — ENOXAPARIN SODIUM 40 MILLIGRAM(S): 100 INJECTION SUBCUTANEOUS at 11:21

## 2019-12-06 RX ADMIN — DIVALPROEX SODIUM 1000 MILLIGRAM(S): 500 TABLET, DELAYED RELEASE ORAL at 21:59

## 2019-12-06 NOTE — PROGRESS NOTE ADULT - SUBJECTIVE AND OBJECTIVE BOX
DAVY HOLDEN Male 69y [id number]    Patient is a 69y old  Male who presents with a chief complaint of Acute hypoxic respiratory failure  COPD exacerbation, Pneumonia (06 Dec 2019 14:27)      Subjective/objective:  Pt seen and examined at bedside, no over night event reported by night staff. Pt still c/o some SOB with intermittent cough, no fever or chest pain. Resting comfortably, speaking in full sentences.     Review of system:  No fever, chills, nausea, vomiting, headache, dizziness, chest pain.       PHYSICAL EXAM:    Vital Signs Last 24 Hrs  T(C): 36.4 (06 Dec 2019 05:04), Max: 36.4 (05 Dec 2019 22:43)  T(F): 97.5 (06 Dec 2019 05:04), Max: 97.5 (05 Dec 2019 22:43)  HR: 76 (06 Dec 2019 16:05) (76 - 86)  BP: 119/82 (06 Dec 2019 05:04) (119/82 - 139/88)  BP(mean): --  RR: 20 (06 Dec 2019 05:04) (20 - 20)  SpO2: 95% (06 Dec 2019 05:04) (94% - 96%)    GENERAL: Pt lying comfortably, NAD.  CHEST/LUNG: Diminished air entry, no crackles or wheezing.  HEART: S1S2+, Regular rate and rhythm; No murmurs.  ABDOMEN: Soft, Nontender, Nondistended; Bowel sounds present.  Extremities: No LE edema, pulses+  NEURO: Awake, alert, follows commands.   PSYCH: normal mood.    MEDICATIONS  (STANDING):  acetylcysteine 10%  Inhalation 3 milliLiter(s) Inhalation three times a day  ALBUTerol    90 MICROgram(s) HFA Inhaler 1 Puff(s) Inhalation every 4 hours  albuterol/ipratropium for Nebulization 3 milliLiter(s) Nebulizer every 6 hours  azithromycin  IVPB 500 milliGRAM(s) IV Intermittent every 24 hours  azithromycin  IVPB      benztropine 1 milliGRAM(s) Oral two times a day  budesonide 160 MICROgram(s)/formoterol 4.5 MICROgram(s) Inhaler 2 Puff(s) Inhalation two times a day  cefTRIAXone   IVPB 1000 milliGRAM(s) IV Intermittent every 24 hours  cefTRIAXone   IVPB      diVALproex DR 1000 milliGRAM(s) Oral at bedtime  enoxaparin Injectable 40 milliGRAM(s) SubCutaneous daily  haloperidol     Tablet 10 milliGRAM(s) Oral at bedtime  haloperidol     Tablet 5 milliGRAM(s) Oral <User Schedule>  influenza   Vaccine 0.5 milliLiter(s) IntraMuscular once  melatonin 3 milliGRAM(s) Oral at bedtime  metoprolol succinate ER 25 milliGRAM(s) Oral daily  pantoprazole    Tablet 40 milliGRAM(s) Oral before breakfast  risperiDONE   Tablet 4 milliGRAM(s) Oral daily  rOPINIRole 0.5 milliGRAM(s) Oral two times a day  saccharomyces boulardii 250 milliGRAM(s) Oral two times a day  tiotropium 18 MICROgram(s) Capsule 1 Capsule(s) Inhalation daily    MEDICATIONS  (PRN):  acetaminophen   Tablet .. 325 milliGRAM(s) Oral every 6 hours PRN Mild Pain (1 - 3)  guaiFENesin    Syrup 200 milliGRAM(s) Oral every 6 hours PRN Cough        Labs:  LABS:

## 2019-12-06 NOTE — PROGRESS NOTE ADULT - ASSESSMENT
68yo M with pmh of COPD, Parkinson's disease, schizophrenia current smoker 1/2ppd daily presents to ED c/o SOB and difficulty breathing that began today. Pt poor historian, states that he has been productive cough x2days admitted for copd exacerbation/pneumonia.     Feels better. Afebrile without leukocytosis  Still with hypoxia requiring supplemental Oxygen. Chronic compensated hypercapnia.  CT with RLL infiltrate (Recurrent vs persistent) 68yo M poor historian, with pmh of COPD, Parkinson's disease, schizophrenia current smoker 1/2ppd daily, presents to ED c/o SOB with productive cough x2days. Pt admitted for copd exacerbation/pneumonia.     >Acute hypoxemic respiratory failure:  - 2/2 COPD/ PNA.   - Slowly improving.   - Supplements O2 as needed.   - C/w nebs, prednisone taper, inhalers.   - IV Abx for PNA.   - Pulmonology on board.     >COPD exacerbation- Plan as above.     >Pneumonia- Likely due to gram positive organisms. CT showed opacity. C/w ceftriaxone and Zithromax. Cx were not sent on admission and now Pt already on Abx for days.     >HTN- Stable, C/w metoprolol 25mg daily.     >Parkinson disease- C/w benztropine 1mg daily, Requip 0.5mg bid.     >Schizophrenia- Cont with Depakote 1000mg daily, haldol, Risperdal 4mg.    >DVT ppx- Lovenox.

## 2019-12-06 NOTE — PROGRESS NOTE ADULT - ASSESSMENT
COPD at baseline  O2 dependent  competing rx for CAP    Plan:  prednisone taper over 12 days  Complete ABX  Home O2 if room air sats <88%  Consider Breo 100/25 and Incruse as outpt  Home nebulizer with albuterol  f/u CCT 6-8 wks  f/u with us in 2wks  recall prn

## 2019-12-06 NOTE — DIETITIAN INITIAL EVALUATION ADULT. - PROBLEM SELECTOR PLAN 2
likely due to RLL pneumonia   cont with Duonebs q6hrs, Solumedrol 60mg q8hrs, oxygen maintain SpO2 > 88%  RVP negative, CXR showed Patchy opacity at the right lung base consistent with pneumonia.  started on ceftriaxone and Zithromax for CAP  cont with Mucomyst and Robitussin for cough  -[place] pulm consult placed

## 2019-12-06 NOTE — CHART NOTE - NSCHARTNOTEFT_GEN_A_CORE
Upon Nutritional Assessment by the Registered Dietitian your patient was determined to meet criteria / has evidence of the following diagnosis/diagnoses:            [ x] Severe Protein Calorie Malnutrition        Findings as based on:  •  Comprehensive nutrition assessment and consultation  •  Calorie counts (nutrient intake analysis)  •  Food acceptance and intake status from observations by staff  •  Follow up  •  Patient education  •  Intervention secondary to interdisciplinary rounds  •   concerns      Treatment:    The following diet has been recommended:      PROVIDER Section:     By signing this assessment you are acknowledging and agree with the diagnosis/diagnoses assigned by the Registered Dietitian    Comments:    RX: Ensure TID, Encourage po intake, monitor weights

## 2019-12-06 NOTE — PROGRESS NOTE ADULT - SUBJECTIVE AND OBJECTIVE BOX
PULMONARY PROGRESS NOTE      [id number]    Patient is a 69y old  Male who presents with a chief complaint of Acute hypoxic respiratory failure  COPD exacerbation, Pneumonia (05 Dec 2019 11:02)      INTERVAL HPI/OVERNIGHT EVENTS:  comfortable at rest on O2  no cough or wheeze  completing ABX    MEDICATIONS  (STANDING):  acetylcysteine 10%  Inhalation 3 milliLiter(s) Inhalation three times a day  ALBUTerol    90 MICROgram(s) HFA Inhaler 1 Puff(s) Inhalation every 4 hours  albuterol/ipratropium for Nebulization 3 milliLiter(s) Nebulizer every 6 hours  azithromycin  IVPB 500 milliGRAM(s) IV Intermittent every 24 hours  azithromycin  IVPB      benztropine 1 milliGRAM(s) Oral two times a day  budesonide 160 MICROgram(s)/formoterol 4.5 MICROgram(s) Inhaler 2 Puff(s) Inhalation two times a day  cefTRIAXone   IVPB 1000 milliGRAM(s) IV Intermittent every 24 hours  cefTRIAXone   IVPB      diVALproex DR 1000 milliGRAM(s) Oral at bedtime  enoxaparin Injectable 40 milliGRAM(s) SubCutaneous daily  haloperidol     Tablet 10 milliGRAM(s) Oral at bedtime  haloperidol     Tablet 5 milliGRAM(s) Oral <User Schedule>  influenza   Vaccine 0.5 milliLiter(s) IntraMuscular once  melatonin 3 milliGRAM(s) Oral at bedtime  metoprolol succinate ER 25 milliGRAM(s) Oral daily  pantoprazole    Tablet 40 milliGRAM(s) Oral before breakfast  risperiDONE   Tablet 4 milliGRAM(s) Oral daily  rOPINIRole 0.5 milliGRAM(s) Oral two times a day  saccharomyces boulardii 250 milliGRAM(s) Oral two times a day  tiotropium 18 MICROgram(s) Capsule 1 Capsule(s) Inhalation daily      MEDICATIONS  (PRN):  acetaminophen   Tablet .. 325 milliGRAM(s) Oral every 6 hours PRN Mild Pain (1 - 3)  guaiFENesin    Syrup 200 milliGRAM(s) Oral every 6 hours PRN Cough      Allergies    Allergy Status Unknown  No Known Allergies    Intolerances        PAST MEDICAL & SURGICAL HISTORY:  Parkinson disease  Chronic obstructive pulmonary disease, unspecified COPD type  Schizophrenia, unspecified type  No significant past surgical history      SOCIAL HISTORY  Smoking History:       REVIEW OF SYSTEMS:    CONSTITUTIONAL:  No distress    HEENT:  Eyes:  No diplopia or blurred vision. ENT:  No earache, sore throat or runny nose.    CARDIOVASCULAR:  No pressure, squeezing, tightness, heaviness or aching about the chest; no palpitations.    RESPIRATORY:  above    GASTROINTESTINAL:  No nausea, vomiting or diarrhea.    GENITOURINARY:  No dysuria, frequency or urgency.    NEUROLOGIC:  No paresthesias, fasciculations, seizures or weakness.    Extremities: No cyanosis, clubbing or edema    PSYCHIATRIC:  No disorder of thought or mood.    Vital Signs Last 24 Hrs  T(C): 36.4 (06 Dec 2019 05:04), Max: 36.8 (05 Dec 2019 15:10)  T(F): 97.5 (06 Dec 2019 05:04), Max: 98.3 (05 Dec 2019 15:10)  HR: 78 (06 Dec 2019 10:32) (78 - 88)  BP: 119/82 (06 Dec 2019 05:04) (119/82 - 139/88)  BP(mean): --  RR: 20 (06 Dec 2019 05:04) (18 - 20)  SpO2: 95% (06 Dec 2019 05:04) (94% - 96%)    PHYSICAL EXAMINATION:    GENERAL: The patient is awake and alert in no apparent distress.     HEENT: Head is normocephalic and atraumatic. Extraocular muscles are intact. Mucous membranes are moist.    NECK: Supple.    LUNGS: Clear to auscultation without wheezing, rales or rhonchi; respirations unlabored. BS diminished    HEART: Regular rate and rhythm without murmur.    ABDOMEN: Soft, nontender, and nondistended.      EXTREMITIES: Without any cyanosis, clubbing, rash, lesions or edema.    NEUROLOGIC: Grossly intact.    LABS:                              MICROBIOLOGY:    RADIOLOGY & ADDITIONAL STUDIES:  All films reviewed on PACS

## 2019-12-06 NOTE — DIETITIAN INITIAL EVALUATION ADULT. - OTHER INFO
70yo M with pmh of COPD, Parkinson's disease, schizophrenia current smoker 1/2ppd daily presents to ED chief complaint of Acute hypoxic respiratory failure COPD exacerbation, Pneumonia. Pt was lethargic during assessment, limited information obtained. Pt consumed 0% of lunch per tray observation. Pt consumed 75% of meal yesterday per EMR. Aware of SLP recommendations.

## 2019-12-06 NOTE — DIETITIAN INITIAL EVALUATION ADULT. - MALNUTRITION
Limited NFPE performed. Severe muscle wasting at temples, clavicle shoulder. Severe fat loss at orbital, buccal pads, triceps. Severe (chronic)

## 2019-12-07 PROCEDURE — 99233 SBSQ HOSP IP/OBS HIGH 50: CPT

## 2019-12-07 RX ORDER — CEFTRIAXONE 500 MG/1
1000 INJECTION, POWDER, FOR SOLUTION INTRAMUSCULAR; INTRAVENOUS EVERY 24 HOURS
Refills: 0 | Status: COMPLETED | OUTPATIENT
Start: 2019-12-08 | End: 2019-12-08

## 2019-12-07 RX ORDER — ACETAMINOPHEN 500 MG
650 TABLET ORAL EVERY 6 HOURS
Refills: 0 | Status: DISCONTINUED | OUTPATIENT
Start: 2019-12-07 | End: 2019-12-12

## 2019-12-07 RX ADMIN — Medication 40 MILLIGRAM(S): at 13:23

## 2019-12-07 RX ADMIN — Medication 325 MILLIGRAM(S): at 01:02

## 2019-12-07 RX ADMIN — Medication 650 MILLIGRAM(S): at 17:27

## 2019-12-07 RX ADMIN — HALOPERIDOL DECANOATE 5 MILLIGRAM(S): 100 INJECTION INTRAMUSCULAR at 09:01

## 2019-12-07 RX ADMIN — PANTOPRAZOLE SODIUM 40 MILLIGRAM(S): 20 TABLET, DELAYED RELEASE ORAL at 05:39

## 2019-12-07 RX ADMIN — Medication 3 MILLILITER(S): at 20:24

## 2019-12-07 RX ADMIN — Medication 650 MILLIGRAM(S): at 22:30

## 2019-12-07 RX ADMIN — Medication 3 MILLILITER(S): at 14:59

## 2019-12-07 RX ADMIN — Medication 1 MILLIGRAM(S): at 05:38

## 2019-12-07 RX ADMIN — Medication 650 MILLIGRAM(S): at 23:30

## 2019-12-07 RX ADMIN — DIVALPROEX SODIUM 1000 MILLIGRAM(S): 500 TABLET, DELAYED RELEASE ORAL at 22:32

## 2019-12-07 RX ADMIN — Medication 200 MILLIGRAM(S): at 11:57

## 2019-12-07 RX ADMIN — AZITHROMYCIN 255 MILLIGRAM(S): 500 TABLET, FILM COATED ORAL at 09:11

## 2019-12-07 RX ADMIN — ENOXAPARIN SODIUM 40 MILLIGRAM(S): 100 INJECTION SUBCUTANEOUS at 11:56

## 2019-12-07 RX ADMIN — ROPINIROLE 0.5 MILLIGRAM(S): 8 TABLET, FILM COATED, EXTENDED RELEASE ORAL at 05:38

## 2019-12-07 RX ADMIN — Medication 25 MILLIGRAM(S): at 05:39

## 2019-12-07 RX ADMIN — ROPINIROLE 0.5 MILLIGRAM(S): 8 TABLET, FILM COATED, EXTENDED RELEASE ORAL at 17:30

## 2019-12-07 RX ADMIN — Medication 3 MILLILITER(S): at 08:14

## 2019-12-07 RX ADMIN — Medication 1 MILLIGRAM(S): at 17:30

## 2019-12-07 RX ADMIN — Medication 3 MILLILITER(S): at 03:31

## 2019-12-07 RX ADMIN — HALOPERIDOL DECANOATE 10 MILLIGRAM(S): 100 INJECTION INTRAMUSCULAR at 22:31

## 2019-12-07 RX ADMIN — Medication 650 MILLIGRAM(S): at 13:26

## 2019-12-07 RX ADMIN — Medication 50 MILLIGRAM(S): at 05:38

## 2019-12-07 RX ADMIN — Medication 3 MILLIGRAM(S): at 22:33

## 2019-12-07 RX ADMIN — Medication 325 MILLIGRAM(S): at 01:45

## 2019-12-07 RX ADMIN — RISPERIDONE 4 MILLIGRAM(S): 4 TABLET ORAL at 11:56

## 2019-12-07 RX ADMIN — Medication 250 MILLIGRAM(S): at 05:38

## 2019-12-07 RX ADMIN — Medication 250 MILLIGRAM(S): at 17:30

## 2019-12-07 RX ADMIN — CEFTRIAXONE 100 MILLIGRAM(S): 500 INJECTION, POWDER, FOR SOLUTION INTRAMUSCULAR; INTRAVENOUS at 09:03

## 2019-12-07 NOTE — PROGRESS NOTE ADULT - ASSESSMENT
70yo M poor historian, with pmh of COPD, Parkinson's disease, schizophrenia current smoker 1/2ppd daily, presents to ED c/o SOB with productive cough x2days. Pt admitted for copd exacerbation/pneumonia.     >Acute hypoxemic respiratory failure:  - 2/2 COPD/ PNA. Slowly improving.   - Supplements O2 as needed.   - C/w nebs, change prednisone to IV Solumedrol for now- pt has persistetn SOB and more wheezing today.   - IV Abx for PNA- finished Zithromax, Rocephin last day tomorrow.    - Seen by Pulmonology     >COPD exacerbation- Plan as above.     >Pneumonia- Likely due to gram positive organisms. CT showed opacity. C/w ceftriaxone,  Zithromax completed. unfortunately Cx were not sent on admission and now Pt already on Abx for days.     >HTN- Stable, C/w metoprolol 25mg daily.     >Parkinson disease- C/w benztropine 1mg daily, Requip 0.5mg bid.     >Schizophrenia- Cont with Depakote 1000mg daily, haldol, Risperdal 4mg.    >DVT ppx- Lovenox.

## 2019-12-07 NOTE — PROGRESS NOTE ADULT - SUBJECTIVE AND OBJECTIVE BOX
DAVY HOLDEN Male 69y [id number]    Patient is a 69y old  Male who presents with a chief complaint of Acute hypoxic respiratory failure  COPD exacerbation, Pneumonia (06 Dec 2019 17:25)      Subjective/objective:  Pt seen and examined at bedside, no over night event reported by night staff. Pt still with significant SOB and wheezing. No other complaints.    Review of system:  No fever, chills, nausea, vomiting, headache, dizziness, chest pain.     PHYSICAL EXAM:    Vital Signs Last 24 Hrs  T(C): 37.2 (07 Dec 2019 07:51), Max: 37.2 (07 Dec 2019 07:51)  T(F): 98.9 (07 Dec 2019 07:51), Max: 98.9 (07 Dec 2019 07:51)  HR: 76 (07 Dec 2019 08:32) (63 - 78)  BP: 118/80 (07 Dec 2019 07:51) (118/80 - 134/73)  BP(mean): --  RR: 18 (07 Dec 2019 08:44) (18 - 18)  SpO2: 97% (07 Dec 2019 08:44) (95% - 100%)    GENERAL: Pt lying comfortably, NAD.  CHEST/LUNG: Diminished air entry, diffuse wheezing b/l.   HEART: S1S2+, Regular rate and rhythm; No murmurs.  ABDOMEN: Soft, Nontender, Nondistended; Bowel sounds present.  Extremities: No LE edema, pulses+  NEURO: Awake, alert, follows commands.   PSYCH: normal mood.    MEDICATIONS  (STANDING):  acetylcysteine 10%  Inhalation 3 milliLiter(s) Inhalation three times a day  ALBUTerol    90 MICROgram(s) HFA Inhaler 1 Puff(s) Inhalation every 4 hours  albuterol/ipratropium for Nebulization 3 milliLiter(s) Nebulizer every 6 hours  benztropine 1 milliGRAM(s) Oral two times a day  budesonide 160 MICROgram(s)/formoterol 4.5 MICROgram(s) Inhaler 2 Puff(s) Inhalation two times a day  diVALproex DR 1000 milliGRAM(s) Oral at bedtime  enoxaparin Injectable 40 milliGRAM(s) SubCutaneous daily  haloperidol     Tablet 10 milliGRAM(s) Oral at bedtime  haloperidol     Tablet 5 milliGRAM(s) Oral <User Schedule>  influenza   Vaccine 0.5 milliLiter(s) IntraMuscular once  melatonin 3 milliGRAM(s) Oral at bedtime  methylPREDNISolone sodium succinate Injectable 40 milliGRAM(s) IV Push every 12 hours  metoprolol succinate ER 25 milliGRAM(s) Oral daily  pantoprazole    Tablet 40 milliGRAM(s) Oral before breakfast  risperiDONE   Tablet 4 milliGRAM(s) Oral daily  rOPINIRole 0.5 milliGRAM(s) Oral two times a day  saccharomyces boulardii 250 milliGRAM(s) Oral two times a day  tiotropium 18 MICROgram(s) Capsule 1 Capsule(s) Inhalation daily    MEDICATIONS  (PRN):  acetaminophen   Tablet .. 325 milliGRAM(s) Oral every 6 hours PRN Mild Pain (1 - 3)  guaiFENesin    Syrup 200 milliGRAM(s) Oral every 6 hours PRN Cough        Labs:  LABS:

## 2019-12-08 LAB
ANION GAP SERPL CALC-SCNC: 9 MMOL/L — SIGNIFICANT CHANGE UP (ref 5–17)
BUN SERPL-MCNC: 24 MG/DL — HIGH (ref 8–20)
CALCIUM SERPL-MCNC: 9.1 MG/DL — SIGNIFICANT CHANGE UP (ref 8.6–10.2)
CHLORIDE SERPL-SCNC: 94 MMOL/L — LOW (ref 98–107)
CO2 SERPL-SCNC: 39 MMOL/L — HIGH (ref 22–29)
CREAT SERPL-MCNC: 0.56 MG/DL — SIGNIFICANT CHANGE UP (ref 0.5–1.3)
GLUCOSE SERPL-MCNC: 86 MG/DL — SIGNIFICANT CHANGE UP (ref 70–115)
HCT VFR BLD CALC: 40.5 % — SIGNIFICANT CHANGE UP (ref 39–50)
HGB BLD-MCNC: 12.5 G/DL — LOW (ref 13–17)
MCHC RBC-ENTMCNC: 30.9 GM/DL — LOW (ref 32–36)
MCHC RBC-ENTMCNC: 31.2 PG — SIGNIFICANT CHANGE UP (ref 27–34)
MCV RBC AUTO: 101 FL — HIGH (ref 80–100)
PLATELET # BLD AUTO: 286 K/UL — SIGNIFICANT CHANGE UP (ref 150–400)
POTASSIUM SERPL-MCNC: 4.7 MMOL/L — SIGNIFICANT CHANGE UP (ref 3.5–5.3)
POTASSIUM SERPL-SCNC: 4.7 MMOL/L — SIGNIFICANT CHANGE UP (ref 3.5–5.3)
RBC # BLD: 4.01 M/UL — LOW (ref 4.2–5.8)
RBC # FLD: 12.9 % — SIGNIFICANT CHANGE UP (ref 10.3–14.5)
SODIUM SERPL-SCNC: 142 MMOL/L — SIGNIFICANT CHANGE UP (ref 135–145)
WBC # BLD: 9.51 K/UL — SIGNIFICANT CHANGE UP (ref 3.8–10.5)
WBC # FLD AUTO: 9.51 K/UL — SIGNIFICANT CHANGE UP (ref 3.8–10.5)

## 2019-12-08 PROCEDURE — 99232 SBSQ HOSP IP/OBS MODERATE 35: CPT

## 2019-12-08 RX ADMIN — Medication 650 MILLIGRAM(S): at 11:30

## 2019-12-08 RX ADMIN — Medication 650 MILLIGRAM(S): at 17:44

## 2019-12-08 RX ADMIN — Medication 650 MILLIGRAM(S): at 10:12

## 2019-12-08 RX ADMIN — Medication 40 MILLIGRAM(S): at 05:26

## 2019-12-08 RX ADMIN — Medication 650 MILLIGRAM(S): at 18:14

## 2019-12-08 RX ADMIN — Medication 3 MILLIGRAM(S): at 21:05

## 2019-12-08 RX ADMIN — ROPINIROLE 0.5 MILLIGRAM(S): 8 TABLET, FILM COATED, EXTENDED RELEASE ORAL at 17:45

## 2019-12-08 RX ADMIN — ROPINIROLE 0.5 MILLIGRAM(S): 8 TABLET, FILM COATED, EXTENDED RELEASE ORAL at 05:26

## 2019-12-08 RX ADMIN — Medication 250 MILLIGRAM(S): at 17:45

## 2019-12-08 RX ADMIN — CEFTRIAXONE 100 MILLIGRAM(S): 500 INJECTION, POWDER, FOR SOLUTION INTRAMUSCULAR; INTRAVENOUS at 10:10

## 2019-12-08 RX ADMIN — Medication 200 MILLIGRAM(S): at 01:56

## 2019-12-08 RX ADMIN — Medication 3 MILLILITER(S): at 20:33

## 2019-12-08 RX ADMIN — Medication 1 MILLIGRAM(S): at 05:26

## 2019-12-08 RX ADMIN — Medication 25 MILLIGRAM(S): at 05:26

## 2019-12-08 RX ADMIN — PANTOPRAZOLE SODIUM 40 MILLIGRAM(S): 20 TABLET, DELAYED RELEASE ORAL at 05:26

## 2019-12-08 RX ADMIN — Medication 3 MILLILITER(S): at 20:37

## 2019-12-08 RX ADMIN — Medication 250 MILLIGRAM(S): at 05:26

## 2019-12-08 RX ADMIN — ENOXAPARIN SODIUM 40 MILLIGRAM(S): 100 INJECTION SUBCUTANEOUS at 11:26

## 2019-12-08 RX ADMIN — Medication 200 MILLIGRAM(S): at 10:25

## 2019-12-08 RX ADMIN — Medication 1 MILLIGRAM(S): at 17:45

## 2019-12-08 RX ADMIN — HALOPERIDOL DECANOATE 10 MILLIGRAM(S): 100 INJECTION INTRAMUSCULAR at 21:05

## 2019-12-08 RX ADMIN — Medication 40 MILLIGRAM(S): at 17:43

## 2019-12-08 RX ADMIN — DIVALPROEX SODIUM 1000 MILLIGRAM(S): 500 TABLET, DELAYED RELEASE ORAL at 21:05

## 2019-12-08 RX ADMIN — RISPERIDONE 4 MILLIGRAM(S): 4 TABLET ORAL at 11:26

## 2019-12-08 RX ADMIN — HALOPERIDOL DECANOATE 5 MILLIGRAM(S): 100 INJECTION INTRAMUSCULAR at 10:10

## 2019-12-08 NOTE — PROGRESS NOTE ADULT - SUBJECTIVE AND OBJECTIVE BOX
DAVY HOLDEN  ----------------------------------------  The patient was seen and evaluated for COPD. Denied dyspnea or chest pain.    Vital Signs Last 24 Hrs  T(C): 36.6 (08 Dec 2019 11:34), Max: 36.7 (07 Dec 2019 22:59)  T(F): 97.9 (08 Dec 2019 11:34), Max: 98.1 (07 Dec 2019 22:59)  HR: 73 (08 Dec 2019 11:34) (73 - 95)  BP: 128/82 (08 Dec 2019 11:34) (125/85 - 138/86)  BP(mean): --  RR: 18 (08 Dec 2019 11:34) (16 - 18)  SpO2: 98% (08 Dec 2019 08:50) (92% - 100%)    PHYSICAL EXAMINATION:  ----------------------------------------  General appearance: No acute distress, Awake, Alert  HEENT: Normocephalic, Atraumatic, Conjunctiva clear, EOMI  Neck: Supple, No JVD, No tenderness  Lungs: Breath sound equal bilaterally, No wheezes, No rales  Cardiovascular: S1S2, Regular rhythm  Abdomen: Soft, Nontender, Nondistended, No guarding/rebound, Positive bowel sounds  Extremities: No clubbing, No cyanosis, No edema, No calf tenderness  Neuro: Strength equal bilaterally, No tremors  Psychiatric: Appropriate mood, Normal affect    LABORATORY STUDIES:  ----------------------------------------             12.5   9.51  )-----------( 286      ( 08 Dec 2019 06:57 )             40.5     12-08    142  |  94<L>  |  24.0<H>  ----------------------------<  86  4.7   |  39.0<H>  |  0.56    Ca    9.1      08 Dec 2019 06:57    MEDICATIONS  (STANDING):  acetylcysteine 10%  Inhalation 3 milliLiter(s) Inhalation three times a day  ALBUTerol    90 MICROgram(s) HFA Inhaler 1 Puff(s) Inhalation every 4 hours  albuterol/ipratropium for Nebulization 3 milliLiter(s) Nebulizer every 6 hours  benztropine 1 milliGRAM(s) Oral two times a day  budesonide 160 MICROgram(s)/formoterol 4.5 MICROgram(s) Inhaler 2 Puff(s) Inhalation two times a day  diVALproex DR 1000 milliGRAM(s) Oral at bedtime  enoxaparin Injectable 40 milliGRAM(s) SubCutaneous daily  haloperidol     Tablet 10 milliGRAM(s) Oral at bedtime  haloperidol     Tablet 5 milliGRAM(s) Oral <User Schedule>  influenza   Vaccine 0.5 milliLiter(s) IntraMuscular once  melatonin 3 milliGRAM(s) Oral at bedtime  methylPREDNISolone sodium succinate Injectable 40 milliGRAM(s) IV Push every 12 hours  metoprolol succinate ER 25 milliGRAM(s) Oral daily  pantoprazole    Tablet 40 milliGRAM(s) Oral before breakfast  risperiDONE   Tablet 4 milliGRAM(s) Oral daily  rOPINIRole 0.5 milliGRAM(s) Oral two times a day  saccharomyces boulardii 250 milliGRAM(s) Oral two times a day  tiotropium 18 MICROgram(s) Capsule 1 Capsule(s) Inhalation daily    MEDICATIONS  (PRN):  acetaminophen   Tablet .. 650 milliGRAM(s) Oral every 6 hours PRN Mild Pain (1 - 3), Moderate Pain (4 - 6)  guaiFENesin    Syrup 200 milliGRAM(s) Oral every 6 hours PRN Cough      ASSESSMENT / PLAN:  ----------------------------------------  70yo M poor historian, with pmh of COPD, Parkinson's disease, schizophrenia current smoker 1/2ppd daily, presents to ED c/o SOB with productive cough x2days. Pt admitted for copd exacerbation/pneumonia.     COPD exacerbation / Acute hypoxic respiratory failure - On supplemental oxygen. To titrate as tolerated. On intravenous methylprednisolone, for possible transition to prednisone if improved.    Pneumonia - On ceftriaxone. Afebrile.    Hypertension - Close blood pressure monitoring. On metoprolol.    Parkinson's disease - On benztropine and ropinirole.    Schizophrenia - On haloperidol and risperidone.

## 2019-12-09 ENCOUNTER — TRANSCRIPTION ENCOUNTER (OUTPATIENT)
Age: 69
End: 2019-12-09

## 2019-12-09 PROCEDURE — 99232 SBSQ HOSP IP/OBS MODERATE 35: CPT

## 2019-12-09 RX ADMIN — Medication 650 MILLIGRAM(S): at 06:25

## 2019-12-09 RX ADMIN — Medication 1 MILLIGRAM(S): at 05:23

## 2019-12-09 RX ADMIN — HALOPERIDOL DECANOATE 5 MILLIGRAM(S): 100 INJECTION INTRAMUSCULAR at 10:10

## 2019-12-09 RX ADMIN — Medication 60 MILLIGRAM(S): at 10:16

## 2019-12-09 RX ADMIN — Medication 250 MILLIGRAM(S): at 17:10

## 2019-12-09 RX ADMIN — Medication 3 MILLILITER(S): at 15:29

## 2019-12-09 RX ADMIN — DIVALPROEX SODIUM 1000 MILLIGRAM(S): 500 TABLET, DELAYED RELEASE ORAL at 21:26

## 2019-12-09 RX ADMIN — RISPERIDONE 4 MILLIGRAM(S): 4 TABLET ORAL at 13:33

## 2019-12-09 RX ADMIN — Medication 650 MILLIGRAM(S): at 21:26

## 2019-12-09 RX ADMIN — Medication 650 MILLIGRAM(S): at 05:25

## 2019-12-09 RX ADMIN — Medication 3 MILLILITER(S): at 10:12

## 2019-12-09 RX ADMIN — Medication 250 MILLIGRAM(S): at 05:23

## 2019-12-09 RX ADMIN — Medication 3 MILLILITER(S): at 15:30

## 2019-12-09 RX ADMIN — ENOXAPARIN SODIUM 40 MILLIGRAM(S): 100 INJECTION SUBCUTANEOUS at 14:21

## 2019-12-09 RX ADMIN — Medication 3 MILLIGRAM(S): at 21:26

## 2019-12-09 RX ADMIN — HALOPERIDOL DECANOATE 10 MILLIGRAM(S): 100 INJECTION INTRAMUSCULAR at 21:26

## 2019-12-09 RX ADMIN — ROPINIROLE 0.5 MILLIGRAM(S): 8 TABLET, FILM COATED, EXTENDED RELEASE ORAL at 17:09

## 2019-12-09 RX ADMIN — PANTOPRAZOLE SODIUM 40 MILLIGRAM(S): 20 TABLET, DELAYED RELEASE ORAL at 05:23

## 2019-12-09 RX ADMIN — Medication 25 MILLIGRAM(S): at 05:23

## 2019-12-09 RX ADMIN — Medication 650 MILLIGRAM(S): at 22:26

## 2019-12-09 RX ADMIN — Medication 3 MILLILITER(S): at 04:08

## 2019-12-09 RX ADMIN — ROPINIROLE 0.5 MILLIGRAM(S): 8 TABLET, FILM COATED, EXTENDED RELEASE ORAL at 05:23

## 2019-12-09 RX ADMIN — Medication 200 MILLIGRAM(S): at 21:27

## 2019-12-09 RX ADMIN — Medication 40 MILLIGRAM(S): at 05:22

## 2019-12-09 RX ADMIN — Medication 1 MILLIGRAM(S): at 17:10

## 2019-12-09 NOTE — PROGRESS NOTE ADULT - SUBJECTIVE AND OBJECTIVE BOX
DAVY HOLDEN  ----------------------------------------  The patient was seen and evaluated for COPD.  Denied any chest pain or dyspnea.    Vital Signs Last 24 Hrs  T(C): 36.4 (09 Dec 2019 07:38), Max: 36.9 (08 Dec 2019 17:58)  T(F): 97.5 (09 Dec 2019 07:38), Max: 98.5 (08 Dec 2019 17:58)  HR: 54 (09 Dec 2019 07:38) (54 - 80)  BP: 144/90 (09 Dec 2019 07:38) (114/71 - 144/90)  BP(mean): --  RR: 18 (09 Dec 2019 07:38) (18 - 20)  SpO2: 95% (09 Dec 2019 04:45) (93% - 97%)    PHYSICAL EXAMINATION:  ----------------------------------------  General appearance: No acute distress, Awake, Alert  HEENT: Normocephalic, Atraumatic, Conjunctiva clear, EOMI  Neck: Supple, No JVD, No tenderness  Lungs: Breath sound equal bilaterally, No wheezes, No rales  Cardiovascular: S1S2, Regular rhythm  Abdomen: Soft, Nontender, Nondistended, No guarding/rebound, Positive bowel sounds  Extremities: No clubbing, No cyanosis, No edema, No calf tenderness  Neuro: Strength equal bilaterally, No tremors  Psychiatric: Appropriate mood, Normal affect    LABORATORY STUDIES:  ----------------------------------------             12.5   9.51  )-----------( 286      ( 08 Dec 2019 06:57 )             40.5     12-08    142  |  94<L>  |  24.0<H>  ----------------------------<  86  4.7   |  39.0<H>  |  0.56    Ca    9.1      08 Dec 2019 06:57    MEDICATIONS  (STANDING):  acetylcysteine 10%  Inhalation 3 milliLiter(s) Inhalation three times a day  ALBUTerol    90 MICROgram(s) HFA Inhaler 1 Puff(s) Inhalation every 4 hours  albuterol/ipratropium for Nebulization 3 milliLiter(s) Nebulizer every 6 hours  benztropine 1 milliGRAM(s) Oral two times a day  budesonide 160 MICROgram(s)/formoterol 4.5 MICROgram(s) Inhaler 2 Puff(s) Inhalation two times a day  diVALproex DR 1000 milliGRAM(s) Oral at bedtime  enoxaparin Injectable 40 milliGRAM(s) SubCutaneous daily  haloperidol     Tablet 10 milliGRAM(s) Oral at bedtime  haloperidol     Tablet 5 milliGRAM(s) Oral <User Schedule>  influenza   Vaccine 0.5 milliLiter(s) IntraMuscular once  melatonin 3 milliGRAM(s) Oral at bedtime  metoprolol succinate ER 25 milliGRAM(s) Oral daily  pantoprazole    Tablet 40 milliGRAM(s) Oral before breakfast  predniSONE   Tablet 60 milliGRAM(s) Oral daily  risperiDONE   Tablet 4 milliGRAM(s) Oral daily  rOPINIRole 0.5 milliGRAM(s) Oral two times a day  saccharomyces boulardii 250 milliGRAM(s) Oral two times a day  tiotropium 18 MICROgram(s) Capsule 1 Capsule(s) Inhalation daily    MEDICATIONS  (PRN):  acetaminophen   Tablet .. 650 milliGRAM(s) Oral every 6 hours PRN Mild Pain (1 - 3), Moderate Pain (4 - 6)  guaiFENesin    Syrup 200 milliGRAM(s) Oral every 6 hours PRN Cough      ASSESSMENT / PLAN:  ----------------------------------------  70yo M poor historian, with pmh of COPD, Parkinson's disease, schizophrenia current smoker 1/2ppd daily, presents to ED c/o SOB with productive cough x2days. Pt admitted for copd exacerbation/pneumonia.     COPD exacerbation / Acute hypoxic respiratory failure - On supplemental oxygen with attempts to titrate, noted to be hypoxic on ambient air at rest. Intravenous methylprednisolone transitioned to prednisone.    Pneumonia - Course of antibiotics was completed. Afebrile.    Hypertension - Close blood pressure monitoring. On metoprolol.    Parkinson's disease - On benztropine and ropinirole.    Schizophrenia - On haloperidol and risperidone. The patient would not be able to return to the group home due to the need for supplemental oxygen. DAVY HOLDEN  ----------------------------------------  The patient was seen and evaluated for COPD.  Denied any chest pain or dyspnea.    Vital Signs Last 24 Hrs  T(C): 36.4 (09 Dec 2019 07:38), Max: 36.9 (08 Dec 2019 17:58)  T(F): 97.5 (09 Dec 2019 07:38), Max: 98.5 (08 Dec 2019 17:58)  HR: 54 (09 Dec 2019 07:38) (54 - 80)  BP: 144/90 (09 Dec 2019 07:38) (114/71 - 144/90)  BP(mean): --  RR: 18 (09 Dec 2019 07:38) (18 - 20)  SpO2: 95% (09 Dec 2019 04:45) (93% - 97%)    PHYSICAL EXAMINATION:  ----------------------------------------  General appearance: No acute distress, Awake, Alert  HEENT: Normocephalic, Atraumatic, Conjunctiva clear, EOMI  Neck: Supple, No JVD, No tenderness  Lungs: Breath sound equal bilaterally, No wheezes, No rales  Cardiovascular: S1S2, Regular rhythm  Abdomen: Soft, Nontender, Nondistended, No guarding/rebound, Positive bowel sounds  Extremities: No clubbing, No cyanosis, No edema, No calf tenderness  Neuro: Strength equal bilaterally, No tremors  Psychiatric: Appropriate mood, Normal affect    LABORATORY STUDIES:  ----------------------------------------             12.5   9.51  )-----------( 286      ( 08 Dec 2019 06:57 )             40.5     12-08    142  |  94<L>  |  24.0<H>  ----------------------------<  86  4.7   |  39.0<H>  |  0.56    Ca    9.1      08 Dec 2019 06:57    MEDICATIONS  (STANDING):  acetylcysteine 10%  Inhalation 3 milliLiter(s) Inhalation three times a day  ALBUTerol    90 MICROgram(s) HFA Inhaler 1 Puff(s) Inhalation every 4 hours  albuterol/ipratropium for Nebulization 3 milliLiter(s) Nebulizer every 6 hours  benztropine 1 milliGRAM(s) Oral two times a day  budesonide 160 MICROgram(s)/formoterol 4.5 MICROgram(s) Inhaler 2 Puff(s) Inhalation two times a day  diVALproex DR 1000 milliGRAM(s) Oral at bedtime  enoxaparin Injectable 40 milliGRAM(s) SubCutaneous daily  haloperidol     Tablet 10 milliGRAM(s) Oral at bedtime  haloperidol     Tablet 5 milliGRAM(s) Oral <User Schedule>  influenza   Vaccine 0.5 milliLiter(s) IntraMuscular once  melatonin 3 milliGRAM(s) Oral at bedtime  metoprolol succinate ER 25 milliGRAM(s) Oral daily  pantoprazole    Tablet 40 milliGRAM(s) Oral before breakfast  predniSONE   Tablet 60 milliGRAM(s) Oral daily  risperiDONE   Tablet 4 milliGRAM(s) Oral daily  rOPINIRole 0.5 milliGRAM(s) Oral two times a day  saccharomyces boulardii 250 milliGRAM(s) Oral two times a day  tiotropium 18 MICROgram(s) Capsule 1 Capsule(s) Inhalation daily    MEDICATIONS  (PRN):  acetaminophen   Tablet .. 650 milliGRAM(s) Oral every 6 hours PRN Mild Pain (1 - 3), Moderate Pain (4 - 6)  guaiFENesin    Syrup 200 milliGRAM(s) Oral every 6 hours PRN Cough      ASSESSMENT / PLAN:  ----------------------------------------  70yo M poor historian, with pmh of COPD, Parkinson's disease, schizophrenia current smoker 1/2ppd daily, presents to ED c/o SOB with productive cough x2days. Pt admitted for copd exacerbation/pneumonia.     COPD exacerbation / Acute hypoxic respiratory failure - On supplemental oxygen with attempts to titrate, noted to be hypoxic on ambient air at rest with an oxygen saturation of 87%. The patient was transitioned from intravenous methylprednisolone to prednisone and had completed the course of antibiotics for pneumonia. It is expected that the patient would not require supplemental oxygen for greater than 120 days on convalescent care as he continues to show improvement.    Pneumonia - Course of antibiotics was completed. Afebrile.    Hypertension - Close blood pressure monitoring. On metoprolol.    Parkinson's disease - On benztropine and ropinirole.    Schizophrenia - On haloperidol and risperidone. The patient would not be able to return to the group home due to the need for supplemental oxygen.

## 2019-12-09 NOTE — DISCHARGE NOTE NURSING/CASE MANAGEMENT/SOCIAL WORK - NSDCFUADDAPPT_GEN_ALL_CORE_FT
Patient has an appointment Appt for Pulmonologist upon d/c scheduled for Friday 12/13/2019 with Dr Clarke @ 1:45 PM Patient has an appointment Appt for Pulmonologist upon d/c scheduled for Tuesday 12/17/19 1230pm with Dr Clarke.

## 2019-12-09 NOTE — CHART NOTE - NSCHARTNOTEFT_GEN_A_CORE
Source: Patient [ ]  Family [ ]   other [ x] EMR    Current Diet: Diet, DASH/TLC:   Sodium & Cholesterol Restricted (12-02-19 @ 07:57)    PO intake:  Pt consuming % of meals per EMR.    Current Weight:   (12/2) 127.8#  -Monitor weights    Pertinent Medications: MEDICATIONS  (STANDING):  acetylcysteine 10%  Inhalation 3 milliLiter(s) Inhalation three times a day  ALBUTerol    90 MICROgram(s) HFA Inhaler 1 Puff(s) Inhalation every 4 hours  albuterol/ipratropium for Nebulization 3 milliLiter(s) Nebulizer every 6 hours  benztropine 1 milliGRAM(s) Oral two times a day  budesonide 160 MICROgram(s)/formoterol 4.5 MICROgram(s) Inhaler 2 Puff(s) Inhalation two times a day  diVALproex DR 1000 milliGRAM(s) Oral at bedtime  enoxaparin Injectable 40 milliGRAM(s) SubCutaneous daily  haloperidol     Tablet 10 milliGRAM(s) Oral at bedtime  haloperidol     Tablet 5 milliGRAM(s) Oral <User Schedule>  influenza   Vaccine 0.5 milliLiter(s) IntraMuscular once  melatonin 3 milliGRAM(s) Oral at bedtime  metoprolol succinate ER 25 milliGRAM(s) Oral daily  pantoprazole    Tablet 40 milliGRAM(s) Oral before breakfast  predniSONE   Tablet 60 milliGRAM(s) Oral daily  risperiDONE   Tablet 4 milliGRAM(s) Oral daily  rOPINIRole 0.5 milliGRAM(s) Oral two times a day  saccharomyces boulardii 250 milliGRAM(s) Oral two times a day  tiotropium 18 MICROgram(s) Capsule 1 Capsule(s) Inhalation daily    MEDICATIONS  (PRN):  acetaminophen   Tablet .. 650 milliGRAM(s) Oral every 6 hours PRN Mild Pain (1 - 3), Moderate Pain (4 - 6)  guaiFENesin    Syrup 200 milliGRAM(s) Oral every 6 hours PRN Cough    Pertinent Labs: CBC Full  -  ( 08 Dec 2019 06:57 )  WBC Count : 9.51 K/uL  RBC Count : 4.01 M/uL  Hemoglobin : 12.5 g/dL  Hematocrit : 40.5 %  Platelet Count - Automated : 286 K/uL  Mean Cell Volume : 101.0 fl  Mean Cell Hemoglobin : 31.2 pg  Mean Cell Hemoglobin Concentration : 30.9 gm/dL  Auto Neutrophil # : x  Auto Lymphocyte # : x  Auto Monocyte # : x  Auto Eosinophil # : x  Auto Basophil # : x  Auto Neutrophil % : x  Auto Lymphocyte % : x  Auto Monocyte % : x  Auto Eosinophil % : x  Auto Basophil % : x    Skin: Intact    Nutrition focused physical exam previously conducted - found signs of malnutrition [ ]absent [ x]present    Subcutaneous fat loss: [x ] Orbital fat pads region, [x ]Buccal fat region, [ x]Triceps region,  [ ]Ribs region    Muscle wasting: [x ]Temples region, [ x]Clavicle region, [ x]Shoulder region, [ ]Scapula region, [ ]Interosseous region,  [ ]thigh region, [ ]Calf region    Estimated Needs:   [x ] no change since previous assessment  [ ] recalculated:     Current Nutrition Diagnosis: Pt remains at high nutrition risk and meets criteria for severe (chronic) malnutrition related to inability to meet increased protein-energy needs secondary to COPD and Parkinson disease as evidenced by severe muscle wasting and severe fat loss, likely meeting <75% est energy needs. Pt unavailable for assessment, multiple attempts made. Per EMR pt consumed 100% of meal today (12/9). RD will remain available.     Recommendations:   -RX: Add Ensure Enlive TID to optimize po intake and provide an additional 350kcal, 20g protein per serving   -Monitor weights  -Encourage po intake     Monitoring and Evaluation:   [x ] PO intake [ x] Tolerance to diet prescription [X] Weights  [X] Follow up per protocol [X] Labs:

## 2019-12-09 NOTE — DISCHARGE NOTE NURSING/CASE MANAGEMENT/SOCIAL WORK - PATIENT PORTAL LINK FT
You can access the FollowMyHealth Patient Portal offered by [facility] by registering at the following website: [email or web address]. By joining [email or web address]’s FollowMyHealth portal, you will also be able to view your health information using other applications (apps) compatible with our system.

## 2019-12-10 PROCEDURE — 99232 SBSQ HOSP IP/OBS MODERATE 35: CPT

## 2019-12-10 PROCEDURE — 93010 ELECTROCARDIOGRAM REPORT: CPT

## 2019-12-10 RX ORDER — TIOTROPIUM BROMIDE 18 UG/1
1 CAPSULE ORAL; RESPIRATORY (INHALATION) DAILY
Refills: 0 | Status: DISCONTINUED | OUTPATIENT
Start: 2019-12-10 | End: 2019-12-12

## 2019-12-10 RX ORDER — BUDESONIDE AND FORMOTEROL FUMARATE DIHYDRATE 160; 4.5 UG/1; UG/1
2 AEROSOL RESPIRATORY (INHALATION)
Refills: 0 | Status: DISCONTINUED | OUTPATIENT
Start: 2019-12-10 | End: 2019-12-12

## 2019-12-10 RX ADMIN — Medication 3 MILLILITER(S): at 10:07

## 2019-12-10 RX ADMIN — Medication 250 MILLIGRAM(S): at 05:21

## 2019-12-10 RX ADMIN — BUDESONIDE AND FORMOTEROL FUMARATE DIHYDRATE 2 PUFF(S): 160; 4.5 AEROSOL RESPIRATORY (INHALATION) at 21:01

## 2019-12-10 RX ADMIN — Medication 250 MILLIGRAM(S): at 17:18

## 2019-12-10 RX ADMIN — RISPERIDONE 4 MILLIGRAM(S): 4 TABLET ORAL at 12:05

## 2019-12-10 RX ADMIN — Medication 650 MILLIGRAM(S): at 23:50

## 2019-12-10 RX ADMIN — Medication 650 MILLIGRAM(S): at 12:05

## 2019-12-10 RX ADMIN — HALOPERIDOL DECANOATE 10 MILLIGRAM(S): 100 INJECTION INTRAMUSCULAR at 23:21

## 2019-12-10 RX ADMIN — ROPINIROLE 0.5 MILLIGRAM(S): 8 TABLET, FILM COATED, EXTENDED RELEASE ORAL at 05:21

## 2019-12-10 RX ADMIN — Medication 25 MILLIGRAM(S): at 05:21

## 2019-12-10 RX ADMIN — Medication 1 MILLIGRAM(S): at 05:21

## 2019-12-10 RX ADMIN — Medication 3 MILLILITER(S): at 15:29

## 2019-12-10 RX ADMIN — Medication 1 MILLIGRAM(S): at 17:18

## 2019-12-10 RX ADMIN — Medication 200 MILLIGRAM(S): at 17:18

## 2019-12-10 RX ADMIN — BUDESONIDE AND FORMOTEROL FUMARATE DIHYDRATE 2 PUFF(S): 160; 4.5 AEROSOL RESPIRATORY (INHALATION) at 10:07

## 2019-12-10 RX ADMIN — Medication 650 MILLIGRAM(S): at 06:31

## 2019-12-10 RX ADMIN — Medication 3 MILLILITER(S): at 21:00

## 2019-12-10 RX ADMIN — Medication 60 MILLIGRAM(S): at 05:21

## 2019-12-10 RX ADMIN — Medication 650 MILLIGRAM(S): at 05:23

## 2019-12-10 RX ADMIN — Medication 3 MILLIGRAM(S): at 23:21

## 2019-12-10 RX ADMIN — HALOPERIDOL DECANOATE 5 MILLIGRAM(S): 100 INJECTION INTRAMUSCULAR at 08:38

## 2019-12-10 RX ADMIN — TIOTROPIUM BROMIDE 1 CAPSULE(S): 18 CAPSULE ORAL; RESPIRATORY (INHALATION) at 10:07

## 2019-12-10 RX ADMIN — PANTOPRAZOLE SODIUM 40 MILLIGRAM(S): 20 TABLET, DELAYED RELEASE ORAL at 05:21

## 2019-12-10 RX ADMIN — Medication 200 MILLIGRAM(S): at 22:57

## 2019-12-10 RX ADMIN — ROPINIROLE 0.5 MILLIGRAM(S): 8 TABLET, FILM COATED, EXTENDED RELEASE ORAL at 17:18

## 2019-12-10 RX ADMIN — ENOXAPARIN SODIUM 40 MILLIGRAM(S): 100 INJECTION SUBCUTANEOUS at 12:05

## 2019-12-10 RX ADMIN — Medication 650 MILLIGRAM(S): at 13:00

## 2019-12-10 RX ADMIN — Medication 650 MILLIGRAM(S): at 22:57

## 2019-12-10 NOTE — CHART NOTE - NSCHARTNOTEFT_GEN_A_CORE
Medicine PA-   S- Cd. for pt. c/o ? CP, pain is pleuritic in nature w/  inspiration, non-radiating, denies SOB, palpitations. Pt. admitted w/ COPD, PNA, still a smoker.  O- VSS- 126/72-65-18-97.9- 95% 4L  Gen- Pt. asleep, in NAD, denies pain now, wants to go home  S1S2 ausc.  Lungs- clear bilat  Chest- + tenderness ant.  A- Pleuritic CP, hx COPD, PNA  P- Stat EKG- sinus melida @57, no acute changes  Reassurance given, continue to monitor, call PA if status changes.

## 2019-12-10 NOTE — GOALS OF CARE CONVERSATION - ADVANCED CARE PLANNING - CONVERSATION DETAILS
The patient's medical issues and treatment course thus far was discussed. He reported improvement in his symptoms and wished to pursue further treatement as necessary. He wished to pursue life sustaining intervention and resuscitation if it was deemed necessary.

## 2019-12-11 PROCEDURE — 99232 SBSQ HOSP IP/OBS MODERATE 35: CPT

## 2019-12-11 RX ORDER — IPRATROPIUM/ALBUTEROL SULFATE 18-103MCG
3 AEROSOL WITH ADAPTER (GRAM) INHALATION EVERY 6 HOURS
Refills: 0 | Status: DISCONTINUED | OUTPATIENT
Start: 2019-12-11 | End: 2019-12-12

## 2019-12-11 RX ADMIN — ENOXAPARIN SODIUM 40 MILLIGRAM(S): 100 INJECTION SUBCUTANEOUS at 12:45

## 2019-12-11 RX ADMIN — BUDESONIDE AND FORMOTEROL FUMARATE DIHYDRATE 2 PUFF(S): 160; 4.5 AEROSOL RESPIRATORY (INHALATION) at 21:12

## 2019-12-11 RX ADMIN — Medication 200 MILLIGRAM(S): at 22:24

## 2019-12-11 RX ADMIN — Medication 3 MILLIGRAM(S): at 21:16

## 2019-12-11 RX ADMIN — RISPERIDONE 4 MILLIGRAM(S): 4 TABLET ORAL at 12:46

## 2019-12-11 RX ADMIN — Medication 3 MILLILITER(S): at 21:28

## 2019-12-11 RX ADMIN — BUDESONIDE AND FORMOTEROL FUMARATE DIHYDRATE 2 PUFF(S): 160; 4.5 AEROSOL RESPIRATORY (INHALATION) at 09:07

## 2019-12-11 RX ADMIN — Medication 250 MILLIGRAM(S): at 05:13

## 2019-12-11 RX ADMIN — Medication 1 MILLIGRAM(S): at 05:13

## 2019-12-11 RX ADMIN — ROPINIROLE 0.5 MILLIGRAM(S): 8 TABLET, FILM COATED, EXTENDED RELEASE ORAL at 05:13

## 2019-12-11 RX ADMIN — PANTOPRAZOLE SODIUM 40 MILLIGRAM(S): 20 TABLET, DELAYED RELEASE ORAL at 05:13

## 2019-12-11 RX ADMIN — TIOTROPIUM BROMIDE 1 CAPSULE(S): 18 CAPSULE ORAL; RESPIRATORY (INHALATION) at 09:09

## 2019-12-11 RX ADMIN — Medication 650 MILLIGRAM(S): at 21:32

## 2019-12-11 RX ADMIN — ROPINIROLE 0.5 MILLIGRAM(S): 8 TABLET, FILM COATED, EXTENDED RELEASE ORAL at 19:58

## 2019-12-11 RX ADMIN — DIVALPROEX SODIUM 1000 MILLIGRAM(S): 500 TABLET, DELAYED RELEASE ORAL at 22:22

## 2019-12-11 RX ADMIN — Medication 25 MILLIGRAM(S): at 05:13

## 2019-12-11 RX ADMIN — Medication 1 MILLIGRAM(S): at 19:58

## 2019-12-11 RX ADMIN — Medication 60 MILLIGRAM(S): at 05:13

## 2019-12-11 RX ADMIN — HALOPERIDOL DECANOATE 5 MILLIGRAM(S): 100 INJECTION INTRAMUSCULAR at 09:34

## 2019-12-11 RX ADMIN — Medication 3 MILLILITER(S): at 03:29

## 2019-12-11 RX ADMIN — HALOPERIDOL DECANOATE 10 MILLIGRAM(S): 100 INJECTION INTRAMUSCULAR at 21:16

## 2019-12-11 RX ADMIN — Medication 3 MILLILITER(S): at 09:07

## 2019-12-11 RX ADMIN — Medication 650 MILLIGRAM(S): at 21:17

## 2019-12-11 RX ADMIN — Medication 650 MILLIGRAM(S): at 09:36

## 2019-12-11 NOTE — PROGRESS NOTE ADULT - ATTENDING COMMENTS
Estimated discharge date 12/11/19
Estimated discharge date 12/12/19
Estimated discharge date undetermined
Continue IV Solumedrol, IV antibiotics  Aspiration precautions, ST evaluation  Will need repeat Ct in 6 weeks

## 2019-12-11 NOTE — PROGRESS NOTE ADULT - REASON FOR ADMISSION
Acute hypoxic respiratory failure  COPD exacerbation, Pneumonia

## 2019-12-11 NOTE — PROGRESS NOTE ADULT - SUBJECTIVE AND OBJECTIVE BOX
DAVY HOLDEN  ----------------------------------------  The patient was seen and evaluated for hypoxia. Appears comfortable, offers no complaints.    Vital Signs Last 24 Hrs  T(C): 36.6 (11 Dec 2019 09:20), Max: 36.8 (10 Dec 2019 17:44)  T(F): 97.9 (11 Dec 2019 09:20), Max: 98.3 (10 Dec 2019 17:44)  HR: 62 (11 Dec 2019 09:20) (62 - 82)  BP: 131/83 (11 Dec 2019 09:20) (110/71 - 134/79)  BP(mean): --  RR: 18 (11 Dec 2019 09:20) (18 - 19)  SpO2: 96% (11 Dec 2019 09:10) (94% - 100%)    PHYSICAL EXAMINATION:  ----------------------------------------  General appearance: No acute distress, Awake, Alert  HEENT: Normocephalic, Atraumatic, Conjunctiva clear, EOMI  Neck: Supple, No JVD, No tenderness  Lungs: Breath sound equal bilaterally, No wheezes, No rales  Cardiovascular: S1S2, Regular rhythm  Abdomen: Soft, Nontender, Nondistended, No guarding/rebound, Positive bowel sounds  Extremities: No clubbing, No cyanosis, No edema, No calf tenderness  Neuro: Strength equal bilaterally, No tremors  Psychiatric: Appropriate mood, Normal affect    MEDICATIONS  (STANDING):  ALBUTerol    90 MICROgram(s) HFA Inhaler 1 Puff(s) Inhalation every 4 hours  albuterol/ipratropium for Nebulization 3 milliLiter(s) Nebulizer every 6 hours  benztropine 1 milliGRAM(s) Oral two times a day  budesonide 160 MICROgram(s)/formoterol 4.5 MICROgram(s) Inhaler 2 Puff(s) Inhalation two times a day  diVALproex DR 1000 milliGRAM(s) Oral at bedtime  enoxaparin Injectable 40 milliGRAM(s) SubCutaneous daily  haloperidol     Tablet 10 milliGRAM(s) Oral at bedtime  haloperidol     Tablet 5 milliGRAM(s) Oral <User Schedule>  influenza   Vaccine 0.5 milliLiter(s) IntraMuscular once  melatonin 3 milliGRAM(s) Oral at bedtime  metoprolol succinate ER 25 milliGRAM(s) Oral daily  pantoprazole    Tablet 40 milliGRAM(s) Oral before breakfast  predniSONE   Tablet 60 milliGRAM(s) Oral daily  risperiDONE   Tablet 4 milliGRAM(s) Oral daily  rOPINIRole 0.5 milliGRAM(s) Oral two times a day  saccharomyces boulardii 250 milliGRAM(s) Oral two times a day  tiotropium 18 MICROgram(s) Capsule 1 Capsule(s) Inhalation daily    MEDICATIONS  (PRN):  acetaminophen   Tablet .. 650 milliGRAM(s) Oral every 6 hours PRN Mild Pain (1 - 3), Moderate Pain (4 - 6)  guaiFENesin    Syrup 200 milliGRAM(s) Oral every 6 hours PRN Cough      ASSESSMENT / PLAN:  ----------------------------------------  68yo M poor historian, with pmh of COPD, Parkinson's disease, schizophrenia current smoker 1/2ppd daily, presents to ED c/o SOB with productive cough x2days. Pt admitted for copd exacerbation/pneumonia.     COPD exacerbation / Acute hypoxic respiratory failure - Titrating supplemental oxygen as tolerated. Previously transitioned from intravenous methylprednisolone to prednisone and he has also completed the course of antibiotics for pneumonia.    Pneumonia - Afebrile today. Course of antibiotics was completed.    Hypertension - Close blood pressure monitoring. On metoprolol.    Parkinson's disease - On benztropine and ropinirole.    Schizophrenia - On haloperidol and risperidone. DAVY HOLDEN  ----------------------------------------  The patient was seen and evaluated for hypoxia. Appears comfortable, offers no complaints.    Vital Signs Last 24 Hrs  T(C): 36.6 (11 Dec 2019 09:20), Max: 36.8 (10 Dec 2019 17:44)  T(F): 97.9 (11 Dec 2019 09:20), Max: 98.3 (10 Dec 2019 17:44)  HR: 62 (11 Dec 2019 09:20) (62 - 82)  BP: 131/83 (11 Dec 2019 09:20) (110/71 - 134/79)  BP(mean): --  RR: 18 (11 Dec 2019 09:20) (18 - 19)  SpO2: 96% (11 Dec 2019 09:10) (94% - 100%)    PHYSICAL EXAMINATION:  ----------------------------------------  General appearance: No acute distress, Awake, Alert  HEENT: Normocephalic, Atraumatic, Conjunctiva clear, EOMI  Neck: Supple, No JVD, No tenderness  Lungs: Breath sound equal bilaterally, No wheezes, No rales  Cardiovascular: S1S2, Regular rhythm  Abdomen: Soft, Nontender, Nondistended, No guarding/rebound, Positive bowel sounds  Extremities: No clubbing, No cyanosis, No edema, No calf tenderness  Neuro: Strength equal bilaterally, No tremors  Psychiatric: Appropriate mood, Normal affect    MEDICATIONS  (STANDING):  ALBUTerol    90 MICROgram(s) HFA Inhaler 1 Puff(s) Inhalation every 4 hours  albuterol/ipratropium for Nebulization 3 milliLiter(s) Nebulizer every 6 hours  benztropine 1 milliGRAM(s) Oral two times a day  budesonide 160 MICROgram(s)/formoterol 4.5 MICROgram(s) Inhaler 2 Puff(s) Inhalation two times a day  diVALproex DR 1000 milliGRAM(s) Oral at bedtime  enoxaparin Injectable 40 milliGRAM(s) SubCutaneous daily  haloperidol     Tablet 10 milliGRAM(s) Oral at bedtime  haloperidol     Tablet 5 milliGRAM(s) Oral <User Schedule>  influenza   Vaccine 0.5 milliLiter(s) IntraMuscular once  melatonin 3 milliGRAM(s) Oral at bedtime  metoprolol succinate ER 25 milliGRAM(s) Oral daily  pantoprazole    Tablet 40 milliGRAM(s) Oral before breakfast  predniSONE   Tablet 60 milliGRAM(s) Oral daily  risperiDONE   Tablet 4 milliGRAM(s) Oral daily  rOPINIRole 0.5 milliGRAM(s) Oral two times a day  saccharomyces boulardii 250 milliGRAM(s) Oral two times a day  tiotropium 18 MICROgram(s) Capsule 1 Capsule(s) Inhalation daily    MEDICATIONS  (PRN):  acetaminophen   Tablet .. 650 milliGRAM(s) Oral every 6 hours PRN Mild Pain (1 - 3), Moderate Pain (4 - 6)  guaiFENesin    Syrup 200 milliGRAM(s) Oral every 6 hours PRN Cough      ASSESSMENT / PLAN:  ----------------------------------------  70yo M poor historian, with pmh of COPD, Parkinson's disease, schizophrenia current smoker 1/2ppd daily, presents to ED c/o SOB with productive cough x2days. He was admitted for COPD exacerbation and treated with intravenous methylprednisolone. Antibiotics were also initiated for pneumonia. The patient had improvement in his symptoms but continued to have episodes of hypoxia.    COPD exacerbation / Acute hypoxic respiratory failure - Titrating supplemental oxygen as tolerated. Previously transitioned from intravenous methylprednisolone to prednisone and he has also completed the course of antibiotics for pneumonia.    Pneumonia - Afebrile today. Course of antibiotics was completed.    Hypertension - Close blood pressure monitoring. On metoprolol.    Parkinson's disease - On benztropine and ropinirole.    Schizophrenia - On haloperidol and risperidone.

## 2019-12-12 ENCOUNTER — INBOUND DOCUMENT (OUTPATIENT)
Age: 69
End: 2019-12-12

## 2019-12-12 ENCOUNTER — TRANSCRIPTION ENCOUNTER (OUTPATIENT)
Age: 69
End: 2019-12-12

## 2019-12-12 VITALS — OXYGEN SATURATION: 94 %

## 2019-12-12 PROCEDURE — 93005 ELECTROCARDIOGRAM TRACING: CPT

## 2019-12-12 PROCEDURE — 80048 BASIC METABOLIC PNL TOTAL CA: CPT

## 2019-12-12 PROCEDURE — 71045 X-RAY EXAM CHEST 1 VIEW: CPT

## 2019-12-12 PROCEDURE — 71250 CT THORAX DX C-: CPT

## 2019-12-12 PROCEDURE — 84484 ASSAY OF TROPONIN QUANT: CPT

## 2019-12-12 PROCEDURE — 97163 PT EVAL HIGH COMPLEX 45 MIN: CPT

## 2019-12-12 PROCEDURE — 97110 THERAPEUTIC EXERCISES: CPT

## 2019-12-12 PROCEDURE — 36415 COLL VENOUS BLD VENIPUNCTURE: CPT

## 2019-12-12 PROCEDURE — 94640 AIRWAY INHALATION TREATMENT: CPT

## 2019-12-12 PROCEDURE — 84145 PROCALCITONIN (PCT): CPT

## 2019-12-12 PROCEDURE — 82803 BLOOD GASES ANY COMBINATION: CPT

## 2019-12-12 PROCEDURE — 97116 GAIT TRAINING THERAPY: CPT

## 2019-12-12 PROCEDURE — 92610 EVALUATE SWALLOWING FUNCTION: CPT

## 2019-12-12 PROCEDURE — 80053 COMPREHEN METABOLIC PANEL: CPT

## 2019-12-12 PROCEDURE — 97530 THERAPEUTIC ACTIVITIES: CPT

## 2019-12-12 PROCEDURE — 99285 EMERGENCY DEPT VISIT HI MDM: CPT | Mod: 25

## 2019-12-12 PROCEDURE — 99239 HOSP IP/OBS DSCHRG MGMT >30: CPT

## 2019-12-12 PROCEDURE — 85027 COMPLETE CBC AUTOMATED: CPT

## 2019-12-12 PROCEDURE — 94660 CPAP INITIATION&MGMT: CPT

## 2019-12-12 RX ORDER — RISPERIDONE 4 MG/1
1 TABLET ORAL
Qty: 0 | Refills: 0 | DISCHARGE

## 2019-12-12 RX ORDER — RISPERIDONE 4 MG/1
1 TABLET ORAL
Qty: 0 | Refills: 0 | DISCHARGE
Start: 2019-12-12

## 2019-12-12 RX ADMIN — HALOPERIDOL DECANOATE 5 MILLIGRAM(S): 100 INJECTION INTRAMUSCULAR at 07:42

## 2019-12-12 RX ADMIN — BUDESONIDE AND FORMOTEROL FUMARATE DIHYDRATE 2 PUFF(S): 160; 4.5 AEROSOL RESPIRATORY (INHALATION) at 08:50

## 2019-12-12 RX ADMIN — PANTOPRAZOLE SODIUM 40 MILLIGRAM(S): 20 TABLET, DELAYED RELEASE ORAL at 05:05

## 2019-12-12 RX ADMIN — Medication 650 MILLIGRAM(S): at 05:25

## 2019-12-12 RX ADMIN — Medication 40 MILLIGRAM(S): at 05:05

## 2019-12-12 RX ADMIN — Medication 650 MILLIGRAM(S): at 05:07

## 2019-12-12 RX ADMIN — Medication 1 MILLIGRAM(S): at 05:05

## 2019-12-12 RX ADMIN — ROPINIROLE 0.5 MILLIGRAM(S): 8 TABLET, FILM COATED, EXTENDED RELEASE ORAL at 05:05

## 2019-12-12 RX ADMIN — RISPERIDONE 4 MILLIGRAM(S): 4 TABLET ORAL at 12:42

## 2019-12-12 RX ADMIN — Medication 25 MILLIGRAM(S): at 05:05

## 2019-12-12 RX ADMIN — TIOTROPIUM BROMIDE 1 CAPSULE(S): 18 CAPSULE ORAL; RESPIRATORY (INHALATION) at 08:50

## 2019-12-12 NOTE — DISCHARGE NOTE PROVIDER - NSDCFUSCHEDAPPT_GEN_ALL_CORE_FT
DAVY HOLDEN ; 12/17/2019 ; [facility] [address] [place] [address] DAVY HOLDEN ; 12/17/2019 ; [facility] [address] [place] [address]

## 2019-12-12 NOTE — DISCHARGE NOTE PROVIDER - CARE PROVIDER_API CALL
Blair Belle)  Critical Care Medicine; Internal Medicine; Pulmonary Disease  [address] [place] [address]  Phone: [phone]  Fax: [phone]  Follow Up Time:

## 2019-12-12 NOTE — DISCHARGE NOTE PROVIDER - HOSPITAL COURSE
COPD exacerbation / Acute hypoxic respiratory failure - Titrating supplemental oxygen as tolerated. Previously transitioned from intravenous methylprednisolone to prednisone and he has also completed the course of antibiotics for pneumonia.        Pneumonia - Afebrile today. Course of antibiotics was completed.        Hypertension - Close blood pressure monitoring. On metoprolol.        Parkinson's disease - On benztropine and ropinirole.        Schizophrenia - On haloperidol and risperidone. COPD exacerbation / Acute hypoxic respiratory failure - Titrating supplemental oxygen as tolerated. Previously transitioned from intravenous methylprednisolone to prednisone and he has also completed the course of antibiotics for pneumonia.        Pneumonia - Afebrile today. Course of antibiotics was completed.        Hypertension -  metoprolol.        Parkinson's disease - benztropine and ropinirole.        Schizophrenia - haloperidol and risperidone.

## 2019-12-12 NOTE — DISCHARGE NOTE PROVIDER - NSDCCPCAREPLAN_GEN_ALL_CORE_FT
PRINCIPAL DISCHARGE DIAGNOSIS  Diagnosis: COPD (chronic obstructive pulmonary disease)  Assessment and Plan of Treatment:       SECONDARY DISCHARGE DIAGNOSES  Diagnosis: COPD with respiratory failure, acute  Assessment and Plan of Treatment:     Diagnosis: Hypertension  Assessment and Plan of Treatment:     Diagnosis: Pneumonia involving right lung  Assessment and Plan of Treatment:

## 2019-12-12 NOTE — DISCHARGE NOTE PROVIDER - NSDCFUADDAPPT_GEN_ALL_CORE_FT
Patient has an appointment Appt for Pulmonologist upon d/c scheduled for Tuesday 12/17/19 1230pm with Dr Clarke.

## 2019-12-12 NOTE — DISCHARGE NOTE PROVIDER - NSDCMRMEDTOKEN_GEN_ALL_CORE_FT
acetaminophen 500 mg oral tablet: 1 tab(s) orally 2 times a day, As Needed  albuterol 90 mcg/inh inhalation aerosol: 1 puff(s) inhaled 4 times a day  benztropine 1 mg oral tablet: 1 tab(s) orally 2 times a day  budesonide-formoterol 160 mcg-4.5 mcg/inh inhalation aerosol: 2 puff(s) inhaled 2 times a day  divalproex sodium 500 mg oral delayed release tablet: 2 tab(s) orally once a day (at bedtime)  haloperidol 10 mg oral tablet: 1 tab(s) orally once a day (at bedtime)  melatonin 3 mg oral tablet: 1 tab(s) orally once a day (at bedtime)  metoprolol succinate 25 mg oral tablet, extended release: 1 tab(s) orally once a day  raNITIdine 150 mg oral capsule: 1 cap(s) orally once a day  Restasis 0.05% ophthalmic emulsion: 1 drop(s) to each affected eye every 12 hours  risperiDONE 4 mg oral tablet: 1 tab(s) orally once a day  rOPINIRole 0.5 mg oral tablet: 1 tab(s) orally 2 times a day  tiotropium 18 mcg inhalation capsule: 1 cap(s) inhaled once a day acetaminophen 500 mg oral tablet: 1 tab(s) orally 2 times a day, As Needed  albuterol 90 mcg/inh inhalation aerosol: 1 puff(s) inhaled 4 times a day  benztropine 1 mg oral tablet: 1 tab(s) orally 2 times a day  budesonide-formoterol 160 mcg-4.5 mcg/inh inhalation aerosol: 2 puff(s) inhaled 2 times a day  divalproex sodium 500 mg oral delayed release tablet: 2 tab(s) orally once a day (at bedtime)  haloperidol 10 mg oral tablet: 1 tab(s) orally once a day (at bedtime)  melatonin 3 mg oral tablet: 1 tab(s) orally once a day (at bedtime)  metoprolol succinate 25 mg oral tablet, extended release: 1 tab(s) orally once a day  predniSONE 10 mg oral tablet: 1 tab(s) orally once a day MDD:3tab dly 2 day/ 2tab dly 3 day/1tab dly 3days/  raNITIdine 150 mg oral capsule: 1 cap(s) orally once a day  Restasis 0.05% ophthalmic emulsion: 1 drop(s) to each affected eye every 12 hours  risperiDONE 4 mg oral tablet: 1 tab(s) orally once a day  rOPINIRole 0.5 mg oral tablet: 1 tab(s) orally 2 times a day  tiotropium 18 mcg inhalation capsule: 1 cap(s) inhaled once a day

## 2019-12-17 ENCOUNTER — APPOINTMENT (OUTPATIENT)
Dept: PULMONOLOGY | Facility: CLINIC | Age: 69
End: 2019-12-17

## 2020-01-14 ENCOUNTER — INPATIENT (INPATIENT)
Facility: HOSPITAL | Age: 70
LOS: 14 days | Discharge: HOME | DRG: 190 | End: 2020-01-29
Attending: HOSPITALIST | Admitting: PSYCHIATRY & NEUROLOGY
Payer: MEDICARE

## 2020-01-14 VITALS
SYSTOLIC BLOOD PRESSURE: 126 MMHG | WEIGHT: 138.01 LBS | HEART RATE: 112 BPM | DIASTOLIC BLOOD PRESSURE: 83 MMHG | OXYGEN SATURATION: 95 % | HEIGHT: 68 IN | RESPIRATION RATE: 28 BRPM | TEMPERATURE: 99 F

## 2020-01-14 DIAGNOSIS — J44.1 CHRONIC OBSTRUCTIVE PULMONARY DISEASE WITH (ACUTE) EXACERBATION: ICD-10-CM

## 2020-01-14 LAB
ALBUMIN SERPL ELPH-MCNC: 4 G/DL — SIGNIFICANT CHANGE UP (ref 3.3–5.2)
ALP SERPL-CCNC: 142 U/L — HIGH (ref 40–120)
ALT FLD-CCNC: 44 U/L — HIGH
ANION GAP SERPL CALC-SCNC: 11 MMOL/L — SIGNIFICANT CHANGE UP (ref 5–17)
ANISOCYTOSIS BLD QL: SLIGHT — SIGNIFICANT CHANGE UP
APTT BLD: 37.8 SEC — HIGH (ref 27.5–36.3)
AST SERPL-CCNC: 73 U/L — HIGH
BASE EXCESS BLDA CALC-SCNC: 2.2 MMOL/L — HIGH (ref -2–2)
BASE EXCESS BLDA CALC-SCNC: 3.6 MMOL/L — HIGH (ref -3–3)
BASE EXCESS BLDV CALC-SCNC: 7.2 MMOL/L — HIGH (ref -2–2)
BASOPHILS # BLD AUTO: 0 K/UL — SIGNIFICANT CHANGE UP (ref 0–0.2)
BASOPHILS NFR BLD AUTO: 0 % — SIGNIFICANT CHANGE UP (ref 0–2)
BILIRUB SERPL-MCNC: 0.6 MG/DL — SIGNIFICANT CHANGE UP (ref 0.4–2)
BLOOD GAS COMMENTS ARTERIAL: SIGNIFICANT CHANGE UP
BUN SERPL-MCNC: 11 MG/DL — SIGNIFICANT CHANGE UP (ref 8–20)
CA-I SERPL-SCNC: 1.13 MMOL/L — LOW (ref 1.15–1.33)
CALCIUM SERPL-MCNC: 9.2 MG/DL — SIGNIFICANT CHANGE UP (ref 8.6–10.2)
CHLORIDE BLDV-SCNC: 92 MMOL/L — LOW (ref 98–107)
CHLORIDE SERPL-SCNC: 92 MMOL/L — LOW (ref 98–107)
CO2 SERPL-SCNC: 32 MMOL/L — HIGH (ref 22–29)
CREAT SERPL-MCNC: 0.71 MG/DL — SIGNIFICANT CHANGE UP (ref 0.5–1.3)
EOSINOPHIL # BLD AUTO: 0.06 K/UL — SIGNIFICANT CHANGE UP (ref 0–0.5)
EOSINOPHIL NFR BLD AUTO: 0.9 % — SIGNIFICANT CHANGE UP (ref 0–6)
GAS PNL BLDA: SIGNIFICANT CHANGE UP
GAS PNL BLDV: 136 MMOL/L — SIGNIFICANT CHANGE UP (ref 135–145)
GAS PNL BLDV: SIGNIFICANT CHANGE UP
GAS PNL BLDV: SIGNIFICANT CHANGE UP
GLUCOSE BLDV-MCNC: 89 MG/DL — SIGNIFICANT CHANGE UP (ref 70–99)
GLUCOSE SERPL-MCNC: 93 MG/DL — SIGNIFICANT CHANGE UP (ref 70–115)
HCO3 BLDA-SCNC: 26 MMOL/L — SIGNIFICANT CHANGE UP (ref 20–26)
HCO3 BLDA-SCNC: 28 MMOL/L — HIGH (ref 20–26)
HCO3 BLDV-SCNC: 30 MMOL/L — HIGH (ref 20–26)
HCT VFR BLD CALC: 40 % — SIGNIFICANT CHANGE UP (ref 39–50)
HCT VFR BLDA CALC: 41 — SIGNIFICANT CHANGE UP (ref 39–50)
HGB BLD CALC-MCNC: 13.4 G/DL — SIGNIFICANT CHANGE UP (ref 13–17)
HGB BLD-MCNC: 12.5 G/DL — LOW (ref 13–17)
HOROWITZ INDEX BLDA+IHG-RTO: 0.3 — SIGNIFICANT CHANGE UP
HOROWITZ INDEX BLDA+IHG-RTO: 30 — SIGNIFICANT CHANGE UP
INR BLD: 1.43 RATIO — HIGH (ref 0.88–1.16)
LACTATE BLDV-MCNC: 1.2 MMOL/L — SIGNIFICANT CHANGE UP (ref 0.5–2)
LYMPHOCYTES # BLD AUTO: 0.34 K/UL — LOW (ref 1–3.3)
LYMPHOCYTES # BLD AUTO: 5.2 % — LOW (ref 13–44)
MACROCYTES BLD QL: SLIGHT — SIGNIFICANT CHANGE UP
MANUAL SMEAR VERIFICATION: SIGNIFICANT CHANGE UP
MCHC RBC-ENTMCNC: 30.7 PG — SIGNIFICANT CHANGE UP (ref 27–34)
MCHC RBC-ENTMCNC: 31.3 GM/DL — LOW (ref 32–36)
MCV RBC AUTO: 98.3 FL — SIGNIFICANT CHANGE UP (ref 80–100)
MICROCYTES BLD QL: SLIGHT — SIGNIFICANT CHANGE UP
MONOCYTES # BLD AUTO: 0.29 K/UL — SIGNIFICANT CHANGE UP (ref 0–0.9)
MONOCYTES NFR BLD AUTO: 4.4 % — SIGNIFICANT CHANGE UP (ref 2–14)
MYELOCYTES NFR BLD: 1.7 % — HIGH (ref 0–0)
NEUTROPHILS # BLD AUTO: 5.79 K/UL — SIGNIFICANT CHANGE UP (ref 1.8–7.4)
NEUTROPHILS NFR BLD AUTO: 83.5 % — HIGH (ref 43–77)
NEUTS BAND # BLD: 4.3 % — SIGNIFICANT CHANGE UP (ref 0–8)
NRBC # BLD: 1 /100 — HIGH (ref 0–0)
OTHER CELLS CSF MANUAL: 11 ML/DL — LOW (ref 18–22)
PCO2 BLDA: 70 MMHG — CRITICAL HIGH (ref 35–45)
PCO2 BLDA: 76 MMHG — CRITICAL HIGH (ref 35–45)
PCO2 BLDV: 67 MMHG — HIGH (ref 35–50)
PH BLDA: 7.23 — LOW (ref 7.35–7.45)
PH BLDA: 7.28 — LOW (ref 7.35–7.45)
PH BLDV: 7.34 — SIGNIFICANT CHANGE UP (ref 7.32–7.43)
PLAT MORPH BLD: ABNORMAL
PLATELET # BLD AUTO: 190 K/UL — SIGNIFICANT CHANGE UP (ref 150–400)
PLATELET CLUMP BLD QL SMEAR: SLIGHT
PLATELET COUNT - ESTIMATE: NORMAL — SIGNIFICANT CHANGE UP
PO2 BLDA: 87 MMHG — SIGNIFICANT CHANGE UP (ref 83–108)
PO2 BLDA: 97 MMHG — SIGNIFICANT CHANGE UP (ref 83–108)
PO2 BLDV: 36 MMHG — SIGNIFICANT CHANGE UP (ref 25–45)
POIKILOCYTOSIS BLD QL AUTO: SLIGHT — SIGNIFICANT CHANGE UP
POLYCHROMASIA BLD QL SMEAR: SLIGHT — SIGNIFICANT CHANGE UP
POTASSIUM BLDV-SCNC: 4.3 MMOL/L — SIGNIFICANT CHANGE UP (ref 3.4–4.5)
POTASSIUM SERPL-MCNC: 4.6 MMOL/L — SIGNIFICANT CHANGE UP (ref 3.5–5.3)
POTASSIUM SERPL-SCNC: 4.6 MMOL/L — SIGNIFICANT CHANGE UP (ref 3.5–5.3)
PROT SERPL-MCNC: 7 G/DL — SIGNIFICANT CHANGE UP (ref 6.6–8.7)
PROTHROM AB SERPL-ACNC: 16.6 SEC — HIGH (ref 10–12.9)
RAPID RVP RESULT: SIGNIFICANT CHANGE UP
RBC # BLD: 4.07 M/UL — LOW (ref 4.2–5.8)
RBC # FLD: 15.7 % — HIGH (ref 10.3–14.5)
RBC BLD AUTO: ABNORMAL
SAO2 % BLDA: 96 % — SIGNIFICANT CHANGE UP (ref 95–99)
SAO2 % BLDA: 96 % — SIGNIFICANT CHANGE UP (ref 95–99)
SAO2 % BLDV: 63 % — SIGNIFICANT CHANGE UP
SCHISTOCYTES BLD QL AUTO: SLIGHT — SIGNIFICANT CHANGE UP
SMUDGE CELLS # BLD: PRESENT — SIGNIFICANT CHANGE UP
SODIUM SERPL-SCNC: 135 MMOL/L — SIGNIFICANT CHANGE UP (ref 135–145)
WBC # BLD: 6.59 K/UL — SIGNIFICANT CHANGE UP (ref 3.8–10.5)
WBC # FLD AUTO: 6.59 K/UL — SIGNIFICANT CHANGE UP (ref 3.8–10.5)

## 2020-01-14 PROCEDURE — 99291 CRITICAL CARE FIRST HOUR: CPT

## 2020-01-14 PROCEDURE — 93010 ELECTROCARDIOGRAM REPORT: CPT

## 2020-01-14 PROCEDURE — 71045 X-RAY EXAM CHEST 1 VIEW: CPT | Mod: 26

## 2020-01-14 RX ORDER — ALBUTEROL 90 UG/1
1 AEROSOL, METERED ORAL
Qty: 0 | Refills: 0 | DISCHARGE

## 2020-01-14 RX ORDER — ENOXAPARIN SODIUM 100 MG/ML
40 INJECTION SUBCUTANEOUS DAILY
Refills: 0 | Status: DISCONTINUED | OUTPATIENT
Start: 2020-01-14 | End: 2020-01-20

## 2020-01-14 RX ORDER — AZITHROMYCIN 500 MG/1
500 TABLET, FILM COATED ORAL ONCE
Refills: 0 | Status: COMPLETED | OUTPATIENT
Start: 2020-01-14 | End: 2020-01-14

## 2020-01-14 RX ORDER — CEFTRIAXONE 500 MG/1
INJECTION, POWDER, FOR SOLUTION INTRAMUSCULAR; INTRAVENOUS
Refills: 0 | Status: DISCONTINUED | OUTPATIENT
Start: 2020-01-14 | End: 2020-01-14

## 2020-01-14 RX ORDER — CYCLOSPORINE 0.5 MG/ML
1 EMULSION OPHTHALMIC
Qty: 0 | Refills: 0 | DISCHARGE

## 2020-01-14 RX ORDER — INFLUENZA VIRUS VACCINE 15; 15; 15; 15 UG/.5ML; UG/.5ML; UG/.5ML; UG/.5ML
0.5 SUSPENSION INTRAMUSCULAR ONCE
Refills: 0 | Status: COMPLETED | OUTPATIENT
Start: 2020-01-14 | End: 2020-01-14

## 2020-01-14 RX ORDER — IPRATROPIUM/ALBUTEROL SULFATE 18-103MCG
3 AEROSOL WITH ADAPTER (GRAM) INHALATION
Refills: 0 | Status: COMPLETED | OUTPATIENT
Start: 2020-01-14 | End: 2020-01-14

## 2020-01-14 RX ORDER — AZITHROMYCIN 500 MG/1
TABLET, FILM COATED ORAL
Refills: 0 | Status: COMPLETED | OUTPATIENT
Start: 2020-01-14 | End: 2020-01-18

## 2020-01-14 RX ORDER — IPRATROPIUM/ALBUTEROL SULFATE 18-103MCG
3 AEROSOL WITH ADAPTER (GRAM) INHALATION EVERY 4 HOURS
Refills: 0 | Status: DISCONTINUED | OUTPATIENT
Start: 2020-01-14 | End: 2020-01-15

## 2020-01-14 RX ORDER — IPRATROPIUM/ALBUTEROL SULFATE 18-103MCG
3 AEROSOL WITH ADAPTER (GRAM) INHALATION ONCE
Refills: 0 | Status: COMPLETED | OUTPATIENT
Start: 2020-01-14 | End: 2020-01-14

## 2020-01-14 RX ORDER — PANTOPRAZOLE SODIUM 20 MG/1
40 TABLET, DELAYED RELEASE ORAL DAILY
Refills: 0 | Status: DISCONTINUED | OUTPATIENT
Start: 2020-01-15 | End: 2020-01-15

## 2020-01-14 RX ORDER — AZITHROMYCIN 500 MG/1
500 TABLET, FILM COATED ORAL EVERY 24 HOURS
Refills: 0 | Status: COMPLETED | OUTPATIENT
Start: 2020-01-15 | End: 2020-01-18

## 2020-01-14 RX ORDER — PANTOPRAZOLE SODIUM 20 MG/1
40 TABLET, DELAYED RELEASE ORAL ONCE
Refills: 0 | Status: COMPLETED | OUTPATIENT
Start: 2020-01-14 | End: 2020-01-14

## 2020-01-14 RX ORDER — CEFTRIAXONE 500 MG/1
1000 INJECTION, POWDER, FOR SOLUTION INTRAMUSCULAR; INTRAVENOUS ONCE
Refills: 0 | Status: COMPLETED | OUTPATIENT
Start: 2020-01-14 | End: 2020-01-14

## 2020-01-14 RX ORDER — VALPROIC ACID (AS SODIUM SALT) 250 MG/5ML
250 SOLUTION, ORAL ORAL EVERY 6 HOURS
Refills: 0 | Status: DISCONTINUED | OUTPATIENT
Start: 2020-01-14 | End: 2020-01-15

## 2020-01-14 RX ORDER — MAGNESIUM SULFATE 500 MG/ML
2 VIAL (ML) INJECTION ONCE
Refills: 0 | Status: COMPLETED | OUTPATIENT
Start: 2020-01-14 | End: 2020-01-14

## 2020-01-14 RX ORDER — PANTOPRAZOLE SODIUM 20 MG/1
TABLET, DELAYED RELEASE ORAL
Refills: 0 | Status: DISCONTINUED | OUTPATIENT
Start: 2020-01-14 | End: 2020-01-15

## 2020-01-14 RX ORDER — CHLORHEXIDINE GLUCONATE 213 G/1000ML
1 SOLUTION TOPICAL
Refills: 0 | Status: DISCONTINUED | OUTPATIENT
Start: 2020-01-14 | End: 2020-01-15

## 2020-01-14 RX ORDER — SODIUM CHLORIDE 9 MG/ML
1950 INJECTION INTRAMUSCULAR; INTRAVENOUS; SUBCUTANEOUS ONCE
Refills: 0 | Status: COMPLETED | OUTPATIENT
Start: 2020-01-14 | End: 2020-01-14

## 2020-01-14 RX ORDER — HALOPERIDOL DECANOATE 100 MG/ML
2.5 INJECTION INTRAMUSCULAR EVERY 6 HOURS
Refills: 0 | Status: DISCONTINUED | OUTPATIENT
Start: 2020-01-14 | End: 2020-01-15

## 2020-01-14 RX ORDER — ACETAMINOPHEN 500 MG
1 TABLET ORAL
Qty: 0 | Refills: 0 | DISCHARGE

## 2020-01-14 RX ORDER — CEFTRIAXONE 500 MG/1
1000 INJECTION, POWDER, FOR SOLUTION INTRAMUSCULAR; INTRAVENOUS ONCE
Refills: 0 | Status: DISCONTINUED | OUTPATIENT
Start: 2020-01-14 | End: 2020-01-14

## 2020-01-14 RX ORDER — DEXMEDETOMIDINE HYDROCHLORIDE IN 0.9% SODIUM CHLORIDE 4 UG/ML
0.5 INJECTION INTRAVENOUS
Qty: 200 | Refills: 0 | Status: DISCONTINUED | OUTPATIENT
Start: 2020-01-14 | End: 2020-01-15

## 2020-01-14 RX ADMIN — SODIUM CHLORIDE 1950 MILLILITER(S): 9 INJECTION INTRAMUSCULAR; INTRAVENOUS; SUBCUTANEOUS at 09:23

## 2020-01-14 RX ADMIN — Medication 3 MILLILITER(S): at 20:50

## 2020-01-14 RX ADMIN — CEFTRIAXONE 100 MILLIGRAM(S): 500 INJECTION, POWDER, FOR SOLUTION INTRAMUSCULAR; INTRAVENOUS at 09:23

## 2020-01-14 RX ADMIN — Medication 60 MILLIGRAM(S): at 19:49

## 2020-01-14 RX ADMIN — Medication 3 MILLILITER(S): at 23:29

## 2020-01-14 RX ADMIN — Medication 3 MILLILITER(S): at 09:23

## 2020-01-14 RX ADMIN — DEXMEDETOMIDINE HYDROCHLORIDE IN 0.9% SODIUM CHLORIDE 7.83 MICROGRAM(S)/KG/HR: 4 INJECTION INTRAVENOUS at 19:49

## 2020-01-14 RX ADMIN — DEXMEDETOMIDINE HYDROCHLORIDE IN 0.9% SODIUM CHLORIDE 7.83 MICROGRAM(S)/KG/HR: 4 INJECTION INTRAVENOUS at 16:25

## 2020-01-14 RX ADMIN — Medication 3 MILLILITER(S): at 12:37

## 2020-01-14 RX ADMIN — ENOXAPARIN SODIUM 40 MILLIGRAM(S): 100 INJECTION SUBCUTANEOUS at 14:00

## 2020-01-14 RX ADMIN — Medication 3 MILLILITER(S): at 09:20

## 2020-01-14 RX ADMIN — CEFTRIAXONE 1000 MILLIGRAM(S): 500 INJECTION, POWDER, FOR SOLUTION INTRAMUSCULAR; INTRAVENOUS at 09:42

## 2020-01-14 RX ADMIN — Medication 60 MILLIGRAM(S): at 14:00

## 2020-01-14 RX ADMIN — SODIUM CHLORIDE 1950 MILLILITER(S): 9 INJECTION INTRAMUSCULAR; INTRAVENOUS; SUBCUTANEOUS at 11:42

## 2020-01-14 RX ADMIN — Medication 3 MILLILITER(S): at 16:19

## 2020-01-14 RX ADMIN — AZITHROMYCIN 500 MILLIGRAM(S): 500 TABLET, FILM COATED ORAL at 10:46

## 2020-01-14 RX ADMIN — Medication 50 GRAM(S): at 11:15

## 2020-01-14 RX ADMIN — Medication 125 MILLIGRAM(S): at 09:15

## 2020-01-14 RX ADMIN — Medication 0.5 MILLIGRAM(S): at 10:30

## 2020-01-14 RX ADMIN — Medication 3 MILLILITER(S): at 09:18

## 2020-01-14 RX ADMIN — PANTOPRAZOLE SODIUM 40 MILLIGRAM(S): 20 TABLET, DELAYED RELEASE ORAL at 14:00

## 2020-01-14 NOTE — ED PROVIDER NOTE - RATE
Consultation - Ophthalmology   Jewell Hernandez 81 y o  female MRN: [id number]  Unit/Bed#: [facility] 604-01 Encounter: [id number]       Assessment / Plan:  Patient Active Problem List   Diagnosis    Slow transit constipation    Orbital fracture    Maxillary fracture ([address]    Zygomatic fracture ([address] )    Fall    Facial laceration     1  Orbital wall fracture left eye - no apparent retinal or optic nerve involvement  - f/u with ophthalmologist for complete exam after discharge      History of Present Illness   Physician Requesting Consult: Tung Krishna MD  Reason for Consult / Principal Problem: poor vision left eye  HPI: Jewell Hernandez is a 81y o  year old female who presents with poor vision left eye after traumatic fall involving left face and eye    Consults    Historical Information   Past Medical History:   Diagnosis Date    Constipation     Hypertension     Osteoporosis     Parkinson disease ([address] )      Past Ocular History:   Past Surgical History:   Procedure Laterality Date    BASAL CELL CARCINOMA EXCISION      above lip    BREAST CYST EXCISION      COLONOSCOPY      HYSTERECTOMY      ROTATOR CUFF REPAIR Right      Social History   Social History     Substance and Sexual Activity   Alcohol Use No     Social History     Substance and Sexual Activity   Drug Use No     Social History     Tobacco Use   Smoking Status Never Smoker   Smokeless Tobacco Never Used     Family History:   Family History   Problem Relation Age of Onset    Heart attack Mother        Meds/Allergies   all current active meds have been reviewed    No Known Allergies    Objective   Vitals: Blood pressure 123/63, pulse 66, temperature 98 6 °F (37 °C), resp  rate 18, SpO2 98 %      Eyes:    PERRL, conjunctiva/corneas clear, EOM's intact  Penlight exam:  Erythema and edema left eye 2+, conjunctiva clear, intraocular lens both eyes, wound above left eye sutures intact  Posterior exam:  0 6 optic cup both eyes, posterior poles, retina, and vitreous intact without hemorrhages both eyes    Lab Results: I have personally reviewed pertinent lab results  Imaging Studies: I have personally reviewed pertinent reports  Pathology, and Other Studies: I have personally reviewed pertinent reports        Code Status: Level 1 - Full Code  Advance Directive and Living Will:      Power of :    POLST:      Sirisha Kaur MD 92

## 2020-01-14 NOTE — PATIENT PROFILE ADULT - BRADEN MOBILITY
(3) slightly limited no dermatitis, no environmental allergies, no food allergies, no immunosuppressive disorder, and no pruritus.

## 2020-01-14 NOTE — ED ADULT TRIAGE NOTE - CHIEF COMPLAINT QUOTE
Pt c/o increasing SOB x 3 days and worse today, hx of COPD, current everyday cigarette smoker, labored breathing noted upon ED arrival, as per EMS, room air O2 80% upon their arrival, pt AOx4

## 2020-01-14 NOTE — CHART NOTE - NSCHARTNOTEFT_GEN_A_CORE
Received call from ED regarding admission   pt seen in ed on bipap, obtunded unable to give   hx, found to be tachypneic with use of accessory  muscle. Pt received ativan. Advised Er MD and RN  staff that patient will require ICU eval for possible   admission.

## 2020-01-14 NOTE — CONSULT NOTE ADULT - SUBJECTIVE AND OBJECTIVE BOX
Patient is a 69y old  Male who presents with a chief complaint of Respiratory failure (14 Jan 2020 11:20)      BRIEF HOSPITAL COURSE: ***      Events last 24 hours: ***      PAST MEDICAL & SURGICAL HISTORY:  Parkinson disease  Chronic obstructive pulmonary disease, unspecified COPD type  Schizophrenia, unspecified type  No significant past surgical history        SOCIAL HISTORY:      Review of Systems:  CONSTITUTIONAL: No fever, chills, or fatigue  EYES: No visual disturbances  ENMT:  No difficulty hearing  NECK: No pain  RESPIRATORY: No cough. No shortness of breath  CARDIOVASCULAR: No chest pain, palpitations, or leg swelling  GASTROINTESTINAL: No abdominal pain. No nausea, vomiting, diarrhea, or constipation. No hematemesis, melena or hematochezia  GENITOURINARY: No dysuria, frequency, hematuria, or incontinence  NEUROLOGICAL: No headaches, loss of strength, numbness, or tremors  SKIN: No rashes  MUSCULOSKELETAL: No back or extremity pain. No calf pain  PSYCHIATRIC: No depression, anxiety, or difficulty sleeping    [  ] Due to altered mental status/intubation, subjective information was not able to be obtained from the patient. History was obtained, to the extent possible, from review of the chart and collateral sources of information.      Medications:  azithromycin  IVPB          albuterol/ipratropium for Nebulization 3 milliLiter(s) Nebulizer every 4 hours  albuterol/ipratropium for Nebulization. 3 milliLiter(s) Nebulizer once        enoxaparin Injectable 40 milliGRAM(s) SubCutaneous daily    pantoprazole  Injectable      pantoprazole  Injectable 40 milliGRAM(s) IV Push once      methylPREDNISolone sodium succinate Injectable 60 milliGRAM(s) IV Push every 6 hours        chlorhexidine 4% Liquid 1 Application(s) Topical <User Schedule>            ICU Vital Signs Last 24 Hrs  T(C): 37 (14 Jan 2020 08:45), Max: 37 (14 Jan 2020 08:45)  T(F): 98.6 (14 Jan 2020 08:45), Max: 98.6 (14 Jan 2020 08:45)  HR: 91 (14 Jan 2020 12:45) (91 - 120)  BP: 128/82 (14 Jan 2020 11:55) (126/83 - 158/100)  BP(mean): --  ABP: --  ABP(mean): --  RR: 40 (14 Jan 2020 11:55) (26 - 40)  SpO2: 96% (14 Jan 2020 12:45) (90% - 97%)      ABG - ( 14 Jan 2020 12:58 )  pH, Arterial: 7.23  pH, Blood: x     /  pCO2: 76    /  pO2: 97    / HCO3: 26    / Base Excess: 2.2   /  SaO2: 96                  I&O's Detail        LABS:                        12.5   6.59  )-----------( 190      ( 14 Jan 2020 09:06 )             40.0     01-14    135  |  92<L>  |  11.0  ----------------------------<  93  4.6   |  32.0<H>  |  0.71    Ca    9.2      14 Jan 2020 09:06    TPro  7.0  /  Alb  4.0  /  TBili  0.6  /  DBili  x   /  AST  73<H>  /  ALT  44<H>  /  AlkPhos  142<H>  01-14          CAPILLARY BLOOD GLUCOSE        PT/INR - ( 14 Jan 2020 09:06 )   PT: 16.6 sec;   INR: 1.43 ratio         PTT - ( 14 Jan 2020 09:06 )  PTT:37.8 sec    CULTURES:  Rapid RVP Result: NotDetec (01-14 @ 09:11)        Physical Examination:    General: No acute distress.      HEENT: Pupils equal, reactive to light.  Symmetric.    PULM: Diminished bilaterally, no significant sputum production    CVS: Regular rate and rhythm, no murmurs, rubs, or gallops    ABD: Soft, nondistended, nontender, normoactive bowel sounds, no masses    EXT: No edema, nontender    SKIN: Warm and well perfused, no rashes noted.    NEURO: Obtunded, intermittently moves spontaneously and fights NIV        RADIOLOGY: ***    INVASIVE LINES:  INDWELLING GARCIA:  VTE PROPHYLAXIS:  CAM ICU:  CODE STATUS:    CRITICAL CARE TIME SPENT: *** minutes spent performing frequent bedside reassessments and augmenting plan of care to address problems of acute critical illness that pose high probability of life threatening deterioration and/or end organ damage/dysfunction and discussing goals of care with patient/family, non-inclusive of time spent on procedures performed. Patient is a 69y old  Male who presents with a chief complaint of Respiratory failure (14 Jan 2020 11:20)      BRIEF HOSPITAL COURSE: 70 y/o male, PMHx severe COPD, Parkinson's disease, schizophrenia, who presented to ED from assisted living facility with a chief complaint of shortness of breath for the past three days. Patient was recently admitted to [facility] 1 month ago 12/2-12/12 for ACOPDE, and then had reportedly been treated for pneumonia as an outpatient as well, with antibiotics and prednisone. Upon arrival to ED, patient was treated with nebs, IV steroids, and magnesium. On room air, VBG 7.34/67. CXR negative for acute infiltrate. Labs without leukocytosis, and transaminitis. Patient was seen by Pulmonary and started on BiPAP 15/5/.30. Patient was agitated, removing BiPAP, and was given Ativan 0.5mg IV x 1. He then became obtunded and an ICU consult was called. ABG obtained after 4 hours with worsening hypercapnic respiratory failure and respiratory acidosis, 7.23/76/97/26. On evaluation, patient is only arousable to noxious stimuli with no purposeful movements or meaningful interaction. He was noted to be pulling Tv ~250cc with large air leak. BiPAP manipulated, eventually converted to AVAPS 500/14-28/6/.30 with improvement in Tv delivery.         PAST MEDICAL & SURGICAL HISTORY:  Parkinson disease  Chronic obstructive pulmonary disease, unspecified COPD type  Schizophrenia, unspecified type  No significant past surgical history        SOCIAL HISTORY: Current active smoker        Review of Systems: Due to altered mental status, subjective information was not able to be obtained from the patient. History was obtained, to the extent possible, from review of the chart and collateral sources of information.        Medications:  azithromycin  IVPB      albuterol/ipratropium for Nebulization 3 milliLiter(s) Nebulizer every 4 hours  albuterol/ipratropium for Nebulization. 3 milliLiter(s) Nebulizer once  enoxaparin Injectable 40 milliGRAM(s) SubCutaneous daily  pantoprazole  Injectable      pantoprazole  Injectable 40 milliGRAM(s) IV Push once  methylPREDNISolone sodium succinate Injectable 60 milliGRAM(s) IV Push every 6 hours  chlorhexidine 4% Liquid 1 Application(s) Topical <User Schedule>        ICU Vital Signs Last 24 Hrs  T(C): 37 (14 Jan 2020 08:45), Max: 37 (14 Jan 2020 08:45)  T(F): 98.6 (14 Jan 2020 08:45), Max: 98.6 (14 Jan 2020 08:45)  HR: 91 (14 Jan 2020 12:45) (91 - 120)  BP: 128/82 (14 Jan 2020 11:55) (126/83 - 158/100)  BP(mean): --  ABP: --  ABP(mean): --  RR: 40 (14 Jan 2020 11:55) (26 - 40)  SpO2: 96% (14 Jan 2020 12:45) (90% - 97%)        ABG - ( 14 Jan 2020 12:58 )  pH, Arterial: 7.23  pH, Blood: x     /  pCO2: 76    /  pO2: 97    / HCO3: 26    / Base Excess: 2.2   /  SaO2: 96              I&O's Detail        LABS:                        12.5   6.59  )-----------( 190      ( 14 Jan 2020 09:06 )             40.0     01-14    135  |  92<L>  |  11.0  ----------------------------<  93  4.6   |  32.0<H>  |  0.71    Ca    9.2      14 Jan 2020 09:06    TPro  7.0  /  Alb  4.0  /  TBili  0.6  /  DBili  x   /  AST  73<H>  /  ALT  44<H>  /  AlkPhos  142<H>  01-14          CAPILLARY BLOOD GLUCOSE        PT/INR - ( 14 Jan 2020 09:06 )   PT: 16.6 sec;   INR: 1.43 ratio         PTT - ( 14 Jan 2020 09:06 )  PTT:37.8 sec    CULTURES:  Rapid RVP Result: NotDetec (01-14 @ 09:11)        Physical Examination:    General: No acute distress.      HEENT: Pupils equal, reactive to light.  Symmetric.    PULM: Diminished bilaterally, no significant sputum production    CVS: Regular rate and rhythm, no murmurs, rubs, or gallops    ABD: Soft, nondistended, nontender, normoactive bowel sounds, no masses    EXT: No edema, nontender    SKIN: Warm and well perfused, no rashes noted.    NEURO: Obtunded, intermittently moves spontaneously and fights NIV        RADIOLOGY: < from: Xray Chest 1 View-PORTABLE IMMEDIATE (01.14.20 @ 09:22) >     EXAM:  XR CHEST PORTABLE IMMED 1V                          PROCEDURE DATE:  01/14/2020      INTERPRETATION:  Portable chest radiograph        CLINICAL INFORMATION: Sepsis    TECHNIQUE:  Portable  AP view of the chest was obtained.    COMPARISON: No previous examinations are available for review.    FINDINGS:    The lungs  are clear.  No pleural abnormality is seen.    The heart and mediastinum are within normal limits.    Chronic LEFT rib fracture deformity noted.    IMPRESSION:   No evidence of active chest disease.    LASHELL DOUGLAS M.D., ATTENDING RADIOLOGIST  This document has been electronically signed. Jan 14 2020  9:30AM      INVASIVE LINES: X   INDWELLING GARCIA: X   VTE PROPHYLAXIS: Lovenox SC  CAM ICU: +   CODE STATUS: FULL    CRITICAL CARE TIME SPENT: 52 minutes spent performing frequent bedside reassessments and augmenting plan of care to address problems of acute critical illness that pose high probability of life threatening deterioration and/or end organ damage/dysfunction and discussing goals of care with patient's family, non-inclusive of time spent on procedures performed.

## 2020-01-14 NOTE — ED PROVIDER NOTE - PHYSICAL EXAMINATION
Const: Awake, alert and oriented. Thin, barrel chested, moderate respiratory distress.  HEENT: NC/AT. Moist mucous membranes.  Eyes: No scleral icterus. EOMI. Exophthalmos.  Neck:. Soft and supple. Full ROM without pain.  Cardiac: Tachycardic rate and regular rhythm. +S1/S2. No murmurs. Peripheral pulses 2+ and symmetric. No LE edema.  Resp: Tachypneic with retractions, moderate respiratory distress, speaking 3-4 word sentences, poor air entry, tight inspiratory and expiratory wheezes.  Abd: Soft, non-tender, non-distended. Normal bowel sounds in all 4 quadrants. No guarding or rebound.  Back: Spine midline and non-tender. No CVAT.  Skin: No rashes, abrasions or lacerations.  Neuro: Awake, alert & oriented x 3. Moves all extremities symmetrically.

## 2020-01-14 NOTE — ED PROVIDER NOTE - OBJECTIVE STATEMENT
68 y/o M with PMH COPD, PD, schizophrenia presents complaining of 3 days of worsening SOB with cough not productive of sputum despite using his inhalers. He denies fevers, chest pain, nausea, vomiting. He states he has never felt like this before. He does not use home O2 and never has required it in the past. He is still a daily smoker. He is otherwise a poor historian.

## 2020-01-14 NOTE — ED ADULT NURSE REASSESSMENT NOTE - NS ED NURSE REASSESS COMMENT FT1
Pt received from Zoë KIDD in critical care, pt awake and alert, Bipap in place, nonverbal at this time.  Pt denies pain.  Diminished bsb, abd soft nondistended, nontender, moving all ext well.  VSS.  CM in place, ST.  Will continue to monitor.

## 2020-01-14 NOTE — ED PROVIDER NOTE - CLINICAL SUMMARY MEDICAL DECISION MAKING FREE TEXT BOX
68 y/o M with PMH COPD, PD, schizophrenia presents with 3 days of worsening coughing and SOB not productive of sputum. He cannot provide much history due to respiratory distress. Afebrile, tachycardic likely due to respiratory distress. Will treat with solu-medrol, duo-neb, patient's O2 improved with 2L NC. Will cover for concominant PNA with Rocephin and Azithro, IV hydration and plan for admission.

## 2020-01-14 NOTE — CONSULT NOTE ADULT - SUBJECTIVE AND OBJECTIVE BOX
PULMONARY CONSULT NOTE      DAVY HOLDEN  MRN-[id number]    Patient is a 69y old  Male who presents with a chief complaint of SOB    HISTORY OF PRESENT ILLNESS:    Hx is obtained from chart and old office records as pt is minimally responsive on BiPAP.    70 y/o M with PMH COPD, PD, schizophrenia presents complaining of 3 days of worsening SOB with cough not productive of sputum despite using his inhalers. He denies fevers, chest pain, nausea, vomiting. He states he has never felt like this before. He does not use home O2 and never has required it in the past. He is still a daily smoker. He is otherwise a poor historian. He was last seen in our office on 6/29/17 and prior lung function testing was c/w severe COPD but with a BD response. It is unclear if pt uses inhaler therapy at home. Pt cannot give hx at this time.    MEDICATIONS  (STANDING):  albuterol/ipratropium for Nebulization. 3 milliLiter(s) Nebulizer once  magnesium sulfate  IVPB 2 Gram(s) IV Intermittent Once      MEDICATIONS  (PRN):      Allergies    No Known Allergies    Intolerances        PAST MEDICAL & SURGICAL HISTORY:  Parkinson disease  Chronic obstructive pulmonary disease, unspecified COPD type  Schizophrenia, unspecified type  No significant past surgical history      FAMILY HISTORY:  No pertinent family history  No pertinent family history in first degree relatives      SOCIAL HISTORY  Smoking History: Current smoker per report    REVIEW OF SYSTEMS: Pt unable to provide given reduced responsiveness    Vital Signs Last 24 Hrs  T(C): 37 (14 Jan 2020 08:45), Max: 37 (14 Jan 2020 08:45)  T(F): 98.6 (14 Jan 2020 08:45), Max: 98.6 (14 Jan 2020 08:45)  HR: 107 (14 Jan 2020 10:36) (107 - 120)  BP: 127/77 (14 Jan 2020 10:36) (126/83 - 158/100)  BP(mean): --  RR: 30 (14 Jan 2020 10:36) (26 - 30)  SpO2: 97% (14 Jan 2020 10:36) (90% - 97%)    PHYSICAL EXAMINATION:    GENERAL: The patient is a well-developed, well-nourished disheveled on BiPAP support    HEENT: Head is normocephalic and atraumatic.     NECK: Supple.     LUNGS: Decreased BS with b/l wheeze but without rales, or rhonchi. Respirations unlabored    HEART: Regular rate and rhythm without murmur.    ABDOMEN: Soft, nontender, and nondistended.  No hepatosplenomegaly is noted.    EXTREMITIES: Without any cyanosis, clubbing, rash, lesions or edema.    NEUROLOGIC: Grossly intact.      LABS:                        12.5   6.59  )-----------( 190      ( 14 Jan 2020 09:06 )             40.0     01-14    135  |  92<L>  |  11.0  ----------------------------<  93  4.6   |  32.0<H>  |  0.71    Ca    9.2      14 Jan 2020 09:06    TPro  7.0  /  Alb  4.0  /  TBili  0.6  /  DBili  x   /  AST  73<H>  /  ALT  44<H>  /  AlkPhos  142<H>  01-14    PT/INR - ( 14 Jan 2020 09:06 )   PT: 16.6 sec;   INR: 1.43 ratio         PTT - ( 14 Jan 2020 09:06 )  PTT:37.8 sec    Blood Gas Profile - Venous (01.14.20 @ 09:05)    pH, Venous: 7.34    pCO2, Venous: 67 mmHg    pO2, Venous: 36 mmHg    HCO3, Venous: 30 mmol/L    Base Excess, Venous: 7.2 mmol/L    Oxygen Saturation, Venous: 63 %    Blood Gas Source Venous: Venous      MICROBIOLOGY:    FLU A B RSV Detection by PCR (11.10.19 @ 02:05)    Flu A Result: NotDete: The Flu A B RSV assay is a Real-Time PCR test for the qualitative  detection and differentiation of Influenza A, Influenza B, and  Respiratory Syncytial Virus on nasopharyngeal swabs. The results should  be interpreted in the context of all clinical and laboratory findings.    Flu B Result: NotDete    RSV Result: NotDete      RADIOLOGY & ADDITIONAL STUDIES:       EXAM:  XR CHEST PORTABLE IMMED 1V                          PROCEDURE DATE:  01/14/2020          INTERPRETATION:  Portable chest radiograph        CLINICAL INFORMATION: Sepsis    TECHNIQUE:  Portable  AP view of the chest was obtained.    COMPARISON: No previous examinations are available for review.    FINDINGS:    The lungs  are clear.  No pleural abnormality is seen.    The heart and mediastinum are within normal limits.    Chronic LEFT rib fracture deformity noted.        IMPRESSION:   No evidence of active chest disease.          LASHELL DOUGLAS M.D., ATTENDING RADIOLOGIST  This document has been electronically signed. Jan 14 2020  9:30AM

## 2020-01-14 NOTE — ED ADULT NURSE NOTE - OBJECTIVE STATEMENT
69 yom with hx of copd stating he is having difficulty breathing. increased work of breathing, upon ems arrival oxygen [id number]% onRA. pt aaox3 afebrile. tachycardic. retractions noted on respirations. md called to bedside to evaluate patient and eval for bipap. dry cough noted not able to produce phlegm. patient has been having increased sob with onset 3 days ago, today it got worse. patient denies having had flu shot at this time patient is being r/o for flu like symptoms.

## 2020-01-14 NOTE — CHART NOTE - NSCHARTNOTEFT_GEN_A_CORE
Sister Called (Iris) (HCP) and rescinded DNR and DNI and claimed she was upset in ED and signed DNR and DNi by mistake

## 2020-01-15 LAB
ANION GAP SERPL CALC-SCNC: 10 MMOL/L — SIGNIFICANT CHANGE UP (ref 5–17)
APPEARANCE UR: CLEAR — SIGNIFICANT CHANGE UP
BACTERIA # UR AUTO: ABNORMAL
BASE EXCESS BLDA CALC-SCNC: 5.4 MMOL/L — HIGH (ref -3–3)
BILIRUB UR-MCNC: NEGATIVE — SIGNIFICANT CHANGE UP
BLOOD GAS COMMENTS ARTERIAL: SIGNIFICANT CHANGE UP
BLOOD GAS COMMENTS ARTERIAL: SIGNIFICANT CHANGE UP
BUN SERPL-MCNC: 16 MG/DL — SIGNIFICANT CHANGE UP (ref 8–20)
CALCIUM SERPL-MCNC: 9 MG/DL — SIGNIFICANT CHANGE UP (ref 8.6–10.2)
CHLORIDE SERPL-SCNC: 96 MMOL/L — LOW (ref 98–107)
CO2 SERPL-SCNC: 29 MMOL/L — SIGNIFICANT CHANGE UP (ref 22–29)
COLOR SPEC: YELLOW — SIGNIFICANT CHANGE UP
CREAT SERPL-MCNC: 0.55 MG/DL — SIGNIFICANT CHANGE UP (ref 0.5–1.3)
DIFF PNL FLD: ABNORMAL
EPI CELLS # UR: SIGNIFICANT CHANGE UP
GAS PNL BLDA: SIGNIFICANT CHANGE UP
GLUCOSE SERPL-MCNC: 124 MG/DL — HIGH (ref 70–115)
GLUCOSE UR QL: NEGATIVE MG/DL — SIGNIFICANT CHANGE UP
HCO3 BLDA-SCNC: 29 MMOL/L — HIGH (ref 20–26)
HCT VFR BLD CALC: 38.5 % — LOW (ref 39–50)
HGB BLD-MCNC: 12.4 G/DL — LOW (ref 13–17)
HOROWITZ INDEX BLDA+IHG-RTO: 0.3 — SIGNIFICANT CHANGE UP
KETONES UR-MCNC: ABNORMAL
LEUKOCYTE ESTERASE UR-ACNC: NEGATIVE — SIGNIFICANT CHANGE UP
MAGNESIUM SERPL-MCNC: 2.4 MG/DL — SIGNIFICANT CHANGE UP (ref 1.6–2.6)
MCHC RBC-ENTMCNC: 31.6 PG — SIGNIFICANT CHANGE UP (ref 27–34)
MCHC RBC-ENTMCNC: 32.2 GM/DL — SIGNIFICANT CHANGE UP (ref 32–36)
MCV RBC AUTO: 98.2 FL — SIGNIFICANT CHANGE UP (ref 80–100)
NITRITE UR-MCNC: NEGATIVE — SIGNIFICANT CHANGE UP
PCO2 BLDA: 64 MMHG — HIGH (ref 35–45)
PH BLDA: 7.32 — LOW (ref 7.35–7.45)
PH UR: 6 — SIGNIFICANT CHANGE UP (ref 5–8)
PHOSPHATE SERPL-MCNC: 4.4 MG/DL — SIGNIFICANT CHANGE UP (ref 2.4–4.7)
PLATELET # BLD AUTO: 147 K/UL — LOW (ref 150–400)
PO2 BLDA: 82 MMHG — LOW (ref 83–108)
POTASSIUM SERPL-MCNC: 5 MMOL/L — SIGNIFICANT CHANGE UP (ref 3.5–5.3)
POTASSIUM SERPL-SCNC: 5 MMOL/L — SIGNIFICANT CHANGE UP (ref 3.5–5.3)
PROT UR-MCNC: 30 MG/DL
RBC # BLD: 3.92 M/UL — LOW (ref 4.2–5.8)
RBC # FLD: 15.9 % — HIGH (ref 10.3–14.5)
RBC CASTS # UR COMP ASSIST: ABNORMAL /HPF (ref 0–4)
SAO2 % BLDA: 96 % — SIGNIFICANT CHANGE UP (ref 95–99)
SODIUM SERPL-SCNC: 135 MMOL/L — SIGNIFICANT CHANGE UP (ref 135–145)
SP GR SPEC: 1.02 — SIGNIFICANT CHANGE UP (ref 1.01–1.02)
UROBILINOGEN FLD QL: 1 MG/DL
WBC # BLD: 3.4 K/UL — LOW (ref 3.8–10.5)
WBC # FLD AUTO: 3.4 K/UL — LOW (ref 3.8–10.5)
WBC UR QL: SIGNIFICANT CHANGE UP

## 2020-01-15 PROCEDURE — 99223 1ST HOSP IP/OBS HIGH 75: CPT

## 2020-01-15 PROCEDURE — 99291 CRITICAL CARE FIRST HOUR: CPT

## 2020-01-15 PROCEDURE — 99232 SBSQ HOSP IP/OBS MODERATE 35: CPT

## 2020-01-15 RX ORDER — METOPROLOL TARTRATE 50 MG
25 TABLET ORAL
Refills: 0 | Status: DISCONTINUED | OUTPATIENT
Start: 2020-01-15 | End: 2020-01-29

## 2020-01-15 RX ORDER — NICOTINE POLACRILEX 2 MG
1 GUM BUCCAL DAILY
Refills: 0 | Status: DISCONTINUED | OUTPATIENT
Start: 2020-01-15 | End: 2020-01-29

## 2020-01-15 RX ORDER — BENZTROPINE MESYLATE 1 MG
1 TABLET ORAL
Refills: 0 | Status: DISCONTINUED | OUTPATIENT
Start: 2020-01-15 | End: 2020-01-29

## 2020-01-15 RX ORDER — ROPINIROLE 8 MG/1
0.5 TABLET, FILM COATED, EXTENDED RELEASE ORAL
Refills: 0 | Status: DISCONTINUED | OUTPATIENT
Start: 2020-01-15 | End: 2020-01-29

## 2020-01-15 RX ORDER — HALOPERIDOL DECANOATE 100 MG/ML
5 INJECTION INTRAMUSCULAR
Refills: 0 | Status: DISCONTINUED | OUTPATIENT
Start: 2020-01-15 | End: 2020-01-29

## 2020-01-15 RX ORDER — DIVALPROEX SODIUM 500 MG/1
1000 TABLET, DELAYED RELEASE ORAL
Refills: 0 | Status: DISCONTINUED | OUTPATIENT
Start: 2020-01-15 | End: 2020-01-29

## 2020-01-15 RX ORDER — HALOPERIDOL DECANOATE 100 MG/ML
10 INJECTION INTRAMUSCULAR AT BEDTIME
Refills: 0 | Status: DISCONTINUED | OUTPATIENT
Start: 2020-01-15 | End: 2020-01-19

## 2020-01-15 RX ORDER — CHLORHEXIDINE GLUCONATE 213 G/1000ML
1 SOLUTION TOPICAL DAILY
Refills: 0 | Status: DISCONTINUED | OUTPATIENT
Start: 2020-01-15 | End: 2020-01-29

## 2020-01-15 RX ORDER — IPRATROPIUM/ALBUTEROL SULFATE 18-103MCG
3 AEROSOL WITH ADAPTER (GRAM) INHALATION EVERY 6 HOURS
Refills: 0 | Status: DISCONTINUED | OUTPATIENT
Start: 2020-01-15 | End: 2020-01-29

## 2020-01-15 RX ORDER — RISPERIDONE 4 MG/1
4 TABLET ORAL DAILY
Refills: 0 | Status: DISCONTINUED | OUTPATIENT
Start: 2020-01-15 | End: 2020-01-29

## 2020-01-15 RX ADMIN — HALOPERIDOL DECANOATE 5 MILLIGRAM(S): 100 INJECTION INTRAMUSCULAR at 17:16

## 2020-01-15 RX ADMIN — Medication 60 MILLIGRAM(S): at 00:26

## 2020-01-15 RX ADMIN — DEXMEDETOMIDINE HYDROCHLORIDE IN 0.9% SODIUM CHLORIDE 7.83 MICROGRAM(S)/KG/HR: 4 INJECTION INTRAVENOUS at 07:40

## 2020-01-15 RX ADMIN — HALOPERIDOL DECANOATE 2.5 MILLIGRAM(S): 100 INJECTION INTRAMUSCULAR at 00:26

## 2020-01-15 RX ADMIN — Medication 3 MILLILITER(S): at 08:44

## 2020-01-15 RX ADMIN — Medication 1 PATCH: at 11:18

## 2020-01-15 RX ADMIN — ENOXAPARIN SODIUM 40 MILLIGRAM(S): 100 INJECTION SUBCUTANEOUS at 11:14

## 2020-01-15 RX ADMIN — CHLORHEXIDINE GLUCONATE 1 APPLICATION(S): 213 SOLUTION TOPICAL at 11:21

## 2020-01-15 RX ADMIN — Medication 60 MILLIGRAM(S): at 11:15

## 2020-01-15 RX ADMIN — HALOPERIDOL DECANOATE 10 MILLIGRAM(S): 100 INJECTION INTRAMUSCULAR at 21:54

## 2020-01-15 RX ADMIN — Medication 25 MILLIGRAM(S): at 18:42

## 2020-01-15 RX ADMIN — Medication 1 PATCH: at 20:00

## 2020-01-15 RX ADMIN — HALOPERIDOL DECANOATE 2.5 MILLIGRAM(S): 100 INJECTION INTRAMUSCULAR at 05:21

## 2020-01-15 RX ADMIN — Medication 3 MILLILITER(S): at 21:07

## 2020-01-15 RX ADMIN — Medication 26.25 MILLIGRAM(S): at 05:22

## 2020-01-15 RX ADMIN — Medication 1 MILLIGRAM(S): at 17:16

## 2020-01-15 RX ADMIN — DIVALPROEX SODIUM 1000 MILLIGRAM(S): 500 TABLET, DELAYED RELEASE ORAL at 17:15

## 2020-01-15 RX ADMIN — Medication 26.25 MILLIGRAM(S): at 00:26

## 2020-01-15 RX ADMIN — AZITHROMYCIN 255 MILLIGRAM(S): 500 TABLET, FILM COATED ORAL at 11:08

## 2020-01-15 RX ADMIN — Medication 3 MILLILITER(S): at 15:38

## 2020-01-15 RX ADMIN — ROPINIROLE 0.5 MILLIGRAM(S): 8 TABLET, FILM COATED, EXTENDED RELEASE ORAL at 20:47

## 2020-01-15 RX ADMIN — Medication 60 MILLIGRAM(S): at 05:21

## 2020-01-15 RX ADMIN — Medication 60 MILLIGRAM(S): at 17:16

## 2020-01-15 RX ADMIN — RISPERIDONE 4 MILLIGRAM(S): 4 TABLET ORAL at 12:25

## 2020-01-15 NOTE — PROGRESS NOTE ADULT - ASSESSMENT
COPD exacerbation   Acute on chronic hypercapnic respiratory failure  Schizophrenia  Parkinson disease    Neuro: No active issues  Cardiovascular: Aim for MAP target 65  Resp/Chest: BiPAP PRN and at night. Continue IV steroid's, Dounebs and empiric Abx  GI/Nutrition: DASH diet  ID: No active issues  Renal: Avoid nephrotoxic agents. Strict I&O  Endocrinology: Maintain Blood sugar < 180. ISS as per protocol  Haem/Oncology:  DVT ppx w/ Lovenox    Critical Care time: 35 minutes assessing presenting problems of acute illness that poses high probability of life threatening deterioration or end organ damage/dysfunction.  Medical decision making including Initiating plan of care, reviewing data, reviewing radiology, discussing with multidisciplinary team, non inclusive of procedures COPD exacerbation   Acute on chronic hypercapnic respiratory failure  Schizophrenia  Parkinson disease    Neuro: Restarted on antipsychotics and taken off Precedex  Cardiovascular: Aim for MAP target 65  Resp/Chest: BiPAP PRN and at night. Continue IV steroid's, Dounebs and empiric Abx  GI/Nutrition: DASH diet  ID: No active issues  Renal: Avoid nephrotoxic agents. Strict I&O  Endocrinology: Maintain Blood sugar < 180. ISS as per protocol  Haem/Oncology:  DVT ppx w/ Lovenox    Critical Care time: 35 minutes assessing presenting problems of acute illness that poses high probability of life threatening deterioration or end organ damage/dysfunction.  Medical decision making including Initiating plan of care, reviewing data, reviewing radiology, discussing with multidisciplinary team, non inclusive of procedures

## 2020-01-15 NOTE — PROGRESS NOTE ADULT - ASSESSMENT
Assess    Acute exacerbation of severe COPD (previously good BD response)  Acute Hypoxic and hypercarbic respiratory failure   Nicotine dependence  Parkinson's Disease  Bronchitis  Likely poor adherence to care measures      Rec    AVAPS  Neb  Steroids with taper  Palliative input a consideration - Difficult to determine advanced directives [place] patient   Ethics input also a consideration

## 2020-01-15 NOTE — PROGRESS NOTE ADULT - SUBJECTIVE AND OBJECTIVE BOX
Patient is a 69y old  Male who presents with a chief complaint of Hypoxia and dyspnea (15 Maciej 2020 09:15)    BRIEF HOSPITAL COURSE:   68 y/o male, PMHx severe COPD, Parkinson's disease, schizophrenia, who presented to ED from assisted living facility with a chief complaint of shortness of breath for the past three days. Patient was recently admitted to [facility] 1 month ago 12/2-12/12 for ACOPDE, and then had reportedly been treated for pneumonia as an outpatient as well, with antibiotics and prednisone. Upon arrival to ED, patient was treated with nebs, IV steroids, and magnesium. On room air, VBG 7.34/67. CXR negative for acute infiltrate. Labs without leukocytosis, and transaminitis. Patient was seen by Pulmonary and started on BiPAP 15/5/.30. Patient was agitated, removing BiPAP, and was given Ativan 0.5mg IV x 1. He then became obtunded and an ICU consult was called. ABG obtained after 4 hours with worsening hypercapnic respiratory failure and respiratory acidosis, 7.23/76/97/26. On evaluation, patient is only arousable to noxious stimuli with no purposeful movements or meaningful interaction. He was noted to be pulling Tv ~250cc with large air leak. BiPAP manipulated, eventually converted to AVAPS 500/14-28/6/.30 with improvement in Tv delivery.    Events last 24 hours:   On AVAPS overnight w/ symptomatic improvement. No fever. Taken off Precedex this morning    PAST MEDICAL & SURGICAL HISTORY:  Parkinson disease  Chronic obstructive pulmonary disease, unspecified COPD type  Schizophrenia, unspecified type  No significant past surgical history    Review of Systems:  CONSTITUTIONAL: No fever, chills, or fatigue  EYES: No eye pain, visual disturbances, or discharge  ENMT:  No difficulty hearing, tinnitus, vertigo; No sinus or throat pain  NECK: No pain or stiffness  RESPIRATORY: No cough, wheezing, chills or hemoptysis; No shortness of breath  CARDIOVASCULAR: No chest pain, palpitations, dizziness, or leg swelling  GASTROINTESTINAL: No abdominal or epigastric pain. No nausea, vomiting, or hematemesis; No diarrhea or constipation. No melena or hematochezia.  GENITOURINARY: No dysuria, frequency, hematuria, or incontinence  NEUROLOGICAL: No headaches, memory loss, loss of strength, numbness, or tremors  SKIN: No itching, burning, rashes, or lesions   MUSCULOSKELETAL: No joint pain or swelling; No muscle, back, or extremity pain  PSYCHIATRIC: No depression, anxiety, mood swings, or difficulty sleeping      Physical Examination:    ICU Vital Signs Last 24 Hrs  T(C): 35.9 (15 Maciej 2020 12:04), Max: 37.2 (14 Jan 2020 14:35)  T(F): 96.6 (15 Maciej 2020 12:04), Max: 99 (14 Jan 2020 14:35)  HR: 64 (15 Maciej 2020 10:00) (44 - 102)  BP: 156/86 (15 Maciej 2020 10:00) (101/66 - 161/99)  BP(mean): 112 (15 Maciej 2020 10:00) (65 - 125)  ABP: --  ABP(mean): --  RR: 32 (15 Maciej 2020 10:00) (18 - 47)  SpO2: 96% (15 Maciej 2020 10:16) (82% - 99%)      General: No acute distress.      Neuro: AAO*3, No motor, sensory, or cranial nerve deficit    HEENT: Pupils equal, reactive to light, Moist oral mucosa    PULM: Clear to auscultation bilaterally, no significant adventitious breath sounds     CVS: Regular rhythm and controlled rate, no murmurs, rubs, or gallops    ABD: Soft, nondistended, nontender, normoactive bowel sounds    EXT: No b/l LE edema, nontender with pedal pulse palpable     SKIN: Warm and well perfused, no acute rashes       Medications:  azithromycin  IVPB      azithromycin  IVPB 500 milliGRAM(s) IV Intermittent every 24 hours      albuterol/ipratropium for Nebulization 3 milliLiter(s) Nebulizer every 6 hours  albuterol/ipratropium for Nebulization. 3 milliLiter(s) Nebulizer once    benztropine 1 milliGRAM(s) Oral two times a day  diVALproex DR 1000 milliGRAM(s) Oral two times a day  haloperidol     Tablet 10 milliGRAM(s) Oral at bedtime  haloperidol     Tablet 5 milliGRAM(s) Oral two times a day  risperiDONE   Tablet 4 milliGRAM(s) Oral daily      enoxaparin Injectable 40 milliGRAM(s) SubCutaneous daily        methylPREDNISolone sodium succinate Injectable 60 milliGRAM(s) IV Push every 6 hours      influenza   Vaccine 0.5 milliLiter(s) IntraMuscular once    chlorhexidine 2% Cloths 1 Application(s) Topical daily    nicotine - 21 mG/24Hr(s) Patch 1 patch Transdermal daily          I&O's Detail    14 Jan 2020 07:01  -  15 Maciej 2020 07:00  --------------------------------------------------------  IN:    dexmedetomidine Infusion: 73.5 mL    Solution: 100 mL  Total IN: 173.5 mL    OUT:    Indwelling Catheter - Urethral: 740 mL    Voided: 150 mL  Total OUT: 890 mL    Total NET: -716.5 mL      15 Maciej 2020 07:01  -  15 Maciej 2020 12:39  --------------------------------------------------------  IN:  Total IN: 0 mL    OUT:    Indwelling Catheter - Urethral: 20 mL  Total OUT: 20 mL    Total NET: -20 mL            RADIOLOGY/ Microbiology/ Labs: reviewed

## 2020-01-15 NOTE — CONSULT NOTE ADULT - SUBJECTIVE AND OBJECTIVE BOX
MICU DOWNGRADE ACCEPTANCE NOTE    PCP Bryan Hannah    HPI:  70 y/o male, PMHx severe COPD, Parkinson's disease, schizophrenia, active smoker, who presented to ED from assisted living facility with a chief complaint of shortness of breath for the past three Medically stable and agreeable with discharge and follow up plan. Patient was advised to return to ED if any symptoms occur or worsen.ays. Patient was recently admitted to [facility] 1 month ago - for ACOPDE, and then had reportedly been treated for pneumonia as an outpatient as well, with antibiotics and prednisone. Upon arrival to ED, patient was treated with nebs, IV steroids, and magnesium.    COURSE:  On room air, VBG 7.34/67. CXR negative for acute infiltrate. Labs without leukocytosis, and transaminitis. Patient was seen by Pulmonary and started on BiPAP 15//.30. Patient was agitated, removing BiPAP, and was given Ativan 0.5mg IV x 1. He then became obtunded and an ICU consult and accepted to MICU. ABG obtained after 4 hours with worsening hypercapnic respiratory failure and respiratory acidosis, 7.23/76/97/26. On evaluation, patient is only arousable to noxious stimuli with no purposeful movements or meaningful interaction. He was noted to be pulling Tv ~250cc with large air leak. BiPAP manipulated, eventually converted to AVAPS 500/14-28/6/.30 with improvement in Tv delivery. Needed Precedex sedation for agitation in MICU.    Empiric antibx coverage for AE COPD. Weaned off BiPAP to NC O2 1/15/20 AM along with sedation.       PAST MEDICAL & SURGICAL HISTORY:  Parkinson disease  Chronic obstructive pulmonary disease, unspecified COPD type  Schizophrenia, unspecified type  No significant past surgical history      MEDICATIONS  (STANDING):  albuterol/ipratropium for Nebulization 3 milliLiter(s) Nebulizer every 6 hours  albuterol/ipratropium for Nebulization. 3 milliLiter(s) Nebulizer once  azithromycin  IVPB      azithromycin  IVPB 500 milliGRAM(s) IV Intermittent every 24 hours  benztropine 1 milliGRAM(s) Oral two times a day  chlorhexidine 2% Cloths 1 Application(s) Topical daily  diVALproex DR 1000 milliGRAM(s) Oral two times a day  enoxaparin Injectable 40 milliGRAM(s) SubCutaneous daily  haloperidol     Tablet 10 milliGRAM(s) Oral at bedtime  haloperidol     Tablet 5 milliGRAM(s) Oral two times a day  influenza   Vaccine 0.5 milliLiter(s) IntraMuscular once  methylPREDNISolone sodium succinate Injectable 60 milliGRAM(s) IV Push every 6 hours  metoprolol tartrate 25 milliGRAM(s) Oral two times a day  nicotine - 21 mG/24Hr(s) Patch 1 patch Transdermal daily  risperiDONE   Tablet 4 milliGRAM(s) Oral daily  rOPINIRole 0.5 milliGRAM(s) Oral two times a day    Allergies  No Known Allergies    SOCIAL HISTORY:  current everyday 1/2p smoker. denies other habits    FAMILY HISTORY:  No pertinent family history  No pertinent family history in first degree relatives      Vital Signs Last 24 Hrs  T(C): 36.6 (15 Maciej 2020 16:00), Max: 37 (2020 23:35)  T(F): 97.9 (15 Maciej 2020 16:00), Max: 98.6 (2020 23:35)  HR: 109 (15 Maciej 2020 17:00) (44 - 110)  BP: 151/78 (15 Maciej 2020 17:00) (108/71 - 169/93)  BP(mean): 105 (15 Maciej 2020 17:00) (85 - 125)  RR: 39 (15 Maciej 2020 17:00) (24 - 41)  SpO2: 93% (15 Maciej 2020 17:00) (82% - 99%)    REVIEW OF SYSTEMS:    comfortable with NC O2, no fever, no pain    PHYSICAL EXAM:    GENERAL: patient appears well, no acute distress, appropriate, pleasant  EYES: sclera clear, no exudates  ENMT: oropharynx clear without erythema, no exudates, moist mucous membranes  NECK: supple, soft, no thyromegaly noted  LUNGS: Coarse breath sounds desmond, desmond basal diminished air entry; scattered rhonchi   HEART: soft S1/S2, regular rate and rhythm, no murmurs noted, no lower extremity edema  GASTROINTESTINAL: abdomen is soft, nontender, nondistended, normoactive bowel sounds, no palpable masses  INTEGUMENT: good skin turgor, warm skin, appears well perfused  MUSCULOSKELETAL: no clubbing or cyanosis, no obvious deformity  NEUROLOGIC: awake, alert, oriented x2, good muscle tone in 4 extremities, no obvious sensory deficits  PSYCHIATRIC: labile       LABS:                        12.4   3.40  )-----------( 147      ( 15 Maceij 2020 05:13 )             38.5     01-15    135  |  96<L>  |  16.0  ----------------------------<  124<H>  5.0   |  29.0  |  0.55    Ca    9.0      15 Maciej 2020 05:13  Phos  4.4     -15  Mg     2.4     -15    TPro  7.0  /  Alb  4.0  /  TBili  0.6  /  DBili  x   /  AST  73<H>  /  ALT  44<H>  /  AlkPhos  142<H>  -14    PT/INR - ( 2020 09:06 )   PT: 16.6 sec;   INR: 1.43 ratio         PTT - ( 2020 09:06 )  PTT:37.8 sec  Urinalysis Basic - ( 15 Maciej 2020 04:20 )    Color: Yellow / Appearance: Clear / S.025 / pH: x  Gluc: x / Ketone: Small  / Bili: Negative / Urobili: 1 mg/dL   Blood: x / Protein: 30 mg/dL / Nitrite: Negative   Leuk Esterase: Negative / RBC: 11-25 /HPF / WBC 0-2   Sq Epi: x / Non Sq Epi: Few / Bacteria: Few    RADIOLOGY & ADDITIONAL STUDIES:

## 2020-01-16 DIAGNOSIS — F20.9 SCHIZOPHRENIA, UNSPECIFIED: ICD-10-CM

## 2020-01-16 DIAGNOSIS — Z71.89 OTHER SPECIFIED COUNSELING: ICD-10-CM

## 2020-01-16 DIAGNOSIS — J44.1 CHRONIC OBSTRUCTIVE PULMONARY DISEASE WITH (ACUTE) EXACERBATION: ICD-10-CM

## 2020-01-16 DIAGNOSIS — G20 PARKINSON'S DISEASE: ICD-10-CM

## 2020-01-16 DIAGNOSIS — J96.21 ACUTE AND CHRONIC RESPIRATORY FAILURE WITH HYPOXIA: ICD-10-CM

## 2020-01-16 LAB
ALBUMIN SERPL ELPH-MCNC: 3.3 G/DL — SIGNIFICANT CHANGE UP (ref 3.3–5.2)
ALP SERPL-CCNC: 107 U/L — SIGNIFICANT CHANGE UP (ref 40–120)
ALT FLD-CCNC: 31 U/L — SIGNIFICANT CHANGE UP
AMMONIA BLD-MCNC: 59 UMOL/L — HIGH (ref 11–55)
ANION GAP SERPL CALC-SCNC: 10 MMOL/L — SIGNIFICANT CHANGE UP (ref 5–17)
AST SERPL-CCNC: 55 U/L — HIGH
BILIRUB SERPL-MCNC: 0.3 MG/DL — LOW (ref 0.4–2)
BUN SERPL-MCNC: 16 MG/DL — SIGNIFICANT CHANGE UP (ref 8–20)
CALCIUM SERPL-MCNC: 8.8 MG/DL — SIGNIFICANT CHANGE UP (ref 8.6–10.2)
CHLORIDE SERPL-SCNC: 94 MMOL/L — LOW (ref 98–107)
CO2 SERPL-SCNC: 33 MMOL/L — HIGH (ref 22–29)
CREAT SERPL-MCNC: 0.49 MG/DL — LOW (ref 0.5–1.3)
CULTURE RESULTS: NO GROWTH — SIGNIFICANT CHANGE UP
GLUCOSE SERPL-MCNC: 101 MG/DL — SIGNIFICANT CHANGE UP (ref 70–115)
HCT VFR BLD CALC: 37.6 % — LOW (ref 39–50)
HGB BLD-MCNC: 11.8 G/DL — LOW (ref 13–17)
MCHC RBC-ENTMCNC: 30.7 PG — SIGNIFICANT CHANGE UP (ref 27–34)
MCHC RBC-ENTMCNC: 31.4 GM/DL — LOW (ref 32–36)
MCV RBC AUTO: 97.9 FL — SIGNIFICANT CHANGE UP (ref 80–100)
PLATELET # BLD AUTO: 166 K/UL — SIGNIFICANT CHANGE UP (ref 150–400)
POTASSIUM SERPL-MCNC: 4.7 MMOL/L — SIGNIFICANT CHANGE UP (ref 3.5–5.3)
POTASSIUM SERPL-SCNC: 4.7 MMOL/L — SIGNIFICANT CHANGE UP (ref 3.5–5.3)
PROT SERPL-MCNC: 6.4 G/DL — LOW (ref 6.6–8.7)
RBC # BLD: 3.84 M/UL — LOW (ref 4.2–5.8)
RBC # FLD: 16 % — HIGH (ref 10.3–14.5)
SODIUM SERPL-SCNC: 137 MMOL/L — SIGNIFICANT CHANGE UP (ref 135–145)
SPECIMEN SOURCE: SIGNIFICANT CHANGE UP
WBC # BLD: 7.39 K/UL — SIGNIFICANT CHANGE UP (ref 3.8–10.5)
WBC # FLD AUTO: 7.39 K/UL — SIGNIFICANT CHANGE UP (ref 3.8–10.5)

## 2020-01-16 PROCEDURE — 99497 ADVNCD CARE PLAN 30 MIN: CPT | Mod: 25

## 2020-01-16 PROCEDURE — 99223 1ST HOSP IP/OBS HIGH 75: CPT

## 2020-01-16 PROCEDURE — 99232 SBSQ HOSP IP/OBS MODERATE 35: CPT

## 2020-01-16 RX ORDER — POLYETHYLENE GLYCOL 3350 17 G/17G
17 POWDER, FOR SOLUTION ORAL AT BEDTIME
Refills: 0 | Status: DISCONTINUED | OUTPATIENT
Start: 2020-01-16 | End: 2020-01-29

## 2020-01-16 RX ORDER — SENNA PLUS 8.6 MG/1
2 TABLET ORAL AT BEDTIME
Refills: 0 | Status: DISCONTINUED | OUTPATIENT
Start: 2020-01-16 | End: 2020-01-29

## 2020-01-16 RX ADMIN — Medication 1 PATCH: at 07:24

## 2020-01-16 RX ADMIN — Medication 60 MILLIGRAM(S): at 05:46

## 2020-01-16 RX ADMIN — Medication 60 MILLIGRAM(S): at 00:13

## 2020-01-16 RX ADMIN — SENNA PLUS 2 TABLET(S): 8.6 TABLET ORAL at 22:15

## 2020-01-16 RX ADMIN — Medication 25 MILLIGRAM(S): at 05:45

## 2020-01-16 RX ADMIN — CHLORHEXIDINE GLUCONATE 1 APPLICATION(S): 213 SOLUTION TOPICAL at 12:25

## 2020-01-16 RX ADMIN — ROPINIROLE 0.5 MILLIGRAM(S): 8 TABLET, FILM COATED, EXTENDED RELEASE ORAL at 17:41

## 2020-01-16 RX ADMIN — Medication 1 MILLIGRAM(S): at 05:46

## 2020-01-16 RX ADMIN — Medication 1 PATCH: at 12:24

## 2020-01-16 RX ADMIN — DIVALPROEX SODIUM 1000 MILLIGRAM(S): 500 TABLET, DELAYED RELEASE ORAL at 05:45

## 2020-01-16 RX ADMIN — HALOPERIDOL DECANOATE 5 MILLIGRAM(S): 100 INJECTION INTRAMUSCULAR at 05:46

## 2020-01-16 RX ADMIN — ENOXAPARIN SODIUM 40 MILLIGRAM(S): 100 INJECTION SUBCUTANEOUS at 12:24

## 2020-01-16 RX ADMIN — Medication 1 PATCH: at 20:00

## 2020-01-16 RX ADMIN — RISPERIDONE 4 MILLIGRAM(S): 4 TABLET ORAL at 12:24

## 2020-01-16 RX ADMIN — Medication 3 MILLILITER(S): at 03:03

## 2020-01-16 RX ADMIN — Medication 60 MILLIGRAM(S): at 12:24

## 2020-01-16 RX ADMIN — ROPINIROLE 0.5 MILLIGRAM(S): 8 TABLET, FILM COATED, EXTENDED RELEASE ORAL at 05:46

## 2020-01-16 RX ADMIN — POLYETHYLENE GLYCOL 3350 17 GRAM(S): 17 POWDER, FOR SOLUTION ORAL at 22:15

## 2020-01-16 RX ADMIN — Medication 1 MILLIGRAM(S): at 17:41

## 2020-01-16 RX ADMIN — HALOPERIDOL DECANOATE 5 MILLIGRAM(S): 100 INJECTION INTRAMUSCULAR at 17:41

## 2020-01-16 RX ADMIN — Medication 3 MILLILITER(S): at 16:12

## 2020-01-16 RX ADMIN — Medication 25 MILLIGRAM(S): at 17:41

## 2020-01-16 RX ADMIN — Medication 60 MILLIGRAM(S): at 22:10

## 2020-01-16 RX ADMIN — Medication 3 MILLILITER(S): at 21:10

## 2020-01-16 RX ADMIN — DIVALPROEX SODIUM 1000 MILLIGRAM(S): 500 TABLET, DELAYED RELEASE ORAL at 17:41

## 2020-01-16 RX ADMIN — AZITHROMYCIN 255 MILLIGRAM(S): 500 TABLET, FILM COATED ORAL at 12:25

## 2020-01-16 RX ADMIN — HALOPERIDOL DECANOATE 10 MILLIGRAM(S): 100 INJECTION INTRAMUSCULAR at 22:09

## 2020-01-16 RX ADMIN — Medication 1 PATCH: at 12:25

## 2020-01-16 RX ADMIN — Medication 3 MILLILITER(S): at 10:29

## 2020-01-16 NOTE — CONSULT NOTE ADULT - CONSULT REASON
Advanced Acute COPD Exacerbation   Acute on Chronic Respiratory Failure  Made DNR by Surrogate and her -then rescinded

## 2020-01-16 NOTE — PROGRESS NOTE ADULT - ASSESSMENT
Assess    Acute exacerbation of severe COPD (previously good BD response)  Acute Hypoxic and hypercarbic respiratory failure   Nicotine dependence  Parkinson's Disease  Bronchitis  Likely poor adherence to care measures      Rec    AVAPS at night as tolerated for now  Neb  Steroids with taper  Palliative input a consideration - Difficult to determine advanced directives as a [place] patient   Ethics input also a consideration

## 2020-01-16 NOTE — CONSULT NOTE ADULT - REASON FOR ADMISSION
Acute on Chronic Hypercapnic Respiratory Failure, Acute COPD Exacerbation
Respiratory failure
COPD with acute exacerbation

## 2020-01-16 NOTE — PROGRESS NOTE ADULT - ASSESSMENT
68 y/o male, PMHx severe COPD, Parkinson's disease, schizophrenia, active smoker, who presented to ED from assisted living facility with a chief complaint of shortness of breath for the past three days.    # AE COPD   s/p BiPAP  Oxygen  nebs  steroids to taper  empiric antibx to prevent bacterial superimposed infection  RVP neg  bld c/s testing    # Acute on chronic hypercapnic respiratory failure  s/p BiPAP  weaned off to NC O2    # thrombocytopenia  1 reading  rpt in AM    # Schizophrenia  Cont all home Psych meds  s/p agitation requiring Precedex sedation    # Parkinson disease  Cont home meds    # Active smoker  Nicotine patch offered    Cont Lovenox    Hard to change/ make [facility] decision as he is [place].   Dispo- to [facility] when tapered off iv steroids

## 2020-01-16 NOTE — PROGRESS NOTE ADULT - SUBJECTIVE AND OBJECTIVE BOX
PULMONARY PROGRESS NOTE      [id number]    Patient is a 69y old  Male who presents with a chief complaint of Acute on Chronic Hypercapnic Respiratory Failure, Acute COPD Exacerbation (2020 14:01)  Advance COPD  Schizophrenia at [facility]  Frequent admissions  Poor Rx adherence  Smoker     INTERVAL HPI/OVERNIGHT EVENTS:  Marginal on nasal 02  Non-compliant with bowel prep    MEDICATIONS  (STANDING):  albuterol/ipratropium for Nebulization 3 milliLiter(s) Nebulizer every 4 hours  albuterol/ipratropium for Nebulization. 3 milliLiter(s) Nebulizer once  azithromycin  IVPB      azithromycin  IVPB 500 milliGRAM(s) IV Intermittent every 24 hours  chlorhexidine 2% Cloths 1 Application(s) Topical daily  dexMEDEtomidine Infusion 0.5 MICROgram(s)/kG/Hr (7.825 mL/Hr) IV Continuous <Continuous>  enoxaparin Injectable 40 milliGRAM(s) SubCutaneous daily  haloperidol    Injectable 2.5 milliGRAM(s) IntraMuscular every 6 hours  influenza   Vaccine 0.5 milliLiter(s) IntraMuscular once  methylPREDNISolone sodium succinate Injectable 60 milliGRAM(s) IV Push every 6 hours  pantoprazole  Injectable      pantoprazole  Injectable 40 milliGRAM(s) IV Push daily  valproate sodium IVPB 250 milliGRAM(s) IV Intermittent every 6 hours      MEDICATIONS  (PRN):      Allergies    No Known Allergies    Intolerances        PAST MEDICAL & SURGICAL HISTORY:  Parkinson disease  Chronic obstructive pulmonary disease, unspecified COPD type  Schizophrenia, unspecified type  No significant past surgical history      SOCIAL HISTORY  Smoking History:       REVIEW OF SYSTEMS:    Poor informant    Vital Signs Last 24 Hrs  T(C): 35.5 (15 Maciej 2020 07:52), Max: 37.2 (2020 14:35)  T(F): 95.9 (15 Maciej 2020 07:52), Max: 99 (2020 14:35)  HR: 65 (15 Maciej 2020 09:00) (44 - 107)  BP: 122/77 (15 Maciej 2020 09:00) (101/66 - 161/99)  BP(mean): 94 (15 Maciej 2020 09:00) (65 - 125)  RR: 29 (15 Maciej 2020 09:00) (18 - 47)  SpO2: 96% (15 Maciej 2020 09:00) (82% - 99%)    PHYSICAL EXAMINATION:    GENERAL: The patient is awake and alert in no apparent distress.     HEENT: Head is normocephalic and atraumatic. Extraocular muscles are intact. Mucous membranes are moist.    NECK: Supple.    LUNGS: Exp rhonchi to auscultation without wheezing, rales; respirations unlabored on NC    HEART: Regular rate and rhythm without murmur.    ABDOMEN: Soft, nontender, and nondistended.      EXTREMITIES: Without any cyanosis, clubbing, rash, lesions or edema.    NEUROLOGIC: Grossly intact.    LABS:                        12.4   3.40  )-----------( 147      ( 15 Maciej 2020 05:13 )             38.5     01-15    135  |  96<L>  |  16.0  ----------------------------<  124<H>  5.0   |  29.0  |  0.55    Ca    9.0      15 Maciej 2020 05:13  Phos  4.4     15  Mg     2.4     15    TPro  7.0  /  Alb  4.0  /  TBili  0.6  /  DBili  x   /  AST  73<H>  /  ALT  44<H>  /  AlkPhos  142<H>  14    PT/INR - ( 2020 09:06 )   PT: 16.6 sec;   INR: 1.43 ratio         PTT - ( 2020 09:06 )  PTT:37.8 sec  Urinalysis Basic - ( 15 Maciej 2020 04:20 )    Color: Yellow / Appearance: Clear / S.025 / pH: x  Gluc: x / Ketone: Small  / Bili: Negative / Urobili: 1 mg/dL   Blood: x / Protein: 30 mg/dL / Nitrite: Negative   Leuk Esterase: Negative / RBC: 11-25 /HPF / WBC 0-2   Sq Epi: x / Non Sq Epi: Few / Bacteria: Few      ABG - ( 15 Maciej 2020 00:36 )  pH, Arterial: 7.32  pH, Blood: x     /  pCO2: 64    /  pO2: 82    / HCO3: 29    / Base Excess: 5.4   /  SaO2: 96            MICROBIOLOGY:  RVP neg        RADIOLOGY & ADDITIONAL STUDIES:    CXR neg on

## 2020-01-16 NOTE — CONSULT NOTE ADULT - SUBJECTIVE AND OBJECTIVE BOX
HPI:This is a 69 yoM living in a group home; PMH COPD, Schizophrenia (unspecific type) and Parkinson's Disease. admitted in Respiratory Failure.   Managed on BIPAP here now using only at night. He was sleeping at time of my visit. He is confused to time and place. Thinks he is moving to another  floor. Calm and pleasant with O2 NC in place. His voice is hoarse  and raspy-difficult to understand. + Urine retention with hematuria. Baird catheter in place draining clear pale yellow urine.   He denies pain, no N/V/D is constipated. NO CP or dyspnea at rest.     PERTINENT PMH REVIEWED: Yes     PAST MEDICAL & SURGICAL HISTORY:  Parkinson disease  Chronic obstructive pulmonary disease, unspecified COPD type  Schizophrenia, unspecified type  No significant past surgical history      SOCIAL HISTORY:  EtOH   No                                    Drugs    No                                    smoker                                    Admitted from: [facility]  Sister Cindy _Surrogate- Phone#: investigating through group home    FAMILY HISTORY:  No pertinent family history  No pertinent family history in first degree relatives    No Known Allergies  Intolerances    Baseline ADLs (prior to admission):  Independent/      Present Symptoms:   Dyspnea: 0 1 GALAN on exertion and when lying flat  Nausea/Vomiting: No  Anxiety:  Yes  Depression:  No  Fatigue: Yes   Loss of appetite: Yes     Pain: Denies            Character-            Duration-            Effect-            Factors-            Frequency-            Location-            Severity-    Review of Systems: Reviewed                     All others negative    MEDICATIONS  (STANDING):  albuterol/ipratropium for Nebulization 3 milliLiter(s) Nebulizer every 6 hours  albuterol/ipratropium for Nebulization. 3 milliLiter(s) Nebulizer once  azithromycin  IVPB      azithromycin  IVPB 500 milliGRAM(s) IV Intermittent every 24 hours  benztropine 1 milliGRAM(s) Oral two times a day  chlorhexidine 2% Cloths 1 Application(s) Topical daily  diVALproex DR 1000 milliGRAM(s) Oral two times a day  enoxaparin Injectable 40 milliGRAM(s) SubCutaneous daily  haloperidol     Tablet 10 milliGRAM(s) Oral at bedtime  haloperidol     Tablet 5 milliGRAM(s) Oral two times a day  influenza   Vaccine 0.5 milliLiter(s) IntraMuscular once  methylPREDNISolone sodium succinate Injectable 60 milliGRAM(s) IV Push every 8 hours  metoprolol tartrate 25 milliGRAM(s) Oral two times a day  nicotine - 21 mG/24Hr(s) Patch 1 patch Transdermal daily  risperiDONE   Tablet 4 milliGRAM(s) Oral daily  rOPINIRole 0.5 milliGRAM(s) Oral two times a day    MEDICATIONS  (PRN):    PHYSICAL EXAM:    Vital Signs Last 24 Hrs  T(C): 37.3 (2020 07:00), Max: 37.3 (2020 07:00)  T(F): 99.1 (2020 07:00), Max: 99.1 (2020 07:00)  HR: 107 (2020 10:32) (82 - 130)  BP: 148/89 (2020 07:00) (134/79 - 151/78)  BP(mean): 101 (15 Maciej 2020 20:00) (101 - 109)  RR: 18 (2020 07:00) (18 - 39)  SpO2: 95% (2020 10:32) (91% - 98%)    General:  oriented x _1-___ lethargic confused                  cachexia  Voice hoarse    HEENT: dry mouth      Lungs: comfortable      CV:   tachycardia    GI:   distended     constipation  last BM: days    :   trerance    MSK:  weakness              ambulatory  bedbound/wheelchair bound    Skin: normal  ___  no rash    LABS:                        11.8   7.39  )-----------( 166      ( 2020 08:21 )             37.6     01-16    137  |  94<L>  |  16.0  ----------------------------<  101  4.7   |  33.0<H>  |  0.49<L>    Ca    8.8      2020 08:21  Phos  4.4     01-15  Mg     2.4     01-15    TPro  6.4<L>  /  Alb  3.3  /  TBili  0.3<L>  /  DBili  x   /  AST  55<H>  /  ALT  31  /  AlkPhos  107  01-16    Urinalysis Basic - ( 15 Maciej 2020 04:20 )    Color: Yellow / Appearance: Clear / S.025 / pH: x  Gluc: x / Ketone: Small  / Bili: Negative / Urobili: 1 mg/dL   Blood: x / Protein: 30 mg/dL / Nitrite: Negative   Leuk Esterase: Negative / RBC: 11-25 /HPF / WBC 0-2   Sq Epi: x / Non Sq Epi: Few / Bacteria: Few    I&O's Summary    15 Maciej 2020 07:  -  2020 07:00  --------------------------------------------------------  IN: 460 mL / OUT: 530 mL / NET: -70 mL    2020 07:  -  2020 15:27  --------------------------------------------------------  IN: 480 mL / OUT: 750 mL / NET: -270 mL    RADIOLOGY & ADDITIONAL STUDIES:    ADVANCE DIRECTIVES:   DNR  NO  Completed on:                     MENA  NO   Completed on: Rescinded  during early hospitalization  Living Will   NO   Completed on: HPI:This is a 69 yoM living in a group home; PMH COPD, Schizophrenia (unspecific type) and Parkinson's Disease. admitted in Respiratory Failure.   Managed on BIPAP here now using only at night. He was sleeping at time of my visit. He is confused to time and place. Thinks he is moving to another  floor. Calm and pleasant with O2 NC in place. His voice is hoarse  and raspy-difficult to understand. + Urine retention with hematuria. Baird catheter in place draining clear pale yellow urine.   He denies pain, no N/V/D is constipated. NO CP or dyspnea at rest.     PERTINENT PMH REVIEWED: Yes     PAST MEDICAL & SURGICAL HISTORY:  Parkinson disease  Chronic obstructive pulmonary disease, unspecified COPD type  Schizophrenia, unspecified type  No significant past surgical history      SOCIAL HISTORY:  EtOH   No                                    Drugs    No                                    smoker                                    Admitted from: [facility]  Sister Cindy _Surrogate- Phone#: [phone]     FAMILY HISTORY:  No pertinent family history  No pertinent family history in first degree relatives    No Known Allergies  Intolerances    Baseline ADLs (prior to admission):  Independent/      Present Symptoms:   Dyspnea: 0 1 GALAN on exertion and when lying flat  Nausea/Vomiting: No  Anxiety:  Yes  Depression:  No  Fatigue: Yes   Loss of appetite: Yes     Pain: Denies            Character-            Duration-            Effect-            Factors-            Frequency-            Location-            Severity-    Review of Systems: Reviewed                     All others negative    MEDICATIONS  (STANDING):  albuterol/ipratropium for Nebulization 3 milliLiter(s) Nebulizer every 6 hours  albuterol/ipratropium for Nebulization. 3 milliLiter(s) Nebulizer once  azithromycin  IVPB      azithromycin  IVPB 500 milliGRAM(s) IV Intermittent every 24 hours  benztropine 1 milliGRAM(s) Oral two times a day  chlorhexidine 2% Cloths 1 Application(s) Topical daily  diVALproex DR 1000 milliGRAM(s) Oral two times a day  enoxaparin Injectable 40 milliGRAM(s) SubCutaneous daily  haloperidol     Tablet 10 milliGRAM(s) Oral at bedtime  haloperidol     Tablet 5 milliGRAM(s) Oral two times a day  influenza   Vaccine 0.5 milliLiter(s) IntraMuscular once  methylPREDNISolone sodium succinate Injectable 60 milliGRAM(s) IV Push every 8 hours  metoprolol tartrate 25 milliGRAM(s) Oral two times a day  nicotine - 21 mG/24Hr(s) Patch 1 patch Transdermal daily  risperiDONE   Tablet 4 milliGRAM(s) Oral daily  rOPINIRole 0.5 milliGRAM(s) Oral two times a day    MEDICATIONS  (PRN):    PHYSICAL EXAM:    Vital Signs Last 24 Hrs  T(C): 37.3 (2020 07:00), Max: 37.3 (2020 07:00)  T(F): 99.1 (2020 07:00), Max: 99.1 (2020 07:00)  HR: 107 (2020 10:32) (82 - 130)  BP: 148/89 (2020 07:00) (134/79 - 151/78)  BP(mean): 101 (15 Maciej 2020 20:00) (101 - 109)  RR: 18 (2020 07:00) (18 - 39)  SpO2: 95% (2020 10:32) (91% - 98%)    General:  oriented x _1-___ lethargic confused                  cachexia  Voice hoarse    HEENT: dry mouth      Lungs: comfortable      CV:   tachycardia    GI:   distended     constipation  last BM: days    :   terrance    MSK:  weakness              ambulatory  bedbound/wheelchair bound    Skin: normal  ___  no rash    LABS:                        11.8   7.39  )-----------( 166      ( 2020 08:21 )             37.6     01-16    137  |  94<L>  |  16.0  ----------------------------<  101  4.7   |  33.0<H>  |  0.49<L>    Ca    8.8      2020 08:21  Phos  4.4     01-15  Mg     2.4     01-15    TPro  6.4<L>  /  Alb  3.3  /  TBili  0.3<L>  /  DBili  x   /  AST  55<H>  /  ALT  31  /  AlkPhos  107  01-16    Urinalysis Basic - ( 15 Maciej 2020 04:20 )    Color: Yellow / Appearance: Clear / S.025 / pH: x  Gluc: x / Ketone: Small  / Bili: Negative / Urobili: 1 mg/dL   Blood: x / Protein: 30 mg/dL / Nitrite: Negative   Leuk Esterase: Negative / RBC: 11-25 /HPF / WBC 0-2   Sq Epi: x / Non Sq Epi: Few / Bacteria: Few    I&O's Summary    15 Maciej 2020 07:  -  2020 07:00  --------------------------------------------------------  IN: 460 mL / OUT: 530 mL / NET: -70 mL    2020 07:  -  2020 15:27  --------------------------------------------------------  IN: 480 mL / OUT: 750 mL / NET: -270 mL    RADIOLOGY & ADDITIONAL STUDIES:  < from: Xray Chest 1 View-PORTABLE IMMEDIATE (20 @ 09:22) >    INTERPRETATION:  Portable chest radiograph        CLINICAL INFORMATION: Sepsis    TECHNIQUE:  Portable  AP view of the chest was obtained.    COMPARISON: No previous examinations are available for review.    FINDINGS:    The lungs  are clear.  No pleural abnormality is seen.    The heart and mediastinum are within normal limits.    Chronic LEFT rib fracture deformity noted.        IMPRESSION:   No evidence of active chest disease.              ADVANCE DIRECTIVES:  FULL CODE  DNR  NO  Completed on:                     MOLST  NO   Completed on: Rescinded  earlier in hospitalization  Living Will   NO   Completed on:

## 2020-01-16 NOTE — CHART NOTE - NSCHARTNOTEFT_GEN_A_CORE
Called by RN, reporting pts refusal to complete bowel prep for colonoscopy 1/16/20.  70 y/o male, PMHx severe COPD, Parkinson's disease, schizophrenia, active smoker, who presented to ED from assisted living facility admitted with COPD exacerbation.  Pt seen and spoken to at bedside. A + O x3, benefits and risks of Colonoscopy explained to pt, to which he expresses understanding. Pt however refused to complete the bowel prep.

## 2020-01-16 NOTE — CONSULT NOTE ADULT - ATTENDING COMMENTS
COUNSELING:    Face to face meeting to discuss Advanced Care Planning - Time Spent ______ Minutes.  See goals of care note.    More than 50% time spent in counseling and coordinating care. ______ Minutes.     Thank you for the opportunity to assist with the care of this patient.   [facility] [phone].

## 2020-01-16 NOTE — PROGRESS NOTE ADULT - SUBJECTIVE AND OBJECTIVE BOX
HEALTH ISSUES - PROBLEM Dx:    AE COPD, hypercapnic resp failure    INTERVAL HPI/ OVERNIGHT EVENTS:    lying comfortably in bed with NC O2  saying he wants symbicort and ventolin  explained he is on duoneb and eventually will go onto his inhalers      REVIEW OF SYSTEMS:    cough - sob -    Vital Signs Last 24 Hrs  T(C): 37.3 (2020 15:00), Max: 37.3 (2020 07:00)  T(F): 99.1 (2020 15:00), Max: 99.1 (2020 07:00)  HR: 105 (2020 17:40) (82 - 130)  BP: 134/85 (2020 17:40) (133/86 - 148/89)  BP(mean): --  RR: 28 (2020 15:00) (18 - 28)  SpO2: 96% (2020 16:14) (91% - 98%)    PHYSICAL EXAM-  GENERAL: comfortbale  EYES: sclera clear, no exudates  ENMT: oropharynx clear without erythema, no exudates, moist mucous membranes  NECK: supple, soft, no thyromegaly noted  LUNGS: CTA desmond; No rales, rhonchi  HEART: soft S1/S2, regular rate and rhythm, no murmurs noted, no lower extremity edema  GASTROINTESTINAL: abdomen is soft, nontender, nondistended, normoactive bowel sounds, no palpable masses  INTEGUMENT: good skin turgor, warm skin, appears well perfused  MUSCULOSKELETAL: no clubbing or cyanosis, no obvious deformity  NEUROLOGIC: awake, alert, oriented x2, good muscle tone in 4 extremities, no obvious sensory deficits  PSYCHIATRIC: labile       MEDICATIONS  (STANDING):  albuterol/ipratropium for Nebulization 3 milliLiter(s) Nebulizer every 6 hours  albuterol/ipratropium for Nebulization. 3 milliLiter(s) Nebulizer once  azithromycin  IVPB      azithromycin  IVPB 500 milliGRAM(s) IV Intermittent every 24 hours  benztropine 1 milliGRAM(s) Oral two times a day  chlorhexidine 2% Cloths 1 Application(s) Topical daily  diVALproex DR 1000 milliGRAM(s) Oral two times a day  enoxaparin Injectable 40 milliGRAM(s) SubCutaneous daily  haloperidol     Tablet 10 milliGRAM(s) Oral at bedtime  haloperidol     Tablet 5 milliGRAM(s) Oral two times a day  influenza   Vaccine 0.5 milliLiter(s) IntraMuscular once  methylPREDNISolone sodium succinate Injectable 60 milliGRAM(s) IV Push every 8 hours  metoprolol tartrate 25 milliGRAM(s) Oral two times a day  nicotine - 21 mG/24Hr(s) Patch 1 patch Transdermal daily  polyethylene glycol 3350 17 Gram(s) Oral at bedtime  risperiDONE   Tablet 4 milliGRAM(s) Oral daily  rOPINIRole 0.5 milliGRAM(s) Oral two times a day  senna 2 Tablet(s) Oral at bedtime    MEDICATIONS  (PRN):      LABS:                        11.8   7.39  )-----------( 166      ( 2020 08:21 )             37.6     01-16    137  |  94<L>  |  16.0  ----------------------------<  101  4.7   |  33.0<H>  |  0.49<L>    Ca    8.8      2020 08:21  Phos  4.4     -15  Mg     2.4     -15    TPro  6.4<L>  /  Alb  3.3  /  TBili  0.3<L>  /  DBili  x   /  AST  55<H>  /  ALT  31  /  AlkPhos  107  01-16      Urinalysis Basic - ( 15 Maciej 2020 04:20 )    Color: Yellow / Appearance: Clear / S.025 / pH: x  Gluc: x / Ketone: Small  / Bili: Negative / Urobili: 1 mg/dL   Blood: x / Protein: 30 mg/dL / Nitrite: Negative   Leuk Esterase: Negative / RBC: 11-25 /HPF / WBC 0-2   Sq Epi: x / Non Sq Epi: Few / Bacteria: Few

## 2020-01-16 NOTE — CONSULT NOTE ADULT - PROBLEM SELECTOR RECOMMENDATION 4
DNR was rescinded by sister Cindy soon after admission  See C conversation documentation  Remains a FULL CODE at this time

## 2020-01-17 PROCEDURE — 99497 ADVNCD CARE PLAN 30 MIN: CPT | Mod: 25

## 2020-01-17 PROCEDURE — 99233 SBSQ HOSP IP/OBS HIGH 50: CPT

## 2020-01-17 PROCEDURE — 99232 SBSQ HOSP IP/OBS MODERATE 35: CPT

## 2020-01-17 RX ORDER — LACTULOSE 10 G/15ML
10 SOLUTION ORAL DAILY
Refills: 0 | Status: DISCONTINUED | OUTPATIENT
Start: 2020-01-17 | End: 2020-01-29

## 2020-01-17 RX ADMIN — RISPERIDONE 4 MILLIGRAM(S): 4 TABLET ORAL at 13:13

## 2020-01-17 RX ADMIN — POLYETHYLENE GLYCOL 3350 17 GRAM(S): 17 POWDER, FOR SOLUTION ORAL at 21:05

## 2020-01-17 RX ADMIN — Medication 3 MILLILITER(S): at 08:44

## 2020-01-17 RX ADMIN — CHLORHEXIDINE GLUCONATE 1 APPLICATION(S): 213 SOLUTION TOPICAL at 13:16

## 2020-01-17 RX ADMIN — Medication 60 MILLIGRAM(S): at 21:04

## 2020-01-17 RX ADMIN — SENNA PLUS 2 TABLET(S): 8.6 TABLET ORAL at 21:05

## 2020-01-17 RX ADMIN — HALOPERIDOL DECANOATE 10 MILLIGRAM(S): 100 INJECTION INTRAMUSCULAR at 21:04

## 2020-01-17 RX ADMIN — Medication 1 PATCH: at 13:12

## 2020-01-17 RX ADMIN — Medication 1 MILLIGRAM(S): at 05:47

## 2020-01-17 RX ADMIN — Medication 1 PATCH: at 07:15

## 2020-01-17 RX ADMIN — AZITHROMYCIN 255 MILLIGRAM(S): 500 TABLET, FILM COATED ORAL at 13:31

## 2020-01-17 RX ADMIN — ROPINIROLE 0.5 MILLIGRAM(S): 8 TABLET, FILM COATED, EXTENDED RELEASE ORAL at 18:28

## 2020-01-17 RX ADMIN — Medication 3 MILLILITER(S): at 16:43

## 2020-01-17 RX ADMIN — Medication 1 PATCH: at 13:13

## 2020-01-17 RX ADMIN — Medication 1 MILLIGRAM(S): at 18:31

## 2020-01-17 RX ADMIN — HALOPERIDOL DECANOATE 5 MILLIGRAM(S): 100 INJECTION INTRAMUSCULAR at 18:28

## 2020-01-17 RX ADMIN — DIVALPROEX SODIUM 1000 MILLIGRAM(S): 500 TABLET, DELAYED RELEASE ORAL at 05:47

## 2020-01-17 RX ADMIN — Medication 60 MILLIGRAM(S): at 05:47

## 2020-01-17 RX ADMIN — ROPINIROLE 0.5 MILLIGRAM(S): 8 TABLET, FILM COATED, EXTENDED RELEASE ORAL at 05:49

## 2020-01-17 RX ADMIN — DIVALPROEX SODIUM 1000 MILLIGRAM(S): 500 TABLET, DELAYED RELEASE ORAL at 18:30

## 2020-01-17 RX ADMIN — Medication 60 MILLIGRAM(S): at 13:16

## 2020-01-17 RX ADMIN — Medication 1 PATCH: at 21:03

## 2020-01-17 RX ADMIN — Medication 25 MILLIGRAM(S): at 18:30

## 2020-01-17 RX ADMIN — HALOPERIDOL DECANOATE 5 MILLIGRAM(S): 100 INJECTION INTRAMUSCULAR at 05:47

## 2020-01-17 RX ADMIN — Medication 3 MILLILITER(S): at 03:07

## 2020-01-17 RX ADMIN — Medication 25 MILLIGRAM(S): at 05:47

## 2020-01-17 RX ADMIN — ENOXAPARIN SODIUM 40 MILLIGRAM(S): 100 INJECTION SUBCUTANEOUS at 13:16

## 2020-01-17 NOTE — PROGRESS NOTE ADULT - SUBJECTIVE AND OBJECTIVE BOX
INTERVAL HPI/OVERNIGHT EVENTS: 70 yo Male Patient PMH of Drug Abuse, Schizophrenia, Parkinson Disease was admitted in Respiratory Distress and failure  with Acute COPD Exacerbation. He has had an altered mental status, lethargic sleeping most of day. Ate good breakfast not hungry for lunch. He is on Continuous O2 NC  saturating wnl's. He is also on BIPAP nocturnally not adjusting well. His voice is low and difficult to understand; not annunciating clearly. Responding to his sister Cindy who visited this afternoon.  We met at Patient's bedside, She and  filled me in on their history of helping him when hospitalized. His sister Cindy is very upset about his obvious decline in functional status and  dependence on O2 and nocturnal bipap. She is having a hard time coming to terms with signing his DNR-we talked a long time, she crying at times. I(SEE [facility] conversation documentation)  He denies CP, palpitations, N/V/D SOB "I don't need this oxygen" Asking for his cigarettes.     69y old  Male who presents with a chief complaint of copd (17 Jan 2020 16:06)    PAST MEDICAL & SURGICAL HISTORY:  Parkinson disease  Chronic obstructive pulmonary disease, unspecified COPD type  Schizophrenia, unspecified type  No significant past surgical history    Present Symptoms:     Dyspnea:  1 2  RR 28-36/min while sleeping shallow inspirations  Nausea/Vomiting:  No  Anxiety:  No  Depression:  No  Fatigue: Yes   Loss of appetite: Yes No- waxes and wanes    Pain: denies            Character-            Duration-            Effect-            Factors-            Frequency-            Location-            Severity-    Review of Systems: Reviewed                 All others negative    MEDICATIONS  (STANDING):  albuterol/ipratropium for Nebulization 3 milliLiter(s) Nebulizer every 6 hours  albuterol/ipratropium for Nebulization. 3 milliLiter(s) Nebulizer once  azithromycin  IVPB      azithromycin  IVPB 500 milliGRAM(s) IV Intermittent every 24 hours  benztropine 1 milliGRAM(s) Oral two times a day  chlorhexidine 2% Cloths 1 Application(s) Topical daily  diVALproex DR 1000 milliGRAM(s) Oral two times a day  enoxaparin Injectable 40 milliGRAM(s) SubCutaneous daily  haloperidol     Tablet 10 milliGRAM(s) Oral at bedtime  haloperidol     Tablet 5 milliGRAM(s) Oral two times a day  influenza   Vaccine 0.5 milliLiter(s) IntraMuscular once  lactulose Syrup 10 Gram(s) Oral daily  methylPREDNISolone sodium succinate Injectable 60 milliGRAM(s) IV Push every 8 hours  metoprolol tartrate 25 milliGRAM(s) Oral two times a day  nicotine - 21 mG/24Hr(s) Patch 1 patch Transdermal daily  polyethylene glycol 3350 17 Gram(s) Oral at bedtime  risperiDONE   Tablet 4 milliGRAM(s) Oral daily  rOPINIRole 0.5 milliGRAM(s) Oral two times a day  senna 2 Tablet(s) Oral at bedtime    MEDICATIONS  (PRN):      PHYSICAL EXAM:    Vital Signs Last 24 Hrs  T(C): 36.4 (17 Jan 2020 08:00), Max: 37.4 (16 Jan 2020 21:00)  T(F): 97.6 (17 Jan 2020 08:00), Max: 99.4 (16 Jan 2020 21:00)  HR: 103 (17 Jan 2020 08:46) (52 - 105)  BP: 129/73 (17 Jan 2020 08:00) (121/79 - 134/85)  BP(mean): --  RR: 18 (17 Jan 2020 08:00) (18 - 24)  SpO2: 95% (17 Jan 2020 07:30) (93% - 96%)    General: alert  oriented x __2__ lethargic                thin voice low and difficult to understand    HEENT: normal  dry mouth      Lungs:  tachypnea/labored breathing-lying flat      CV:  tachycardia    GI:   distended   constipation  last BM: denies    :  terrance    MSK:   weakness             bedbound/    Skin: normal    no rash    LABS:                        11.8   7.39  )-----------( 166      ( 16 Jan 2020 08:21 )             37.6     01-16    137  |  94<L>  |  16.0  ----------------------------<  101  4.7   |  33.0<H>  |  0.49<L>    Ca    8.8      16 Jan 2020 08:21    TPro  6.4<L>  /  Alb  3.3  /  TBili  0.3<L>  /  DBili  x   /  AST  55<H>  /  ALT  31  /  AlkPhos  107  01-16    I&O's Summary    16 Jan 2020 07:01  -  17 Jan 2020 07:00  --------------------------------------------------------  IN: 720 mL / OUT: 750 mL / NET: -30 mL    17 Jan 2020 07:01  -  17 Jan 2020 16:28  --------------------------------------------------------  IN: 720 mL / OUT: 0 mL / NET: 720 mL    RADIOLOGY & ADDITIONAL STUDIES:    ADVANCE DIRECTIVES:   DNR NO  Completed on:                     MENA   NO   Completed on:  Living Will   NO   Completed on:

## 2020-01-17 NOTE — PROGRESS NOTE ADULT - SUBJECTIVE AND OBJECTIVE BOX
PULMONARY PROGRESS NOTE      [facility] [id number]    Patient is a 69y old  Male who presents with a chief complaint of COPD with acute exacerbation (16 Jan 2020 15:23)      INTERVAL HPI/OVERNIGHT EVENTS:  resting comfortably   on 02  no chest pain or SOB reported  MEDICATIONS  (STANDING):  albuterol/ipratropium for Nebulization 3 milliLiter(s) Nebulizer every 6 hours  albuterol/ipratropium for Nebulization. 3 milliLiter(s) Nebulizer once  azithromycin  IVPB      azithromycin  IVPB 500 milliGRAM(s) IV Intermittent every 24 hours  benztropine 1 milliGRAM(s) Oral two times a day  chlorhexidine 2% Cloths 1 Application(s) Topical daily  diVALproex DR 1000 milliGRAM(s) Oral two times a day  enoxaparin Injectable 40 milliGRAM(s) SubCutaneous daily  haloperidol     Tablet 10 milliGRAM(s) Oral at bedtime  haloperidol     Tablet 5 milliGRAM(s) Oral two times a day  influenza   Vaccine 0.5 milliLiter(s) IntraMuscular once  lactulose Syrup 10 Gram(s) Oral daily  methylPREDNISolone sodium succinate Injectable 60 milliGRAM(s) IV Push every 8 hours  metoprolol tartrate 25 milliGRAM(s) Oral two times a day  nicotine - 21 mG/24Hr(s) Patch 1 patch Transdermal daily  polyethylene glycol 3350 17 Gram(s) Oral at bedtime  risperiDONE   Tablet 4 milliGRAM(s) Oral daily  rOPINIRole 0.5 milliGRAM(s) Oral two times a day  senna 2 Tablet(s) Oral at bedtime      MEDICATIONS  (PRN):      Allergies    No Known Allergies    Intolerances        PAST MEDICAL & SURGICAL HISTORY:  Parkinson disease  Chronic obstructive pulmonary disease, unspecified COPD type  Schizophrenia, unspecified type  No significant past surgical history      SOCIAL HISTORY  Smoking History:       REVIEW OF SYSTEMS:    CONSTITUTIONAL:  No distress    HEENT:  Eyes:  No diplopia or blurred vision. ENT:  No earache, sore throat or runny nose.    CARDIOVASCULAR:  No pressure, squeezing, tightness, heaviness or aching about the chest; no palpitations.    RESPIRATORY:  see HPI    GASTROINTESTINAL:  No nausea, vomiting or diarrhea.    GENITOURINARY:  No dysuria, frequency or urgency.    NEUROLOGIC:  No paresthesias, fasciculations, seizures or weakness.    PSYCHIATRIC:  No disorder of thought or mood.    Vital Signs Last 24 Hrs  T(C): 36.4 (17 Jan 2020 08:00), Max: 37.4 (16 Jan 2020 21:00)  T(F): 97.6 (17 Jan 2020 08:00), Max: 99.4 (16 Jan 2020 21:00)  HR: 103 (17 Jan 2020 08:46) (52 - 105)  BP: 129/73 (17 Jan 2020 08:00) (121/79 - 134/85)  BP(mean): --  RR: 18 (17 Jan 2020 08:00) (18 - 24)  SpO2: 95% (17 Jan 2020 07:30) (93% - 96%)    PHYSICAL EXAMINATION:    GENERAL: The patient is awake and alert in no apparent distress.     HEENT: Head is normocephalic and atraumatic. Extraocular muscles are intact. Mucous membranes are moist.    NECK: Supple.    LUNGS: moderate air entry bilat ; respirations unlabored    HEART: Regular rate and rhythm without murmur.    ABDOMEN: Soft, nontender, and nondistended.      EXTREMITIES: Without any cyanosis, clubbing, rash, lesions or edema.    NEUROLOGIC: Grossly intact.    LABS:                        11.8   7.39  )-----------( 166      ( 16 Jan 2020 08:21 )             37.6     01-16    137  |  94<L>  |  16.0  ----------------------------<  101  4.7   |  33.0<H>  |  0.49<L>    Ca    8.8      16 Jan 2020 08:21    TPro  6.4<L>  /  Alb  3.3  /  TBili  0.3<L>  /  DBili  x   /  AST  55<H>  /  ALT  31  /  AlkPhos  107  01-16                        MICROBIOLOGY:    RADIOLOGY & ADDITIONAL STUDIES:  CXR and CT scan reviewed

## 2020-01-17 NOTE — PROGRESS NOTE ADULT - SUBJECTIVE AND OBJECTIVE BOX
HEALTH ISSUES - PROBLEM Dx:    AE COPD, hypercapnic resp failure    INTERVAL HPI/ OVERNIGHT EVENTS:    lying comfortably in bed with NC O2  mouth breathing and so reads hypoxia on   however when he closes his mouth and breathes through his nose, saturation improves    REVIEW OF SYSTEMS:    cough - sob -    Vital Signs Last 24 Hrs  T(C): 36.4 (17 Jan 2020 08:00), Max: 37.4 (16 Jan 2020 21:00)  T(F): 97.6 (17 Jan 2020 08:00), Max: 99.4 (16 Jan 2020 21:00)  HR: 103 (17 Jan 2020 08:46) (52 - 110)  BP: 129/73 (17 Jan 2020 08:00) (121/79 - 134/85)  BP(mean): --  RR: 18 (17 Jan 2020 08:00) (18 - 28)  SpO2: 95% (17 Jan 2020 07:30) (91% - 96%)    PHYSICAL EXAM-  GENERAL: comfortbale  EYES: sclera clear, no exudates  ENMT: oropharynx clear without erythema, no exudates, moist mucous membranes  NECK: supple, soft, no thyromegaly noted  LUNGS: CTA desmond; No rales, rhonchi  HEART: soft S1/S2, regular rate and rhythm, no murmurs noted, no lower extremity edema  GASTROINTESTINAL: abdomen is soft, nontender, nondistended, normoactive bowel sounds, no palpable masses  INTEGUMENT: good skin turgor, warm skin, appears well perfused  MUSCULOSKELETAL: no clubbing or cyanosis, no obvious deformity  NEUROLOGIC: awake, alert, oriented x2, good muscle tone in 4 extremities, no obvious sensory deficits  PSYCHIATRIC: labile     MEDICATIONS  (STANDING):  albuterol/ipratropium for Nebulization 3 milliLiter(s) Nebulizer every 6 hours  albuterol/ipratropium for Nebulization. 3 milliLiter(s) Nebulizer once  azithromycin  IVPB      azithromycin  IVPB 500 milliGRAM(s) IV Intermittent every 24 hours  benztropine 1 milliGRAM(s) Oral two times a day  chlorhexidine 2% Cloths 1 Application(s) Topical daily  diVALproex DR 1000 milliGRAM(s) Oral two times a day  enoxaparin Injectable 40 milliGRAM(s) SubCutaneous daily  haloperidol     Tablet 10 milliGRAM(s) Oral at bedtime  haloperidol     Tablet 5 milliGRAM(s) Oral two times a day  influenza   Vaccine 0.5 milliLiter(s) IntraMuscular once  methylPREDNISolone sodium succinate Injectable 60 milliGRAM(s) IV Push every 8 hours  metoprolol tartrate 25 milliGRAM(s) Oral two times a day  nicotine - 21 mG/24Hr(s) Patch 1 patch Transdermal daily  polyethylene glycol 3350 17 Gram(s) Oral at bedtime  risperiDONE   Tablet 4 milliGRAM(s) Oral daily  rOPINIRole 0.5 milliGRAM(s) Oral two times a day  senna 2 Tablet(s) Oral at bedtime    MEDICATIONS  (PRN):      LABS:                        11.8   7.39  )-----------( 166      ( 16 Jan 2020 08:21 )             37.6     01-16    137  |  94<L>  |  16.0  ----------------------------<  101  4.7   |  33.0<H>  |  0.49<L>    Ca    8.8      16 Jan 2020 08:21    TPro  6.4<L>  /  Alb  3.3  /  TBili  0.3<L>  /  DBili  x   /  AST  55<H>  /  ALT  31  /  AlkPhos  107  01-16

## 2020-01-17 NOTE — PROGRESS NOTE ADULT - ASSESSMENT
Severe COPD with acute on chronic hypercapnic vent failure, complicated  by psych hx  not tolerating Trilogy, currently resting comfortably on 02  continue medrol, nebs, abx, o2, Trilogy as able  needs follow up CT as out pt  prognosis guarded Severe COPD with acute on chronic hypercapnic vent failure, complicated  by psych hx  not tolerating Trilogy, currently resting comfortably on 02  continue medrol, nebs, short course of  abx, o2, Trilogy as able  needs follow up CT as out pt, home 02 eval, Trilogy as out pt if tolerates  prognosis guarded

## 2020-01-17 NOTE — PROGRESS NOTE ADULT - ATTENDING COMMENTS
COUNSELING:    Face to face meeting to discuss Advanced Care Planning - Time Spent 30______ Minutes.                          SEE [facility] SEPARATE DOCMENTATION   More than 50% time spent in counseling and coordinating care. _30_____ Minutes.     Thank you for the opportunity to assist with the care of this patient.   [facility] [phone].

## 2020-01-17 NOTE — PROGRESS NOTE ADULT - ASSESSMENT
Assessment and Plan:   · Assessment		  Severe COPD Exacerbation with acute on chronic hypercapnic vent failure,     Not tolerating BIPAP, currently resting comfortably on 02  O2 sats wnl  Dr. Ventura:  Planning on DC Nocturnal BIPAP assess if he can be off that support without  desaturating and accelerate to Respiratory Distress and Hypoxia  PLAN:   Stressing tobacco cessation-not able to quit-- Nicotene Patch  Continue Steroids and  neb treatments  IV ABX, short course of Azithromycin   Trilogy as able  Needs follow up CT as out pt,   Home 02 eval, Trilogy as out pt if tolerates\    Schizophrenia  Continue all chronic medications prescribed in [facility]  Drug addictions since teenage years  Administer prn benzodiazepenes as needed    Urine Retention  Flomax  Baird Catheter insitu  no hemauria  No edema    GOC  see separate note

## 2020-01-17 NOTE — PROGRESS NOTE ADULT - ASSESSMENT
70 y/o male, PMHx severe COPD, Parkinson's disease, schizophrenia, active smoker, who presented to ED from assisted living facility with a chief complaint of shortness of breath for the past three days.    # AE COPD   s/p BiPAP  Oxygen  nebs  steroids to taper  empiric antibx to prevent bacterial superimposed infection  RVP neg  bld c/s negative    # Acute on chronic hypercapnic respiratory failure  s/p BiPAP  weaned off to NC O2  has been non compliant with nocturnal BiPAP    # thrombocytopenia  resolved    # Schizophrenia  Cont all home Psych meds  s/p agitation requiring Precedex sedation    # Parkinson disease  Cont home meds    # Active smoker  Nicotine patch offered    Cont Lovenox    [facility]C- D/W sister who is HCP- she initially rescinded DNR/ DNI as she saw pt in acute states and panicked believing that he will not be treated. Now explained that DNR/ DNI only applies to end of life situation. It does not mean do not treat. Acute and active treatment will always continue. She did not seem to understand very well. She questioned if not intubated he will die. She wants to discuss with her brother who lives out of town    Dispo- to group home when tapered off O2 and BiPAP. Currently noncompliant with nocturnal BiPAP and using daytime O2.

## 2020-01-18 PROCEDURE — 99232 SBSQ HOSP IP/OBS MODERATE 35: CPT

## 2020-01-18 PROCEDURE — 93010 ELECTROCARDIOGRAM REPORT: CPT

## 2020-01-18 PROCEDURE — 99233 SBSQ HOSP IP/OBS HIGH 50: CPT

## 2020-01-18 RX ADMIN — HALOPERIDOL DECANOATE 10 MILLIGRAM(S): 100 INJECTION INTRAMUSCULAR at 22:08

## 2020-01-18 RX ADMIN — HALOPERIDOL DECANOATE 5 MILLIGRAM(S): 100 INJECTION INTRAMUSCULAR at 05:33

## 2020-01-18 RX ADMIN — HALOPERIDOL DECANOATE 5 MILLIGRAM(S): 100 INJECTION INTRAMUSCULAR at 16:31

## 2020-01-18 RX ADMIN — ROPINIROLE 0.5 MILLIGRAM(S): 8 TABLET, FILM COATED, EXTENDED RELEASE ORAL at 16:34

## 2020-01-18 RX ADMIN — AZITHROMYCIN 255 MILLIGRAM(S): 500 TABLET, FILM COATED ORAL at 12:42

## 2020-01-18 RX ADMIN — RISPERIDONE 4 MILLIGRAM(S): 4 TABLET ORAL at 12:42

## 2020-01-18 RX ADMIN — Medication 1 PATCH: at 12:42

## 2020-01-18 RX ADMIN — DIVALPROEX SODIUM 1000 MILLIGRAM(S): 500 TABLET, DELAYED RELEASE ORAL at 16:31

## 2020-01-18 RX ADMIN — SENNA PLUS 2 TABLET(S): 8.6 TABLET ORAL at 22:08

## 2020-01-18 RX ADMIN — Medication 1 PATCH: at 19:00

## 2020-01-18 RX ADMIN — ENOXAPARIN SODIUM 40 MILLIGRAM(S): 100 INJECTION SUBCUTANEOUS at 12:42

## 2020-01-18 RX ADMIN — Medication 1 MILLIGRAM(S): at 05:32

## 2020-01-18 RX ADMIN — Medication 3 MILLILITER(S): at 20:32

## 2020-01-18 RX ADMIN — Medication 40 MILLIGRAM(S): at 16:31

## 2020-01-18 RX ADMIN — Medication 40 MILLIGRAM(S): at 05:33

## 2020-01-18 RX ADMIN — Medication 25 MILLIGRAM(S): at 05:32

## 2020-01-18 RX ADMIN — DIVALPROEX SODIUM 1000 MILLIGRAM(S): 500 TABLET, DELAYED RELEASE ORAL at 05:32

## 2020-01-18 RX ADMIN — Medication 3 MILLILITER(S): at 15:44

## 2020-01-18 RX ADMIN — Medication 1 PATCH: at 12:46

## 2020-01-18 RX ADMIN — LACTULOSE 10 GRAM(S): 10 SOLUTION ORAL at 12:41

## 2020-01-18 RX ADMIN — Medication 25 MILLIGRAM(S): at 16:31

## 2020-01-18 RX ADMIN — POLYETHYLENE GLYCOL 3350 17 GRAM(S): 17 POWDER, FOR SOLUTION ORAL at 22:08

## 2020-01-18 RX ADMIN — Medication 3 MILLILITER(S): at 09:09

## 2020-01-18 RX ADMIN — Medication 1 MILLIGRAM(S): at 16:31

## 2020-01-18 RX ADMIN — ROPINIROLE 0.5 MILLIGRAM(S): 8 TABLET, FILM COATED, EXTENDED RELEASE ORAL at 05:33

## 2020-01-18 NOTE — PROGRESS NOTE ADULT - ASSESSMENT
70 y/o male, PMHx severe COPD, Parkinson's disease, schizophrenia, active smoker, who presented to ED from assisted living facility with a chief complaint of shortness of breath for the past three days.    # somnolence   - ammonia 59  - lactulose trial started  - monitor for response    # AE COPD   s/p BiPAP  Oxygen  nebs  steroids to taper  empiric antibx to prevent bacterial superimposed infection  RVP neg  bld c/s negative    # Acute on chronic hypercapnic respiratory failure  s/p BiPAP  weaned off to NC O2  has been non compliant with nocturnal BiPAP    # thrombocytopenia  resolved    # Schizophrenia  Cont all home Psych meds  s/p agitation requiring Precedex sedation    # Parkinson disease  Cont home meds    # Active smoker  Nicotine patch offered    Cont Lovenox    GoC- D/W sister who is HCP- she initially rescinded DNR/ DNI as she saw pt in acute states and panicked believing that he will not be treated. Now explained that DNR/ DNI only applies to end of life situation. It does not mean do not treat. Acute and active treatment will always continue. She did not seem to understand very well. She questioned if not intubated he will die. She wants to discuss with her brother who lives out of town    Dispo- to group home when tapered off O2 and BiPAP. Currently noncompliant with nocturnal BiPAP and using daytime O2. 68 y/o male, PMHx severe COPD, Parkinson's disease, schizophrenia, active smoker, who presented to ED from group home with a chief complaint of shortness of breath for the past three days. treated in MICU for AE COPD and BiPAP. he needed precedex sedation. off bipap d/g to floor on NC O2 and AVAPS nocturnal. but pt noncomplaint with AVPAS. uses NC O2 daytime. cannot go back to [facility] home with oxygen    # somnolence   - ammonia 59  - lactulose trial started  - monitor for response    # AE COPD   s/p BiPAP  Oxygen  nebs  steroids to taper  empiric antibx to prevent bacterial superimposed infection  RVP neg  bld c/s negative    # Acute on chronic hypercapnic respiratory failure  s/p BiPAP  weaned off to NC O2  has been non compliant with nocturnal BiPAP    # thrombocytopenia  resolved    # Schizophrenia  Cont all home Psych meds  s/p agitation requiring Precedex sedation    # Parkinson disease  Cont home meds    # Active smoker  Nicotine patch offered    Cont Lovenox    GoC- D/W sister who is HCP- she initially rescinded DNR/ DNI as she saw pt in acute states and panicked believing that he will not be treated. Now explained that DNR/ DNI only applies to end of life situation. It does not mean do not treat. Acute and active treatment will always continue. She did not seem to understand very well. She questioned if not intubated he will die. She wants to discuss with her brother who lives out of town    Dispo- to group home when tapered off O2 and BiPAP. Currently noncompliant with nocturnal BiPAP and using daytime O2.

## 2020-01-18 NOTE — PROGRESS NOTE ADULT - SUBJECTIVE AND OBJECTIVE BOX
HEALTH ISSUES - PROBLEM Dx:    AE COPD, hypercapnic resp failure    INTERVAL HPI/ OVERNIGHT EVENTS:    lying comfortably in bed with NC O2  wakes up to eat and takes his meds and goes back to sleep    REVIEW OF SYSTEMS:    cough - sob -    Vital Signs Last 24 Hrs  T(C): 36.7 (18 Jan 2020 07:55), Max: 37.1 (18 Jan 2020 04:51)  T(F): 98.1 (18 Jan 2020 07:55), Max: 98.7 (18 Jan 2020 04:51)  HR: 59 (18 Jan 2020 09:09) (55 - 100)  BP: 111/56 (18 Jan 2020 07:55) (111/56 - 143/86)  BP(mean): 106 (18 Jan 2020 04:51) (106 - 106)  RR: 18 (18 Jan 2020 07:55) (18 - 21)  SpO2: 100% (18 Jan 2020 09:09) (92% - 100%)    PHYSICAL EXAM-  GENERAL: comfortbale  EYES: sclera clear, no exudates  ENMT: oropharynx clear without erythema, no exudates, moist mucous membranes  NECK: supple, soft, no thyromegaly noted  LUNGS: CTA desmond; No rales, rhonchi  HEART: soft S1/S2, regular rate and rhythm, no murmurs noted, no lower extremity edema  GASTROINTESTINAL: abdomen is soft, nontender, nondistended, normoactive bowel sounds, no palpable masses  INTEGUMENT: good skin turgor, warm skin, appears well perfused  MUSCULOSKELETAL: no clubbing or cyanosis, no obvious deformity  NEUROLOGIC: awake, alert, oriented x2, good muscle tone in 4 extremities, no obvious sensory deficits  PSYCHIATRIC: labile     MEDICATIONS  (STANDING):  albuterol/ipratropium for Nebulization 3 milliLiter(s) Nebulizer every 6 hours  albuterol/ipratropium for Nebulization. 3 milliLiter(s) Nebulizer once  benztropine 1 milliGRAM(s) Oral two times a day  chlorhexidine 2% Cloths 1 Application(s) Topical daily  diVALproex DR 1000 milliGRAM(s) Oral two times a day  enoxaparin Injectable 40 milliGRAM(s) SubCutaneous daily  haloperidol     Tablet 10 milliGRAM(s) Oral at bedtime  haloperidol     Tablet 5 milliGRAM(s) Oral two times a day  influenza   Vaccine 0.5 milliLiter(s) IntraMuscular once  lactulose Syrup 10 Gram(s) Oral daily  methylPREDNISolone sodium succinate Injectable 40 milliGRAM(s) IV Push every 12 hours  metoprolol tartrate 25 milliGRAM(s) Oral two times a day  nicotine - 21 mG/24Hr(s) Patch 1 patch Transdermal daily  polyethylene glycol 3350 17 Gram(s) Oral at bedtime  risperiDONE   Tablet 4 milliGRAM(s) Oral daily  rOPINIRole 0.5 milliGRAM(s) Oral two times a day  senna 2 Tablet(s) Oral at bedtime    MEDICATIONS  (PRN):      LABS:

## 2020-01-18 NOTE — DIETITIAN INITIAL EVALUATION ADULT. - ADD RECOMMEND
Rx: MVI and vit C 500mg daily. Encourage po intake, monitor tolerance, and to provide assistance at all meals. Obtain daily weights to monitor trends.

## 2020-01-18 NOTE — PROGRESS NOTE ADULT - ASSESSMENT
HPI/INTERVAL EVENTS: continues to refuse bipap, now climbing out of bed    EXAM:   GENERAL: tremulous, confused, appears frail and cachectic   HEAD: Atraumatic, normocephalic,   EYES: EOMI,   ENMT: MMM,  NECK: supple, trachea midline,   NERVOUS SYSTEM:, no motor deficits noted,   CHEST/LUNG: bilat air entry, +expiratory wheeze  HEART: slightly tachycardic   ABDOMEN: nontender, soft, nondistended, no rebound or guarding,   EXTREMITIES: no clubbing, no cyanosis,   LYMPH: no lymphadenopathy,        RADIOLOGY REVIEWED: cxr - hyperinflated lungs    IMPRESSION/ASSESSMENT & PLAN:  70 yo male with severe COPD, chronic hypercapnic respiratory failure, parkinsons disease, schizophrenia, admitted with acute on chronic hypercapnic respiratory failure requiring bipap on 1/14, later transferred out of MICU on 1/15.  Now refusing bipap/avaps.  May have some degree of delirium now.     Recommendations:  - continue bronchodilators, steroids  - given severity of COPD and frequent exacerbation, can start azithromycin 500 mg PO three times a week (long term), as outpatient would add daliresp (not available in hospital)  I encouraged him to wear his bipap but he refuses.  He does require long term nocturnal bipap, and without it he will ultimately die.  It seems he does not want to use the machine and becomes distressed even at mentioning it.  Given the severity of his lung disease and his disdain for NIV or invasive mechanical ventilation, hospice would be appropriate.    It seems unethical to restrain him and force him to use NIV or intubate him given his wishes.   I will try to reach family to discuss.    His prognosis is poor, it would be a shame if he were to be intubated.

## 2020-01-18 NOTE — PROGRESS NOTE ADULT - SUBJECTIVE AND OBJECTIVE BOX
On Admission  01-14-20 (4d)  HPI:    PAST MEDICAL & SURGICAL HISTORY:  Parkinson disease  Chronic obstructive pulmonary disease, unspecified COPD type  Schizophrenia, unspecified type  No significant past surgical history      Antimicrobial:    Cardiovascular:  metoprolol tartrate 25 milliGRAM(s) Oral two times a day    Pulmonary:  albuterol/ipratropium for Nebulization 3 milliLiter(s) Nebulizer every 6 hours  albuterol/ipratropium for Nebulization. 3 milliLiter(s) Nebulizer once    Hematalogic:  enoxaparin Injectable 40 milliGRAM(s) SubCutaneous daily    Other:  benztropine 1 milliGRAM(s) Oral two times a day  chlorhexidine 2% Cloths 1 Application(s) Topical daily  diVALproex DR 1000 milliGRAM(s) Oral two times a day  haloperidol     Tablet 10 milliGRAM(s) Oral at bedtime  haloperidol     Tablet 5 milliGRAM(s) Oral two times a day  influenza   Vaccine 0.5 milliLiter(s) IntraMuscular once  lactulose Syrup 10 Gram(s) Oral daily  nicotine - 21 mG/24Hr(s) Patch 1 patch Transdermal daily  polyethylene glycol 3350 17 Gram(s) Oral at bedtime  risperiDONE   Tablet 4 milliGRAM(s) Oral daily  rOPINIRole 0.5 milliGRAM(s) Oral two times a day  senna 2 Tablet(s) Oral at bedtime      Drug Dosing Weight  Height (cm): 172.72 (14 Jan 2020 08:45)  Weight (kg): 62.6 (14 Jan 2020 08:45)  BMI (kg/m2): 21 (14 Jan 2020 08:45)  BSA (m2): 1.75 (14 Jan 2020 08:45)    T(C): 37.3 (01-18-20 @ 16:33), Max: 37.3 (01-18-20 @ 16:33)  HR: 127 (01-18-20 @ 16:33)  BP: 127/79 (01-18-20 @ 16:33)  BP(mean): 106 (01-18-20 @ 04:51)  ABP: --  ABP(mean): --  RR: 20 (01-18-20 @ 16:33)  SpO2: 90% (01-18-20 @ 16:33)          01-17 @ 07:01  -  01-18 @ 07:00  --------------------------------------------------------  IN: 720 mL / OUT: 0 mL / NET: 720 mL              LABS:                ____________________________________________________________________________________________________

## 2020-01-18 NOTE — DIETITIAN INITIAL EVALUATION ADULT. - OTHER INFO
69 year old male PMH severe COPD, Parkinson's disease, schizophrenia, active smoker, who presented to ED from group home with a chief complaint of shortness of breath for the past three days, treated in MICU for AE COPD and BiPAP. Pt lethargic at time of interview, aware AxOx1, unable to provide any meaningful nutrition information. No family present at bedside. Per documentation, pt has been with poor po intake. Lunch tray observed at bedside, 0% entree consumed per plate waste. Pt did consume 100% of supplement.

## 2020-01-18 NOTE — DIETITIAN INITIAL EVALUATION ADULT. - MALNUTRITION
NFPE: severe muscle loss of temples, moderate muscle loss of clavicles and shoulders, moderate fat loss of orbitals and triceps severe, chronic

## 2020-01-18 NOTE — DIETITIAN INITIAL EVALUATION ADULT. - PERTINENT MEDS FT
MEDICATIONS  (STANDING):  albuterol/ipratropium for Nebulization 3 milliLiter(s) Nebulizer every 6 hours  albuterol/ipratropium for Nebulization. 3 milliLiter(s) Nebulizer once  benztropine 1 milliGRAM(s) Oral two times a day  chlorhexidine 2% Cloths 1 Application(s) Topical daily  diVALproex DR 1000 milliGRAM(s) Oral two times a day  enoxaparin Injectable 40 milliGRAM(s) SubCutaneous daily  haloperidol     Tablet 10 milliGRAM(s) Oral at bedtime  haloperidol     Tablet 5 milliGRAM(s) Oral two times a day  influenza   Vaccine 0.5 milliLiter(s) IntraMuscular once  lactulose Syrup 10 Gram(s) Oral daily  methylPREDNISolone sodium succinate Injectable 40 milliGRAM(s) IV Push every 12 hours  metoprolol tartrate 25 milliGRAM(s) Oral two times a day  nicotine - 21 mG/24Hr(s) Patch 1 patch Transdermal daily  polyethylene glycol 3350 17 Gram(s) Oral at bedtime  risperiDONE   Tablet 4 milliGRAM(s) Oral daily  rOPINIRole 0.5 milliGRAM(s) Oral two times a day  senna 2 Tablet(s) Oral at bedtime    MEDICATIONS  (PRN):

## 2020-01-18 NOTE — CHART NOTE - NSCHARTNOTEFT_GEN_A_CORE
Upon Nutritional Assessment by the Registered Dietitian your patient was determined to meet criteria / has evidence of the following diagnosis/diagnoses:          [ ]  Mild Protein Calorie Malnutrition        [ ]  Moderate Protein Calorie Malnutrition        [ x] Severe Protein Calorie Malnutrition        [ ] Unspecified Protein Calorie Malnutrition        [ ] Underweight / BMI <19        [ ] Morbid Obesity / BMI > 40    Pt presents at high nutrition risk secondary to malnutrition (severe, chronic) related to inability to meet sufficient protein-energy in setting of severe COPD, Parkinson's disease, acute on chronic hypercapnic respiratory failure, and decreased appetite as evidenced by likely meeting <75% nutrient needs >1 month, severe muscle loss of temples, moderate muscle loss of clavicles and shoulders, moderate fat loss of orbitals and triceps.     Findings as based on:  •  Comprehensive nutrition assessment and consultation  •  Calorie counts (nutrient intake analysis)  •  Food acceptance and intake status from observations by staff  •  Follow up  •  Patient education  •  Intervention secondary to interdisciplinary rounds  •   concerns      Treatment:    The following has been recommended:    1) Continue diet as ordered/tolerated.  2) Rx: MVI and vit C 500mg daily.   3) Encourage po intake, monitor tolerance, and to provide assistance at all meals.   4) Obtain daily weights to monitor trends.      PROVIDER Section:     By signing this assessment you are acknowledging and agree with the diagnosis/diagnoses assigned by the Registered Dietitian    Comments:

## 2020-01-18 NOTE — DIETITIAN INITIAL EVALUATION ADULT. - ETIOLOGY
related to inability to meet sufficient protein-energy in setting of severe COPD, Parkinson's disease, acute on chronic hypercapnic respiratory failure, and decreased appetite

## 2020-01-19 LAB
ANION GAP SERPL CALC-SCNC: 11 MMOL/L — SIGNIFICANT CHANGE UP (ref 5–17)
BASE EXCESS BLDA CALC-SCNC: 18.9 MMOL/L — HIGH (ref -2–2)
BLOOD GAS COMMENTS ARTERIAL: SIGNIFICANT CHANGE UP
BUN SERPL-MCNC: 24 MG/DL — HIGH (ref 8–20)
CALCIUM SERPL-MCNC: 9 MG/DL — SIGNIFICANT CHANGE UP (ref 8.6–10.2)
CHLORIDE SERPL-SCNC: 93 MMOL/L — LOW (ref 98–107)
CO2 SERPL-SCNC: 34 MMOL/L — HIGH (ref 22–29)
CREAT SERPL-MCNC: 0.53 MG/DL — SIGNIFICANT CHANGE UP (ref 0.5–1.3)
CULTURE RESULTS: SIGNIFICANT CHANGE UP
CULTURE RESULTS: SIGNIFICANT CHANGE UP
GAS PNL BLDA: SIGNIFICANT CHANGE UP
GLUCOSE SERPL-MCNC: 99 MG/DL — SIGNIFICANT CHANGE UP (ref 70–115)
HCO3 BLDA-SCNC: 43 MMOL/L — HIGH (ref 20–26)
HCT VFR BLD CALC: 39.3 % — SIGNIFICANT CHANGE UP (ref 39–50)
HGB BLD-MCNC: 12.8 G/DL — LOW (ref 13–17)
HOROWITZ INDEX BLDA+IHG-RTO: SIGNIFICANT CHANGE UP
MCHC RBC-ENTMCNC: 31.8 PG — SIGNIFICANT CHANGE UP (ref 27–34)
MCHC RBC-ENTMCNC: 32.6 GM/DL — SIGNIFICANT CHANGE UP (ref 32–36)
MCV RBC AUTO: 97.5 FL — SIGNIFICANT CHANGE UP (ref 80–100)
PCO2 BLDA: 56 MMHG — HIGH (ref 35–45)
PH BLDA: 7.51 — HIGH (ref 7.35–7.45)
PLATELET # BLD AUTO: 143 K/UL — LOW (ref 150–400)
PO2 BLDA: 53 MMHG — LOW (ref 83–108)
POTASSIUM SERPL-MCNC: 4.7 MMOL/L — SIGNIFICANT CHANGE UP (ref 3.5–5.3)
POTASSIUM SERPL-SCNC: 4.7 MMOL/L — SIGNIFICANT CHANGE UP (ref 3.5–5.3)
RBC # BLD: 4.03 M/UL — LOW (ref 4.2–5.8)
RBC # FLD: 15.6 % — HIGH (ref 10.3–14.5)
SAO2 % BLDA: 88 % — LOW (ref 95–99)
SODIUM SERPL-SCNC: 138 MMOL/L — SIGNIFICANT CHANGE UP (ref 135–145)
SPECIMEN SOURCE: SIGNIFICANT CHANGE UP
SPECIMEN SOURCE: SIGNIFICANT CHANGE UP
WBC # BLD: 3.91 K/UL — SIGNIFICANT CHANGE UP (ref 3.8–10.5)
WBC # FLD AUTO: 3.91 K/UL — SIGNIFICANT CHANGE UP (ref 3.8–10.5)

## 2020-01-19 PROCEDURE — 71250 CT THORAX DX C-: CPT | Mod: 26

## 2020-01-19 PROCEDURE — 99233 SBSQ HOSP IP/OBS HIGH 50: CPT

## 2020-01-19 PROCEDURE — 71045 X-RAY EXAM CHEST 1 VIEW: CPT | Mod: 26

## 2020-01-19 RX ORDER — VANCOMYCIN HCL 1 G
1000 VIAL (EA) INTRAVENOUS ONCE
Refills: 0 | Status: COMPLETED | OUTPATIENT
Start: 2020-01-19 | End: 2020-01-19

## 2020-01-19 RX ORDER — PIPERACILLIN AND TAZOBACTAM 4; .5 G/20ML; G/20ML
3.38 INJECTION, POWDER, LYOPHILIZED, FOR SOLUTION INTRAVENOUS EVERY 8 HOURS
Refills: 0 | Status: COMPLETED | OUTPATIENT
Start: 2020-01-19 | End: 2020-01-24

## 2020-01-19 RX ORDER — PIPERACILLIN AND TAZOBACTAM 4; .5 G/20ML; G/20ML
3.38 INJECTION, POWDER, LYOPHILIZED, FOR SOLUTION INTRAVENOUS ONCE
Refills: 0 | Status: COMPLETED | OUTPATIENT
Start: 2020-01-19 | End: 2020-01-19

## 2020-01-19 RX ORDER — IPRATROPIUM/ALBUTEROL SULFATE 18-103MCG
3 AEROSOL WITH ADAPTER (GRAM) INHALATION ONCE
Refills: 0 | Status: COMPLETED | OUTPATIENT
Start: 2020-01-19 | End: 2020-01-19

## 2020-01-19 RX ORDER — DEXTROSE MONOHYDRATE, SODIUM CHLORIDE, AND POTASSIUM CHLORIDE 50; .745; 4.5 G/1000ML; G/1000ML; G/1000ML
1000 INJECTION, SOLUTION INTRAVENOUS
Refills: 0 | Status: DISCONTINUED | OUTPATIENT
Start: 2020-01-19 | End: 2020-01-25

## 2020-01-19 RX ADMIN — Medication 1 MILLIGRAM(S): at 06:12

## 2020-01-19 RX ADMIN — LACTULOSE 10 GRAM(S): 10 SOLUTION ORAL at 12:31

## 2020-01-19 RX ADMIN — ENOXAPARIN SODIUM 40 MILLIGRAM(S): 100 INJECTION SUBCUTANEOUS at 12:31

## 2020-01-19 RX ADMIN — Medication 25 MILLIGRAM(S): at 06:12

## 2020-01-19 RX ADMIN — HALOPERIDOL DECANOATE 5 MILLIGRAM(S): 100 INJECTION INTRAMUSCULAR at 18:00

## 2020-01-19 RX ADMIN — Medication 3 MILLILITER(S): at 20:30

## 2020-01-19 RX ADMIN — RISPERIDONE 4 MILLIGRAM(S): 4 TABLET ORAL at 12:31

## 2020-01-19 RX ADMIN — Medication 3 MILLILITER(S): at 14:36

## 2020-01-19 RX ADMIN — Medication 1 MILLIGRAM(S): at 18:00

## 2020-01-19 RX ADMIN — ROPINIROLE 0.5 MILLIGRAM(S): 8 TABLET, FILM COATED, EXTENDED RELEASE ORAL at 06:11

## 2020-01-19 RX ADMIN — POLYETHYLENE GLYCOL 3350 17 GRAM(S): 17 POWDER, FOR SOLUTION ORAL at 21:36

## 2020-01-19 RX ADMIN — Medication 40 MILLIGRAM(S): at 06:12

## 2020-01-19 RX ADMIN — ROPINIROLE 0.5 MILLIGRAM(S): 8 TABLET, FILM COATED, EXTENDED RELEASE ORAL at 18:00

## 2020-01-19 RX ADMIN — Medication 250 MILLIGRAM(S): at 04:42

## 2020-01-19 RX ADMIN — Medication 3 MILLILITER(S): at 01:58

## 2020-01-19 RX ADMIN — CHLORHEXIDINE GLUCONATE 1 APPLICATION(S): 213 SOLUTION TOPICAL at 12:31

## 2020-01-19 RX ADMIN — Medication 1 PATCH: at 12:30

## 2020-01-19 RX ADMIN — DEXTROSE MONOHYDRATE, SODIUM CHLORIDE, AND POTASSIUM CHLORIDE 75 MILLILITER(S): 50; .745; 4.5 INJECTION, SOLUTION INTRAVENOUS at 20:34

## 2020-01-19 RX ADMIN — PIPERACILLIN AND TAZOBACTAM 200 GRAM(S): 4; .5 INJECTION, POWDER, LYOPHILIZED, FOR SOLUTION INTRAVENOUS at 04:42

## 2020-01-19 RX ADMIN — Medication 25 MILLIGRAM(S): at 18:00

## 2020-01-19 RX ADMIN — Medication 1 PATCH: at 19:00

## 2020-01-19 RX ADMIN — DIVALPROEX SODIUM 1000 MILLIGRAM(S): 500 TABLET, DELAYED RELEASE ORAL at 18:00

## 2020-01-19 RX ADMIN — SENNA PLUS 2 TABLET(S): 8.6 TABLET ORAL at 21:37

## 2020-01-19 RX ADMIN — Medication 1 PATCH: at 07:20

## 2020-01-19 RX ADMIN — Medication 3 MILLILITER(S): at 09:52

## 2020-01-19 RX ADMIN — PIPERACILLIN AND TAZOBACTAM 25 GRAM(S): 4; .5 INJECTION, POWDER, LYOPHILIZED, FOR SOLUTION INTRAVENOUS at 18:16

## 2020-01-19 RX ADMIN — HALOPERIDOL DECANOATE 5 MILLIGRAM(S): 100 INJECTION INTRAMUSCULAR at 06:12

## 2020-01-19 RX ADMIN — DIVALPROEX SODIUM 1000 MILLIGRAM(S): 500 TABLET, DELAYED RELEASE ORAL at 06:12

## 2020-01-19 NOTE — PROGRESS NOTE ADULT - ASSESSMENT
HPI/INTERVAL EVENTS: refused bipap overnight    EXAM:   GENERAL: sleeping prior to my arrival, awakens easy, says "what do you want" then goes back to sleep  HEAD: temporal wasting   EYES: EOMI, PERRL, sclera clear,   ENMT: MMM,   NECK: supple, trachea midline,   NERVOUS SYSTEM:  no motor deficits noted,   CHEST/LUNG: bilat air entry, less wheezing than yesterday  HEART: regular rhythm,  ABDOMEN: nontender, soft, nondistended,   EXTREMITIES:  no clubbing, no cyanosis,   LYMPH: no lymphadenopathy,    SKIN: ecchymosis     IMPRESSION/ASSESSMENT & PLAN:  70 yo male with severe COPD, chronic hypercapnic respiratory failure, parkinsons disease, schizophrenia, admitted with acute on chronic hypercapnic respiratory failure requiring bipap on 1/14, later transferred out of MICU on 1/15.  Now refusing bipap/avaps.  May have some degree of delirium now.     Recommendations:  - continue bronchodilators, steroids  I encouraged him to wear his bipap but he refuses.  He does require long term nocturnal bipap, and without it he will ultimately die.  It seems he does not want to use the machine and becomes distressed even at mentioning it.  Given the severity of his lung disease and his disdain for NIV or invasive mechanical ventilation, hospice would be appropriate.    It seems unethical to restrain him and force him to use NIV or intubate him given his wishes.   I will try to reach family to discuss.    His prognosis is poor, it would be a shame if he were to be intubated. HPI/INTERVAL EVENTS: refused bipap overnight    EXAM:   GENERAL: sleeping prior to my arrival, awakens easy, says "what do you want" then goes back to sleep  HEAD: temporal wasting   EYES: EOMI, PERRL, sclera clear,   ENMT: MMM,   NECK: supple, trachea midline,   NERVOUS SYSTEM:  no motor deficits noted,   CHEST/LUNG: bilat air entry, less wheezing than yesterday  HEART: regular rhythm,  ABDOMEN: nontender, soft, nondistended,   EXTREMITIES:  no clubbing, no cyanosis,   LYMPH: no lymphadenopathy,    SKIN: ecchymosis     IMPRESSION/ASSESSMENT & PLAN:  68 yo male with severe COPD, chronic hypercapnic respiratory failure, parkinsons disease, schizophrenia, admitted with acute on chronic hypercapnic respiratory failure requiring bipap on 1/14, later transferred out of MICU on 1/15.  Now refusing bipap/avaps.  May have some degree of delirium now.     Spoke with Cindy sister/ HCP - she now agrees to DNR/DNI/no bipap or other forms of life support.  I informed her that we would try to help him with medications alone in hopes of improvement but despite this he may not improve.  She expressed understanding.    Recommendations:  - continue bronchodilators, steroids  - if patient improves then possible d/c to nursing home, otherwise hospice would be appropriate  - please discontinue bipap  - if dyspnea becomes severe then can start low dose morphine

## 2020-01-19 NOTE — CONSULT NOTE ADULT - ASSESSMENT
70 y/o male, PMHx severe COPD, Parkinson's disease, schizophrenia, active smoker, who presented to ED from assisted living facility with a chief complaint of shortness of breath for the past three days.    # AE COPD   s/p BiPAP  Oxygen  nebs  steroids  empiric antibx to prevent bacterial superimposed infection  RVP neg  bld c/s testing    # Acute on chronic hypercapnic respiratory failure  s/p BiPAP  weaned off to NC O2    # thrombocytopenia  1 reading  rpt in AM    # Schizophrenia  Cont all home Psych meds  s/p agitation requiring Precedex sedation    # Parkinson disease  Cont home meds    # Active smoker  Nicotine patch offered    Cont Lovenox
Severe COPD  Hypoxic respiratory failure  VBG c/w chronic, likely essentially compensated hypercarbic respiratory failure  Smoking hx  Not compliant with f/u  ? bronchitis vs pneumonia  Requiring BiPAP support but only minimally responsive which may be due to receiving Ativan    Rec:    Drug nebs  IV steroids  Empiric abx  BiPAP support  Titrate O2 as needed  ICU evaluation for MICU admission as pt would require intubation and mechanical ventilation if does not improve  Chest CT when able  Prognosis guarded    d/w medicine
70 yo male with COPD, Parkinson's, schizophrenia presented from assisted living facility on 1/14 with complaints of SOB admitted with COPD exacerbation.    Reccomendations:   COPD Exacerbation, suggest continuing current treatment  Supportive care/encouragement to use NIV as tolerated  NC moved to be placed infront of oral cavity with improved spo2 likely 2/2 mouth breathing.   Does not require MICU admission at this time.   As reviewed in chart and observed at bedside, patient has severe COPD, endorsing not wanting to use the mask and has made his wishes clear that he would not want aggressive measures to sustain or prolong his life.  However advanced directive changed by HCP (sister) suggest continued discussions with the family.
68 y/o male, PMHx severe COPD, Parkinson's disease, schizophrenia, who presents with acute on chronic hypercapnic respiratory failure and encephalopathy secondary to acute COPD exacerbation.    Transfer to MICU   Patient needs close respiratory monitoring and manipulation of NIV with high risk for abrupt respiratory decompensation, worsening hypercapnea and acidosis  Continue duonebs and IV steroids  CXR clear without evidence of pneumonia, no leukocytosis, Recently treated with abx as outpatient for PNA. Will d/c Ceftriaxone and continue Azithromycin for anti-inflammatory properties  Fold further sedation / benzodiazepines, likely precipitated and further contributing to worsening encephalopathy and respiratory failure  Will repeat ABG on AVAPS and continue to augment to maintain pH >7.25 and paO2 >70, avoid hyperoxia    Case discussed with ED Attending Dr. Gomez, Hospitalist Dr. Davis, and ICU Attending Dr. Castro
70 yo M with long history of drug abuse in his youth , Schizophrenia with severe COPD exacerbation and Respiratory failure

## 2020-01-19 NOTE — PROGRESS NOTE ADULT - SUBJECTIVE AND OBJECTIVE BOX
Patient seen and examined . Found sleeping , open his eyes and goes to sleep right back , several attempt made to wake him up , refusing to open his eyes , ? lethargic , as per nurse earlier patient was AAOX2 .     CC : SOB       PAST MEDICAL & SURGICAL HISTORY:  Parkinson disease  Chronic obstructive pulmonary disease, unspecified COPD type  Schizophrenia, unspecified type  No significant past surgical history      MEDICATIONS  (STANDING):  albuterol/ipratropium for Nebulization 3 milliLiter(s) Nebulizer every 6 hours  albuterol/ipratropium for Nebulization. 3 milliLiter(s) Nebulizer once  benztropine 1 milliGRAM(s) Oral two times a day  chlorhexidine 2% Cloths 1 Application(s) Topical daily  diVALproex DR 1000 milliGRAM(s) Oral two times a day  enoxaparin Injectable 40 milliGRAM(s) SubCutaneous daily  haloperidol     Tablet 10 milliGRAM(s) Oral at bedtime  haloperidol     Tablet 5 milliGRAM(s) Oral two times a day  influenza   Vaccine 0.5 milliLiter(s) IntraMuscular once  lactulose Syrup 10 Gram(s) Oral daily  metoprolol tartrate 25 milliGRAM(s) Oral two times a day  nicotine - 21 mG/24Hr(s) Patch 1 patch Transdermal daily  polyethylene glycol 3350 17 Gram(s) Oral at bedtime  predniSONE   Tablet 40 milliGRAM(s) Oral daily  risperiDONE   Tablet 4 milliGRAM(s) Oral daily  rOPINIRole 0.5 milliGRAM(s) Oral two times a day  senna 2 Tablet(s) Oral at bedtime    MEDICATIONS  (PRN):      LABS:                          12.8   3.91  )-----------( 143      ( 19 Jan 2020 00:25 )             39.3     01-19    138  |  93<L>  |  24.0<H>  ----------------------------<  99  4.7   |  34.0<H>  |  0.53    Ca    9.0      19 Jan 2020 00:25    Ammonia, Serum (01.16.20 @ 08:21)    Ammonia, Serum: 59: Ammonia levels will be falsely elevated if specimen is NOT collected,  processed and maintained at 4-8 C umol/L        Blood Gas Profile - Arterial (01.19.20 @ 01:49)    pH, Arterial: 7.51    pCO2, Arterial: 56 mmHg    pO2, Arterial: 53 mmHg  Specimen Source: .Blood (01.14.20 @ 09:10)      HCO3, Arterial: 43 mmoL/L    Base Excess, Arterial: 18.9 mmol/L    Oxygen Saturation, Arterial: 88 %    FIO2, Arterial: 3L    Blood Gas Comments Arterial: 3Ln/c    Blood Gas Source Arterial: Arterial    Culture - Urine (01.15.20 @ 08:29)    Specimen Source: .Urine    Culture Results:   No growth    Culture - Blood (01.14.20 @ 09:56)    Specimen Source: .Blood    Culture Results:   No growth at 5 days.  Culture - Sputum . (04.07.19 @ 12:25)    Gram Stain:   Few White blood cells  Few Gram Positive Cocci in Pairs and Chains  Few Gram Positive Cocci in Clusters  Few Gram Positive Rods  Few Gram Negative Rods    Specimen Source: .Sputum    Culture Results:   Moderate Routine respiratory junaid present    RADIOLOGY & ADDITIONAL TESTS:    < from: Xray Chest 1 View- PORTABLE-Urgent (01.19.20 @ 00:33) >     EXAM:  XR CHEST PORTABLE URGENT 1V                          PROCEDURE DATE:  01/19/2020          INTERPRETATION:  TECHNIQUE: Single portable view of the chest.    COMPARISON: 1/14/2020    CLINICAL HISTORY: Exacerbated copd O2 sat decreased exacerbated Copd    FINDINGS:    Single frontal view of the chest demonstrates bilateral lower lobe infiltrates. Moderate COPD changes. The cardiomediastinal silhouette is normal. No acute osseous abnormalities. Overlying EKG leads and wires are noted.    IMPRESSION: Bilateral lower lobe infiltrates. Moderate COPD changes..      JIMMY MICHEL M.D., ATTENDING RADIOLOGIST  This document has been electronically signed. Jan 19 2020  7:18AM        < end of copied text >  < from: CT Chest No Cont (12.03.19 @ 18:41) >     EXAM:  CT CHEST                          PROCEDURE DATE:  12/03/2019          INTERPRETATION:  CLINICAL INFORMATION: COPD. Right lower lobe infiltrate.   Please compare to prior.    COMPARISON: 8/8/2017    PROCEDURE:   CT of the Chest was performed without intravenous contrast.  Sagittal and coronal reformats were performed.      FINDINGS:    Hiatus hernia. Diffuse dilatation of the thoracic esophagus, presumably   secondary to reflux. These findings are unchanged since 8/8/2017.    No evidence of mediastinal or hilar lymphadenopathy. Small right pleural   effusion. No evidence of a left pleural effusion. No evidence of a   pericardial effusion. No evidence of cardiomegaly. Coronary artery   calcifications are noted.    Images through the upper abdomen reveal no significant abnormality.    Emphysematous changes diffusely as previously noted. The left lung is   otherwise clear. There is a region of atelectasis or fibrosis in the   right apex which is new since 8/8/2017. There is a zone of consolidation   at the right lower lobe with air bronchograms more extensive since the   prior study. No underlying endobronchial lesion is demonstrated.    There are degenerative changes in the spine. Old healed right rib   fractures, unchanged.    IMPRESSION:     Right lower lobe consolidation more extensive since 8/8/2017. No   underlying endobronchial lesion.    There is a region of atelectasis or fibrosis in the right apex which is   new since the prior study. Cannot exclude ongoing infection at this site.    Small right pleural effusion.    Extensive emphysematous changes.    VALERIANO TEJADA M.D., ATTENDING RADIOLOGIST  This document has been electronically signed. Dec  3 2019  6:57PM        < end of copied text >        REVIEW OF SYSTEMS:    CONSTITUTIONAL: No fever, weight loss, or fatigue  EYES: No eye pain, visual disturbances, or discharge  ENMT:  No difficulty hearing, tinnitus, vertigo; No sinus or throat pain  NECK: No pain or stiffness  RESPIRATORY: No cough, wheezing, chills or hemoptysis; No shortness of breath  CARDIOVASCULAR: No chest pain, palpitations, dizziness, or leg swelling  GASTROINTESTINAL: No abdominal or epigastric pain. No nausea, vomiting, or hematemesis; No diarrhea or constipation. No melena or hematochezia.  GENITOURINARY: No dysuria, frequency, hematuria, or incontinence  NEUROLOGICAL: No headaches, memory loss, loss of strength, numbness, or tremors  SKIN: No itching, burning, rashes, or lesions   LYMPH NODES: No enlarged glands  ENDOCRINE: No heat or cold intolerance; No hair loss  MUSCULOSKELETAL:   PSYCHIATRIC: No depression, anxiety, mood swings, or difficulty sleeping  HEME/LYMPH: No easy bruising, or bleeding gums  ALLERGY AND IMMUNOLOGIC: No hives or eczema    Vital Signs Last 24 Hrs  T(C): 36.6 (19 Jan 2020 08:12), Max: 37.9 (18 Jan 2020 21:38)  T(F): 97.9 (19 Jan 2020 08:12), Max: 100.2 (18 Jan 2020 21:38)  HR: 101 (19 Jan 2020 14:37) (62 - 120)  BP: 132/81 (19 Jan 2020 08:12) (124/78 - 132/84)  BP(mean): --  RR: 18 (19 Jan 2020 08:12) (18 - 18)  SpO2: 96% (19 Jan 2020 14:37) (88% - 97%)  PHYSICAL EXAM:    GENERAL: NAD, well-groomed, well-developed  HEAD:  Atraumatic, Normocephalic  EYES: EOMI, PERRLA, conjunctiva and sclera clear  NECK: Supple, No JVD, Normal thyroid  NERVOUS SYSTEM:  Alert & Oriented X3, no focal deficit  CHEST/LUNG: CTA b/l ,  no  rales, rhonchi, wheezing, or rubs  HEART: Regular rate and rhythm; No murmurs, rubs, or gallops  ABDOMEN: Soft, Nontender, Nondistended; Bowel sounds present  EXTREMITIES:  2+ Peripheral Pulses, No clubbing, cyanosis, or edema  LYMPH: No lymphadenopathy noted  SKIN: No rashes or lesions

## 2020-01-19 NOTE — PROGRESS NOTE ADULT - ASSESSMENT
68 y/o male, PMHx severe COPD, Parkinson's disease, schizophrenia, active smoker, who presented to ED from group home with a chief complaint of shortness of breath for the past three days. Treated in MICU for AE COPD and BiPAP. he needed precedex sedation. off bipap d/g to floor on NC O2 and AVAPS nocturnal. but pt noncomplaint with AVPAS. uses NC O2 daytime. cannot go back to [facility] home with oxygen.     # somnolence   - ammonia 59  - lactulose trial started  - monitor for response  - Haldol dose decreased     # AE COPD   s/p BiPAP- refusing to wear it   Oxygen  nebs  steroids to taper  empiric antibx  Rocephin / Zithromax given , today Zosyn / Vanco  dose given for possible Aspiration PNA , will continue Zosyn , follow up CT chest   RVP neg  bld c/s negative  CT chest ordered , pending report     # Acute on chronic hypercapnic respiratory failure  s/p BiPAP  weaned off to NC O2  has been non compliant with nocturnal BiPAP    # thrombocytopenia  resolved    # Schizophrenia  Cont all home Psych meds  s/p agitation requiring Precedex sedation    # Parkinson disease  Cont home meds    # Active smoker  Nicotine patch offered    Cont Lovenox    [facility]- D/W sister who is HCP- she initially rescinded DNR/ DNI as she saw pt in acute states and panicked believing that he will not be treated. Now explained that DNR/ DNI only applies to end of life situation. It does not mean do not treat. Acute and active treatment will always continue. She did not seem to understand very well. She questioned if not intubated he will die. She wants to discuss with her brother who lives out of town  Above noted .  Today Dr Hennessy ( Pulm ) and I spoke to patient's sister Cindy - after long conversation , she staeted she does agree with DNR/ DNI - order placed in the chart . If patient doesn't get better will need long term placement .

## 2020-01-19 NOTE — CONSULT NOTE ADULT - SUBJECTIVE AND OBJECTIVE BOX
Patient is a 69y old  Male who presents with a chief complaint of Acute COPD Exacerbation-acute on chronic Respiratory Failure (17 Jan 2020 16:27)      BRIEF HOSPITAL COURSE: ***    Events last 24 hours: ***    PAST MEDICAL & SURGICAL HISTORY:  Parkinson disease  Chronic obstructive pulmonary disease, unspecified COPD type  Schizophrenia, unspecified type  No significant past surgical history    Allergies    No Known Allergies    Intolerances      FAMILY HISTORY:  No pertinent family history  No pertinent family history in first degree relatives      Social History:     Review of Systems:  CONSTITUTIONAL: No fever, chills, or fatigue  EYES: No eye pain, visual disturbances, or discharge  ENMT:  No difficulty hearing, tinnitus, vertigo; No sinus or throat pain  NECK: No pain or stiffness  RESPIRATORY: No cough, wheezing, chills or hemoptysis; No shortness of breath  CARDIOVASCULAR: No chest pain, palpitations, dizziness, or leg swelling  GASTROINTESTINAL: No abdominal or epigastric pain. No nausea, vomiting, or hematemesis; No diarrhea or constipation. No melena or hematochezia.  GENITOURINARY: No dysuria, frequency, hematuria, or incontinence  NEUROLOGICAL: No headaches, memory loss, loss of strength, numbness, or tremors  SKIN: No itching, burning, rashes, or lesions   MUSCULOSKELETAL: No joint pain or swelling; No muscle, back, or extremity pain  PSYCHIATRIC: No depression, anxiety, mood swings, or difficulty sleeping      Vitals During Exam:   HR:   BP:   RR:  sPO2:     Physical Examination:    General: No acute distress.      HEENT: Pupils equal, reactive to light.  Symmetric.    PULM: Clear to auscultation bilaterally, no significant sputum production    CVS: Regular rate and rhythm, no murmurs, rubs, or gallops    ABD: Soft, nondistended, nontender, normoactive bowel sounds, no masses    EXT: No edema, nontender    SKIN: Warm and well perfused, no rashes noted.    NEURO: Alert, oriented, interactive, nonfocal      Medications:    metoprolol tartrate 25 milliGRAM(s) Oral two times a day    albuterol/ipratropium for Nebulization 3 milliLiter(s) Nebulizer every 6 hours  albuterol/ipratropium for Nebulization. 3 milliLiter(s) Nebulizer once    benztropine 1 milliGRAM(s) Oral two times a day  diVALproex DR 1000 milliGRAM(s) Oral two times a day  haloperidol     Tablet 10 milliGRAM(s) Oral at bedtime  haloperidol     Tablet 5 milliGRAM(s) Oral two times a day  risperiDONE   Tablet 4 milliGRAM(s) Oral daily  rOPINIRole 0.5 milliGRAM(s) Oral two times a day      enoxaparin Injectable 40 milliGRAM(s) SubCutaneous daily    lactulose Syrup 10 Gram(s) Oral daily  polyethylene glycol 3350 17 Gram(s) Oral at bedtime  senna 2 Tablet(s) Oral at bedtime      predniSONE   Tablet 40 milliGRAM(s) Oral daily      influenza   Vaccine 0.5 milliLiter(s) IntraMuscular once    chlorhexidine 2% Cloths 1 Application(s) Topical daily    nicotine - 21 mG/24Hr(s) Patch 1 patch Transdermal daily          ICU Vital Signs Last 24 Hrs  T(C): 36.7 (19 Jan 2020 01:16), Max: 37.9 (18 Jan 2020 21:38)  T(F): 98.1 (19 Jan 2020 01:16), Max: 100.2 (18 Jan 2020 21:38)  HR: 78 (19 Jan 2020 01:56) (56 - 127)  BP: 132/84 (19 Jan 2020 01:16) (111/56 - 143/86)  BP(mean): 106 (18 Jan 2020 04:51) (106 - 106)  ABP: --  ABP(mean): --  RR: 18 (19 Jan 2020 01:16) (18 - 21)  SpO2: 90% (19 Jan 2020 01:56) (88% - 100%)    Vital Signs Last 24 Hrs  T(C): 36.7 (19 Jan 2020 01:16), Max: 37.9 (18 Jan 2020 21:38)  T(F): 98.1 (19 Jan 2020 01:16), Max: 100.2 (18 Jan 2020 21:38)  HR: 78 (19 Jan 2020 01:56) (56 - 127)  BP: 132/84 (19 Jan 2020 01:16) (111/56 - 143/86)  BP(mean): 106 (18 Jan 2020 04:51) (106 - 106)  RR: 18 (19 Jan 2020 01:16) (18 - 21)  SpO2: 90% (19 Jan 2020 01:56) (88% - 100%)    ABG - ( 19 Jan 2020 01:49 )  pH, Arterial: 7.51  pH, Blood: x     /  pCO2: 56    /  pO2: 53    / HCO3: 43    / Base Excess: 18.9  /  SaO2: 88                  I&O's Detail    17 Jan 2020 07:01  -  18 Jan 2020 07:00  --------------------------------------------------------  IN:    Oral Fluid: 720 mL  Total IN: 720 mL    OUT:  Total OUT: 0 mL    Total NET: 720 mL      18 Jan 2020 07:01  -  19 Jan 2020 03:10  --------------------------------------------------------  IN:    Oral Fluid: 960 mL  Total IN: 960 mL    OUT:  Total OUT: 0 mL    Total NET: 960 mL            LABS:                        12.8   3.91  )-----------( 143      ( 19 Jan 2020 00:25 )             39.3     01-19    138  |  93<L>  |  24.0<H>  ----------------------------<  99  4.7   |  34.0<H>  |  0.53    Ca    9.0      19 Jan 2020 00:25            CAPILLARY BLOOD GLUCOSE            CULTURES:  Culture Results:   No growth (01-15 @ 08:29)  Culture Results:   No growth at 48 hours (01-14 @ 09:56)  Culture Results:   No growth at 48 hours (01-14 @ 09:10)        RADIOLOGY: ***      SUPPLEMENTAL O2:   LINES:  WHITNEY:  RADHA:   PPx:   CONTACT: Patient is a 69y old  Male who presents with a chief complaint of Acute COPD Exacerbation-acute on chronic Respiratory Failure (17 Jan 2020 16:27)      BRIEF HOSPITAL COURSE:   70 yo male with COPD, Parkinson's, schizophrenia presented from assisted living facility on 1/14 with complaints of SOB admitted with COPD exacerbation. Patient transferred to MICU on AVAPS assisted by precedex for agitation, ultimately improved and was downgraded to the floor.      Events last 24 hours:   Called by medicine PA this evening due to patient desatting and refusing to use his BiPAP.  Patient given Haldol 10mg PO at bedtime, still refusing AVAPS. ABG obtained 7.51/56/53/43/18.9.  Called due to patient hypoxic to the 80s.  Patient seen at bedside, sleeping, resting comfortably with spo2 86% on 4L NC.  Patient woken up, discussed wearing the AVAPS and patient refusing endorsing "I want to go home to my family".  Patient also refusing ventimask.  When sleeping patient seems to breath predominantly through his mouth.  Asked about placing his NC by his mouth instead of in his nose, patient agreed.  NC moved to be sitting infront of his mouth and went back to sleep.  spO2 increased to 91%, in setting of severe COPD >88% is acceptable.       PAST MEDICAL & SURGICAL HISTORY:  Parkinson disease  Chronic obstructive pulmonary disease, unspecified COPD type  Schizophrenia, unspecified type  No significant past surgical history    Allergies  No Known Allergies      FAMILY HISTORY:  No pertinent family history  No pertinent family history in first degree relatives      Social History:   From assisted living facility      Review of Systems:  No complaints at this time.       Vitals During Exam:   HR: 90s  BP: 130/70s  RR: 16  sPO2: 91% on 4L     Physical Examination:    General: Elderly male, lying in bed, appears comfortable      HEENT: NC/AT, Pupils 3mm equal, reactive to light.  Symmetric. NC in place over oral cavity opening    PULM: Symmetrical thorax expansion upon respiration. Good air movement bilaterally. Clear to auscultation bilaterally, no significant sputum production    CVS: Regular rate and rhythm, no murmurs, rubs, or gallops    ABD: Soft, nondistended, nontender, normoactive bowel sounds, no masses appreciated    EXT: No edema, nontender, DP pulses symmetrical    SKIN: Warm and well perfused, no rashes noted.     NEURO: Lethargic, oriented to person and hospital setting.       Medications:  metoprolol tartrate 25 milliGRAM(s) Oral two times a day  albuterol/ipratropium for Nebulization 3 milliLiter(s) Nebulizer every 6 hours  albuterol/ipratropium for Nebulization. 3 milliLiter(s) Nebulizer once  benztropine 1 milliGRAM(s) Oral two times a day  diVALproex DR 1000 milliGRAM(s) Oral two times a day  haloperidol     Tablet 10 milliGRAM(s) Oral at bedtime  haloperidol     Tablet 5 milliGRAM(s) Oral two times a day  risperiDONE   Tablet 4 milliGRAM(s) Oral daily  rOPINIRole 0.5 milliGRAM(s) Oral two times a day  enoxaparin Injectable 40 milliGRAM(s) SubCutaneous daily  lactulose Syrup 10 Gram(s) Oral daily  polyethylene glycol 3350 17 Gram(s) Oral at bedtime  senna 2 Tablet(s) Oral at bedtime  predniSONE   Tablet 40 milliGRAM(s) Oral daily  influenza   Vaccine 0.5 milliLiter(s) IntraMuscular once  chlorhexidine 2% Cloths 1 Application(s) Topical daily  nicotine - 21 mG/24Hr(s) Patch 1 patch Transdermal daily      ICU Vital Signs Last 24 Hrs  T(C): 36.7 (19 Jan 2020 01:16), Max: 37.9 (18 Jan 2020 21:38)  T(F): 98.1 (19 Jan 2020 01:16), Max: 100.2 (18 Jan 2020 21:38)  HR: 78 (19 Jan 2020 01:56) (56 - 127)  BP: 132/84 (19 Jan 2020 01:16) (111/56 - 143/86)  BP(mean): 106 (18 Jan 2020 04:51) (106 - 106)  ABP: --  ABP(mean): --  RR: 18 (19 Jan 2020 01:16) (18 - 21)  SpO2: 90% (19 Jan 2020 01:56) (88% - 100%)    Vital Signs Last 24 Hrs  T(C): 36.7 (19 Jan 2020 01:16), Max: 37.9 (18 Jan 2020 21:38)  T(F): 98.1 (19 Jan 2020 01:16), Max: 100.2 (18 Jan 2020 21:38)  HR: 78 (19 Jan 2020 01:56) (56 - 127)  BP: 132/84 (19 Jan 2020 01:16) (111/56 - 143/86)  BP(mean): 106 (18 Jan 2020 04:51) (106 - 106)  RR: 18 (19 Jan 2020 01:16) (18 - 21)  SpO2: 90% (19 Jan 2020 01:56) (88% - 100%)    ABG - ( 19 Jan 2020 01:49 )  pH, Arterial: 7.51  pH, Blood: x     /  pCO2: 56    /  pO2: 53    / HCO3: 43    / Base Excess: 18.9  /  SaO2: 88          I&O's Detail    17 Jan 2020 07:01  -  18 Jan 2020 07:00  --------------------------------------------------------  IN:    Oral Fluid: 720 mL  Total IN: 720 mL    OUT:  Total OUT: 0 mL  Total NET: 720 mL      18 Jan 2020 07:01  -  19 Jan 2020 03:10  --------------------------------------------------------  IN:    Oral Fluid: 960 mL  Total IN: 960 mL    OUT:  Total OUT: 0 mL  Total NET: 960 mL      LABS:                        12.8   3.91  )-----------( 143      ( 19 Jan 2020 00:25 )             39.3     01-19    138  |  93<L>  |  24.0<H>  ----------------------------<  99  4.7   |  34.0<H>  |  0.53    Ca    9.0      19 Jan 2020 00:25      CULTURES:  Culture Results:   No growth (01-15 @ 08:29)  Culture Results:   No growth at 48 hours (01-14 @ 09:56)  Culture Results:   No growth at 48 hours (01-14 @ 09:10)      RADIOLOGY:   < from: Xray Chest 1 View-PORTABLE IMMEDIATE (01.14.20 @ 09:22) >   EXAM:  XR CHEST PORTABLE IMMED 1V                          PROCEDURE DATE:  01/14/2020      INTERPRETATION:  Portable chest radiograph        CLINICAL INFORMATION: Sepsis    TECHNIQUE:  Portable  AP view of the chest was obtained.    COMPARISON: No previous examinations are available for review.    FINDINGS:    The lungs  are clear.  No pleural abnormality is seen.    The heart and mediastinum are within normal limits.    Chronic LEFT rib fracture deformity noted.    IMPRESSION:   No evidence of active chest disease      LASHELL DOUGLAS M.D., ATTENDING RADIOLOGIST  This document has been electronically signed. Jan 14 2020  9:30AM    < end of copied text >      SUPPLEMENTAL O2: NC  LINES: Peripheral   IVF: N  GARCIA: N  PPx: NA  CONTACT: N

## 2020-01-19 NOTE — PROGRESS NOTE ADULT - SUBJECTIVE AND OBJECTIVE BOX
On Admission  01-14-20 (5d)  HPI:    PAST MEDICAL & SURGICAL HISTORY:  Parkinson disease  Chronic obstructive pulmonary disease, unspecified COPD type  Schizophrenia, unspecified type  No significant past surgical history      Antimicrobial:    Cardiovascular:  metoprolol tartrate 25 milliGRAM(s) Oral two times a day    Pulmonary:  albuterol/ipratropium for Nebulization 3 milliLiter(s) Nebulizer every 6 hours  albuterol/ipratropium for Nebulization. 3 milliLiter(s) Nebulizer once    Hematalogic:  enoxaparin Injectable 40 milliGRAM(s) SubCutaneous daily    Other:  benztropine 1 milliGRAM(s) Oral two times a day  chlorhexidine 2% Cloths 1 Application(s) Topical daily  diVALproex DR 1000 milliGRAM(s) Oral two times a day  haloperidol     Tablet 10 milliGRAM(s) Oral at bedtime  haloperidol     Tablet 5 milliGRAM(s) Oral two times a day  influenza   Vaccine 0.5 milliLiter(s) IntraMuscular once  lactulose Syrup 10 Gram(s) Oral daily  nicotine - 21 mG/24Hr(s) Patch 1 patch Transdermal daily  polyethylene glycol 3350 17 Gram(s) Oral at bedtime  predniSONE   Tablet 40 milliGRAM(s) Oral daily  risperiDONE   Tablet 4 milliGRAM(s) Oral daily  rOPINIRole 0.5 milliGRAM(s) Oral two times a day  senna 2 Tablet(s) Oral at bedtime      Drug Dosing Weight  Height (cm): 172.72 (14 Jan 2020 08:45)  Weight (kg): 62.6 (14 Jan 2020 08:45)  BMI (kg/m2): 21 (14 Jan 2020 08:45)  BSA (m2): 1.75 (14 Jan 2020 08:45)    T(C): 36.6 (01-19-20 @ 08:12), Max: 37.9 (01-18-20 @ 21:38)  HR: 101 (01-19-20 @ 14:37)  BP: 132/81 (01-19-20 @ 08:12)  BP(mean): --  ABP: --  ABP(mean): --  RR: 18 (01-19-20 @ 08:12)  SpO2: 96% (01-19-20 @ 14:37)    ABG - ( 19 Jan 2020 01:49 )  pH, Arterial: 7.51  pH, Blood: x     /  pCO2: 56    /  pO2: 53    / HCO3: 43    / Base Excess: 18.9  /  SaO2: 88                    01-18 @ 07:01  -  01-19 @ 07:00  --------------------------------------------------------  IN: 960 mL / OUT: 0 mL / NET: 960 mL              LABS:  CBC Full  -  ( 19 Jan 2020 00:25 )  WBC Count : 3.91 K/uL  RBC Count : 4.03 M/uL  Hemoglobin : 12.8 g/dL  Hematocrit : 39.3 %  Platelet Count - Automated : 143 K/uL  Mean Cell Volume : 97.5 fl  Mean Cell Hemoglobin : 31.8 pg  Mean Cell Hemoglobin Concentration : 32.6 gm/dL  Auto Neutrophil # : x  Auto Lymphocyte # : x  Auto Monocyte # : x  Auto Eosinophil # : x  Auto Basophil # : x  Auto Neutrophil % : x  Auto Lymphocyte % : x  Auto Monocyte % : x  Auto Eosinophil % : x  Auto Basophil % : x    01-19    138  |  93<L>  |  24.0<H>  ----------------------------<  99  4.7   |  34.0<H>  |  0.53    Ca    9.0      19 Jan 2020 00:25            ____________________________________________________________________________________________________

## 2020-01-19 NOTE — CHART NOTE - NSCHARTNOTEFT_GEN_A_CORE
PA NOTE-MED    Called by RN due to Pt Pulse of 120  Pt is 70yo Male PMHX:  Drug Abuse Current Smoker Severe COPD Schizophrenia Parkinsons  admitted 1/14/20 from assisted living  2/2 Respiratory Failure Acute on Chronic COPD Exacerbation  Pt is non compliant with O2 Mask and has nocturnal AVAPS ordered which he refuses to wear  Pt refuses Bipap AVAPS or any type of facial O2 Mask  Agreeable to NC O2 only    Pt is currently A & O x 2 (Person/Place) and states he signed DNR/DNI  However on admission Pt was Obtunded and his Sister who is his HCP Rescinded his DNR/DNI   Pt has been previously transferred to MICU this admission 1/15  due to requiring AVAPS but refusing  Pt was sedated in MICU and Put on AVAPs and eventually downgraded to reg floor with cardiac/ monitor     While examining Pt for Tachycardia Pts O2 Sat began to decrease to mid-low 80's  Increased O2 NC to 6 Liters (Refused 50% Venti mask)   with increase in Pt O2 to 88% 6 L NC    Pt on Duonebs Q 6 Hrs  also on Prednisone Taper    Vitals: BP-132/84              P-120 (manual)              R-18              T-99 po (refused Rectal Temp)              O2 Sat-88% on 6 L NC    General: WD Cachetic Male lying in bed  NC In Place Sl WOB  Cardiac: S1S2 + Tachy Reg Rhythm  Lungs: Decreased BS + SL Rhonchi B/L Bases No wheezing No crackles B/L   Integument: Color good  Warm/dry      A/P Eval Pt 2/2 tachycardia Respiratory distress  Duoneb TX-Stat  ABG-Stat  CBC/BMP Stat  CXR-Stat       CXR- ? B/L infiltrates ? Aspiration     LABS:                      12.8   3.91  )-----------( 143      ( 19 Jan 2020 00:25 )             39.3     01-19    138  |  93<L>  |  24.0<H>  ----------------------------<  99  4.7   |  34.0<H>  |  0.53    Ca    9.0      19 Jan 2020 00:25    ABG - ( 19 Jan 2020 01:49 )  pH, Arterial: 7.51  pH, Blood: x     /  pCO2: 56    /  pO2: 53    / HCO3: 43    / Base Excess: 18.9  /  SaO2: 88        Called PT's HCP (sister) Iris to Discuss Her rescinding Pt's DNR/I  (informed her of Pt's desire not to be intubated/resuscitated )  Sister now agreeable to DNI only-Still wants CPR/Bipap/Avaps  DNI Obtained via telephone and placed in chart    ICU Consult called for Assistance in possible sedation and placement of AVAPS (Done previously in MICU 1/15)  ICU ELLA Wolff saw Pt   Placed NC 4 L near entrance of mouth due to Pt being mouthbreather  Pt's O2 sat increased to 95% and is sustaining    Pt's Current Vitals:    T(C): 36.7 (19 Jan 2020 01:16), Max: 37.9 (18 Jan 2020 21:38)  T(F): 98.1 (19 Jan 2020 01:16), Max: 100.2 (18 Jan 2020 21:38)  HR: 78 (19 Jan 2020 01:56) (56 - 127)  BP: 132/84 (19 Jan 2020 01:16) (111/56 - 143/86)  BP(mean): 106 (18 Jan 2020 04:51) (106 - 106)  RR: 18 (19 Jan 2020 01:16) (18 - 21)  SpO2: 90% (19 Jan 2020 01:56) (88% - 100%)    CXR- ? B/l infiltrates (Bases) Official read-Pending  Rx'd 1 dose of Vanco 1 gm IVPB stat and Zosyn 3.375 IVPB x 1 Dose     Pt Stable- VSS  Continue to Monitor  Call PA if any changes in Pt Status

## 2020-01-20 LAB
AMMONIA BLD-MCNC: 19 UMOL/L — SIGNIFICANT CHANGE UP (ref 11–55)
ANION GAP SERPL CALC-SCNC: 10 MMOL/L — SIGNIFICANT CHANGE UP (ref 5–17)
BUN SERPL-MCNC: 18 MG/DL — SIGNIFICANT CHANGE UP (ref 8–20)
CALCIUM SERPL-MCNC: 8.4 MG/DL — LOW (ref 8.6–10.2)
CHLORIDE SERPL-SCNC: 93 MMOL/L — LOW (ref 98–107)
CO2 SERPL-SCNC: 36 MMOL/L — HIGH (ref 22–29)
CREAT SERPL-MCNC: 0.51 MG/DL — SIGNIFICANT CHANGE UP (ref 0.5–1.3)
GLUCOSE SERPL-MCNC: 92 MG/DL — SIGNIFICANT CHANGE UP (ref 70–115)
HCT VFR BLD CALC: 39.2 % — SIGNIFICANT CHANGE UP (ref 39–50)
HGB BLD-MCNC: 12.4 G/DL — LOW (ref 13–17)
MCHC RBC-ENTMCNC: 30.8 PG — SIGNIFICANT CHANGE UP (ref 27–34)
MCHC RBC-ENTMCNC: 31.6 GM/DL — LOW (ref 32–36)
MCV RBC AUTO: 97.3 FL — SIGNIFICANT CHANGE UP (ref 80–100)
PLATELET # BLD AUTO: 80 K/UL — LOW (ref 150–400)
POTASSIUM SERPL-MCNC: 4.2 MMOL/L — SIGNIFICANT CHANGE UP (ref 3.5–5.3)
POTASSIUM SERPL-SCNC: 4.2 MMOL/L — SIGNIFICANT CHANGE UP (ref 3.5–5.3)
RBC # BLD: 4.03 M/UL — LOW (ref 4.2–5.8)
RBC # FLD: 15.2 % — HIGH (ref 10.3–14.5)
SODIUM SERPL-SCNC: 139 MMOL/L — SIGNIFICANT CHANGE UP (ref 135–145)
WBC # BLD: 2.94 K/UL — LOW (ref 3.8–10.5)
WBC # FLD AUTO: 2.94 K/UL — LOW (ref 3.8–10.5)

## 2020-01-20 PROCEDURE — 99233 SBSQ HOSP IP/OBS HIGH 50: CPT

## 2020-01-20 RX ORDER — PANTOPRAZOLE SODIUM 20 MG/1
40 TABLET, DELAYED RELEASE ORAL
Refills: 0 | Status: DISCONTINUED | OUTPATIENT
Start: 2020-01-20 | End: 2020-01-29

## 2020-01-20 RX ORDER — SACCHAROMYCES BOULARDII 250 MG
250 POWDER IN PACKET (EA) ORAL
Refills: 0 | Status: DISCONTINUED | OUTPATIENT
Start: 2020-01-20 | End: 2020-01-27

## 2020-01-20 RX ADMIN — Medication 1 MILLIGRAM(S): at 06:54

## 2020-01-20 RX ADMIN — DIVALPROEX SODIUM 1000 MILLIGRAM(S): 500 TABLET, DELAYED RELEASE ORAL at 06:55

## 2020-01-20 RX ADMIN — ENOXAPARIN SODIUM 40 MILLIGRAM(S): 100 INJECTION SUBCUTANEOUS at 12:33

## 2020-01-20 RX ADMIN — Medication 1 PATCH: at 12:34

## 2020-01-20 RX ADMIN — Medication 1 PATCH: at 20:04

## 2020-01-20 RX ADMIN — LACTULOSE 10 GRAM(S): 10 SOLUTION ORAL at 12:34

## 2020-01-20 RX ADMIN — Medication 250 MILLIGRAM(S): at 17:21

## 2020-01-20 RX ADMIN — Medication 1 PATCH: at 07:36

## 2020-01-20 RX ADMIN — Medication 25 MILLIGRAM(S): at 17:22

## 2020-01-20 RX ADMIN — PIPERACILLIN AND TAZOBACTAM 25 GRAM(S): 4; .5 INJECTION, POWDER, LYOPHILIZED, FOR SOLUTION INTRAVENOUS at 03:09

## 2020-01-20 RX ADMIN — ROPINIROLE 0.5 MILLIGRAM(S): 8 TABLET, FILM COATED, EXTENDED RELEASE ORAL at 06:54

## 2020-01-20 RX ADMIN — DEXTROSE MONOHYDRATE, SODIUM CHLORIDE, AND POTASSIUM CHLORIDE 75 MILLILITER(S): 50; .745; 4.5 INJECTION, SOLUTION INTRAVENOUS at 12:38

## 2020-01-20 RX ADMIN — HALOPERIDOL DECANOATE 5 MILLIGRAM(S): 100 INJECTION INTRAMUSCULAR at 06:54

## 2020-01-20 RX ADMIN — SENNA PLUS 2 TABLET(S): 8.6 TABLET ORAL at 21:21

## 2020-01-20 RX ADMIN — POLYETHYLENE GLYCOL 3350 17 GRAM(S): 17 POWDER, FOR SOLUTION ORAL at 21:21

## 2020-01-20 RX ADMIN — Medication 1 MILLIGRAM(S): at 17:22

## 2020-01-20 RX ADMIN — Medication 1 PATCH: at 12:36

## 2020-01-20 RX ADMIN — Medication 3 MILLILITER(S): at 03:19

## 2020-01-20 RX ADMIN — Medication 3 MILLILITER(S): at 20:58

## 2020-01-20 RX ADMIN — DIVALPROEX SODIUM 1000 MILLIGRAM(S): 500 TABLET, DELAYED RELEASE ORAL at 18:50

## 2020-01-20 RX ADMIN — Medication 3 MILLILITER(S): at 08:27

## 2020-01-20 RX ADMIN — ROPINIROLE 0.5 MILLIGRAM(S): 8 TABLET, FILM COATED, EXTENDED RELEASE ORAL at 17:22

## 2020-01-20 RX ADMIN — CHLORHEXIDINE GLUCONATE 1 APPLICATION(S): 213 SOLUTION TOPICAL at 12:33

## 2020-01-20 RX ADMIN — Medication 25 MILLIGRAM(S): at 06:54

## 2020-01-20 RX ADMIN — HALOPERIDOL DECANOATE 5 MILLIGRAM(S): 100 INJECTION INTRAMUSCULAR at 17:22

## 2020-01-20 RX ADMIN — PIPERACILLIN AND TAZOBACTAM 25 GRAM(S): 4; .5 INJECTION, POWDER, LYOPHILIZED, FOR SOLUTION INTRAVENOUS at 21:21

## 2020-01-20 RX ADMIN — Medication 3 MILLILITER(S): at 15:14

## 2020-01-20 RX ADMIN — RISPERIDONE 4 MILLIGRAM(S): 4 TABLET ORAL at 12:33

## 2020-01-20 RX ADMIN — PANTOPRAZOLE SODIUM 40 MILLIGRAM(S): 20 TABLET, DELAYED RELEASE ORAL at 17:22

## 2020-01-20 RX ADMIN — PIPERACILLIN AND TAZOBACTAM 25 GRAM(S): 4; .5 INJECTION, POWDER, LYOPHILIZED, FOR SOLUTION INTRAVENOUS at 12:32

## 2020-01-20 RX ADMIN — Medication 40 MILLIGRAM(S): at 06:54

## 2020-01-20 NOTE — PROGRESS NOTE ADULT - ASSESSMENT
Stage 4 COPD  Remains hypercapnic but refusing NIV  Pt DNR/DNI  Little else to offer    Recommendations:  PO prednisone at 60 mg  taper prednisone over 12 days  Complete ABX  Home O2 if room air sats <88%  Consider performist or brovana as a home nebulized LABA, revefenacin at once a day nebulized LAMA and budesonide to guarantee delivery  Home nebulizer with albuterol PRN  f/u with us in 2wks  recall prn

## 2020-01-20 NOTE — SWALLOW BEDSIDE ASSESSMENT ADULT - SLP GENERAL OBSERVATIONS
Pt received awake and alert, upright in bed +dysarthria, decreased vocal intensity, pain level 0/10 via verbal pain scale.

## 2020-01-20 NOTE — SWALLOW BEDSIDE ASSESSMENT ADULT - SWALLOW EVAL: DIAGNOSIS
Swallow judged to be functional for regular and thin liquids with no OVERT s/s of aspiration/penetration noted. Cannot r/o silent aspiration at bedside

## 2020-01-20 NOTE — PROGRESS NOTE ADULT - SUBJECTIVE AND OBJECTIVE BOX
Patient seen and examined . More comfortable this am , found sleeping , when woke up more alert , awake , on questions states " Leave me alone "     CC : hypoxia     MEDICATIONS  (STANDING):  albuterol/ipratropium for Nebulization 3 milliLiter(s) Nebulizer every 6 hours  albuterol/ipratropium for Nebulization. 3 milliLiter(s) Nebulizer once  benztropine 1 milliGRAM(s) Oral two times a day  chlorhexidine 2% Cloths 1 Application(s) Topical daily  dextrose 5% + sodium chloride 0.45% with potassium chloride 10 mEq/L 1000 milliLiter(s) (75 mL/Hr) IV Continuous <Continuous>  diVALproex DR 1000 milliGRAM(s) Oral two times a day  enoxaparin Injectable 40 milliGRAM(s) SubCutaneous daily  haloperidol     Tablet 5 milliGRAM(s) Oral two times a day  influenza   Vaccine 0.5 milliLiter(s) IntraMuscular once  lactulose Syrup 10 Gram(s) Oral daily  metoprolol tartrate 25 milliGRAM(s) Oral two times a day  nicotine - 21 mG/24Hr(s) Patch 1 patch Transdermal daily  piperacillin/tazobactam IVPB.. 3.375 Gram(s) IV Intermittent every 8 hours  polyethylene glycol 3350 17 Gram(s) Oral at bedtime  predniSONE   Tablet   Oral   risperiDONE   Tablet 4 milliGRAM(s) Oral daily  rOPINIRole 0.5 milliGRAM(s) Oral two times a day  senna 2 Tablet(s) Oral at bedtime    MEDICATIONS  (PRN):      LABS:                          12.4   2.94  )-----------( 80       ( 20 Jan 2020 09:10 )             39.2     01-20    139  |  93<L>  |  18.0  ----------------------------<  92  4.2   |  36.0<H>  |  0.51    Ca    8.4<L>      20 Jan 2020 09:07    Culture - Urine (01.15.20 @ 08:29)    Specimen Source: .Urine    Culture Results:   No growth    Culture - Blood (01.14.20 @ 09:56)    Specimen Source: .Blood    Culture Results:   No growth at 5 days.    Culture - Blood (01.14.20 @ 09:10)    Specimen Source: .Blood    Culture Results:   No growth at 5 days.    RADIOLOGY & ADDITIONAL TESTS:  < from: CT Chest No Cont (01.19.20 @ 16:07) >     EXAM:  CT CHEST                          PROCEDURE DATE:  01/19/2020          INTERPRETATION:  CLINICAL INFORMATION: COPD. Shortness of breath. Rule out aspiration pneumonia    COMPARISON: 12/3/2019    PROCEDURE:   CT of the Chest was performed without intravenous contrast.  Sagittal and coronal reformats were performed.      FINDINGS:    LUNGS AND AIRWAYS: Patent central airways.  There is emphysema. There is a small area of infiltrate in the posterior segment of the right upper lobe and at both lung bases consistent with infiltrates likely due to aspiration    PLEURA: Small bilateral pleural effusions.    MEDIASTINUM AND SAMEERA: No lymphadenopathy. The esophagus is patulous and air filled. This may predispose to aspiration    VESSELS: Atherosclerotic calcification of the aorta and coronary arteries is appreciated    HEART: Heart size is normal. No pericardial effusion.    CHEST WALL AND LOWER NECK: Within normal limits.    VISUALIZED UPPER ABDOMEN: There is a moderate-sized hiatal hernia with partially intrathoracic stomach in the left chest    BONES: Degenerative changes of the spine are noted. There are old right rib fractures. There is also old healed anterior left sixth rib fracture. Compression fracture is seen in the mid thoracic spine similar to the prior study. There is heterogeneity of the inferior sternum which is new    IMPRESSION:     Bibasilar infiltrates with small infiltrate in the posterior segment of the right upper lobe likely due to aspiration. Please note that the esophagus is patulous which likely predisposes to aspiration  Small bilateral pleural effusions  Moderate size hiatal hernia  Emphysema  Large hiatal hernia    CHANTELL MATHEW M.D.,ATTENDING RADIOLOGIST  This document has been electronically signed. Jan 19 2020  5:16PM        < end of copied text >        REVIEW OF SYSTEMS:  UTO , patient refusing to answer   Vital Signs Last 24 Hrs  T(C): 36.7 (20 Jan 2020 08:44), Max: 36.7 (19 Jan 2020 15:01)  T(F): 98.1 (20 Jan 2020 08:44), Max: 98.1 (19 Jan 2020 20:01)  HR: 62 (20 Jan 2020 08:44) (62 - 107)  BP: 113/67 (20 Jan 2020 08:44) (100/67 - 113/67)  BP(mean): --  RR: 18 (20 Jan 2020 08:44) (18 - 19)  SpO2: 95% (20 Jan 2020 08:44) (89% - 98%)  PHYSICAL EXAM:    GENERAL: NAD, well-groomed, well-developed  HEAD:  Atraumatic, Normocephalic  EYES: EOMI, PERRLA, conjunctiva and sclera clear  NECK: Supple, No JVD, Normal thyroid  NERVOUS SYSTEM:  Alert & Oriented X3, no focal deficit  CHEST/LUNG: mild expiratory wheezing b/+   ,  no  rales, rhonchi, wheezing, or rubs  HEART: Regular rate and rhythm; No murmurs, rubs, or gallops  ABDOMEN: Soft, Nontender, Nondistended; Bowel sounds present  EXTREMITIES:  2+ Peripheral Pulses, No clubbing, cyanosis, or edema  LYMPH: No lymphadenopathy noted  SKIN: No rashes or lesions

## 2020-01-20 NOTE — PROGRESS NOTE ADULT - SUBJECTIVE AND OBJECTIVE BOX
PULMONARY PROGRESS NOTE      [id number]    Patient is a 69y old  Male who presents with a chief complaint of sob (19 Jan 2020 16:54)      INTERVAL HPI/OVERNIGHT EVENTS:  confused in NAD  No SOB on NCO.   Not using AVAPS  now on PO steroids  completing ABX      MEDICATIONS  (STANDING):  albuterol/ipratropium for Nebulization 3 milliLiter(s) Nebulizer every 6 hours  albuterol/ipratropium for Nebulization. 3 milliLiter(s) Nebulizer once  benztropine 1 milliGRAM(s) Oral two times a day  chlorhexidine 2% Cloths 1 Application(s) Topical daily  dextrose 5% + sodium chloride 0.45% with potassium chloride 10 mEq/L 1000 milliLiter(s) (75 mL/Hr) IV Continuous <Continuous>  diVALproex DR 1000 milliGRAM(s) Oral two times a day  enoxaparin Injectable 40 milliGRAM(s) SubCutaneous daily  haloperidol     Tablet 5 milliGRAM(s) Oral two times a day  influenza   Vaccine 0.5 milliLiter(s) IntraMuscular once  lactulose Syrup 10 Gram(s) Oral daily  metoprolol tartrate 25 milliGRAM(s) Oral two times a day  nicotine - 21 mG/24Hr(s) Patch 1 patch Transdermal daily  piperacillin/tazobactam IVPB.. 3.375 Gram(s) IV Intermittent every 8 hours  polyethylene glycol 3350 17 Gram(s) Oral at bedtime  predniSONE   Tablet   Oral   risperiDONE   Tablet 4 milliGRAM(s) Oral daily  rOPINIRole 0.5 milliGRAM(s) Oral two times a day  senna 2 Tablet(s) Oral at bedtime      MEDICATIONS  (PRN):      Allergies    No Known Allergies    Intolerances        PAST MEDICAL & SURGICAL HISTORY:  Parkinson disease  Chronic obstructive pulmonary disease, unspecified COPD type  Schizophrenia, unspecified type  No significant past surgical history      SOCIAL HISTORY  Smoking History:       REVIEW OF SYSTEMS:    CONSTITUTIONAL:  No distress    HEENT:  Eyes:  No diplopia or blurred vision. ENT:  No earache, sore throat or runny nose.    CARDIOVASCULAR:  No pressure, squeezing, tightness, heaviness or aching about the chest; no palpitations.    RESPIRATORY:  above    GASTROINTESTINAL:  No nausea, vomiting or diarrhea.    GENITOURINARY:  No dysuria, frequency or urgency.    NEUROLOGIC:  No paresthesias, fasciculations, seizures or weakness.    Extremities: No cyanosis, clubbing or edema    PSYCHIATRIC:  No disorder of thought or mood.    Vital Signs Last 24 Hrs  T(C): 36.7 (20 Jan 2020 08:44), Max: 36.7 (19 Jan 2020 15:01)  T(F): 98.1 (20 Jan 2020 08:44), Max: 98.1 (19 Jan 2020 20:01)  HR: 62 (20 Jan 2020 08:44) (62 - 107)  BP: 113/67 (20 Jan 2020 08:44) (100/67 - 113/67)  BP(mean): --  RR: 18 (20 Jan 2020 08:44) (18 - 19)  SpO2: 95% (20 Jan 2020 08:44) (89% - 98%)    PHYSICAL EXAMINATION:    GENERAL: The patient is awake and alert in no apparent distress.     HEENT: Head is normocephalic and atraumatic. Extraocular muscles are intact. Mucous membranes are moist.    NECK: Supple.    LUNGS: Clear to auscultation without wheezing, rales or rhonchi; respirations unlabored    HEART: Regular rate and rhythm without murmur.    ABDOMEN: Soft, nontender, and nondistended.      EXTREMITIES: Without any cyanosis, clubbing, rash, lesions or edema.    NEUROLOGIC: Grossly intact.    LABS:                        12.4   2.94  )-----------( 80       ( 20 Jan 2020 09:10 )             39.2     01-20    139  |  93<L>  |  18.0  ----------------------------<  92  4.2   |  36.0<H>  |  0.51    Ca    8.4<L>      20 Jan 2020 09:07          ABG - ( 19 Jan 2020 01:49 )  pH, Arterial: 7.51  pH, Blood: x     /  pCO2: 56    /  pO2: 53    / HCO3: 43    / Base Excess: 18.9  /  SaO2: 88            MICROBIOLOGY:    RADIOLOGY & ADDITIONAL STUDIES:  < from: CT Chest No Cont (01.19.20 @ 16:07) >   EXAM:  CT CHEST                          PROCEDURE DATE:  01/19/2020          INTERPRETATION:  CLINICAL INFORMATION: COPD. Shortness of breath. Rule out aspiration pneumonia    COMPARISON: 12/3/2019    PROCEDURE:   CT of the Chest was performed without intravenous contrast.  Sagittal and coronal reformats were performed.      FINDINGS:    LUNGS AND AIRWAYS: Patent central airways.  There is emphysema. There is a small area of infiltrate in the posterior segment of the right upper lobe and at both lung bases consistent with infiltrates likely due to aspiration    PLEURA: Small bilateral pleural effusions.    MEDIASTINUM AND SAMEERA: No lymphadenopathy. The esophagus is patulous and air filled. This may predispose to aspiration    VESSELS: Atherosclerotic calcification of the aorta and coronary arteries is appreciated    HEART: Heart size is normal. No pericardial effusion.    CHEST WALL AND LOWER NECK: Within normal limits.    VISUALIZED UPPER ABDOMEN: There is a moderate-sized hiatal hernia with partially intrathoracic stomach in the left chest    BONES: Degenerative changes of the spine are noted. There are old right rib fractures. There is also old healed anterior left sixth rib fracture. Compression fracture is seen in the mid thoracic spine similar to the prior study. There is heterogeneity of the inferior sternum which is new    IMPRESSION:     Bibasilar infiltrates with small infiltrate in the posterior segment of the right upper lobe likely due to aspiration. Please note that the esophagus is patulous which likely predisposes to aspiration  Small bilateral pleural effusions  Moderate size hiatal hernia  Emphysema  Large hiatal hernia      CHANTELL MATHEW M.D.,ATTENDING RADIOLOGIST  This document has been electronically signed. Jan 19 2020  5:16PM    < end of copied text >  All films reviewed on PACS

## 2020-01-20 NOTE — SWALLOW BEDSIDE ASSESSMENT ADULT - SWALLOW EVAL: RECOMMENDED FEEDING/EATING TECHNIQUES
no straws/position upright (90 degrees)/small sips/bites/maintain upright posture during/after eating for 30 mins

## 2020-01-20 NOTE — CHART NOTE - NSCHARTNOTEFT_GEN_A_CORE
Chart reviewed and case discussed with hospitalist Dr. Wang. Per discussion held by Pulm (Dr. Hennessy) and Hospitalist Dr. Wang yesterday, sister is requesting for DNR/I. Given that pt resides in a group home overseen by [facility], [facility] must be submitted and approved prior to implementation of advance directives. Please refer to [facility] note by Pablo Loyd 1/17/2020, who has already reached out to  there to review case. Chart reviewed and case discussed with hospitalist Dr. Wang. Per discussion held by Pulm (Dr. Hennessy) and Hospitalist Dr. Wang yesterday, sister is requesting for DNR/I. Given that pt resides in a group home overseen by [facility], a MOLST checklist #3 must be submitted. A DNR/I order at present time is NOT valid until case is reviewed and approved by [facility] . Please refer to [facility] note by Pbalo Loyd 1/17/2020, who has already reached out to  there to review case. Chart reviewed. Per discussion held by Pulm (Dr. Hennessy) and Hospitalist Dr. Wang yesterday, sister is requesting for DNR/I. Given that pt resides in a group home overseen by [facility], a MOLST checklist #3 must be submitted. A DNR/I order at present time is NOT valid until case is reviewed and approved by [facility] . D/W hospitalist Dr. Wang and recommend discontinuing advance directives. Please refer to [facility] note by Pablo Loyd 1/17/2020, who has already reached out to  there to review case.

## 2020-01-20 NOTE — SWALLOW BEDSIDE ASSESSMENT ADULT - ASR SWALLOW ASPIRATION MONITOR
fever/pneumonia/upper respiratory infection/gurgly voice/oral hygiene/throat clearing/change of breathing pattern/position upright (90Y)/cough

## 2020-01-20 NOTE — PROGRESS NOTE ADULT - ASSESSMENT
70 y/o male, PMHx severe COPD, Parkinson's disease, schizophrenia, active smoker, who presented to ED from group home with a chief complaint of shortness of breath for the past three days. Treated in MICU for AE COPD and BiPAP, he needed precedex sedation. off bipap d/g to floor on NC O2 and AVAPS nocturnal , but pt noncomplaint with AVPAS. uses NC O2 daytime. cannot go back to [facility] home with oxygen.     # somnolence , resolving   - ammonia 59 - 19   - lactulose trial started  - Haldol dose decreased     # AE COPD   s/p BiPAP- refusing to wear it   Oxygen  nebs  steroids to taper  empiric antibx  Rocephin / Zithromax given x 4 days ,   for possible LLL PNA  CT chest noted , likely aspiration PNA    ABX changed to Zosyn add florastor  RVP neg  bld c/s negative      # Acute on chronic hypercapnic respiratory failure  s/p BiPAP  weaned off to NC O2  has been non compliant with nocturnal BiPAP    # thrombocytopenia - plt - 80 , will hold on Lovenox , observe     # Schizophrenia  Cont all home Psych meds  Haldol decreased due to somnolence     # Parkinson disease  Cont home meds    # Active smoker  Nicotine patch offered    #  Aspiration PNA - npo , IVF , speech and swallow eval pending       [facility]- D/W sister who is HCP- she initially rescinded DNR/ DNI as she saw pt in acute states and panicked believing that he will not be treated. Now explained that DNR/ DNI only applies to end of life situation. It does not mean do not treat. Acute and active treatment will always continue. She did not seem to understand very well. She questioned if not intubated he will die. She wants to discuss with her brother who lives out of town  Above noted .    Yesterday  Dr Hennessy ( Pulm ) and I spoke to patient's sister Cindy - after long conversation , she staeted she does agree with DNR/ DNI - order placed in the chart . If patient doesn't get better will need long term placement .     As per Palliative care conversation today :  Patient himself does not want to be intubated or be put on life support; sister Cindy was aware of his decision, although he is  not deemed to have capacity to make complex medical decisions    See Rach Garcia [facility] note which states that group home is under [facility] and will need  to complete the MOLST #3 checklist and secure legal apporval before setting up his directives.

## 2020-01-21 PROCEDURE — 99232 SBSQ HOSP IP/OBS MODERATE 35: CPT

## 2020-01-21 RX ADMIN — PANTOPRAZOLE SODIUM 40 MILLIGRAM(S): 20 TABLET, DELAYED RELEASE ORAL at 05:26

## 2020-01-21 RX ADMIN — HALOPERIDOL DECANOATE 5 MILLIGRAM(S): 100 INJECTION INTRAMUSCULAR at 05:25

## 2020-01-21 RX ADMIN — Medication 1 MILLIGRAM(S): at 17:46

## 2020-01-21 RX ADMIN — Medication 30 MILLIGRAM(S): at 05:25

## 2020-01-21 RX ADMIN — Medication 1 MILLIGRAM(S): at 05:25

## 2020-01-21 RX ADMIN — Medication 3 MILLILITER(S): at 20:49

## 2020-01-21 RX ADMIN — DIVALPROEX SODIUM 1000 MILLIGRAM(S): 500 TABLET, DELAYED RELEASE ORAL at 17:46

## 2020-01-21 RX ADMIN — CHLORHEXIDINE GLUCONATE 1 APPLICATION(S): 213 SOLUTION TOPICAL at 12:18

## 2020-01-21 RX ADMIN — LACTULOSE 10 GRAM(S): 10 SOLUTION ORAL at 12:18

## 2020-01-21 RX ADMIN — Medication 25 MILLIGRAM(S): at 05:25

## 2020-01-21 RX ADMIN — SENNA PLUS 2 TABLET(S): 8.6 TABLET ORAL at 21:29

## 2020-01-21 RX ADMIN — Medication 1 PATCH: at 12:18

## 2020-01-21 RX ADMIN — RISPERIDONE 4 MILLIGRAM(S): 4 TABLET ORAL at 12:19

## 2020-01-21 RX ADMIN — POLYETHYLENE GLYCOL 3350 17 GRAM(S): 17 POWDER, FOR SOLUTION ORAL at 21:29

## 2020-01-21 RX ADMIN — ROPINIROLE 0.5 MILLIGRAM(S): 8 TABLET, FILM COATED, EXTENDED RELEASE ORAL at 17:46

## 2020-01-21 RX ADMIN — ROPINIROLE 0.5 MILLIGRAM(S): 8 TABLET, FILM COATED, EXTENDED RELEASE ORAL at 05:25

## 2020-01-21 RX ADMIN — DEXTROSE MONOHYDRATE, SODIUM CHLORIDE, AND POTASSIUM CHLORIDE 75 MILLILITER(S): 50; .745; 4.5 INJECTION, SOLUTION INTRAVENOUS at 02:16

## 2020-01-21 RX ADMIN — HALOPERIDOL DECANOATE 5 MILLIGRAM(S): 100 INJECTION INTRAMUSCULAR at 17:46

## 2020-01-21 RX ADMIN — Medication 25 MILLIGRAM(S): at 17:46

## 2020-01-21 RX ADMIN — PIPERACILLIN AND TAZOBACTAM 25 GRAM(S): 4; .5 INJECTION, POWDER, LYOPHILIZED, FOR SOLUTION INTRAVENOUS at 13:59

## 2020-01-21 RX ADMIN — PIPERACILLIN AND TAZOBACTAM 25 GRAM(S): 4; .5 INJECTION, POWDER, LYOPHILIZED, FOR SOLUTION INTRAVENOUS at 21:31

## 2020-01-21 RX ADMIN — DIVALPROEX SODIUM 1000 MILLIGRAM(S): 500 TABLET, DELAYED RELEASE ORAL at 05:25

## 2020-01-21 RX ADMIN — DEXTROSE MONOHYDRATE, SODIUM CHLORIDE, AND POTASSIUM CHLORIDE 75 MILLILITER(S): 50; .745; 4.5 INJECTION, SOLUTION INTRAVENOUS at 21:31

## 2020-01-21 RX ADMIN — Medication 1 PATCH: at 12:38

## 2020-01-21 RX ADMIN — Medication 250 MILLIGRAM(S): at 05:25

## 2020-01-21 RX ADMIN — Medication 250 MILLIGRAM(S): at 17:46

## 2020-01-21 RX ADMIN — Medication 1 PATCH: at 19:37

## 2020-01-21 RX ADMIN — PIPERACILLIN AND TAZOBACTAM 25 GRAM(S): 4; .5 INJECTION, POWDER, LYOPHILIZED, FOR SOLUTION INTRAVENOUS at 05:26

## 2020-01-21 RX ADMIN — Medication 3 MILLILITER(S): at 03:39

## 2020-01-21 NOTE — PROGRESS NOTE ADULT - ATTENDING COMMENTS
COUNSELING:    Face to face meeting to discuss Advanced Care Planning - Time Spent ______ Minutes.  See goals of care note.    More than 50% time spent in counseling and coordinating care. ______ Minutes.     Thank you for the opportunity to assist with the care of this patient.   [facility] [phone]. COUNSELING:    Face to face meeting to discuss Advanced Care Planning - Time Spent ______ Minutes.  See goals of care note.    More than 50% time spent in counseling and coordinating care. _20_____ Minutes.     Thank you for the opportunity to assist with the care of this patient.   [place] [facility] Consult [facility] [phone].

## 2020-01-21 NOTE — PROGRESS NOTE ADULT - SUBJECTIVE AND OBJECTIVE BOX
Patient seen and examined . Found sleeping , arousal , AAOX2 , confused , hard to understand .     CC : sleepy , confused       MEDICATIONS  (STANDING):  albuterol/ipratropium for Nebulization 3 milliLiter(s) Nebulizer every 6 hours  albuterol/ipratropium for Nebulization. 3 milliLiter(s) Nebulizer once  benztropine 1 milliGRAM(s) Oral two times a day  chlorhexidine 2% Cloths 1 Application(s) Topical daily  dextrose 5% + sodium chloride 0.45% with potassium chloride 10 mEq/L 1000 milliLiter(s) (75 mL/Hr) IV Continuous <Continuous>  diVALproex DR 1000 milliGRAM(s) Oral two times a day  haloperidol     Tablet 5 milliGRAM(s) Oral two times a day  influenza   Vaccine 0.5 milliLiter(s) IntraMuscular once  lactulose Syrup 10 Gram(s) Oral daily  metoprolol tartrate 25 milliGRAM(s) Oral two times a day  nicotine - 21 mG/24Hr(s) Patch 1 patch Transdermal daily  pantoprazole    Tablet 40 milliGRAM(s) Oral before breakfast  piperacillin/tazobactam IVPB.. 3.375 Gram(s) IV Intermittent every 8 hours  polyethylene glycol 3350 17 Gram(s) Oral at bedtime  predniSONE   Tablet   Oral   predniSONE   Tablet 30 milliGRAM(s) Oral daily  risperiDONE   Tablet 4 milliGRAM(s) Oral daily  rOPINIRole 0.5 milliGRAM(s) Oral two times a day  saccharomyces boulardii 250 milliGRAM(s) Oral two times a day  senna 2 Tablet(s) Oral at bedtime    MEDICATIONS  (PRN):      LABS:                          12.4   2.94  )-----------( 80       ( 20 Jan 2020 09:10 )             39.2     01-20    139  |  93<L>  |  18.0  ----------------------------<  92  4.2   |  36.0<H>  |  0.51    Ca    8.4<L>      20 Jan 2020 09:07      RADIOLOGY & ADDITIONAL TESTS:  < from: CT Chest No Cont (01.19.20 @ 16:07) >     EXAM:  CT CHEST                          PROCEDURE DATE:  01/19/2020          INTERPRETATION:  CLINICAL INFORMATION: COPD. Shortness of breath. Rule out aspiration pneumonia    COMPARISON: 12/3/2019    PROCEDURE:   CT of the Chest was performed without intravenous contrast.  Sagittal and coronal reformats were performed.      FINDINGS:    LUNGS AND AIRWAYS: Patent central airways.  There is emphysema. There is a small area of infiltrate in the posterior segment of the right upper lobe and at both lung bases consistent with infiltrates likely due to aspiration    PLEURA: Small bilateral pleural effusions.    MEDIASTINUM AND SAMEERA: No lymphadenopathy. The esophagus is patulous and air filled. This may predispose to aspiration    VESSELS: Atherosclerotic calcification of the aorta and coronary arteries is appreciated    HEART: Heart size is normal. No pericardial effusion.    CHEST WALL AND LOWER NECK: Within normal limits.    VISUALIZED UPPER ABDOMEN: There is a moderate-sized hiatal hernia with partially intrathoracic stomach in the left chest    BONES: Degenerative changes of the spine are noted. There are old right rib fractures. There is also old healed anterior left sixth rib fracture. Compression fracture is seen in the mid thoracic spine similar to the prior study. There is heterogeneity of the inferior sternum which is new    IMPRESSION:     Bibasilar infiltrates with small infiltrate in the posterior segment of the right upper lobe likely due to aspiration. Please note that the esophagus is patulous which likely predisposes to aspiration  Small bilateral pleural effusions  Moderate size hiatal hernia  Emphysema  Large hiatal hernia    CHANTELL MATHEW M.D.,ATTENDING RADIOLOGIST  This document has been electronically signed. Jan 19 2020  5:16PM    < end of copied text >        REVIEW OF SYSTEMS:    UTO , patient confused , hard to understand     Vital Signs Last 24 Hrs  T(C): 36.7 (21 Jan 2020 08:01), Max: 36.7 (20 Jan 2020 15:54)  T(F): 98 (21 Jan 2020 08:01), Max: 98 (20 Jan 2020 15:54)  HR: 86 (21 Jan 2020 08:01) (52 - 110)  BP: 131/75 (21 Jan 2020 08:01) (106/72 - 137/71)  BP(mean): --  RR: 19 (20 Jan 2020 15:54) (19 - 19)  SpO2: 91% (21 Jan 2020 08:01) (91% - 99%)    PHYSICAL EXAM:    GENERAL: NAD, well-groomed, well-developed  HEAD:  Atraumatic, Normocephalic  EYES: EOMI, PERRLA, conjunctiva and sclera clear  NECK: Supple, No JVD, Normal thyroid  NERVOUS SYSTEM:  Alert & Oriented X 2, confused   CHEST/LUNG: decreased breath sounds b/l ,   no  rales, rhonchi, wheezing, or rubs  HEART: Regular rate and rhythm; No murmurs, rubs, or gallops  ABDOMEN: Soft, Nontender, Nondistended; Bowel sounds present  EXTREMITIES:  2+ Peripheral Pulses, No clubbing, cyanosis, or edema  LYMPH: No lymphadenopathy noted  SKIN: No rashes or lesions  L elbow , pressure ulcer + , stage 2

## 2020-01-21 NOTE — PROGRESS NOTE ADULT - SUBJECTIVE AND OBJECTIVE BOX
INTERVAL HPI/OVERNIGHT EVENTS: 70yo Male Patient with Advanced COPD with exacerbation, Respiratory Failure, Schizophrenia and Urine retention.  Patient continues to smoke cigarettes, non compliant with disease self management continues to be lethargic sleeping most of the day-but is arouseable and will eat.  He was sleeping soundly at time of my visit, difficult to arouse but will eventually open his eyes and speak. Very difficult to understand his speech.  Offers no complaints at his time    INTERVAL HPI/OVERNIGHT EVENTS:  Confused in NAD  No SOB on NCO.   Not using AVAPS  PO steroids  completing ABX    68 yo  Male who presents with a chief complaint of COPD, resp failure (20 Jan 2020 13:07)      PAST MEDICAL & SURGICAL HISTORY:  Parkinson disease  Chronic obstructive pulmonary disease, unspecified COPD type  Schizophrenia, unspecified type  No significant past surgical history    Present Symptoms:     Dyspnea:  2- 3 Tachypnea RR 30/min more labored  Nausea/Vomiting:  No  Anxiety:   No  Depression: Yes   Fatigue: Yes   Loss of appetite:  No    Pain: mirna            Character-            Duration-            Effect-            Factors-            Frequency-            Location-            Severity-    Review of Systems: Reviewed                   Unable to obtain due to poor mentation       MEDICATIONS  (STANDING):  albuterol/ipratropium for Nebulization 3 milliLiter(s) Nebulizer every 6 hours  albuterol/ipratropium for Nebulization. 3 milliLiter(s) Nebulizer once  benztropine 1 milliGRAM(s) Oral two times a day  chlorhexidine 2% Cloths 1 Application(s) Topical daily  dextrose 5% + sodium chloride 0.45% with potassium chloride 10 mEq/L 1000 milliLiter(s) (75 mL/Hr) IV Continuous <Continuous>  diVALproex DR 1000 milliGRAM(s) Oral two times a day  haloperidol     Tablet 5 milliGRAM(s) Oral two times a day  influenza   Vaccine 0.5 milliLiter(s) IntraMuscular once  lactulose Syrup 10 Gram(s) Oral daily  metoprolol tartrate 25 milliGRAM(s) Oral two times a day  nicotine - 21 mG/24Hr(s) Patch 1 patch Transdermal daily  pantoprazole    Tablet 40 milliGRAM(s) Oral before breakfast  piperacillin/tazobactam IVPB.. 3.375 Gram(s) IV Intermittent every 8 hours  polyethylene glycol 3350 17 Gram(s) Oral at bedtime  predniSONE   Tablet   Oral   predniSONE   Tablet 30 milliGRAM(s) Oral daily  risperiDONE   Tablet 4 milliGRAM(s) Oral daily  rOPINIRole 0.5 milliGRAM(s) Oral two times a day  saccharomyces boulardii 250 milliGRAM(s) Oral two times a day  senna 2 Tablet(s) Oral at bedtime    MEDICATIONS  (PRN):      PHYSICAL EXAM:    Vital Signs Last 24 Hrs  T(C): 36.7 (21 Jan 2020 08:01), Max: 36.7 (20 Jan 2020 15:54)  T(F): 98 (21 Jan 2020 08:01), Max: 98 (20 Jan 2020 15:54)  HR: 86 (21 Jan 2020 08:01) (52 - 110)  BP: 131/75 (21 Jan 2020 08:01) (106/72 - 137/71)  BP(mean): --  RR: 19 (20 Jan 2020 15:54) (19 - 19)  SpO2: 91% (21 Jan 2020 08:01) (91% - 99%)    General: alert  oriented x _1-2___ lethargic                   thin speech difficult to understand    HEENT: dry mouth  h    Lungs:  tachypnea/labored breathing  Nasal O2 4L  CV:   tachycardia    GI: normal  distended                 constipation  last BM:     :   terrance    MSK:  weakness  edema           bedbound/wheelchair bound    Skin: normal  ____  no rash    LABS:                        12.4   2.94  )-----------( 80       ( 20 Jan 2020 09:10 )             39.2     01-20    139  |  93<L>  |  18.0  ----------------------------<  92  4.2   |  36.0<H>  |  0.51    Ca    8.4<L>      20 Jan 2020 09:07    &O's Summary    20 Jan 2020 07:01  -  21 Jan 2020 07:00  --------------------------------------------------------  IN: 2045 mL / OUT: 0 mL / NET: 2045 mL    RADIOLOGY & ADDITIONAL STUDIES:    ADVANCE DIRECTIVES:  MOLST Checklist #3 in process; HE REMAINS FULL CODE   Sister is very anxious and unable to make up her mind about her brother's advance directives. She has agreed to DNR then rescinded twice  DNR   Completed on:                     MOLST  YES    Completed on:  Living Will NO   Completed on: INTERVAL HPI/OVERNIGHT EVENTS: 70yo Male Patient with Advanced COPD with exacerbation, Respiratory Failure, Schizophrenia and Urine retention.  Patient continues to smoke cigarettes, non compliant with disease self management continues to be lethargic sleeping most of the day-but is arouseable and will eat.    He was sleeping soundly at time of my visit, difficult to arouse but will eventually open his eyes and speak. Very difficult to understand his speech.  Offers no complaints at his time    INTERVAL HPI/OVERNIGHT EVENTS:  Confused in NAD  No SOB on NCO.   Not using AVAPS  PO steroids  completing ABX    68 yo  Male who presents with a chief complaint of COPD, resp failure (20 Jan 2020 13:07)      PAST MEDICAL & SURGICAL HISTORY:  Parkinson disease  Chronic obstructive pulmonary disease, unspecified COPD type  Schizophrenia, unspecified type  No significant past surgical history    Present Symptoms:     Dyspnea:  2- 3 Tachypnea RR 30/min more labored  Nausea/Vomiting:  No  Anxiety:   No  Depression: Yes   Fatigue: Yes   Loss of appetite:  No    Pain: denies            Character-            Duration-            Effect-            Factors-            Frequency-            Location-            Severity-    Review of Systems: Reviewed                   Unable to obtain due to poor mentation       MEDICATIONS  (STANDING):  albuterol/ipratropium for Nebulization 3 milliLiter(s) Nebulizer every 6 hours  albuterol/ipratropium for Nebulization. 3 milliLiter(s) Nebulizer once  benztropine 1 milliGRAM(s) Oral two times a day  chlorhexidine 2% Cloths 1 Application(s) Topical daily  dextrose 5% + sodium chloride 0.45% with potassium chloride 10 mEq/L 1000 milliLiter(s) (75 mL/Hr) IV Continuous <Continuous>  diVALproex DR 1000 milliGRAM(s) Oral two times a day  haloperidol     Tablet 5 milliGRAM(s) Oral two times a day  influenza   Vaccine 0.5 milliLiter(s) IntraMuscular once  lactulose Syrup 10 Gram(s) Oral daily  metoprolol tartrate 25 milliGRAM(s) Oral two times a day  nicotine - 21 mG/24Hr(s) Patch 1 patch Transdermal daily  pantoprazole    Tablet 40 milliGRAM(s) Oral before breakfast  piperacillin/tazobactam IVPB.. 3.375 Gram(s) IV Intermittent every 8 hours  polyethylene glycol 3350 17 Gram(s) Oral at bedtime  predniSONE   Tablet   Oral   predniSONE   Tablet 30 milliGRAM(s) Oral daily  risperiDONE   Tablet 4 milliGRAM(s) Oral daily  rOPINIRole 0.5 milliGRAM(s) Oral two times a day  saccharomyces boulardii 250 milliGRAM(s) Oral two times a day  senna 2 Tablet(s) Oral at bedtime    MEDICATIONS  (PRN):      PHYSICAL EXAM:    Vital Signs Last 24 Hrs  T(C): 36.7 (21 Jan 2020 08:01), Max: 36.7 (20 Jan 2020 15:54)  T(F): 98 (21 Jan 2020 08:01), Max: 98 (20 Jan 2020 15:54)  HR: 86 (21 Jan 2020 08:01) (52 - 110)  BP: 131/75 (21 Jan 2020 08:01) (106/72 - 137/71)  BP(mean): --  RR: 19 (20 Jan 2020 15:54) (19 - 19)  SpO2: 91% (21 Jan 2020 08:01) (91% - 99%)    General: alert  oriented x _1-2___ lethargic                   thin speech difficult to understand    HEENT: dry mouth      Lungs:  tachypnea/labored breathing  Nasal O2 4L  CV:   tachycardia    GI: normal  distended                 constipation  last BM:     :   terrance    MSK:  weakness  edema           bedbound/wheelchair bound    Skin: normal  ____  no rash    LABS:                        12.4   2.94  )-----------( 80       ( 20 Jan 2020 09:10 )             39.2     01-20    139  |  93<L>  |  18.0  ----------------------------<  92  4.2   |  36.0<H>  |  0.51    Ca    8.4<L>      20 Jan 2020 09:07    &O's Summary    20 Jan 2020 07:01  -  21 Jan 2020 07:00  --------------------------------------------------------  IN: 2045 mL / OUT: 0 mL / NET: 2045 mL    RADIOLOGY & ADDITIONAL STUDIES:    ADVANCE DIRECTIVES:  MOLST Checklist #3 in process; HE REMAINS FULL CODE  ** Sister is very anxious and unable to make up her mind about her brother's Advance Directives. She has agreed to DNR then rescinded twice  DNR   Completed on:                     MENA  YES    Completed on: Rescinded and is Full Code  office of Mental Health overseeing his case-Cannot be DNR without MOLST #3 Checklist  Living Will NO   Completed on:

## 2020-01-21 NOTE — PROGRESS NOTE ADULT - ASSESSMENT
68 y/o male, PMHx severe COPD, Parkinson's disease, schizophrenia, active smoker, who presented to ED from group home with a chief complaint of shortness of breath for the past three days. Treated in MICU for AE COPD and BiPAP, he needed precedex sedation. off bipap d/g to floor on NC O2 and AVAPS nocturnal , but pt non complaint with AVPAS. uses NC O2 daytime. cannot go back to [facility] home with oxygen.     # somnolence , better , AAOX 2   - ammonia 59 - 19   - lactulose trial started  - Haldol dose decreased     # AE COPD   s/p BiPAP- refusing to wear it   Oxygen  nebs  steroids to taper  empiric antibx  Rocephin / Zithromax given x 4 days ,   for possible LLL PNA  CT chest noted , likely aspiration PNA    ABX changed to Zosyn add florastor  RVP neg  bld c/s negative  Swallow eval appreciated - passed for regular diet with liquids , unable to r/o silent aspiration ,   MBS recommended and will order       # Acute on chronic hypercapnic respiratory failure  s/p BiPAP  weaned off to NC O2  has been non compliant with nocturnal BiPAP  Pulmonology appreciated , signed off ,   as per Pulmonology :   PO prednisone at 60 mg  Taper prednisone over 12 days  Complete ABX  ConsiderHome O2 if room air sats <88%  Consider performist or brovana as a home nebulized LABA,   Revefenacin at once a day nebulized LAMA and budesonide to guarantee delivery  Home nebulizer with albuterol PRN    # thrombocytopenia - plt - 80 , will hold on Lovenox ,follow CBC in am ,   may be due to Zosyn , if Plt not better will consider to change iv abx     # Schizophrenia  Cont all home Psych meds  Haldol decreased due to somnolence     # Parkinson disease  Cont home meds    # Active smoker  Nicotine patch offered    #  Aspiration PNA - for MBS to r/o silent aspiration   # L elbow pressure ulcer stage 2 - keep off load       [facility]- D/W sister who is HCP- she initially rescinded DNR/ DNI as she saw pt in acute states and panicked believing that he will not be treated. Now explained that DNR/ DNI only applies to end of life situation. It does not mean do not treat. Acute and active treatment will always continue. She did not seem to understand very well. She questioned if not intubated he will die. She wants to discuss with her brother who lives out of town  Above noted .    On 1/19  Dr Hennessy ( Pul ) and I spoke to patient's sister Cindy - after long conversation , she  does agree with DNR/ DNI - order placed in the chart .     As per Palliative care conversation on 1/20  :    Patient himself does not want to be intubated or be put on life support; sister Cindy was aware of his decision, although he is  not deemed to have capacity to make complex medical decisions.   See [facility] note which states that group home is under [facility] and will need  to complete the MOLST #3 checklist and secure legal approval before setting up his directives.  Palliative aware of issue . So patient at present is Full CODE .

## 2020-01-21 NOTE — PROGRESS NOTE ADULT - ASSESSMENT
Assessment and Plan:   		  Stage 4 COPD  Remains hypercapnic but refusing NIV  Pt DNR/DNI  Recommendations:Pulmonology  PO prednisone at 60 mg  Taper prednisone over 12 days  Complete ABX  ConsiderHome O2 if room air sats <88%  Consider performist or brovana as a home nebulized LABA,   Revefenacin at once a day nebulized LAMA and budesonide to guarantee delivery  Home nebulizer with albuterol PRN    Lethargy and Weakness    Urine retention    GOC  Schizophrenia  Directives need to be approved [facility] [place] #3 Checklist   He is living in a group home-cannot return to [facility] home with O2  Remains FullCode Assessment and Plan:   		  Stage 4 COPD  Remains hypercapnic but refusing NIV    Aspiration Pneumonia-  Swallow evaluation he passed, diet has been advanced  MBS ordered for tomorrow to R/O silent aspirattion  Antibiotics  Recommendations: Pulmonology  PO prednisone at 60 mg  Taper prednisone over 12 days  Complete ABX  Consider Home O2 if room air sats <88%- But will need to leave Group home  Revefenacin at once a day nebulized LAMA and budesonide to guarantee delivery  Home nebulizer with albuterol PRN    Lethargy and Weakness  End Stage COPD, PNA  Remains on bedrest-    Urine retention  Baird catheter    Schizophrenia  Deemed not to have capacity to make his own decisions  Continue maintenance medications  Haldol dose has been decreased    GOC    Directives need to be approved through MOLST #3 Checklist   He is living in a group home-cannot return to [facility] home with O2  Remains Full Code

## 2020-01-22 PROCEDURE — 99233 SBSQ HOSP IP/OBS HIGH 50: CPT

## 2020-01-22 PROCEDURE — 74230 X-RAY XM SWLNG FUNCJ C+: CPT | Mod: 26

## 2020-01-22 RX ADMIN — Medication 25 MILLIGRAM(S): at 18:04

## 2020-01-22 RX ADMIN — Medication 3 MILLILITER(S): at 03:29

## 2020-01-22 RX ADMIN — LACTULOSE 10 GRAM(S): 10 SOLUTION ORAL at 11:46

## 2020-01-22 RX ADMIN — Medication 250 MILLIGRAM(S): at 18:04

## 2020-01-22 RX ADMIN — POLYETHYLENE GLYCOL 3350 17 GRAM(S): 17 POWDER, FOR SOLUTION ORAL at 21:33

## 2020-01-22 RX ADMIN — DIVALPROEX SODIUM 1000 MILLIGRAM(S): 500 TABLET, DELAYED RELEASE ORAL at 18:04

## 2020-01-22 RX ADMIN — ROPINIROLE 0.5 MILLIGRAM(S): 8 TABLET, FILM COATED, EXTENDED RELEASE ORAL at 18:04

## 2020-01-22 RX ADMIN — Medication 1 MILLIGRAM(S): at 18:04

## 2020-01-22 RX ADMIN — Medication 25 MILLIGRAM(S): at 06:08

## 2020-01-22 RX ADMIN — Medication 1 PATCH: at 07:59

## 2020-01-22 RX ADMIN — ROPINIROLE 0.5 MILLIGRAM(S): 8 TABLET, FILM COATED, EXTENDED RELEASE ORAL at 06:08

## 2020-01-22 RX ADMIN — Medication 1 PATCH: at 11:46

## 2020-01-22 RX ADMIN — Medication 1 PATCH: at 12:58

## 2020-01-22 RX ADMIN — RISPERIDONE 4 MILLIGRAM(S): 4 TABLET ORAL at 11:46

## 2020-01-22 RX ADMIN — PIPERACILLIN AND TAZOBACTAM 25 GRAM(S): 4; .5 INJECTION, POWDER, LYOPHILIZED, FOR SOLUTION INTRAVENOUS at 21:33

## 2020-01-22 RX ADMIN — Medication 1 MILLIGRAM(S): at 06:08

## 2020-01-22 RX ADMIN — HALOPERIDOL DECANOATE 5 MILLIGRAM(S): 100 INJECTION INTRAMUSCULAR at 18:04

## 2020-01-22 RX ADMIN — Medication 1 PATCH: at 19:59

## 2020-01-22 RX ADMIN — DIVALPROEX SODIUM 1000 MILLIGRAM(S): 500 TABLET, DELAYED RELEASE ORAL at 06:08

## 2020-01-22 RX ADMIN — Medication 250 MILLIGRAM(S): at 06:08

## 2020-01-22 RX ADMIN — PIPERACILLIN AND TAZOBACTAM 25 GRAM(S): 4; .5 INJECTION, POWDER, LYOPHILIZED, FOR SOLUTION INTRAVENOUS at 06:08

## 2020-01-22 RX ADMIN — CHLORHEXIDINE GLUCONATE 1 APPLICATION(S): 213 SOLUTION TOPICAL at 11:46

## 2020-01-22 RX ADMIN — PIPERACILLIN AND TAZOBACTAM 25 GRAM(S): 4; .5 INJECTION, POWDER, LYOPHILIZED, FOR SOLUTION INTRAVENOUS at 15:08

## 2020-01-22 RX ADMIN — PANTOPRAZOLE SODIUM 40 MILLIGRAM(S): 20 TABLET, DELAYED RELEASE ORAL at 06:08

## 2020-01-22 RX ADMIN — Medication 30 MILLIGRAM(S): at 06:08

## 2020-01-22 RX ADMIN — SENNA PLUS 2 TABLET(S): 8.6 TABLET ORAL at 21:33

## 2020-01-22 RX ADMIN — HALOPERIDOL DECANOATE 5 MILLIGRAM(S): 100 INJECTION INTRAMUSCULAR at 06:08

## 2020-01-22 NOTE — PHYSICAL THERAPY INITIAL EVALUATION ADULT - CRITERIA FOR SKILLED THERAPEUTIC INTERVENTIONS
rehab potential/functional limitations in following categories/predicted duration of therapy intervention/anticipated discharge recommendation/impairments found/therapy frequency

## 2020-01-22 NOTE — SWALLOW VFSS/MBS ASSESSMENT ADULT - ORAL PREPARATORY PHASE
Functional Slow mastication, - impacted by lethargy however functional Absent bolus manipulation, likely - impacted by lethargy. Pt with no attempt to chew presented bolus, keeping it on lingual tip despite mutimodality cues Insufficient mastication/Poor bolus formation/- impacted by lethargy, +oral holding requiring maximal cues to chew bolus

## 2020-01-22 NOTE — SWALLOW VFSS/MBS ASSESSMENT ADULT - ORAL PREP COMMENTS
Attempted to have pt self feed, however poor oral grading on cup rim and unable to draw liquid from cup when self administered.

## 2020-01-22 NOTE — SWALLOW VFSS/MBS ASSESSMENT ADULT - SLP PERTINENT HISTORY OF CURRENT PROBLEM
Pt known to this service, last seen for an MBS study in August, 2017 at which time a regular/thin liquid diet was rx'd.  Pt is a 69 year old male with advanced (stage 4) COPD with exacerbation, respiratory failure, schizophrenia, +smoker. CT chest: "Bibasilar infiltrates with small infiltrate in the posterior segment of the right upper lobe likely due to aspiration. Please note that the esophagus is patulous which likely predisposes to aspiration, Small bilateral pleural effusions, moderate size hiatal hernia, large hiatal hernia" MBS rx'd to r/o silent aspiration

## 2020-01-22 NOTE — SWALLOW VFSS/MBS ASSESSMENT ADULT - RECOMMENDED FEEDING/EATING TECHNIQUES
pt must be awake and alert for all PO/crush medication (when feasible)/no straws/position upright (90 degrees)/oral hygiene/maintain upright posture during/after eating for 30 mins

## 2020-01-22 NOTE — SWALLOW VFSS/MBS ASSESSMENT ADULT - ORAL PHASE
Uncontrolled bolus / spillover in aris-pharynx Reduced anterior - posterior transport/Delayed oral transit time Uncontrolled bolus / spillover in hypopharynx

## 2020-01-22 NOTE — SWALLOW VFSS/MBS ASSESSMENT ADULT - DIAGNOSTIC IMPRESSIONS
Mild oral dysphagia, marked by reduced lingual coordination and overall attention to bolus, and further negatively impacted by lethargy and cognition at this time. Oral stage functional for puree, mechanical soft solids and liquids.     Pharyngeal stage functional for solids and liquids with no penetration/aspiration observed during study.

## 2020-01-22 NOTE — ADVANCED PRACTICE NURSE CONSULT - RECOMMEDATIONS
Follow Nutritional suggestions as per Dietary consult.    Recommendation for Unstageable Pressure Injury to Left elbow:  Daily cleanse wound and periwound with Normal Saline, apply nickel-coin thickness of Santyl (collagenase) to entire base of wound, cover with 2 x 2 dressing and Hypafix.    Goal of Care:  Enzymatic debridement    Continue low air loss bed therapy, continue to turn & reposition q2h with Z-lexis fluidized positioning device, Z-Lexis Heel boots to bilateral feet and single breathable pad, please continue measures to decrease friction/shear/pressure.     Plan discussed with patient’s primary nurse, Eron Mclean RN and Dr. Jannette Edmonds.  Please call wound care service line at [phone], if further assistance is needed.

## 2020-01-22 NOTE — ADVANCED PRACTICE NURSE CONSULT - ASSESSMENT
Patient is confused, chairfast, incontinent of urine and stool.   Skin warm, dry with poor turgor, scattered areas of hyperpigmentation and hypopigmentation, scattered areas of ecchymosis on bilateral upper extremities, blanchable erythema to bilateral heels.    Patient is noted to have an Unstageable Pressure Injury to his left elbow which measures 0.5 x 0.5 x 0.3 cm wound bed covered 100% with attached yellow fibrin tissue, circumferential hyperpigmentation extending 2 cm, scant serous drainage, no undermining, tunneling or odor noted.  No erythema, warmth or induration noted to periwound.

## 2020-01-22 NOTE — SWALLOW VFSS/MBS ASSESSMENT ADULT - MODE OF PRESENTATION
fed by clinician/spoon spoon/fed by clinician fed by clinician cup/fed by clinician fed by clinician/cup

## 2020-01-22 NOTE — ADVANCED PRACTICE NURSE CONSULT - REASON FOR CONSULT
Patient seen on skin care rounds after wound care referral received for assessment of skin impairment and recommendations of topical management.  Chart reviewed:   BMI (21), David (15), Serum albumin (3.3 g/dL) and Total Protein (6.4 g/dL).  Patient H/O COPD, Parkinson's, schizophrenia presented from assisted living facility on 1/14 with complaints of SOB admitted with COPD exacerbation.

## 2020-01-22 NOTE — SWALLOW VFSS/MBS ASSESSMENT ADULT - SLP GENERAL OBSERVATIONS
Pt received asleep in Radiology suite, arousable to voice however very lethargic and requiring cues to maintain arousal throughout, 0x1, reduced cognition, +02 via nc.

## 2020-01-22 NOTE — SWALLOW VFSS/MBS ASSESSMENT ADULT - NS SWALLOW VFSS REC ASPIR MON
change of breathing pattern/upper respiratory infection/oral hygiene/fever/position upright (90Y)/gurgly voice/pneumonia/cough/throat clearing

## 2020-01-22 NOTE — SWALLOW VFSS/MBS ASSESSMENT ADULT - PHARYNGEAL PHASE COMMENTS
Unable to assess 2* bolus removed from pt's mouth as noted above Limited trials administered 2* pt lethargy, however no penetration/aspiration or pharyngeal stasis observed

## 2020-01-22 NOTE — PHYSICAL THERAPY INITIAL EVALUATION ADULT - DISCHARGE DISPOSITION, PT EVAL
subacute rehab vs return back to adult home c 24/7 assist subacute rehab vs return back to adult home c assist

## 2020-01-23 DIAGNOSIS — F05 DELIRIUM DUE TO KNOWN PHYSIOLOGICAL CONDITION: ICD-10-CM

## 2020-01-23 PROCEDURE — 99222 1ST HOSP IP/OBS MODERATE 55: CPT

## 2020-01-23 PROCEDURE — 99497 ADVNCD CARE PLAN 30 MIN: CPT | Mod: 25

## 2020-01-23 PROCEDURE — 99233 SBSQ HOSP IP/OBS HIGH 50: CPT

## 2020-01-23 PROCEDURE — 99232 SBSQ HOSP IP/OBS MODERATE 35: CPT

## 2020-01-23 RX ADMIN — Medication 1 PATCH: at 20:38

## 2020-01-23 RX ADMIN — RISPERIDONE 4 MILLIGRAM(S): 4 TABLET ORAL at 13:14

## 2020-01-23 RX ADMIN — ROPINIROLE 0.5 MILLIGRAM(S): 8 TABLET, FILM COATED, EXTENDED RELEASE ORAL at 05:09

## 2020-01-23 RX ADMIN — HALOPERIDOL DECANOATE 5 MILLIGRAM(S): 100 INJECTION INTRAMUSCULAR at 05:09

## 2020-01-23 RX ADMIN — Medication 1 MILLIGRAM(S): at 17:44

## 2020-01-23 RX ADMIN — PIPERACILLIN AND TAZOBACTAM 25 GRAM(S): 4; .5 INJECTION, POWDER, LYOPHILIZED, FOR SOLUTION INTRAVENOUS at 05:09

## 2020-01-23 RX ADMIN — ROPINIROLE 0.5 MILLIGRAM(S): 8 TABLET, FILM COATED, EXTENDED RELEASE ORAL at 17:44

## 2020-01-23 RX ADMIN — Medication 3 MILLILITER(S): at 09:59

## 2020-01-23 RX ADMIN — Medication 1 PATCH: at 13:12

## 2020-01-23 RX ADMIN — Medication 1 MILLIGRAM(S): at 05:09

## 2020-01-23 RX ADMIN — Medication 3 MILLILITER(S): at 15:03

## 2020-01-23 RX ADMIN — Medication 1 PATCH: at 07:00

## 2020-01-23 RX ADMIN — SENNA PLUS 2 TABLET(S): 8.6 TABLET ORAL at 21:02

## 2020-01-23 RX ADMIN — LACTULOSE 10 GRAM(S): 10 SOLUTION ORAL at 13:12

## 2020-01-23 RX ADMIN — Medication 1 PATCH: at 11:00

## 2020-01-23 RX ADMIN — DIVALPROEX SODIUM 1000 MILLIGRAM(S): 500 TABLET, DELAYED RELEASE ORAL at 05:09

## 2020-01-23 RX ADMIN — HALOPERIDOL DECANOATE 5 MILLIGRAM(S): 100 INJECTION INTRAMUSCULAR at 17:45

## 2020-01-23 RX ADMIN — Medication 3 MILLILITER(S): at 21:20

## 2020-01-23 RX ADMIN — Medication 25 MILLIGRAM(S): at 17:44

## 2020-01-23 RX ADMIN — Medication 250 MILLIGRAM(S): at 17:45

## 2020-01-23 RX ADMIN — CHLORHEXIDINE GLUCONATE 1 APPLICATION(S): 213 SOLUTION TOPICAL at 13:15

## 2020-01-23 RX ADMIN — PIPERACILLIN AND TAZOBACTAM 25 GRAM(S): 4; .5 INJECTION, POWDER, LYOPHILIZED, FOR SOLUTION INTRAVENOUS at 13:12

## 2020-01-23 RX ADMIN — Medication 250 MILLIGRAM(S): at 05:09

## 2020-01-23 RX ADMIN — PANTOPRAZOLE SODIUM 40 MILLIGRAM(S): 20 TABLET, DELAYED RELEASE ORAL at 05:09

## 2020-01-23 RX ADMIN — Medication 25 MILLIGRAM(S): at 05:09

## 2020-01-23 RX ADMIN — Medication 20 MILLIGRAM(S): at 05:09

## 2020-01-23 RX ADMIN — PIPERACILLIN AND TAZOBACTAM 25 GRAM(S): 4; .5 INJECTION, POWDER, LYOPHILIZED, FOR SOLUTION INTRAVENOUS at 21:03

## 2020-01-23 RX ADMIN — DIVALPROEX SODIUM 1000 MILLIGRAM(S): 500 TABLET, DELAYED RELEASE ORAL at 17:43

## 2020-01-23 RX ADMIN — POLYETHYLENE GLYCOL 3350 17 GRAM(S): 17 POWDER, FOR SOLUTION ORAL at 21:02

## 2020-01-23 NOTE — BEHAVIORAL HEALTH ASSESSMENT NOTE - DIFFERENTIAL
hypoactive delirium vs delirium due to medications vs delirium due to electrolyte imbalance vs parkinsons dementia

## 2020-01-23 NOTE — PROGRESS NOTE ADULT - ASSESSMENT
68 y/o male, PMHx severe COPD, Parkinson's disease, schizophrenia, active smoker, who presented to ED from group home with a chief complaint of shortness of breath for the past three days. Treated in MICU for AE COPD and BiPAP, he needed precedex sedation. off bipap d/g to floor on NC O2 and AVAPS nocturnal , but pt non complaint with AVPAS. uses NC O2 daytime. cannot go back to [facility] home with oxygen.     # somnolence , improving  - ammonia 59 - 19   - lactulose trial   - Haldol dose decreased     # AE COPD   s/p BiPAP- refusing to wear it   Oxygen  nebs  steroids to taper  on zosyn, last day tomorrow  CT chest noted , likely aspiration PNA   RVP neg  bld c/s negative  Swallow eval appreciated - passed for regular diet with liquids , unable to r/o silent aspiration ,   MS 1/22--> mechanical soft, thin liquids      # Acute on chronic hypercapnic respiratory failure  s/p BiPAP  weaned off to NC O2  has been non compliant with nocturnal BiPAP  Pulmonology appreciated , signed off ,   as per Pulmonology :   PO prednisone at 60 mg  Taper prednisone over 12 days  Complete ABX  ConsiderHome O2 if room air sats <88%  Consider performist or brovana as a home nebulized LABA,   Revefenacin at once a day nebulized LAMA and budesonide to guarantee delivery  Home nebulizer with albuterol PRN    # thrombocytopenia - plt - 80 , will hold on Lovenox ,follow CBC in am ,   may be due to Zosyn , if Plt not better will consider to change iv abx     # Schizophrenia  Cont all home Psych meds  Haldol decreased due to somnolence     # Parkinson disease  Cont home meds    # Active smoker  Nicotine patch offered    #  Aspiration PNA - for MBS to r/o silent aspiration   # L elbow pressure ulcer stage 2 - keep off load       [facility]- previous D/W sister who is HCP- she initially rescinded DNR/ DNI as she saw pt in acute states and panicked believing that he will not be treated. Now explained that DNR/ DNI only applies to end of life situation. It does not mean do not treat. Acute and active treatment will always continue. She did not seem to understand very well. She questioned if not intubated he will die. She wants to discuss with her brother who lives out of town  Above noted .    On 1/19  Dr Hennessy ( [facility] ) and ana spoke to patient's sister Cindy - after long conversation , she  does agree with DNR/ DNI - order placed in the chart .     As per Palliative care conversation on 1/20  :    Patient himself does not want to be intubated or be put on life support; sister Cindy was aware of his decision, although he is  not deemed to have capacity to make complex medical decisions.   See [facility] note which states that group home is under [facility] and will need  to complete the MOLST #3 checklist and secure legal approval before setting up his directives.  Palliative aware of issue . So patient at present is Full CODE .     1/22: psych reported patient is unable to make medical decision. sister HCP. has  palliative on board

## 2020-01-23 NOTE — PROGRESS NOTE ADULT - SUBJECTIVE AND OBJECTIVE BOX
Patient is a 69y old  Male who presents with a chief complaint of COPD Exacerbation  Acute on Chronic Respiratory Failure (23 Jan 2020 13:58)    interval: clinically improving slowly. confused. on NC oxygen    MEDICATIONS  (STANDING):  albuterol/ipratropium for Nebulization 3 milliLiter(s) Nebulizer every 6 hours  albuterol/ipratropium for Nebulization. 3 milliLiter(s) Nebulizer once  benztropine 1 milliGRAM(s) Oral two times a day  chlorhexidine 2% Cloths 1 Application(s) Topical daily  dextrose 5% + sodium chloride 0.45% with potassium chloride 10 mEq/L 1000 milliLiter(s) (75 mL/Hr) IV Continuous <Continuous>  diVALproex DR 1000 milliGRAM(s) Oral two times a day  haloperidol     Tablet 5 milliGRAM(s) Oral two times a day  influenza   Vaccine 0.5 milliLiter(s) IntraMuscular once  lactulose Syrup 10 Gram(s) Oral daily  metoprolol tartrate 25 milliGRAM(s) Oral two times a day  nicotine - 21 mG/24Hr(s) Patch 1 patch Transdermal daily  pantoprazole    Tablet 40 milliGRAM(s) Oral before breakfast  piperacillin/tazobactam IVPB.. 3.375 Gram(s) IV Intermittent every 8 hours  polyethylene glycol 3350 17 Gram(s) Oral at bedtime  predniSONE   Tablet   Oral   predniSONE   Tablet 20 milliGRAM(s) Oral daily  risperiDONE   Tablet 4 milliGRAM(s) Oral daily  rOPINIRole 0.5 milliGRAM(s) Oral two times a day  saccharomyces boulardii 250 milliGRAM(s) Oral two times a day  senna 2 Tablet(s) Oral at bedtime    MEDICATIONS  (PRN):      MEDICATIONS  (PRN):    Vital Signs Last 24 Hrs  T(C): 36.9 (23 Jan 2020 15:24), Max: 36.9 (23 Jan 2020 15:24)  T(F): 98.4 (23 Jan 2020 15:24), Max: 98.4 (23 Jan 2020 15:24)  HR: 83 (23 Jan 2020 15:24) (56 - 83)  BP: 118/74 (23 Jan 2020 15:24) (108/76 - 149/92)  BP(mean): --  RR: 17 (23 Jan 2020 15:24) (17 - 24)  SpO2: 95% (23 Jan 2020 16:00) (90% - 96%)    GENERAL: Not in distress. Alert    HEENT:  Normocephalic and atraumatic. PEARLA,EOMI  NECK: Supple.  No JVD.    CARDIOVASCULAR: RRR S1, S2. No murmur/rubs/gallop  LUNGS: BLAE+, no rales, no wheezing, no rhonchi.    ABDOMEN: ND. Soft,  NT, no guarding / rebound / rigidity. BS normoactive. No CVA tenderness.    EXTREMITIES: no cyanosis, no clubbing, no edema. no leg or calf TP  SKIN: no rash.   NEUROLOGIC: confused. moves limbs    PSYCHIATRIC: Calm.  No agitation.        LABS:    no lab      RADIOLOGY & ADDITIONAL TESTS:  < from: CT Chest No Cont (01.19.20 @ 16:07) >     EXAM:  CT CHEST                          PROCEDURE DATE:  01/19/2020          INTERPRETATION:  CLINICAL INFORMATION: COPD. Shortness of breath. Rule out aspiration pneumonia    COMPARISON: 12/3/2019    PROCEDURE:   CT of the Chest was performed without intravenous contrast.  Sagittal and coronal reformats were performed.      FINDINGS:    LUNGS AND AIRWAYS: Patent central airways.  There is emphysema. There is a small area of infiltrate in the posterior segment of the right upper lobe and at both lung bases consistent with infiltrates likely due to aspiration    PLEURA: Small bilateral pleural effusions.    MEDIASTINUM AND SAMEERA: No lymphadenopathy. The esophagus is patulous and air filled. This may predispose to aspiration    VESSELS: Atherosclerotic calcification of the aorta and coronary arteries is appreciated    HEART: Heart size is normal. No pericardial effusion.    CHEST WALL AND LOWER NECK: Within normal limits.    VISUALIZED UPPER ABDOMEN: There is a moderate-sized hiatal hernia with partially intrathoracic stomach in the left chest    BONES: Degenerative changes of the spine are noted. There are old right rib fractures. There is also old healed anterior left sixth rib fracture. Compression fracture is seen in the mid thoracic spine similar to the prior study. There is heterogeneity of the inferior sternum which is new    IMPRESSION:     Bibasilar infiltrates with small infiltrate in the posterior segment of the right upper lobe likely due to aspiration. Please note that the esophagus is patulous which likely predisposes to aspiration  Small bilateral pleural effusions  Moderate size hiatal hernia  Emphysema  Large hiatal hernia    CHANTELL MATHEW M.D.,ATTENDING RADIOLOGIST  This document has been electronically signed. Jan 19 2020  5:16PM    < end of copied text >        REVIEW OF SYSTEMS:    UTO , patient confused , hard to understand     Vital Signs Last 24 Hrs  T(C): 36.7 (21 Jan 2020 08:01), Max: 36.7 (20 Jan 2020 15:54)  T(F): 98 (21 Jan 2020 08:01), Max: 98 (20 Jan 2020 15:54)  HR: 86 (21 Jan 2020 08:01) (52 - 110)  BP: 131/75 (21 Jan 2020 08:01) (106/72 - 137/71)  BP(mean): --  RR: 19 (20 Jan 2020 15:54) (19 - 19)  SpO2: 91% (21 Jan 2020 08:01) (91% - 99%)    PHYSICAL EXAM:    GENERAL: NAD, well-groomed, well-developed  HEAD:  Atraumatic, Normocephalic  EYES: EOMI, PERRLA, conjunctiva and sclera clear  NECK: Supple, No JVD, Normal thyroid  NERVOUS SYSTEM:  Alert & Oriented X 2, confused   CHEST/LUNG: decreased breath sounds b/l ,   no  rales, rhonchi, wheezing, or rubs  HEART: Regular rate and rhythm; No murmurs, rubs, or gallops  ABDOMEN: Soft, Nontender, Nondistended; Bowel sounds present  EXTREMITIES:  2+ Peripheral Pulses, No clubbing, cyanosis, or edema  LYMPH: No lymphadenopathy noted  SKIN: No rashes or lesions  L elbow , pressure ulcer + , stage 2

## 2020-01-23 NOTE — BEHAVIORAL HEALTH ASSESSMENT NOTE - NSBHMEDSOTHERFT_PSY_A_CORE
Home Medications:  acetaminophen 500 mg oral tablet: 1 tab(s) orally 2 times a day, As Needed (14 Jan 2020 20:10)  albuterol 90 mcg/inh inhalation aerosol: 1 puff(s) inhaled 4 times a day (14 Jan 2020 20:10)  benztropine 1 mg oral tablet: 1 tab(s) orally 2 times a day (14 Jan 2020 20:10)  budesonide-formoterol 160 mcg-4.5 mcg/inh inhalation aerosol: 2 puff(s) inhaled 2 times a day (14 Jan 2020 20:10)  divalproex sodium 500 mg oral delayed release tablet: 2 tab(s) orally once a day (at bedtime) (14 Jan 2020 20:10)  Haldol 5 mg oral tablet: 1 tab(s) orally once in the morning, 1 tab orally once in the afternoon (14 Jan 2020 20:10)  haloperidol 10 mg oral tablet: 1 tab(s) orally once a day (at bedtime) (14 Jan 2020 20:10)  metoprolol succinate 25 mg oral tablet, extended release: 1 tab(s) orally once a day (14 Jan 2020 20:10)  ranitidine 150 mg oral capsule: 1 cap(s) orally once a day (14 Jan 2020 20:10)  Restasis 0.05% ophthalmic emulsion: 1 drop(s) to each affected eye every 12 hours (14 Jan 2020 20:10)  risperidone 4 mg oral tablet: 1 tab(s) orally once a day (14 Jan 2020 20:10)  ropinirole 0.5 mg oral tablet: 1 tab(s) orally 2 times a day (14 Jan 2020 20:10)

## 2020-01-23 NOTE — BEHAVIORAL HEALTH ASSESSMENT NOTE - NSBHCONSULTMEDS_PSY_A_CORE FT
MEDICATIONS  (STANDING):  divalproex DR 1000 milliGRAM(s) Oral two times a day  haloperidol     Tablet 5 milliGRAM(s) Oral two times a day  risperidone   Tablet 4 milliGRAM(s) Oral daily

## 2020-01-23 NOTE — BEHAVIORAL HEALTH ASSESSMENT NOTE - HPI (INCLUDE ILLNESS QUALITY, SEVERITY, DURATION, TIMING, CONTEXT, MODIFYING FACTORS, ASSOCIATED SIGNS AND SYMPTOMS)
Pt is a 70 yo M w/ PMH schizophrenia, Parkinson disease, COPD admitted to [facility] for acute exacerbation of COPD on 1/14/2020 with SOB and cough. Pt is showing signs of hypoactive delirium that is worsening. Pt is stated to be a poor historian. Pt is gradually deteriorating in his mental status and is hard to arouse by verbal stimulation, is lethargic and has unorganized speech/thoughts. Pt is aware he is at [facility] but is unable to state why or give details regarding his medical/psychiatric history. Pt is mumbling incoherently to himself and is unable to answer questions. Pt does not use O2 at home and is awaiting nursing home placement by sister, Brittany who is his HCP.   Spoke with sister Brittany @[phone]. She is aware of pt is incapable of making decisions regarding his medical treatment/discharge. Pt is aware that pt's mental status is gradually deteriorating and will need to take over as his HCP.    PAST MEDICAL & SURGICAL HISTORY:  Parkinson disease  Chronic obstructive pulmonary disease, unspecified COPD type  Schizophrenia, unspecified type  No significant past surgical history

## 2020-01-23 NOTE — BEHAVIORAL HEALTH ASSESSMENT NOTE - SUMMARY
Pt is unable to answer questions. He is lethargic and mumbling to himself incoherently. Pt does not show capacity to make decisions abut his medical care/discharge

## 2020-01-23 NOTE — BEHAVIORAL HEALTH ASSESSMENT NOTE - PAST PSYCHOTROPIC MEDICATION
MEDICATIONS  (STANDING):  divalproex DR 1000 milliGRAM(s) Oral two times a day  haloperidol     Tablet 5 milliGRAM(s) Oral two times a day  risperidone   Tablet 4 milliGRAM(s) Oral daily  rOPINIRole 0.5 milliGRAM(s) Oral two times a day

## 2020-01-23 NOTE — GOALS OF CARE CONVERSATION - ADVANCED CARE PLANNING - FUTURE HOSPITALIZATION/TRANSFER
Send to hospital, if necessary, based on MOLST orders
Send to hospital, if necessary, based on MOLST orders

## 2020-01-23 NOTE — BEHAVIORAL HEALTH ASSESSMENT NOTE - NSBHCONSULTRECOMMENDOTHER_PSY_A_CORE FT
furtehr reductions in psych meds maybe advised if this level of letahrgy continue  check ammonia and valproic acid levels further reductions in psych meds maybe advised if this level of lethargy continue  check  valproic acid levels

## 2020-01-23 NOTE — BEHAVIORAL HEALTH ASSESSMENT NOTE - RISK ASSESSMENT
Unable to determine Suicide Risk hx of schizophrenia, nursing home placement, chronic medical conditions

## 2020-01-23 NOTE — BEHAVIORAL HEALTH ASSESSMENT NOTE - OTHER
Sister- Brittany [phone] [facility] thin appearing Unable to assess- pt is lethargic and mumbling to himself incoherently Unable to assess- pt is laying in bed

## 2020-01-23 NOTE — CHART NOTE - NSCHARTNOTEFT_GEN_A_CORE
Source: Patient [ ]  Family [ ]   other [ x]    Current Diet: Mech soft    PO intake:  < 50% [ x]   50-75%  [ ]   %  [ ]  other :    Source for PO intake [ ] Patient [ ] family [ x] chart [ ] staff [ ] other    Enteral /Parenteral Nutrition:     Current Weight: 1/14 62.6kg, 1/14 59.6kg, 1/15 59 kg    % Weight Change     Pertinent Medications: MEDICATIONS  (STANDING):  albuterol/ipratropium for Nebulization 3 milliLiter(s) Nebulizer every 6 hours  albuterol/ipratropium for Nebulization. 3 milliLiter(s) Nebulizer once  benztropine 1 milliGRAM(s) Oral two times a day  chlorhexidine 2% Cloths 1 Application(s) Topical daily  dextrose 5% + sodium chloride 0.45% with potassium chloride 10 mEq/L 1000 milliLiter(s) (75 mL/Hr) IV Continuous <Continuous>  diVALproex DR 1000 milliGRAM(s) Oral two times a day  haloperidol     Tablet 5 milliGRAM(s) Oral two times a day  influenza   Vaccine 0.5 milliLiter(s) IntraMuscular once  lactulose Syrup 10 Gram(s) Oral daily  metoprolol tartrate 25 milliGRAM(s) Oral two times a day  nicotine - 21 mG/24Hr(s) Patch 1 patch Transdermal daily  pantoprazole    Tablet 40 milliGRAM(s) Oral before breakfast  piperacillin/tazobactam IVPB.. 3.375 Gram(s) IV Intermittent every 8 hours  polyethylene glycol 3350 17 Gram(s) Oral at bedtime  predniSONE   Tablet   Oral   predniSONE   Tablet 20 milliGRAM(s) Oral daily  risperiDONE   Tablet 4 milliGRAM(s) Oral daily  rOPINIRole 0.5 milliGRAM(s) Oral two times a day  saccharomyces boulardii 250 milliGRAM(s) Oral two times a day  senna 2 Tablet(s) Oral at bedtime    MEDICATIONS  (PRN):    Pertinent Labs:         Skin:     Nutrition focused physical exam conducted - found signs of malnutrition [ ]absent [x ]present    Subcutaneous fat loss: [x ] Orbital fat pads region, [ ]Buccal fat region, [ x]Triceps region,  [ ]Ribs region    Muscle wasting: [x ]Temples region, [x ]Clavicle region, [ x]Shoulder region, [ ]Scapula region, [ ]Interosseous region,  [ ]thigh region, [ ]Calf region    Estimated Needs:   [ x] no change since previous assessment  [ ] recalculated:     Current Nutrition Diagnosis: Malnutrition severe, chronic related to inability to meet sufficient protein-energy in setting of severe COPD, Parkinson's disease, acute on chronic hypercapnic respiratory failure, and decreased appetite as evidenced by likely meeting <75% nutrient needs >1 month and muscle/fat loss noted on NFPE. Pt continues with poor po intake.       Recommendations: Needs assist. Rec Ensure TID, Rec MVI, Vit C    Monitoring and Evaluation:   [x ] PO intake x] Tolerance to diet prescription [X] Weights  [X] Follow up per protocol [X] Labs:

## 2020-01-23 NOTE — PROGRESS NOTE ADULT - ATTENDING COMMENTS
COUNSELING:    Face to face meeting to discuss Advanced Care Planning - Time Spent ______ Minutes.  See goals of care note.    More than 50% time spent in counseling and coordinating care. _15_____ Minutes.     Thank you for the opportunity to assist with the care of this patient.   [facility] [phone]. COUNSELING:    Face to face meeting to discuss Advanced Care Planning - Time Spent 25______ Minutes.  See goals of care note.    More than 50% time spent in counseling and coordinating care. _15_____ Minutes.     Thank you for the opportunity to assist with the care of this patient.   [facility] Consult [facility] [phone].

## 2020-01-23 NOTE — BEHAVIORAL HEALTH ASSESSMENT NOTE - NSBHCHARTREVIEWVS_PSY_A_CORE FT
T(C): 36.4 (23 Jan 2020 08:00), Max: 36.6 (22 Jan 2020 15:17)  T(F): 97.5 (23 Jan 2020 08:00), Max: 97.8 (22 Jan 2020 15:17)  HR: 57 (23 Jan 2020 09:59) (56 - 92)  BP: 126/76 (23 Jan 2020 08:00) (108/76 - 149/92)  RR: 24 (23 Jan 2020 08:00) (17 - 24)  SpO2: 96% (23 Jan 2020 09:59) (94% - 96%)

## 2020-01-23 NOTE — PROGRESS NOTE ADULT - ASSESSMENT
Assessment and Plan:   · Assessment		  68 y/o male, PMHx severe COPD, Parkinson's disease, schizophrenia, active smoker, who presented to ED from group home with a chief complaint of shortness of breath for the past three days. Treated in MICU for AE COPD and BiPAP, he needed Precedex sedation. Weaned off BIPAP  transferred to floor on NC O2 and AVAPS nocturnal., Patient is non complaint with AVPAS. uses NC O2 daytime. He cannot return to group home at discharge: if he is still dependent on Oxygen.     Somnolence  improving-OOB today, AAOX 2   - ammonia 59 - 19   - lactulose trial started  - Haldol dose decreased   Aspiration Pneumonia- MBS planned- passed test now on soft diet with liquids    # AE COPD   s/p BiPAP- refusing to wear it   Oxygen  nebs  steroids to taper  empiric Antibiotics started:  Rocephin / Zithromax given x 4 days -possible LLLPNA,  CT chest noted , likely aspiration PNA    ABX changed to Zosyn add florastor  RVP neg  bld c/s negative  Swallow eval appreciated - passed for regular diet with liquids , unable to r/o silent aspiration ,   MBS recommended and will order       # Acute on chronic hypercapnic respiratory failure  s/p BiPAP  weaned off to NC O2  has been non compliant with nocturnal BiPAP  Pulmonology appreciated , signed off ,   as per Pulmonology :     Taper prednisone over 12 days  Complete ABX  Consider Home O2 if room air sats <88%  Consider performist or brovana as a home nebulized LABA,   Revefenacin at once a day nebulized LAMA and budesonide to guarantee delivery  Home nebulizer with albuterol PRN    # thrombocytopenia - plt - 80 ,    Hold  Lovenox ,follow CBC daily ,   may be due to Zosyn , if Platelets do not improve may  consider changing IV Antibiotics     # Schizophrenia  Cont all home Psych meds  Haldol decreased due to somnolence   Psychiatric Consult appreciated to reconfirm that he does not have capacity to make his own medical decisions    Left elbow pressure ulcer Stage 2-off load  Wound Consult:  Recommendations made for wound care    Goals of Care see [facility] note  Acute and active treatment will always continue.  She obssesses about "if not intubated he will die", and does not want "to let my brother die"

## 2020-01-23 NOTE — PROGRESS NOTE ADULT - SUBJECTIVE AND OBJECTIVE BOX
INTERVAL HPI/OVERNIGHT EVENTS: 68yo Male Patient PMH of Schizophrenia,  Advanced Stage COPD, active smoker, is confused speech difficult to understand. Is  non compliant with Nocturnal BIPAP remains lethargic sleeping most of the day. He was OOB to commode earlier and urinated. Still eating-now on puree diet   recommended by Speech and Swallow evaluation. Psychiatry consult has deemed him to have no capacity to make independent medical decisions due to underlying  psychiatric disorder.  He denies pain, no SOB denies any discomfort     69y old  Male who presents with a chief complaint of sob (21 Jan 2020 13:14)      Present Symptoms:     Dyspnea: 2 3   Nausea/Vomiting:  No  Anxiety:  No  Depression: No  Fatigue: Yes   Loss of appetite:  No    Pain: denies            Character-            Duration-            Effect-            Factors-            Frequency-            Location-            Severity-    Review of Systems: Reviewed                       Unable to obtain due to poor mentation   All others negative    MEDICATIONS  (STANDING):  albuterol/ipratropium for Nebulization 3 milliLiter(s) Nebulizer every 6 hours  albuterol/ipratropium for Nebulization. 3 milliLiter(s) Nebulizer once  benztropine 1 milliGRAM(s) Oral two times a day  chlorhexidine 2% Cloths 1 Application(s) Topical daily  dextrose 5% + sodium chloride 0.45% with potassium chloride 10 mEq/L 1000 milliLiter(s) (75 mL/Hr) IV Continuous <Continuous>  diVALproex DR 1000 milliGRAM(s) Oral two times a day  haloperidol     Tablet 5 milliGRAM(s) Oral two times a day  influenza   Vaccine 0.5 milliLiter(s) IntraMuscular once  lactulose Syrup 10 Gram(s) Oral daily  metoprolol tartrate 25 milliGRAM(s) Oral two times a day  nicotine - 21 mG/24Hr(s) Patch 1 patch Transdermal daily  pantoprazole    Tablet 40 milliGRAM(s) Oral before breakfast  piperacillin/tazobactam IVPB.. 3.375 Gram(s) IV Intermittent every 8 hours  polyethylene glycol 3350 17 Gram(s) Oral at bedtime  predniSONE   Tablet   Oral   predniSONE   Tablet 20 milliGRAM(s) Oral daily  risperiDONE   Tablet 4 milliGRAM(s) Oral daily  rOPINIRole 0.5 milliGRAM(s) Oral two times a day  saccharomyces boulardii 250 milliGRAM(s) Oral two times a day  senna 2 Tablet(s) Oral at bedtime    MEDICATIONS  (PRN):    PHYSICAL EXAM:    Vital Signs Last 24 Hrs  T(C): 36.4 (23 Jan 2020 08:00), Max: 36.6 (22 Jan 2020 15:17)  T(F): 97.5 (23 Jan 2020 08:00), Max: 97.8 (22 Jan 2020 15:17)  HR: 57 (23 Jan 2020 09:59) (56 - 92)  BP: 126/76 (23 Jan 2020 08:00) (108/76 - 149/92)  BP(mean): --  RR: 24 (23 Jan 2020 08:00) (17 - 24)  SpO2: 96% (23 Jan 2020 09:59) (94% - 96%)    General: arouseable oriented x __1__ lethargic                   cachexia speech unintelligible  HEENT:  dry mouth      Lungs:  tachypnea/labored breathing belly breathing    CV: normal      GI: distended           constipation  last BM:     : normal      MSK:  weakness              ambulatory to chair and commode    Skin: normal B/L Buttock dermatitis from incontinence____   rash    LABS:    I&O's Summary    22 Jan 2020 07:01  -  23 Jan 2020 07:00  --------------------------------------------------------  IN: 1385 mL / OUT: 0 mL / NET: 1385 mL    RADIOLOGY & ADDITIONAL STUDIES:  ADVANCE DIRECTIVES:   DNR NO  Completed on:                     MENA   NO   Completed on: Rescinded by his sister twice. BREONNAST Checklist #3 in process  Living Will   NO   Completed on: INTERVAL HPI/OVERNIGHT EVENTS: 70yo Male Patient PMH of parkinson's Disease,  Schizophrenia,  Advanced Stage COPD, active smoker, is confused speech difficult to understand.  He Is non compliant with Nocturnal BIPAP, remains lethargic sleeping most of the day. He was OOB to commode earlier and urinated. Still eating-now on puree diet   recommended by Speech and Swallow evaluation. Psychiatry consult has deemed him to be without capacity to make independent medical decisions due to underlying  psychiatric disorder. He denies pain, CP, coughing,  SOB dysuria, constipation or  any discomfort   69y old  Male who presents with a chief complaint of sob (21 Jan 2020 13:14)     PAST MEDICAL & SURGICAL HISTORY:  Parkinson disease  Chronic obstructive pulmonary disease, unspecified COPD type  Schizophrenia, unspecified type  No significant past surgical history    Present Symptoms:     Dyspnea: 2 "Belly breathing" using Nasal O2   Nausea/Vomiting:  No  Anxiety:  No  Depression: No  Fatigue: Yes   Loss of appetite:  No    Pain: denies            Character-            Duration-            Effect-            Factors-            Frequency-            Location-            Severity-    Review of Systems: Reviewed                       Unable to obtain due to poor mentation   All others negative    MEDICATIONS  (STANDING):  albuterol/ipratropium for Nebulization 3 milliLiter(s) Nebulizer every 6 hours  albuterol/ipratropium for Nebulization. 3 milliLiter(s) Nebulizer once  benztropine 1 milliGRAM(s) Oral two times a day  chlorhexidine 2% Cloths 1 Application(s) Topical daily  dextrose 5% + sodium chloride 0.45% with potassium chloride 10 mEq/L 1000 milliLiter(s) (75 mL/Hr) IV Continuous <Continuous>  diVALproex DR 1000 milliGRAM(s) Oral two times a day  haloperidol     Tablet 5 milliGRAM(s) Oral two times a day  influenza   Vaccine 0.5 milliLiter(s) IntraMuscular once  lactulose Syrup 10 Gram(s) Oral daily  metoprolol tartrate 25 milliGRAM(s) Oral two times a day  nicotine - 21 mG/24Hr(s) Patch 1 patch Transdermal daily  pantoprazole    Tablet 40 milliGRAM(s) Oral before breakfast  piperacillin/tazobactam IVPB.. 3.375 Gram(s) IV Intermittent every 8 hours  polyethylene glycol 3350 17 Gram(s) Oral at bedtime  predniSONE   Tablet   Oral   predniSONE   Tablet 20 milliGRAM(s) Oral daily  risperiDONE   Tablet 4 milliGRAM(s) Oral daily  rOPINIRole 0.5 milliGRAM(s) Oral two times a day  saccharomyces boulardii 250 milliGRAM(s) Oral two times a day  senna 2 Tablet(s) Oral at bedtime    MEDICATIONS  (PRN):    PHYSICAL EXAM:    Vital Signs Last 24 Hrs  T(C): 36.4 (23 Jan 2020 08:00), Max: 36.6 (22 Jan 2020 15:17)  T(F): 97.5 (23 Jan 2020 08:00), Max: 97.8 (22 Jan 2020 15:17)  HR: 57 (23 Jan 2020 09:59) (56 - 92)  BP: 126/76 (23 Jan 2020 08:00) (108/76 - 149/92)  BP(mean): --  RR: 24 (23 Jan 2020 08:00) (17 - 24)  SpO2: 96% (23 Jan 2020 09:59) (94% - 96%)    General: arouseable oriented x __1__ lethargic                   cachexia speech unintelligible  HEENT:  dry mouth      Lungs:  tachypnea/labored breathing belly breathing    CV: normal      GI: distended           constipation  last BM:     : normal      MSK:  weakness              ambulatory to chair and commode    Skin: normal B/L Buttock dermatitis from incontinence____   rash    LABS:    I&O's Summary    22 Jan 2020 07:01  -  23 Jan 2020 07:00  --------------------------------------------------------  IN: 1385 mL / OUT: 0 mL / NET: 1385 mL    RADIOLOGY & ADDITIONAL STUDIES:  ADVANCE DIRECTIVES:   DNR NO  Completed on:                     MOLST   NO   Completed on: Rescinded by his sister twice. MOLST Checklist #3 in process  Living Will   NO   Completed on:

## 2020-01-24 LAB
ALBUMIN SERPL ELPH-MCNC: 2.5 G/DL — LOW (ref 3.3–5.2)
ALP SERPL-CCNC: 71 U/L — SIGNIFICANT CHANGE UP (ref 40–120)
ALT FLD-CCNC: 32 U/L — SIGNIFICANT CHANGE UP
ANION GAP SERPL CALC-SCNC: 5 MMOL/L — SIGNIFICANT CHANGE UP (ref 5–17)
AST SERPL-CCNC: 43 U/L — HIGH
BILIRUB SERPL-MCNC: 0.6 MG/DL — SIGNIFICANT CHANGE UP (ref 0.4–2)
BUN SERPL-MCNC: 7 MG/DL — LOW (ref 8–20)
CALCIUM SERPL-MCNC: 8 MG/DL — LOW (ref 8.6–10.2)
CHLORIDE SERPL-SCNC: 96 MMOL/L — LOW (ref 98–107)
CO2 SERPL-SCNC: 36 MMOL/L — HIGH (ref 22–29)
CREAT SERPL-MCNC: 0.56 MG/DL — SIGNIFICANT CHANGE UP (ref 0.5–1.3)
GLUCOSE SERPL-MCNC: 89 MG/DL — SIGNIFICANT CHANGE UP (ref 70–99)
HCT VFR BLD CALC: 34.4 % — LOW (ref 39–50)
HGB BLD-MCNC: 10.8 G/DL — LOW (ref 13–17)
MAGNESIUM SERPL-MCNC: 1.9 MG/DL — SIGNIFICANT CHANGE UP (ref 1.6–2.6)
MCHC RBC-ENTMCNC: 30.9 PG — SIGNIFICANT CHANGE UP (ref 27–34)
MCHC RBC-ENTMCNC: 31.4 GM/DL — LOW (ref 32–36)
MCV RBC AUTO: 98.6 FL — SIGNIFICANT CHANGE UP (ref 80–100)
PLATELET # BLD AUTO: 97 K/UL — LOW (ref 150–400)
POTASSIUM SERPL-MCNC: 3.6 MMOL/L — SIGNIFICANT CHANGE UP (ref 3.5–5.3)
POTASSIUM SERPL-SCNC: 3.6 MMOL/L — SIGNIFICANT CHANGE UP (ref 3.5–5.3)
PROT SERPL-MCNC: 5.4 G/DL — LOW (ref 6.6–8.7)
RBC # BLD: 3.49 M/UL — LOW (ref 4.2–5.8)
RBC # FLD: 14.4 % — SIGNIFICANT CHANGE UP (ref 10.3–14.5)
SODIUM SERPL-SCNC: 137 MMOL/L — SIGNIFICANT CHANGE UP (ref 135–145)
WBC # BLD: 4.67 K/UL — SIGNIFICANT CHANGE UP (ref 3.8–10.5)
WBC # FLD AUTO: 4.67 K/UL — SIGNIFICANT CHANGE UP (ref 3.8–10.5)

## 2020-01-24 PROCEDURE — 99497 ADVNCD CARE PLAN 30 MIN: CPT | Mod: 25

## 2020-01-24 PROCEDURE — 99232 SBSQ HOSP IP/OBS MODERATE 35: CPT

## 2020-01-24 RX ORDER — NYSTATIN/TRIAMCINOLONE ACET
1 OINTMENT (GRAM) TOPICAL
Refills: 0 | Status: DISCONTINUED | OUTPATIENT
Start: 2020-01-24 | End: 2020-01-29

## 2020-01-24 RX ORDER — COLLAGENASE CLOSTRIDIUM HIST. 250 UNIT/G
1 OINTMENT (GRAM) TOPICAL DAILY
Refills: 0 | Status: DISCONTINUED | OUTPATIENT
Start: 2020-01-24 | End: 2020-01-29

## 2020-01-24 RX ADMIN — Medication 1 APPLICATION(S): at 18:17

## 2020-01-24 RX ADMIN — Medication 1 PATCH: at 07:18

## 2020-01-24 RX ADMIN — Medication 3 MILLILITER(S): at 21:16

## 2020-01-24 RX ADMIN — LACTULOSE 10 GRAM(S): 10 SOLUTION ORAL at 12:19

## 2020-01-24 RX ADMIN — PIPERACILLIN AND TAZOBACTAM 25 GRAM(S): 4; .5 INJECTION, POWDER, LYOPHILIZED, FOR SOLUTION INTRAVENOUS at 05:36

## 2020-01-24 RX ADMIN — Medication 3 MILLILITER(S): at 15:55

## 2020-01-24 RX ADMIN — CHLORHEXIDINE GLUCONATE 1 APPLICATION(S): 213 SOLUTION TOPICAL at 12:20

## 2020-01-24 RX ADMIN — ROPINIROLE 0.5 MILLIGRAM(S): 8 TABLET, FILM COATED, EXTENDED RELEASE ORAL at 05:37

## 2020-01-24 RX ADMIN — POLYETHYLENE GLYCOL 3350 17 GRAM(S): 17 POWDER, FOR SOLUTION ORAL at 21:59

## 2020-01-24 RX ADMIN — PANTOPRAZOLE SODIUM 40 MILLIGRAM(S): 20 TABLET, DELAYED RELEASE ORAL at 05:38

## 2020-01-24 RX ADMIN — Medication 25 MILLIGRAM(S): at 05:37

## 2020-01-24 RX ADMIN — Medication 1 APPLICATION(S): at 13:29

## 2020-01-24 RX ADMIN — RISPERIDONE 4 MILLIGRAM(S): 4 TABLET ORAL at 12:19

## 2020-01-24 RX ADMIN — Medication 1 TABLET(S): at 13:29

## 2020-01-24 RX ADMIN — DIVALPROEX SODIUM 1000 MILLIGRAM(S): 500 TABLET, DELAYED RELEASE ORAL at 05:37

## 2020-01-24 RX ADMIN — Medication 1 PATCH: at 12:20

## 2020-01-24 RX ADMIN — Medication 3 MILLILITER(S): at 03:18

## 2020-01-24 RX ADMIN — Medication 1 MILLIGRAM(S): at 05:37

## 2020-01-24 RX ADMIN — Medication 250 MILLIGRAM(S): at 05:37

## 2020-01-24 RX ADMIN — Medication 250 MILLIGRAM(S): at 17:52

## 2020-01-24 RX ADMIN — ROPINIROLE 0.5 MILLIGRAM(S): 8 TABLET, FILM COATED, EXTENDED RELEASE ORAL at 17:52

## 2020-01-24 RX ADMIN — Medication 1 MILLIGRAM(S): at 17:53

## 2020-01-24 RX ADMIN — HALOPERIDOL DECANOATE 5 MILLIGRAM(S): 100 INJECTION INTRAMUSCULAR at 05:37

## 2020-01-24 RX ADMIN — DEXTROSE MONOHYDRATE, SODIUM CHLORIDE, AND POTASSIUM CHLORIDE 75 MILLILITER(S): 50; .745; 4.5 INJECTION, SOLUTION INTRAVENOUS at 12:17

## 2020-01-24 RX ADMIN — Medication 20 MILLIGRAM(S): at 05:37

## 2020-01-24 RX ADMIN — Medication 25 MILLIGRAM(S): at 17:53

## 2020-01-24 RX ADMIN — DIVALPROEX SODIUM 1000 MILLIGRAM(S): 500 TABLET, DELAYED RELEASE ORAL at 17:53

## 2020-01-24 RX ADMIN — HALOPERIDOL DECANOATE 5 MILLIGRAM(S): 100 INJECTION INTRAMUSCULAR at 17:53

## 2020-01-24 RX ADMIN — SENNA PLUS 2 TABLET(S): 8.6 TABLET ORAL at 21:59

## 2020-01-24 RX ADMIN — PIPERACILLIN AND TAZOBACTAM 25 GRAM(S): 4; .5 INJECTION, POWDER, LYOPHILIZED, FOR SOLUTION INTRAVENOUS at 13:30

## 2020-01-24 RX ADMIN — Medication 1 PATCH: at 12:18

## 2020-01-24 RX ADMIN — Medication 1 PATCH: at 20:20

## 2020-01-24 NOTE — GOALS OF CARE CONVERSATION - ADVANCED CARE PLANNING - DOES PATIENT HAVE ADVANCE DIRECTIVE
No/Rescinded this admission
Rescinded this admission/No
Yes
No
No/Rescinded this admission
Rescinded this admission/No

## 2020-01-24 NOTE — PROGRESS NOTE ADULT - ASSESSMENT
68 y/o male, PMHx severe COPD, Parkinson's disease, schizophrenia, active smoker, who presented to ED from group home with a chief complaint of shortness of breath for the past three days. Treated in MICU for AE COPD and BiPAP, he needed precedex sedation. off bipap d/g to floor on NC O2 and AVAPS nocturnal , but pt non complaint with AVPAS. uses NC O2 daytime. cannot go back to [facility] home with oxygen.     # somnolence , improving  - ammonia 59 - 19   - lactulose trial   - Haldol dose decreased     # AE COPD   s/p BiPAP- refusing to wear it   Oxygen  nebs  steroids to taper  on zosyn, last day tomorrow  CT chest noted , likely aspiration PNA   RVP neg  bld c/s negative  Swallow eval appreciated - passed for regular diet with liquids , unable to r/o silent aspiration ,   MS 1/22--> mechanical soft, thin liquids      # Acute on chronic hypercapnic respiratory failure  s/p BiPAP  weaned off to NC O2  has been non compliant with nocturnal BiPAP  Pulmonology appreciated , signed off ,   as per Pulmonology :   PO prednisone at 60 mg  Taper prednisone over 12 days  Complete ABX  ConsiderHome O2 if room air sats <88%  Consider performist or brovana as a home nebulized LABA,   Revefenacin at once a day nebulized LAMA and budesonide to guarantee delivery  Home nebulizer with albuterol PRN    # thrombocytopenia - plt - 80 , will hold on Lovenox ,follow CBC in am ,   may be due to Zosyn , if Plt not better will consider to change iv abx     # Schizophrenia  Cont all home Psych meds  Haldol decreased due to somnolence     # Parkinson disease  Cont home meds    # Active smoker  Nicotine patch offered    #  Aspiration PNA - for MBS to r/o silent aspiration   # L elbow pressure ulcer stage 2 - keep off load       GoC- previous D/W sister who is HCP- she initially rescinded DNR/ DNI as she saw pt in acute states and panicked believing that he will not be treated. Now explained that DNR/ DNI only applies to end of life situation. It does not mean do not treat. Acute and active treatment will always continue. She did not seem to understand very well. She questioned if not intubated he will die. She wants to discuss with her brother who lives out of town  Above noted .    On 1/19  Dr Hennessy ( [facility] ) and ana spoke to patient's sister Cindy - after long conversation , she  does agree with DNR/ DNI - order placed in the chart .     As per Palliative care conversation on 1/20  :    Patient himself does not want to be intubated or be put on life support; sister Cindy was aware of his decision, although he is  not deemed to have capacity to make complex medical decisions.   See [facility] note which states that group home is under [facility] Health Dept and will need  to complete the MOLST #3 checklist and secure legal approval before setting up his directives.  Palliative aware of issue . So patient at present is Full CODE .     1/22: psych reported patient is unable to make medical decision. sister HCP. has  palliative on board 68 y/o male, PMHx severe COPD, Parkinson's disease, schizophrenia, active smoker, who presented to ED from group home with a chief complaint of shortness of breath for the past three days. Treated in MICU for AE COPD and BiPAP, he needed precedex sedation. off bipap d/g to floor on NC O2 and AVAPS nocturnal , but pt non complaint with AVPAS. uses NC O2 daytime. cannot go back to [facility] home with oxygen.     # AE COPD   steroids PO taper, Taper prednisone over 12 days  completed zosyn today,  Nebs  Consider performist or brovana as a home nebulized LABA,   Revefenacin at once a day nebulized LAMA and budesonide to guarantee delivery  Home nebulizer with albuterol PRN    #  Aspiration PNA    CT chest noted , likely aspiration PNA   RVP neg  Zosyn complete  bld c/s negative  Swallow eval appreciated - passed for regular diet with liquids , unable to r/o silent aspiration ,   MS 1/22--> mechanical soft, thin liquids    # Acute on chronic hypercapnic respiratory failure  s/p BiPAP  weaned off to NC O2  has been non compliant with nocturnal BiPAP  Pulmonology appreciated , signed off ,   Consider Home O2 if room air sats <88%    # thrombocytopenia -   -plt  trending up, 97 today.  - will cont to hold on Lovenox, may be due to Zosyn   - CBC in AM    #anemia  - Hgb/Hct trending down  - repeat CBC in AM    # rash - buttocks, chest, appears candidal  - will try nystatin/triamcinolone cream    # Schizophrenia  Cont all home Psych meds  Haldol decreased due to somnolence     # Parkinson disease  Cont home meds    # Active smoker  Nicotine patch offered    # L elbow pressure ulcer stage 2 - keep off load   - santyl QD      GoC- previous D/W sister who is HCP- she initially rescinded DNR/ DNI as she saw pt in acute states and panicked believing that he will not be treated. Now explained that DNR/ DNI only applies to end of life situation. It does not mean do not treat. Acute and active treatment will always continue. She did not seem to understand very well. She questioned if not intubated he will die. She wants to discuss with her brother who lives out of town  Above noted .    On 1/19  Dr Hennessy ( Pulm ) and ana spoke to patient's sister Cindy - after long conversation , she  does agree with DNR/ DNI - order placed in the chart .     As per Palliative care conversation on 1/20  :    Patient himself does not want to be intubated or be put on life support; sister Cindy was aware of his decision, although he is  not deemed to have capacity to make complex medical decisions.   See Rach [facility] note which states that group home is under [facility] Mental Health Dept and will need  to complete the MOLST #3 checklist and secure legal approval before setting up his directives.  Palliative aware of issue . So patient at present is Full CODE .     1/22: psych reported patient is unable to make medical decision. sister HCP. has  palliative on board 70 y/o male, PMHx severe COPD, Parkinson's disease, schizophrenia, active smoker, who presented to ED from group home with a chief complaint of shortness of breath for the past three days. Treated in MICU for AE COPD and BiPAP, he needed precedex sedation. off bipap d/g to floor on NC O2 and AVAPS nocturnal , but pt non complaint with AVPAS. uses NC O2 daytime. cannot go back to [facility] home with oxygen.     # AE COPD   steroids PO taper, Taper prednisone over 12 days  completed zosyn today,  Nebs  Consider performist or brovana as a home nebulized LABA,   Revefenacin at once a day nebulized LAMA and budesonide to guarantee delivery  Home nebulizer with albuterol PRN    #  Aspiration PNA    CT chest noted , likely aspiration PNA   RVP neg  Zosyn complete  bld c/s negative  Swallow eval appreciated - passed for regular diet with liquids , unable to r/o silent aspiration ,   MS 1/22--> mechanical soft, thin liquids    # Acute on chronic hypercapnic respiratory failure  s/p BiPAP  weaned off to NC O2  has been non compliant with nocturnal BiPAP  Pulmonology appreciated , signed off ,   Consider Home O2 if room air sats <88%    # thrombocytopenia -   -plt  trending up, 97 today.  - will cont to hold on Lovenox, may be due to Zosyn   - CBC in AM    #anemia  - Hgb/Hct trending down  - repeat CBC in AM    # rash - buttocks, chest, appears candidal  - will try nystatin/triamcinolone cream    # Schizophrenia  Cont all home Psych meds  Haldol decreased due to somnolence     # Parkinson disease  Cont home meds    # Active smoker  Nicotine patch offered    # L elbow pressure ulcer stage 2 - keep off load   - santyl QD      GoC- previous D/W sister who is HCP- she initially rescinded DNR/ DNI as she saw pt in acute states and panicked believing that he will not be treated. Now explained that DNR/ DNI only applies to end of life situation. It does not mean do not treat. Acute and active treatment will always continue. She did not seem to understand very well. She questioned if not intubated he will die. She wants to discuss with her brother who lives out of town  Above noted .    On 1/19  Dr Hennessy ( Pulm ) and ana spoke to patient's sister Cindy - after long conversation , she  does agree with DNR/ DNI - order placed in the chart .     As per Palliative care conversation on 1/20  :    Patient himself does not want to be intubated or be put on life support; sister Cindy was aware of his decision, although he is  not deemed to have capacity to make complex medical decisions.   See [facility]ach [facility] note which states that group home is under [facility] Mental Health Dept and will need  to complete the MOLST #3 checklist and secure legal approval before setting up his directives.  Palliative aware of issue . So patient at present is Full CODE .     1/22: psych reported patient is unable to make medical decision. sister HCP. has  palliative on board    1/24: DNR/DNI .  and sister kortney

## 2020-01-24 NOTE — PROGRESS NOTE ADULT - ATTENDING COMMENTS
COUNSELING:    Face to face meeting to discuss Advanced Care Planning - Time Spent __60____ Minutes.  See goals of care note.    More than 50% time spent in counseling and coordinating care. _20_____ Minutes.     Thank you for the opportunity to assist with the care of this patient.   [facility] [phone].

## 2020-01-24 NOTE — PROGRESS NOTE ADULT - ATTENDING COMMENTS
seen and examined at bedside. denied complaints. did not answer all qs. confused. SaO2 dropped to 87% on RA at rest. skin rash obn buttock and unstagable decubitus on elbow. wound care as per wound care team. skin rash looks like fungal dermatitis. mycolog added. chanbge positin Q2hrs. OOB with assistance. mobility as tolerated with assisst. rest as per PA note.

## 2020-01-24 NOTE — GOALS OF CARE CONVERSATION - ADVANCED CARE PLANNING - CONVERSATION DETAILS
HARDEEP  spoke with Office of Mental Hygiene  -Emmanuel Garnett to review patients case. Since patient lives in residential group home overseen by Office of Mental Health then if surrogate proceeds with wanting advance directives we must initiate MOLST checklist 3.
HARDEEP spoke with  Daisy jiang Office [facility] Legal Services who made on site visit earlier today and spoke with patients sister jim who was vacillating on directives decision.  willing to call sister Jim back after palliative spoke with hr again to clarify her questions. SW present during phone call with sister jim and palliative NP Kacey Romano . Questions regarding CPR addressed and sister agreeable to DNR/DNI fir end of life decisions. SW notified  who reached out to sister again. HARDEEP received call back form  who advised writer that [facility] has no objection to directives for patient as request of surrogate Jim patients sister. official letter of no objection to be sent to writer. SW attached legal letter and MOLST checklist to MOLST form for patients medical record.
MOLST checklist 3 has been initiated for surrogate decisions for advance directives. SW awaiting psychiatric eval . SW contacted Emmanuel Garnett   at [employer] Legal Services who will review clinical and follow up with sister and hospital visit before decision finalized.
see my note from today, spoke with sister, patient now dnr/dni/no bipap
SW and palliative NP Kacey Romano coordinating information to establish goals of care for patient. SW aware patients sister had initially completed MOLST in ICU but has been rescinded at this point. SW aware patient lives at [facility] [phone]. SW spoke with Lona at [facility] who confirmed that patients sister Cindy is only family member  involved in patients care. patient has resided for last 8 years at Group residence but previously had been institutionalized at [facility]. patient with dx of schizophrenia. Patient has treating psychiatrist and  Loki Castano in community. SW contacted Office of Mental [employer] Legal Services to ensure planning for advance directives followed proper MOLST checklist if required. SW awaiting further discussion with  Emmanuel Garnett. Palliative following.
I received a call from Patient's brother Kareem now in [place], who was following up on his sister Cindy's request to  call me and try to understand what is going on with Demetrius critical condition and to helping her to make  a decision about DNR to protect her very ill brother. She was very upset last night.    After we talked he understood why it is important to try and help Cindy to understand end of life wishes, their importance especially in her brother's case. I have told both brother and sister that he will be nearly impossible to extubate/wean off vent  if intubation is in his future.. He is becoming more hypercarbic and sleepy.each day.    I then spent at least 45 min at patient's bedside with Cindy and her , first discussing his poor prognosis, addiction to cigarette  smoking, and relaince on oxygen. This dependence will lead to domicile displacement. Group home cannot accommodate Patient's  on oxygen.   Patient himself does not want to be intubated or be put on life support; sister Cindy was aware of his decision, although he is  not deemed to have capacity to make complex medical decsiions.    He remains a full code at this time. Cindy bringing home MOLST Directive to discuss with her brother again in more detail.  See Rach Garcia [facility] note which states that group home is under [facility] Dept and will need  to complete the MOLST #3 checklist and secure legal apporval before setting up his directives
This is the third conversation I have had with Mr. Mancini sister who is acting as his HCP.    She has rescinded MOLST Directive Twice since his admission to [facility]. For the most part, her rationale for rescinding was  she did not want to give up on him and let him die. She has become very anxious during these discussions and tearful.    Today she and her  were at his bedside. He sleeping soundly.   I wanted to prepare her for a call from the Office of Mental Hygiene specifically:  Emmanuel Garnett who is involved in advocating for her brother's end of life wishes, and establishment of MOLST Directive.   Once  MOLST Checklist #3 is complete, he will contact her to discuss her understanding of his wishes.  Patient's HCP will be respected as a very important and knowledgeable advocate on his behalf.    We reviewed together a MOLST Document she had been working on at home. I explained each point again and am satisfied  that she finally understands that DNR does not mean DO NOT TREAT!    She would want to ensure a Natural Death without CPR or Intubation and Mechanical Ventilation. Her  reminded her that her brother August was asked for a decision about his wishes, which he confirmed he would not want to be resusitated,  and be kept alive on a machine.
I had a conversation this morning with Yuri Ramon for [facility] providing past medical history and hospital course of Patient August Mancini who has Advanced COPD/Emphysema and is in need of her help in establishing  MOLST Directive DNR/DNI. She went to his bedside earlier today spoke with him briefly. He again confirmed he does not want to be resusitated or kept alive on machines when his heart of breathing should stop. Lawyer Welch then called his sister gustavo White  and was not convinced she was in agreement to establish this DNR Directive. I called Cindy and we went over her questions, I reassured her her brother is not going to be Euthanized, just protected from an unwanted resusitation at the time of his death.    She is trying to deal with feelings of guilt about making this decision, I reminded her he already made this decision for her; just be decisive when speaking to  who has only his interests in  mind.    Cindy and Lawyer Welch had a second conversation, there was a decisive decision supported by her brother Kareem  to establish MOLST Directive at this time.    MOLST will be established today/
Upon initially meeting patient's sister Brittany, she asked if patient was on a respirator (he was on BiPAP).  She adamantly stated she did not want him on a respirator, and confided that her mother  on a respirator and she did not want that for him.  We planned to optimize him noninvasively on BiPAP, and should natural death occur, we will not intervene and allow him to pass in peace.  A MOLST was signed and placed in chart
Rach Garcia our SW helped me track down, Patient's sister Cindy who is only relative in [place].  She is there for him in acute emmergencies and hospitalizations.    She described what had happened, when the MOLST DNR.DNI Directive was Rescinded.  RN came to her and tried to reconfirm her wishes to allow natural death and refuse CPR.  Her brother was in critical condition with tubes everywhere, and I paniced thinking that  they were going to stop aggressively treating and allow him to die.  So she rescinded the DNR asking for acute aggressive care for her brother.    I tried to explain why we use MOLST Directives; traveling form to protect abad in Community from unwanted resusitation efforts at time of death.  She became confused and more anxious not understanding my intentions in speaking about it, or my reassurance that'  this directive does not mean "DO NOT TREAT" He will remain a full code at this time  She will give her brother living out of state my contact number and ask him to call me for further clarification.    She does understand that if her brother needs to be on continuous O2 upon discharged, he cannot return to   [facility] home to live.

## 2020-01-24 NOTE — GOALS OF CARE CONVERSATION - ADVANCED CARE PLANNING - CONVERSATION/DISCUSSION
Palliative Care Referral
Diagnosis/MOLST Discussed/Prognosis/Treatment Options
Diagnosis/Prognosis/MOLST Discussed/Treatment Options
MOLST Discussed/Treatment Options/Diagnosis/Prognosis
MOLST Discussed/Treatment Options/Prognosis/Diagnosis
Diagnosis/MOLST Discussed/Prognosis/Treatment Options

## 2020-01-24 NOTE — PROGRESS NOTE ADULT - SUBJECTIVE AND OBJECTIVE BOX
Patient is a 69y old  Male who presents with a chief complaint of COPD Exacerbation  Acute on Chronic Respiratory Failure (23 Jan 2020 13:58)    interval: clinically improving slowly. confused. on NC oxygen    MEDICATIONS  (STANDING):  albuterol/ipratropium for Nebulization 3 milliLiter(s) Nebulizer every 6 hours  albuterol/ipratropium for Nebulization. 3 milliLiter(s) Nebulizer once  benztropine 1 milliGRAM(s) Oral two times a day  chlorhexidine 2% Cloths 1 Application(s) Topical daily  dextrose 5% + sodium chloride 0.45% with potassium chloride 10 mEq/L 1000 milliLiter(s) (75 mL/Hr) IV Continuous <Continuous>  diVALproex DR 1000 milliGRAM(s) Oral two times a day  haloperidol     Tablet 5 milliGRAM(s) Oral two times a day  influenza   Vaccine 0.5 milliLiter(s) IntraMuscular once  lactulose Syrup 10 Gram(s) Oral daily  metoprolol tartrate 25 milliGRAM(s) Oral two times a day  nicotine - 21 mG/24Hr(s) Patch 1 patch Transdermal daily  pantoprazole    Tablet 40 milliGRAM(s) Oral before breakfast  piperacillin/tazobactam IVPB.. 3.375 Gram(s) IV Intermittent every 8 hours  polyethylene glycol 3350 17 Gram(s) Oral at bedtime  predniSONE   Tablet   Oral   predniSONE   Tablet 20 milliGRAM(s) Oral daily  risperiDONE   Tablet 4 milliGRAM(s) Oral daily  rOPINIRole 0.5 milliGRAM(s) Oral two times a day  saccharomyces boulardii 250 milliGRAM(s) Oral two times a day  senna 2 Tablet(s) Oral at bedtime    MEDICATIONS  (PRN):      MEDICATIONS  (PRN):    Vital Signs Last 24 Hrs  T(C): 36.9 (23 Jan 2020 15:24), Max: 36.9 (23 Jan 2020 15:24)  T(F): 98.4 (23 Jan 2020 15:24), Max: 98.4 (23 Jan 2020 15:24)  HR: 83 (23 Jan 2020 15:24) (56 - 83)  BP: 118/74 (23 Jan 2020 15:24) (108/76 - 149/92)  BP(mean): --  RR: 17 (23 Jan 2020 15:24) (17 - 24)  SpO2: 95% (23 Jan 2020 16:00) (90% - 96%)    GENERAL: Not in distress. Alert    HEENT:  Normocephalic and atraumatic. PEARLA,EOMI  NECK: Supple.  No JVD.    CARDIOVASCULAR: RRR S1, S2. No murmur/rubs/gallop  LUNGS: BLAE+, no rales, no wheezing, no rhonchi.    ABDOMEN: ND. Soft,  NT, no guarding / rebound / rigidity. BS normoactive. No CVA tenderness.    EXTREMITIES: no cyanosis, no clubbing, no edema. no leg or calf TP  SKIN: no rash.   NEUROLOGIC: confused. moves limbs    PSYCHIATRIC: Calm.  No agitation.        LABS:    no lab      RADIOLOGY & ADDITIONAL TESTS:  < from: CT Chest No Cont (01.19.20 @ 16:07) >     EXAM:  CT CHEST                          PROCEDURE DATE:  01/19/2020          INTERPRETATION:  CLINICAL INFORMATION: COPD. Shortness of breath. Rule out aspiration pneumonia    COMPARISON: 12/3/2019    PROCEDURE:   CT of the Chest was performed without intravenous contrast.  Sagittal and coronal reformats were performed.      FINDINGS:    LUNGS AND AIRWAYS: Patent central airways.  There is emphysema. There is a small area of infiltrate in the posterior segment of the right upper lobe and at both lung bases consistent with infiltrates likely due to aspiration    PLEURA: Small bilateral pleural effusions.    MEDIASTINUM AND SAMEERA: No lymphadenopathy. The esophagus is patulous and air filled. This may predispose to aspiration    VESSELS: Atherosclerotic calcification of the aorta and coronary arteries is appreciated    HEART: Heart size is normal. No pericardial effusion.    CHEST WALL AND LOWER NECK: Within normal limits.    VISUALIZED UPPER ABDOMEN: There is a moderate-sized hiatal hernia with partially intrathoracic stomach in the left chest    BONES: Degenerative changes of the spine are noted. There are old right rib fractures. There is also old healed anterior left sixth rib fracture. Compression fracture is seen in the mid thoracic spine similar to the prior study. There is heterogeneity of the inferior sternum which is new    IMPRESSION:     Bibasilar infiltrates with small infiltrate in the posterior segment of the right upper lobe likely due to aspiration. Please note that the esophagus is patulous which likely predisposes to aspiration  Small bilateral pleural effusions  Moderate size hiatal hernia  Emphysema  Large hiatal hernia    CHANTELL MATHEW M.D.,ATTENDING RADIOLOGIST  This document has been electronically signed. Jan 19 2020  5:16PM    < end of copied text >        REVIEW OF SYSTEMS:    UTO , patient confused , hard to understand     Vital Signs Last 24 Hrs  T(C): 36.7 (21 Jan 2020 08:01), Max: 36.7 (20 Jan 2020 15:54)  T(F): 98 (21 Jan 2020 08:01), Max: 98 (20 Jan 2020 15:54)  HR: 86 (21 Jan 2020 08:01) (52 - 110)  BP: 131/75 (21 Jan 2020 08:01) (106/72 - 137/71)  BP(mean): --  RR: 19 (20 Jan 2020 15:54) (19 - 19)  SpO2: 91% (21 Jan 2020 08:01) (91% - 99%)    PHYSICAL EXAM:    GENERAL: NAD, well-groomed, well-developed  HEAD:  Atraumatic, Normocephalic  EYES: EOMI, PERRLA, conjunctiva and sclera clear  NECK: Supple, No JVD, Normal thyroid  NERVOUS SYSTEM:  Alert & Oriented X 2, confused   CHEST/LUNG: decreased breath sounds b/l ,   no  rales, rhonchi, wheezing, or rubs  HEART: Regular rate and rhythm; No murmurs, rubs, or gallops  ABDOMEN: Soft, Nontender, Nondistended; Bowel sounds present  EXTREMITIES:  2+ Peripheral Pulses, No clubbing, cyanosis, or edema  LYMPH: No lymphadenopathy noted  SKIN: No rashes or lesions  L elbow , pressure ulcer + , stage 2 Patient is a 69y old  Male who presents with a chief complaint of COPD Exacerbation  Acute on Chronic Respiratory Failure (23 Jan 2020 13:58)    interval: clinically improving slowly. confused. on NC oxygen. No overnight events. Denies pain, dyspnea, N/V but poor historian.    PHYSICAL EXAM:    Vital Signs Last 24 Hrs  T(C): 36.5 (24 Jan 2020 07:59), Max: 36.6 (23 Jan 2020 23:00)  T(F): 97.7 (24 Jan 2020 07:59), Max: 97.8 (23 Jan 2020 23:00)  HR: 63 (24 Jan 2020 07:59) (63 - 78)  BP: 122/81 (24 Jan 2020 07:59) (122/81 - 144/89)  BP(mean): --  RR: 18 (24 Jan 2020 07:59) (18 - 20)  SpO2: 94% (24 Jan 2020 09:08) (90% - 98%)    GENERAL: Not in distress. Alert    HEENT:  Normocephalic and atraumatic. PEARLA, EOMI  NECK: Supple.  No JVD.    CARDIOVASCULAR: RRR S1, S2. No murmur/rubs/gallop  LUNGS: BLAE+, no rales, no wheezing, no rhonchi.    ABDOMEN: ND. Soft,  NT, no guarding / rebound / rigidity. BS normoactive. No CVA tenderness.    EXTREMITIES: no cyanosis, no clubbing, no edema. no leg or calf TP  SKIN: erythematous maculopapular rash with serpentigenous border on buttocks, lower back, perineum, upper thighs. similar smaller patch on right anterior chest over medial clavicle.  NEUROLOGIC: confused. moves limbs  x4  PSYCHIATRIC: Calm.  No agitation.    MEDICATIONS  (STANDING):  albuterol/ipratropium for Nebulization 3 milliLiter(s) Nebulizer every 6 hours  albuterol/ipratropium for Nebulization. 3 milliLiter(s) Nebulizer once  benztropine 1 milliGRAM(s) Oral two times a day  calcium carbonate 1250 mG  + Vitamin D (OsCal 500 + D) 1 Tablet(s) Oral daily  chlorhexidine 2% Cloths 1 Application(s) Topical daily  collagenase Ointment 1 Application(s) Topical daily  dextrose 5% + sodium chloride 0.45% with potassium chloride 10 mEq/L 1000 milliLiter(s) (75 mL/Hr) IV Continuous <Continuous>  diVALproex DR 1000 milliGRAM(s) Oral two times a day  haloperidol     Tablet 5 milliGRAM(s) Oral two times a day  influenza   Vaccine 0.5 milliLiter(s) IntraMuscular once  lactulose Syrup 10 Gram(s) Oral daily  metoprolol tartrate 25 milliGRAM(s) Oral two times a day  nicotine - 21 mG/24Hr(s) Patch 1 patch Transdermal daily  nystatin/triamcinolone Cream 1 Application(s) Topical two times a day  pantoprazole    Tablet 40 milliGRAM(s) Oral before breakfast  polyethylene glycol 3350 17 Gram(s) Oral at bedtime  predniSONE   Tablet   Oral   risperiDONE   Tablet 4 milliGRAM(s) Oral daily  rOPINIRole 0.5 milliGRAM(s) Oral two times a day  saccharomyces boulardii 250 milliGRAM(s) Oral two times a day  senna 2 Tablet(s) Oral at bedtime    MEDICATIONS  (PRN):    LABS:                        10.8   4.67  )-----------( 97       ( 24 Jan 2020 07:13 )             34.4     01-24    137  |  96<L>  |  7.0<L>  ----------------------------<  89  3.6   |  36.0<H>  |  0.56    Ca    8.0<L>      24 Jan 2020 07:13  Mg     1.9     01-24    TPro  5.4<L>  /  Alb  2.5<L>  /  TBili  0.6  /  DBili  x   /  AST  43<H>  /  ALT  32  /  AlkPhos  71  01-24

## 2020-01-24 NOTE — GOALS OF CARE CONVERSATION - ADVANCED CARE PLANNING - WHAT MATTERS MOST
That he be treated acutely, assistance OOB/ eating, toileting providing whatever time he has left comfort and  quality.
That his acute medical condition be treated aggressively. Family wants him to recover-quit smoking and have more quality years ahead
That he be treated acutely, that he not suffer
That he be treated acutely- Do not want conservative measures at this time-including Intubation

## 2020-01-24 NOTE — GOALS OF CARE CONVERSATION - ADVANCED CARE PLANNING - TREATMENT GUIDELINES
Antibiotic trial/Artificial nutrition trial/IV fluid trial
DNI/DNR Order
DNR Order
IV fluid trial/Intubation trial/Antibiotic trial

## 2020-01-24 NOTE — GOALS OF CARE CONVERSATION - ADVANCED CARE PLANNING - TREATMENT GUIDELINE COMMENT
Psychiatry was consulted to ascertain patient's capacity to make complicated medical decsions on his own.  He was deemed to be without capacity rely on his HCP with final decision
see above
Continue acute treatments and medications as needed
See above

## 2020-01-24 NOTE — PROGRESS NOTE ADULT - SUBJECTIVE AND OBJECTIVE BOX
INTERVAL HPI/OVERNIGHT EVENTS: 70 yo Male Patient is a       · Subjective and Objective: 	  Patient is a 69y old  Male who presents with a chief complaint of COPD Exacerbation  Acute on Chronic Respiratory Failure (23 Jan 2020 13:58)    Interval: clinically improving slowly. Sleeping most of the time, arouseable and confused.He is able to feed himself and get OOB to commode with assistance.  He continues to wear Nasal O2 continuously. Pulse Oximetry WNL.  No cough, fever CP, palpitations N/V/D constipation or musculoskeletal pain.    PAST MEDICAL & SURGICAL HISTORY:  Parkinson disease  Chronic obstructive pulmonary disease, unspecified COPD type  Schizophrenia, unspecified type  No significant past surgical history    Present Symptoms:     Dyspnea: on exertion O2 dependent  Nausea/Vomiting:  No  Anxiety:   No  Depression: No  Fatigue: Yes   Loss of appetite: Yes     Pain: denies            Character-            Duration-            Effect-            Factors-            Frequency-            Location-            Severity-    Review of Systems: Reviewed                    All others negative    MEDICATIONS  (STANDING):  albuterol/ipratropium for Nebulization 3 milliLiter(s) Nebulizer every 6 hours  albuterol/ipratropium for Nebulization. 3 milliLiter(s) Nebulizer once  benztropine 1 milliGRAM(s) Oral two times a day  calcium carbonate 1250 mG  + Vitamin D (OsCal 500 + D) 1 Tablet(s) Oral daily  chlorhexidine 2% Cloths 1 Application(s) Topical daily  collagenase Ointment 1 Application(s) Topical daily  dextrose 5% + sodium chloride 0.45% with potassium chloride 10 mEq/L 1000 milliLiter(s) (75 mL/Hr) IV Continuous <Continuous>  diVALproex DR 1000 milliGRAM(s) Oral two times a day  haloperidol     Tablet 5 milliGRAM(s) Oral two times a day  influenza   Vaccine 0.5 milliLiter(s) IntraMuscular once  lactulose Syrup 10 Gram(s) Oral daily  metoprolol tartrate 25 milliGRAM(s) Oral two times a day  nicotine - 21 mG/24Hr(s) Patch 1 patch Transdermal daily  nystatin/triamcinolone Cream 1 Application(s) Topical two times a day  pantoprazole    Tablet 40 milliGRAM(s) Oral before breakfast  polyethylene glycol 3350 17 Gram(s) Oral at bedtime  predniSONE   Tablet   Oral   risperiDONE   Tablet 4 milliGRAM(s) Oral daily  rOPINIRole 0.5 milliGRAM(s) Oral two times a day  saccharomyces boulardii 250 milliGRAM(s) Oral two times a day  senna 2 Tablet(s) Oral at bedtime    MEDICATIONS  (PRN):      PHYSICAL EXAM:    Vital Signs Last 24 Hrs  T(C): 36.5 (24 Jan 2020 07:59), Max: 36.9 (23 Jan 2020 15:24)  T(F): 97.7 (24 Jan 2020 07:59), Max: 98.4 (23 Jan 2020 15:24)  HR: 63 (24 Jan 2020 07:59) (61 - 83)  BP: 122/81 (24 Jan 2020 07:59) (118/74 - 144/89)  BP(mean): --  RR: 18 (24 Jan 2020 07:59) (17 - 20)  SpO2: 94% (24 Jan 2020 09:08) (90% - 98%)    General: alert  oriented x __1-2__ lethargic calm                 thin elderly      HEENT: dry mouth      Lungs: comfortable    CV: normal      GI: normal               constipation  last BM:     : nfoley    MSK: weakness              OOB w assistance to chair and commode otherwise in bed     Skin: normal  __  no rash    LABS:                        10.8   4.67  )-----------( 97       ( 24 Jan 2020 07:13 )             34.4     01-24    137  |  96<L>  |  7.0<L>  ----------------------------<  89  3.6   |  36.0<H>  |  0.56    Ca    8.0<L>      24 Jan 2020 07:13  Mg     1.9     01-24    TPro  5.4<L>  /  Alb  2.5<L>  /  TBili  0.6  /  DBili  x   /  AST  43<H>  /  ALT  32  /  AlkPhos  71  01-24    I&O's Summary    23 Jan 2020 07:01  -  24 Jan 2020 07:00  --------------------------------------------------------  IN: 1925 mL / OUT: 0 mL / NET: 1925 mL    24 Jan 2020 07:01 - 24 Jan 2020 15:00  --------------------------------------------------------  IN: 620 mL / OUT: 0 mL / NET: 620 mL    RADIOLOGY & ADDITIONAL STUDIES:    ADVANCE DIRECTIVES:   DNR YES   Completed on:          1/24/2020           MOLST  YES    Completed on:   1/24/2020  Living Will  NO   Completed on:

## 2020-01-24 NOTE — GOALS OF CARE CONVERSATION - ADVANCED CARE PLANNING - FAMILY/RELATIVE
Cindy Mancini (sister)  Kareem Mancini Brother-  via telephone another sister from [place] and her niece

## 2020-01-24 NOTE — PROGRESS NOTE ADULT - ASSESSMENT
Assessment and Plan:   Assessment and Plan:   · Assessment		  70 y/o male, PMHx severe COPD, Parkinson's disease, schizophrenia, active smoker, who presented to ED from group home with a chief complaint of shortness of breath for the past three days. Treated in MICU for AE COPD and BiPAP, he needed Precedex sedation. Weaned off BIPAP  transferred to floor on NC O2 and AVAPS nocturnal., Patient is non complaint with AVPAS. uses NC O2 daytime. He cannot return to group home at discharge: if he is still dependent on Oxygen.     Somnolence  improving-OOB today, AAOX 2   - ammonia 59 - 19   - lactulose trial started  - Haldol dose decreased   Aspiration Pneumonia- MBS planned- passed test now on soft diet with liquids    # AE COPD   s/p BiPAP- refusing to wear it   Oxygen  nebs  steroids to taper  empiric Antibiotics started:  Rocephin / Zithromax given x 5 days -possible LLLPNA,  CT chest noted , likely aspiration PNA    ABX changed to Zosyn add florastor last dose tomorrow  RVP neg  bld c/s negative  Swallow eval appreciated - passed for soft diet with liquids , unable to r/o silent aspiration ,         # Acute on chronic hypercapnic respiratory failure  s/p BiPAP  weaned off to NC O2  has been non compliant with nocturnal BiPAP  Pulmonology appreciated , signed off ,   as per Pulmonology :     Taper prednisone over 12 days  Complete ABX  Consider Home O2 if room air sats <88%    Home nebulizer with albuterol PRN    # thrombocytopenia - plt - 80 ,    Hold  Lovenox ,follow CBC daily ,   may be due to Zosyn , if Platelets do not improve may  consider changing IV Antibiotics     # Schizophrenia  Cont all home Psych meds  Haldol decreased due to somnolence   Psychiatric Consult appreciated to reconfirm that he does not have capacity to make his own medical decisions    Left elbow pressure ulcer Stage 2-off load  Wound Consult:  Recommendations made for wound care    Goals of Care see separate GOC note  Supported by MENA Fonseca will be established today for Patient

## 2020-01-25 PROCEDURE — 99232 SBSQ HOSP IP/OBS MODERATE 35: CPT

## 2020-01-25 RX ORDER — LORATADINE 10 MG/1
10 TABLET ORAL DAILY
Refills: 0 | Status: DISCONTINUED | OUTPATIENT
Start: 2020-01-25 | End: 2020-01-29

## 2020-01-25 RX ADMIN — Medication 1 MILLIGRAM(S): at 18:02

## 2020-01-25 RX ADMIN — Medication 1 APPLICATION(S): at 12:06

## 2020-01-25 RX ADMIN — Medication 3 MILLILITER(S): at 08:22

## 2020-01-25 RX ADMIN — Medication 1 TABLET(S): at 12:06

## 2020-01-25 RX ADMIN — HALOPERIDOL DECANOATE 5 MILLIGRAM(S): 100 INJECTION INTRAMUSCULAR at 05:49

## 2020-01-25 RX ADMIN — Medication 1 PATCH: at 12:58

## 2020-01-25 RX ADMIN — Medication 10 MILLIGRAM(S): at 05:49

## 2020-01-25 RX ADMIN — Medication 1 APPLICATION(S): at 05:49

## 2020-01-25 RX ADMIN — Medication 250 MILLIGRAM(S): at 18:02

## 2020-01-25 RX ADMIN — Medication 1 PATCH: at 12:07

## 2020-01-25 RX ADMIN — DIVALPROEX SODIUM 1000 MILLIGRAM(S): 500 TABLET, DELAYED RELEASE ORAL at 18:02

## 2020-01-25 RX ADMIN — Medication 1 MILLIGRAM(S): at 05:49

## 2020-01-25 RX ADMIN — DIVALPROEX SODIUM 1000 MILLIGRAM(S): 500 TABLET, DELAYED RELEASE ORAL at 05:49

## 2020-01-25 RX ADMIN — ROPINIROLE 0.5 MILLIGRAM(S): 8 TABLET, FILM COATED, EXTENDED RELEASE ORAL at 18:02

## 2020-01-25 RX ADMIN — Medication 1 PATCH: at 07:58

## 2020-01-25 RX ADMIN — POLYETHYLENE GLYCOL 3350 17 GRAM(S): 17 POWDER, FOR SOLUTION ORAL at 22:14

## 2020-01-25 RX ADMIN — Medication 25 MILLIGRAM(S): at 18:02

## 2020-01-25 RX ADMIN — PANTOPRAZOLE SODIUM 40 MILLIGRAM(S): 20 TABLET, DELAYED RELEASE ORAL at 05:49

## 2020-01-25 RX ADMIN — Medication 250 MILLIGRAM(S): at 05:49

## 2020-01-25 RX ADMIN — LACTULOSE 10 GRAM(S): 10 SOLUTION ORAL at 12:06

## 2020-01-25 RX ADMIN — DEXTROSE MONOHYDRATE, SODIUM CHLORIDE, AND POTASSIUM CHLORIDE 75 MILLILITER(S): 50; .745; 4.5 INJECTION, SOLUTION INTRAVENOUS at 04:20

## 2020-01-25 RX ADMIN — Medication 3 MILLILITER(S): at 03:14

## 2020-01-25 RX ADMIN — HALOPERIDOL DECANOATE 5 MILLIGRAM(S): 100 INJECTION INTRAMUSCULAR at 18:02

## 2020-01-25 RX ADMIN — ROPINIROLE 0.5 MILLIGRAM(S): 8 TABLET, FILM COATED, EXTENDED RELEASE ORAL at 05:49

## 2020-01-25 RX ADMIN — SENNA PLUS 2 TABLET(S): 8.6 TABLET ORAL at 22:14

## 2020-01-25 RX ADMIN — Medication 25 MILLIGRAM(S): at 05:49

## 2020-01-25 RX ADMIN — Medication 3 MILLILITER(S): at 20:14

## 2020-01-25 RX ADMIN — Medication 1 PATCH: at 19:00

## 2020-01-25 RX ADMIN — CHLORHEXIDINE GLUCONATE 1 APPLICATION(S): 213 SOLUTION TOPICAL at 12:06

## 2020-01-25 RX ADMIN — Medication 1 APPLICATION(S): at 18:02

## 2020-01-25 RX ADMIN — Medication 3 MILLILITER(S): at 15:53

## 2020-01-25 RX ADMIN — RISPERIDONE 4 MILLIGRAM(S): 4 TABLET ORAL at 12:06

## 2020-01-25 NOTE — PROGRESS NOTE ADULT - SUBJECTIVE AND OBJECTIVE BOX
Patient is a 69y old  Male who presents with a chief complaint of COPD Exacerbation  Acute on Chronic Respiratory Failure (23 Jan 2020 13:58)    interval: clinically improving slowly. confused. on NC oxygen. extensive skin rash over back , groin, upper chest, left axilla. spreading as per RN. patient denied any complaints,     ROS:  unable to obtain due to mumbling and incoherent words.     MEDICATIONS  (STANDING):  albuterol/ipratropium for Nebulization 3 milliLiter(s) Nebulizer every 6 hours  albuterol/ipratropium for Nebulization. 3 milliLiter(s) Nebulizer once  benztropine 1 milliGRAM(s) Oral two times a day  calcium carbonate 1250 mG  + Vitamin D (OsCal 500 + D) 1 Tablet(s) Oral daily  chlorhexidine 2% Cloths 1 Application(s) Topical daily  collagenase Ointment 1 Application(s) Topical daily  diVALproex DR 1000 milliGRAM(s) Oral two times a day  haloperidol     Tablet 5 milliGRAM(s) Oral two times a day  influenza   Vaccine 0.5 milliLiter(s) IntraMuscular once  lactulose Syrup 10 Gram(s) Oral daily  loratadine 10 milliGRAM(s) Oral daily  metoprolol tartrate 25 milliGRAM(s) Oral two times a day  nicotine - 21 mG/24Hr(s) Patch 1 patch Transdermal daily  nystatin/triamcinolone Cream 1 Application(s) Topical two times a day  pantoprazole    Tablet 40 milliGRAM(s) Oral before breakfast  polyethylene glycol 3350 17 Gram(s) Oral at bedtime  predniSONE   Tablet   Oral   predniSONE   Tablet 10 milliGRAM(s) Oral daily  risperiDONE   Tablet 4 milliGRAM(s) Oral daily  rOPINIRole 0.5 milliGRAM(s) Oral two times a day  saccharomyces boulardii 250 milliGRAM(s) Oral two times a day  senna 2 Tablet(s) Oral at bedtime    MEDICATIONS  (PRN):    Vital Signs Last 24 Hrs  T(C): 36.4 (25 Jan 2020 08:08), Max: 36.8 (24 Jan 2020 19:57)  T(F): 97.5 (25 Jan 2020 08:08), Max: 98.3 (24 Jan 2020 19:57)  HR: 68 (25 Jan 2020 08:24) (58 - 86)  BP: 144/96 (25 Jan 2020 08:08) (134/86 - 148/87)  BP(mean): --  RR: 18 (25 Jan 2020 08:08) (18 - 18)  SpO2: 96% (25 Jan 2020 08:24) (91% - 97%)    CAPILLARY BLOOD GLUCOSE      I&O's Detail    24 Jan 2020 07:01  -  25 Jan 2020 07:00  --------------------------------------------------------  IN:    Oral Fluid: 620 mL  Total IN: 620 mL    OUT:  Total OUT: 0 mL    Total NET: 620 mL      GENERAL: Not in distress. Alert    HEENT:  Normocephalic and atraumatic.  NECK: Supple.  No JVD.    CARDIOVASCULAR: RRR S1, S2. No murmur/rubs/gallop  LUNGS: BLAE+, no rales, no wheezing, no rhonchi.    ABDOMEN: ND. Soft,  NT, no guarding / rebound / rigidity. BS normoactive. No CVA tenderness.    EXTREMITIES: no cyanosis, no clubbing, no edema. no leg or calf TP  SKIN: extensive erythematous rash with irregular raised border and excoriation, over lower back, groin, proximal thigh, left axilla. upper body rash has more regular border with central clearing  NEUROLOGIC: confused. moves limbs  . LUE spasticity  PSYCHIATRIC: Calm.  No agitation.        LABS:                          10.8   4.67  )-----------( 97       ( 24 Jan 2020 07:13 )             34.4       01-24    137  |  96<L>  |  7.0<L>  ----------------------------<  89  3.6   |  36.0<H>  |  0.56    Ca    8.0<L>      24 Jan 2020 07:13  Mg     1.9     01-24    TPro  5.4<L>  /  Alb  2.5<L>  /  TBili  0.6  /  DBili  x   /  AST  43<H>  /  ALT  32  /  AlkPhos  71  01-24        RADIOLOGY & ADDITIONAL TESTS:  < from: CT Chest No Cont (01.19.20 @ 16:07) >     EXAM:  CT CHEST                          PROCEDURE DATE:  01/19/2020          INTERPRETATION:  CLINICAL INFORMATION: COPD. Shortness of breath. Rule out aspiration pneumonia    COMPARISON: 12/3/2019    PROCEDURE:   CT of the Chest was performed without intravenous contrast.  Sagittal and coronal reformats were performed.      FINDINGS:    LUNGS AND AIRWAYS: Patent central airways.  There is emphysema. There is a small area of infiltrate in the posterior segment of the right upper lobe and at both lung bases consistent with infiltrates likely due to aspiration    PLEURA: Small bilateral pleural effusions.    MEDIASTINUM AND SAMEERA: No lymphadenopathy. The esophagus is patulous and air filled. This may predispose to aspiration    VESSELS: Atherosclerotic calcification of the aorta and coronary arteries is appreciated    HEART: Heart size is normal. No pericardial effusion.    CHEST WALL AND LOWER NECK: Within normal limits.    VISUALIZED UPPER ABDOMEN: There is a moderate-sized hiatal hernia with partially intrathoracic stomach in the left chest    BONES: Degenerative changes of the spine are noted. There are old right rib fractures. There is also old healed anterior left sixth rib fracture. Compression fracture is seen in the mid thoracic spine similar to the prior study. There is heterogeneity of the inferior sternum which is new    IMPRESSION:     Bibasilar infiltrates with small infiltrate in the posterior segment of the right upper lobe likely due to aspiration. Please note that the esophagus is patulous which likely predisposes to aspiration  Small bilateral pleural effusions  Moderate size hiatal hernia  Emphysema  Large hiatal hernia    CHANTELL MATHEW M.D.,ATTENDING RADIOLOGIST  This document has been electronically signed. Jan 19 2020  5:16PM    < end of copied text >

## 2020-01-25 NOTE — PROGRESS NOTE ADULT - ASSESSMENT
68 y/o male, PMHx severe COPD, Parkinson's disease, schizophrenia, active smoker, who presented to ED from group home with a chief complaint of shortness of breath for the past three days. Treated in MICU for AE COPD and BiPAP, he needed precedex sedation. off bipap d/g to floor on [place] O2 and AVAPS nocturnal , but pt non complaint with AVPAS. uses [place] O2 daytime. cannot go back to [facility] home with oxygen.     # somnolence/delirium/confusion/dementia  - more alert today  - h/o schizophrenia. on multiple anti-psych meds. from [facility]  - ammonia 59 - 19   - lactulos   - Haldol dose decreased   - seen by psych. not able to make medical decision or discharge decision. may need to reduce dose of his psych meds if patient remains lethargic and somnolent.   - sister is aware of worsening mental status  - completed zosyn for PNA today    # acute on chronic resp failure with hypoxia and hypercarnia  AE COPD   Sat 87% on Ra at rest  s/p BiPAP- refusing to wear it   Oxygen  nebs  steroids to taper slowly  on zosyn, last day today  CT chest noted , likely aspiration PNA   RVP neg  bld c/s negative  as per Pulmonology :   PO prednisone at 60 mg  Taper prednisone over 12 days  Complete ABX  ConsiderHome O2 if room air sats <88%  Consider performist or brovana as a home nebulized LABA,   Revefenacin at once a day nebulized LAMA and budesonide to guarantee delivery  Home nebulizer with albuterol PRN  Swallow eval appreciated - passed for regular diet with liquids , unable to r/o silent aspiration ,  MS 1/22--> mechanical soft, thin liquids      # thrombocytopenia - plt - 80 , will hold on Lovenox ,follow CBC in am ,   may be due to Zosyn , completed dose today.      # Parkinson disease  Cont home meds    # Active smoker  Nicotine patch offered    # L elbow pressure ulcer stage 2 - keep off load     # Skin rash: trial of mycolog cream. loratadine. change frequently. occasional stool and urinary incontinence. be side comode. keep the area dry. on prednisone for COPD. derm eval if not improving    GOC: palliative SW spoke with  Daisy form [employer] and sister Cindy [ HCP}. DNR/DNI. GOC ongoing by palliative team    dispo: needs SNF. may need oxygen at home. cannot go back to [facility] with O2. PT eval for safe DC.

## 2020-01-26 LAB
AMMONIA BLD-MCNC: 41 UMOL/L — SIGNIFICANT CHANGE UP (ref 11–55)
ANION GAP SERPL CALC-SCNC: 7 MMOL/L — SIGNIFICANT CHANGE UP (ref 5–17)
BUN SERPL-MCNC: 7 MG/DL — LOW (ref 8–20)
CALCIUM SERPL-MCNC: 8.7 MG/DL — SIGNIFICANT CHANGE UP (ref 8.6–10.2)
CHLORIDE SERPL-SCNC: 92 MMOL/L — LOW (ref 98–107)
CO2 SERPL-SCNC: 35 MMOL/L — HIGH (ref 22–29)
CREAT SERPL-MCNC: 0.5 MG/DL — SIGNIFICANT CHANGE UP (ref 0.5–1.3)
GLUCOSE SERPL-MCNC: 75 MG/DL — SIGNIFICANT CHANGE UP (ref 70–99)
HCT VFR BLD CALC: 35.4 % — LOW (ref 39–50)
HGB BLD-MCNC: 11.1 G/DL — LOW (ref 13–17)
MCHC RBC-ENTMCNC: 30.3 PG — SIGNIFICANT CHANGE UP (ref 27–34)
MCHC RBC-ENTMCNC: 31.4 GM/DL — LOW (ref 32–36)
MCV RBC AUTO: 96.7 FL — SIGNIFICANT CHANGE UP (ref 80–100)
PLATELET # BLD AUTO: 142 K/UL — LOW (ref 150–400)
POTASSIUM SERPL-MCNC: 4.1 MMOL/L — SIGNIFICANT CHANGE UP (ref 3.5–5.3)
POTASSIUM SERPL-SCNC: 4.1 MMOL/L — SIGNIFICANT CHANGE UP (ref 3.5–5.3)
RBC # BLD: 3.66 M/UL — LOW (ref 4.2–5.8)
RBC # FLD: 14.3 % — SIGNIFICANT CHANGE UP (ref 10.3–14.5)
SODIUM SERPL-SCNC: 134 MMOL/L — LOW (ref 135–145)
WBC # BLD: 5.79 K/UL — SIGNIFICANT CHANGE UP (ref 3.8–10.5)
WBC # FLD AUTO: 5.79 K/UL — SIGNIFICANT CHANGE UP (ref 3.8–10.5)

## 2020-01-26 PROCEDURE — 99232 SBSQ HOSP IP/OBS MODERATE 35: CPT

## 2020-01-26 RX ADMIN — Medication 250 MILLIGRAM(S): at 06:14

## 2020-01-26 RX ADMIN — Medication 25 MILLIGRAM(S): at 16:38

## 2020-01-26 RX ADMIN — Medication 1 MILLIGRAM(S): at 16:37

## 2020-01-26 RX ADMIN — Medication 3 MILLILITER(S): at 03:36

## 2020-01-26 RX ADMIN — RISPERIDONE 4 MILLIGRAM(S): 4 TABLET ORAL at 09:05

## 2020-01-26 RX ADMIN — POLYETHYLENE GLYCOL 3350 17 GRAM(S): 17 POWDER, FOR SOLUTION ORAL at 21:51

## 2020-01-26 RX ADMIN — Medication 25 MILLIGRAM(S): at 06:13

## 2020-01-26 RX ADMIN — Medication 1 TABLET(S): at 09:05

## 2020-01-26 RX ADMIN — Medication 1 APPLICATION(S): at 06:14

## 2020-01-26 RX ADMIN — Medication 1 PATCH: at 09:05

## 2020-01-26 RX ADMIN — Medication 250 MILLIGRAM(S): at 16:39

## 2020-01-26 RX ADMIN — SENNA PLUS 2 TABLET(S): 8.6 TABLET ORAL at 21:51

## 2020-01-26 RX ADMIN — LORATADINE 10 MILLIGRAM(S): 10 TABLET ORAL at 09:05

## 2020-01-26 RX ADMIN — ROPINIROLE 0.5 MILLIGRAM(S): 8 TABLET, FILM COATED, EXTENDED RELEASE ORAL at 16:38

## 2020-01-26 RX ADMIN — HALOPERIDOL DECANOATE 5 MILLIGRAM(S): 100 INJECTION INTRAMUSCULAR at 16:38

## 2020-01-26 RX ADMIN — Medication 1 PATCH: at 19:00

## 2020-01-26 RX ADMIN — Medication 3 MILLILITER(S): at 20:36

## 2020-01-26 RX ADMIN — Medication 1 PATCH: at 07:00

## 2020-01-26 RX ADMIN — Medication 1 APPLICATION(S): at 16:38

## 2020-01-26 RX ADMIN — PANTOPRAZOLE SODIUM 40 MILLIGRAM(S): 20 TABLET, DELAYED RELEASE ORAL at 06:14

## 2020-01-26 RX ADMIN — Medication 10 MILLIGRAM(S): at 06:14

## 2020-01-26 RX ADMIN — HALOPERIDOL DECANOATE 5 MILLIGRAM(S): 100 INJECTION INTRAMUSCULAR at 06:13

## 2020-01-26 RX ADMIN — Medication 1 MILLIGRAM(S): at 06:13

## 2020-01-26 RX ADMIN — LACTULOSE 10 GRAM(S): 10 SOLUTION ORAL at 16:36

## 2020-01-26 RX ADMIN — Medication 1 PATCH: at 09:06

## 2020-01-26 RX ADMIN — ROPINIROLE 0.5 MILLIGRAM(S): 8 TABLET, FILM COATED, EXTENDED RELEASE ORAL at 06:14

## 2020-01-26 RX ADMIN — DIVALPROEX SODIUM 1000 MILLIGRAM(S): 500 TABLET, DELAYED RELEASE ORAL at 06:13

## 2020-01-26 RX ADMIN — Medication 1 APPLICATION(S): at 09:05

## 2020-01-26 RX ADMIN — CHLORHEXIDINE GLUCONATE 1 APPLICATION(S): 213 SOLUTION TOPICAL at 09:01

## 2020-01-26 RX ADMIN — DIVALPROEX SODIUM 1000 MILLIGRAM(S): 500 TABLET, DELAYED RELEASE ORAL at 16:37

## 2020-01-26 NOTE — PROGRESS NOTE ADULT - ASSESSMENT
68 y/o male, PMHx severe COPD, Parkinson's disease, schizophrenia, active smoker, who presented to ED from group home with a chief complaint of shortness of breath for the past three days. Treated in MICU for AE COPD and BiPAP, he needed precedex sedation. off bipap d/g to floor on [place] O2 and AVAPS nocturnal , but pt non complaint with AVAPS. uses [place] O2 daytime. cannot go back to [facility] home with oxygen.     # somnolence/delirium/confusion/dementia  - more alert today  - h/o schizophrenia. on multiple anti-psych meds. from [facility]  - ammonia 59 - 19   - lactulose  - cont haldol  - seen by psych. not able to make medical decision or discharge decision. may need to reduce dose of his psych meds if patient remains lethargic and somnolent.   - sister is aware of worsening mental status  - completed zosyn for PNA 1/25    # acute on chronic resp failure with hypoxia and hypercarnia  AE COPD   Sat 87% on Ra at rest  s/p BiPAP- refusing to wear it   Oxygen  nebs  completed steroid taper  CT chest noted , likely aspiration PNA   RVP neg  blood c/s negative  as per Pulmonology :   ConsiderHome O2 if room air sats <88%  Consider performist or brovana as a home nebulized LABA,   Revefenacin at once a day nebulized LAMA and budesonide to guarantee delivery  Home nebulizer with albuterol PRN  Swallow eval appreciated - passed for regular diet with liquids , unable to r/o silent aspiration ,  MS 1/22--> mechanical soft, thin liquids      # thrombocytopenia -  improving, will cont to trend  may be due to Zosyn , completed course 1/25     # Parkinson disease  Cont home meds    # Active smoker  Nicotine patch offered    # L elbow pressure ulcer stage 2 - keep off load     # Skin rash: trial of mycolog cream. loratadine. change frequently. occasional stool and urinary incontinence. bedside commode. keep the area dry. derm eval if not improving    GOC: palliative SW spoke with shankar jiang [employer] and sister Cindy [ HCP}. DNR/DNI. GOC ongoing by palliative team    dispo: needs SNF. may need oxygen at home. cannot go back to [facility] with O2. PT eval for safe DC.

## 2020-01-26 NOTE — PROGRESS NOTE ADULT - SUBJECTIVE AND OBJECTIVE BOX
DAVY HOLDEN    [id number]    69y      Male    CC: SOB    INTERVAL HPI/OVERNIGHT EVENTS: Pt seen and examined. On NC. Resting comfortably in bed.     REVIEW OF SYSTEMS:  unable to obtain due to mental status    Vital Signs Last 24 Hrs  T(C): 36.7 (26 Jan 2020 07:55), Max: 36.8 (25 Jan 2020 22:10)  T(F): 98 (26 Jan 2020 07:55), Max: 98.3 (25 Jan 2020 22:10)  HR: 61 (26 Jan 2020 07:55) (61 - 86)  BP: 143/80 (26 Jan 2020 07:55) (122/83 - 145/88)  BP(mean): --  RR: 19 (26 Jan 2020 07:55) (17 - 19)  SpO2: 96% (26 Jan 2020 06:12) (96% - 97%)    PHYSICAL EXAM:    GENERAL: NAD  HEENT: PERRL  NECK: soft, supple  CHEST/LUNG: Clear to auscultation bilaterally; No wheezing, rhonchi; nonlabored breathing on NC  HEART: S1S2+, Regular rate and rhythm; No murmurs, rubs, or gallops  ABDOMEN: Soft, Nontender, Nondistended; Bowel sounds present  SKIN: warm, dry  NEURO: confused; moving extremities   PSYCH: calm at time of exam    LABS:                        11.1   5.79  )-----------( 142      ( 26 Jan 2020 06:51 )             35.4     01-26    134<L>  |  92<L>  |  7.0<L>  ----------------------------<  75  4.1   |  35.0<H>  |  0.50    Ca    8.7      26 Jan 2020 06:51      MEDICATIONS  (STANDING):  albuterol/ipratropium for Nebulization 3 milliLiter(s) Nebulizer every 6 hours  albuterol/ipratropium for Nebulization. 3 milliLiter(s) Nebulizer once  benztropine 1 milliGRAM(s) Oral two times a day  calcium carbonate 1250 mG  + Vitamin D (OsCal 500 + D) 1 Tablet(s) Oral daily  chlorhexidine 2% Cloths 1 Application(s) Topical daily  collagenase Ointment 1 Application(s) Topical daily  diVALproex DR 1000 milliGRAM(s) Oral two times a day  haloperidol     Tablet 5 milliGRAM(s) Oral two times a day  influenza   Vaccine 0.5 milliLiter(s) IntraMuscular once  lactulose Syrup 10 Gram(s) Oral daily  loratadine 10 milliGRAM(s) Oral daily  metoprolol tartrate 25 milliGRAM(s) Oral two times a day  nicotine - 21 mG/24Hr(s) Patch 1 patch Transdermal daily  nystatin/triamcinolone Cream 1 Application(s) Topical two times a day  pantoprazole    Tablet 40 milliGRAM(s) Oral before breakfast  polyethylene glycol 3350 17 Gram(s) Oral at bedtime  risperiDONE   Tablet 4 milliGRAM(s) Oral daily  rOPINIRole 0.5 milliGRAM(s) Oral two times a day  saccharomyces boulardii 250 milliGRAM(s) Oral two times a day  senna 2 Tablet(s) Oral at bedtime    MEDICATIONS  (PRN):      RADIOLOGY & ADDITIONAL TESTS:

## 2020-01-27 LAB
AMMONIA BLD-MCNC: 19 UMOL/L — SIGNIFICANT CHANGE UP (ref 11–55)
ANION GAP SERPL CALC-SCNC: 7 MMOL/L — SIGNIFICANT CHANGE UP (ref 5–17)
BUN SERPL-MCNC: 12 MG/DL — SIGNIFICANT CHANGE UP (ref 8–20)
CALCIUM SERPL-MCNC: 8.7 MG/DL — SIGNIFICANT CHANGE UP (ref 8.6–10.2)
CHLORIDE SERPL-SCNC: 90 MMOL/L — LOW (ref 98–107)
CO2 SERPL-SCNC: 35 MMOL/L — HIGH (ref 22–29)
CREAT SERPL-MCNC: 0.59 MG/DL — SIGNIFICANT CHANGE UP (ref 0.5–1.3)
GLUCOSE SERPL-MCNC: 81 MG/DL — SIGNIFICANT CHANGE UP (ref 70–99)
HCT VFR BLD CALC: 35.4 % — LOW (ref 39–50)
HGB BLD-MCNC: 11.3 G/DL — LOW (ref 13–17)
MAGNESIUM SERPL-MCNC: 1.8 MG/DL — SIGNIFICANT CHANGE UP (ref 1.6–2.6)
MCHC RBC-ENTMCNC: 31 PG — SIGNIFICANT CHANGE UP (ref 27–34)
MCHC RBC-ENTMCNC: 31.9 GM/DL — LOW (ref 32–36)
MCV RBC AUTO: 97 FL — SIGNIFICANT CHANGE UP (ref 80–100)
PLATELET # BLD AUTO: 157 K/UL — SIGNIFICANT CHANGE UP (ref 150–400)
POTASSIUM SERPL-MCNC: 4.2 MMOL/L — SIGNIFICANT CHANGE UP (ref 3.5–5.3)
POTASSIUM SERPL-SCNC: 4.2 MMOL/L — SIGNIFICANT CHANGE UP (ref 3.5–5.3)
RBC # BLD: 3.65 M/UL — LOW (ref 4.2–5.8)
RBC # FLD: 14.3 % — SIGNIFICANT CHANGE UP (ref 10.3–14.5)
SODIUM SERPL-SCNC: 132 MMOL/L — LOW (ref 135–145)
WBC # BLD: 6.87 K/UL — SIGNIFICANT CHANGE UP (ref 3.8–10.5)
WBC # FLD AUTO: 6.87 K/UL — SIGNIFICANT CHANGE UP (ref 3.8–10.5)

## 2020-01-27 PROCEDURE — 99232 SBSQ HOSP IP/OBS MODERATE 35: CPT

## 2020-01-27 RX ADMIN — Medication 1 APPLICATION(S): at 05:28

## 2020-01-27 RX ADMIN — Medication 1 APPLICATION(S): at 11:38

## 2020-01-27 RX ADMIN — Medication 1 MILLIGRAM(S): at 17:19

## 2020-01-27 RX ADMIN — RISPERIDONE 4 MILLIGRAM(S): 4 TABLET ORAL at 11:41

## 2020-01-27 RX ADMIN — Medication 1 PATCH: at 11:26

## 2020-01-27 RX ADMIN — Medication 1 PATCH: at 19:00

## 2020-01-27 RX ADMIN — DIVALPROEX SODIUM 1000 MILLIGRAM(S): 500 TABLET, DELAYED RELEASE ORAL at 17:18

## 2020-01-27 RX ADMIN — Medication 250 MILLIGRAM(S): at 05:28

## 2020-01-27 RX ADMIN — Medication 3 MILLILITER(S): at 15:59

## 2020-01-27 RX ADMIN — Medication 1 TABLET(S): at 11:38

## 2020-01-27 RX ADMIN — Medication 25 MILLIGRAM(S): at 17:20

## 2020-01-27 RX ADMIN — LACTULOSE 10 GRAM(S): 10 SOLUTION ORAL at 11:41

## 2020-01-27 RX ADMIN — POLYETHYLENE GLYCOL 3350 17 GRAM(S): 17 POWDER, FOR SOLUTION ORAL at 21:10

## 2020-01-27 RX ADMIN — ROPINIROLE 0.5 MILLIGRAM(S): 8 TABLET, FILM COATED, EXTENDED RELEASE ORAL at 17:20

## 2020-01-27 RX ADMIN — HALOPERIDOL DECANOATE 5 MILLIGRAM(S): 100 INJECTION INTRAMUSCULAR at 05:28

## 2020-01-27 RX ADMIN — Medication 1 PATCH: at 07:32

## 2020-01-27 RX ADMIN — Medication 1 MILLIGRAM(S): at 05:28

## 2020-01-27 RX ADMIN — ROPINIROLE 0.5 MILLIGRAM(S): 8 TABLET, FILM COATED, EXTENDED RELEASE ORAL at 05:28

## 2020-01-27 RX ADMIN — CHLORHEXIDINE GLUCONATE 1 APPLICATION(S): 213 SOLUTION TOPICAL at 11:37

## 2020-01-27 RX ADMIN — Medication 25 MILLIGRAM(S): at 05:28

## 2020-01-27 RX ADMIN — PANTOPRAZOLE SODIUM 40 MILLIGRAM(S): 20 TABLET, DELAYED RELEASE ORAL at 05:28

## 2020-01-27 RX ADMIN — LORATADINE 10 MILLIGRAM(S): 10 TABLET ORAL at 11:38

## 2020-01-27 RX ADMIN — HALOPERIDOL DECANOATE 5 MILLIGRAM(S): 100 INJECTION INTRAMUSCULAR at 17:19

## 2020-01-27 RX ADMIN — DIVALPROEX SODIUM 1000 MILLIGRAM(S): 500 TABLET, DELAYED RELEASE ORAL at 05:28

## 2020-01-27 RX ADMIN — SENNA PLUS 2 TABLET(S): 8.6 TABLET ORAL at 21:11

## 2020-01-27 RX ADMIN — Medication 1 APPLICATION(S): at 18:02

## 2020-01-27 RX ADMIN — Medication 1 PATCH: at 11:38

## 2020-01-27 RX ADMIN — Medication 3 MILLILITER(S): at 20:51

## 2020-01-27 NOTE — PROGRESS NOTE ADULT - SUBJECTIVE AND OBJECTIVE BOX
CC: SOB    Subjective/Objective:  Patient seen and examined at bedside. Patient's sister and brother-in-law at bedside.  No over night events reported by staff. Patient continues to refuse nocturnal BiPAP.  Patient currently without any complaints. States "I want my supper".  Patient denies dizziness, headache, CP, SOB, abdominal pain, N/V.    Vital Signs Last 24 Hrs  T(C): 36.4 (27 Jan 2020 07:49), Max: 36.6 (26 Jan 2020 16:10)  T(F): 97.5 (27 Jan 2020 07:49), Max: 97.9 (26 Jan 2020 16:10)  HR: 60 (27 Jan 2020 07:49) (60 - 78)  BP: 137/84 (27 Jan 2020 07:49) (110/80 - 150/89)  BP(mean): --  RR: 18 (27 Jan 2020 07:49) (18 - 18)  SpO2: 97% (27 Jan 2020 05:24) (90% - 97%)	    PHYSICAL EXAM:  GENERAL: Pt lying in bed, NAD  HEAD: Atraumatic, Normocephalic  EYES: EOMI, PERRLA, conjunctiva and sclera clear  ENT: Moist mucous membranes  NECK: Supple  CHEST/LUNG: Clear to auscultation bilaterally. Unlabored respirations  HEART: S1S2+. Regular rate and rhythm  ABDOMEN: BS+; Soft, Nontender, Nondistended  EXTREMITIES:  2+ Peripheral Pulses. No LE edema  NERVOUS SYSTEM: A&Ox2 to person and place  SKIN: Warm and dry    LABS:                        11.3   6.87  )-----------( 157      ( 27 Jan 2020 07:56 )             35.4     01-27    132<L>  |  90<L>  |  12.0  ----------------------------<  81  4.2   |  35.0<H>  |  0.59    Ca    8.7      27 Jan 2020 07:56  Mg     1.8     01-27

## 2020-01-27 NOTE — PROGRESS NOTE ADULT - ASSESSMENT
Assessment and Plan:   · Assessment		  68 y/o male, PMHx severe COPD, Parkinson's disease, schizophrenia, active smoker, who presented to ED from group home with a chief complaint of shortness of breath for the past three days. Treated in MICU for AE COPD and BiPAP, he needed precedex sedation. off bipap d/g to floor on [place] O2 and AVAPS nocturnal , but pt non complaint with AVAPS. uses NC O2 daytime.      somnolence/delirium/confusion    - h/o schizophrenia. on multiple anti-psych meds. from [facility]  - ammonia 59 - 19   - lactulose    Psychiatry evaluation on Friday appreciated- He does not have capacity to make his own medical decisionsnot able to make medical decision or discharge decision.   - Sister is aware of worsening mental status. Talking to her by phone daily offering support      Acute on Chronic Resp Failure with hypoxia and hypercarnia  AE COPD   Sat 87% on Ra at rest  Refusing or cannot tolerate BIPAP Mask- not using nocturnally  Oxygen  nebs  completed steroid taper  CT chest noted , likely aspiration PNA     Swallow eval appreciated - passed for regular diet with liquids ,  MS 1/22--> mechanical soft, thin liquids     L elbow pressure ulcer stage 2 -   keep off load   topical co     Skin rash: trial of mycolog cream. loratadine. change frequently. occasional stool and urinary incontinence. bedside commode. keep the area dry. derm eval if not improving    GOC:   Once stable, workiing on Discharge to SNF-cannot return to group home on Oxygen  [employer] and sister Cindy [ HCP}.   DNR/DNI. last Friday.

## 2020-01-27 NOTE — PROGRESS NOTE ADULT - ATTENDING COMMENTS
COUNSELING:    Face to face meeting to discuss Advanced Care Planning - Time Spent ______ Minutes.  See goals of care note.    More than 50% time spent in counseling and coordinating care. __25____ Minutes.     Thank you for the opportunity to assist with the care of this patient.   [facility] [phone].

## 2020-01-27 NOTE — PROGRESS NOTE ADULT - ASSESSMENT
70 y/o male, PMHx severe COPD, Parkinson's disease, schizophrenia, active smoker, who presented to ED from group home with a chief complaint of shortness of breath for the past three days. Treated in MICU for AE COPD and BiPAP, he needed precedex sedation. off bipap d/g to floor on [place] O2 and AVAPS nocturnal , but pt non complaint with AVAPS. uses [place] O2 daytime. cannot go back to [facility] home with oxygen.     1) Somnolence/delirium/confusion/dementia  - H/o schizophrenia, on multiple anti-psych meds. From [facility]  - Ammonia 19 - 59   - Lactulose  - Cont haldol  - Seen by psych. Not able to make medical decision or discharge decision. May need to reduce dose of his psych meds if patient remains lethargic and somnolent.   - Sister is aware of worsening mental status  - Completed zosyn for PNA 1/25    2) Acute on chronic resp failure with hypoxia and hypercarnia  - AE COPD   - Sat 87% on RA at rest  - s/p BiPAP- refusing to wear it   - On supplemental O2  - Nebs  - Completed steroid taper  - CT chest noted, likely aspiration PNA   - RVP neg  - Blood c/s negative  - As per Pulmonology: Consider home O2 if room air sats <88%. Consider performist or brovana as a home nebulized LABA, Revefenacin at once a day nebulized LAMA and budesonide to guarantee delivery. - Home nebulizer with albuterol PRN,  - Swallow eval appreciated - passed for regular diet with liquids, unable to r/o silent aspiration,  MS 1/22--> mechanical soft, thin liquids      3) Thrombocytopenia  - May be due to Zosyn, completed course 1/25  - Resolved  - Repeat CBC in AM    4) Parkinson disease  - Cont home meds    5) Active smoker  - Nicotine patch offered    6) L elbow pressure ulcer stage 2 - keep off load     7) Skin rash  - Trial of mycolog cream. loratadine. change frequently. occasional stool and urinary incontinence. bedside commode. keep the area dry. derm eval if not improving    GOC: palliative SW spoke with  Daisy jiang [employer] and sister Cindy [ HCP}. DNR/DNI. GOC ongoing by palliative team    dispo: needs SNF. may need oxygen at home. cannot go back to [facility] with O2. PT eval for safe DC. 70 y/o male, PMHx severe COPD, Parkinson's disease, schizophrenia, active smoker, who presented to ED from group home with a chief complaint of shortness of breath for the past three days. Treated in MICU for AE COPD and BiPAP, he needed precedex sedation. off bipap d/g to floor on [place] O2 and AVAPS nocturnal , but pt non complaint with AVAPS. uses [place] O2 daytime. cannot go back to [facility] home with oxygen.     1) Somnolence/delirium/confusion/dementia  - H/o schizophrenia, on multiple anti-psych meds. From [facility]  - Ammonia 19 - 59   - Lactulose  - Cont haldol  - Seen by psych. Not able to make medical decision or discharge decision. May need to reduce dose of his psych meds if patient remains lethargic and somnolent.   - Sister is aware of worsening mental status  - Completed zosyn for PNA 1/25    2) Acute on chronic resp failure with hypoxia and hypercarnia  - AE COPD   - Sat 87% on RA at rest  - s/p BiPAP- refusing to wear it   - On supplemental O2  - Nebs  - Completed steroid taper  - CT chest noted, likely aspiration PNA   - RVP neg  - Blood c/s negative  - As per Pulmonology: Consider home O2 if room air sats <88%. Consider performist or brovana as a home nebulized LABA, Revefenacin at once a day nebulized LAMA and budesonide to guarantee delivery. Home nebulizer with albuterol PRN,  - Swallow eval appreciated - passed for regular diet with liquids, unable to r/o silent aspiration, MS 1/22--> mechanical soft, thin liquids      3) Thrombocytopenia  - May be due to Zosyn, completed course 1/25  - Resolved  - Repeat CBC in AM    4) Parkinson disease  - Cont home meds    5) Active smoker  - Nicotine patch offered    6) L elbow pressure ulcer stage 2 - keep off load     7) Skin rash  - Trial of mycolog cream. loratadine. Change frequently  - Occasional stool and urinary incontinence. Bedside commode  - Keep the area dry  - Derm eval if not improving    GOC: palliative SW spoke with  Daisy jiang [employer] and sister Cindy [ HCP}. DNR/DNI. GOC ongoing by palliative team    DISPO: needs SNF. May need oxygen at home. Cannot go back to [place] with O2. PT eval for safe DC.

## 2020-01-27 NOTE — PROGRESS NOTE ADULT - ATTENDING COMMENTS
I have personally seen, examined and participated in the care of this patient. I have reviewed all pertinent clinical information, including history, physical exam, plan and agree with the above.   pt continuing to require O2. will cont to monitor rash.  dispo pending SNF placement.

## 2020-01-27 NOTE — PROGRESS NOTE ADULT - SUBJECTIVE AND OBJECTIVE BOX
INTERVAL HPI/OVERNIGHT EVENTS: 68 yo Male Patient with Advanced COPD and Hypoxic Respiratory Failure. He continues to use Nasal O2 continuous. Cannot tolerate nocturnal BIPAP. He was barely able to respond verbally to simple questions.  Confused, voice very low ptiched and difficult to understand. . Afebrile, no SOB, CP, Palpitations, N/V/D will eat when fed. He has periods of being more alert- and gets OOB/CH. Overall poor functional status and not improving  since admitted   69y old  Male who presents with a chief complaint of SOB (26 Jan 2020 15:02)    PAST MEDICAL & SURGICAL HISTORY:  Parkinson disease  Chronic obstructive pulmonary disease, unspecified COPD type  Schizophrenia, unspecified type  No significant past surgical history    Present Symptoms:     Dyspnea: 0 1 2 when off O2   Nausea/Vomiting:  No  Anxiety:   No  Depression:  No  Fatigue: Yes   Loss of appetite:  No    Pain: denies            Character-            Duration-            Effect-            Factors-            Frequency-            Location-            Severity-    Review of Systems: Reviewed                                   Unable to obtain due to poor mentation       MEDICATIONS  (STANDING):  albuterol/ipratropium for Nebulization 3 milliLiter(s) Nebulizer every 6 hours  albuterol/ipratropium for Nebulization. 3 milliLiter(s) Nebulizer once  benztropine 1 milliGRAM(s) Oral two times a day  calcium carbonate 1250 mG  + Vitamin D (OsCal 500 + D) 1 Tablet(s) Oral daily  chlorhexidine 2% Cloths 1 Application(s) Topical daily  collagenase Ointment 1 Application(s) Topical daily  diVALproex DR 1000 milliGRAM(s) Oral two times a day  haloperidol     Tablet 5 milliGRAM(s) Oral two times a day  influenza   Vaccine 0.5 milliLiter(s) IntraMuscular once  lactulose Syrup 10 Gram(s) Oral daily  loratadine 10 milliGRAM(s) Oral daily  metoprolol tartrate 25 milliGRAM(s) Oral two times a day  nicotine - 21 mG/24Hr(s) Patch 1 patch Transdermal daily  nystatin/triamcinolone Cream 1 Application(s) Topical two times a day  pantoprazole    Tablet 40 milliGRAM(s) Oral before breakfast  polyethylene glycol 3350 17 Gram(s) Oral at bedtime  risperiDONE   Tablet 4 milliGRAM(s) Oral daily  rOPINIRole 0.5 milliGRAM(s) Oral two times a day  saccharomyces boulardii 250 milliGRAM(s) Oral two times a day  senna 2 Tablet(s) Oral at bedtime    MEDICATIONS  (PRN):      PHYSICAL EXAM:    Vital Signs Last 24 Hrs  T(C): 36.4 (27 Jan 2020 07:49), Max: 36.6 (26 Jan 2020 16:10)  T(F): 97.5 (27 Jan 2020 07:49), Max: 97.9 (26 Jan 2020 16:10)  HR: 60 (27 Jan 2020 07:49) (60 - 78)  BP: 137/84 (27 Jan 2020 07:49) (110/80 - 150/89)  BP(mean): --  RR: 18 (27 Jan 2020 07:49) (18 - 18)  SpO2: 97% (27 Jan 2020 05:24) (90% - 97%)    General:  oriented x _1___ lethargic                   cachexia  attempts to speak- no air in lungs to project his voice      HEENT:   dry mouth     Lungs: comfortable     CV: normal  bradycardia    GI: normal                 : carlos    MSK:  weakness              bedbound/wheelchair bound    Skin:   pressure ulcers buttock Red rash on R antecubital-  and other areas's MD aware     LABS:                        11.3   6.87  )-----------( 157      ( 27 Jan 2020 07:56 )             35.4     01-27    132<L>  |  90<L>  |  12.0  ----------------------------<  81  4.2   |  35.0<H>  |  0.59    Ca    8.7      27 Jan 2020 07:56  Mg     1.8     01-27    I&O's Summary    26 Jan 2020 07:01  -  27 Jan 2020 07:00  --------------------------------------------------------  IN: 0 mL / OUT: 200 mL / NET: -200 mL    RADIOLOGY & ADDITIONAL STUDIES:    ADVANCE DIRECTIVES:   DNR YES  Completed on:                     MOLST  YES    Completed on: 1/24/2020  Living Will  NO   Completed on:

## 2020-01-28 ENCOUNTER — TRANSCRIPTION ENCOUNTER (OUTPATIENT)
Age: 70
End: 2020-01-28

## 2020-01-28 LAB
ALBUMIN SERPL ELPH-MCNC: 2.6 G/DL — LOW (ref 3.3–5.2)
ALP SERPL-CCNC: 78 U/L — SIGNIFICANT CHANGE UP (ref 40–120)
ALT FLD-CCNC: 32 U/L — SIGNIFICANT CHANGE UP
ANION GAP SERPL CALC-SCNC: 7 MMOL/L — SIGNIFICANT CHANGE UP (ref 5–17)
AST SERPL-CCNC: 40 U/L — HIGH
BILIRUB SERPL-MCNC: 0.5 MG/DL — SIGNIFICANT CHANGE UP (ref 0.4–2)
BUN SERPL-MCNC: 12 MG/DL — SIGNIFICANT CHANGE UP (ref 8–20)
CALCIUM SERPL-MCNC: 8.7 MG/DL — SIGNIFICANT CHANGE UP (ref 8.6–10.2)
CHLORIDE SERPL-SCNC: 90 MMOL/L — LOW (ref 98–107)
CO2 SERPL-SCNC: 33 MMOL/L — HIGH (ref 22–29)
CREAT SERPL-MCNC: 0.6 MG/DL — SIGNIFICANT CHANGE UP (ref 0.5–1.3)
GLUCOSE SERPL-MCNC: 80 MG/DL — SIGNIFICANT CHANGE UP (ref 70–99)
HCT VFR BLD CALC: 35.3 % — LOW (ref 39–50)
HGB BLD-MCNC: 11.7 G/DL — LOW (ref 13–17)
MAGNESIUM SERPL-MCNC: 1.7 MG/DL — SIGNIFICANT CHANGE UP (ref 1.6–2.6)
MCHC RBC-ENTMCNC: 31.5 PG — SIGNIFICANT CHANGE UP (ref 27–34)
MCHC RBC-ENTMCNC: 33.1 GM/DL — SIGNIFICANT CHANGE UP (ref 32–36)
MCV RBC AUTO: 94.9 FL — SIGNIFICANT CHANGE UP (ref 80–100)
PLATELET # BLD AUTO: 190 K/UL — SIGNIFICANT CHANGE UP (ref 150–400)
POTASSIUM SERPL-MCNC: 4.4 MMOL/L — SIGNIFICANT CHANGE UP (ref 3.5–5.3)
POTASSIUM SERPL-SCNC: 4.4 MMOL/L — SIGNIFICANT CHANGE UP (ref 3.5–5.3)
PROT SERPL-MCNC: 6 G/DL — LOW (ref 6.6–8.7)
RBC # BLD: 3.72 M/UL — LOW (ref 4.2–5.8)
RBC # FLD: 13.9 % — SIGNIFICANT CHANGE UP (ref 10.3–14.5)
SODIUM SERPL-SCNC: 130 MMOL/L — LOW (ref 135–145)
WBC # BLD: 6.45 K/UL — SIGNIFICANT CHANGE UP (ref 3.8–10.5)
WBC # FLD AUTO: 6.45 K/UL — SIGNIFICANT CHANGE UP (ref 3.8–10.5)

## 2020-01-28 PROCEDURE — 99232 SBSQ HOSP IP/OBS MODERATE 35: CPT

## 2020-01-28 RX ORDER — BENZTROPINE MESYLATE 1 MG
1 TABLET ORAL
Qty: 0 | Refills: 0 | DISCHARGE
Start: 2020-01-28

## 2020-01-28 RX ORDER — BENZTROPINE MESYLATE 1 MG
1 TABLET ORAL
Qty: 0 | Refills: 0 | DISCHARGE

## 2020-01-28 RX ORDER — NICOTINE POLACRILEX 2 MG
1 GUM BUCCAL
Qty: 0 | Refills: 0 | DISCHARGE
Start: 2020-01-28

## 2020-01-28 RX ORDER — DIVALPROEX SODIUM 500 MG/1
2 TABLET, DELAYED RELEASE ORAL
Qty: 0 | Refills: 0 | DISCHARGE
Start: 2020-01-28

## 2020-01-28 RX ORDER — RANITIDINE HYDROCHLORIDE 150 MG/1
1 TABLET, FILM COATED ORAL
Qty: 0 | Refills: 0 | DISCHARGE

## 2020-01-28 RX ORDER — LACTULOSE 10 G/15ML
15 SOLUTION ORAL
Qty: 0 | Refills: 0 | DISCHARGE
Start: 2020-01-28

## 2020-01-28 RX ORDER — SENNA PLUS 8.6 MG/1
2 TABLET ORAL
Qty: 0 | Refills: 0 | DISCHARGE
Start: 2020-01-28

## 2020-01-28 RX ORDER — ROPINIROLE 8 MG/1
1 TABLET, FILM COATED, EXTENDED RELEASE ORAL
Qty: 0 | Refills: 0 | DISCHARGE
Start: 2020-01-28

## 2020-01-28 RX ORDER — HALOPERIDOL DECANOATE 100 MG/ML
1 INJECTION INTRAMUSCULAR
Qty: 0 | Refills: 0 | DISCHARGE

## 2020-01-28 RX ORDER — REVEFENACIN 175 UG/3ML
175 SOLUTION RESPIRATORY (INHALATION)
Qty: 10 | Refills: 0
Start: 2020-01-28 | End: 2020-02-26

## 2020-01-28 RX ORDER — FORMOTEROL FUMARATE 12 MCG
2 CAPSULE, WITH INHALATION DEVICE INHALATION
Qty: 10 | Refills: 0
Start: 2020-01-28 | End: 2020-02-26

## 2020-01-28 RX ORDER — LORATADINE 10 MG/1
1 TABLET ORAL
Qty: 0 | Refills: 0 | DISCHARGE
Start: 2020-01-28

## 2020-01-28 RX ORDER — PANTOPRAZOLE SODIUM 20 MG/1
1 TABLET, DELAYED RELEASE ORAL
Qty: 0 | Refills: 0 | DISCHARGE
Start: 2020-01-28

## 2020-01-28 RX ORDER — NYSTATIN/TRIAMCINOLONE ACET
1 OINTMENT (GRAM) TOPICAL
Qty: 0 | Refills: 0 | DISCHARGE
Start: 2020-01-28

## 2020-01-28 RX ORDER — POLYETHYLENE GLYCOL 3350 17 G/17G
17 POWDER, FOR SOLUTION ORAL
Qty: 0 | Refills: 0 | DISCHARGE
Start: 2020-01-28

## 2020-01-28 RX ORDER — RISPERIDONE 4 MG/1
1 TABLET ORAL
Qty: 0 | Refills: 0 | DISCHARGE
Start: 2020-01-28

## 2020-01-28 RX ORDER — HALOPERIDOL DECANOATE 100 MG/ML
1 INJECTION INTRAMUSCULAR
Qty: 0 | Refills: 0 | DISCHARGE
Start: 2020-01-28

## 2020-01-28 RX ORDER — CHLORHEXIDINE GLUCONATE 213 G/1000ML
1 SOLUTION TOPICAL
Qty: 0 | Refills: 0 | DISCHARGE
Start: 2020-01-28

## 2020-01-28 RX ORDER — METOPROLOL TARTRATE 50 MG
1 TABLET ORAL
Qty: 0 | Refills: 0 | DISCHARGE
Start: 2020-01-28

## 2020-01-28 RX ORDER — BUDESONIDE, MICRONIZED 100 %
2 POWDER (GRAM) MISCELLANEOUS
Qty: 10 | Refills: 0
Start: 2020-01-28 | End: 2020-02-26

## 2020-01-28 RX ORDER — COLLAGENASE CLOSTRIDIUM HIST. 250 UNIT/G
1 OINTMENT (GRAM) TOPICAL
Qty: 0 | Refills: 0 | DISCHARGE
Start: 2020-01-28

## 2020-01-28 RX ORDER — IPRATROPIUM/ALBUTEROL SULFATE 18-103MCG
3 AEROSOL WITH ADAPTER (GRAM) INHALATION
Qty: 0 | Refills: 0 | DISCHARGE
Start: 2020-01-28

## 2020-01-28 RX ADMIN — Medication 1 PATCH: at 11:25

## 2020-01-28 RX ADMIN — ROPINIROLE 0.5 MILLIGRAM(S): 8 TABLET, FILM COATED, EXTENDED RELEASE ORAL at 17:07

## 2020-01-28 RX ADMIN — POLYETHYLENE GLYCOL 3350 17 GRAM(S): 17 POWDER, FOR SOLUTION ORAL at 21:23

## 2020-01-28 RX ADMIN — PANTOPRAZOLE SODIUM 40 MILLIGRAM(S): 20 TABLET, DELAYED RELEASE ORAL at 05:55

## 2020-01-28 RX ADMIN — Medication 3 MILLILITER(S): at 15:39

## 2020-01-28 RX ADMIN — HALOPERIDOL DECANOATE 5 MILLIGRAM(S): 100 INJECTION INTRAMUSCULAR at 05:55

## 2020-01-28 RX ADMIN — Medication 1 APPLICATION(S): at 11:24

## 2020-01-28 RX ADMIN — Medication 1 PATCH: at 11:24

## 2020-01-28 RX ADMIN — Medication 1 MILLIGRAM(S): at 17:07

## 2020-01-28 RX ADMIN — Medication 1 PATCH: at 09:27

## 2020-01-28 RX ADMIN — Medication 1 APPLICATION(S): at 17:08

## 2020-01-28 RX ADMIN — Medication 1 TABLET(S): at 11:23

## 2020-01-28 RX ADMIN — ROPINIROLE 0.5 MILLIGRAM(S): 8 TABLET, FILM COATED, EXTENDED RELEASE ORAL at 05:55

## 2020-01-28 RX ADMIN — Medication 3 MILLILITER(S): at 03:18

## 2020-01-28 RX ADMIN — Medication 3 MILLILITER(S): at 21:09

## 2020-01-28 RX ADMIN — DIVALPROEX SODIUM 1000 MILLIGRAM(S): 500 TABLET, DELAYED RELEASE ORAL at 17:07

## 2020-01-28 RX ADMIN — SENNA PLUS 2 TABLET(S): 8.6 TABLET ORAL at 21:23

## 2020-01-28 RX ADMIN — RISPERIDONE 4 MILLIGRAM(S): 4 TABLET ORAL at 11:23

## 2020-01-28 RX ADMIN — CHLORHEXIDINE GLUCONATE 1 APPLICATION(S): 213 SOLUTION TOPICAL at 11:26

## 2020-01-28 RX ADMIN — DIVALPROEX SODIUM 1000 MILLIGRAM(S): 500 TABLET, DELAYED RELEASE ORAL at 05:55

## 2020-01-28 RX ADMIN — Medication 25 MILLIGRAM(S): at 05:55

## 2020-01-28 RX ADMIN — Medication 25 MILLIGRAM(S): at 17:07

## 2020-01-28 RX ADMIN — Medication 1 APPLICATION(S): at 05:54

## 2020-01-28 RX ADMIN — Medication 1 PATCH: at 19:45

## 2020-01-28 RX ADMIN — Medication 3 MILLILITER(S): at 08:16

## 2020-01-28 RX ADMIN — HALOPERIDOL DECANOATE 5 MILLIGRAM(S): 100 INJECTION INTRAMUSCULAR at 17:08

## 2020-01-28 RX ADMIN — LACTULOSE 10 GRAM(S): 10 SOLUTION ORAL at 11:22

## 2020-01-28 RX ADMIN — LORATADINE 10 MILLIGRAM(S): 10 TABLET ORAL at 11:24

## 2020-01-28 RX ADMIN — Medication 1 MILLIGRAM(S): at 05:55

## 2020-01-28 NOTE — PROGRESS NOTE ADULT - ASSESSMENT
70 y/o male, PMHx severe COPD, Parkinson's disease, schizophrenia, active smoker, who presented to ED from group home with a chief complaint of shortness of breath for the past three days. Treated in MICU for AE COPD and BiPAP, he needed precedex sedation. off bipap d/g to floor on NC O2 and AVAPS nocturnal , but pt non complaint with AVAPS. uses NC O2 daytime. cannot go back to [facility] home with oxygen.     1) Somnolence/delirium/confusion/dementia  - H/o schizophrenia, on multiple anti-psych meds. From [facility]  - Ammonia 19 - 59   - Lactulose  - Cont haldol  - Seen by psych. Not able to make medical decision or discharge decision. May need to reduce dose of his psych meds if patient remains lethargic and somnolent.   - Sister is aware of worsening mental status  - Completed zosyn for PNA 1/25    2) Acute on chronic resp failure with hypoxia and hypercarnia  - AE COPD   - Sat 87% on RA at rest  - s/p BiPAP- refusing to wear it   - On supplemental O2  - Nebs  - Completed steroid taper  - CT chest noted, likely aspiration PNA   - RVP neg  - Blood c/s negative  - As per Pulmonology: Consider home O2 if room air sats <88%. Consider performist or brovana as a home nebulized LABA, Revefenacin at once a day nebulized LAMA and budesonide to guarantee delivery. Home nebulizer with albuterol PRN,  - Swallow eval appreciated - passed for regular diet with liquids, unable to r/o silent aspiration, MS 1/22--> mechanical soft, thin liquids    3) Thrombocytopenia  - May be due to Zosyn, completed course 1/25  - Resolved  - Repeat CBC in AM    4) Parkinson disease  - Cont home meds    5) Active smoker  - Nicotine patch offered    6) L elbow pressure ulcer stage 2 - keep off load     7) Skin rash  - Trial of mycolog cream. loratadine. Change frequently  - Occasional stool and urinary incontinence. Bedside commode  - Keep the area dry  - Derm eval if not improving    GOC: palliative SW spoke with  [employer] [employer] and sister Cindy [ HCP}. DNR/DNI. GOC ongoing by palliative team    DISPO: pending SNF placement.

## 2020-01-28 NOTE — DISCHARGE NOTE PROVIDER - HOSPITAL COURSE
70 y/o male, PMHx severe COPD, Parkinson's disease, schizophrenia, active smoker, who presented to ED from group home with a chief complaint of shortness of breath for the past three days. Treated in MICU for AE COPD and BiPAP, he needed precedex sedation. off bipap d/g to floor on NC O2 and AVAPS nocturnal , but pt non complaint with AVPAS. uses NC O2 daytime. cannot go back to [facility] home with oxygen.         # somnolence/delirium/confusion/dementia    - h/o schizophrenia. on multiple anti-psych meds. from [facility]    - ammonia 59 - 19     - lactulose    - Haldol dose decreased     - seen by psych. not able to make medical decision or discharge decision. may need to reduce dose of his psych meds if patient remains lethargic and somnolent.     - sister is aware of worsening mental status    - completed zosyn for PNA         # acute on chronic resp failure with hypoxia and hypercarbia    AE COPD     s/p BiPAP- refusing to wear it     Oxygen    nebs    CT chest noted , likely aspiration PNA     RVP neg    blood c/s negative    Swallow eval appreciated - passed for regular diet with liquids , unable to r/o silent aspiration ,    MS 1/22--> mechanical soft, thin liquids        # thrombocytopenia - improving        # Parkinson disease    Cont home meds        # Active smoker    Nicotine patch offered        # L elbow pressure ulcer stage 2 - keep off load         # Skin rash: trial of mycolog cream. loratadine. change frequently. occasional stool and urinary incontinence. be side comode. keep the area dry.         GOC: palliative SW spoke with  [employer]aisy [employer] [employer] and sister Cindy [ HCP}. DNR/DNI.         Vital Signs Last 24 Hrs    T(C): 36.5 (28 Jan 2020 08:01), Max: 36.6 (27 Jan 2020 20:12)    T(F): 97.7 (28 Jan 2020 08:01), Max: 97.9 (27 Jan 2020 20:12)    HR: 71 (28 Jan 2020 08:01) (58 - 83)    BP: 124/82 (28 Jan 2020 08:01) (124/82 - 135/85)    BP(mean): --    RR: 18 (28 Jan 2020 08:01) (18 - 28)    SpO2: 98% (28 Jan 2020 03:18) (92% - 98%) 68 y/o male, PMHx severe COPD, Parkinson's disease, schizophrenia, active smoker, who presented to ED from group home with a chief complaint of shortness of breath for the past three days. Treated in MICU for AE COPD and BiPAP, he needed precedex sedation. off bipap d/g to floor on NC O2 and AVAPS nocturnal , but pt non complaint with AVPAS. uses NC O2 daytime. cannot go back to grp home with oxygen.         # somnolence/delirium/confusion/dementia    - h/o schizophrenia. on multiple anti-psych meds. from [facility]    - ammonia 59 - 19     - lactulose    - Haldol dose decreased     - seen by psych. not able to make medical decision or discharge decision. may need to reduce dose of his psych meds if patient remains lethargic and somnolent.     - sister is aware of worsening mental status    - completed zosyn for PNA         # acute on chronic resp failure with hypoxia and hypercarbia    AE COPD     s/p BiPAP- refusing to wear it     Oxygen    nebs    CT chest noted , likely aspiration PNA     RVP neg    blood c/s negative    Swallow eval appreciated - passed for regular diet with liquids , unable to r/o silent aspiration ,    MS 1/22--> mechanical soft, thin liquids        # thrombocytopenia - improving        # Parkinson disease    Cont home meds        # Active smoker    Nicotine patch offered        # L elbow pressure ulcer stage 2 - keep off load         # Skin rash: trial of mycolog cream. loratadine. change frequently. occasional stool and urinary incontinence. be side comode. keep the area dry.         GOC: palliative SW spoke with  [employer] [employer] and sister Cindy [ HCP}. DNR/DNI.         Vital Signs Last 24 Hrs    T(C): 36.5 (28 Jan 2020 08:01), Max: 36.6 (27 Jan 2020 20:12)    T(F): 97.7 (28 Jan 2020 08:01), Max: 97.9 (27 Jan 2020 20:12)    HR: 71 (28 Jan 2020 08:01) (58 - 83)    BP: 124/82 (28 Jan 2020 08:01) (124/82 - 135/85)    BP(mean): --    RR: 18 (28 Jan 2020 08:01) (18 - 28)    SpO2: 98% (28 Jan 2020 03:18) (92% - 98%)        GENERAL: NAD    HEENT: PERRL, +EOMI    NECK: soft, supple    CHEST/LUNG: Clear to auscultation bilaterally; non-labored respirations on NC    HEART: S1S2+, Regular rate and rhythm; No murmurs, rubs, or gallops    ABDOMEN: Soft, Nontender, Nondistended; Bowel sounds present    SKIN: warm, dry, erythematous rash to buttocks, thighs b/l, no excoriations, no vesicles or pustules    NEURO: awake, alert        40 minutes spent on discharge. 70 y/o male, PMHx severe COPD, Parkinson's disease, schizophrenia, active smoker, who presented to ED from group home with a chief complaint of shortness of breath for the past three days. Treated in MICU for AE COPD and BiPAP, he needed precedex sedation. off bipap d/g to floor on NC O2 and AVAPS nocturnal , but pt non complaint with AVPAS. uses NC O2 daytime. cannot go back to [facility] home with oxygen.         # somnolence/delirium/confusion/dementia    - h/o schizophrenia. on multiple anti-psych meds. from [facility]    - ammonia 59 - 19     - lactulose    - Haldol dose decreased     - seen by psych. not able to make medical decision or discharge decision. may need to reduce dose of his psych meds if patient remains lethargic and somnolent.     - sister is aware of worsening mental status    - completed zosyn for PNA         # acute on chronic resp failure with hypoxia and hypercarbia    AE COPD     Oxygen    nebs    CT chest noted , likely aspiration PNA     RVP neg    blood c/s negative    Swallow eval appreciated - passed for regular diet with liquids , unable to r/o silent aspiration ,    MS 1/22--> mechanical soft, thin liquids    Pt refusing AVAPS qHS. Can cont Bipap at night on discharge.         # thrombocytopenia - improving        # Parkinson disease    Cont home meds        # Active smoker    Nicotine patch offered        # L elbow pressure ulcer stage 2 - keep off load         # Skin rash: trial of mycolog cream. loratadine. change frequently. occasional stool and urinary incontinence. be side comode. keep the area dry.         GOC: palliative SW spoke with  [employer] [employer] and sister Cindy [ HCP}. DNR/DNI.         Vital Signs Last 24 Hrs    T(C): 36.5 (28 Jan 2020 08:01), Max: 36.6 (27 Jan 2020 20:12)    T(F): 97.7 (28 Jan 2020 08:01), Max: 97.9 (27 Jan 2020 20:12)    HR: 71 (28 Jan 2020 08:01) (58 - 83)    BP: 124/82 (28 Jan 2020 08:01) (124/82 - 135/85)    BP(mean): --    RR: 18 (28 Jan 2020 08:01) (18 - 28)    SpO2: 98% (28 Jan 2020 03:18) (92% - 98%)        GENERAL: NAD    HEENT: PERRL, +EOMI    NECK: soft, supple    CHEST/LUNG: Clear to auscultation bilaterally; non-labored respirations on NC    HEART: S1S2+, Regular rate and rhythm; No murmurs, rubs, or gallops    ABDOMEN: Soft, Nontender, Nondistended; Bowel sounds present    SKIN: warm, dry, erythematous rash to buttocks, thighs b/l, no excoriations, no vesicles or pustules    NEURO: awake, alert        40 minutes spent on discharge.

## 2020-01-28 NOTE — PROGRESS NOTE ADULT - REASON FOR ADMISSION
COPD Exacerbation  Acute on Chronic Respiratory Failure
COPD, resp failure
SOB
Advanced COPD Respiratory Failure
COPD with Acute Exacerbation
Hypoxia and dyspnea
Hypoxia and dyspnea
SOB, weakness, Aspiration Pneumonia with COPD Exacerbation
Acute COPD Exacerbation-acute on chronic Respiratory Failure
COPD exacerbation
copd

## 2020-01-28 NOTE — DISCHARGE NOTE PROVIDER - NSDCMRMEDTOKEN_GEN_ALL_CORE_FT
acetaminophen 500 mg oral tablet: 1 tab(s) orally 2 times a day, As Needed  albuterol 90 mcg/inh inhalation aerosol: 1 puff(s) inhaled 4 times a day  benztropine 1 mg oral tablet: 1 tab(s) orally 2 times a day  budesonide 0.25 mg/2 mL inhalation suspension: 2 milliliter(s) by nebulizer 4 times a day   calcium-vitamin D 500 mg-200 intl units (5 mcg) oral tablet: 1 tab(s) orally once a day  chlorhexidine 2% topical pad: 1 application topically once a day  collagenase 250 units/g topical ointment: 1 application topically once a day  divalproex sodium 500 mg oral delayed release tablet: 2 tab(s) orally 2 times a day  formoterol 20 mcg/2 mL inhalation solution: 2 milliliter(s) inhaled 2 times a day   haloperidol 5 mg oral tablet: 1 tab(s) orally 2 times a day  ipratropium-albuterol 0.5 mg-2.5 mg/3 mLinhalation solution: 3 milliliter(s) inhaled every 6 hours  lactulose 10 g/15 mL oral syrup: 15 milliliter(s) orally once a day  loratadine 10 mg oral tablet: 1 tab(s) orally once a day  metoprolol tartrate 25 mg oral tablet: 1 tab(s) orally 2 times a day  nicotine 21 mg/24 hr transdermal film, extended release: 1 patch transdermal once a day  nystatin-triamcinolone 100,000 units/g-0.1% topical cream: 1 application topically 2 times a day  pantoprazole 40 mg oral delayed release tablet: 1 tab(s) orally once a day (before a meal)  polyethylene glycol 3350 oral powder for reconstitution: 17 gram(s) orally once a day (at bedtime)  Restasis 0.05% ophthalmic emulsion: 1 drop(s) to each affected eye every 12 hours  revefenacin 175 mcg/3 mL inhalation solution: 175 microgram(s) inhaled once a day   risperiDONE 4 mg oral tablet: 1 tab(s) orally once a day  rOPINIRole 0.5 mg oral tablet: 1 tab(s) orally 2 times a day  senna oral tablet: 2 tab(s) orally once a day (at bedtime)

## 2020-01-28 NOTE — DISCHARGE NOTE PROVIDER - CARE PROVIDERS DIRECT ADDRESSES
[email or web address]

## 2020-01-28 NOTE — DISCHARGE NOTE PROVIDER - NSDCCPCAREPLAN_GEN_ALL_CORE_FT
PRINCIPAL DISCHARGE DIAGNOSIS  Diagnosis: COPD exacerbation  Assessment and Plan of Treatment:       SECONDARY DISCHARGE DIAGNOSES  Diagnosis: Respiratory distress  Assessment and Plan of Treatment:

## 2020-01-28 NOTE — DISCHARGE NOTE PROVIDER - CARE PROVIDER_API CALL
Helder Sánchez)  Internal Medicine; Rheumatology  [address] [place] [address]  Phone: [phone]  Fax: [phone]  Follow Up Time:

## 2020-01-28 NOTE — PROGRESS NOTE ADULT - SUBJECTIVE AND OBJECTIVE BOX
DAVY HOLDEN    [id number]    69y      Male    CC: SOB    INTERVAL HPI/OVERNIGHT EVENTS: Pt seen and examined. No acute events overnight. Non-sensible speech but lying comfortably, not in distress on NC.     REVIEW OF SYSTEMS:  unable to obtain 2/2 mental status    Vital Signs Last 24 Hrs  T(C): 36.6 (28 Jan 2020 16:06), Max: 36.6 (27 Jan 2020 20:12)  T(F): 97.9 (28 Jan 2020 16:06), Max: 97.9 (27 Jan 2020 20:12)  HR: 76 (28 Jan 2020 16:06) (58 - 83)  BP: 128/72 (28 Jan 2020 16:06) (124/82 - 135/85)  BP(mean): --  RR: 28 (28 Jan 2020 16:06) (18 - 28)  SpO2: 90% (28 Jan 2020 16:06) (90% - 98%)    PHYSICAL EXAM:    GENERAL: NAD  HEENT: PERRL, +EOMI  NECK: soft, supple  CHEST/LUNG: Clear to auscultation bilaterally; non-labored respirations on NC  HEART: S1S2+, Regular rate and rhythm; No murmurs, rubs, or gallops  ABDOMEN: Soft, Nontender, Nondistended; Bowel sounds present  SKIN: warm, dry, erythematous rash to buttocks, thighs b/l, no excoriations, no vesicles or pustules  NEURO: awake, alert      LABS:                        11.7   6.45  )-----------( 190      ( 28 Jan 2020 12:37 )             35.3     01-28    130<L>  |  90<L>  |  12.0  ----------------------------<  80  4.4   |  33.0<H>  |  0.60    Ca    8.7      28 Jan 2020 12:37  Mg     1.7     01-28    TPro  6.0<L>  /  Alb  2.6<L>  /  TBili  0.5  /  DBili  x   /  AST  40<H>  /  ALT  32  /  AlkPhos  78  01-28        MEDICATIONS  (STANDING):  albuterol/ipratropium for Nebulization 3 milliLiter(s) Nebulizer every 6 hours  albuterol/ipratropium for Nebulization. 3 milliLiter(s) Nebulizer once  benztropine 1 milliGRAM(s) Oral two times a day  calcium carbonate 1250 mG  + Vitamin D (OsCal 500 + D) 1 Tablet(s) Oral daily  chlorhexidine 2% Cloths 1 Application(s) Topical daily  collagenase Ointment 1 Application(s) Topical daily  diVALproex DR 1000 milliGRAM(s) Oral two times a day  haloperidol     Tablet 5 milliGRAM(s) Oral two times a day  influenza   Vaccine 0.5 milliLiter(s) IntraMuscular once  lactulose Syrup 10 Gram(s) Oral daily  loratadine 10 milliGRAM(s) Oral daily  metoprolol tartrate 25 milliGRAM(s) Oral two times a day  nicotine - 21 mG/24Hr(s) Patch 1 patch Transdermal daily  nystatin/triamcinolone Cream 1 Application(s) Topical two times a day  pantoprazole    Tablet 40 milliGRAM(s) Oral before breakfast  polyethylene glycol 3350 17 Gram(s) Oral at bedtime  risperiDONE   Tablet 4 milliGRAM(s) Oral daily  rOPINIRole 0.5 milliGRAM(s) Oral two times a day  senna 2 Tablet(s) Oral at bedtime    MEDICATIONS  (PRN):      RADIOLOGY & ADDITIONAL TESTS:

## 2020-01-28 NOTE — CHART NOTE - NSCHARTNOTEFT_GEN_A_CORE
70 yo M with End Stage Emphysema COPD Exacerbation. Unable to tolerate Nocturnal BIPAP. Remains very lethargic during day. No change in his  condition or GOC. Waiting for SNF to accept him when discharged. MOLST Directive in place. Signing off his case-reconsult if needed Kacey Romano NP Palliative Team

## 2020-01-29 ENCOUNTER — TRANSCRIPTION ENCOUNTER (OUTPATIENT)
Age: 70
End: 2020-01-29

## 2020-01-29 VITALS
SYSTOLIC BLOOD PRESSURE: 131 MMHG | TEMPERATURE: 98 F | RESPIRATION RATE: 18 BRPM | HEART RATE: 68 BPM | DIASTOLIC BLOOD PRESSURE: 82 MMHG | OXYGEN SATURATION: 94 %

## 2020-01-29 PROCEDURE — 80053 COMPREHEN METABOLIC PANEL: CPT

## 2020-01-29 PROCEDURE — 96375 TX/PRO/DX INJ NEW DRUG ADDON: CPT

## 2020-01-29 PROCEDURE — 83605 ASSAY OF LACTIC ACID: CPT

## 2020-01-29 PROCEDURE — 94660 CPAP INITIATION&MGMT: CPT

## 2020-01-29 PROCEDURE — 97116 GAIT TRAINING THERAPY: CPT

## 2020-01-29 PROCEDURE — 97530 THERAPEUTIC ACTIVITIES: CPT

## 2020-01-29 PROCEDURE — 99239 HOSP IP/OBS DSCHRG MGMT >30: CPT

## 2020-01-29 PROCEDURE — 83735 ASSAY OF MAGNESIUM: CPT

## 2020-01-29 PROCEDURE — 92611 MOTION FLUOROSCOPY/SWALLOW: CPT

## 2020-01-29 PROCEDURE — 92610 EVALUATE SWALLOWING FUNCTION: CPT

## 2020-01-29 PROCEDURE — 97163 PT EVAL HIGH COMPLEX 45 MIN: CPT

## 2020-01-29 PROCEDURE — 84132 ASSAY OF SERUM POTASSIUM: CPT

## 2020-01-29 PROCEDURE — 36600 WITHDRAWAL OF ARTERIAL BLOOD: CPT

## 2020-01-29 PROCEDURE — 87486 CHLMYD PNEUM DNA AMP PROBE: CPT

## 2020-01-29 PROCEDURE — 85730 THROMBOPLASTIN TIME PARTIAL: CPT

## 2020-01-29 PROCEDURE — 82947 ASSAY GLUCOSE BLOOD QUANT: CPT

## 2020-01-29 PROCEDURE — 82435 ASSAY OF BLOOD CHLORIDE: CPT

## 2020-01-29 PROCEDURE — 87798 DETECT AGENT NOS DNA AMP: CPT

## 2020-01-29 PROCEDURE — 82803 BLOOD GASES ANY COMBINATION: CPT

## 2020-01-29 PROCEDURE — 99291 CRITICAL CARE FIRST HOUR: CPT | Mod: 25

## 2020-01-29 PROCEDURE — 87633 RESP VIRUS 12-25 TARGETS: CPT

## 2020-01-29 PROCEDURE — 82330 ASSAY OF CALCIUM: CPT

## 2020-01-29 PROCEDURE — 84100 ASSAY OF PHOSPHORUS: CPT

## 2020-01-29 PROCEDURE — 87086 URINE CULTURE/COLONY COUNT: CPT

## 2020-01-29 PROCEDURE — 82140 ASSAY OF AMMONIA: CPT

## 2020-01-29 PROCEDURE — 85027 COMPLETE CBC AUTOMATED: CPT

## 2020-01-29 PROCEDURE — 80048 BASIC METABOLIC PNL TOTAL CA: CPT

## 2020-01-29 PROCEDURE — 84295 ASSAY OF SERUM SODIUM: CPT

## 2020-01-29 PROCEDURE — 71250 CT THORAX DX C-: CPT

## 2020-01-29 PROCEDURE — 36415 COLL VENOUS BLD VENIPUNCTURE: CPT

## 2020-01-29 PROCEDURE — 85610 PROTHROMBIN TIME: CPT

## 2020-01-29 PROCEDURE — 74230 X-RAY XM SWLNG FUNCJ C+: CPT

## 2020-01-29 PROCEDURE — 93005 ELECTROCARDIOGRAM TRACING: CPT

## 2020-01-29 PROCEDURE — 87581 M.PNEUMON DNA AMP PROBE: CPT

## 2020-01-29 PROCEDURE — 85014 HEMATOCRIT: CPT

## 2020-01-29 PROCEDURE — 96365 THER/PROPH/DIAG IV INF INIT: CPT

## 2020-01-29 PROCEDURE — 71045 X-RAY EXAM CHEST 1 VIEW: CPT

## 2020-01-29 PROCEDURE — 94640 AIRWAY INHALATION TREATMENT: CPT

## 2020-01-29 PROCEDURE — 87040 BLOOD CULTURE FOR BACTERIA: CPT

## 2020-01-29 PROCEDURE — 81001 URINALYSIS AUTO W/SCOPE: CPT

## 2020-01-29 RX ADMIN — PANTOPRAZOLE SODIUM 40 MILLIGRAM(S): 20 TABLET, DELAYED RELEASE ORAL at 05:40

## 2020-01-29 RX ADMIN — CHLORHEXIDINE GLUCONATE 1 APPLICATION(S): 213 SOLUTION TOPICAL at 12:06

## 2020-01-29 RX ADMIN — LACTULOSE 10 GRAM(S): 10 SOLUTION ORAL at 12:05

## 2020-01-29 RX ADMIN — Medication 1 PATCH: at 07:00

## 2020-01-29 RX ADMIN — Medication 25 MILLIGRAM(S): at 05:32

## 2020-01-29 RX ADMIN — HALOPERIDOL DECANOATE 5 MILLIGRAM(S): 100 INJECTION INTRAMUSCULAR at 05:31

## 2020-01-29 RX ADMIN — Medication 1 TABLET(S): at 12:06

## 2020-01-29 RX ADMIN — LORATADINE 10 MILLIGRAM(S): 10 TABLET ORAL at 12:06

## 2020-01-29 RX ADMIN — DIVALPROEX SODIUM 1000 MILLIGRAM(S): 500 TABLET, DELAYED RELEASE ORAL at 05:32

## 2020-01-29 RX ADMIN — Medication 3 MILLILITER(S): at 07:58

## 2020-01-29 RX ADMIN — ROPINIROLE 0.5 MILLIGRAM(S): 8 TABLET, FILM COATED, EXTENDED RELEASE ORAL at 05:32

## 2020-01-29 RX ADMIN — RISPERIDONE 4 MILLIGRAM(S): 4 TABLET ORAL at 12:07

## 2020-01-29 RX ADMIN — Medication 3 MILLILITER(S): at 03:23

## 2020-01-29 RX ADMIN — Medication 1 APPLICATION(S): at 12:06

## 2020-01-29 RX ADMIN — Medication 1 MILLIGRAM(S): at 05:32

## 2020-01-29 RX ADMIN — Medication 1 PATCH: at 12:07

## 2020-01-29 RX ADMIN — Medication 3 MILLILITER(S): at 07:56

## 2020-01-29 RX ADMIN — Medication 1 PATCH: at 11:00

## 2020-01-29 RX ADMIN — Medication 1 APPLICATION(S): at 05:33

## 2020-02-10 ENCOUNTER — EMERGENCY (EMERGENCY)
Facility: HOSPITAL | Age: 70
LOS: 1 days | Discharge: HOME | End: 2020-02-10
Attending: STUDENT IN AN ORGANIZED HEALTH CARE EDUCATION/TRAINING PROGRAM
Payer: MEDICARE

## 2020-02-10 VITALS
OXYGEN SATURATION: 100 % | RESPIRATION RATE: 18 BRPM | SYSTOLIC BLOOD PRESSURE: 140 MMHG | DIASTOLIC BLOOD PRESSURE: 90 MMHG | TEMPERATURE: 99 F | HEART RATE: 75 BPM

## 2020-02-10 PROCEDURE — 70450 CT HEAD/BRAIN W/O DYE: CPT

## 2020-02-10 PROCEDURE — 72125 CT NECK SPINE W/O DYE: CPT

## 2020-02-10 PROCEDURE — 72125 CT NECK SPINE W/O DYE: CPT | Mod: 26

## 2020-02-10 PROCEDURE — 99284 EMERGENCY DEPT VISIT MOD MDM: CPT

## 2020-02-10 PROCEDURE — 70450 CT HEAD/BRAIN W/O DYE: CPT | Mod: 26

## 2020-02-10 PROCEDURE — 99284 EMERGENCY DEPT VISIT MOD MDM: CPT | Mod: 25

## 2020-02-10 NOTE — ED ADULT TRIAGE NOTE - CHIEF COMPLAINT QUOTE
Pt BIBA from [place] for witnessed fall and hitting head, pt on MD Sarkis garcía bedside. Pt denies LOC.

## 2020-02-10 NOTE — ED PROVIDER NOTE - PATIENT PORTAL LINK FT
You can access the FollowMyHealth Patient Portal offered by [facility] by registering at the following website: [email or web address]. By joining [facility]’s FollowMyHealth portal, you will also be able to view your health information using other applications (apps) compatible with our system.

## 2020-02-10 NOTE — ED PROVIDER NOTE - NSFOLLOWUPINSTRUCTIONS_ED_ALL_ED_FT
Head Injury, Adult  There are many types of head injuries. Head injuries can be as minor as a bump, or they can be more severe. More severe head injuries include:  A jarring injury to the brain (concussion).A bruise of the brain (contusion). This means there is bleeding in the brain that can cause swelling.A cracked skull (skull fracture).Bleeding in the brain that collects, clots, and forms a bump (hematoma).After a head injury, you may need to be observed for a while in the emergency department or urgent care. Sometimes admission to the hospital is needed.  After a head injury has happened, most problems occur within the first 24 hours, but side effects may occur up to 7–10 days after the injury. It is important to watch your condition for any changes.  What are the causes?  There are many possible causes of a head injury. A serious head injury may happen to someone who is in a car accident (motor vehicle collision). Other causes of major head injuries include bicycle or motorcycle accidents, sports injuries, and falls.  Risk factors  This condition is more likely to occur in people who:  Drink a lot of alcohol or use drugs.Are over the age of 65.Are at risk for falls.What are the symptoms?  There are many possible symptoms of a head injury. Visible symptoms of a head injury include a bruise, bump, or bleeding at the site of the injury. Other non-visible symptoms include:  Feeling sleepy or not being able to stay awake.Passing out.Headache.Seizures.Dizziness.Confusion.Memory problems.Nausea or vomiting.Other possible symptoms that may develop after the head injury include:  Poor attention and concentration.Fatigue or tiring easily.Irritability.Being uncomfortable around bright lights or loud noises.Anxiety or depression.Disturbed sleep.How is this diagnosed?  This condition can usually be diagnosed based on your symptoms, a description of the injury, and a physical exam. You may also have imaging tests done, such as a CT scan or MRI. You will also be closely watched.  How is this treated?  Treatment for this condition depends on the severity and type of injury you have. The main goal of treatment is to prevent complications and allow the brain time to heal.  For mild head injury, you may be sent home and treatment may include:  Observation. A responsible adult should stay with you for 24 hours after your injury and check on you often.Physical rest.Brain rest.Pain medicines.For severe brain injury, treatment may include:  Close observation. This includes hospitalization with frequent physical exams. You may need to go to a hospital that specializes in head injury.Pain medicines.Breathing support. This may include using a ventilator.Managing the pressure inside the brain (intracranial pressure, or ICP). This may include:  Monitoring the ICP.Giving medicines to decrease the ICP.Positioning you to decrease the ICP.Medicine to prevent seizures.Surgery to stop bleeding or to remove blood clots (craniotomy).Surgery to remove part of the skull (decompressive craniectomy). This allows room for the brain to swell.Follow these instructions at home:  Activity     Rest as much as possible and avoid activities that are physically hard or tiring.Make sure you get enough sleep.Limit activities that require a lot of thought or attention, such as:  Watching TV.Playing memory games and puzzles.Job-related work or homework.Working on the computer, social media, and texting.Avoid activities that could cause another head injury, such as playing sports, until your health care provider approves. Having another head injury, especially before the first one has healed, can be dangerous.Ask your health care provider when it is safe for you to return to your regular activities, including work or school. Ask your health care provider for a step-by-step plan for gradually returning to activities.Ask your health care provider when you can drive, ride a bicycle, or use heavy machinery. Your ability to react may be slower after a brain injury. Never do these activities if you are dizzy.Lifestyle     Do not drink alcohol until your health care provider approves, and avoid drug use. Alcohol and certain drugs may slow your recovery and can put you at risk of further injury.If it is harder than usual to remember things, write them down.If you are easily distracted, try to do one thing at a time.Talk with family members or close friends when making important decisions.Tell your friends, family, a trusted colleague, and  about your injury, symptoms, and restrictions. Have them watch for any new or worsening problems.General instructions     Take over-the-counter and prescription medicines only as told by your health care provider.Have someone stay with you for 24 hours after your head injury. This person should watch you for any changes in your symptoms and be ready to seek medical help, as needed.Keep all follow-up visits as told by your health care provider. This is important.How is this prevented?  Work on improving your balance and strength to avoid falls.Wear a seatbelt when you are in a moving vehicle.Wear a helmet when riding a bicycle, skiing, or doing any other sport or activity that has a risk of injury.Drink alcohol only in moderation.Take safety measures in your home, such as:  Removing clutter and tripping hazards from floors and stairways.Using grab bars in bathrooms and handrails by stairs.Placing non-slip mats on floors and in bathtubs.Improving lighting in dim areas.Get help right away if:  You have:  A severe headache that is not helped by medicine.Trouble walking, have weakness in your arms and legs, or lose your balance.Clear or bloody fluid coming from your nose or ears.Changes in your vision.A seizure.You vomit.Your symptoms get worse.Your speech is slurred.You pass out.You are sleepier and have trouble staying awake.Your pupils change size.These symptoms may represent a serious problem that is an emergency. Do not wait to see if the symptoms will go away. Get medical help right away. Call your local emergency services (911 in the [place] Do not drive yourself to the hospital.   This information is not intended to replace advice given to you by your health care provider. Make sure you discuss any questions you have with your health care provider.

## 2020-02-10 NOTE — ED ADULT NURSE NOTE - CHIEF COMPLAINT QUOTE
Pt BIBA from [facility] for witnessed fall and hitting head, pt on MD Sarkis garcía bedside. Pt denies LOC.

## 2020-02-10 NOTE — ED PROVIDER NOTE - OBJECTIVE STATEMENT
68 yo male hx psychosis copd home o2 dependent, lives in nursing home for full time help with ADLs; patient on prohylactic lovenox for dvt prophylaxis; without assistance got up from bed at nursing home and went into bathroom around 2pm; patient's sister came to visit him around 4pm and he stated he hit his head on toilet; staff then thought it prudent to come to get head CT; patient looks well and has no focal complaints other than mild frontal headache

## 2020-02-10 NOTE — ED ADULT NURSE NOTE - OBJECTIVE STATEMENT
68yo male biba from [facility] s/p fall. pt states tripped in bathroom, + hit head, denies loc, n/v. on lovenox. no obvious injury or deformity. denies change in vision, numbness/tingling. o2 3l nc per baseline

## 2020-02-10 NOTE — ED PEDIATRIC NURSE REASSESSMENT NOTE - NS ED NURSE REASSESS COMMENT FT2
received report from rn lombardi, pt laying in strecther, on o2 4l nc, no s/s of acute distress, awaiting ct results.

## 2020-02-14 NOTE — ED ADULT NURSE NOTE - PRO INTERPRETER NEED 2
English Finasteride Pregnancy And Lactation Text: This medication is absolutely contraindicated during pregnancy. It is unknown if it is excreted in breast milk.

## 2020-05-01 ENCOUNTER — OUTPATIENT (OUTPATIENT)
Dept: OUTPATIENT SERVICES | Facility: HOSPITAL | Age: 70
LOS: 1 days | End: 2020-05-01
Payer: MEDICARE

## 2020-05-01 PROCEDURE — G9001: CPT

## 2020-05-08 ENCOUNTER — EMERGENCY (EMERGENCY)
Facility: HOSPITAL | Age: 70
LOS: 1 days | Discharge: HOME | End: 2020-05-08
Attending: EMERGENCY MEDICINE
Payer: MEDICARE

## 2020-05-08 VITALS
RESPIRATION RATE: 18 BRPM | SYSTOLIC BLOOD PRESSURE: 125 MMHG | TEMPERATURE: 98 F | HEART RATE: 94 BPM | WEIGHT: 119.93 LBS | DIASTOLIC BLOOD PRESSURE: 87 MMHG | OXYGEN SATURATION: 97 %

## 2020-05-08 LAB
ALBUMIN SERPL ELPH-MCNC: 3.7 G/DL — SIGNIFICANT CHANGE UP (ref 3.3–5.2)
ALP SERPL-CCNC: 68 U/L — SIGNIFICANT CHANGE UP (ref 40–120)
ALT FLD-CCNC: 10 U/L — SIGNIFICANT CHANGE UP
ANION GAP SERPL CALC-SCNC: 18 MMOL/L — HIGH (ref 5–17)
AST SERPL-CCNC: 25 U/L — SIGNIFICANT CHANGE UP
BASOPHILS # BLD AUTO: 0.04 K/UL — SIGNIFICANT CHANGE UP (ref 0–0.2)
BASOPHILS NFR BLD AUTO: 0.4 % — SIGNIFICANT CHANGE UP (ref 0–2)
BILIRUB SERPL-MCNC: 0.6 MG/DL — SIGNIFICANT CHANGE UP (ref 0.4–2)
BUN SERPL-MCNC: 17 MG/DL — SIGNIFICANT CHANGE UP (ref 8–20)
CALCIUM SERPL-MCNC: 9.8 MG/DL — SIGNIFICANT CHANGE UP (ref 8.6–10.2)
CHLORIDE SERPL-SCNC: 100 MMOL/L — SIGNIFICANT CHANGE UP (ref 98–107)
CO2 SERPL-SCNC: 24 MMOL/L — SIGNIFICANT CHANGE UP (ref 22–29)
CREAT SERPL-MCNC: 0.71 MG/DL — SIGNIFICANT CHANGE UP (ref 0.5–1.3)
EOSINOPHIL # BLD AUTO: 0.04 K/UL — SIGNIFICANT CHANGE UP (ref 0–0.5)
EOSINOPHIL NFR BLD AUTO: 0.4 % — SIGNIFICANT CHANGE UP (ref 0–6)
GLUCOSE SERPL-MCNC: 60 MG/DL — LOW (ref 70–99)
HCT VFR BLD CALC: 39.2 % — SIGNIFICANT CHANGE UP (ref 39–50)
HGB BLD-MCNC: 12.8 G/DL — LOW (ref 13–17)
IMM GRANULOCYTES NFR BLD AUTO: 0.2 % — SIGNIFICANT CHANGE UP (ref 0–1.5)
LYMPHOCYTES # BLD AUTO: 2.23 K/UL — SIGNIFICANT CHANGE UP (ref 1–3.3)
LYMPHOCYTES # BLD AUTO: 21.8 % — SIGNIFICANT CHANGE UP (ref 13–44)
MCHC RBC-ENTMCNC: 32.7 GM/DL — SIGNIFICANT CHANGE UP (ref 32–36)
MCHC RBC-ENTMCNC: 32.8 PG — SIGNIFICANT CHANGE UP (ref 27–34)
MCV RBC AUTO: 100.5 FL — HIGH (ref 80–100)
MONOCYTES # BLD AUTO: 1 K/UL — HIGH (ref 0–0.9)
MONOCYTES NFR BLD AUTO: 9.8 % — SIGNIFICANT CHANGE UP (ref 2–14)
NEUTROPHILS # BLD AUTO: 6.89 K/UL — SIGNIFICANT CHANGE UP (ref 1.8–7.4)
NEUTROPHILS NFR BLD AUTO: 67.4 % — SIGNIFICANT CHANGE UP (ref 43–77)
PLATELET # BLD AUTO: 415 K/UL — HIGH (ref 150–400)
POTASSIUM SERPL-MCNC: 3.9 MMOL/L — SIGNIFICANT CHANGE UP (ref 3.5–5.3)
POTASSIUM SERPL-SCNC: 3.9 MMOL/L — SIGNIFICANT CHANGE UP (ref 3.5–5.3)
PROT SERPL-MCNC: 7.7 G/DL — SIGNIFICANT CHANGE UP (ref 6.6–8.7)
RBC # BLD: 3.9 M/UL — LOW (ref 4.2–5.8)
RBC # FLD: 13.7 % — SIGNIFICANT CHANGE UP (ref 10.3–14.5)
SODIUM SERPL-SCNC: 142 MMOL/L — SIGNIFICANT CHANGE UP (ref 135–145)
WBC # BLD: 10.22 K/UL — SIGNIFICANT CHANGE UP (ref 3.8–10.5)
WBC # FLD AUTO: 10.22 K/UL — SIGNIFICANT CHANGE UP (ref 3.8–10.5)

## 2020-05-08 PROCEDURE — 99284 EMERGENCY DEPT VISIT MOD MDM: CPT

## 2020-05-08 PROCEDURE — 90792 PSYCH DIAG EVAL W/MED SRVCS: CPT

## 2020-05-08 PROCEDURE — 80053 COMPREHEN METABOLIC PANEL: CPT

## 2020-05-08 PROCEDURE — 36415 COLL VENOUS BLD VENIPUNCTURE: CPT

## 2020-05-08 PROCEDURE — 85027 COMPLETE CBC AUTOMATED: CPT

## 2020-05-08 RX ORDER — OLANZAPINE 15 MG/1
5 TABLET, FILM COATED ORAL ONCE
Refills: 0 | Status: COMPLETED | OUTPATIENT
Start: 2020-05-08 | End: 2020-05-08

## 2020-05-08 RX ADMIN — OLANZAPINE 5 MILLIGRAM(S): 15 TABLET, FILM COATED ORAL at 15:05

## 2020-05-08 RX ADMIN — Medication 1 MILLIGRAM(S): at 15:05

## 2020-05-08 NOTE — ED BEHAVIORAL HEALTH ASSESSMENT NOTE - HPI (INCLUDE ILLNESS QUALITY, SEVERITY, DURATION, TIMING, CONTEXT, MODIFYING FACTORS, ASSOCIATED SIGNS AND SYMPTOMS)
70 yo M w/ PMH schizophrenia, Parkinson disease, COPD sent to ED for agitated behavior.   Pt a&ox2, mood "fine", affect euthymic/full. Offers no complaints now. He took dose of zyprexa and ativan voluntarily. He was verbally aggressive in the ED initially but now calm. He requests symbicort for his breathing. He admits to AH in the past, that were "friendly". Denies current si/hi/avh. Denies CAH. He denies recent stressors. He has poor insight and states that he doesn't need medications any longer. He does not appear to have any paranoid delusions, nor internally preoccupied. Willing to return to nursing home. States he feels safe there.  Spoke with staff nurse. She reports that he has been refusing his medications intermittently and hasn't taken any for the past 3 days. He can be verbally aggressive but does not get violent. Otherwise, eating and sleeping wnl. No si/hi.

## 2020-05-08 NOTE — ED BEHAVIORAL HEALTH ASSESSMENT NOTE - SUMMARY
68 yo M w/ PMH schizophrenia, Parkinson disease, COPD sent to ED for agitated behavior.   Pt a&ox2, mood "fine", affect euthymic/full. Offers no complaints now. He took dose of zyprexa and ativan voluntarily. He was verbally aggressive in the ED initially but now calm. He requests symbicort for his breathing. He admits to AH in the past, that were "friendly". Denies current si/hi/avh. Denies CAH. He denies recent stressors. He has poor insight and states that he doesn't need medications any longer. He does not appear to have any paranoid delusions, nor internally preoccupied. Willing to return to nursing home. States he feels safe there.  Spoke with staff nurse. She reports that he has been refusing his medications intermittently and hasn't taken any for the past 3 days. He can be verbally aggressive but does not get violent. Otherwise, eating and sleeping wnl. No si/hi.

## 2020-05-08 NOTE — CHART NOTE - NSCHARTNOTEFT_GEN_A_CORE
SW Note: HARDEEP received call from MD that pt is medically and psychiatrically cleared to return to [facility]. HARDEEP placed call to [facility] and spoke to Nursing Supervisor Letty, who is aware of pt's return. HARDEEP arranged matt via [facility], MARTI Reagan completed BH questions, NESCOTT left on unit, no further SW services at this time

## 2020-05-08 NOTE — ED ADULT TRIAGE NOTE - CHIEF COMPLAINT QUOTE
Sent in from [facility] for refusing to take medications and aggressive behavior.  Following simple commands in triage.  Pt offering no complaints.

## 2020-05-08 NOTE — ED PROVIDER NOTE - CLINICAL SUMMARY MEDICAL DECISION MAKING FREE TEXT BOX
Patient presents with agitation.  Lab values do not require emergent intervention. willingly took PO meds in ED.  seen and cleared by psych.  will return and f/u.  Uneventful ED observation period. Non toxic.  Well appearing. Discussed results and outcome of testing with the patient.  Patient advised to please follow up with their primary care doctor within the next 24 hours and return to the Emergency Department for worsening symptoms or any other concerns.  Patient advised that their doctor may call  to follow up on the specific results of the tests performed today in the emergency department.

## 2020-05-08 NOTE — ED PROVIDER NOTE - OBJECTIVE STATEMENT
Pertinent PMH/PSH/FHx/SHx and Review of Systems contained within:  Patient presents to the ED for reportedly aggressive behavior at [facility] with refusal to take meds.  VSS.  PMh of psychosis, COPD.  BIBA without intervention.  Patient cooperative with EMS crew but verbally aggressive at times.  Denies S/H TIP.  no trauma.  no other concerns.  Patient then refused to speak with undersigned provider.  no other reported concerns.  Non toxic.  Well appearing. No aggravating or relieving factors. No other pertinent PMH.  No other pertinent PSH.  No other pertinent FHx.  Patient denies EtOH/tobacco/illicit substance use. No fever/chills,  No ear pain/sore throat/dysphagia, No chest pain/palpitations, no SOB/cough/wheeze/stridor, No abdominal pain, No N/V/D, no dysuria/frequency/discharge, No neck/back pain, no rash, no changes in neurological status/function.

## 2020-05-08 NOTE — ED ADULT NURSE NOTE - OBJECTIVE STATEMENT
[facility] sent from [facility] for refusal to take meds, pt offers no c/o pain at this time, uncooperative, unable to obtain further information at this time, will continue to monitor

## 2020-05-08 NOTE — ED ADULT NURSE REASSESSMENT NOTE - NS ED NURSE REASSESS COMMENT FT1
pt sleeping comfortably in stretcher, NAD noted, respirations even and nonlabored. awaiting transport home.

## 2020-05-08 NOTE — ED PROVIDER NOTE - PATIENT PORTAL LINK FT
You can access the FollowMyHealth Patient Portal offered by [facility] by registering at the following website: [email or web address]. By joining [employer]’s FollowMyHealth portal, you will also be able to view your health information using other applications (apps) compatible with our system.

## 2020-05-12 DIAGNOSIS — Z71.89 OTHER SPECIFIED COUNSELING: ICD-10-CM

## 2020-05-20 ENCOUNTER — EMERGENCY (EMERGENCY)
Facility: HOSPITAL | Age: 70
LOS: 1 days | Discharge: HOME | End: 2020-05-20
Attending: EMERGENCY MEDICINE
Payer: MEDICARE

## 2020-05-20 VITALS
RESPIRATION RATE: 18 BRPM | TEMPERATURE: 98 F | SYSTOLIC BLOOD PRESSURE: 133 MMHG | HEIGHT: 65 IN | OXYGEN SATURATION: 99 % | WEIGHT: 100.09 LBS | HEART RATE: 86 BPM | DIASTOLIC BLOOD PRESSURE: 95 MMHG

## 2020-05-20 LAB
ALBUMIN SERPL ELPH-MCNC: 3.8 G/DL — SIGNIFICANT CHANGE UP (ref 3.3–5.2)
ALP SERPL-CCNC: 59 U/L — SIGNIFICANT CHANGE UP (ref 40–120)
ALT FLD-CCNC: 10 U/L — SIGNIFICANT CHANGE UP
ANION GAP SERPL CALC-SCNC: 15 MMOL/L — SIGNIFICANT CHANGE UP (ref 5–17)
AST SERPL-CCNC: 27 U/L — SIGNIFICANT CHANGE UP
BASOPHILS # BLD AUTO: 0.03 K/UL — SIGNIFICANT CHANGE UP (ref 0–0.2)
BASOPHILS NFR BLD AUTO: 0.3 % — SIGNIFICANT CHANGE UP (ref 0–2)
BILIRUB SERPL-MCNC: 0.7 MG/DL — SIGNIFICANT CHANGE UP (ref 0.4–2)
BUN SERPL-MCNC: 21 MG/DL — HIGH (ref 8–20)
CALCIUM SERPL-MCNC: 10 MG/DL — SIGNIFICANT CHANGE UP (ref 8.6–10.2)
CHLORIDE SERPL-SCNC: 92 MMOL/L — LOW (ref 98–107)
CO2 SERPL-SCNC: 27 MMOL/L — SIGNIFICANT CHANGE UP (ref 22–29)
CREAT SERPL-MCNC: 0.82 MG/DL — SIGNIFICANT CHANGE UP (ref 0.5–1.3)
EOSINOPHIL # BLD AUTO: 0.09 K/UL — SIGNIFICANT CHANGE UP (ref 0–0.5)
EOSINOPHIL NFR BLD AUTO: 1 % — SIGNIFICANT CHANGE UP (ref 0–6)
GLUCOSE SERPL-MCNC: 106 MG/DL — HIGH (ref 70–99)
HCT VFR BLD CALC: 40.8 % — SIGNIFICANT CHANGE UP (ref 39–50)
HGB BLD-MCNC: 13.7 G/DL — SIGNIFICANT CHANGE UP (ref 13–17)
IMM GRANULOCYTES NFR BLD AUTO: 0.1 % — SIGNIFICANT CHANGE UP (ref 0–1.5)
LACTATE BLDV-MCNC: 1.2 MMOL/L — SIGNIFICANT CHANGE UP (ref 0.5–2)
LIDOCAIN IGE QN: 21 U/L — LOW (ref 22–51)
LYMPHOCYTES # BLD AUTO: 2.08 K/UL — SIGNIFICANT CHANGE UP (ref 1–3.3)
LYMPHOCYTES # BLD AUTO: 23.9 % — SIGNIFICANT CHANGE UP (ref 13–44)
MCHC RBC-ENTMCNC: 32.5 PG — SIGNIFICANT CHANGE UP (ref 27–34)
MCHC RBC-ENTMCNC: 33.6 GM/DL — SIGNIFICANT CHANGE UP (ref 32–36)
MCV RBC AUTO: 96.9 FL — SIGNIFICANT CHANGE UP (ref 80–100)
MONOCYTES # BLD AUTO: 0.97 K/UL — HIGH (ref 0–0.9)
MONOCYTES NFR BLD AUTO: 11.1 % — SIGNIFICANT CHANGE UP (ref 2–14)
NEUTROPHILS # BLD AUTO: 5.52 K/UL — SIGNIFICANT CHANGE UP (ref 1.8–7.4)
NEUTROPHILS NFR BLD AUTO: 63.6 % — SIGNIFICANT CHANGE UP (ref 43–77)
PLATELET # BLD AUTO: 261 K/UL — SIGNIFICANT CHANGE UP (ref 150–400)
POTASSIUM SERPL-MCNC: 4.2 MMOL/L — SIGNIFICANT CHANGE UP (ref 3.5–5.3)
POTASSIUM SERPL-SCNC: 4.2 MMOL/L — SIGNIFICANT CHANGE UP (ref 3.5–5.3)
PROT SERPL-MCNC: 7.6 G/DL — SIGNIFICANT CHANGE UP (ref 6.6–8.7)
RBC # BLD: 4.21 M/UL — SIGNIFICANT CHANGE UP (ref 4.2–5.8)
RBC # FLD: 13.9 % — SIGNIFICANT CHANGE UP (ref 10.3–14.5)
SODIUM SERPL-SCNC: 134 MMOL/L — LOW (ref 135–145)
WBC # BLD: 8.7 K/UL — SIGNIFICANT CHANGE UP (ref 3.8–10.5)
WBC # FLD AUTO: 8.7 K/UL — SIGNIFICANT CHANGE UP (ref 3.8–10.5)

## 2020-05-20 PROCEDURE — 93010 ELECTROCARDIOGRAM REPORT: CPT

## 2020-05-20 PROCEDURE — 80053 COMPREHEN METABOLIC PANEL: CPT

## 2020-05-20 PROCEDURE — 36415 COLL VENOUS BLD VENIPUNCTURE: CPT

## 2020-05-20 PROCEDURE — 71045 X-RAY EXAM CHEST 1 VIEW: CPT | Mod: 26

## 2020-05-20 PROCEDURE — 93005 ELECTROCARDIOGRAM TRACING: CPT

## 2020-05-20 PROCEDURE — 83690 ASSAY OF LIPASE: CPT

## 2020-05-20 PROCEDURE — 85027 COMPLETE CBC AUTOMATED: CPT

## 2020-05-20 PROCEDURE — 83605 ASSAY OF LACTIC ACID: CPT

## 2020-05-20 PROCEDURE — 99283 EMERGENCY DEPT VISIT LOW MDM: CPT | Mod: 25

## 2020-05-20 PROCEDURE — 71045 X-RAY EXAM CHEST 1 VIEW: CPT

## 2020-05-20 PROCEDURE — 99284 EMERGENCY DEPT VISIT MOD MDM: CPT

## 2020-05-20 RX ORDER — DIVALPROEX SODIUM 500 MG/1
1000 TABLET, DELAYED RELEASE ORAL ONCE
Refills: 0 | Status: COMPLETED | OUTPATIENT
Start: 2020-05-20 | End: 2020-05-20

## 2020-05-20 RX ORDER — BENZTROPINE MESYLATE 1 MG
1 TABLET ORAL ONCE
Refills: 0 | Status: COMPLETED | OUTPATIENT
Start: 2020-05-20 | End: 2020-05-20

## 2020-05-20 RX ORDER — METOPROLOL TARTRATE 50 MG
25 TABLET ORAL ONCE
Refills: 0 | Status: COMPLETED | OUTPATIENT
Start: 2020-05-20 | End: 2020-05-20

## 2020-05-20 RX ORDER — SODIUM CHLORIDE 9 MG/ML
1000 INJECTION INTRAMUSCULAR; INTRAVENOUS; SUBCUTANEOUS ONCE
Refills: 0 | Status: COMPLETED | OUTPATIENT
Start: 2020-05-20 | End: 2020-05-20

## 2020-05-20 RX ORDER — HALOPERIDOL DECANOATE 100 MG/ML
5 INJECTION INTRAMUSCULAR ONCE
Refills: 0 | Status: COMPLETED | OUTPATIENT
Start: 2020-05-20 | End: 2020-05-20

## 2020-05-20 RX ORDER — SENNA PLUS 8.6 MG/1
2 TABLET ORAL AT BEDTIME
Refills: 0 | Status: DISCONTINUED | OUTPATIENT
Start: 2020-05-20 | End: 2020-05-25

## 2020-05-20 RX ORDER — ROPINIROLE 8 MG/1
0.5 TABLET, FILM COATED, EXTENDED RELEASE ORAL ONCE
Refills: 0 | Status: COMPLETED | OUTPATIENT
Start: 2020-05-20 | End: 2020-05-20

## 2020-05-20 RX ADMIN — SENNA PLUS 2 TABLET(S): 8.6 TABLET ORAL at 23:03

## 2020-05-20 RX ADMIN — DIVALPROEX SODIUM 1000 MILLIGRAM(S): 500 TABLET, DELAYED RELEASE ORAL at 23:03

## 2020-05-20 RX ADMIN — HALOPERIDOL DECANOATE 5 MILLIGRAM(S): 100 INJECTION INTRAMUSCULAR at 23:03

## 2020-05-20 RX ADMIN — ROPINIROLE 0.5 MILLIGRAM(S): 8 TABLET, FILM COATED, EXTENDED RELEASE ORAL at 23:03

## 2020-05-20 RX ADMIN — Medication 1 MILLIGRAM(S): at 23:03

## 2020-05-20 RX ADMIN — Medication 25 MILLIGRAM(S): at 23:03

## 2020-05-20 NOTE — ED PROVIDER NOTE - NSFOLLOWUPINSTRUCTIONS_ED_ALL_ED_FT
Schizophrenia    WHAT YOU NEED TO KNOW:    What is schizophrenia? Schizophrenia is a long-term mental disease that affects how your brain works. Schizophrenia may change how you think, feel, and behave. You may not be able to know what is real and what is not real. Your thoughts may not be clear, or may jump from one topic to another.    What increases my risk for schizophrenia? Healthcare providers do not exactly know what causes schizophrenia. Stressful events or accidents may trigger symptoms. The following may increase your risk:    You have a family member with schizophrenia.      You were exposed to substances such as amphetamines and opiates.    What are the signs and symptoms of schizophrenia?     Delusions: These are false ideas. You may believe that someone is spying on you, or that you are someone famous.      Hallucinations: You see, feel, taste, hear, or smell something that is not real.       Disordered thinking and speech: When you talk, you move from one subject to another in a way that does not make sense. You make up your own words or sounds.      Lack of emotion: You lack facial expressions and do not express emotion.      Lack of drive or initiative: You have less ability to start and continue a planned activity.      Social withdrawal: You avoid gatherings with family and friends.      Cognitive symptoms: These may affect your attention, memory, and to plan and organize things.    How is schizophrenia diagnosed? Your healthcare provider will examine you. He will ask if you have a history of psychological trauma, such as physical, sexual, or mental abuse. He will ask if you were given the care that you needed when you needed it. He will ask if you have a history of alcohol or drug abuse. Your healthcare provider will ask if you want to hurt or kill yourself or others. He will also ask about your hobbies and goals, the people in your life who support you, and how you feel about treatment. The answers to these questions help healthcare providers plan your treatment.     Which medicines are used to treat schizophrenia?     Antipsychotics: These help decrease psychotic symptoms and severe agitation. You may need antiparkinson medicine to control muscle stiffness, twitches, and restlessness caused by antipsychotic medicines.      Antianxiety medicine: This medicine may be given to decrease anxiety and help you feel calm and relaxed.       Antidepressants: These help with symptoms of depression and anxiety.      Mood stabilizers: These control mood swings.      Tranquilizers: These increase feelings of being calm and relaxed.    Which therapies are used to treat schizophrenia?     Assertive community treatment: A team of healthcare providers and support groups in your community help you with your therapy.       Cognitive behavior therapy: This therapy helps you to change certain behaviors. It will help you handle symptoms such as hallucinations and delusions.      Illness-management skills: This type of therapy teaches you what you can do to help manage your disease.      Family psychoeducation: Your family will be part of your therapy.       Social skills training: This training helps you learn how to get along with other people.      Supported employment: This is a form of therapy where you are placed into a job that fits your skills. It will help give you independence and self-confidence.      Electroconvulsive therapy: This is a type of shock therapy, also called ECT. This therapy passes a small amount of electricity to the brain.     What are the risks of schizophrenia? If untreated, your signs and symptoms may get worse. Your illness may make it hard to work or get along with others. It may also change the way you eat and sleep. These changes may make you suffer other illnesses and diseases. Schizophrenia may also damage your brain.    How do I find support and more information?     [facility] [place] [facility]  [address]  [place] [address]  Phone: [phone]  Phone: [phone]  Web Address: http://www.sandy.org      National [place] of [facility] ([facility]), Public Information & Communication Branch  [address], [facility] [address]  [place], [id number]   Phone: [phone]  Phone: [phone]  Web Address: http://www.[facility].nih.gov/      When should I contact my healthcare provider?     You feel that you are having symptoms of schizophrenia.      You are not able to sleep well, or are sleeping more than usual.      You cannot eat or are eating more than usual.      You have questions or concerns about your condition or care.    When should I seek immediate care or call 911?     You think about killing yourself or someone else.      You have a rash, swelling, or trouble breathing after you take your medicine. THIS PATIENT WAS TOLERATING PO INTAKE AND TOOK HIS MEDICATIONS.  HE WAS COOPERATIVE AND ENGAGING. PLEASE FOLLOW UP WITH HIS PMD AND PSYCHIATRIST FOR BEHAVIOR MODIFICATION AND THERAPIES.  THANK YOU.     Schizophrenia    WHAT YOU NEED TO KNOW:    What is schizophrenia? Schizophrenia is a long-term mental disease that affects how your brain works. Schizophrenia may change how you think, feel, and behave. You may not be able to know what is real and what is not real. Your thoughts may not be clear, or may jump from one topic to another.    What increases my risk for schizophrenia? Healthcare providers do not exactly know what causes schizophrenia. Stressful events or accidents may trigger symptoms. The following may increase your risk:    You have a family member with schizophrenia.      You were exposed to substances such as amphetamines and opiates.    What are the signs and symptoms of schizophrenia?     Delusions: These are false ideas. You may believe that someone is spying on you, or that you are someone famous.      Hallucinations: You see, feel, taste, hear, or smell something that is not real.       Disordered thinking and speech: When you talk, you move from one subject to another in a way that does not make sense. You make up your own words or sounds.      Lack of emotion: You lack facial expressions and do not express emotion.      Lack of drive or initiative: You have less ability to start and continue a planned activity.      Social withdrawal: You avoid gatherings with family and friends.      Cognitive symptoms: These may affect your attention, memory, and to plan and organize things.    How is schizophrenia diagnosed? Your healthcare provider will examine you. He will ask if you have a history of psychological trauma, such as physical, sexual, or mental abuse. He will ask if you were given the care that you needed when you needed it. He will ask if you have a history of alcohol or drug abuse. Your healthcare provider will ask if you want to hurt or kill yourself or others. He will also ask about your hobbies and goals, the people in your life who support you, and how you feel about treatment. The answers to these questions help healthcare providers plan your treatment.     Which medicines are used to treat schizophrenia?     Antipsychotics: These help decrease psychotic symptoms and severe agitation. You may need antiparkinson medicine to control muscle stiffness, twitches, and restlessness caused by antipsychotic medicines.      Antianxiety medicine: This medicine may be given to decrease anxiety and help you feel calm and relaxed.       Antidepressants: These help with symptoms of depression and anxiety.      Mood stabilizers: These control mood swings.      Tranquilizers: These increase feelings of being calm and relaxed.    Which therapies are used to treat schizophrenia?     Assertive community treatment: A team of healthcare providers and support groups in your community help you with your therapy.       Cognitive behavior therapy: This therapy helps you to change certain behaviors. It will help you handle symptoms such as hallucinations and delusions.      Illness-management skills: This type of therapy teaches you what you can do to help manage your disease.      Family psychoeducation: Your family will be part of your therapy.       Social skills training: This training helps you learn how to get along with other people.      Supported employment: This is a form of therapy where you are placed into a job that fits your skills. It will help give you independence and self-confidence.      Electroconvulsive therapy: This is a type of shock therapy, also called ECT. This therapy passes a small amount of electricity to the brain.     What are the risks of schizophrenia? If untreated, your signs and symptoms may get worse. Your illness may make it hard to work or get along with others. It may also change the way you eat and sleep. These changes may make you suffer other illnesses and diseases. Schizophrenia may also damage your brain.    How do I find support and more information?     [facility] [place] on [facility] Illness  [address]  [place] [address]  Phone: [phone]  Phone: [phone]  Web Address: http://www.[place].org      [facility] [place] of Mental Health ([facility]), Public Information & Communication Branch  [address] [place] [address], [address], [facility] [address]  [place], [id number]   Phone: [phone]  Phone: [phone]  Web Address: http://www.[facility].nih.gov/      When should I contact my healthcare provider?     You feel that you are having symptoms of schizophrenia.      You are not able to sleep well, or are sleeping more than usual.      You cannot eat or are eating more than usual.      You have questions or concerns about your condition or care.    When should I seek immediate care or call 911?     You think about killing yourself or someone else.      You have a rash, swelling, or trouble breathing after you take your medicine.

## 2020-05-20 NOTE — ED PROVIDER NOTE - PATIENT PORTAL LINK FT
You can access the FollowMyHealth Patient Portal offered by [facility] by registering at the following website: [email or web address]. By joining [employer]’s FollowMyHealth portal, you will also be able to view your health information using other applications (apps) compatible with our system.

## 2020-05-20 NOTE — ED PROVIDER NOTE - NS ED ROS FT
Constitutional: (-) fever  (-)chills  (-)sweats  Eyes/ENT: (-) blurry vision, (-) epistaxis  (-)rhinorrhea   (-) sore throat    Cardiovascular: (-) chest pain, (-) palpitations (-) edema   Respiratory: (-) cough, (-) shortness of breath   Gastrointestinal: (-)nausea  (-)vomiting, (-) diarrhea  (-) abdominal pain   :  (-)dysuria, (-)frequency, (-)urgency, (-)hematuria  Musculoskeletal: (-) neck pain, (-) back pain, (-) joint pain  Integumentary: (-) rash, (-) edema  Neurological: (-) headache, (-) altered mental status  (-)LOC Dwight Braswell, daughter

## 2020-05-20 NOTE — ED ADULT NURSE REASSESSMENT NOTE - NS ED NURSE REASSESS COMMENT FT1
report received from albina caceres. pt resting on stretcher, NAD noted, respirations even and nonlabored. pt refusing fluids.

## 2020-05-20 NOTE — ED ADULT NURSE NOTE - CHIEF COMPLAINT QUOTE
pt BIBA from [facility] for failure to thrive over the last week. As per EMS, staff states patient refusing to eat or drink and refusing all care.  Pt DNR/DNI- molst in chart. Pt denies any symptoms at this time. NAD noted, respirations even and unlabored. Pt mucous membranes dry. Denies any covid contact at nursing home.

## 2020-05-20 NOTE — ED ADULT NURSE NOTE - SUICIDE SCREENING DEPRESSION
"Chief Complaint   Patient presents with     Pre-Op Exam       Initial /71 mmHg  Pulse 64  Temp(Src) 97.4  F (36.3  C) (Oral)  Ht 4' 8\" (1.422 m)  Wt 134 lb 3.2 oz (60.873 kg)  BMI 30.10 kg/m2  SpO2 98% Estimated body mass index is 30.1 kg/(m^2) as calculated from the following:    Height as of this encounter: 4' 8\" (1.422 m).    Weight as of this encounter: 134 lb 3.2 oz (60.873 kg).  BP completed using cuff size: janie Henderson MA      " Negative

## 2020-05-20 NOTE — ED ADULT TRIAGE NOTE - CHIEF COMPLAINT QUOTE
pt BIBA from [facility] for failure to thrive over the last week. As per EMS, staff states patient refusing to eat or drink and refusing all care.  Pt DNR/DNI- molst in chart. Pt denies any symptoms at this time. NAD noted, respirations even and unlabored. Pt mucous membranes dry. Denies any covid contact at nursing home.

## 2020-05-20 NOTE — ED ADULT NURSE NOTE - OBJECTIVE STATEMENT
Patient A&Ox4, denies any pain or discomfort. Denies any chest pain, shortness of breath, nausea or dizziness. Respirations even & unlabored. Negative obvious distress. Stated was sent to ED for blood work. See triage note for patients current visit to ED.

## 2020-05-20 NOTE — ED PROVIDER NOTE - PHYSICAL EXAMINATION
General:     NAD,  Head:     NC/AT, EOMI, oral mucosa moist  Neck:     trachea midline  Lungs:     CTA b/l, no w/r/r  CVS:     S1S2, RRR, no m/g/r  Abd:     +BS, s/nt/nd, no organomegaly  Ext:    2+ radial and pedal pulses, no c/c/e  Neuro: grossly intact

## 2020-05-20 NOTE — ED PROVIDER NOTE - PROGRESS NOTE DETAILS
patient asking for something to drink and eat. given ginger ale and greek yogurt which he quickly ate. patient tolerating po, cooperative and able to tolerate meds.

## 2020-05-20 NOTE — ED PROVIDER NOTE - OBJECTIVE STATEMENT
69yoM; with Parkinson's, Schizophrenia, COPD; now p/w decreased PO intake as per facility. patient with no complaints. denies f/c/s. denies cough. denies abd pain. denies n/v/d.  PMH: Parkinson's, Schizophrenia, COPD  SOCIAL: No tobacco/illicit substance use/EtOH

## 2020-05-20 NOTE — ED ADULT TRIAGE NOTE - HEIGHT IN CM
How Severe Is Your Skin Lesion?: mild Is This A New Presentation, Or A Follow-Up?: Skin Lesions 165.1

## 2020-05-20 NOTE — ED PROVIDER NOTE - CLINICAL SUMMARY MEDICAL DECISION MAKING FREE TEXT BOX
patient with normal labs, xray normal, tolerated fluids and yogurt. tolerated PO meds and agreed to eat and take meds at facility. spoke to facility to discuss patient's goals of care and that patient is cooperative, eating/drinking, and taking medications. will dc and suggest f/up with pmd and psychiatrist.

## 2020-05-20 NOTE — ED ADULT NURSE NOTE - INTERVENTIONS DEFINITIONS
Instruct patient to call for assistance/Physically safe environment: no spills, clutter or unnecessary equipment/Stretcher in lowest position, wheels locked, appropriate side rails in place

## 2020-07-07 ENCOUNTER — EMERGENCY (EMERGENCY)
Facility: HOSPITAL | Age: 70
LOS: 1 days | Discharge: OTHER INSTITUTION | End: 2020-07-07
Attending: EMERGENCY MEDICINE
Payer: MEDICARE

## 2020-07-07 VITALS
HEIGHT: 65 IN | WEIGHT: 123.9 LBS | TEMPERATURE: 98 F | OXYGEN SATURATION: 94 % | HEART RATE: 66 BPM | DIASTOLIC BLOOD PRESSURE: 74 MMHG | RESPIRATION RATE: 18 BRPM | SYSTOLIC BLOOD PRESSURE: 103 MMHG

## 2020-07-07 LAB
ALBUMIN SERPL ELPH-MCNC: 3.6 G/DL — SIGNIFICANT CHANGE UP (ref 3.3–5.2)
ALP SERPL-CCNC: 61 U/L — SIGNIFICANT CHANGE UP (ref 40–120)
ALT FLD-CCNC: 30 U/L — SIGNIFICANT CHANGE UP
ANION GAP SERPL CALC-SCNC: 11 MMOL/L — SIGNIFICANT CHANGE UP (ref 5–17)
APPEARANCE UR: CLEAR — SIGNIFICANT CHANGE UP
AST SERPL-CCNC: 54 U/L — HIGH
BASOPHILS # BLD AUTO: 0.04 K/UL — SIGNIFICANT CHANGE UP (ref 0–0.2)
BASOPHILS NFR BLD AUTO: 0.5 % — SIGNIFICANT CHANGE UP (ref 0–2)
BILIRUB SERPL-MCNC: 0.5 MG/DL — SIGNIFICANT CHANGE UP (ref 0.4–2)
BILIRUB UR-MCNC: NEGATIVE — SIGNIFICANT CHANGE UP
BUN SERPL-MCNC: 26 MG/DL — HIGH (ref 8–20)
CALCIUM SERPL-MCNC: 9.7 MG/DL — SIGNIFICANT CHANGE UP (ref 8.6–10.2)
CHLORIDE SERPL-SCNC: 97 MMOL/L — LOW (ref 98–107)
CO2 SERPL-SCNC: 31 MMOL/L — HIGH (ref 22–29)
COLOR SPEC: YELLOW — SIGNIFICANT CHANGE UP
CREAT SERPL-MCNC: 0.57 MG/DL — SIGNIFICANT CHANGE UP (ref 0.5–1.3)
DIFF PNL FLD: NEGATIVE — SIGNIFICANT CHANGE UP
EOSINOPHIL # BLD AUTO: 0.27 K/UL — SIGNIFICANT CHANGE UP (ref 0–0.5)
EOSINOPHIL NFR BLD AUTO: 3.6 % — SIGNIFICANT CHANGE UP (ref 0–6)
EPI CELLS # UR: SIGNIFICANT CHANGE UP
GLUCOSE SERPL-MCNC: 87 MG/DL — SIGNIFICANT CHANGE UP (ref 70–99)
GLUCOSE UR QL: NEGATIVE MG/DL — SIGNIFICANT CHANGE UP
HCT VFR BLD CALC: 41.2 % — SIGNIFICANT CHANGE UP (ref 39–50)
HGB BLD-MCNC: 13.5 G/DL — SIGNIFICANT CHANGE UP (ref 13–17)
IMM GRANULOCYTES NFR BLD AUTO: 0.1 % — SIGNIFICANT CHANGE UP (ref 0–1.5)
KETONES UR-MCNC: NEGATIVE — SIGNIFICANT CHANGE UP
LEUKOCYTE ESTERASE UR-ACNC: ABNORMAL
LYMPHOCYTES # BLD AUTO: 1.85 K/UL — SIGNIFICANT CHANGE UP (ref 1–3.3)
LYMPHOCYTES # BLD AUTO: 24.4 % — SIGNIFICANT CHANGE UP (ref 13–44)
MCHC RBC-ENTMCNC: 32.4 PG — SIGNIFICANT CHANGE UP (ref 27–34)
MCHC RBC-ENTMCNC: 32.8 GM/DL — SIGNIFICANT CHANGE UP (ref 32–36)
MCV RBC AUTO: 98.8 FL — SIGNIFICANT CHANGE UP (ref 80–100)
MONOCYTES # BLD AUTO: 0.93 K/UL — HIGH (ref 0–0.9)
MONOCYTES NFR BLD AUTO: 12.3 % — SIGNIFICANT CHANGE UP (ref 2–14)
NEUTROPHILS # BLD AUTO: 4.48 K/UL — SIGNIFICANT CHANGE UP (ref 1.8–7.4)
NEUTROPHILS NFR BLD AUTO: 59.1 % — SIGNIFICANT CHANGE UP (ref 43–77)
NITRITE UR-MCNC: NEGATIVE — SIGNIFICANT CHANGE UP
PH UR: 5 — SIGNIFICANT CHANGE UP (ref 5–8)
PLATELET # BLD AUTO: 244 K/UL — SIGNIFICANT CHANGE UP (ref 150–400)
POTASSIUM SERPL-MCNC: 4.6 MMOL/L — SIGNIFICANT CHANGE UP (ref 3.5–5.3)
POTASSIUM SERPL-SCNC: 4.6 MMOL/L — SIGNIFICANT CHANGE UP (ref 3.5–5.3)
PROT SERPL-MCNC: 6.9 G/DL — SIGNIFICANT CHANGE UP (ref 6.6–8.7)
PROT UR-MCNC: 15 MG/DL
RBC # BLD: 4.17 M/UL — LOW (ref 4.2–5.8)
RBC # FLD: 13.3 % — SIGNIFICANT CHANGE UP (ref 10.3–14.5)
RBC CASTS # UR COMP ASSIST: NEGATIVE /HPF — SIGNIFICANT CHANGE UP (ref 0–4)
SODIUM SERPL-SCNC: 139 MMOL/L — SIGNIFICANT CHANGE UP (ref 135–145)
SP GR SPEC: 1.02 — SIGNIFICANT CHANGE UP (ref 1.01–1.02)
UROBILINOGEN FLD QL: 8 MG/DL
VALPROATE SERPL-MCNC: <3.7 UG/ML — LOW (ref 50–100)
WBC # BLD: 7.58 K/UL — SIGNIFICANT CHANGE UP (ref 3.8–10.5)
WBC # FLD AUTO: 7.58 K/UL — SIGNIFICANT CHANGE UP (ref 3.8–10.5)
WBC UR QL: SIGNIFICANT CHANGE UP

## 2020-07-07 PROCEDURE — 90792 PSYCH DIAG EVAL W/MED SRVCS: CPT

## 2020-07-07 PROCEDURE — 99220: CPT | Mod: CS

## 2020-07-07 PROCEDURE — 93010 ELECTROCARDIOGRAM REPORT: CPT

## 2020-07-07 RX ORDER — MAGNESIUM HYDROXIDE 400 MG/1
30 TABLET, CHEWABLE ORAL ONCE
Refills: 0 | Status: DISCONTINUED | OUTPATIENT
Start: 2020-07-07 | End: 2020-07-08

## 2020-07-07 RX ORDER — LACTULOSE 10 G/15ML
10 SOLUTION ORAL DAILY
Refills: 0 | Status: DISCONTINUED | OUTPATIENT
Start: 2020-07-07 | End: 2020-07-08

## 2020-07-07 RX ORDER — PANTOPRAZOLE SODIUM 20 MG/1
40 TABLET, DELAYED RELEASE ORAL
Refills: 0 | Status: DISCONTINUED | OUTPATIENT
Start: 2020-07-07 | End: 2020-07-12

## 2020-07-07 RX ORDER — LANOLIN ALCOHOL/MO/W.PET/CERES
5 CREAM (GRAM) TOPICAL AT BEDTIME
Refills: 0 | Status: DISCONTINUED | OUTPATIENT
Start: 2020-07-07 | End: 2020-07-12

## 2020-07-07 RX ORDER — DIPHENHYDRAMINE HCL 50 MG
50 CAPSULE ORAL ONCE
Refills: 0 | Status: COMPLETED | OUTPATIENT
Start: 2020-07-07 | End: 2020-07-07

## 2020-07-07 RX ORDER — METOPROLOL TARTRATE 50 MG
25 TABLET ORAL
Refills: 0 | Status: DISCONTINUED | OUTPATIENT
Start: 2020-07-07 | End: 2020-07-12

## 2020-07-07 RX ORDER — SENNA PLUS 8.6 MG/1
2 TABLET ORAL AT BEDTIME
Refills: 0 | Status: DISCONTINUED | OUTPATIENT
Start: 2020-07-07 | End: 2020-07-08

## 2020-07-07 RX ORDER — ROPINIROLE 8 MG/1
0.25 TABLET, FILM COATED, EXTENDED RELEASE ORAL
Refills: 0 | Status: DISCONTINUED | OUTPATIENT
Start: 2020-07-07 | End: 2020-07-12

## 2020-07-07 RX ORDER — DIPHENHYDRAMINE HCL 50 MG
50 CAPSULE ORAL ONCE
Refills: 0 | Status: DISCONTINUED | OUTPATIENT
Start: 2020-07-07 | End: 2020-07-07

## 2020-07-07 RX ORDER — HALOPERIDOL DECANOATE 100 MG/ML
100 INJECTION INTRAMUSCULAR ONCE
Refills: 0 | Status: DISCONTINUED | OUTPATIENT
Start: 2020-07-07 | End: 2020-07-08

## 2020-07-07 RX ORDER — DIVALPROEX SODIUM 500 MG/1
500 TABLET, DELAYED RELEASE ORAL
Refills: 0 | Status: DISCONTINUED | OUTPATIENT
Start: 2020-07-07 | End: 2020-07-09

## 2020-07-07 RX ORDER — CALCIUM CARBONATE 500(1250)
1 TABLET ORAL DAILY
Refills: 0 | Status: DISCONTINUED | OUTPATIENT
Start: 2020-07-07 | End: 2020-07-12

## 2020-07-07 RX ORDER — BENZTROPINE MESYLATE 1 MG
1 TABLET ORAL
Refills: 0 | Status: DISCONTINUED | OUTPATIENT
Start: 2020-07-07 | End: 2020-07-12

## 2020-07-07 NOTE — ED BEHAVIORAL HEALTH ASSESSMENT NOTE - DESCRIPTION
COPD, parkinson's see hpi Patient exhibits tardive movements of tongue and siddhartha oral area.  He is curled in bed, with poor grooming and poorly articulated speech. Patient is alert and responsive but difficult to understand. Writer was able to discern that he denies any S/H I/I/P, appeared to endorse some paranoid material about home but stated that he feels safe there, does not feel in danger and would like to return.    Vital Signs Last 24 Hrs  T(C): 36.7 (07 Jul 2020 18:01), Max: 36.8 (07 Jul 2020 12:08)  T(F): 98 (07 Jul 2020 18:01), Max: 98.2 (07 Jul 2020 12:08)  HR: 72 (07 Jul 2020 18:01) (66 - 72)  BP: 115/68 (07 Jul 2020 18:01) (103/74 - 115/68)  BP(mean): --  RR: 20 (07 Jul 2020 18:01) (18 - 20)  SpO2: 94% (07 Jul 2020 18:01) (94% - 94%)

## 2020-07-07 NOTE — ED ADULT TRIAGE NOTE - CHIEF COMPLAINT QUOTE
Patient from [facility] c/o increased agitation per staff, pt was placed on IM Haldol and has had intermittent episodes of agitation. Hx COPD and schizophrenia. Pt arrives a&ox3, shouting but able to be deescalated by RN. RR even and unlabored, skin warm and dry. +lip smacking moves noted. Denies any pain

## 2020-07-07 NOTE — ED CDU PROVIDER INITIAL DAY NOTE - OBJECTIVE STATEMENT
69 yo M sent in from [facility] of [facility], COPD, insomia, sob, BPH, epilepsy, HTN, pneumonitis, schizophrenia, UTI sent in for aggressive behavior and agitation. Patient on ativan and haldo. Patient seen by psychiatry, will hold for reassessment after resuming his meds.     DNR/DNI

## 2020-07-07 NOTE — ED BEHAVIORAL HEALTH ASSESSMENT NOTE - REASON
Patient has not been aggressive and denies any S/H I/I/P, however increasingly disorganized and refusing care, along w/medication. Would assess if he is will to accept medications and  further evaluate

## 2020-07-07 NOTE — ED PROVIDER NOTE - OBJECTIVE STATEMENT
69 yo M sent in from [facility] of Parkinson, COPD, insomia, sob, BPH, epilspy, HTN, pneumoitis, schziphrenia, UTI sent in for aggressive behavior and agitation.     DNR/DNI 69 yo M sent in from [facility] of [facility], COPD, insomia, sob, BPH, epilspy, HTN, pneumoitis, schziphrenia, UTI sent in for aggressive behavior and agitation. Patient on ativan and haldo.     DNR/DNI

## 2020-07-07 NOTE — ED PROVIDER NOTE - CLINICAL SUMMARY MEDICAL DECISION MAKING FREE TEXT BOX
71 yo M hx of schizophrenia, non-compliant on meds, send in for aggressive behaviour and refusing to take his meds. Patient calm at the moment. valproic acid low. Patient seen by psych, will attempt to give him his normal meds and reassess.

## 2020-07-07 NOTE — ED BEHAVIORAL HEALTH ASSESSMENT NOTE - HPI (INCLUDE ILLNESS QUALITY, SEVERITY, DURATION, TIMING, CONTEXT, MODIFYING FACTORS, ASSOCIATED SIGNS AND SYMPTOMS)
Patient is a 68 yo M w/ PMH schizophrenia, current resident of [facility], with PMhx of  Parkinson disease, COPD sent to ED for agitated behavior.     Called [facility].  Spoke with MARTI Severino who reported that patient has not been compliant with oral medications.  She reports that he continues to be agitated and yells at people who pass by his room. Nurse reports that patient has increase in hallucinations and talking to himself.  He was seen by psychiatrist last week who recommended patient be given Haldol 100 decanoate. Staff has been unable to give injection.  The psychiatrist recommended that he be sent to the ER for evaluation. He takes intermittently and none in the last few days.  Patient has not been making any suicidal statements. He has been resistant to care.  He has not been violent towards other people otherwise. He has been a resident there for the last 5 months.  When he takes his medications he does much better. He has been more easily agitated the last month. He has been sleeping on and off, and he has been pureed food, and honeythick food which he does not like. Patient is a 70 yo M w/ PMH schizophrenia, current resident of [facility], with PMhx of  Parkinson disease, COPD sent to ED for agitated behavior.    Patient was sent in May 20th for similar behavior. Patient does have a history of intermittent non compliance with medications with mild baseline paranoia and refusal to take medications if he does not recognized them.  On interview, patient is awake and alert with poorly articulated speech. He has reportedly been increasingly disorganized, internally preoccupied, verbally yelling/agitated, and physically resistant to care. Patient is difficult to understand.  He denies any thoughts about hurting himself or others. He reports that he feels safes in the home and is wants to return there. He has not been aggressive towards staff. He became irritable towards writer when discussing medications.   It is unclear if he is willing to take medications.           Called [facility].  Spoke with MARTI Severino who reported that patient has not been compliant with oral medications.  She reports that he continues to be agitated and yells at people who pass by his room. Nurse reports that patient has increase in hallucinations and talking to himself and his symptoms have been worsening for the last few weeks.  .  He was seen by psychiatrist last week who recommended patient be given Haldol 100 decanoate. Staff has been unable to give injection as patient does not allow staff to do so..  The psychiatrist recommended that he be sent to the ER for evaluation. He takes medications  intermittently and no psychiatric medications in the last days.  .  Patient has not been making any suicidal statements but has been aggressive towards staff trying to provide care. He has been a resident there for the last 5 months.  When he takes his medications he does much better. He has been more easily agitated the last month. He has been sleeping on and off, and he has been pureed food, and honeythick food which he does not like. Nurse asks if possible to give patient decanoate injection.

## 2020-07-07 NOTE — ED PROVIDER NOTE - PHYSICAL EXAMINATION
VITAL SIGNS: I have reviewed nursing notes and confirm.  CONSTITUTIONAL: (+) cachectic   SKIN: Skin exam is warm and dry, no acute rash.  HEAD: Normocephalic; atraumatic.  EYES: PERRL, EOM intact; conjunctiva and sclera clear.  ENT: No nasal discharge; airway clear. Throat clear.  NECK: Supple; non tender.    CARD: S1, S2 normal; Regular rate and rhythm.  RESP: No wheezes,  no rales or rhonchi.   ABD:  soft; non-distended; non-tender;   EXT: Normal ROM. No clubbing, cyanosis or edema. (+) peristent movement   NEURO: (+)non-verbal, (+)lip smacking. moves all extremities,

## 2020-07-07 NOTE — ED ADULT TRIAGE NOTE - BMI (KG/M2)
After Visit Summary      Facility     Name Address Phone       [facility] [address]  [place] [address] [phone]            Patient Discharge Summary   4/20/2017    Miguel Hawkins            Please bring this medication reconciliation form to your next doctor’s appointment(s). Please update the form if you stop taking any of these medications or you start taking any new medications including over the counter medications. Also please carry a copy of this form with you at all times in the event of an emergency.    A copy of these discharge instructions was reviewed with and given to the patient/patient representative/Guardian/Caregiver at discharge.          The doctor who took care of you in the hospital     Provider Specialty    Mitul Milton MD General Surgery      Allergies as of 4/20/2017        Reactions    Amlodipine HIVES    Atenolol PRURITUS    Lisinopril HIVES           Discharge Medications              What to Do with Your Medications      CONTINUE taking these medications which have NOT CHANGED        Details    Morning Noon Evening Bedtime As needed    Cholecalciferol 5000 units Cap        Take 1 capsule by mouth daily.   Authorizing Provider:  Radha Brooks                            ferrous sulfate 325 (65 FE) MG tablet        Take 1 tablet by mouth every other day.   Authorizing Provider:  Radha Brooks                            fluticasone 50 MCG/ACT nasal spray   Commonly known as:  FLONASE        Spray 2 sprays in each nostril daily.   Authorizing Provider:  Aleksandra Adam                            hydrochlorothiazide 25 MG tablet   Commonly known as:  HYDRODIURIL        Take 1 tablet by mouth daily.   Authorizing Provider:  Radha Brooks                            omeprazole 40 MG capsule   Commonly known as:  PRILOSEC        Take 1 capsule by mouth 2 times daily.   Authorizing Provider:  Bryce Potter                                                         Your To Do List     Future Appointments Provider Department Dept Phone    4/20/2017 2:45 PM Bryce Potter MD [facility] Gastroenterology [phone]    5/2/2017 9:00 AM Mitul Milton MD [facility] Medical Weight Loss Services [phone]    5/10/2017 8:00 AM Berry Turner MD [facility] Gastroenterology [phone]    10/4/2017 8:15 AM [facility] Laboratory [phone]    10/10/2017 9:00 AM Radha Brooks MD [facility] Internal Medicine [phone]        Discharge Instructions       Post Endoscopy Care      During your procedure today we found: normal anatomy other than slight narrowing near the end of stomach. This was stretched.        You may experience abdominal bloating or cramps due to air used to inflate the stomach during the procedure.  This is common and can be relieved by walking, belching, and passing gas.     If any of the following problems occur, call Dr. Mitul Milton at [phone].    Severe pain/ discomfort in abdomen  Greater difficulty swallowing  Nausea and/or vomiting  Temperature above 101 degrees F  New/ increased bleeding from mouth or rectum, or black, tarry stools  Abdominal bloating not relieved by belching or passing gas  Chest pain or shortness of breath    Follow Up Plan:  1. Resume your regular diet  2. Resume all medications    Due to sedation you received, you may not remember the procedure or the doctor’s explanation afterward.  Our medications sometimes cause dizziness, drowsiness and impaired judgment.  Therefore, please follow these recommendations for the next 24 hours.    Do not drive a car or operate any machinery. Have a responsible adult drive you home.  Do not make critical decisions or sign any legal documents.  Do not drink alcohol  Do not return to work, or perform any tasks that require coordinated activity    Patient and  significant other have verbalized  understanding of these instructions and have  received a copy.         Discharge References/Attachments     None      Pending Labs/Results     Any lab or diagnostic test results that are \"pending\" at time of discharge are listed below. If no results display then none were \"pending\" at time of discharge. Depending on when the test was completed results may be available within 3-5 days. Results can be reviewed with your provider at your next visits or through [facility]. If needed contact the provider office for additional information.          Your Opinion Matters To Us  If you receive a patient satisfaction survey in the mail, please complete and return it in the postage-paid envelope.    We truly value and appreciate your feedback.           Miguel Hawkins        Your To Do List     Future Appointments Provider Department Dept Phone    4/20/2017 2:45 PM Bryce Potter MD [facility] Gastroenterology [phone]    5/2/2017 9:00 AM Mitul Milton MD [facility] Medical Weight Loss Services [phone]    5/10/2017 8:00 AM Berry Turner MD [facility] Gastroenterology [phone]    10/4/2017 8:15 AM [facility] MOB Laboratory [phone]    10/10/2017 9:00 AM Radha Brooks MD [facility] Internal Medicine [phone]      Contact your doctor for follow-up appointment if not already scheduled.     Follow-up information has not been specified.         Miguel Hawkins           Summary of your Discharge Medications      Take these Medications        Details    Morning Noon Evening Bedtime As needed    Cholecalciferol 5000 units Cap    Take 1 capsule by mouth daily.                            ferrous sulfate 325 (65 FE) MG tablet    Take 1 tablet by mouth every other day.                            fluticasone 50 MCG/ACT nasal spray   Commonly known as:  FLONASE    Spray 2 sprays in each nostril daily.                             hydrochlorothiazide 25 MG tablet   Commonly known as:  HYDRODIURIL    Take 1 tablet by mouth daily.                            omeprazole 40 MG capsule   Commonly known as:  PRILOSEC    Take 1 capsule by mouth 2 times daily.                                                          Outpatient Administered Medication List      Notice     You have not been prescribed any facility-administered medications.       20.6

## 2020-07-07 NOTE — ED BEHAVIORAL HEALTH ASSESSMENT NOTE - CURRENT MEDICATION
MEDICATIONS  (STANDING):  divalproex DR 1000 milliGRAM(s) Oral two times a day  haloperidol     Tablet 5 milliGRAM(s) Oral two times a day  risperidone   Tablet 4 milliGRAM(s) Oral daily Depakote 500 mg twice daily, Haldol decanoate 100 mg Q month (pt refusing)

## 2020-07-07 NOTE — ED PROVIDER NOTE - PROGRESS NOTE DETAILS
patient declined benadryl but was calm. Patient seen by psych, will hold for reassessment. will attempt to give him his home meds. will place in Obs for COVID-19 result and reassessment.

## 2020-07-07 NOTE — ED BEHAVIORAL HEALTH ASSESSMENT NOTE - SUMMARY
Patient is a 68 yo M w/ PMH schizophrenia, current resident of [facility], with PMhx of  Parkinson disease, COPD sent to ED for agitated behavior.    Patient was sent in May 20th for similar behavior. Patient does have a history of intermittent non compliance with medications with mild baseline paranoia and refusal to take medications if he does not recognized them.  On interview, patient is awake and alert with poorly articulated speech. He has reportedly been increasingly disorganized, internally preoccupied, verbally yelling/agitated, and physically resistant to care. Patient is difficult to understand.  He denies any thoughts about hurting himself or others. He reports that he feels safes in the home and is wants to return there. He has not been aggressive towards staff. He became irritable towards writer when discussing medications.   It is unclear if he is willing to take medications.           Called [facility].  Spoke with MARTI Severino who reported that patient has not been compliant with oral medications.  She reports that he continues to be agitated and yells at people who pass by his room. Nurse reports that patient has increase in hallucinations and talking to himself and his symptoms have been worsening for the last few weeks.  .  He was seen by psychiatrist last week who recommended patient be given Haldol 100 decanoate. Staff has been unable to give injection as patient does not allow staff to do so..  The psychiatrist recommended that he be sent to the ER for evaluation. He takes medications  intermittently and no psychiatric medications in the last days.  .  Patient has not been making any suicidal statements but has been aggressive towards staff trying to provide care. He has been a resident there for the last 5 months.  When he takes his medications he does much better. He has been more easily agitated the last month. He has been sleeping on and off, and he has been pureed food, and honeythick food which he does not like.     Will hold for re evaluation. Would assess if patient is willing to accept Haldol decanoate 100 mg X 1 dose and Depakote in ER and re assess behavior and need for possible psychiatric admission vs discharge back to NH.

## 2020-07-08 LAB — SARS-COV-2 RNA SPEC QL NAA+PROBE: SIGNIFICANT CHANGE UP

## 2020-07-08 PROCEDURE — 99213 OFFICE O/P EST LOW 20 MIN: CPT

## 2020-07-08 PROCEDURE — 99226: CPT | Mod: CS

## 2020-07-08 NOTE — PROGRESS NOTE BEHAVIORAL HEALTH - RISK ASSESSMENT
Moderate to High: Given medication non compliance, mood lability, increase in agitation, impulsivity, poor insight/judgment.

## 2020-07-08 NOTE — PROGRESS NOTE BEHAVIORAL HEALTH - SUMMARY
Patient is a 68 yo M w/ PMH schizophrenia, current resident of [facility], with PMhx of  Parkinson disease, COPD sent to ED for agitated behavior.    Patient was sent in May 20th for similar behavior. Patient does have a history of intermittent non compliance with medications with mild baseline paranoia and refusal to take medications if he does not recognized them.  On interview, patient is awake and alert with poorly articulated speech. He has reportedly been increasingly disorganized, internally preoccupied, verbally yelling/agitated, and physically resistant to care. Patient is difficult to understand.  He denies any thoughts about hurting himself or others. Patient has been hostile, intrusive, disorganized, and exhibiting symptoms of paranoia with continued refusal to take psychiatric medications secondary to paranoia about medications.  Patient with poor insight/judgment, requires inpatient psychiatric hospitalization for safety and stabilization

## 2020-07-08 NOTE — PROGRESS NOTE BEHAVIORAL HEALTH - NSBHFUPINTERVALHXFT_PSY_A_CORE
Patient has been responding to internal stimuli, agitated with labile mood.  He was intrusive on unit and cursing at other patients.  Patient exhibiting symptoms of paranoia towards writer believes that he is being poisoned. Refused his psychiatric medications.

## 2020-07-08 NOTE — CHART NOTE - NSCHARTNOTEFT_GEN_A_CORE
SW Note: Plan is to transfer pt for inpt psychiatric care. Reviewed NH papers and chart notes. pt walking around the  area independently. Talking to self, argumentative towards staff and other peers. [facility] does not have an appropriate unit at this time due to unit changes at their facility due to Covid pts. Referral made to [facility] but after they ran his insurance they determined they could not accept. Medicare is only part B and Medicaid is thru an elderserve program and has no  benefits.   NO beds at [facility] ( spoke with Ana) and [facility] ( Spoke with Chastity)   staff aware pt will need to stay over night and SW will explore options in the a.m.

## 2020-07-09 ENCOUNTER — INPATIENT (INPATIENT)
Facility: HOSPITAL | Age: 70
LOS: 40 days | Discharge: NURSING, REHAB OR LONG-TERM CARE | End: 2020-08-19
Attending: PSYCHIATRY & NEUROLOGY | Admitting: PSYCHIATRY & NEUROLOGY
Payer: MEDICAID

## 2020-07-09 VITALS
SYSTOLIC BLOOD PRESSURE: 140 MMHG | DIASTOLIC BLOOD PRESSURE: 96 MMHG | RESPIRATION RATE: 16 BRPM | WEIGHT: 92.15 LBS | TEMPERATURE: 96 F | HEIGHT: 66 IN | HEART RATE: 101 BPM

## 2020-07-09 VITALS
HEART RATE: 92 BPM | TEMPERATURE: 98 F | OXYGEN SATURATION: 95 % | RESPIRATION RATE: 20 BRPM | SYSTOLIC BLOOD PRESSURE: 132 MMHG | DIASTOLIC BLOOD PRESSURE: 90 MMHG

## 2020-07-09 DIAGNOSIS — F32.9 MAJOR DEPRESSIVE DISORDER, SINGLE EPISODE, UNSPECIFIED: ICD-10-CM

## 2020-07-09 LAB
CULTURE RESULTS: SIGNIFICANT CHANGE UP
SPECIMEN SOURCE: SIGNIFICANT CHANGE UP

## 2020-07-09 PROCEDURE — 80053 COMPREHEN METABOLIC PANEL: CPT

## 2020-07-09 PROCEDURE — U0003: CPT

## 2020-07-09 PROCEDURE — 85027 COMPLETE CBC AUTOMATED: CPT

## 2020-07-09 PROCEDURE — 36415 COLL VENOUS BLD VENIPUNCTURE: CPT

## 2020-07-09 PROCEDURE — 87086 URINE CULTURE/COLONY COUNT: CPT

## 2020-07-09 PROCEDURE — 80164 ASSAY DIPROPYLACETIC ACD TOT: CPT

## 2020-07-09 PROCEDURE — 99217: CPT | Mod: CS

## 2020-07-09 PROCEDURE — G0378: CPT

## 2020-07-09 PROCEDURE — 99213 OFFICE O/P EST LOW 20 MIN: CPT

## 2020-07-09 PROCEDURE — 93005 ELECTROCARDIOGRAM TRACING: CPT

## 2020-07-09 PROCEDURE — 81001 URINALYSIS AUTO W/SCOPE: CPT

## 2020-07-09 PROCEDURE — 99285 EMERGENCY DEPT VISIT HI MDM: CPT

## 2020-07-09 RX ORDER — VALPROIC ACID (AS SODIUM SALT) 250 MG/5ML
500 SOLUTION, ORAL ORAL EVERY 12 HOURS
Refills: 0 | Status: DISCONTINUED | OUTPATIENT
Start: 2020-07-09 | End: 2020-07-10

## 2020-07-09 RX ORDER — LANOLIN ALCOHOL/MO/W.PET/CERES
5 CREAM (GRAM) TOPICAL AT BEDTIME
Refills: 0 | Status: DISCONTINUED | OUTPATIENT
Start: 2020-07-09 | End: 2020-08-19

## 2020-07-09 RX ORDER — METOPROLOL TARTRATE 50 MG
25 TABLET ORAL EVERY 12 HOURS
Refills: 0 | Status: DISCONTINUED | OUTPATIENT
Start: 2020-07-09 | End: 2020-08-19

## 2020-07-09 RX ORDER — PANTOPRAZOLE SODIUM 20 MG/1
40 TABLET, DELAYED RELEASE ORAL
Refills: 0 | Status: DISCONTINUED | OUTPATIENT
Start: 2020-07-09 | End: 2020-08-19

## 2020-07-09 RX ORDER — CALCIUM CARBONATE 500(1250)
1 TABLET ORAL DAILY
Refills: 0 | Status: DISCONTINUED | OUTPATIENT
Start: 2020-07-09 | End: 2020-08-19

## 2020-07-09 RX ORDER — HALOPERIDOL DECANOATE 100 MG/ML
100 INJECTION INTRAMUSCULAR ONCE
Refills: 0 | Status: COMPLETED | OUTPATIENT
Start: 2020-07-09 | End: 2020-07-09

## 2020-07-09 RX ORDER — BENZTROPINE MESYLATE 1 MG
1 TABLET ORAL
Refills: 0 | Status: DISCONTINUED | OUTPATIENT
Start: 2020-07-09 | End: 2020-08-07

## 2020-07-09 RX ORDER — HYDROXYZINE HCL 10 MG
50 TABLET ORAL EVERY 6 HOURS
Refills: 0 | Status: DISCONTINUED | OUTPATIENT
Start: 2020-07-09 | End: 2020-08-19

## 2020-07-09 RX ORDER — ROPINIROLE 8 MG/1
0.25 TABLET, FILM COATED, EXTENDED RELEASE ORAL EVERY 12 HOURS
Refills: 0 | Status: DISCONTINUED | OUTPATIENT
Start: 2020-07-09 | End: 2020-08-19

## 2020-07-09 RX ORDER — VALPROIC ACID (AS SODIUM SALT) 250 MG/5ML
500 SOLUTION, ORAL ORAL
Refills: 0 | Status: DISCONTINUED | OUTPATIENT
Start: 2020-07-09 | End: 2020-07-12

## 2020-07-09 RX ADMIN — HALOPERIDOL DECANOATE 100 MILLIGRAM(S): 100 INJECTION INTRAMUSCULAR at 11:53

## 2020-07-09 NOTE — CHART NOTE - NSCHARTNOTEFT_GEN_A_CORE
Saw Pt on admission to [facility] 2N unit. Patient is a 68 yo M w/ PMH schizophrenia, current resident of [facility], with PMhx of  Parkinson disease, COPD, initially sent to ED for agitated behavior, transfer from [facility]. renea LOW 9:37 status. EMR was reviewed, Pt was seen. Reportedly Pt was agitated, disorganised, responding to internal stimuli in context of poor adherence to Tx. Currently Pt presents calm, cooperative, denies Sx of psychosis, denies SI/HI. Pt's medications were restarted, other orders entered, Pt will be on enhanced supervision. Medical PA will examine the Pt.

## 2020-07-09 NOTE — ED CDU PROVIDER SUBSEQUENT DAY NOTE - MEDICAL DECISION MAKING DETAILS
medical stable awaiting bh recs gen in nad resp clear cardiac no murmur abd soft neuro intact
pending placement

## 2020-07-09 NOTE — ED ADULT NURSE REASSESSMENT NOTE - GENERAL PATIENT STATE
comfortable appearance
comfortable appearance
resting/sleeping
resting/sleeping/comfortable appearance
anxious
resting/sleeping/comfortable appearance

## 2020-07-09 NOTE — ED CDU PROVIDER SUBSEQUENT DAY NOTE - NS ED ROS FT
unable to obtain ROS secondary to Parkinson and schizophrenia. non-verbal.
unable to obtain ROS secondary to Parkinson and schizophrenia. non-verbal.

## 2020-07-09 NOTE — H&P ADULT - HISTORY OF PRESENT ILLNESS
70 YEARS OLD MALE SENT FROM [facility] TO [facility] FOR PSYCHIATRIC EVALUATION DUE TO AGGRESSIVE BEHAVIOR AND AGITATION .

## 2020-07-09 NOTE — ED BEHAVIORAL HEALTH NOTE - BEHAVIORAL HEALTH NOTE
PROGRESS NOTE: 20 @ 10:32  	  • Reason for Ongoing Consultation: 	psychosis no accepting facility    ID: [id number] Male with HEALTH ISSUES - PROBLEM Dx:          INTERVAL DATA:   • Interval Chief Complaint: Get me out  • Interval History: incessant talking to self seemingly in response to internal stimuli Accuses staff of trying to harm him Observed making racial slurs and profane comments to other patients    REVIEW OF SYSTEMS:   refuses to answer appropriately    REVIEW OF VITALS/LABS/IMAGING/INVESTIGATIONS:   • Vital signs reviewed: Yes  • Vital Signs:	    T(C): 36.7 (20 @ 15:45), Max: 36.7 (20 @ 15:45)  HR: 90 (20 @ 23:37) (69 - 90)  BP: 131/84 (20 @ 23:37) (131/84 - 134/83)  RR: 17 (20 @ 23:37) (17 - 18)  SpO2: 94% (20 @ 23:37) (94% - 97%)    • Available labs reviewed: Yes  • Available Lab Results:                           13.5   7.58  )-----------( 244      ( 2020 12:46 )             41.2     07-    139  |  97<L>  |  26.0<H>  ----------------------------<  87  4.6   |  31.0<H>  |  0.57    Ca    9.7      2020 12:46    TPro  6.9  /  Alb  3.6  /  TBili  0.5  /  DBili  x   /  AST  54<H>  /  ALT  30  /  AlkPhos  61  07-07    LIVER FUNCTIONS - ( 2020 12:46 )  Alb: 3.6 g/dL / Pro: 6.9 g/dL / ALK PHOS: 61 U/L / ALT: 30 U/L / AST: 54 U/L / GGT: x             Urinalysis Basic - ( 2020 20:38 )    Color: Yellow / Appearance: Clear / S.020 / pH: x  Gluc: x / Ketone: Negative  / Bili: Negative / Urobili: 8 mg/dL   Blood: x / Protein: 15 mg/dL / Nitrite: Negative   Leuk Esterase: Trace / RBC: Negative /HPF / WBC 0-2   Sq Epi: x / Non Sq Epi: Occasional / Bacteria: x          MEDICATIONS:      PRN Medications:  • PRN Medications since last evaluation	  • PRN Details	    Current Medications: refusing all PO medications  benztropine 1 milliGRAM(s) Oral two times a day  calcium carbonate    500 mG (Tums) Chewable 1 Tablet(s) Chew daily  divalproex  milliGRAM(s) Oral two times a day  melatonin 5 milliGRAM(s) Oral at bedtime  metoprolol tartrate 25 milliGRAM(s) Oral two times a day  pantoprazole    Tablet 40 milliGRAM(s) Oral before breakfast  ropinirole 0.25 milliGRAM(s) Oral two times a day     Medication Side Effects:  • Medication Side Effects or Adverse Reactions (new or ongoing)	None known    MENTAL STATUS EXAM:   • Level of Consciousness	Alert  • General Appearance	Well developed  • Body Habitus	Well nourished  • Hygiene	poor  • Grooming	poor  • Behavior	uncooperative  • Eye Contact	Good  • Relatedness	Good  • Impulse Control	Normal  • Muscle Tone / Strength	Normal muscle tone/strength  • Abnormal Movements	No abnormal movements  • Gait / Station	Normal gait / station  • Speech Volume	loud  • Speech Rate	rapid  • Speech Spontaneity	Normal  • Speech Articulation	garbled  • Mood	angry  • Affect Quality	hostile  • Affect Range	constricted  • Affect Congruence	Congruent  • Thought Process	Linear  • Thought Associations	Normal  • Thought Content	delusions  • Perceptions	suspected hallucinations  • Oriented to Time	no  • Oriented to Place	Yes  • Oriented to Situation	no  • Oriented to Person	Yes  • Attention / Concentration	Normal  • Estimated Intelligence	Average  • Recent Memory	Normal  • Remote Memory	Normal  • Fund of Knowledge	Normal  • Language	No abnormalities noted  • Judgment (regarding everyday events)	Fair  • Insight (regarding psychiatric illness)	Fair    SUICIDALITY:   • Suicidality (Interval)	none known    HOMICIDALITY/AGGRESSION:   • Homicidality/Aggression	none known    DIAGNOSIS DSM-V:    Psychiatric Diagnosis (Corresponds to DSM-IV Axis I, II): Schizophrenia paranoid type   HEALTH ISSUES - PROBLEM Dx:           Medical Diagnosis (Corresponds to DSM-IV Axis III):  • Axis III	  PAST MEDICAL & SURGICAL HISTORY:  Parkinson disease  Chronic obstructive pulmonary disease, unspecified COPD type  No significant past surgical history      ASSESSMENT OF CURRENT CONDITION:   Summary (include case differential, formulation and patient response to therapy): remains psychotic refusing treatment not stable for return to Nursing Home    Risk Assessment (consider static vs modifiable risk factors and protective factors; comment on level of risk for dangerous behavior): elevated risk of aggressive behaviors    PLAN  involuntary admission  to inpatient psych unit  sw exploring options PROGRESS NOTE: 20 @ 10:32  	  • Reason for Ongoing Consultation: 	psychosis no accepting facility    ID: [id number] Male with HEALTH ISSUES - PROBLEM Dx:          INTERVAL DATA:   • Interval Chief Complaint: Get me out  • Interval History: incessant talking to self seemingly in response to internal stimuli Accuses staff of trying to harm him Observed making racial slurs and profane comments to other patients    REVIEW OF SYSTEMS:   refuses to answer appropriately    REVIEW OF VITALS/LABS/IMAGING/INVESTIGATIONS:   • Vital signs reviewed: Yes  • Vital Signs:	    T(C): 36.7 (20 @ 15:45), Max: 36.7 (20 @ 15:45)  HR: 90 (20 @ 23:37) (69 - 90)  BP: 131/84 (20 @ 23:37) (131/84 - 134/83)  RR: 17 (20 @ 23:37) (17 - 18)  SpO2: 94% (20 @ 23:37) (94% - 97%)    • Available labs reviewed: Yes  • Available Lab Results:                           13.5   7.58  )-----------( 244      ( 2020 12:46 )             41.2     07-    139  |  97<L>  |  26.0<H>  ----------------------------<  87  4.6   |  31.0<H>  |  0.57    Ca    9.7      2020 12:46    TPro  6.9  /  Alb  3.6  /  TBili  0.5  /  DBili  x   /  AST  54<H>  /  ALT  30  /  AlkPhos  61  07-07    LIVER FUNCTIONS - ( 2020 12:46 )  Alb: 3.6 g/dL / Pro: 6.9 g/dL / ALK PHOS: 61 U/L / ALT: 30 U/L / AST: 54 U/L / GGT: x             Urinalysis Basic - ( 2020 20:38 )    Color: Yellow / Appearance: Clear / S.020 / pH: x  Gluc: x / Ketone: Negative  / Bili: Negative / Urobili: 8 mg/dL   Blood: x / Protein: 15 mg/dL / Nitrite: Negative   Leuk Esterase: Trace / RBC: Negative /HPF / WBC 0-2   Sq Epi: x / Non Sq Epi: Occasional / Bacteria: x          MEDICATIONS:      PRN Medications:  • PRN Medications since last evaluation	  • PRN Details	    Current Medications: refusing all PO medications  benztropine 1 milliGRAM(s) Oral two times a day  calcium carbonate    500 mG (Tums) Chewable 1 Tablet(s) Chew daily  divalproex  milliGRAM(s) Oral two times a day  melatonin 5 milliGRAM(s) Oral at bedtime  metoprolol tartrate 25 milliGRAM(s) Oral two times a day  pantoprazole    Tablet 40 milliGRAM(s) Oral before breakfast  ropinirole 0.25 milliGRAM(s) Oral two times a day     Medication Side Effects:  • Medication Side Effects or Adverse Reactions (new or ongoing)	None known    MENTAL STATUS EXAM:   • Level of Consciousness	Alert  • General Appearance	Well developed  • Body Habitus	Well nourished  • Hygiene	poor  • Grooming	poor  • Behavior	uncooperative  • Eye Contact	Good  • Relatedness	Good  • Impulse Control	Normal  • Muscle Tone / Strength	Normal muscle tone/strength  • Abnormal Movements	No abnormal movements  • Gait / Station	Normal gait / station  • Speech Volume	loud  • Speech Rate	rapid  • Speech Spontaneity	Normal  • Speech Articulation	garbled  • Mood	angry  • Affect Quality	hostile  • Affect Range	constricted  • Affect Congruence	Congruent  • Thought Process	Linear  • Thought Associations	Normal  • Thought Content	delusions  • Perceptions	suspected hallucinations  • Oriented to Time	no  • Oriented to Place	Yes  • Oriented to Situation	no  • Oriented to Person	Yes  • Attention / Concentration	Normal  • Estimated Intelligence	Average  • Recent Memory	Normal  • Remote Memory	Normal  • Fund of Knowledge	Normal  • Language	No abnormalities noted  • Judgment (regarding everyday events)	Fair  • Insight (regarding psychiatric illness)	Fair    SUICIDALITY:   • Suicidality (Interval)	none known    HOMICIDALITY/AGGRESSION:   • Homicidality/Aggression	none known    DIAGNOSIS DSM-V:    Psychiatric Diagnosis (Corresponds to DSM-IV Axis I, II): Schizophrenia paranoid type   HEALTH ISSUES - PROBLEM Dx:           Medical Diagnosis (Corresponds to DSM-IV Axis III):  • Axis III	  PAST MEDICAL & SURGICAL HISTORY:  Parkinson disease  Chronic obstructive pulmonary disease, unspecified COPD type  No significant past surgical history      ASSESSMENT OF CURRENT CONDITION:   Summary (include case differential, formulation and patient response to therapy): remains psychotic refusing treatment not stable for return to Nursing Home    Risk Assessment (consider static vs modifiable risk factors and protective factors; comment on level of risk for dangerous behavior): elevated risk of aggressive behaviors    PLAN  Haldol decanoate 100 mg IM  valproic  acid Syrup 500 milliGRAM(s) Oral two times a day    involuntary admission  to inpatient psych unit  sw exploring options

## 2020-07-09 NOTE — ED CDU PROVIDER SUBSEQUENT DAY NOTE - NO SIGNIFICANT PAST SURGICAL HISTORY
<<----- Click to add NO significant Past Surgical History
<<----- Click to add NO significant Past Surgical History

## 2020-07-09 NOTE — ED ADULT NURSE NOTE - CHIEF COMPLAINT QUOTE
Patient from [facility] c/o increased agitation per staff, pt was placed on IM Haldol and has had intermittent episodes of agitation. Hx COPD and schizophrenia. Pt arrives a&ox3, shouting but able to be deescalated by RN. RR even and unlabored, skin warm and dry. +lip smacking moves noted. Denies any pain

## 2020-07-09 NOTE — ED CDU PROVIDER SUBSEQUENT DAY NOTE - HISTORY
pending placement.
71 yo M sent in from [facility], COPD, insomia, sob, BPH, epilepsy, HTN, pneumonitis, schizophrenia, UTI sent in for aggressive behavior and agitation. Patient on ativan and haldo.

## 2020-07-09 NOTE — ED CDU PROVIDER DISPOSITION NOTE - CLINICAL COURSE
This is a 70 yo M w/ [facility] schizophrenia, current resident of [facility], with PMhx of  Parkinson disease, COPD sent to ED for agitated behavior.  Patient was sent in May 20th for similar behavior. Patient does have a history of intermittent non compliance with medications with mild baseline paranoia and refusal to take medications if he does not recognized them.  On interview, patient is awake and alert with poorly articulated speech. He has reportedly been increasingly disorganized, internally preoccupied, verbally yelling/agitated, and physically resistant to care. Patient is difficult to understand.  He denies any thoughts about hurting himself or others. Patient has been hostile, intrusive, disorganized, and exhibiting symptoms of paranoia with continued refusal to take psychiatric medications secondary to paranoia about medications.  Patient with poor insight/judgment, requires inpatient psychiatric hospitalization for safety and stabilization. Patient placed in observation for placement.  Patient transferred to [facility]. This is a 68 yo M w/ PMH schizophrenia, current resident of [facility], with PMhx of  Parkinson disease, COPD sent to ED for agitated behavior.  Patient was sent in May 20th for similar behavior. Patient does have a history of intermittent non compliance with medications with mild baseline paranoia and refusal to take medications if he does not recognized them.  On interview, patient is awake and alert with poorly articulated speech. He has reportedly been increasingly disorganized, internally preoccupied, verbally yelling/agitated, and physically resistant to care. Patient is difficult to understand.  He denies any thoughts about hurting himself or others. Patient has been hostile, intrusive, disorganized, and exhibiting symptoms of paranoia with continued refusal to take psychiatric medications secondary to paranoia about medications.  Patient with poor insight/judgment, requires inpatient psychiatric hospitalization for safety and stabilization. Patient placed in observation for placement.  Patient transferred to [facility].

## 2020-07-09 NOTE — ED CDU PROVIDER SUBSEQUENT DAY NOTE - PMH
Chronic obstructive pulmonary disease, unspecified COPD type    Parkinson disease    Schizophrenia, unspecified type
Chronic obstructive pulmonary disease, unspecified COPD type    Parkinson disease    Schizophrenia, unspecified type

## 2020-07-09 NOTE — ED CDU PROVIDER SUBSEQUENT DAY NOTE - PHYSICAL EXAMINATION
VITAL SIGNS: I have reviewed nursing notes and confirm.  CONSTITUTIONAL: (+) cachectic   SKIN: Skin exam is warm and dry, no acute rash.  HEAD: Normocephalic; atraumatic.  EYES: PERRL, EOM intact; conjunctiva and sclera clear.  ENT: No nasal discharge; airway clear. Throat clear.  NECK: Supple; non tender.    CARD: S1, S2 normal; Regular rate and rhythm.  RESP: No wheezes,  no rales or rhonchi.   ABD:  soft; non-distended; non-tender;   EXT: Normal ROM. No clubbing, cyanosis or edema. (+) peristent movement   NEURO: (+)non-verbal, (+)lip smacking. moves all extremities,
VITAL SIGNS: I have reviewed nursing notes and confirm.  CONSTITUTIONAL: (+) cachectic   SKIN: Skin exam is warm and dry, no acute rash.  HEAD: Normocephalic; atraumatic.  EYES: PERRL, EOM intact; conjunctiva and sclera clear.  ENT: No nasal discharge; airway clear. Throat clear.  NECK: Supple; non tender.    CARD: S1, S2 normal; Regular rate and rhythm.  RESP: No wheezes,  no rales or rhonchi.   ABD:  soft; non-distended; non-tender;   EXT: Normal ROM. No clubbing, cyanosis or edema. (+) persistent movement   NEURO: (+)non-verbal, (+)lip smacking. moves all extremities,

## 2020-07-09 NOTE — ED CDU PROVIDER DISPOSITION NOTE - PATIENT PORTAL LINK FT
You can access the FollowMyHealth Patient Portal offered by [facility] by registering at the following website: [email or web address]. By joining [employer]’s FollowMyHealth portal, you will also be able to view your health information using other applications (apps) compatible with our system.

## 2020-07-09 NOTE — H&P ADULT - ENMT
negative Alert and oriented, no focal deficits, no motor or sensory deficits. No oral lesions; no gross abnormalities

## 2020-07-09 NOTE — ED CDU PROVIDER SUBSEQUENT DAY NOTE - ATTENDING CONTRIBUTION TO CARE
I, Uriah Guzmán, performed a face to face bedside interview with this patient regarding history of present illness, review of symptoms and relevant past medical, social and family history.  I completed an independent physical examination. I have communicated the patient’s plan of care and disposition with the ACP.      no complaints overnight;   awaiting placement;   pe awake confuse  chest clear no wheezing;  abd soft ; neuro nonfocal dx schizophrenia

## 2020-07-09 NOTE — H&P ADULT - NSICDXPASTMEDICALHX_GEN_ALL_CORE_FT
PAST MEDICAL HISTORY:  BPH (benign prostatic hyperplasia)     COPD, moderate     Epilepsy     HTN (hypertension)     Parkinsonism

## 2020-07-09 NOTE — ED ADULT NURSE REASSESSMENT NOTE - COMFORT CARE
darkened lights
repositioned/warm blanket provided/darkened lights/side rails up
darkened lights
po fluids offered

## 2020-07-09 NOTE — ED CDU PROVIDER DISPOSITION NOTE - ATTENDING CONTRIBUTION TO CARE
I, Uriah Guzmán, performed a face to face bedside interview with this patient regarding history of present illness, review of symptoms and relevant past medical, social and family history.  I completed an independent physical examination. I have communicated the patient’s plan of care and disposition with the ACP.      pt with parkinsons ds, schizophrenia refusing to take medication with increased aggitation; pt accepted at [facility] for inpatient management;

## 2020-07-09 NOTE — ED ADULT NURSE REASSESSMENT NOTE - NS ED NURSE REASSESS COMMENT FT1
assumed care of pt who is on a stretcher, pt continues to be hyper-verbal with garbled speech at times. pt provided orange juice with thick it for nectar consistency. pt voiding in urinal 400ml noted, Dr. Navarro at bedside for psych evaluation.
pt awake and alert oriented x1 to person. pt talking to himself and hyper-verbal, offered lunch with meal pureed consistency pt declined meal, pt given and consumed nectar consistency juice. pt has made no attempts to harm himself or others.
pt continues to be hyperverbal talking to himself in his room, vital signs obtained and wnl. pt when approached is verbally belligerent and is sometimes re-directable.
pt is awake hyperverbal. speech garbled difficult to understand. disorganized.  pt refusing medication and vs.
pt offered dinner but declined, pt accepted Ensure with thickit for nectar consistency, pt consumed Ensure 100%.
pt refused lunch, pt consumed orange juice nectar consistency, refused depakote, and was administered hadol dec IM. pt is now resting in NAD
pt status unchanged, refer to flowsheet and chart, pt safety maintained, pt hemodynamically stable. Pt resting comfortably. Food provided for hunger. Pt awaiting dispo.
Pt woke up, ambulated steadily around the unit and to the bathroom. Pt currently sitting in the common room area, internally  preoccupied, talking to himself. Pt currently non-aggressive.
Pt continues to be internally preoccupied, hyperverbal, talking to himself, with poorly articulated speech. Pt consumed 100% of Ensure. Pt non-aggressive, cooperative with vitals. Vitals WDL. No attempts to harm self or others. Safety maintained.
Received pt yelling/agitated with poorly articulated speech on stretcher. Pt uncooperative, cursing, attempting to hit staff. Pt able to be redirected with security at bedside. Pt refusing vitals and medication. Pt currently resting on stretcher, continuing to yell. Will monitor the pt for safety.
Assumed care of pt @1930. Pt refusing vitals and medication. Pt currently resting comfortably on stretcher, in no distress. Pt cursing and yelling at staff at times.
Pt currently sleeping on stretcher, in no apparent distress. Respirations even & unlabored.

## 2020-07-09 NOTE — H&P ADULT - NSHPPHYSICALEXAM_GEN_ALL_CORE
Vital Signs Last 24 Hrs  T(C): 35.7 (07-09-20 @ 19:22)  T(F): 96.3 (07-09-20 @ 19:22), Max: 96.3 (07-09-20 @ 19:22)  HR: 101 (07-09-20 @ 19:22) (101 - 101)  BP: 140/96 (07-09-20 @ 19:22)  BP(mean): --  RR: 16 (07-09-20 @ 19:22) (16 - 16)  SpO2: --  Wt(kg): --

## 2020-07-10 DIAGNOSIS — F20.9 SCHIZOPHRENIA, UNSPECIFIED: ICD-10-CM

## 2020-07-10 LAB
A1C WITH ESTIMATED AVERAGE GLUCOSE RESULT: 4.9 % — SIGNIFICANT CHANGE UP (ref 4–5.6)
CHOLEST SERPL-MCNC: 141 MG/DL — SIGNIFICANT CHANGE UP (ref 100–200)
ESTIMATED AVERAGE GLUCOSE: 94 MG/DL — SIGNIFICANT CHANGE UP (ref 68–114)
HCV AB S/CO SERPL IA: 65.58 COI — HIGH
HCV AB SERPL-IMP: REACTIVE
HDLC SERPL-MCNC: 34 MG/DL — LOW
LIPID PNL WITH DIRECT LDL SERPL: 96 MG/DL — SIGNIFICANT CHANGE UP (ref 4–129)
TOTAL CHOLESTEROL/HDL RATIO MEASUREMENT: 4.1 RATIO — SIGNIFICANT CHANGE UP (ref 4–5.5)
TRIGL SERPL-MCNC: 86 MG/DL — SIGNIFICANT CHANGE UP (ref 10–149)

## 2020-07-10 RX ORDER — HALOPERIDOL DECANOATE 100 MG/ML
2 INJECTION INTRAMUSCULAR
Refills: 0 | Status: DISCONTINUED | OUTPATIENT
Start: 2020-07-10 | End: 2020-07-20

## 2020-07-10 RX ORDER — DIVALPROEX SODIUM 500 MG/1
500 TABLET, DELAYED RELEASE ORAL
Refills: 0 | Status: DISCONTINUED | OUTPATIENT
Start: 2020-07-10 | End: 2020-07-28

## 2020-07-10 NOTE — DIETITIAN INITIAL EVALUATION ADULT. - PHYSICAL APPEARANCE
Pt is very agitated/ aggressive. Low BMI noted however RD is unable to perform NFPE at this time. BMI 14.9 ( 167.6cm/ 41.8kg), IBW- 64.5kg +/-10%. No documented BMs yet. Skin is intact.

## 2020-07-10 NOTE — PROGRESS NOTE BEHAVIORAL HEALTH - NSBHFUPADDHPIFT_PSY_A_CORE
69M domiciled at [facility], PPH: schizophrenia, PMH: Parkinson disease, COPD, sent to [facility]'s ED for agitated behavior. Pt previously sent on 5/20/20 for similar behavior. Pt with hx of intermittent non-compliance with medications with baseline mild paranoia and refusal to take medications if he does not recognize them. In the ED, pt with poorly articulated speech and physically resistant to care, difficult to understand. Pt denies SI/HI, states he feels safe in the home and wants to return there, has not been aggressive to staff, although easily irritable. Collateral obtained from ED writer - BRIAN Hernández ([phone]) - has been a resident for 5 months, states pt has been taking his oral medications intermittently, however increasingly non-compliant resulting in increased agitation and yelling at people who pass by his room, observed to be talking to himself with increase in hallucinations, worsening for the last few weeks. States when pt takes medications, he does well. Pt was seen by psychiatrist last week who recommended pt to be given Haldol decanoate 100 mg, but staff unable to do so because pt refused.    In IPP, pt presents curled in bed with poor grooming and poorly articulated speech. Pt is alert and responsive, difficult to understand, uncooperative with aggressive posturing. Attempted to evaluate several times, however pt remains agitated and refuses to engage meaningfully with this writer. Limited interview. 69M domiciled at [place], PPH: schizophrenia, PMH: Parkinson disease, COPD, sent to [facility]'s ED for agitated behavior. Pt previously sent on 5/20/20 for similar behavior. Pt with hx of intermittent non-compliance with medications with baseline mild paranoia and refusal to take medications if he does not recognize them. In the ED, pt with poorly articulated speech and physically resistant to care, difficult to understand. Pt denies SI/HI, states he feels safe in the home and wants to return there, has not been aggressive to staff, although easily irritable. Collateral obtained from ED writer - BRIAN Hernández ([phone]) - has been a resident for 5 months, states pt has been taking his oral medications intermittently, however increasingly non-compliant resulting in increased agitation and yelling at people who pass by his room, observed to be talking to himself with increase in hallucinations, worsening for the last few weeks. States when pt takes medications, he does well. Pt was seen by psychiatrist last week who recommended pt to be given Haldol decanoate 100 mg, but staff unable to do so because pt refused.    In IPP, pt presents curled in bed with poor grooming and poorly articulated speech. Pt is alert and responsive, difficult to understand, uncooperative with aggressive posturing. Attempted to evaluate several times, however pt remains agitated and refuses to engage appropriately with this writer. Limited interview. Collateral obtained from SW from RN at [facility] - pt transferred to [facility] for rehab after decompensation from medication nonadherence in previous group home. Most recent medication list includes depakote 500 mg bid, risperdal 2 mg bid and haldol 2 mg daily.     Pt's health care proxy, Mecca Cottrell ([phone]) - left VM and callback #. 69M domiciled at [place], PPH: schizophrenia, PMH: Parkinson disease, COPD, sent to [facility]'s ED for agitated behavior. Pt previously sent on 5/20/20 for similar behavior. Pt with hx of intermittent non-compliance with medications with baseline mild paranoia and refusal to take medications if he does not recognize them. In the ED, pt with poorly articulated speech and physically resistant to care, difficult to understand, refusing medications. Pt denies SI/HI, states he feels safe in the home and wants to return there, has not been aggressive to staff, although easily irritable. UA/Ucx done. ECG QTc 433 ms. Valproic acid level <3.7.    Collateral obtained from ED writer - BRIAN Hernández ([phone]) - has been a resident for 5 months, states pt has been taking his oral medications intermittently, however increasingly non-compliant resulting in increased agitation and yelling at people who pass by his room, observed to be talking to himself with increase in hallucinations, worsening for the last few weeks. States when pt takes medications, he does well. Pt was seen by psychiatrist last week who recommended pt to be given Haldol decanoate 100 mg, but staff unable to do so because pt refused.

## 2020-07-10 NOTE — DIETITIAN INITIAL EVALUATION ADULT. - PERTINENT MEDS FT
Depakote, calcium carbonate, atarax, melatonin, lopressor, protonix, requip ordered: Depakote, calcium carbonate, atarax, melatonin, lopressor, protonix, requip - at this point pt is refusing all meds as per NP

## 2020-07-10 NOTE — PROGRESS NOTE BEHAVIORAL HEALTH - NSBHFUPINTERVALHXFT_PSY_A_CORE
Pt seen and evaluated, chart reviewed (pt transfer from [facility] - MRN #[id number]). As per nursing report, pt has been isolative to room, hostile, disorganized, and exhibiting symptoms of paranoia with continued refusal to take psychiatric medications. On evaluation, pt presents curled in bed with poor grooming and poorly articulated speech. Pt is alert and responsive, difficult to understand, uncooperative with aggressive posturing repeatedly stating "go away". Attempted to evaluate several times, however pt remains agitated and refuses to engage appropriately with this writer. Limited interview. Collateral obtained from  from RN at [facility] - pt transferred to [facility] for rehab after decompensation from medication nonadherence in previous group home. Most recent medication list includes depakote 500 mg bid, risperdal 4 mg daily and haldol 2 mg daily with start of haldol decanoate 100 mg IM once. Pt continues to refuse medication, applying for TOO.     Pt's health care proxy, Mecca Cottrell ([phone]) - left VM and callback #. Pt seen and evaluated, chart reviewed (pt transfer from [facility] - MRN #[id number]). As per nursing report, pt has been isolative to room, hostile, disorganized, and exhibiting symptoms of paranoia with continued refusal to take psychiatric medications. On evaluation, pt presents curled in bed with poor grooming and poorly articulated speech with TD movements of tongue protuding out and perioral area. Pt is alert and responsive, difficult to understand, uncooperative with aggressive posturing repeatedly stating "go away". Attempted to evaluate several times, however pt remains agitated and refuses to engage appropriately with this writer. Limited interview. Collateral obtained from JEANNIE from RN at [facility] - pt transferred to [facility] for rehab after decompensation from medication nonadherence in previous group home. Most recent medication list includes depakote 500 mg bid, risperdal 4 mg daily and haldol 2 mg daily with start of haldol decanoate 100 mg IM once. Pt continues to refuse medication, applying for TOO.     Pt's health care proxy, Mecca Cottrell ([phone]) - left VM and callback #.

## 2020-07-10 NOTE — CHART NOTE - NSCHARTNOTEFT_GEN_A_CORE
Upon Nutritional Assessment by the Registered Dietitian your patient was determined to meet criteria / has evidence of the following diagnosis/diagnoses:          [ ]  Mild Protein Calorie Malnutrition        [ ]  Moderate Protein Calorie Malnutrition        [ ] Severe Protein Calorie Malnutrition        [ ] Unspecified Protein Calorie Malnutrition        [v] Underweight / BMI <19        [ ] Morbid Obesity / BMI > 40      Findings as based on:    BMI - 14.9 (167.6cm/41.8kg)    Treatment:    The following diet has been recommended:    See nutrition assessment (7/10) for details    PROVIDER Section:     By signing this assessment you are acknowledging and agree with the diagnosis/diagnoses assigned by the Registered Dietitian    Comments:

## 2020-07-10 NOTE — DIETITIAN INITIAL EVALUATION ADULT. - ENERGY NEEDS
Estimated energy needs:   Estimated protein needs:   Estimated fluid needs: Estimated energy needs: 1465-1670kcal/d (35-40kcal/kg act wt) - low body weight considered  Estimated protein needs: 42-55gm/day (1.0-1.3gm/kg act wt)  Estimated fluid needs: 1ml/kcal or per LIP

## 2020-07-10 NOTE — CHART NOTE - NSCHARTNOTEFT_GEN_A_CORE
SW Admission Note    Mr. Collins is a 70 year old male admitted to P due to paranoid behavior and not taking current medication regiment.. Per H+P pt was admitted from [facility] where he was receiving PT/REHAB. Once pt became erratic, disorganized , and paranoid, they sent him [facility] where pt was evaluated and admitted to [facility]. Upon meeting pt, he was laying in bed with a blanket over his head. Upon attempting to wake the pt he sat up began to curse at SW and NP. When we continued to attempt to engage pt began to become more aggressive and interview was stopped.    JEANNIE received collateral from Solange TORRES @ [place] who stated that pt has decompensated significantly and has begun to refuse to take his medication. Once behavior began to get severe he was sent to the hospital. JEANNIE also received pt’s family contact Daughter Mecca Cottrell [phone] who provided similar collateral. Soo stated that pt was initially living at [facility] where family would like him to return upon D/C. D/C Planning is ongoing and JEANNIE will continue to follow.    Pt is not AAOx4 but reports no SI,HI at this time    Sexual History  Unable to obtain    Family relationships and history- Pt’s daughter is supportive of pt.     Leisure Activity Assessment- Unable to obtain    Community Supports- Children    Employment- Unable to obtain    Substance Use Assessment  - None Reported from collateral     History of suicidality or self- injurious behaviors- None reported    Significant Loses  Unable to obtain    Life Goals- Unable to obtain       will continue to meet with Mr. Collins one on one and with  treatment  Team daily.  Discharge plan is on-going..  Please refer to Social Work  Psychosocial for additional information.

## 2020-07-10 NOTE — DIETITIAN INITIAL EVALUATION ADULT. - DIET TYPE
NKA, supplements: calcium carbonate as per NH: on MARLY, puree diet w/ honey thick liquids, NKA. Supplements: calcium carbonate+ vit D. UBW unknown. dysphagia 1, pureed, honey consistency fluid

## 2020-07-10 NOTE — DIETITIAN INITIAL EVALUATION ADULT. - ADD RECOMMEND
please adjust diet to dysphagia (1) puree w/ honey this liquids (NH diet). Add Ensure Pudding q 8hrs. Discussed w/ NP Dionna. Pt is refusing meds/meals/care. If PO intake won't improve w/in 24-48hrs please consider 3 day kcal count. Will follow. please adjust diet to dysphagia (1) puree w/ honey this liquids (NH diet). Add Ensure Pudding q 8hrs.  Pt is refusing meds/meals/care. Consider SLP eval (when appropriate).  If PO intake won't improve w/in 24-48hrs please consider 3 day kcal count. Discussed w/ NP Dionna. Will follow.

## 2020-07-10 NOTE — PROGRESS NOTE BEHAVIORAL HEALTH - SUMMARY
69M domiciled at [place], PPH: schizophrenia, PMH: Parkinson disease, COPD, sent to [facility]'s ED for agitated behavior. As per collateral, pt nonadherent to medication regimen leading to 69M domiciled at [place], PPH: schizophrenia, PMH: Parkinson disease, COPD, sent to [facility]'s ED for agitated behavior. Pt presents agitated, hostile, disorganised, appears to be responding to internal stimuli and paranoid about medications in context of poor adherence to medication regimen.     #Schizophrenia  -change depakene syrup 500 mg bid to depakote sprinkles 500 mg bid to increase adherence  -start haldol concentrate 2 mg bid  -start 69M domiciled at [place], PPH: schizophrenia, PMH: Parkinson disease, COPD, sent to [facility]'s ED for agitated behavior. Pt presents agitated, hostile, disorganised, appears to be responding to internal stimuli and paranoid about medications in context of poor adherence to medication regimen. Pt continues to refuse medications. TOO to be requested.    #Schizophrenia  -change depakene syrup 500 mg bid to depakote sprinkles 500 mg bid to increase adherence  -start haldol concentrate 2 mg bid  -c/w cogentin 1 mg bid  -c/w melatonin 5 mg qhs  -start haldol concentrate 2.5 mg q8h prn for agitation  -start benadryl 25 mg q8h prn for eps prophylaxis  -c/w hydroxyzine 50 mg q6h prn for anxiety or/and insomnia    #HTN, Parkinsons, COPD  -c/w home medications 69M domiciled at [place], PPH: schizophrenia, PMH: Parkinson disease, COPD, sent to [facility]'s ED for agitated behavior. Pt presents agitated, hostile, disorganised, appears to be responding to internal stimuli and paranoid about medications in context of poor adherence to medication regimen. Pt continues to refuse medications. TOO to be requested.    #Schizophrenia  -change depakene syrup 500 mg bid to depakote sprinkles 500 mg bid to increase adherence  -start haldol concentrate 2 mg bid  -c/w cogentin 1 mg bid  -c/w melatonin 5 mg qhs  -start haldol concentrate 2.5 mg q8h prn for agitation  -c/w hydroxyzine 50 mg q6h prn for anxiety or/and insomnia    #HTN, Parkinsons, COPD  -c/w home medications 69M domiciled at [place], PPH: schizophrenia, PMH: Parkinson disease, COPD, sent to [facility]'s ED for agitated behavior. Pt presents agitated, hostile, disorganised, appears to be responding to internal stimuli and paranoid about medications in context of poor adherence to medication regimen. Pt continues to refuse medications. TOO to be requested.    #Schizophrenia  -change depakene syrup 500 mg bid to depakote sprinkles 500 mg bid to increase adherence  -start haldol concentrate 2 mg bid  -c/w cogentin 1 mg bid  -c/w melatonin 5 mg qhs  -start haldol concentrate 2.5 mg q8h prn for agitation  -c/w hydroxyzine 50 mg q6h prn for anxiety or/and insomnia    #HTN, Parkinsons, COPD  -c/w home medications  -medicine consulted  -PT/OT consult pending    #Poor PO Intake  -RD consulted

## 2020-07-10 NOTE — PROGRESS NOTE BEHAVIORAL HEALTH - NSBHADDHXPSYCHFT_PSY_A_CORE
As per chart review, hx of schizophrenia on haldol decanoate 100 mg IM x 4 weeks (which he has been refusing), Depakote 500 mg twice daily. Pt has been on cogentin, risperdal and zyprexa

## 2020-07-10 NOTE — PROGRESS NOTE BEHAVIORAL HEALTH - NSBHCHARTREVIEWLAB_PSY_A_CORE FT
Lipid Profile (07.10.20 @ 04:30)    Total Cholesterol/HDL Ratio Measurement: 4.1 Ratio    Cholesterol, Serum: 141 mg/dL    Triglycerides, Serum: 86 mg/dL    HDL Cholesterol, Serum: 34    Direct LDL: 96 (Results found in [facility] MRN # [id number])    Lipid Profile (07.10.20 @ 04:30)    Total Cholesterol/HDL Ratio Measurement: 4.1 Ratio    Cholesterol, Serum: 141 mg/dL    Triglycerides, Serum: 86 mg/dL    HDL Cholesterol, Serum: 34    Direct LDL: 96

## 2020-07-11 LAB
SARS-COV-2 IGG SERPL QL IA: POSITIVE
SARS-COV-2 IGM SERPL IA-ACNC: 6.25 INDEX — HIGH

## 2020-07-11 NOTE — CONSULT NOTE ADULT - SUBJECTIVE AND OBJECTIVE BOX
Podiatry Consult Note    Subjective:    DOREEN VEGA is a 70y year old Male seen at bedside with a chief complaint of  painful thickened, dystrophic, ingrowing and long toenails digits 1-5 bilaterally  and preventative foot examination. Patient is medically managed  by Medicine/Hospitalists.  Patient denies any history of trauma to both feet. Patient has no other pedal complaints.  Patient is experiencing pain while standing, walking and in shoe gear.     PMH: HTN (hypertension)  Epilepsy  BPH (benign prostatic hyperplasia)  COPD, moderate  Parkinsonism   PAST MEDICAL & SURGICAL HISTORY:  HTN (hypertension)  Epilepsy  BPH (benign prostatic hyperplasia)  COPD, moderate  Parkinsonism    PSH:  Allergies:No Known Allergies      Labs:      WBC Trend      Chem    T(F): --  HR: --  BP: --  RR: --  SpO2: --  Wt(kg): --    Objective:   DERM:  Skin warm, dry and supple bilateral.  No open lesions or inter-digital macerations noted bilateral.   Toenails 1-5 Right and Left feet thickened, elongated, discolored, and dystrophic with subungual debris. There is pain upon palpation of all fungal and ingrowing nails 1-5 bilaterally.   VASC: Dorsalis Pedis and Posterior Tibial pulses 1/4.  Capillary re-fill time less than 3 seconds digits 1-5 bilateral.   NEURO: Protective sensation intact to the level of the digits bilateral.  MSK: Muscle strength 5/5 all major muscle groups bilateral. No structural abnormality, bilaterally    Assessment:   Bilateral dystrophic toenails consistent with  Onychomycosis.   Pain from Elongated nails, ingrowing, incurvated,  and dystrophic nails upon palpation of nails.  Xerosis of bilateral plantar feet.     Plan:   Discussed diagnosis and treatment with patient  Aseptic debridement and curettage of all fungal and ingrowing nails 1-5 bilateral with sterile nail nipper.  Discussed importance of daily foot examinations, drying of feet after bathing and proper shoe gear.  Patient Would Benefit From Periodic Debridements; Can Follow Up as Outpatient w/ Dr. Munson Post Discharge   Discussed Plan w/ Attending    Spectra;  Podiatry Consult Note    Subjective:    DOREEN VEGA is a 70y year old Male seen at bedside with a chief complaint of  painful thickened, dystrophic, ingrowing and long toenails digits 1-5 bilaterally  and preventative foot examination. Patient is medically managed  by Medicine/Hospitalists.  Patient denies any history of trauma to both feet. Patient has no other pedal complaints.  Patient is experiencing pain while standing, walking and in shoe gear.     PMH: HTN (hypertension)  Epilepsy  BPH (benign prostatic hyperplasia)  COPD, moderate  Parkinsonism   PAST MEDICAL & SURGICAL HISTORY:  HTN (hypertension)  Epilepsy  BPH (benign prostatic hyperplasia)  COPD, moderate  Parkinsonism    PSH:  Allergies:No Known Allergies      Labs:      WBC Trend      Chem    T(F): --  HR: --  BP: --  RR: --  SpO2: --  Wt(kg): --    Objective:   DERM:  Skin warm, dry and supple bilateral.  No open lesions or inter-digital macerations noted bilateral.   Toenails 1-5 Right and Left feet thickened, elongated, discolored, and dystrophic with subungual debris. There is pain upon palpation of all fungal and ingrowing nails 1-5 bilaterally.   VASC: Dorsalis Pedis and Posterior Tibial pulses 1/4.  Capillary re-fill time less than 3 seconds digits 1-5 bilateral.   NEURO: Protective sensation intact to the level of the digits bilateral.  MSK: Muscle strength 5/5 all major muscle groups bilateral. No structural abnormality, bilaterally    Assessment:   Bilateral dystrophic toenails consistent with  Onychomycosis.   Pain from Elongated nails, ingrowing, incurvated,  and dystrophic nails upon palpation of nails.  Xerosis of bilateral plantar feet.     Plan:   Discussed diagnosis and treatment with patient  Aseptic debridement and curettage of all fungal and ingrowing nails 1-5 bilateral with sterile nail nipper.  Discussed importance of daily foot examinations, drying of feet after bathing and proper shoe gear.  Patient Would Benefit From Periodic Debridements; Can Follow Up as Outpatient w/ Dr. Munson Post Discharge       Spectra;

## 2020-07-11 NOTE — PROGRESS NOTE BEHAVIORAL HEALTH - NSBHFUPINTERVALHXFT_PSY_A_CORE
Patient refusing medications and vitals, remaining isolative in room. Limited engagement in interview. Denies any acute concerns. Reports sleeping last night, denies taking recent shower, requesting beverages, continues to refuse medical intervention. Makes racially charged derogatory statements towards me at end of encounter.

## 2020-07-11 NOTE — PROGRESS NOTE BEHAVIORAL HEALTH - SUMMARY
69M domiciled at [place], PPH: schizophrenia, PMH: Parkinson disease, COPD, sent to [facility]'s ED for agitated behavior. Pt presents agitated, hostile, disorganised, appears to be responding to internal stimuli and paranoid about medications in context of poor adherence to medication regimen. Pt continues to refuse medications. TOO to be requested.    #Schizophrenia  -depakote sprinkles 500 mg bid to increase adherence  - haldol concentrate 2 mg bid  -c/w cogentin 1 mg bid  -c/w melatonin 5 mg qhs  -haldol concentrate 2.5 mg q8h prn for agitation  -c/w hydroxyzine 50 mg q6h prn for anxiety or/and insomnia    #HTN, Parkinsons, COPD  -c/w home medications  -medicine consulted  -patient refused PT consult, recall when patient agreeable    #inadequate energy Intake  -per RD, dysphagia 1 diet, pureed, honey consistency fluid  - Ensure pudding q8hrs  - consider SLP consult  - if no improvement by 7/12, initiated 3d/kcal count  - RD to continue to monitor, f/u at [phone]

## 2020-07-11 NOTE — PHYSICAL THERAPY INITIAL EVALUATION ADULT - SPECIFY REASON(S)
Chart reviewed. PT attempted to see patient for evaluation, however, patient adamantly refusing to work with PT at this time. Will follow up.

## 2020-07-12 NOTE — PROGRESS NOTE BEHAVIORAL HEALTH - SUMMARY
69M domiciled at [place], PPH: schizophrenia, PMH: Parkinson disease, COPD, sent to [facility]'s ED for agitated behavior. Pt presents agitated, hostile, disorganised, appears to be responding to internal stimuli and paranoid about medications in context of poor adherence to medication regimen. Pt continues to refuse medications. TOO to be requested.    #Schizophrenia  -depakote sprinkles 500 mg bid to increase adherence  - haldol concentrate 2 mg bid  -c/w cogentin 1 mg bid  -c/w melatonin 5 mg qhs  -haldol concentrate 2.5 mg q8h prn for agitation  -c/w hydroxyzine 50 mg q6h prn for anxiety or/and insomnia    #HTN, Parkinsons, COPD  -c/w home medications  -medicine consulted  -patient refused PT consult, recall when patient agreeable    #inadequate energy Intake  -per RD, dysphagia 1 diet, pureed, honey consistency fluid  - Ensure pudding q8hrs  - consider SLP consult  - if no improvement by 7/12, initiated 3d/kcal count  - RD to continue to monitor, f/u at [phone]

## 2020-07-12 NOTE — PHYSICAL THERAPY INITIAL EVALUATION ADULT - SPECIFY REASON(S)
Patient adamantly refused to participate in PT evaluation despite constant encouragement and discussion of purpose, Rn aware. Will follow up and discuss with MD if patient continues to refuse.

## 2020-07-12 NOTE — PROGRESS NOTE BEHAVIORAL HEALTH - NSBHFUPINTERVALHXFT_PSY_A_CORE
Patient sleeping comfortably. As per chart review, refused morning meds and vitals. He has been refusing meds for past few days, remains isolative in room and minimally engages. Patient evaluated at bedside, he engages briefly with team, speech is difficult to understand at times. As per chart review, refused morning meds and vitals. He has been refusing meds for past few days, remains isolative in room and minimally engages with staff. But denies any thoughts of self harm to harm to others. Patient turns away, closes eyes and does not engage further with team.

## 2020-07-12 NOTE — PROGRESS NOTE BEHAVIORAL HEALTH - MODIFICATIONS
pt remains irritable and isolative.  Will continue to encourage compliance with meds/unit routine. Monitor PO intake
none

## 2020-07-12 NOTE — PROGRESS NOTE BEHAVIORAL HEALTH - CASE SUMMARY
as noted
69M domiciled at [place], PPH: schizophrenia, PMH: Parkinson disease, COPD, sent to [facility]'s ED reportedly for agitated behavior in context of non-adherence to Tx. Pt was seen with resident. Pt is hard to understand due to dysarthric speech and poor engagement in interview. He denies SI/HI. He is mildly irritable although not agitated or aggressive. Continue current Tx.

## 2020-07-13 LAB
HCV RNA FLD QL NAA+PROBE: SIGNIFICANT CHANGE UP
HCV RNA SPEC QL PROBE+SIG AMP: DETECTED

## 2020-07-13 NOTE — PHYSICAL THERAPY INITIAL EVALUATION ADULT - SPECIFY REASON(S)
As discussed with MD Mora, will discontinue further attempt to evaluate patient as he has adamantly refused to participate since 7/11/20. MD to reconsult PT when indicated.

## 2020-07-13 NOTE — PROGRESS NOTE BEHAVIORAL HEALTH - NSBHFUPINTERVALHXFT_PSY_A_CORE
Pt was seen, evaluated, chart reviewed.  As per nursing staff, pt has been refusing all medications. Nursing staff managed to get pt to take 1 dose of haldol 2 mg in the evening yesterday. On evaluation, pt is difficult to understand. He got up from bed ad requested orange juice. States that he was transferred from [facility]. Pt was minimally cooperative with interview. Continues to refuse medications. Asked for orange juice and Ensure. Pt also did not cooperate with OT/PT eval, kicked the therapist out of his room. Pt is refusing vital signs and EKG. Denied suicidal/homicidal ideation, intent or plan.

## 2020-07-13 NOTE — PROGRESS NOTE BEHAVIORAL HEALTH - SUMMARY
69M domiciled at [place], PPH: schizophrenia, PMH: Parkinson disease, COPD, sent to [facility]'s ED for agitated behavior. Pt presents agitated, hostile, disorganised, appears to be responding to internal stimuli and paranoid about medications in context of poor adherence to medication regimen. Pt continues to refuse medications. TOO to be requested.    #Schizophrenia  -change depakene syrup 500 mg bid to depakote sprinkles 500 mg bid to increase adherence  -continue haldol concentrate 2 mg bid  - pt is non compliant with medications; will pursue TOO  -c/w cogentin 1 mg bid  -c/w melatonin 5 mg qhs  -start haldol concentrate 2.5 mg q8h prn for agitation  -c/w hydroxyzine 50 mg q6h prn for anxiety or/and insomnia    #HTN, Parkinsons, COPD  -c/w home medications  -medicine consulted  -PT/OT consult pending    #Poor PO Intake  -RD consulted

## 2020-07-13 NOTE — CHART NOTE - NSCHARTNOTEFT_GEN_A_CORE
Mr. Collins remains on IPP for safety and stabilization. During AM rounds there were no acute issues to report at this time. Upon meeting with pt he was up and demanding Mohave Juice. Pt is difficult to understand at baseline but was able to express that he was transferred from [facility] and sent to [facility]. Pt continued to repeat that and requested Mohave Juice. Pt reported that he is not eating because [facility]” Pt did agree to eat sealed foods if they are opened infront of him. Per SNF that pt came from, this paranoid behavior is not baseline for the pt and this is what caused him to not take his medication while at the SNF. SW will continue to follow. Pt is not stable for D/C and IPP is needed for safety and stabilization.     Pt reports no SI or HI and no AVH. SW and team will continue to monitor pt’s progress.     Mood – Paranoid/Guarded  Sleep - Poor  Appetite - Poor  ADLs - Poor  Thought Process –Tangential   Observation – Q10    SW will continue to follow. D/C planning is on-going at this time.

## 2020-07-13 NOTE — OCCUPATIONAL THERAPY INITIAL EVALUATION ADULT - SPECIFY REASON(S)
Patient declined OT IE, even though educated on the importance of treatment. RN aware. OT to discuss with MD in regards to patient's need for OT at this time.

## 2020-07-14 DIAGNOSIS — G20 PARKINSON'S DISEASE: ICD-10-CM

## 2020-07-14 DIAGNOSIS — I10 ESSENTIAL (PRIMARY) HYPERTENSION: ICD-10-CM

## 2020-07-14 RX ORDER — HALOPERIDOL DECANOATE 100 MG/ML
5 INJECTION INTRAMUSCULAR ONCE
Refills: 0 | Status: COMPLETED | OUTPATIENT
Start: 2020-07-14 | End: 2020-07-14

## 2020-07-14 RX ADMIN — HALOPERIDOL DECANOATE 5 MILLIGRAM(S): 100 INJECTION INTRAMUSCULAR at 12:52

## 2020-07-14 NOTE — PROGRESS NOTE BEHAVIORAL HEALTH - NSBHFUPINTERVALHXFT_PSY_A_CORE
Pt was seen, evaluated, chart reviewed.  As per nursing staff, pt continues to refuse all medications. Pt is not eating much, drinking occasional Ensure. Nursing staff managed to get pt to take 1 dose of haldol 2 mg in the evening yesterday. Pt asked for juice this morning and then came out of his room, threw it at the nursing station. Has been verbally abusive and belligerent towards staff. On evaluation, pt was covered under blankets. Remains irritable, easily agitated, refusing to show his face. Refusing to cooperate with interview or medications. When asked if he was eating, pt stated "No!" Pt is refusing blood work and EKG. Denied suicidal/homicidal ideation, intent or plan.

## 2020-07-14 NOTE — CONSULT NOTE ADULT - REASON FOR ADMISSION
70 YEARS OLD MALE COME FROM [facility] FOR PSYCHIATRIC ADMISSION .
70 YEARS OLD MALE COME FROM [facility] FOR PSYCHIATRIC ADMISSION .

## 2020-07-14 NOTE — CHART NOTE - NSCHARTNOTEFT_GEN_A_CORE
Registered Dietitian Follow-Up     Patient Profile Reviewed                           Yes [x]   No []     Nutrition History Previously Obtained        Yes [x]  No []       Pertinent Subjective Information: Pt is refusing meals/meds/care.      Pertinent Medical Interventions: Schizophrenia - c/w treatment as per Psych team. H/o HTN, Parkinsons, COPD noted.      Diet order: dysphagia (1) puree w/ honey this liquids +Ensure Pudding q 8hrs.       Anthropometrics:  - Ht. 167.6cm  - Wt. no new weights, last 41.8kg (7/9)  - %wt change  - BMI - 14.9  - IBW - 64.5kg +/-10%.      Pertinent Lab Data:  only lipid panel available at this time (7/10) - previously noted     Pertinent Meds: Depakote, calcium carbonate, atarax, melatonin, lopressor, protonix, requip     Physical Findings:  - Appearance:  - GI function: no documented BMs  - Tubes:  - Oral/Mouth cavity:  - Skin: intact.     Nutrition Requirements  Weight Used: dosing  41.8kg    Estimated energy needs  Continue [x]  Adjust []: 1465-1670kcal/d (35-40kcal/kg act wt) - low body weight considered    Estimated protein needs  Continue [x]  Adjust []: 42-55gm/day (1.0-1.3gm/kg act wt)    Estimated fluid needs  Continue [x]  Adjust []: 1ml/kcal or per LIP      Nutrient Intake: not meeting needs, pt is refusing meals         [x] Previous Nutrition Diagnosis: Inadequate Energy Intake.             [x] Ongoing          [] Resolved    [] No active nutrition diagnosis identified at this time     Nutrition Diagnostic #1  Problem:  Etiology:  Statement:     Nutrition Diagnostic #2  Problem:  Etiology:  Statement:     Nutrition Intervention: Meal and snacks. Medical food supplements. Coordination of care.      Goal/Expected Outcome:   PO intake > 50% meals, snacks and supplements upon f/u in 3 days.     Indicator/Monitoring:  RD will monitor energy intake, diet order, glucose/renal profile, body composition, NFPF.    Recommendations	Continue current diet and supplements. Consider SLP eval (when appropriate). ?Consider appetite stimulant (noted pt is refusing meds at this time). Please start 3 day kcal count. Obtain weights 3x/week. Will follow. Registered Dietitian Follow-Up     Patient Profile Reviewed                           Yes [x]   No []     Nutrition History Previously Obtained        Yes [x]  No []       Pertinent Subjective Information: Pt is refusing meals and meds as per RN. Pt did not talk to RD today, covered his face in blankets. Noted few open juices, Ensure Enlive and banana peel in pt's room.      Pertinent Medical Interventions: Schizophrenia - c/w treatment as per Psych team. H/o HTN, Parkinson's, COPD noted.      Diet order: dysphagia (1) puree w/ honey this liquids +Ensure Pudding q 8hrs.       Anthropometrics:  - Ht. 167.6cm  - Wt. no new weights, last 41.8kg (7/9)  - %wt change  - BMI - 14.9  - IBW - 64.5kg +/-10%.      Pertinent Lab Data:  only lipid panel available at this time (7/10) - previously noted     Pertinent Meds: Depakote, calcium carbonate, atarax, melatonin, lopressor, protonix, requip     Physical Findings:  - Appearance: alert per nursing, refused to talk to RD, covered his face in blanket.   - GI function: no documented BMs  - Tubes:  - Oral/Mouth cavity:  - Skin: intact.     Nutrition Requirements  Weight Used: dosing  41.8kg    Estimated energy needs  Continue [x]  Adjust []: 1465-1670kcal/d (35-40kcal/kg act wt) - low body weight considered    Estimated protein needs  Continue [x]  Adjust []: 42-55gm/day (1.0-1.3gm/kg act wt)    Estimated fluid needs  Continue [x]  Adjust []: 1ml/kcal or per LIP      Nutrient Intake: not meeting needs, pt is refusing meals         [x] Previous Nutrition Diagnosis: Inadequate Energy Intake.             [x] Ongoing          [] Resolved    [] No active nutrition diagnosis identified at this time     Nutrition Diagnostic #1  Problem:  Etiology:  Statement:     Nutrition Diagnostic #2  Problem:  Etiology:  Statement:     Nutrition Intervention: Meal and snacks. Medical food supplements. Coordination of care.      Goal/Expected Outcome:   PO intake > 50% meals, snacks and supplements upon f/u in 3 days.     Indicator/Monitoring:  RD will monitor energy intake, diet order, glucose/renal profile, body composition, NFPF.    Recommendations	Continue current diet and supplements (Ensure Pudding), noted that pt is also receiving Ensure Enlive - RN aware that pt needs honey thick liquids and Ensure must be thickened to the right consistency. Consider SLP eval (when appropriate). ?Consider appetite stimulant (noted pt is refusing meds at this time). Please start 3 day kcal count (RN aware). Obtain weights 3x/week. Will follow.

## 2020-07-14 NOTE — CONSULT NOTE ADULT - SUBJECTIVE AND OBJECTIVE BOX
DOREEN VEGA  70y  Male      Patient is a 70y old  Male who presents with a chief complaint of 70 YEARS OLD MALE COME FROM [facility] FOR PSYCHIATRIC ADMISSION . (11 Jul 2020 18:19)    HPI:  70 YEARS OLD MALE SENT FROM [facility] TO [facility] FOR PSYCHIATRIC EVALUATION DUE TO AGGRESSIVE BEHAVIOR AND AGITATION . (09 Jul 2020 21:20)    INTERVAL HPI/OVERNIGHT EVENTS:  HEALTH ISSUES - PROBLEM Dx:  Schizophrenia, unspecified type  Depression: Depression        PAST MEDICAL & SURGICAL HISTORY:  HTN (hypertension)  Epilepsy  BPH (benign prostatic hyperplasia)  COPD, moderate  Parkinsonism    FAMILY HISTORY:    benztropine 1 milliGRAM(s) Oral two times a day  calcium carbonate    500 mG (Tums) Chewable 1 Tablet(s) Chew daily  diVALproex Sprinkle 500 milliGRAM(s) Oral two times a day  haloperidol    Concentrate 2 milliGRAM(s) Oral two times a day  hydrOXYzine hydrochloride 50 milliGRAM(s) Oral every 6 hours PRN  melatonin. 5 milliGRAM(s) Oral at bedtime  metoprolol tartrate 25 milliGRAM(s) Oral every 12 hours  pantoprazole    Tablet 40 milliGRAM(s) Oral before breakfast  rOPINIRole 0.25 milliGRAM(s) Oral every 12 hours      REVIEW OF SYSTEMS:  CONSTITUTIONAL: No fever, weight loss, or fatigue  EYES: No eye pain, visual disturbances, or discharge  ENMT:  No difficulty hearing, tinnitus, vertigo; No sinus or throat pain  NECK: No pain or stiffness  BREASTS: No pain, masses, or nipple discharge  RESPIRATORY: No cough, wheezing, chills or hemoptysis; No shortness of breath  CARDIOVASCULAR: No chest pain, palpitations, dizziness, or leg swelling  GASTROINTESTINAL: No abdominal or epigastric pain. No nausea, vomiting, or hematemesis; No diarrhea or constipation. No melena or hematochezia.  GENITOURINARY: No dysuria, frequency, hematuria, or incontinence  NEUROLOGICAL: No headaches, memory loss, loss of strength, numbness, or tremors  SKIN: No itching, burning, rashes, or lesions   LYMPH NODES: No enlarged glands  ENDOCRINE: No heat or cold intolerance; No hair loss  MUSCULOSKELETAL: No joint pain or swelling; No muscle, back, or extremity pain  PSYCHIATRIC: as per hpi and previous psych history  HEME/LYMPH: No easy bruising, or bleeding gums  ALLERY AND IMMUNOLOGIC: No hives or eczema    T(C): 37 (07-13-20 @ 16:30), Max: 37 (07-13-20 @ 16:30)  HR: 86 (07-14-20 @ 05:59) (71 - 86)  BP: 81/68 (07-14-20 @ 05:59) (81/68 - 112/72)  RR: 15 (07-14-20 @ 05:59) (15 - 16)  SpO2: --  Wt(kg): --Vital Signs Last 24 Hrs  T(C): 37 (13 Jul 2020 16:30), Max: 37 (13 Jul 2020 16:30)  T(F): 98.6 (13 Jul 2020 16:30), Max: 98.6 (13 Jul 2020 16:30)  HR: 86 (14 Jul 2020 05:59) (71 - 86)  BP: 81/68 (14 Jul 2020 05:59) (81/68 - 112/72)  BP(mean): --  RR: 15 (14 Jul 2020 05:59) (15 - 16)  SpO2: --    PHYSICAL EXAM:  GENERAL: NAD,well-developed  HEAD:  Atraumatic, Normocephalic  EYES: EOMI, PERRLA, conjunctiva and sclera clear  ENMT: ; Moist mucous membranes, Good dentition, No lesions  NECK: Supple, No JVD, Normal thyroid  CHEST/LUNG: Clear bs bilaterally; No rales, rhonchi, wheezing  HEART: Regular rate and rhythm; No murmurs, rubs, or gallops  ABDOMEN: Soft, Nontender, Nondistended; Bowel sounds present  EXTREMITIES:  2+ Peripheral Pulses, No clubbing, cyanosis, or edema  LYMPH: No lymphadenopathy noted  SKIN: No rashes or lesions  Neuro: alert  no focal deficits    Consultant(s) Notes Reviewed:  [x ] YES  [ ] NO  Care Discussed with Consultants/Other Providers [ x] YES  [ ] NO    LABS:              CAPILLARY BLOOD GLUCOSE                RADIOLOGY & ADDITIONAL TESTS:    Imaging Personally Reviewed:  [ ] YES  [ ] NO

## 2020-07-15 RX ORDER — HALOPERIDOL DECANOATE 100 MG/ML
5 INJECTION INTRAMUSCULAR ONCE
Refills: 0 | Status: COMPLETED | OUTPATIENT
Start: 2020-07-15 | End: 2020-07-15

## 2020-07-15 RX ORDER — ALBUTEROL 90 UG/1
2 AEROSOL, METERED ORAL EVERY 6 HOURS
Refills: 0 | Status: DISCONTINUED | OUTPATIENT
Start: 2020-07-15 | End: 2020-08-19

## 2020-07-15 RX ADMIN — HALOPERIDOL DECANOATE 2 MILLIGRAM(S): 100 INJECTION INTRAMUSCULAR at 20:28

## 2020-07-15 RX ADMIN — ALBUTEROL 2 PUFF(S): 90 AEROSOL, METERED ORAL at 21:24

## 2020-07-15 RX ADMIN — HALOPERIDOL DECANOATE 5 MILLIGRAM(S): 100 INJECTION INTRAMUSCULAR at 12:01

## 2020-07-15 NOTE — PROGRESS NOTE BEHAVIORAL HEALTH - NSBHFUPINTERVALHXFT_PSY_A_CORE
Pt seen and evaluated, chart reviewed.  As per nursing staff, pt continues to refuse all medications. On evaluation, pt found in bed with Ensure thrown in the hallway, refuses to drink it and demanding coffee; when coffee offered, pt refuses and states will throw coffee if given to him. Pt has been verbally abusive and belligerent towards staff. Remains irritable, easily agitated, refusing to cooperate with interview or medications. When asked if he was eating, pt stated "No!" Pt is refusing blood work and EKG. Denies AVH. Denies suicidal/homicidal ideation, intent or plan. TOO to be pursued.

## 2020-07-15 NOTE — PROGRESS NOTE BEHAVIORAL HEALTH - SUMMARY
69M domiciled at [place], PPH: schizophrenia, PMH: Parkinson disease, COPD, sent to [facility]'s ED for agitated behavior. Pt presents agitated, hostile, disorganised, appears to be responding to internal stimuli and paranoid about medications in context of poor adherence to medication regimen. Pt continues to refuse medications. TOO to be requested.    #Schizophrenia  -change depakene syrup 500 mg bid to depakote sprinkles 500 mg bid to increase adherence  -continue haldol concentrate 2 mg bid  - pt is non compliant with medications; will pursue TOO  -c/w cogentin 1 mg bid  -c/w melatonin 5 mg qhs  -start haldol concentrate 2.5 mg q8h prn for agitation  -c/w hydroxyzine 50 mg q6h prn for anxiety or/and insomnia    #HTN, Parkinsons, COPD  -c/w home medications  -medicine consulted  -PT/OT consult pending    #Poor PO Intake  -RD consulted; Ensure with meals  -Monitor weight every 3 days

## 2020-07-15 NOTE — PROGRESS NOTE ADULT - SUBJECTIVE AND OBJECTIVE BOX
pt stable alert in NAD  no new complaints    DEPRESSION  ^DEPRESSION  Yes  Handoff  HTN (hypertension)  Epilepsy  BPH (benign prostatic hyperplasia)  COPD, moderate  Parkinsonism  Essential hypertension  Parkinsonism, unspecified Parkinsonism type  Schizophrenia, unspecified type  Depression    HEALTH ISSUES - PROBLEM Dx:  Essential hypertension: Essential hypertension  Parkinsonism, unspecified Parkinsonism type: Parkinsonism, unspecified Parkinsonism type  Schizophrenia, unspecified type  Depression: Depression        PAST MEDICAL & SURGICAL HISTORY:  HTN (hypertension)  Epilepsy  BPH (benign prostatic hyperplasia)  COPD, moderate  Parkinsonism    No Known Allergies      FAMILY HISTORY:      benztropine 1 milliGRAM(s) Oral two times a day  calcium carbonate    500 mG (Tums) Chewable 1 Tablet(s) Chew daily  diVALproex Sprinkle 500 milliGRAM(s) Oral two times a day  haloperidol    Concentrate 2 milliGRAM(s) Oral two times a day  hydrOXYzine hydrochloride 50 milliGRAM(s) Oral every 6 hours PRN  melatonin. 5 milliGRAM(s) Oral at bedtime  metoprolol tartrate 25 milliGRAM(s) Oral every 12 hours  pantoprazole    Tablet 40 milliGRAM(s) Oral before breakfast  rOPINIRole 0.25 milliGRAM(s) Oral every 12 hours      T(C): --  HR: --  BP: --  RR: --  SpO2: --    PE;  general:  stable no acute cahnges in nad    Lungs:    Heart:    EXT:    Neuro:  aelrt no deficits                          CAPILLARY BLOOD GLUCOSE

## 2020-07-15 NOTE — CHART NOTE - NSCHARTNOTEFT_GEN_A_CORE
Mr. Collins remains on IPP for safety and stabilization. During AM rounds there were no acute issues to report at this time. Upon meeting with pt he was very agitated and threw an open bottle towards the hallway of his room. When asked what happened pt began to become increasingly agitated and began to mutter incoherently. Pt was able to recognize NP and stated that he is not eating because we are trying to hurt him. Pt continued to say that [facility] is attempting to poison him. After he requested a cup of coffee. Upon providing pt with a cup of coffee pt requested that we throw it out or he is just going to throw it. Pt has remained isolative and is not present on the unit. Pt has been agitated when speaking with any staff and verbally abusive.     Pt has been refusing all medication at this time. TOO will be conducted 7/20/2020    Pt reports no SI or HI and no AVH. SW and team will continue to monitor pt’s progress.     Mood – Paranoid/Guarded  Sleep - Poor  Appetite - Poor  ADLs - Poor  Thought Process –Tangential   Observation – Q10    SW will continue to follow. D/C planning is on-going at this time.

## 2020-07-16 LAB
ALBUMIN SERPL ELPH-MCNC: 3.4 G/DL — LOW (ref 3.5–5.2)
ALP SERPL-CCNC: 52 U/L — SIGNIFICANT CHANGE UP (ref 30–115)
ALT FLD-CCNC: 23 U/L — SIGNIFICANT CHANGE UP (ref 0–41)
ANION GAP SERPL CALC-SCNC: 9 MMOL/L — SIGNIFICANT CHANGE UP (ref 7–14)
AST SERPL-CCNC: 35 U/L — SIGNIFICANT CHANGE UP (ref 0–41)
BASOPHILS # BLD AUTO: 0.04 K/UL — SIGNIFICANT CHANGE UP (ref 0–0.2)
BASOPHILS NFR BLD AUTO: 0.6 % — SIGNIFICANT CHANGE UP (ref 0–1)
BILIRUB SERPL-MCNC: 0.3 MG/DL — SIGNIFICANT CHANGE UP (ref 0.2–1.2)
BUN SERPL-MCNC: 21 MG/DL — HIGH (ref 10–20)
CALCIUM SERPL-MCNC: 9.7 MG/DL — SIGNIFICANT CHANGE UP (ref 8.5–10.1)
CHLORIDE SERPL-SCNC: 96 MMOL/L — LOW (ref 98–110)
CO2 SERPL-SCNC: 32 MMOL/L — SIGNIFICANT CHANGE UP (ref 17–32)
CREAT SERPL-MCNC: 0.6 MG/DL — LOW (ref 0.7–1.5)
EOSINOPHIL # BLD AUTO: 0.12 K/UL — SIGNIFICANT CHANGE UP (ref 0–0.7)
EOSINOPHIL NFR BLD AUTO: 1.7 % — SIGNIFICANT CHANGE UP (ref 0–8)
GLUCOSE SERPL-MCNC: 80 MG/DL — SIGNIFICANT CHANGE UP (ref 70–99)
HCT VFR BLD CALC: 33.8 % — LOW (ref 42–52)
HGB BLD-MCNC: 11.1 G/DL — LOW (ref 14–18)
IMM GRANULOCYTES NFR BLD AUTO: 0.1 % — SIGNIFICANT CHANGE UP (ref 0.1–0.3)
LYMPHOCYTES # BLD AUTO: 2.21 K/UL — SIGNIFICANT CHANGE UP (ref 1.2–3.4)
LYMPHOCYTES # BLD AUTO: 31 % — SIGNIFICANT CHANGE UP (ref 20.5–51.1)
MCHC RBC-ENTMCNC: 31.9 PG — HIGH (ref 27–31)
MCHC RBC-ENTMCNC: 32.8 G/DL — SIGNIFICANT CHANGE UP (ref 32–37)
MCV RBC AUTO: 97.1 FL — HIGH (ref 80–94)
MONOCYTES # BLD AUTO: 0.91 K/UL — HIGH (ref 0.1–0.6)
MONOCYTES NFR BLD AUTO: 12.8 % — HIGH (ref 1.7–9.3)
NEUTROPHILS # BLD AUTO: 3.83 K/UL — SIGNIFICANT CHANGE UP (ref 1.4–6.5)
NEUTROPHILS NFR BLD AUTO: 53.8 % — SIGNIFICANT CHANGE UP (ref 42.2–75.2)
NRBC # BLD: 0 /100 WBCS — SIGNIFICANT CHANGE UP (ref 0–0)
PLATELET # BLD AUTO: 259 K/UL — SIGNIFICANT CHANGE UP (ref 130–400)
POTASSIUM SERPL-MCNC: 5.1 MMOL/L — HIGH (ref 3.5–5)
POTASSIUM SERPL-SCNC: 5.1 MMOL/L — HIGH (ref 3.5–5)
PROT SERPL-MCNC: 5.9 G/DL — LOW (ref 6–8)
RBC # BLD: 3.48 M/UL — LOW (ref 4.7–6.1)
RBC # FLD: 13.3 % — SIGNIFICANT CHANGE UP (ref 11.5–14.5)
SODIUM SERPL-SCNC: 137 MMOL/L — SIGNIFICANT CHANGE UP (ref 135–146)
TSH SERPL-MCNC: 2.88 UIU/ML — SIGNIFICANT CHANGE UP (ref 0.27–4.2)
WBC # BLD: 7.12 K/UL — SIGNIFICANT CHANGE UP (ref 4.8–10.8)
WBC # FLD AUTO: 7.12 K/UL — SIGNIFICANT CHANGE UP (ref 4.8–10.8)

## 2020-07-16 RX ORDER — SODIUM POLYSTYRENE SULFONATE 4.1 MEQ/G
15 POWDER, FOR SUSPENSION ORAL ONCE
Refills: 0 | Status: COMPLETED | OUTPATIENT
Start: 2020-07-16 | End: 2020-07-17

## 2020-07-16 RX ADMIN — HALOPERIDOL DECANOATE 2 MILLIGRAM(S): 100 INJECTION INTRAMUSCULAR at 10:52

## 2020-07-16 NOTE — PROGRESS NOTE BEHAVIORAL HEALTH - NSBHCHARTREVIEWLAB_PSY_A_CORE FT
Complete Blood Count + Automated Diff in AM (07.16.20 @ 07:02)    WBC Count: 7.12 K/uL    RBC Count: 3.48 M/uL    Hemoglobin: 11.1 g/dL    Hematocrit: 33.8 %    Mean Cell Volume: 97.1 fL    Mean Cell Hemoglobin: 31.9 pg    Mean Cell Hemoglobin Conc: 32.8 g/dL    Red Cell Distrib Width: 13.3 %    Platelet Count - Automated: 259 K/uL    Auto Neutrophil #: 3.83 K/uL    Auto Lymphocyte #: 2.21 K/uL    Auto Monocyte #: 0.91 K/uL    Auto Eosinophil #: 0.12 K/uL    Auto Basophil #: 0.04 K/uL    Auto Neutrophil %: 53.8    Auto Lymphocyte %: 31.0 %    Auto Monocyte %: 12.8 %    Auto Eosinophil %: 1.7 %    Auto Basophil %: 0.6 %    Auto Immature Granulocyte %: 0.1 %    Nucleated RBC: 0 /100 WBCs  Comprehensive Metabolic Panel in AM (07.16.20 @ 07:02)    Sodium, Serum: 137 mmol/L    Potassium, Serum: 5.1 mmol/L    Chloride, Serum: 96 mmol/L    Carbon Dioxide, Serum: 32 mmol/L    Anion Gap, Serum: 9 mmol/L    Blood Urea Nitrogen, Serum: 21 mg/dL    Creatinine, Serum: 0.6 mg/dL    Glucose, Serum: 80 mg/dL    Calcium, Total Serum: 9.7 mg/dL    Protein Total, Serum: 5.9 g/dL    Albumin, Serum: 3.4 g/dL    Bilirubin Total, Serum: 0.3 mg/dL    Alkaline Phosphatase, Serum: 52 U/L    Aspartate Aminotransferase (AST/SGOT): 35 U/L    Alanine Aminotransferase (ALT/SGPT): 23 U/L    eGFR if Non : 103    eGFR if : 119 mL/min/1.73M2  Lipid Profile (07.10.20 @ 04:30)    Total Cholesterol/HDL Ratio Measurement: 4.1 Ratio    Cholesterol, Serum: 141 mg/dL    Triglycerides, Serum: 86 mg/dL    HDL Cholesterol, Serum: 34    Direct LDL: 96  A1C with Estimated Average Glucose in AM (07.10.20 @ 04:30)    A1C with Estimated Average Glucose Result: 4.9    Estimated Average Glucose: 94

## 2020-07-16 NOTE — PROGRESS NOTE BEHAVIORAL HEALTH - SUMMARY
69M domiciled at [facility], PPH: schizophrenia, PMH: Parkinson disease, COPD, sent to [facility]'s ED for agitated behavior. Pt presents agitated, hostile, disorganised, appears to be responding to internal stimuli and paranoid about medications in context of poor adherence to medication regimen. Pt continues to refuse medications. TOO to be requested.    #Schizophrenia  -change depakene syrup 500 mg bid to depakote sprinkles 500 mg bid to increase adherence  -continue haldol concentrate 2 mg bid  - pt is non compliant with medications; will pursue TOO  -c/w cogentin 1 mg bid  -c/w melatonin 5 mg qhs  -start haldol concentrate 2.5 mg q8h prn for agitation  -c/w hydroxyzine 50 mg q6h prn for anxiety or/and insomnia    #HTN, Parkinsons, COPD  -c/w home medications  -medicine consulted  -PT/OT consult pending    #Poor PO Intake  -RD consulted; Ensure with meals  -Monitor weight every 3 days

## 2020-07-16 NOTE — PROGRESS NOTE BEHAVIORAL HEALTH - NSBHFUPINTERVALHXFT_PSY_A_CORE
Pt seen and evaluated, refused treatment team, chart reviewed.  As per nursing staff, pt intermittently refusing medications. On evaluation, pt found in hallway demanding for coffee and when offered, proceeded to be verbally abusive and belligerent towards staff. Pt remains irritable, easily agitated, refusing to cooperate with interview or medications. When asked if he was eating, pt began yelling racial slurs at this writer, demanding "real food"; pt agrees to SLP evaluation to advance diet. Limited interview. Pt accepted blood work this AM, refuses VS and EKG. Denies AVH. Denies SI/HI, intent and plan. TOO to be pursued. Pt seen and evaluated, refused treatment team, chart reviewed.  As per nursing staff, pt intermittently refusing medications, at times observed talking to self in room. On evaluation, pt found in hallway demanding for coffee and when offered, proceeded to be verbally abusive and belligerent towards staff. Pt remains irritable, easily agitated, refusing to cooperate with interview or medications. When asked if he was eating, pt began yelling racial slurs at this writer, demanding "real food"; pt agrees to SLP evaluation to advance diet. Limited interview. Pt accepted blood work this AM, refuses VS and EKG. Denies AVH. Denies SI/HI, intent and plan. TOO to be pursued. Pt seen and evaluated, refused treatment team, chart reviewed.  As per nursing staff, pt intermittently refusing medications, at times observed talking to self in room. On evaluation, pt found in hallway demanding for coffee and when offered, proceeded to be verbally abusive and belligerent towards staff. Pt remains irritable, easily agitated, refusing to cooperate with interview or medications. When asked if he was eating, pt began yelling racial slurs at this writer, demanding "real food"; pt agrees to SLP evaluation to advance diet. Limited interview. Pt accepted blood work this AM, refuses VS and EKG. Denies AVH. Denies SI/HI, intent and plan. TOO to be pursued.    Spoke with pt's daughter, Mecca Cottrell ([phone]) - updated on POC and TOO for 7/20/20. States she hasn't spoke to pt since June 4th, he has been refusing to take her call and calling her back. States pt has hx of being nonadherent to psychiatric medications and was aware he was having increased difficulty taking medications. She states she has been hospitalized for COVID-19 the last few weeks, unable to provide more information d/t weakness at this time.

## 2020-07-17 RX ADMIN — HALOPERIDOL DECANOATE 2 MILLIGRAM(S): 100 INJECTION INTRAMUSCULAR at 20:41

## 2020-07-17 RX ADMIN — HALOPERIDOL DECANOATE 2 MILLIGRAM(S): 100 INJECTION INTRAMUSCULAR at 08:16

## 2020-07-17 RX ADMIN — SODIUM POLYSTYRENE SULFONATE 15 GRAM(S): 4.1 POWDER, FOR SUSPENSION ORAL at 08:25

## 2020-07-17 NOTE — CHART NOTE - NSCHARTNOTEFT_GEN_A_CORE
Mr. Collins remains on IPP for safety and stabilization. During AM rounds there were no acute issues to report at this time. Pt refused to meet with SW and NP and became aggressive and agitated. Pt at this time is still not taking any medication and is refusing to eat stating that "[employer] is trying to poison him" Per RN report pt is aggressive sporadically and will become verbally aggressive (using racial slurs) as well as will throw open bottles of juices or coffee.      Pt has been refusing all medication at this time. TOO will be conducted 7/20/2020    Pt reports no SI or HI and no AVH. SW and team will continue to monitor pt’s progress.     Mood – Paranoid/Guarded  Sleep - Poor  Appetite - Poor  ADLs - Poor  Thought Process –Tangential   Observation – Q10    SW will continue to follow. D/C planning is on-going at this time.

## 2020-07-17 NOTE — SWALLOW BEDSIDE ASSESSMENT ADULT - SLP PERTINENT HISTORY OF CURRENT PROBLEM
MBS January 2020 in [facility]. recommended soft thins. PMHX listed below, was admitted due to agitated behavior from SNF; placed on modified diet no record of instrumental assessment

## 2020-07-17 NOTE — PROGRESS NOTE ADULT - SUBJECTIVE AND OBJECTIVE BOX
pt stable alert in NAD  no new complaints    DEPRESSION  ^DEPRESSION  Yes  Handoff  HTN (hypertension)  Epilepsy  BPH (benign prostatic hyperplasia)  COPD, moderate  Parkinsonism  Essential hypertension  Parkinsonism, unspecified Parkinsonism type  Schizophrenia, unspecified type  Depression    HEALTH ISSUES - PROBLEM Dx:  Essential hypertension: Essential hypertension  Parkinsonism, unspecified Parkinsonism type: Parkinsonism, unspecified Parkinsonism type  Schizophrenia, unspecified type  Depression: Depression        PAST MEDICAL & SURGICAL HISTORY:  HTN (hypertension)  Epilepsy  BPH (benign prostatic hyperplasia)  COPD, moderate  Parkinsonism    No Known Allergies      FAMILY HISTORY:      ALBUTerol    90 MICROgram(s) HFA Inhaler 2 Puff(s) Inhalation every 6 hours PRN  benztropine 1 milliGRAM(s) Oral two times a day  calcium carbonate    500 mG (Tums) Chewable 1 Tablet(s) Chew daily  diVALproex Sprinkle 500 milliGRAM(s) Oral two times a day  haloperidol    Concentrate 2 milliGRAM(s) Oral two times a day  hydrOXYzine hydrochloride 50 milliGRAM(s) Oral every 6 hours PRN  melatonin. 5 milliGRAM(s) Oral at bedtime  metoprolol tartrate 25 milliGRAM(s) Oral every 12 hours  pantoprazole    Tablet 40 milliGRAM(s) Oral before breakfast  rOPINIRole 0.25 milliGRAM(s) Oral every 12 hours  sodium polystyrene sulfonate Suspension 15 Gram(s) Oral once      T(C): --  HR: --  BP: --  RR: --  SpO2: --    PE;  general:  no acute changes in nad    Lungs:    Heart:    EXT:    Neuro:  alert nod eficits      11.1  33.8  7.12  21  0.6  5.1  80      07-16    137  |  96<L>  |  21<H>  ----------------------------<  80  5.1<H>   |  32  |  0.6<L>    Ca    9.7      16 Jul 2020 07:02    TPro  5.9<L>  /  Alb  3.4<L>  /  TBili  0.3  /  DBili  x   /  AST  35  /  ALT  23  /  AlkPhos  52  07-16      LIVER FUNCTIONS - ( 16 Jul 2020 07:02 )  Alb: 3.4 g/dL / Pro: 5.9 g/dL / ALK PHOS: 52 U/L / ALT: 23 U/L / AST: 35 U/L / GGT: x                                   11.1   7.12  )-----------( 259      ( 16 Jul 2020 07:02 )             33.8       CAPILLARY BLOOD GLUCOSE

## 2020-07-17 NOTE — PROGRESS NOTE BEHAVIORAL HEALTH - NSBHFUPINTERVALHXFT_PSY_A_CORE
Pt seen and evaluated, chart reviewed.  As per nursing staff, pt intermittently refusing medications, often observed talking to self in room. On evaluation, pt found curled up in bed with covers over head, irritable and easily agitated, refusing to cooperate with interview, repeatedly demanding this writer "go away". Limited interview. Pt continues to be verbally abusive and belligerent towards staff. Pt minimally cooperative with SLP evaluation, able to tolerate thin liquids, diet advanced as recommended. TOO for 7/20.

## 2020-07-17 NOTE — CHART NOTE - NSCHARTNOTEFT_GEN_A_CORE
Registered Dietitian Follow-Up     Patient Profile Reviewed                           Yes [x]   No []     Nutrition History Previously Obtained        Yes [x]  No []       Pertinent Subjective Information: Pt continues to refuse meds and meals       Pertinent Medical Interventions: Schizophrenia - c/w treatment as per Psych team. H/o HTN, Parkinson's, COPD noted. As per NP pt is agreeable to SLP eval (? diet advancement)     Diet order: dysphagia (1) puree w/ honey this liquids +Ensure Pudding q 8hrs.       Anthropometrics:  - Ht. 167.6cm  - Wt. no new weights, last 41.8kg (7/9)  - %wt change  - BMI - 14.9  - IBW - 64.5kg +/-10%.      Pertinent Lab Data:  7/16 H/H 11.1/33.8, K 5.1, Cl- 96, BUN 21, Cr- 0.6, Alb 3.4, (7/10) lipid panel previously noted     Pertinent Meds: Kayaxelate, depakote, calcium carbonate, atarax, melatonin, lopressor, protonix, requip     Physical Findings:  - Appearance: uncooperative, hostile  - GI function: no documented BMs  - Tubes:  - Oral/Mouth cavity:  - Skin: intact.     Nutrition Requirements  Weight Used: dosing  41.8kg    Estimated energy needs  Continue [x]  Adjust []: 1465-1670kcal/d (35-40kcal/kg act wt) - low body weight considered    Estimated protein needs  Continue [x]  Adjust []: 42-55gm/day (1.0-1.3gm/kg act wt)    Estimated fluid needs  Continue [x]  Adjust []: 1ml/kcal or per LIP      Nutrient Intake:     3 day calorie count            [x] Previous Nutrition Diagnosis: Inadequate Energy Intake.             [x] Ongoing          [] Resolved    [] No active nutrition diagnosis identified at this time     Nutrition Diagnostic #1  Problem:  Etiology:  Statement:     Nutrition Diagnostic #2  Problem:  Etiology:  Statement:     Nutrition Intervention: Meal and snacks. Medical food supplements. Coordination of care.      Goal/Expected Outcome:   PO intake > 50% meals, snacks and supplements upon f/u in 4 days.     Indicator/Monitoring:  RD will monitor energy intake, diet order, glucose/renal profile, body composition, NFPF.    Recommendations: If Potassium remains elevated consider " no concentrated Potassium" diet modifier. Continue current diet and supplements (Ensure Pudding), noted that pt is also receiving Ensure Enlive - RN aware that pt needs honey thick liquids and Ensure must be thickened to the right consistency.  SLP eval (when appropriate). ?Consider appetite stimulant (noted pt is refusing meds at this time). Obtain weights 3x/week. Will follow.    Pt remains at risk Registered Dietitian Follow-Up     Patient Profile Reviewed                           Yes [x]   No []     Nutrition History Previously Obtained        Yes [x]  No []       Pertinent Subjective Information: Pt continues to refuse care. As per RN pt is eating some of the meals/snacks.      Pertinent Medical Interventions: Schizophrenia - c/w treatment as per Psych team. H/o HTN, Parkinson's, COPD noted. As per NP pt is agreeable to SLP eval (? diet advancement)     Diet order: dysphagia (1) puree w/ honey this liquids +Ensure Pudding q 8hrs.       Anthropometrics:  - Ht. 167.6cm  - Wt. no new weights, last 41.8kg (7/9)  - %wt change  - BMI - 14.9  - IBW - 64.5kg +/-10%.      Pertinent Lab Data:  7/16 H/H 11.1/33.8, K 5.1, Cl- 96, BUN 21, Cr- 0.6, Alb 3.4, (7/10) lipid panel previously noted     Pertinent Meds: Kayexalate, depakote, calcium carbonate, atarax, melatonin, lopressor, protonix, requip     Physical Findings:  - Appearance: uncooperative, angry, irritable  - GI function: no documented BMs - RN aware  - Tubes:  - Oral/Mouth cavity:  - Skin: intact     Nutrition Requirements  Weight Used: dosing  41.8kg    Estimated energy needs  Continue [x]  Adjust []: 1465-1670kcal/d (35-40kcal/kg act wt) - low body weight considered    Estimated protein needs  Continue [x]  Adjust []: 42-55gm/day (1.0-1.3gm/kg act wt)    Estimated fluid needs  Continue [x]  Adjust []: 1ml/kcal or per LIP      Nutrient Intake: not meeting needs; documented PO intake mostly 0-25% (only one meal documented as 100%)    3 day calorie count:    Day# 1 (7/14): pt refused breakfast and lunch , consumed ~ 370kcal/8gm protein at dinner time (includes 2 Ensure Puddings)  Day# 2 (7/15): pt refused breakfast, consumed 75% lunch tray (only % documented) and 1070kcal/23gm protein at dinner time (includes 2 Ensure Puddings)  Day# 3 (7/16): pt refused breakfast and lunch, consumed ~580kcal/26gm protein at dinner time (includes 1 Ensure Pudding)    2 day average (7/14 & 7/16): ~475 kcal, 17gm protein.   Unable to provide 3 day average due to incomplete data ( documented % vs. actual foods consumed on day #2)        [x] Previous Nutrition Diagnosis: Inadequate Energy Intake.             [x] Ongoing          [] Resolved    [] No active nutrition diagnosis identified at this time     Nutrition Diagnostic #1  Problem:  Etiology:  Statement:     Nutrition Diagnostic #2  Problem:  Etiology:  Statement:     Nutrition Intervention: Meal and snacks. Medical food supplements. Coordination of care.      Goal/Expected Outcome:   PO intake > 50% meals, snacks and supplements upon f/u in 4 days.     Indicator/Monitoring:  RD will monitor energy intake, diet order, glucose/renal profile, body composition, NFPF.    Recommendations: Continue current diet and supplements, consider adding honey thickened Ensure Enlive q 8 hrs - vanilla (additional 1050kcal, 60gm protein, 1680mg Potassium) or Nepro q 12hrs (850kcal, 38gm protein, 500mg Potassium) - if K remains elevated.  When appropriate please order SLP eval ( ? diet advancement). Consider appetite stimulant (noted that pt is refusing meds at this time).   Obtain weights 3x/week. If Potassium remains elevated consider " no concentrated Potassium" diet modifier.  At this time pt is not meeting estimated needs, may consider short term NGT to provide adequate nutrition (if consistent w/ goals of care).   Will follow.    Pt remains at risk

## 2020-07-17 NOTE — PROGRESS NOTE BEHAVIORAL HEALTH - SUMMARY
69M domiciled at [place], PPH: schizophrenia, PMH: Parkinson disease, COPD, sent to [facility]'s ED for agitated behavior. Pt presents agitated, hostile, disorganised, appears to be responding to internal stimuli and paranoid about medications in context of poor adherence to medication regimen. Pt continues to refuse medications. TOO to be requested.    #Schizophrenia  -change depakene syrup 500 mg bid to depakote sprinkles 500 mg bid to increase adherence  -continue haldol concentrate 2 mg bid  - pt is non compliant with medications; will pursue TOO  -c/w cogentin 1 mg bid  -c/w melatonin 5 mg qhs  -start haldol concentrate 2.5 mg q8h prn for agitation  -c/w hydroxyzine 50 mg q6h prn for anxiety or/and insomnia    #HTN, Parkinsons, COPD  -c/w home medications  -medicine consulted  -PT/OT consult pending    #Poor PO Intake  -RD consulted; Ensure with meals  -Monitor weight every 3 days  -SLP consulted; advance diet as per notes

## 2020-07-17 NOTE — PROGRESS NOTE ADULT - PROVIDER SPECIALTY LIST ADULT
Internal Medicine Mom feels baby is now breastfeeding well every 2-3 hours with frequent stool and urine output. Mom has slight initial latch on tenderness but no signs of nipple trauma. Reviewed Breastfeeding [place] times and location.

## 2020-07-18 RX ADMIN — ALBUTEROL 2 PUFF(S): 90 AEROSOL, METERED ORAL at 18:43

## 2020-07-18 RX ADMIN — HALOPERIDOL DECANOATE 2 MILLIGRAM(S): 100 INJECTION INTRAMUSCULAR at 21:02

## 2020-07-18 RX ADMIN — HALOPERIDOL DECANOATE 2 MILLIGRAM(S): 100 INJECTION INTRAMUSCULAR at 08:33

## 2020-07-19 RX ADMIN — HALOPERIDOL DECANOATE 2 MILLIGRAM(S): 100 INJECTION INTRAMUSCULAR at 21:09

## 2020-07-19 RX ADMIN — HALOPERIDOL DECANOATE 2 MILLIGRAM(S): 100 INJECTION INTRAMUSCULAR at 12:52

## 2020-07-20 RX ORDER — HALOPERIDOL DECANOATE 100 MG/ML
5 INJECTION INTRAMUSCULAR AT BEDTIME
Refills: 0 | Status: DISCONTINUED | OUTPATIENT
Start: 2020-07-20 | End: 2020-07-27

## 2020-07-20 RX ADMIN — HALOPERIDOL DECANOATE 5 MILLIGRAM(S): 100 INJECTION INTRAMUSCULAR at 21:10

## 2020-07-20 NOTE — PROGRESS NOTE BEHAVIORAL HEALTH - SUMMARY
69M domiciled at [place], PPH: schizophrenia, PMH: Parkinson disease, COPD, sent to [facility]'s ED for agitated behavior. Pt presents agitated, hostile, disorganised, appears to be responding to internal stimuli and paranoid about medications in context of poor adherence to medication regimen. Pt continues to refuse medications. TOO to be requested.    #Schizophrenia  -change depakene syrup 500 mg bid to depakote sprinkles 500 mg bid to increase adherence  -continue haldol concentrate 2 mg bid  - pt is non compliant with medications; will pursue TOO  -c/w cogentin 1 mg bid  -c/w melatonin 5 mg qhs  -start haldol concentrate 2.5 mg q8h prn for agitation  -c/w hydroxyzine 50 mg q6h prn for anxiety or/and insomnia    #HTN, Parkinsons, COPD  -c/w home medications  -medicine consulted  -PT/OT consult pending    #Poor PO Intake  -RD consulted; Ensure with meals  -Monitor weight every 3 days  -SLP consulted; advance diet as per notes 69M domiciled at [place], PPH: schizophrenia, PMH: Parkinson disease, COPD, sent to [facility]'s ED for agitated behavior. Pt presents agitated, hostile, disorganised, appears to be responding to internal stimuli and paranoid about medications in context of poor adherence to medication regimen. Pt continues to refuse medications. TOO to be requested.    #Schizophrenia  -change depakene syrup 500 mg bid to depakote sprinkles 500 mg bid to increase adherence  -continue haldol concentrate 2 mg bid --> titrate haldol concentrate 5 mg qhs (7/20)  - pt is non compliant with medications; will pursue TOO  -c/w cogentin 1 mg bid  -c/w melatonin 5 mg qhs  -start haldol concentrate 2.5 mg q8h prn for agitation  -c/w hydroxyzine 50 mg q6h prn for anxiety or/and insomnia    #HTN, Parkinsons, COPD  -c/w home medications  -medicine consulted  -PT/OT consult pending    #Poor PO Intake  -RD consulted; Ensure with meals  -Monitor weight every 3 days  -SLP consulted; advance diet as per notes

## 2020-07-20 NOTE — PROGRESS NOTE ADULT - SUBJECTIVE AND OBJECTIVE BOX
pt stable alert in NAD  no new complaints    DEPRESSION  ^DEPRESSION  Yes  Handoff  HTN (hypertension)  Epilepsy  BPH (benign prostatic hyperplasia)  COPD, moderate  Parkinsonism  Essential hypertension  Parkinsonism, unspecified Parkinsonism type  Schizophrenia, unspecified type  Depression    HEALTH ISSUES - PROBLEM Dx:  Essential hypertension: Essential hypertension  Parkinsonism, unspecified Parkinsonism type: Parkinsonism, unspecified Parkinsonism type  Schizophrenia, unspecified type  Depression: Depression        PAST MEDICAL & SURGICAL HISTORY:  HTN (hypertension)  Epilepsy  BPH (benign prostatic hyperplasia)  COPD, moderate  Parkinsonism    No Known Allergies      FAMILY HISTORY:      ALBUTerol    90 MICROgram(s) HFA Inhaler 2 Puff(s) Inhalation every 6 hours PRN  benztropine 1 milliGRAM(s) Oral two times a day  calcium carbonate    500 mG (Tums) Chewable 1 Tablet(s) Chew daily  diVALproex Sprinkle 500 milliGRAM(s) Oral two times a day  haloperidol    Concentrate 2 milliGRAM(s) Oral two times a day  hydrOXYzine hydrochloride 50 milliGRAM(s) Oral every 6 hours PRN  melatonin. 5 milliGRAM(s) Oral at bedtime  metoprolol tartrate 25 milliGRAM(s) Oral every 12 hours  pantoprazole    Tablet 40 milliGRAM(s) Oral before breakfast  rOPINIRole 0.25 milliGRAM(s) Oral every 12 hours      T(C): 36.2 (07-20-20 @ 06:00), Max: 36.2 (07-20-20 @ 06:00)  HR: 68 (07-20-20 @ 06:00) (68 - 68)  BP: 110/78 (07-20-20 @ 06:00) (110/78 - 110/78)  RR: 16 (07-20-20 @ 06:00) (16 - 16)  SpO2: --    PE;  general:  stasble no cahnges noted in nad    Lungs:    Heart:    EXT:    Neuro:  aelrt no deficits                          CAPILLARY BLOOD GLUCOSE

## 2020-07-20 NOTE — PROGRESS NOTE BEHAVIORAL HEALTH - NSBHFUPINTERVALHXFT_PSY_A_CORE
Pt seen and evaluated, chart reviewed.  As per nursing staff, pt intermittently refusing medications over the weekend, at times verbally abusive and belligerent towards staff, observed talking to self in room. On evaluation, pt found curled up in bed with covers over head, agrees to evaluation, cooperative however difficult to understand. Pt endorses good mood and sleep, endorses continued poor appetite, states he has been drinking his Ensure. Pt denies AVH, however is observed to look around room while talking to this writer, appears to responding to internal stimuli. Denies SI/HI, intent and plan. TOO for today.

## 2020-07-20 NOTE — CHART NOTE - NSCHARTNOTEFT_GEN_A_CORE
Mr. Collins remains on IPP for safety and stabilization. During AM rounds there were no acute issues to report at this time. It was noted that pt has been taking his medication sporadically. Upon meeting with pt today, he was less aggressive and agreed to talk to us. Pt continues to repeat that he was transferred from [facility] and that he is here now. Pt is much more approachable today than prior and engaged with treatment team. Pt still reports that he is not eating, and has been refusing at this time. Pt unable to provide SW and NP with any other information at this time. TOO to be completed today. SW will continue to follow.     Mood – Paranoid/Guarded  Sleep - Poor  Appetite - Poor  ADLs - Poor  Thought Process –Tangential   Observation – Q10    SW will continue to follow. D/C planning is on-going at this time.

## 2020-07-21 RX ORDER — BUDESONIDE AND FORMOTEROL FUMARATE DIHYDRATE 160; 4.5 UG/1; UG/1
2 AEROSOL RESPIRATORY (INHALATION)
Refills: 0 | Status: DISCONTINUED | OUTPATIENT
Start: 2020-07-21 | End: 2020-08-19

## 2020-07-21 RX ADMIN — BUDESONIDE AND FORMOTEROL FUMARATE DIHYDRATE 2 PUFF(S): 160; 4.5 AEROSOL RESPIRATORY (INHALATION) at 20:26

## 2020-07-21 RX ADMIN — HALOPERIDOL DECANOATE 5 MILLIGRAM(S): 100 INJECTION INTRAMUSCULAR at 20:26

## 2020-07-21 NOTE — PROGRESS NOTE BEHAVIORAL HEALTH - SUMMARY
69M domiciled at [place], PPH: schizophrenia, PMH: Parkinson disease, COPD, sent to [facility]'s ED for agitated behavior. Pt presents agitated, hostile, disorganised, appears to be responding to internal stimuli and paranoid about medications in context of poor adherence to medication regimen. Pt continues to refuse medications. TOO to be requested.    #Schizophrenia  -change depakene syrup 500 mg bid to depakote sprinkles 500 mg bid to increase adherence  -continue haldol concentrate 2 mg bid --> titrate haldol concentrate 5 mg qhs (7/20)  - pt is non compliant with medications; will pursue TOO  -c/w cogentin 1 mg bid  -c/w melatonin 5 mg qhs  -start haldol concentrate 2.5 mg q8h prn for agitation  -c/w hydroxyzine 50 mg q6h prn for anxiety or/and insomnia    #HTN, Parkinsons, COPD  -c/w home medications  -medicine consulted  -PT/OT consult pending    #Poor PO Intake  -RD consulted; Ensure with meals  -Monitor weight every 3 days  -SLP consulted; advance diet as per notes

## 2020-07-21 NOTE — CHART NOTE - NSCHARTNOTEFT_GEN_A_CORE
Registered Dietitian Follow-Up     Patient Profile Reviewed                           Yes [x]   No []     Nutrition History Previously Obtained        Yes [x]  No []       Pertinent Subjective Information:    Pertinent Medical Interventions: Schizophrenia - c/w treatment as per Psych team. SLP tiffanie; (7/17), recommended: Dysphagia Diet II mechanical soft consistency with ground meat thin liquids.  H/o HTN, Parkinson's, COPD noted.      Diet order: (7/17) dysphagia II mechanical soft diet with thin liquids + Ensure Pudding q 8hrs.      Anthropometrics:  - Ht. 167.6cm  - Wt. no new weights, last 41.8kg (7/9)  - %wt change  - BMI - 14.9  - IBW - 64.5kg +/-10%.      Pertinent Lab Data: no new labs, last (7/16) H/H 11.1/33.8, K 5.1, Cl- 96, BUN 21, Cr- 0.6, Alb 3.4     Pertinent Meds: haldol, depakote, cogentin, calcium carbonate, atarax, melatonin, lopressor, protonix, requip     Physical Findings:  - Appearance:   - GI function: no documented BMs - RN aware  - Tubes:  - Oral/Mouth cavity:   - Skin: intact     Nutrition Requirements  Weight Used: dosing  41.8kg    Estimated energy needs  Continue [x]  Adjust []: 1465-1670kcal/d (35-40kcal/kg act wt) - low body weight considered    Estimated protein needs  Continue [x]  Adjust []: 42-55gm/day (1.0-1.3gm/kg act wt)    Estimated fluid needs  Continue [x]  Adjust []: 1ml/kcal or per LIP      Nutrient Intake: PO intake has improved since diet advancement 4 days ago (from puree to mech soft); documented PO intake varies % trays since last RD f/u (previously 0-25%).     [x] Previous Nutrition Diagnosis: Inadequate Energy Intake.             [x] Ongoing/resolving          [] Resolved    [] No active nutrition diagnosis identified at this time     Nutrition Diagnostic #1  Problem:  Etiology:  Statement:     Nutrition Diagnostic #2  Problem:  Etiology:  Statement:     Nutrition Intervention: Meal and snacks. Medical food supplements. Coordination of care.      Goal/Expected Outcome:   PO intake > 75% meals, snacks and supplements upon f/u in 3 days.     Indicator/Monitoring:  RD will monitor energy intake, diet order, glucose/renal profile, body composition, NFPF.    Recommendations: Continue current diet, add Ensure Enlive q 8hrs (1050kcal, 60gm protein), decrease Ensure Pudding to q 24hrs (170kcal, 4gm protein).    Obtain and document weights 3x/week. Will follow.      Pt remains at risk. Registered Dietitian Follow-Up     Patient Profile Reviewed                           Yes [x]   No []     Nutrition History Previously Obtained        Yes [x]  No []       Pertinent Subjective Information: As per RN pt is tolerating new diet consistency (mechanical soft with thin liquids) and eating better; still aggressive towards the staff but took his meds today for the first time. ? last BM - unknown.     Pertinent Medical Interventions: Schizophrenia - c/w treatment as per Psych team. SLP carmen (7/17), recommended: Dysphagia Diet II mechanical soft consistency with ground meat thin liquids.  H/o HTN, Parkinson's, COPD noted.      Diet order: (7/17) dysphagia II mechanical soft diet with thin liquids + Ensure Pudding q 8hrs.      Anthropometrics:  - Ht. 167.6cm  - Wt. no new weights, last 41.8kg (7/9)  - %wt change  - BMI - 14.9  - IBW - 64.5kg +/-10%.      Pertinent Lab Data: no new labs, last (7/16) H/H 11.1/33.8, K 5.1, Cl- 96, BUN 21, Cr- 0.6, Alb 3.4     Pertinent Meds: haldol, depakote, cogentin, calcium carbonate, atarax, melatonin, lopressor, protonix, requip     Physical Findings:  - Appearance: anxious, irritable  - GI function: no documented BMs - RN aware  - Tubes: none  - Oral/Mouth cavity: pt is tolerating mechanical soft diet and thin liquids well as per RN report   - Skin: intact     Nutrition Requirements  Weight Used: dosing  41.8kg    Estimated energy needs  Continue [x]  Adjust []: 1465-1670kcal/d (35-40kcal/kg act wt) - low body weight considered    Estimated protein needs  Continue [x]  Adjust []: 42-55gm/day (1.0-1.3gm/kg act wt)    Estimated fluid needs  Continue [x]  Adjust []: 1ml/kcal or per LIP      Nutrient Intake: as per RN pt is eating better, PO intake has improved since diet advancement 4 days ago (from puree to mech soft); documented PO intake varies % trays since last RD f/u (previously 0-25%). Pt likes and drinks Ensure Enlive - not officially ordered yet.     [x] Previous Nutrition Diagnosis: Inadequate Energy Intake.             [x] Ongoing/resolving          [] Resolved    [] No active nutrition diagnosis identified at this time     Nutrition Diagnostic #1  Problem:  Etiology:  Statement:     Nutrition Diagnostic #2  Problem:  Etiology:  Statement:     Nutrition Intervention: Meal and snacks. Medical food supplements. Coordination of care.      Goal/Expected Outcome:   PO intake > 75% meals, snacks and supplements upon f/u in 3 days.     Indicator/Monitoring:  RD will monitor energy intake, diet order, glucose/renal profile, body composition, NFPF.    Recommendations: Continue current diet, add Ensure Enlive q 8hrs (1050kcal, 60gm protein), decrease Ensure Pudding to q 24hrs (170kcal, 4gm protein).    Obtain and document weights 3x/week. Document BMs. ? Consider stool softeners.  Will follow.      Pt remains at risk.

## 2020-07-21 NOTE — PROGRESS NOTE BEHAVIORAL HEALTH - NSBHFUPINTERVALHXFT_PSY_A_CORE
Pt seen and evaluated, chart reviewed.  As per nursing staff, pt has been compliant with medications since last night, at times verbally abusive and belligerent towards staff. On evaluation, pt found curled up in bed with covers over head, agrees to evaluation, cooperative however difficult to understand. Pt endorses good mood, sleep and appetite, states he has been drinking his Ensure. Pt denies AVH, however is observed by staff to talk to self in room at times. Denies SI/HI, intent and plan. TOO scheduled for next week.

## 2020-07-22 RX ADMIN — BUDESONIDE AND FORMOTEROL FUMARATE DIHYDRATE 2 PUFF(S): 160; 4.5 AEROSOL RESPIRATORY (INHALATION) at 21:38

## 2020-07-22 RX ADMIN — BUDESONIDE AND FORMOTEROL FUMARATE DIHYDRATE 2 PUFF(S): 160; 4.5 AEROSOL RESPIRATORY (INHALATION) at 08:10

## 2020-07-22 RX ADMIN — HALOPERIDOL DECANOATE 5 MILLIGRAM(S): 100 INJECTION INTRAMUSCULAR at 21:46

## 2020-07-22 NOTE — PROGRESS NOTE BEHAVIORAL HEALTH - SUMMARY
69M domiciled at [place], PPH: schizophrenia, PMH: Parkinson disease, COPD, sent to [facility]'s ED for agitated behavior. Pt presents agitated, hostile, disorganised, appears to be responding to internal stimuli and paranoid about medications in context of poor adherence to medication regimen. Pt continues to refuse medications. TOO to be requested.    #Schizophrenia  -change depakene syrup 500 mg bid to depakote sprinkles 500 mg bid to increase adherence  -continue haldol concentrate 2 mg bid --> titrate haldol concentrate 5 mg qhs (7/20)  - pt is non compliant with medications; will pursue TOO  -c/w cogentin 1 mg bid  -c/w melatonin 5 mg qhs  -start haldol concentrate 2.5 mg q8h prn for agitation  -c/w hydroxyzine 50 mg q6h prn for anxiety or/and insomnia    #HTN, Parkinsons, COPD  -c/w home medications  -medicine consulted  -PT/OT consult pending    #Poor PO Intake  -RD consulted; Ensure with meals  -Monitor weight every 3 days  -SLP consulted; advance diet as per notes

## 2020-07-22 NOTE — CHART NOTE - NSCHARTNOTEFT_GEN_A_CORE
Mr. Collins remains on IPP for safety and stabilization. During AM rounds there were no acute issues to report at this time. SW and NP met with pt during AM rounds, pt was cooperative and engaged with treatment team. While pt is difficult to understand at times. He was able to inform the team that he did not sleep well last night and he was up and down all night. Upon asking other questions pt began to drift off and discuss "the spaceship, the monkey, and the spider" Pt began to become incoherent with his speech but was able to be redirected. Pt continued to engage with team and requested medication was his asthma. SW will continue to follow. D/C plan is unclear as pt has not fully stabilized at this point. Likely to return to SNF for Long Term Care, Adult Home Placement at prior facility.     Pt reports no SI/HI or AVH at this time.     Mood – Improving  Sleep - Poor  Appetite - Poor  ADLs - Poor  Thought Process –Tangential   Observation – Q10    SW will continue to follow. D/C planning is on-going at this time.

## 2020-07-22 NOTE — PROGRESS NOTE ADULT - SUBJECTIVE AND OBJECTIVE BOX
pt stable alert in NAD  no new complaints    DEPRESSION  ^DEPRESSION  Yes  Handoff  HTN (hypertension)  Epilepsy  BPH (benign prostatic hyperplasia)  COPD, moderate  Parkinsonism  Essential hypertension  Parkinsonism, unspecified Parkinsonism type  Schizophrenia, unspecified type  Depression    HEALTH ISSUES - PROBLEM Dx:  Essential hypertension: Essential hypertension  Parkinsonism, unspecified Parkinsonism type: Parkinsonism, unspecified Parkinsonism type  Schizophrenia, unspecified type  Depression: Depression        PAST MEDICAL & SURGICAL HISTORY:  HTN (hypertension)  Epilepsy  BPH (benign prostatic hyperplasia)  COPD, moderate  Parkinsonism    No Known Allergies      FAMILY HISTORY:      ALBUTerol    90 MICROgram(s) HFA Inhaler 2 Puff(s) Inhalation every 6 hours PRN  benztropine 1 milliGRAM(s) Oral two times a day  budesonide  80 MICROgram(s)/formoterol 4.5 MICROgram(s) Inhaler 2 Puff(s) Inhalation two times a day  calcium carbonate    500 mG (Tums) Chewable 1 Tablet(s) Chew daily  diVALproex Sprinkle 500 milliGRAM(s) Oral two times a day  haloperidol    Concentrate 5 milliGRAM(s) Oral at bedtime  hydrOXYzine hydrochloride 50 milliGRAM(s) Oral every 6 hours PRN  melatonin. 5 milliGRAM(s) Oral at bedtime  metoprolol tartrate 25 milliGRAM(s) Oral every 12 hours  pantoprazole    Tablet 40 milliGRAM(s) Oral before breakfast  rOPINIRole 0.25 milliGRAM(s) Oral every 12 hours      T(C): 36.1 (07-21-20 @ 09:56), Max: 36.1 (07-21-20 @ 09:56)  HR: 70 (07-21-20 @ 09:56) (70 - 70)  BP: 122/79 (07-21-20 @ 09:56) (122/79 - 122/79)  RR: 16 (07-21-20 @ 09:56) (16 - 16)  SpO2: --    PE;  general:  stable innad no acute changes    Lungs:    Heart:    EXT:    Neuro:   aelrt nod eficits                          CAPILLARY BLOOD GLUCOSE

## 2020-07-22 NOTE — PROGRESS NOTE BEHAVIORAL HEALTH - NSBHFUPINTERVALHXFT_PSY_A_CORE
Pt seen and evaluated, chart reviewed.  As per nursing staff, pt has been compliant with medications, at times verbally abusive and belligerent towards staff. On evaluation, pt found curled up in bed with covers over head, agrees to evaluation, cooperative however difficult to understand. Pt endorses good mood, sleep and appetite, states he has been drinking his Ensure. Pt denies AVH, however is observed by staff to talk to self in room at times. Denies SI/HI, intent and plan. Refusing VS, labwork and ECG. TOO scheduled for next week. Pt seen and evaluated, chart reviewed.  As per nursing staff, pt has been compliant with medications, at times verbally abusive and belligerent towards staff. On evaluation, pt found curled up in bed with covers over head, agrees to evaluation, cooperative however difficult to understand, easily agitated. Pt endorses good mood, sleep and appetite, states he has been drinking his Ensure. Pt denies AVH, however is observed by staff to talk to self in room at times. Denies SI/HI, intent and plan. Refusing VS, labwork and ECG. TOO scheduled for next week.

## 2020-07-23 RX ORDER — HALOPERIDOL DECANOATE 100 MG/ML
2 INJECTION INTRAMUSCULAR DAILY
Refills: 0 | Status: DISCONTINUED | OUTPATIENT
Start: 2020-07-24 | End: 2020-07-27

## 2020-07-23 RX ADMIN — Medication 25 MILLIGRAM(S): at 20:16

## 2020-07-23 RX ADMIN — ROPINIROLE 0.25 MILLIGRAM(S): 8 TABLET, FILM COATED, EXTENDED RELEASE ORAL at 20:16

## 2020-07-23 RX ADMIN — ALBUTEROL 2 PUFF(S): 90 AEROSOL, METERED ORAL at 15:13

## 2020-07-23 RX ADMIN — BUDESONIDE AND FORMOTEROL FUMARATE DIHYDRATE 2 PUFF(S): 160; 4.5 AEROSOL RESPIRATORY (INHALATION) at 20:16

## 2020-07-23 RX ADMIN — Medication 1 MILLIGRAM(S): at 20:16

## 2020-07-23 RX ADMIN — Medication 5 MILLIGRAM(S): at 20:16

## 2020-07-23 RX ADMIN — ALBUTEROL 2 PUFF(S): 90 AEROSOL, METERED ORAL at 22:57

## 2020-07-23 RX ADMIN — BUDESONIDE AND FORMOTEROL FUMARATE DIHYDRATE 2 PUFF(S): 160; 4.5 AEROSOL RESPIRATORY (INHALATION) at 06:24

## 2020-07-23 RX ADMIN — HALOPERIDOL DECANOATE 5 MILLIGRAM(S): 100 INJECTION INTRAMUSCULAR at 20:16

## 2020-07-23 RX ADMIN — DIVALPROEX SODIUM 500 MILLIGRAM(S): 500 TABLET, DELAYED RELEASE ORAL at 20:16

## 2020-07-23 NOTE — PROGRESS NOTE BEHAVIORAL HEALTH - NSBHFUPINTERVALHXFT_PSY_A_CORE
Pt seen and evaluated, refuses to leave room to go to treatment team, chart reviewed.  As per nursing staff, pt has been intermittently compliant with medications, at times verbally abusive and belligerent towards staff. On evaluation, pt found curled up in bed with covers over head, agrees to evaluation, cooperative however difficult to understand, easily agitated. Pt endorses good mood, sleep and appetite, states he has been drinking his Ensure. Pt denies AVH, however is observed by staff to talk to self in room at times. Denies SI/HI, intent and plan. Refusing VS, labwork and ECG. TOO scheduled for next week.

## 2020-07-23 NOTE — PROGRESS NOTE BEHAVIORAL HEALTH - SUMMARY
69M domiciled at [place], PPH: schizophrenia, PMH: Parkinson disease, COPD, sent to [facility]'s ED for agitated behavior. Pt presents agitated, hostile, disorganised, appears to be responding to internal stimuli and paranoid about medications in context of poor adherence to medication regimen. Pt continues to refuse medications. TOO to be requested.    #Schizophrenia  -change depakene syrup 500 mg bid to depakote sprinkles 500 mg bid to increase adherence  -continue haldol concentrate 2 mg bid --> titrate haldol concentrate 5 mg qhs (7/20) --> titrate haldol concentrate 2 mg daily, haldol concentrate 5 mg qhs (7/23)  - pt is non compliant with medications; will pursue TOO  -c/w cogentin 1 mg bid  -c/w melatonin 5 mg qhs  -start haldol concentrate 2.5 mg q8h prn for agitation  -c/w hydroxyzine 50 mg q6h prn for anxiety or/and insomnia    #HTN, Parkinsons, COPD  -c/w home medications  -medicine consulted  -PT/OT consult pending    #Poor PO Intake  -RD consulted; Ensure with meals  -Monitor weight every 3 days  -SLP consulted; advance diet as per notes

## 2020-07-23 NOTE — PROGRESS NOTE BEHAVIORAL HEALTH - NSBHCHARTREVIEWLAB_PSY_A_CORE FT
Complete Blood Count + Automated Diff in AM (07.16.20 @ 07:02)    WBC Count: 7.12 K/uL    RBC Count: 3.48 M/uL    Hemoglobin: 11.1 g/dL    Hematocrit: 33.8 %    Mean Cell Volume: 97.1 fL    Mean Cell Hemoglobin: 31.9 pg    Mean Cell Hemoglobin Conc: 32.8 g/dL    Red Cell Distrib Width: 13.3 %    Platelet Count - Automated: 259 K/uL    Auto Neutrophil #: 3.83 K/uL    Auto Lymphocyte #: 2.21 K/uL    Auto Monocyte #: 0.91 K/uL    Auto Eosinophil #: 0.12 K/uL    Auto Basophil #: 0.04 K/uL    Auto Neutrophil %: 53.8    Auto Lymphocyte %: 31.0 %    Auto Monocyte %: 12.8 %    Auto Eosinophil %: 1.7 %    Auto Basophil %: 0.6 %    Auto Immature Granulocyte %: 0.1 %    Nucleated RBC: 0 /100 WBCs  Comprehensive Metabolic Panel in AM (07.16.20 @ 07:02)    Sodium, Serum: 137 mmol/L    Potassium, Serum: 5.1 mmol/L    Chloride, Serum: 96 mmol/L    Carbon Dioxide, Serum: 32 mmol/L    Anion Gap, Serum: 9 mmol/L    Blood Urea Nitrogen, Serum: 21 mg/dL    Creatinine, Serum: 0.6 mg/dL    Glucose, Serum: 80 mg/dL    Calcium, Total Serum: 9.7 mg/dL    Protein Total, Serum: 5.9 g/dL    Albumin, Serum: 3.4 g/dL    Bilirubin Total, Serum: 0.3 mg/dL    Alkaline Phosphatase, Serum: 52 U/L    Aspartate Aminotransferase (AST/SGOT): 35 U/L    Alanine Aminotransferase (ALT/SGPT): 23 U/L    eGFR if Non : 103    eGFR if : 119 mL/min/1.73M2  Lipid Profile (07.10.20 @ 04:30)    Total Cholesterol/HDL Ratio Measurement: 4.1 Ratio    Cholesterol, Serum: 141 mg/dL    Triglycerides, Serum: 86 mg/dL    HDL Cholesterol, Serum: 34    Direct LDL: 96  A1C with Estimated Average Glucose in AM (07.10.20 @ 04:30)    A1C with Estimated Average Glucose Result: 4.9  Thyroid Stimulating Hormone, Serum in AM (07.16.20 @ 07:02)    Thyroid Stimulating Hormone, Serum: 2.88 uIU/mL

## 2020-07-23 NOTE — CHART NOTE - NSCHARTNOTEFT_GEN_A_CORE
Social Work Note:    Treatment team meets with patient to discuss treatment plan, medications and discharge plan.  During the day patient is observed to be isolative to his room.  Patient is cooperative but speech is often incoherent.  Patient has not been consistent with medication adherence.  He has been observed to engage in self-dialogue by staff.  Suicidal / homicidal ideation denied.      As patient has not been adherent to diagnostic testing or medication administration plan is for treatment over objection hearing which is scheduled for next week. Discharge plan is for placement to a skilled nursing facility.     Mental Status Exam:    Mood – irritable at times  Sleep - Good  Appetite - Fair  ADLs - Fair  Thought Process – Disorganized   Observation – [id number]    No barriers to discharge identified at this time.     At this time patient is not psychiatrically stable for discharge.

## 2020-07-24 RX ADMIN — ALBUTEROL 2 PUFF(S): 90 AEROSOL, METERED ORAL at 15:29

## 2020-07-24 RX ADMIN — Medication 50 MILLIGRAM(S): at 22:28

## 2020-07-24 RX ADMIN — Medication 1 MILLIGRAM(S): at 08:27

## 2020-07-24 RX ADMIN — BUDESONIDE AND FORMOTEROL FUMARATE DIHYDRATE 2 PUFF(S): 160; 4.5 AEROSOL RESPIRATORY (INHALATION) at 19:24

## 2020-07-24 RX ADMIN — Medication 50 MILLIGRAM(S): at 11:14

## 2020-07-24 RX ADMIN — BUDESONIDE AND FORMOTEROL FUMARATE DIHYDRATE 2 PUFF(S): 160; 4.5 AEROSOL RESPIRATORY (INHALATION) at 08:28

## 2020-07-24 RX ADMIN — PANTOPRAZOLE SODIUM 40 MILLIGRAM(S): 20 TABLET, DELAYED RELEASE ORAL at 08:27

## 2020-07-24 RX ADMIN — Medication 25 MILLIGRAM(S): at 20:25

## 2020-07-24 RX ADMIN — Medication 1 TABLET(S): at 08:27

## 2020-07-24 RX ADMIN — DIVALPROEX SODIUM 500 MILLIGRAM(S): 500 TABLET, DELAYED RELEASE ORAL at 20:25

## 2020-07-24 RX ADMIN — Medication 1 MILLIGRAM(S): at 20:25

## 2020-07-24 RX ADMIN — HALOPERIDOL DECANOATE 2 MILLIGRAM(S): 100 INJECTION INTRAMUSCULAR at 09:46

## 2020-07-24 RX ADMIN — DIVALPROEX SODIUM 500 MILLIGRAM(S): 500 TABLET, DELAYED RELEASE ORAL at 08:27

## 2020-07-24 RX ADMIN — ROPINIROLE 0.25 MILLIGRAM(S): 8 TABLET, FILM COATED, EXTENDED RELEASE ORAL at 20:25

## 2020-07-24 RX ADMIN — ROPINIROLE 0.25 MILLIGRAM(S): 8 TABLET, FILM COATED, EXTENDED RELEASE ORAL at 08:27

## 2020-07-24 RX ADMIN — Medication 5 MILLIGRAM(S): at 20:25

## 2020-07-24 RX ADMIN — ALBUTEROL 2 PUFF(S): 90 AEROSOL, METERED ORAL at 05:28

## 2020-07-24 RX ADMIN — HALOPERIDOL DECANOATE 5 MILLIGRAM(S): 100 INJECTION INTRAMUSCULAR at 20:27

## 2020-07-24 RX ADMIN — Medication 25 MILLIGRAM(S): at 08:27

## 2020-07-24 NOTE — PROGRESS NOTE BEHAVIORAL HEALTH - NSBHFUPINTERVALHXFT_PSY_A_CORE
Pt seen and evaluated, chart reviewed.  As per nursing staff, pt has been intermittently compliant with medications, at times verbally abusive and belligerent towards staff. On evaluation, pt found curled up in bed with covers over head, agrees to evaluation, cooperative however easily agitated and difficult to understand. Pt endorses good mood, sleep and appetite, states he has been drinking his Ensure. Pt denies AVH, however is observed by staff to talk to self in room at times. Denies SI/HI, intent and plan. Refusing VS, labwork and ECG. TOO scheduled for next week. Pt seen and evaluated, chart reviewed.  As per nursing staff, pt has been intermittently compliant with medications, at times verbally abusive and belligerent towards staff. On evaluation, pt found curled up in bed with covers over head, agrees to evaluation, cooperative however easily agitated and difficult to understand. Pt endorses good mood, sleep and appetite, states he has been drinking his Ensure, endorses regular BM (last yesterday). Pt denies AVH, however is observed by staff to talk to self in room at times. Denies SI/HI, intent and plan. Refusing VS, labwork and ECG. TOO scheduled for next week.

## 2020-07-24 NOTE — CHART NOTE - NSCHARTNOTEFT_GEN_A_CORE
Registered Dietitian Follow-Up     Patient Profile Reviewed                           Yes [x]   No []     Nutrition History Previously Obtained        Yes [x]  No []       Pertinent Subjective Information:     Pertinent Medical Interventions: Schizophrenia - c/w treatment as per Psych team. Pt was cleared for dysphagia II mechanical mechanical soft diet with thin liquids.  H/o HTN, Parkinson's, COPD noted.      Diet order: (7/21) dysphagia II mechanical soft diet with thin liquids + Ensure Enlive q 8hrs and Ensure Pudding q 24hrs.      Anthropometrics:  - Ht. 167.6cm  - Wt. no new weights - pt refused to check weight (7/21), last 41.8kg (7/9)  - %wt change  - BMI - 14.9  - IBW - 64.5kg +/-10%.      Pertinent Lab Data: no new labs, last (7/16) H/H 11.1/33.8, K 5.1, Cl- 96, BUN 21, Cr- 0.6, Alb 3.4     Pertinent Meds: haldol, depakote, cogentin, calcium carbonate, melatonin, lopressor, protonix, requip     Physical Findings:  - Appearance:   - GI function: no documented BMs - RN aware  - Tubes: none  - Oral/Mouth cavity: pt is tolerating mechanical soft diet and thin liquids   - Skin: intact     Nutrition Requirements  Weight Used: dosing  41.8kg    Estimated energy needs  Continue [x]  Adjust []: 1465-1670kcal/d (35-40kcal/kg act wt) - low body weight considered    Estimated protein needs  Continue [x]  Adjust []: 42-55gm/day (1.0-1.3gm/kg act wt)    Estimated fluid needs  Continue [x]  Adjust []: 1ml/kcal or per LIP      Nutrient Intake: documented PO intake varies % trays since diet consistency was advanced to mechanical soft from puree. Pt takes Ensure supplements.     [x] Previous Nutrition Diagnosis: Inadequate Energy Intake.             [x] Ongoing (improved since admission)          [] Resolved    [] No active nutrition diagnosis identified at this time     Nutrition Diagnostic #1  Problem:  Etiology:  Statement:     Nutrition Diagnostic #2  Problem:  Etiology:  Statement:     Nutrition Intervention: Meal and snacks. Medical food supplements. Coordination of care.      Goal/Expected Outcome:   PO intake > 75% meals, snacks and supplements upon f/u in 4 days.     Indicator/Monitoring:  RD will monitor energy intake, diet order, glucose/renal profile, body composition, NFPF.    Recommendations: Continue current diet with Ensure Enlive and Ensure Puddings. Obtain and document weights 3x/week. Document BMs. ? Consider stool softeners.  Will follow.      Pt remains at risk.      ____________  Pertinent Medical Interventions:     Diet order:     Anthropometrics:  - Ht.  - Wt.  - %wt change  - BMI  - IBW     Pertinent Lab Data:     Pertinent Meds:     Physical Findings:  - Appearance:  - GI function:  - Tubes:  - Oral/Mouth cavity:  - Skin:     Nutrition Requirements  Weight Used:     Estimated Energy Needs    Continue []  Adjust []  Adjusted Energy Recommendations:   kcal/day        Estimated Protein Needs    Continue []  Adjust []  Adjusted Protein Recommendations:   gm/day        Estimated Fluid Needs        Continue []  Adjust []  Adjusted Fluid Recommendations:   mL/day     Nutrient Intake:        [] Previous Nutrition Diagnosis:            [] Ongoing          [] Resolved    [] No active nutrition diagnosis identified at this time     Nutrition Diagnostic #1  Problem:  Etiology:  Statement:     Nutrition Diagnostic #2  Problem:  Etiology:  Statement:     Nutrition Intervention      Goal/Expected Outcome:     Indicator/Monitoring: Registered Dietitian Follow-Up     Patient Profile Reviewed                           Yes [x]   No []     Nutrition History Previously Obtained        Yes [x]  No []       Pertinent Subjective Information: Pt states that he's appetite is good (as per PCA pt ate most of his breakfast today), pt drinks Ensure Enlive but does not like Ensure Pudding. Poor dentition noted however pt denies any chewing or swallowing difficulties. No GI complaints, 1 BM today     Pertinent Medical Interventions: Schizophrenia - c/w treatment as per Psych team. Pt was cleared for dysphagia II mechanical soft diet with thin liquids.  H/o HTN, Parkinson's, COPD noted.      Diet order: (7/21) dysphagia II mechanical soft diet with thin liquids + Ensure Enlive q 8hrs and Ensure Pudding q 24hrs.      Anthropometrics:  - Ht. 167.6cm  - Wt. no new weights - pt refused to check weight (7/21), last 41.8kg (7/9)  - %wt change  - BMI - 14.9  - IBW - 64.5kg +/-10%.      Pertinent Lab Data: no new labs, last (7/16) H/H 11.1/33.8, K 5.1, Cl- 96, BUN 21, Cr- 0.6, Alb 3.4     Pertinent Meds: haldol, depakote, cogentin, calcium carbonate, melatonin, lopressor, protonix, requip     Physical Findings:  - Appearance: alert, answered RD questions today  - GI function: 1 BM today  - Tubes: none  - Oral/Mouth cavity: pt is tolerating mechanical soft diet and thin liquids   - Skin: intact     Nutrition Requirements  Weight Used: dosing  41.8kg    Estimated energy needs  Continue [x]  Adjust []: 1465-1670kcal/d (35-40kcal/kg act wt) - low body weight considered    Estimated protein needs  Continue [x]  Adjust []: 42-55gm/day (1.0-1.3gm/kg act wt)    Estimated fluid needs  Continue [x]  Adjust []: 1ml/kcal or per LIP      Nutrient Intake: pt reports good appetite, documented PO intake varies % trays since diet consistency was advanced to mechanical soft from puree. Pt drinks Ensure Enlive supplements.     [x] Previous Nutrition Diagnosis: Inadequate Energy Intake.             [x] Ongoing /improving         [] Resolved    [] No active nutrition diagnosis identified at this time     Nutrition Diagnostic #1  Problem:  Etiology:  Statement:     Nutrition Diagnostic #2  Problem:  Etiology:  Statement:     Nutrition Intervention: Meal and snacks. Medical food supplements. Coordination of care.      Goal/Expected Outcome:   PO intake > 75% meals, snacks and supplements upon f/u in 4 days.     Indicator/Monitoring:  RD will monitor energy intake, diet order, glucose/renal profile, body composition, NFPF.    Recommendations: Continue current diet with Ensure Enlive, can discontinue Ensure Pudding. If possible please obtain and document weight 3x/week. Will follow.      Pt remains at risk.

## 2020-07-24 NOTE — PROGRESS NOTE BEHAVIORAL HEALTH - SUMMARY
69M domiciled at [place], PPH: schizophrenia, PMH: Parkinson disease, COPD, sent to [facility]'s ED for agitated behavior. Pt presents agitated, hostile, disorganised, appears to be responding to internal stimuli and paranoid about medications in context of poor adherence to medication regimen. Pt continues to refuse medications. TOO to be requested.    #Schizophrenia  -change depakene syrup 500 mg bid to depakote sprinkles 500 mg bid to increase adherence  -continue haldol concentrate 2 mg bid --> titrate haldol concentrate 5 mg qhs (7/20) --> titrate haldol concentrate 2 mg daily, haldol concentrate 5 mg qhs (7/23)  - pt is non compliant with medications; will pursue TOO  -c/w cogentin 1 mg bid  -c/w melatonin 5 mg qhs  -start haldol concentrate 2.5 mg q8h prn for agitation  -c/w hydroxyzine 50 mg q6h prn for anxiety or/and insomnia    #HTN, Parkinsons, COPD  -c/w home medications  -medicine consulted  -PT/OT consult pending    #Poor PO Intake  -RD consulted; Ensure with meals  -Monitor weight every 3 days  -SLP consulted; advance diet as per notes

## 2020-07-25 RX ADMIN — Medication 1 MILLIGRAM(S): at 20:08

## 2020-07-25 RX ADMIN — DIVALPROEX SODIUM 500 MILLIGRAM(S): 500 TABLET, DELAYED RELEASE ORAL at 08:05

## 2020-07-25 RX ADMIN — Medication 1 MILLIGRAM(S): at 08:05

## 2020-07-25 RX ADMIN — Medication 1 TABLET(S): at 08:05

## 2020-07-25 RX ADMIN — DIVALPROEX SODIUM 500 MILLIGRAM(S): 500 TABLET, DELAYED RELEASE ORAL at 20:06

## 2020-07-25 RX ADMIN — ALBUTEROL 2 PUFF(S): 90 AEROSOL, METERED ORAL at 03:24

## 2020-07-25 RX ADMIN — ROPINIROLE 0.25 MILLIGRAM(S): 8 TABLET, FILM COATED, EXTENDED RELEASE ORAL at 20:08

## 2020-07-25 RX ADMIN — PANTOPRAZOLE SODIUM 40 MILLIGRAM(S): 20 TABLET, DELAYED RELEASE ORAL at 08:05

## 2020-07-25 RX ADMIN — ALBUTEROL 2 PUFF(S): 90 AEROSOL, METERED ORAL at 12:55

## 2020-07-25 RX ADMIN — Medication 5 MILLIGRAM(S): at 20:06

## 2020-07-25 RX ADMIN — Medication 25 MILLIGRAM(S): at 20:06

## 2020-07-25 RX ADMIN — HALOPERIDOL DECANOATE 5 MILLIGRAM(S): 100 INJECTION INTRAMUSCULAR at 20:09

## 2020-07-25 RX ADMIN — HALOPERIDOL DECANOATE 2 MILLIGRAM(S): 100 INJECTION INTRAMUSCULAR at 08:05

## 2020-07-25 RX ADMIN — ROPINIROLE 0.25 MILLIGRAM(S): 8 TABLET, FILM COATED, EXTENDED RELEASE ORAL at 08:05

## 2020-07-25 RX ADMIN — Medication 25 MILLIGRAM(S): at 08:05

## 2020-07-25 RX ADMIN — BUDESONIDE AND FORMOTEROL FUMARATE DIHYDRATE 2 PUFF(S): 160; 4.5 AEROSOL RESPIRATORY (INHALATION) at 20:09

## 2020-07-25 RX ADMIN — BUDESONIDE AND FORMOTEROL FUMARATE DIHYDRATE 2 PUFF(S): 160; 4.5 AEROSOL RESPIRATORY (INHALATION) at 08:05

## 2020-07-26 RX ADMIN — ROPINIROLE 0.25 MILLIGRAM(S): 8 TABLET, FILM COATED, EXTENDED RELEASE ORAL at 08:07

## 2020-07-26 RX ADMIN — DIVALPROEX SODIUM 500 MILLIGRAM(S): 500 TABLET, DELAYED RELEASE ORAL at 20:13

## 2020-07-26 RX ADMIN — ROPINIROLE 0.25 MILLIGRAM(S): 8 TABLET, FILM COATED, EXTENDED RELEASE ORAL at 20:12

## 2020-07-26 RX ADMIN — Medication 50 MILLIGRAM(S): at 20:51

## 2020-07-26 RX ADMIN — Medication 50 MILLIGRAM(S): at 02:55

## 2020-07-26 RX ADMIN — HALOPERIDOL DECANOATE 5 MILLIGRAM(S): 100 INJECTION INTRAMUSCULAR at 20:51

## 2020-07-26 RX ADMIN — BUDESONIDE AND FORMOTEROL FUMARATE DIHYDRATE 2 PUFF(S): 160; 4.5 AEROSOL RESPIRATORY (INHALATION) at 20:12

## 2020-07-26 RX ADMIN — Medication 25 MILLIGRAM(S): at 20:13

## 2020-07-26 RX ADMIN — Medication 1 MILLIGRAM(S): at 20:12

## 2020-07-26 RX ADMIN — Medication 1 MILLIGRAM(S): at 08:07

## 2020-07-26 RX ADMIN — ALBUTEROL 2 PUFF(S): 90 AEROSOL, METERED ORAL at 01:00

## 2020-07-26 RX ADMIN — Medication 5 MILLIGRAM(S): at 20:13

## 2020-07-26 RX ADMIN — DIVALPROEX SODIUM 500 MILLIGRAM(S): 500 TABLET, DELAYED RELEASE ORAL at 08:06

## 2020-07-26 RX ADMIN — HALOPERIDOL DECANOATE 2 MILLIGRAM(S): 100 INJECTION INTRAMUSCULAR at 08:07

## 2020-07-26 RX ADMIN — PANTOPRAZOLE SODIUM 40 MILLIGRAM(S): 20 TABLET, DELAYED RELEASE ORAL at 08:07

## 2020-07-26 RX ADMIN — BUDESONIDE AND FORMOTEROL FUMARATE DIHYDRATE 2 PUFF(S): 160; 4.5 AEROSOL RESPIRATORY (INHALATION) at 08:06

## 2020-07-26 RX ADMIN — Medication 1 TABLET(S): at 08:06

## 2020-07-26 RX ADMIN — Medication 25 MILLIGRAM(S): at 08:06

## 2020-07-26 NOTE — PROGRESS NOTE ADULT - SUBJECTIVE AND OBJECTIVE BOX
pt stable alert in NAD  no new complaints    DEPRESSION  ^DEPRESSION  Yes  Handoff  HTN (hypertension)  Epilepsy  BPH (benign prostatic hyperplasia)  COPD, moderate  Parkinsonism  Essential hypertension  Parkinsonism, unspecified Parkinsonism type  Schizophrenia, unspecified type  Depression    HEALTH ISSUES - PROBLEM Dx:  Essential hypertension: Essential hypertension  Parkinsonism, unspecified Parkinsonism type: Parkinsonism, unspecified Parkinsonism type  Schizophrenia, unspecified type  Depression: Depression        PAST MEDICAL & SURGICAL HISTORY:  HTN (hypertension)  Epilepsy  BPH (benign prostatic hyperplasia)  COPD, moderate  Parkinsonism    No Known Allergies      FAMILY HISTORY:      ALBUTerol    90 MICROgram(s) HFA Inhaler 2 Puff(s) Inhalation every 6 hours PRN  benztropine 1 milliGRAM(s) Oral two times a day  budesonide  80 MICROgram(s)/formoterol 4.5 MICROgram(s) Inhaler 2 Puff(s) Inhalation two times a day  calcium carbonate    500 mG (Tums) Chewable 1 Tablet(s) Chew daily  diVALproex Sprinkle 500 milliGRAM(s) Oral two times a day  haloperidol    Concentrate 5 milliGRAM(s) Oral at bedtime  haloperidol    Concentrate 2 milliGRAM(s) Oral daily  hydrOXYzine hydrochloride 50 milliGRAM(s) Oral every 6 hours PRN  melatonin. 5 milliGRAM(s) Oral at bedtime  metoprolol tartrate 25 milliGRAM(s) Oral every 12 hours  pantoprazole    Tablet 40 milliGRAM(s) Oral before breakfast  rOPINIRole 0.25 milliGRAM(s) Oral every 12 hours      T(C): 36.4 (07-26-20 @ 08:19), Max: 36.7 (07-26-20 @ 06:02)  HR: 70 (07-26-20 @ 08:19) (70 - 80)  BP: 114/67 (07-26-20 @ 08:19) (110/57 - 114/67)  RR: 18 (07-26-20 @ 08:19) (18 - 20)  SpO2: --    PE;  general: stable no cahgnes noted    Lungs:    Heart:    EXT:    Neuro:  aelrt no deficits                          CAPILLARY BLOOD GLUCOSE

## 2020-07-27 RX ORDER — HALOPERIDOL DECANOATE 100 MG/ML
5 INJECTION INTRAMUSCULAR
Refills: 0 | Status: DISCONTINUED | OUTPATIENT
Start: 2020-07-27 | End: 2020-08-04

## 2020-07-27 RX ORDER — HALOPERIDOL DECANOATE 100 MG/ML
2.5 INJECTION INTRAMUSCULAR
Refills: 0 | Status: DISCONTINUED | OUTPATIENT
Start: 2020-07-27 | End: 2020-08-14

## 2020-07-27 RX ADMIN — Medication 1 MILLIGRAM(S): at 08:06

## 2020-07-27 RX ADMIN — ALBUTEROL 2 PUFF(S): 90 AEROSOL, METERED ORAL at 06:46

## 2020-07-27 RX ADMIN — BUDESONIDE AND FORMOTEROL FUMARATE DIHYDRATE 2 PUFF(S): 160; 4.5 AEROSOL RESPIRATORY (INHALATION) at 08:05

## 2020-07-27 RX ADMIN — PANTOPRAZOLE SODIUM 40 MILLIGRAM(S): 20 TABLET, DELAYED RELEASE ORAL at 08:05

## 2020-07-27 RX ADMIN — HALOPERIDOL DECANOATE 2 MILLIGRAM(S): 100 INJECTION INTRAMUSCULAR at 08:06

## 2020-07-27 RX ADMIN — Medication 1 MILLIGRAM(S): at 20:15

## 2020-07-27 RX ADMIN — DIVALPROEX SODIUM 500 MILLIGRAM(S): 500 TABLET, DELAYED RELEASE ORAL at 20:16

## 2020-07-27 RX ADMIN — HALOPERIDOL DECANOATE 5 MILLIGRAM(S): 100 INJECTION INTRAMUSCULAR at 20:16

## 2020-07-27 RX ADMIN — ROPINIROLE 0.25 MILLIGRAM(S): 8 TABLET, FILM COATED, EXTENDED RELEASE ORAL at 20:15

## 2020-07-27 RX ADMIN — Medication 5 MILLIGRAM(S): at 20:15

## 2020-07-27 RX ADMIN — Medication 25 MILLIGRAM(S): at 20:16

## 2020-07-27 RX ADMIN — ALBUTEROL 2 PUFF(S): 90 AEROSOL, METERED ORAL at 22:00

## 2020-07-27 RX ADMIN — ROPINIROLE 0.25 MILLIGRAM(S): 8 TABLET, FILM COATED, EXTENDED RELEASE ORAL at 08:05

## 2020-07-27 RX ADMIN — Medication 1 TABLET(S): at 08:05

## 2020-07-27 RX ADMIN — Medication 25 MILLIGRAM(S): at 08:05

## 2020-07-27 RX ADMIN — ALBUTEROL 2 PUFF(S): 90 AEROSOL, METERED ORAL at 15:15

## 2020-07-27 RX ADMIN — BUDESONIDE AND FORMOTEROL FUMARATE DIHYDRATE 2 PUFF(S): 160; 4.5 AEROSOL RESPIRATORY (INHALATION) at 20:16

## 2020-07-27 RX ADMIN — DIVALPROEX SODIUM 500 MILLIGRAM(S): 500 TABLET, DELAYED RELEASE ORAL at 11:56

## 2020-07-27 RX ADMIN — ALBUTEROL 2 PUFF(S): 90 AEROSOL, METERED ORAL at 00:21

## 2020-07-27 NOTE — CHART NOTE - NSCHARTNOTEFT_GEN_A_CORE
Treatment team meets with patient to discuss treatment plan, medications and discharge plan.  During the day patient is observed to be isolative to his room.  Patient is cooperative but speech is often incoherent.  Patient has not been consistent with medication adherence.  He has been observed to engage in self-dialogue by staff.  Suicidal / homicidal ideation denied.      As patient has not been adherent to diagnostic testing or medication administration plan is for treatment over objection hearing which is scheduled for today. Discharge plan is for placement to a skilled nursing facility vs Adult Home    Mental Status Exam:    Mood – irritable at times  Sleep - Good  Appetite - Fair  ADLs - Fair  Thought Process – Disorganized   Observation – [id number]    No barriers to discharge identified at this time.     At this time patient is not psychiatrically stable for discharge.

## 2020-07-27 NOTE — PROGRESS NOTE BEHAVIORAL HEALTH - NSBHFUPVIOL_PSY_A_CORE
Report called to Ynes DAVIDSON. All pertinent events, medical information and care plan details reported to receiving RN. Reviewed lines and drains including PIVx2 and fluids received perioperatively (~1.5L) and EBL. Reviewed hemodynamics, allergies, code status, applicable medications including oral pain medications given, and pertinent assessment findings including focused BLE surgical site assessment. Bruit present in LLE AV fistula & RLE AV fistula. Strong pulse felt in bilateral AV fistula sites. All questions and concerns addressed. Patient remains HDS on 2LNC, AOx4 at this time. Pt tolerating PO liquids. Patient educated on next phase of care. Receiving RN notified of 3/14 12am H&H orders.   none known

## 2020-07-27 NOTE — PROGRESS NOTE BEHAVIORAL HEALTH - SUMMARY
69M domiciled at [place], PPH: schizophrenia, PMH: Parkinson disease, COPD, sent to [facility]'s ED for agitated behavior. Pt presents agitated, hostile, disorganised, appears to be responding to internal stimuli and paranoid about medications in context of poor adherence to medication regimen. Pt continues to refuse medications. TOO to be requested.    #Schizophrenia  -change depakene syrup 500 mg bid to depakote sprinkles 500 mg bid to increase adherence  -continue haldol concentrate 2 mg bid --> titrate haldol concentrate 5 mg qhs (7/20) --> titrate haldol concentrate 2 mg daily, haldol concentrate 5 mg qhs (7/23)  - pt is non compliant with medications; will pursue TOO  -c/w cogentin 1 mg bid  -c/w melatonin 5 mg qhs  -start haldol concentrate 2.5 mg q8h prn for agitation  -c/w hydroxyzine 50 mg q6h prn for anxiety or/and insomnia    #HTN, Parkinsons, COPD  -c/w home medications  -medicine consulted  -PT/OT consult pending    #Poor PO Intake  -RD consulted; Ensure with meals  -Monitor weight every 3 days  -SLP consulted; advance diet as per notes 69M domiciled at [place], PPH: schizophrenia, PMH: Parkinson disease, COPD, sent to [facility]'s ED for agitated behavior. Pt presents agitated, hostile, disorganised, appears to be responding to internal stimuli and paranoid about medications in context of poor adherence to medication regimen. Pt continues to refuse medications. TOO to be requested.    #Schizophrenia  -change depakene syrup 500 mg bid to depakote sprinkles 500 mg bid to increase adherence  -continue haldol concentrate 2 mg bid --> titrate haldol concentrate 5 mg qhs (7/20) --> titrate haldol concentrate 2 mg daily, haldol concentrate 5 mg qhs (7/23) --> titrate haldol concentrate 5 mg bid (7/27)  -TOO granted; as per TOO, if pt refuses Haldol PO, then Haldol 2.5 mg IM to be given  -c/w cogentin 1 mg bid  -c/w melatonin 5 mg qhs  -start haldol concentrate 2.5 mg q8h prn for agitation  -c/w hydroxyzine 50 mg q6h prn for anxiety or/and insomnia    #HTN, Parkinsons, COPD  -c/w home medications  -medicine consulted  -PT/OT consult pending    #Poor PO Intake  -RD consulted; Ensure with meals  -Monitor weight every 3 days  -SLP consulted; advance diet as per notes

## 2020-07-27 NOTE — CHART NOTE - NSCHARTNOTEFT_GEN_A_CORE
Registered Dietitian Follow-Up     Patient Profile Reviewed                           Yes [x]   No []     Nutrition History Previously Obtained        Yes [x]  No []       Pertinent Subjective Information: As per RN pt is consuming ~50% of his trays, drinks Ensure Enlive. Unknown last BM.      Pertinent Medical Interventions: Schizophrenia - c/w treatment as per Psych team. Pt was cleared for dysphagia II mechanical soft diet with thin liquids.   H/o HTN, Parkinson's, COPD noted.      Diet order: (7/21) dysphagia II mechanical soft diet with thin liquids + Ensure Enlive q 8hrs and Ensure Pudding q 24hrs.      Anthropometrics:  - Ht. 167.6cm  - Wt. no new weights - pt refused to check weight (7/21), last 41.8kg (7/9)  - %wt change  - BMI - 14.9  - IBW - 64.5kg +/-10%.      Pertinent Lab Data: no new labs, last (7/16) H/H 11.1/33.8, K - 5.1, Cl- 96, BUN - 21, Cr- 0.6, Alb - 3.4     Pertinent Meds: haldol, depakote, cogentin, calcium carbonate, melatonin, lopressor, protonix, requip     Physical Findings:  - Appearance: alert, easily agitated  - GI function: as per NP pt endorses regular BM   - Tubes: none  - Oral/Mouth cavity: pt is tolerating mechanical soft diet and thin liquids   - Skin: intact     Nutrition Requirements  Weight Used: dosing  41.8kg    Estimated energy needs  Continue [x]  Adjust []: 1465-1670kcal/d (35-40kcal/kg act wt) - low body weight considered    Estimated protein needs  Continue [x]  Adjust []: 42-55gm/day (1.0-1.3gm/kg act wt)    Estimated fluid needs  Continue [x]  Adjust []: 1ml/kcal or per LIP      Nutrient Intake: as per RN pt is consuming ~50%trays but drinks all of his Ensure Enlive supplements (which provide additional 1050kcal, 60 gm protein/day). As per EMR PO intake is improving, per flow sheets pt is consuming mostly % trays    [x] Previous Nutrition Diagnosis: Inadequate Energy Intake.             [x] Ongoing /improving         [] Resolved    [] No active nutrition diagnosis identified at this time     Nutrition Diagnostic #1  Problem:  Etiology:  Statement:     Nutrition Diagnostic #2  Problem:  Etiology:  Statement:     Nutrition Intervention: Meal and snacks. Medical food supplements. Coordination of care.      Goal/Expected Outcome:   PO intake > 75% meals, snacks and supplements upon f/u in 3 days.     Indicator/Monitoring:  RD will monitor energy intake, diet order, glucose/renal profile, body composition, NFPF.    Recommendations: Continue current diet and Ensure supplements. Please obtain/document new weight if possible. Will follow.      Pt remains at risk.

## 2020-07-27 NOTE — PROGRESS NOTE BEHAVIORAL HEALTH - NSBHFUPINTERVALHXFT_PSY_A_CORE
Pt seen and evaluated, chart reviewed.  As per nursing staff, pt has been intermittently compliant with medications, at times verbally abusive and belligerent towards staff. On evaluation, pt being encouraged from bed to carolyn-chair, cooperative however easily agitated and difficult to understand. Pt endorses good mood, sleep and appetite, states he has been drinking his Ensure, endorses regular BM. Pt denies AVH, however is observed by staff to talk to self in room at times. Denies SI/HI, intent and plan. Refusing VS, labwork and ECG. TOO scheduled for today. Pt seen and evaluated, chart reviewed.  As per nursing staff, pt has been intermittently compliant with medications, at times verbally abusive and belligerent towards staff, verbally redirectable. On evaluation, pt being encouraged from bed to carolyn-chair, cooperative however easily agitated and difficult to understand. Pt endorses good mood, sleep and appetite, states he has been drinking his Ensure, endorses regular BM. Pt denies AVH, however is observed by staff to talk to self in room at times. Denies SI/HI, intent and plan. Refusing VS, labwork and ECG. TOO granted.

## 2020-07-28 LAB
ALBUMIN SERPL ELPH-MCNC: 3.2 G/DL — LOW (ref 3.5–5.2)
ALP SERPL-CCNC: 49 U/L — SIGNIFICANT CHANGE UP (ref 30–115)
ALT FLD-CCNC: 20 U/L — SIGNIFICANT CHANGE UP (ref 0–41)
ANION GAP SERPL CALC-SCNC: 8 MMOL/L — SIGNIFICANT CHANGE UP (ref 7–14)
AST SERPL-CCNC: 29 U/L — SIGNIFICANT CHANGE UP (ref 0–41)
BILIRUB SERPL-MCNC: 0.2 MG/DL — SIGNIFICANT CHANGE UP (ref 0.2–1.2)
BUN SERPL-MCNC: 23 MG/DL — HIGH (ref 10–20)
CALCIUM SERPL-MCNC: 9.2 MG/DL — SIGNIFICANT CHANGE UP (ref 8.5–10.1)
CHLORIDE SERPL-SCNC: 92 MMOL/L — LOW (ref 98–110)
CO2 SERPL-SCNC: 31 MMOL/L — SIGNIFICANT CHANGE UP (ref 17–32)
CREAT SERPL-MCNC: 0.6 MG/DL — LOW (ref 0.7–1.5)
GLUCOSE SERPL-MCNC: 98 MG/DL — SIGNIFICANT CHANGE UP (ref 70–99)
HCT VFR BLD CALC: 30.9 % — LOW (ref 42–52)
HGB BLD-MCNC: 10.3 G/DL — LOW (ref 14–18)
MCHC RBC-ENTMCNC: 32.7 PG — HIGH (ref 27–31)
MCHC RBC-ENTMCNC: 33.3 G/DL — SIGNIFICANT CHANGE UP (ref 32–37)
MCV RBC AUTO: 98.1 FL — HIGH (ref 80–94)
NRBC # BLD: 0 /100 WBCS — SIGNIFICANT CHANGE UP (ref 0–0)
PLATELET # BLD AUTO: 219 K/UL — SIGNIFICANT CHANGE UP (ref 130–400)
POTASSIUM SERPL-MCNC: 5.2 MMOL/L — HIGH (ref 3.5–5)
POTASSIUM SERPL-SCNC: 5.2 MMOL/L — HIGH (ref 3.5–5)
PROT SERPL-MCNC: 5.6 G/DL — LOW (ref 6–8)
RBC # BLD: 3.15 M/UL — LOW (ref 4.7–6.1)
RBC # FLD: 14.3 % — SIGNIFICANT CHANGE UP (ref 11.5–14.5)
SODIUM SERPL-SCNC: 131 MMOL/L — LOW (ref 135–146)
VALPROATE SERPL-MCNC: 33 UG/ML — LOW (ref 50–100)
WBC # BLD: 6.46 K/UL — SIGNIFICANT CHANGE UP (ref 4.8–10.8)
WBC # FLD AUTO: 6.46 K/UL — SIGNIFICANT CHANGE UP (ref 4.8–10.8)

## 2020-07-28 RX ORDER — DIVALPROEX SODIUM 500 MG/1
500 TABLET, DELAYED RELEASE ORAL
Refills: 0 | Status: DISCONTINUED | OUTPATIENT
Start: 2020-07-28 | End: 2020-07-30

## 2020-07-28 RX ADMIN — DIVALPROEX SODIUM 500 MILLIGRAM(S): 500 TABLET, DELAYED RELEASE ORAL at 20:09

## 2020-07-28 RX ADMIN — Medication 50 MILLIGRAM(S): at 04:21

## 2020-07-28 RX ADMIN — Medication 25 MILLIGRAM(S): at 08:32

## 2020-07-28 RX ADMIN — ALBUTEROL 2 PUFF(S): 90 AEROSOL, METERED ORAL at 11:09

## 2020-07-28 RX ADMIN — PANTOPRAZOLE SODIUM 40 MILLIGRAM(S): 20 TABLET, DELAYED RELEASE ORAL at 08:32

## 2020-07-28 RX ADMIN — DIVALPROEX SODIUM 500 MILLIGRAM(S): 500 TABLET, DELAYED RELEASE ORAL at 08:32

## 2020-07-28 RX ADMIN — HALOPERIDOL DECANOATE 5 MILLIGRAM(S): 100 INJECTION INTRAMUSCULAR at 08:33

## 2020-07-28 RX ADMIN — HALOPERIDOL DECANOATE 5 MILLIGRAM(S): 100 INJECTION INTRAMUSCULAR at 20:09

## 2020-07-28 RX ADMIN — Medication 1 TABLET(S): at 08:32

## 2020-07-28 RX ADMIN — ALBUTEROL 2 PUFF(S): 90 AEROSOL, METERED ORAL at 17:09

## 2020-07-28 RX ADMIN — Medication 1 MILLIGRAM(S): at 20:09

## 2020-07-28 RX ADMIN — BUDESONIDE AND FORMOTEROL FUMARATE DIHYDRATE 2 PUFF(S): 160; 4.5 AEROSOL RESPIRATORY (INHALATION) at 20:10

## 2020-07-28 RX ADMIN — Medication 5 MILLIGRAM(S): at 20:09

## 2020-07-28 RX ADMIN — ROPINIROLE 0.25 MILLIGRAM(S): 8 TABLET, FILM COATED, EXTENDED RELEASE ORAL at 08:36

## 2020-07-28 RX ADMIN — ROPINIROLE 0.25 MILLIGRAM(S): 8 TABLET, FILM COATED, EXTENDED RELEASE ORAL at 20:08

## 2020-07-28 RX ADMIN — Medication 1 MILLIGRAM(S): at 08:32

## 2020-07-28 RX ADMIN — Medication 25 MILLIGRAM(S): at 20:09

## 2020-07-28 RX ADMIN — BUDESONIDE AND FORMOTEROL FUMARATE DIHYDRATE 2 PUFF(S): 160; 4.5 AEROSOL RESPIRATORY (INHALATION) at 07:48

## 2020-07-28 NOTE — CHART NOTE - NSCHARTNOTEFT_GEN_A_CORE
Called by Rn regarding findings of mild hyponatremia and mild hyperkalemia on labwork. Na 131 and K 5.2, 5.1 2 days ago.  No obvious meds pt is currently on that may results in lab abnormalities. Discussed with nursing for psychiatrist to review te prescribed meds for potential causes. No acute intervention necessary at this time. Repeat labs, including BMP and Mg ordered for am.

## 2020-07-28 NOTE — PROGRESS NOTE BEHAVIORAL HEALTH - NSBHFUPINTERVALHXFT_PSY_A_CORE
Pt seen and evaluated, chart reviewed.  As per nursing staff, pt has been compliant with medications since TOO obtained yesterday, verbally abusive and belligerent towards staff, verbally redirectable. On evaluation, pt disorganized and cooperative, easily agitated and difficult to understand at times. Pt endorses good mood, sleep and appetite, states he has been drinking his Ensure, endorses regular BM (last yesterday). Pt denies AVH, however is observed by staff to talk to self in room at times. Denies SI/HI, intent and plan. TOO granted 7/27/2020. Pt seen and evaluated, chart reviewed.  As per nursing staff, pt has been compliant with medications since TOO obtained yesterday, verbally abusive and belligerent towards staff, verbally redirectable. On evaluation, pt disorganized and cooperative, easily agitated and difficult to understand at times. Pt endorses good mood, sleep and appetite, states he has been drinking his Ensure, endorses regular BM (last yesterday). Pt denies AVH, however is observed by staff to talk to self in room at times. Denies SI/HI, intent and plan. TOO granted 7/27/2020.    Attempted to call [place] pt's daughter/HCP ([phone]) - no VM set up, unable to leave VM and callback #.

## 2020-07-28 NOTE — PROGRESS NOTE BEHAVIORAL HEALTH - SUMMARY
69M domiciled at [place], PPH: schizophrenia, PMH: Parkinson disease, COPD, sent to [facility]'s ED for agitated behavior. Pt presents agitated, hostile, disorganised, appears to be responding to internal stimuli and paranoid about medications in context of poor adherence to medication regimen. Pt continues to refuse medications. TOO to be requested.    #Schizophrenia  -TOO granted (7/28/20); as per TOO, if pt refuses Haldol PO, then Haldol 2.5 mg IM to be given  -change depakene syrup 500 mg bid to depakote sprinkles 500 mg bid to increase adherence --> change to depakote dr 500 mg bid (7/28)  -continue haldol concentrate 2 mg bid --> titrate haldol concentrate 5 mg qhs (7/20) --> titrate haldol concentrate 2 mg daily, haldol concentrate 5 mg qhs (7/23) --> titrate haldol concentrate 5 mg bid (7/27)  -c/w cogentin 1 mg bid  -c/w melatonin 5 mg qhs  -start haldol concentrate 2.5 mg q8h prn for agitation  -c/w hydroxyzine 50 mg q6h prn for anxiety or/and insomnia    #HTN, Parkinsons, COPD  -c/w home medications  -medicine consulted  -PT/OT consult pending    #Poor PO Intake  -RD consulted; Ensure with meals  -Monitor weight every 3 days  -SLP consulted; advance diet as per notes 69M domiciled at [place], PPH: schizophrenia, PMH: Parkinson disease, COPD, sent to [facility]'s ED for agitated behavior. Pt presents agitated, hostile, disorganised, appears to be responding to internal stimuli and paranoid about medications in context of poor adherence to medication regimen. Pt continues to refuse medications. TOO to be requested.    #Schizophrenia  -TOO granted (7/28/20); as per TOO, if pt refuses Haldol PO, then Haldol 2.5 mg IM to be given  -change depakene syrup 500 mg bid to depakote sprinkles 500 mg bid to increase adherence --> change to depakote dr 500 mg bid (7/28)  -continue haldol concentrate 2 mg bid --> titrate haldol concentrate 5 mg qhs (7/20) --> titrate haldol concentrate 2 mg daily, haldol concentrate 5 mg qhs (7/23) --> titrate haldol concentrate 5 mg bid (7/27)  -c/w cogentin 1 mg bid  -c/w melatonin 5 mg qhs  -start haldol concentrate 2.5 mg q8h prn for agitation  -c/w hydroxyzine 50 mg q6h prn for anxiety or/and insomnia    #HTN, Parkinsons, COPD  -c/w home medications  -medicine consulted  -PT/OT consult pending    #Poor PO Intake  -RD consulted; Ensure with meals  -Monitor weight every 3 days  -SLP consulted; advance diet as per notes    #Mild hyponatremia/mild hyperkalemia  -Na 131, K 5.2 (7/28)  -medicine consulted  -as per medicine rec, repeat BMP in AM   -RD consulted, no concentrated potassium modifier added to diet

## 2020-07-28 NOTE — PROGRESS NOTE BEHAVIORAL HEALTH - NSBHCHARTREVIEWINVESTIGATE_PSY_A_CORE FT
< from: 12 Lead ECG (07.23.20 @ 10:25) >      Ventricular Rate 80 BPM    Atrial Rate 80 BPM    P-R Interval 126 ms    QRS Duration 80 ms    Q-T Interval 370 ms    QTC Calculation(Bezet) 426 ms    P Axis 63 degrees    R Axis 69 degrees    T Axis 67 degrees    Diagnosis Line Normal sinus rhythm  Normal ECG    < end of copied text >

## 2020-07-28 NOTE — PROGRESS NOTE ADULT - PROBLEM SELECTOR PLAN 1
continue present treatment as per psych plan as reviewed  Medically stable with no new changes in treatment  will continue to monitor medical status while being treated on psych  bp stable

## 2020-07-29 LAB
ANION GAP SERPL CALC-SCNC: 10 MMOL/L — SIGNIFICANT CHANGE UP (ref 7–14)
BUN SERPL-MCNC: 18 MG/DL — SIGNIFICANT CHANGE UP (ref 10–20)
CALCIUM SERPL-MCNC: 9.7 MG/DL — SIGNIFICANT CHANGE UP (ref 8.5–10.1)
CHLORIDE SERPL-SCNC: 89 MMOL/L — LOW (ref 98–110)
CO2 SERPL-SCNC: 31 MMOL/L — SIGNIFICANT CHANGE UP (ref 17–32)
CREAT SERPL-MCNC: 0.5 MG/DL — LOW (ref 0.7–1.5)
GLUCOSE SERPL-MCNC: 97 MG/DL — SIGNIFICANT CHANGE UP (ref 70–99)
HCT VFR BLD CALC: 36.3 % — LOW (ref 42–52)
HGB BLD-MCNC: 12 G/DL — LOW (ref 14–18)
MAGNESIUM SERPL-MCNC: 2 MG/DL — SIGNIFICANT CHANGE UP (ref 1.8–2.4)
MANUAL SMEAR VERIFICATION: SIGNIFICANT CHANGE UP
MCHC RBC-ENTMCNC: 32.5 PG — HIGH (ref 27–31)
MCHC RBC-ENTMCNC: 33.1 G/DL — SIGNIFICANT CHANGE UP (ref 32–37)
MCV RBC AUTO: 98.4 FL — HIGH (ref 80–94)
NEUTS BAND # BLD: 11 % — HIGH (ref 0–6)
NRBC # BLD: 0 /100 WBCS — SIGNIFICANT CHANGE UP (ref 0–0)
NRBC # BLD: 0 /100 — SIGNIFICANT CHANGE UP (ref 0–0)
PLAT MORPH BLD: NORMAL — SIGNIFICANT CHANGE UP
PLATELET # BLD AUTO: 288 K/UL — SIGNIFICANT CHANGE UP (ref 130–400)
POTASSIUM SERPL-MCNC: 4.8 MMOL/L — SIGNIFICANT CHANGE UP (ref 3.5–5)
POTASSIUM SERPL-SCNC: 4.8 MMOL/L — SIGNIFICANT CHANGE UP (ref 3.5–5)
RBC # BLD: 3.69 M/UL — LOW (ref 4.7–6.1)
RBC # FLD: 14.2 % — SIGNIFICANT CHANGE UP (ref 11.5–14.5)
RBC BLD AUTO: NORMAL — SIGNIFICANT CHANGE UP
SODIUM SERPL-SCNC: 130 MMOL/L — LOW (ref 135–146)
VARIANT LYMPHS # BLD: 1 % — SIGNIFICANT CHANGE UP (ref 0–5)
WBC # BLD: 7.79 K/UL — SIGNIFICANT CHANGE UP (ref 4.8–10.8)
WBC # FLD AUTO: 7.79 K/UL — SIGNIFICANT CHANGE UP (ref 4.8–10.8)

## 2020-07-29 RX ADMIN — Medication 50 MILLIGRAM(S): at 20:13

## 2020-07-29 RX ADMIN — Medication 25 MILLIGRAM(S): at 20:13

## 2020-07-29 RX ADMIN — Medication 1 MILLIGRAM(S): at 20:13

## 2020-07-29 RX ADMIN — ROPINIROLE 0.25 MILLIGRAM(S): 8 TABLET, FILM COATED, EXTENDED RELEASE ORAL at 20:13

## 2020-07-29 RX ADMIN — HALOPERIDOL DECANOATE 5 MILLIGRAM(S): 100 INJECTION INTRAMUSCULAR at 20:14

## 2020-07-29 RX ADMIN — ALBUTEROL 2 PUFF(S): 90 AEROSOL, METERED ORAL at 01:57

## 2020-07-29 RX ADMIN — BUDESONIDE AND FORMOTEROL FUMARATE DIHYDRATE 2 PUFF(S): 160; 4.5 AEROSOL RESPIRATORY (INHALATION) at 20:15

## 2020-07-29 RX ADMIN — Medication 25 MILLIGRAM(S): at 08:17

## 2020-07-29 RX ADMIN — HALOPERIDOL DECANOATE 5 MILLIGRAM(S): 100 INJECTION INTRAMUSCULAR at 08:18

## 2020-07-29 RX ADMIN — Medication 1 TABLET(S): at 08:18

## 2020-07-29 RX ADMIN — PANTOPRAZOLE SODIUM 40 MILLIGRAM(S): 20 TABLET, DELAYED RELEASE ORAL at 08:17

## 2020-07-29 RX ADMIN — BUDESONIDE AND FORMOTEROL FUMARATE DIHYDRATE 2 PUFF(S): 160; 4.5 AEROSOL RESPIRATORY (INHALATION) at 08:19

## 2020-07-29 RX ADMIN — ROPINIROLE 0.25 MILLIGRAM(S): 8 TABLET, FILM COATED, EXTENDED RELEASE ORAL at 08:18

## 2020-07-29 RX ADMIN — DIVALPROEX SODIUM 500 MILLIGRAM(S): 500 TABLET, DELAYED RELEASE ORAL at 08:17

## 2020-07-29 RX ADMIN — DIVALPROEX SODIUM 500 MILLIGRAM(S): 500 TABLET, DELAYED RELEASE ORAL at 20:13

## 2020-07-29 RX ADMIN — Medication 1 MILLIGRAM(S): at 08:17

## 2020-07-29 RX ADMIN — Medication 5 MILLIGRAM(S): at 20:13

## 2020-07-29 NOTE — CHART NOTE - NSCHARTNOTEFT_GEN_A_CORE
Treatment team meets with patient to discuss treatment plan, medications and discharge plan.  During the day patient is observed to be isolative to his room.  Patient is cooperative but speech is often incoherent.  Patient has not been consistent with medication adherence.  He has been observed to engage in self-dialogue by staff and has been verbally aggressive towards staff and peers.  Suicidal / homicidal ideation denied.      As patient has not been adherent to diagnostic testing or medication administration plan is for treatment over objection hearing which was conducted on Monday 7/27/2020. Pt at this time is court mandated to take medication while on the unit. Discharge plan is for placement to a skilled nursing facility vs Adult Home    Mental Status Exam:    Mood – irritable at times  Sleep - Good  Appetite - Fair  ADLs - Fair  Thought Process – Disorganized   Observation – [id number]    No barriers to discharge identified at this time.     At this time patient is not psychiatrically stable for discharge

## 2020-07-29 NOTE — PROGRESS NOTE BEHAVIORAL HEALTH - SUMMARY
69M domiciled at [place], PPH: schizophrenia, PMH: Parkinson disease, COPD, sent to [facility]'s ED for agitated behavior. Pt presents agitated, hostile, disorganised, appears to be responding to internal stimuli and paranoid about medications in context of poor adherence to medication regimen. Pt continues to refuse medications. TOO to be requested.    #Schizophrenia  -TOO granted (7/28/20); as per TOO, if pt refuses Haldol PO, then Haldol 2.5 mg IM to be given  -change depakene syrup 500 mg bid to depakote sprinkles 500 mg bid to increase adherence --> change to depakote dr 500 mg bid (7/28)  -continue haldol concentrate 2 mg bid --> titrate haldol concentrate 5 mg qhs (7/20) --> titrate haldol concentrate 2 mg daily, haldol concentrate 5 mg qhs (7/23) --> titrate haldol concentrate 5 mg bid (7/27)  -c/w cogentin 1 mg bid  -c/w melatonin 5 mg qhs  -start haldol concentrate 2.5 mg q8h prn for agitation  -c/w hydroxyzine 50 mg q6h prn for anxiety or/and insomnia    #HTN, Parkinsons, COPD  -c/w home medications  -medicine consulted  -PT/OT consult pending    #Poor PO Intake  -RD consulted; Ensure with meals  -Monitor weight every 3 days  -SLP consulted; advance diet as per notes    #Mild hyponatremia/mild hyperkalemia  -Na 131, K 5.2 (7/28)  -medicine consulted  -as per medicine rec, repeat BMP in AM   -RD consulted, no concentrated potassium modifier added to diet 69M domiciled at [place], PPH: schizophrenia, PMH: Parkinson disease, COPD, sent to [facility]'s ED for agitated behavior. Pt presents agitated, hostile, disorganised, appears to be responding to internal stimuli and paranoid about medications in context of poor adherence to medication regimen. Pt continues to refuse medications. TOO to be requested.    #Schizophrenia  -TOO granted (7/28/20); as per TOO, if pt refuses Haldol PO, then Haldol 2.5 mg IM to be given  -change depakene syrup 500 mg bid to depakote sprinkles 500 mg bid to increase adherence --> valproic acid level 33, change to depakote dr 500 mg bid (7/28)  -continue haldol concentrate 2 mg bid --> titrate haldol concentrate 5 mg qhs (7/20) --> titrate haldol concentrate 2 mg daily, haldol concentrate 5 mg qhs (7/23) --> titrate haldol concentrate 5 mg bid (7/27)  -c/w cogentin 1 mg bid  -c/w melatonin 5 mg qhs  -start haldol concentrate 2.5 mg q8h prn for agitation  -c/w hydroxyzine 50 mg q6h prn for anxiety or/and insomnia    #HTN, Parkinsons, COPD  -c/w home medications  -medicine consulted  -PT/OT consult pending    #Poor PO Intake  -RD consulted; Ensure with meals  -Monitor weight every 3 days  -SLP consulted; advance diet as per notes    #Mild hyponatremia/mild hyperkalemia  -Na 131, K 5.2 (7/28)  -medicine consulted  -as per medicine rec, repeat BMP in AM --> Na 130, K 4.8 (7/29), repeat BMP in AM  -RD consulted, no concentrated potassium modifier added to diet 69M domiciled at [place], PPH: schizophrenia, PMH: Parkinson disease, COPD, sent to [facility]'s ED for agitated behavior. Pt presents agitated, hostile, disorganised, appears to be responding to internal stimuli and paranoid about medications in context of poor adherence to medication regimen. Pt continues to refuse medications. TOO to be requested.    #Schizophrenia  -TOO granted (7/28/20); as per TOO, if pt refuses Haldol PO, then Haldol 2.5 mg IM to be given  -change depakene syrup 500 mg bid to depakote sprinkles 500 mg bid to increase adherence --> valproic acid level 33, change to depakote dr 500 mg bid (7/28)  -continue haldol concentrate 2 mg bid --> titrate haldol concentrate 5 mg qhs (7/20) --> titrate haldol concentrate 2 mg daily, haldol concentrate 5 mg qhs (7/23) --> titrate haldol concentrate 5 mg bid (7/27)  -c/w cogentin 1 mg bid  -c/w melatonin 5 mg qhs  -start haldol concentrate 2.5 mg q8h prn for agitation  -c/w hydroxyzine 50 mg q6h prn for anxiety or/and insomnia    #HTN, Parkinsons, COPD  -c/w home medications  -medicine consulted  -PT/OT consult pending    #Poor PO Intake  -RD consulted; Ensure with meals  -Monitor weight every 3 days  -SLP consulted; advance diet as per notes    #Mild hyponatremia/mild hyperkalemia  -Na 131, K 5.2 (7/28)  -medicine consulted  -as per medicine rec, repeat BMP in AM --> Na 130, K 4.8 (7/29), repeat BMP in AM  -RD consulted, no concentrated potassium modifier added to diet    Dispo: [facility]

## 2020-07-29 NOTE — PROGRESS NOTE BEHAVIORAL HEALTH - NSBHCHARTREVIEWLAB_PSY_A_CORE FT
Complete Blood Count + Automated Diff in AM (07.16.20 @ 07:02)    WBC Count: 7.12 K/uL    RBC Count: 3.48 M/uL    Hemoglobin: 11.1 g/dL    Hematocrit: 33.8 %    Mean Cell Volume: 97.1 fL    Mean Cell Hemoglobin: 31.9 pg    Mean Cell Hemoglobin Conc: 32.8 g/dL    Red Cell Distrib Width: 13.3 %    Platelet Count - Automated: 259 K/uL    Auto Neutrophil #: 3.83 K/uL    Auto Lymphocyte #: 2.21 K/uL    Auto Monocyte #: 0.91 K/uL    Auto Eosinophil #: 0.12 K/uL    Auto Basophil #: 0.04 K/uL    Auto Neutrophil %: 53.8    Auto Lymphocyte %: 31.0 %    Auto Monocyte %: 12.8 %    Auto Eosinophil %: 1.7 %    Auto Basophil %: 0.6 %    Auto Immature Granulocyte %: 0.1 %    Nucleated RBC: 0 /100 WBCs  Comprehensive Metabolic Panel in AM (07.16.20 @ 07:02)    Sodium, Serum: 137 mmol/L    Potassium, Serum: 5.1 mmol/L    Chloride, Serum: 96 mmol/L    Carbon Dioxide, Serum: 32 mmol/L    Anion Gap, Serum: 9 mmol/L    Blood Urea Nitrogen, Serum: 21 mg/dL    Creatinine, Serum: 0.6 mg/dL    Glucose, Serum: 80 mg/dL    Calcium, Total Serum: 9.7 mg/dL    Protein Total, Serum: 5.9 g/dL    Albumin, Serum: 3.4 g/dL    Bilirubin Total, Serum: 0.3 mg/dL    Alkaline Phosphatase, Serum: 52 U/L    Aspartate Aminotransferase (AST/SGOT): 35 U/L    Alanine Aminotransferase (ALT/SGPT): 23 U/L    eGFR if Non : 103    eGFR if : 119 mL/min/1.73M2  Lipid Profile (07.10.20 @ 04:30)    Total Cholesterol/HDL Ratio Measurement: 4.1 Ratio    Cholesterol, Serum: 141 mg/dL    Triglycerides, Serum: 86 mg/dL    HDL Cholesterol, Serum: 34    Direct LDL: 96  A1C with Estimated Average Glucose in AM (07.10.20 @ 04:30)    A1C with Estimated Average Glucose Result: 4.9  Thyroid Stimulating Hormone, Serum in AM (07.16.20 @ 07:02)    Thyroid Stimulating Hormone, Serum: 2.88 uIU/mL Complete Blood Count + Automated Diff in AM (07.16.20 @ 07:02)    WBC Count: 7.12 K/uL    RBC Count: 3.48 M/uL    Hemoglobin: 11.1 g/dL    Hematocrit: 33.8 %    Mean Cell Volume: 97.1 fL    Mean Cell Hemoglobin: 31.9 pg    Mean Cell Hemoglobin Conc: 32.8 g/dL    Red Cell Distrib Width: 13.3 %    Platelet Count - Automated: 259 K/uL    Auto Neutrophil #: 3.83 K/uL    Auto Lymphocyte #: 2.21 K/uL    Auto Monocyte #: 0.91 K/uL    Auto Eosinophil #: 0.12 K/uL    Auto Basophil #: 0.04 K/uL    Auto Neutrophil %: 53.8    Auto Lymphocyte %: 31.0 %    Auto Monocyte %: 12.8 %    Auto Eosinophil %: 1.7 %    Auto Basophil %: 0.6 %    Auto Immature Granulocyte %: 0.1 %    Nucleated RBC: 0 /100 WBCs  Basic Metabolic Panel in AM (07.29.20 @ 09:04)    Sodium, Serum: 130 mmol/L    Potassium, Serum: 4.8 mmol/L    Chloride, Serum: 89 mmol/L    Carbon Dioxide, Serum: 31 mmol/L    Anion Gap, Serum: 10 mmol/L    Blood Urea Nitrogen, Serum: 18 mg/dL    Creatinine, Serum: 0.5 mg/dL    Glucose, Serum: 97 mg/dL    Calcium, Total Serum: 9.7 mg/dL    eGFR if Non : 111  Lipid Profile (07.10.20 @ 04:30)    Total Cholesterol/HDL Ratio Measurement: 4.1 Ratio    Cholesterol, Serum: 141 mg/dL    Triglycerides, Serum: 86 mg/dL    HDL Cholesterol, Serum: 34    Direct LDL: 96  A1C with Estimated Average Glucose in AM (07.10.20 @ 04:30)    A1C with Estimated Average Glucose Result: 4.9  Thyroid Stimulating Hormone, Serum in AM (07.16.20 @ 07:02)    Thyroid Stimulating Hormone, Serum: 2.88 uIU/mL

## 2020-07-29 NOTE — PROGRESS NOTE BEHAVIORAL HEALTH - NSBHFUPINTERVALHXFT_PSY_A_CORE
home Pt seen and evaluated, chart reviewed.  As per nursing staff, pt has been compliant with medications since TOO obtained (7/27/20), less verbally abusive and belligerent towards staff, verbally redirectable. On evaluation, pt disorganized at times and cooperative, difficult to understand. Pt endorses good mood, sleep and appetite, states he has been drinking his Ensure, endorses regular BM (last yesterday). Pt denies AVH, however is observed by staff to talk to self in room at times. Denies SI/HI, intent and plan.     Attempted to call hamzah Wing's daughter/HCP ([phone]) - no VM set up, unable to leave VM and callback #. Pt seen and evaluated, chart reviewed.  As per nursing staff, pt has been compliant with medications since TOO obtained (7/27/20), less verbally abusive and belligerent towards staff, verbally redirectable. On evaluation, pt disorganized at times and cooperative, difficult to understand. Pt endorses good mood, sleep and appetite, states he has been drinking his Ensure, endorses regular BM (last yesterday). Pt denies AVH, however is observed by staff to talk to self in room at times. Denies SI/HI, intent and plan. Na 131 (7/28) --> Na 130 (7/29), repeat in AM.    Spoke with [place] pt's sister/HCP ([phone]) - states pt at baseline is disorganized, however cooperative and not a danger to self or/and others. States when pt is taking his medications he does well and is safe, agrees to transition to haldol decanoate when pt is stable and transfer back to [place]. Pt seen and evaluated, chart reviewed.  As per nursing staff, pt has been compliant with medications since TOO obtained (7/27/20), less verbally abusive and belligerent towards staff, verbally redirectable. On evaluation, pt disorganized at times and cooperative, difficult to understand. Pt endorses good mood, sleep and appetite, states he has been drinking his Ensure, endorses regular BM (last yesterday). Pt denies AVH, however is observed by staff to talk to self in room at times. Denies SI/HI, intent and plan. Na 131 (7/28) --> Na 130 (7/29), repeat BMP in AM.    Spoke with [place] pt's sister/HCP ([phone]) - states pt at baseline is disorganized, however cooperative and not a danger to self or/and others. States when pt is taking his medications he does well and is safe, agrees to transition to haldol decanoate when pt is stable and transfer back to [place].

## 2020-07-30 DIAGNOSIS — E87.1 HYPO-OSMOLALITY AND HYPONATREMIA: ICD-10-CM

## 2020-07-30 LAB
ANION GAP SERPL CALC-SCNC: 8 MMOL/L — SIGNIFICANT CHANGE UP (ref 7–14)
BUN SERPL-MCNC: 20 MG/DL — SIGNIFICANT CHANGE UP (ref 10–20)
CALCIUM SERPL-MCNC: 9 MG/DL — SIGNIFICANT CHANGE UP (ref 8.5–10.1)
CHLORIDE SERPL-SCNC: 88 MMOL/L — LOW (ref 98–110)
CO2 SERPL-SCNC: 32 MMOL/L — SIGNIFICANT CHANGE UP (ref 17–32)
CREAT SERPL-MCNC: 0.5 MG/DL — LOW (ref 0.7–1.5)
GLUCOSE SERPL-MCNC: 115 MG/DL — HIGH (ref 70–99)
OSMOLALITY SERPL: 278 MOSMOL/KG — LOW (ref 280–301)
POTASSIUM SERPL-MCNC: 4.7 MMOL/L — SIGNIFICANT CHANGE UP (ref 3.5–5)
POTASSIUM SERPL-SCNC: 4.7 MMOL/L — SIGNIFICANT CHANGE UP (ref 3.5–5)
SODIUM SERPL-SCNC: 128 MMOL/L — LOW (ref 135–146)

## 2020-07-30 RX ORDER — DIVALPROEX SODIUM 500 MG/1
250 TABLET, DELAYED RELEASE ORAL
Refills: 0 | Status: DISCONTINUED | OUTPATIENT
Start: 2020-07-30 | End: 2020-08-07

## 2020-07-30 RX ADMIN — Medication 50 MILLIGRAM(S): at 20:22

## 2020-07-30 RX ADMIN — BUDESONIDE AND FORMOTEROL FUMARATE DIHYDRATE 2 PUFF(S): 160; 4.5 AEROSOL RESPIRATORY (INHALATION) at 20:03

## 2020-07-30 RX ADMIN — HALOPERIDOL DECANOATE 5 MILLIGRAM(S): 100 INJECTION INTRAMUSCULAR at 20:06

## 2020-07-30 RX ADMIN — Medication 25 MILLIGRAM(S): at 20:15

## 2020-07-30 RX ADMIN — ROPINIROLE 0.25 MILLIGRAM(S): 8 TABLET, FILM COATED, EXTENDED RELEASE ORAL at 08:18

## 2020-07-30 RX ADMIN — BUDESONIDE AND FORMOTEROL FUMARATE DIHYDRATE 2 PUFF(S): 160; 4.5 AEROSOL RESPIRATORY (INHALATION) at 08:18

## 2020-07-30 RX ADMIN — Medication 1 TABLET(S): at 08:18

## 2020-07-30 RX ADMIN — Medication 25 MILLIGRAM(S): at 08:18

## 2020-07-30 RX ADMIN — DIVALPROEX SODIUM 500 MILLIGRAM(S): 500 TABLET, DELAYED RELEASE ORAL at 08:18

## 2020-07-30 RX ADMIN — Medication 1 MILLIGRAM(S): at 20:02

## 2020-07-30 RX ADMIN — ROPINIROLE 0.25 MILLIGRAM(S): 8 TABLET, FILM COATED, EXTENDED RELEASE ORAL at 20:03

## 2020-07-30 RX ADMIN — ALBUTEROL 2 PUFF(S): 90 AEROSOL, METERED ORAL at 01:02

## 2020-07-30 RX ADMIN — HALOPERIDOL DECANOATE 5 MILLIGRAM(S): 100 INJECTION INTRAMUSCULAR at 08:27

## 2020-07-30 RX ADMIN — ALBUTEROL 2 PUFF(S): 90 AEROSOL, METERED ORAL at 22:40

## 2020-07-30 RX ADMIN — DIVALPROEX SODIUM 250 MILLIGRAM(S): 500 TABLET, DELAYED RELEASE ORAL at 20:02

## 2020-07-30 RX ADMIN — Medication 1 MILLIGRAM(S): at 08:18

## 2020-07-30 RX ADMIN — PANTOPRAZOLE SODIUM 40 MILLIGRAM(S): 20 TABLET, DELAYED RELEASE ORAL at 08:18

## 2020-07-30 RX ADMIN — Medication 5 MILLIGRAM(S): at 20:05

## 2020-07-30 NOTE — CHART NOTE - NSCHARTNOTEFT_GEN_A_CORE
Called in by Psychiatrist   States patient's Sodium is progressively depleting, now 128. Likely 2/2 psychiatry medications.   Psychiatry team to decrease the dose of depakote 500 bid to 250 BID  Will also get Urine osmolality, serum osmolality, urine sodium, urine creatinine, TSH and labs for am  Ordered labs

## 2020-07-30 NOTE — CHART NOTE - NSCHARTNOTEFT_GEN_A_CORE
Registered Dietitian Follow-Up     Patient Profile Reviewed                           Yes [x]   No []     Nutrition History Previously Obtained        Yes [x]  No []       Pertinent Subjective Information: Spoke with pt RN over the phone who reports that pt has been eating like he normally does and that he is consuming the ensure supplements as ordered. Pt typically eats most of his breakfast and then eats a little less at lunch time per RN. Pt consuming at least 50% of each given meal. Per EMR documentation, pt consuming % of meals since previous assessment, with po intake averaging >75%.      Pertinent Medical Interventions: Pt being managed for schizophrenia per psych. Essential HTN-- blood pressure remains stable. Hyponatremia-- unknown etiology, possibly drug induced, plan to repeat lab + if continues to decline pt will need renal workup.     Diet order: Dysphagia 2 mechanical soft, thin liquids. Ensure Enlive TID, Ensure Pudding q24h.      Anthropometrics:  - Ht. 66"  - Wt. dosing 41.8 kg/92 lbs; no new wts recorded  - %wt change NA  - BMI 14.9  - IBW 64.5. kg/142 lbs     Pertinent Lab Data: (7/29) RBC 3.69, H/H 12.0/36.3, Na 130, Cr 0.5; (7/10) A1c 4.1     Pertinent Meds: haldol, TUMS, metoprolol, protonix       Physical Findings:  - Appearance: underweight; no edema noted  - GI function: no diarrhea/constipation per RN-- unsure last BM  - Tubes: NA  - Oral/Mouth cavity: dysphagia, on modified diet-- tolerating well per RN  - Skin: WDL (7/30)     Nutrition Requirements  Weight Used: dosing  41.8kg    Estimated energy needs  Continue [x]  1465-1670kcal/d (35-40kcal/kg act wt) - low body weight considered    Estimated protein needs  Continue [x] 42-55gm/day (1.0-1.3gm/kg act wt)    Estimated fluid needs  Continue [x] 1ml/kcal or per LIP     Nutrient Intake: >75% on average, doing well with meals + consuming supplements as ordered        [x] Previous Nutrition Diagnosis: Inadequate energy intake            [x] Resolved    [x] No active nutrition diagnosis identified at this time    Nutrition Intervention: meals and snacks, coordination of care     Goal/Expected Outcome: pt to maintain po intake >75% over the next 7 days     Indicator/Monitoring: RD to monitor diet order, energy intake, body composition, NFPF    Recommendation: continue current diet order per SLP + continue with supplements, encourage po intake, provide assistance with meals PRN    Pt is consuming most of his meals and drinking supplements-- likely meeting/exceeding estimated protein/energy requirements. There are no further nutrition interventions at this time. Pt is no longer at risk for this reason. RD with f/u within the next 7 days.

## 2020-07-30 NOTE — PROGRESS NOTE BEHAVIORAL HEALTH - SUMMARY
69M domiciled at [place], PPH: schizophrenia, PMH: Parkinson disease, COPD, sent to [facility]'s ED for agitated behavior. Pt presents agitated, hostile, disorganised, appears to be responding to internal stimuli and paranoid about medications in context of poor adherence to medication regimen. Pt continues to refuse medications. TOO to be requested.    #Schizophrenia  -TOO granted (7/28/20); as per TOO, if pt refuses Haldol PO, then Haldol 2.5 mg IM to be given  -change depakene syrup 500 mg bid to depakote sprinkles 500 mg bid to increase adherence --> valproic acid level 33, change to depakote dr 500 mg bid (7/28)  -continue haldol concentrate 2 mg bid --> titrate haldol concentrate 5 mg qhs (7/20) --> titrate haldol concentrate 2 mg daily, haldol concentrate 5 mg qhs (7/23) --> titrate haldol concentrate 5 mg bid (7/27)  -c/w cogentin 1 mg bid  -c/w melatonin 5 mg qhs  -start haldol concentrate 2.5 mg q8h prn for agitation  -c/w hydroxyzine 50 mg q6h prn for anxiety or/and insomnia    #HTN, Parkinsons, COPD  -c/w home medications  -medicine consulted  -PT/OT consult pending    #Poor PO Intake  -RD consulted; Ensure with meals  -Monitor weight every 3 days  -SLP consulted; advance diet as per notes    #Mild hyponatremia/mild hyperkalemia  -Na 131, K 5.2 (7/28)  -medicine consulted  -as per medicine rec, repeat BMP in AM --> Na 130, K 4.8 (7/29), repeat BMP in AM  -RD consulted, no concentrated potassium modifier added to diet    Dispo: [facility] 69M domiciled at [place], PPH: schizophrenia, PMH: Parkinson disease, COPD, sent to [facility]'s ED for agitated behavior. Pt presents agitated, hostile, disorganised, appears to be responding to internal stimuli and paranoid about medications in context of poor adherence to medication regimen. Pt continues to refuse medications. TOO to be requested.    #Schizophrenia  -TOO granted (7/28/20); as per TOO, if pt refuses Haldol PO, then Haldol 2.5 mg IM to be given  -change depakene syrup 500 mg bid to depakote sprinkles 500 mg bid to increase adherence --> valproic acid level 33, change to depakote dr 500 mg bid (7/28) --> taper depakote dr 250 mg bid (7/30) d/t possible drug-induced hyponatremia  -continue haldol concentrate 2 mg bid --> titrate haldol concentrate 5 mg qhs (7/20) --> titrate haldol concentrate 2 mg daily, haldol concentrate 5 mg qhs (7/23) --> titrate haldol concentrate 5 mg bid (7/27)  -c/w cogentin 1 mg bid  -c/w melatonin 5 mg qhs  -start haldol concentrate 2.5 mg q8h prn for agitation  -c/w hydroxyzine 50 mg q6h prn for anxiety or/and insomnia    #HTN, Parkinsons, COPD  -c/w home medications  -medicine consulted  -PT/OT consult pending    #Poor PO Intake  -RD consulted; Ensure with meals  -Monitor weight every 3 days  -SLP consulted; advance diet as per notes    #Mild hyponatremia/mild hyperkalemia  -Na 131, K 5.2 (7/28) --> Na 130, K 4.8 (7/29) --> Na 128 (7/30)  -medicine consulted, recs and labs in note  -taper depakote 250 mg bid if drug-induced hyponatremia  -RD consulted, no concentrated potassium modifier added to diet    [facility]: [facility]

## 2020-07-30 NOTE — PROGRESS NOTE ADULT - SUBJECTIVE AND OBJECTIVE BOX
pt stable alert in NAD  no new complaints    DEPRESSION  ^DEPRESSION  Yes  Handoff  HTN (hypertension)  Epilepsy  BPH (benign prostatic hyperplasia)  COPD, moderate  Parkinsonism  Essential hypertension  Parkinsonism, unspecified Parkinsonism type  Schizophrenia, unspecified type  Depression    HEALTH ISSUES - PROBLEM Dx:  Essential hypertension: Essential hypertension  Parkinsonism, unspecified Parkinsonism type: Parkinsonism, unspecified Parkinsonism type  Schizophrenia, unspecified type  Depression: Depression        PAST MEDICAL & SURGICAL HISTORY:  HTN (hypertension)  Epilepsy  BPH (benign prostatic hyperplasia)  COPD, moderate  Parkinsonism    No Known Allergies      FAMILY HISTORY:      ALBUTerol    90 MICROgram(s) HFA Inhaler 2 Puff(s) Inhalation every 6 hours PRN  benztropine 1 milliGRAM(s) Oral two times a day  budesonide  80 MICROgram(s)/formoterol 4.5 MICROgram(s) Inhaler 2 Puff(s) Inhalation two times a day  calcium carbonate    500 mG (Tums) Chewable 1 Tablet(s) Chew daily  diVALproex  milliGRAM(s) Oral two times a day  haloperidol    Concentrate 5 milliGRAM(s) Oral two times a day  haloperidol    Injectable 2.5 milliGRAM(s) IntraMuscular two times a day PRN  hydrOXYzine hydrochloride 50 milliGRAM(s) Oral every 6 hours PRN  melatonin. 5 milliGRAM(s) Oral at bedtime  metoprolol tartrate 25 milliGRAM(s) Oral every 12 hours  pantoprazole    Tablet 40 milliGRAM(s) Oral before breakfast  rOPINIRole 0.25 milliGRAM(s) Oral every 12 hours      T(C): 36.3 (07-29-20 @ 20:30), Max: 36.3 (07-29-20 @ 20:30)  HR: 75 (07-29-20 @ 20:30) (75 - 75)  BP: 117/66 (07-29-20 @ 20:30) (117/66 - 117/66)  RR: 18 (07-29-20 @ 20:30) (18 - 18)  SpO2: --    PE;  general:  no changes in status in nad    Lungs:    Heart:    EXT:    Neuro:  no deficits      12.0  36.3  7.79  --  --  --  --  --  --  --  18  0.5  4.8  97  10.3  30.9  6.46  23  0.6  5.2  98      07-29    130<L>  |  89<L>  |  18  ----------------------------<  97  4.8   |  31  |  0.5<L>    Ca    9.7      29 Jul 2020 09:04  Mg     2.0     07-29                                  12.0   7.79  )-----------( 288      ( 29 Jul 2020 09:04 )             36.3       CAPILLARY BLOOD GLUCOSE pt stable alert in NAD  no new complaints ambulating on unit    DEPRESSION  ^DEPRESSION  Yes  Handoff  HTN (hypertension)  Epilepsy  BPH (benign prostatic hyperplasia)  COPD, moderate  Parkinsonism  Essential hypertension  Parkinsonism, unspecified Parkinsonism type  Schizophrenia, unspecified type  Depression    HEALTH ISSUES - PROBLEM Dx:  Essential hypertension: Essential hypertension  Parkinsonism, unspecified Parkinsonism type: Parkinsonism, unspecified Parkinsonism type  Schizophrenia, unspecified type  Depression: Depression        PAST MEDICAL & SURGICAL HISTORY:  HTN (hypertension)  Epilepsy  BPH (benign prostatic hyperplasia)  COPD, moderate  Parkinsonism    No Known Allergies      FAMILY HISTORY:      ALBUTerol    90 MICROgram(s) HFA Inhaler 2 Puff(s) Inhalation every 6 hours PRN  benztropine 1 milliGRAM(s) Oral two times a day  budesonide  80 MICROgram(s)/formoterol 4.5 MICROgram(s) Inhaler 2 Puff(s) Inhalation two times a day  calcium carbonate    500 mG (Tums) Chewable 1 Tablet(s) Chew daily  diVALproex  milliGRAM(s) Oral two times a day  haloperidol    Concentrate 5 milliGRAM(s) Oral two times a day  haloperidol    Injectable 2.5 milliGRAM(s) IntraMuscular two times a day PRN  hydrOXYzine hydrochloride 50 milliGRAM(s) Oral every 6 hours PRN  melatonin. 5 milliGRAM(s) Oral at bedtime  metoprolol tartrate 25 milliGRAM(s) Oral every 12 hours  pantoprazole    Tablet 40 milliGRAM(s) Oral before breakfast  rOPINIRole 0.25 milliGRAM(s) Oral every 12 hours      T(C): 36.3 (07-29-20 @ 20:30), Max: 36.3 (07-29-20 @ 20:30)  HR: 75 (07-29-20 @ 20:30) (75 - 75)  BP: 117/66 (07-29-20 @ 20:30) (117/66 - 117/66)  RR: 18 (07-29-20 @ 20:30) (18 - 18)  SpO2: --    PE;  general:  no changes in status in nad    Lungs:    Heart:    EXT: no edema    Neuro:  no deficits      12.0  36.3  7.79  --  --  --  --  --  --  --  18  0.5  4.8  97  10.3  30.9  6.46  23  0.6  5.2  98      07-29    130<L>  |  89<L>  |  18  ----------------------------<  97  4.8   |  31  |  0.5<L>    Ca    9.7      29 Jul 2020 09:04  Mg     2.0     07-29                                  12.0   7.79  )-----------( 288      ( 29 Jul 2020 09:04 )             36.3       CAPILLARY BLOOD GLUCOSE

## 2020-07-30 NOTE — PROGRESS NOTE BEHAVIORAL HEALTH - NSBHFUPINTERVALHXFT_PSY_A_CORE
Pt seen and evaluated, chart reviewed.  As per nursing staff, pt has been compliant with medications since TOO obtained (7/27/20), less verbally abusive and belligerent towards staff, verbally redirectable and less isolation. On evaluation, pt disorganized at times and cooperative, difficult to understand. Pt endorses good mood, sleep and appetite, states he has been drinking his Ensure, endorses regular BM (last yesterday). Pt denies AVH, however is observed by staff to talk to self in room at times, pt does not appear in distress. Denies SI/HI, intent and plan. Na 131 (7/28) --> Na 130 (7/29), repeat BMP in AM, medicine consulted.    Spoke with [place] pt's sister/HCP ([phone]) - states pt at baseline is disorganized, however cooperative and not a danger to self or/and others. States when pt is taking his medications he does well and is safe, agrees to transition to haldol decanoate when pt is stable and transfer back to [facility]. Pt seen and evaluated, chart reviewed.  As per nursing staff, pt has been compliant with medications since TOO obtained (7/27/20), less verbally abusive and belligerent towards staff, verbally redirectable and less isolation. On evaluation, pt disorganized at times and cooperative, difficult to understand. Pt endorses good mood, sleep and appetite, states he has been drinking his Ensure, endorses regular BM (last yesterday). Pt denies AVH, however is observed by staff to talk to self in room at times, pt does not appear in distress. Denies SI/HI, intent and plan. Na 131 (7/28) --> Na 130 (7/29) --> Na 128 (7/30), medicine consulted and labs placed.

## 2020-07-30 NOTE — PROGRESS NOTE BEHAVIORAL HEALTH - NSBHCHARTREVIEWLAB_PSY_A_CORE FT
Complete Blood Count + Automated Diff in AM (07.16.20 @ 07:02)    WBC Count: 7.12 K/uL    RBC Count: 3.48 M/uL    Hemoglobin: 11.1 g/dL    Hematocrit: 33.8 %    Mean Cell Volume: 97.1 fL    Mean Cell Hemoglobin: 31.9 pg    Mean Cell Hemoglobin Conc: 32.8 g/dL    Red Cell Distrib Width: 13.3 %    Platelet Count - Automated: 259 K/uL    Auto Neutrophil #: 3.83 K/uL    Auto Lymphocyte #: 2.21 K/uL    Auto Monocyte #: 0.91 K/uL    Auto Eosinophil #: 0.12 K/uL    Auto Basophil #: 0.04 K/uL    Auto Neutrophil %: 53.8    Auto Lymphocyte %: 31.0 %    Auto Monocyte %: 12.8 %    Auto Eosinophil %: 1.7 %    Auto Basophil %: 0.6 %    Auto Immature Granulocyte %: 0.1 %    Nucleated RBC: 0 /100 WBCs  Basic Metabolic Panel in AM (07.29.20 @ 09:04)    Sodium, Serum: 130 mmol/L    Potassium, Serum: 4.8 mmol/L    Chloride, Serum: 89 mmol/L    Carbon Dioxide, Serum: 31 mmol/L    Anion Gap, Serum: 10 mmol/L    Blood Urea Nitrogen, Serum: 18 mg/dL    Creatinine, Serum: 0.5 mg/dL    Glucose, Serum: 97 mg/dL    Calcium, Total Serum: 9.7 mg/dL    eGFR if Non : 111  Lipid Profile (07.10.20 @ 04:30)    Total Cholesterol/HDL Ratio Measurement: 4.1 Ratio    Cholesterol, Serum: 141 mg/dL    Triglycerides, Serum: 86 mg/dL    HDL Cholesterol, Serum: 34    Direct LDL: 96  A1C with Estimated Average Glucose in AM (07.10.20 @ 04:30)    A1C with Estimated Average Glucose Result: 4.9  Thyroid Stimulating Hormone, Serum in AM (07.16.20 @ 07:02)    Thyroid Stimulating Hormone, Serum: 2.88 uIU/mL

## 2020-07-30 NOTE — PROGRESS NOTE ADULT - ASSESSMENT
medically stable with no acute issues  labs reviewed adn al ok medically stable with no acute issues  labs reviewed noted with decrease over few day now 130

## 2020-07-31 LAB
ANION GAP SERPL CALC-SCNC: 8 MMOL/L — SIGNIFICANT CHANGE UP (ref 7–14)
BUN SERPL-MCNC: 21 MG/DL — HIGH (ref 10–20)
CALCIUM SERPL-MCNC: 9.3 MG/DL — SIGNIFICANT CHANGE UP (ref 8.5–10.1)
CHLORIDE SERPL-SCNC: 90 MMOL/L — LOW (ref 98–110)
CO2 SERPL-SCNC: 30 MMOL/L — SIGNIFICANT CHANGE UP (ref 17–32)
CREAT ?TM UR-MCNC: 24 MG/DL — SIGNIFICANT CHANGE UP
CREAT SERPL-MCNC: 0.6 MG/DL — LOW (ref 0.7–1.5)
GLUCOSE SERPL-MCNC: 108 MG/DL — HIGH (ref 70–99)
OSMOLALITY UR: 440 MOS/KG — SIGNIFICANT CHANGE UP (ref 50–1400)
POTASSIUM SERPL-MCNC: 4.9 MMOL/L — SIGNIFICANT CHANGE UP (ref 3.5–5)
POTASSIUM SERPL-SCNC: 4.9 MMOL/L — SIGNIFICANT CHANGE UP (ref 3.5–5)
SODIUM SERPL-SCNC: 128 MMOL/L — LOW (ref 135–146)
SODIUM UR-SCNC: 72 MMOL/L — SIGNIFICANT CHANGE UP
TSH SERPL-MCNC: 3.77 UIU/ML — SIGNIFICANT CHANGE UP (ref 0.27–4.2)

## 2020-07-31 RX ADMIN — Medication 1 MILLIGRAM(S): at 21:03

## 2020-07-31 RX ADMIN — Medication 1 MILLIGRAM(S): at 08:39

## 2020-07-31 RX ADMIN — DIVALPROEX SODIUM 250 MILLIGRAM(S): 500 TABLET, DELAYED RELEASE ORAL at 21:03

## 2020-07-31 RX ADMIN — ROPINIROLE 0.25 MILLIGRAM(S): 8 TABLET, FILM COATED, EXTENDED RELEASE ORAL at 21:03

## 2020-07-31 RX ADMIN — BUDESONIDE AND FORMOTEROL FUMARATE DIHYDRATE 2 PUFF(S): 160; 4.5 AEROSOL RESPIRATORY (INHALATION) at 07:56

## 2020-07-31 RX ADMIN — Medication 25 MILLIGRAM(S): at 21:03

## 2020-07-31 RX ADMIN — ALBUTEROL 2 PUFF(S): 90 AEROSOL, METERED ORAL at 04:53

## 2020-07-31 RX ADMIN — Medication 25 MILLIGRAM(S): at 08:39

## 2020-07-31 RX ADMIN — DIVALPROEX SODIUM 250 MILLIGRAM(S): 500 TABLET, DELAYED RELEASE ORAL at 08:39

## 2020-07-31 RX ADMIN — BUDESONIDE AND FORMOTEROL FUMARATE DIHYDRATE 2 PUFF(S): 160; 4.5 AEROSOL RESPIRATORY (INHALATION) at 19:56

## 2020-07-31 RX ADMIN — HALOPERIDOL DECANOATE 5 MILLIGRAM(S): 100 INJECTION INTRAMUSCULAR at 08:39

## 2020-07-31 RX ADMIN — Medication 5 MILLIGRAM(S): at 21:03

## 2020-07-31 RX ADMIN — ALBUTEROL 2 PUFF(S): 90 AEROSOL, METERED ORAL at 11:56

## 2020-07-31 RX ADMIN — ROPINIROLE 0.25 MILLIGRAM(S): 8 TABLET, FILM COATED, EXTENDED RELEASE ORAL at 08:38

## 2020-07-31 RX ADMIN — PANTOPRAZOLE SODIUM 40 MILLIGRAM(S): 20 TABLET, DELAYED RELEASE ORAL at 08:38

## 2020-07-31 RX ADMIN — HALOPERIDOL DECANOATE 5 MILLIGRAM(S): 100 INJECTION INTRAMUSCULAR at 21:03

## 2020-07-31 RX ADMIN — Medication 1 TABLET(S): at 08:39

## 2020-07-31 NOTE — CHART NOTE - NSCHARTNOTEFT_GEN_A_CORE
Treatment team meets with patient to discuss treatment plan, medications and discharge plan.  During the day patient is observed to be isolative to his room.  Patient is cooperative but speech is often incoherent.  Patient has not been consistent with medication adherence.  He has been observed to engage in self-dialogue by staff and has been verbally aggressive towards staff and peers.  Suicidal / homicidal ideation denied.      As patient has not been adherent to diagnostic testing or medication administration plan is for treatment over objection hearing which was conducted on Monday 7/27/2020. Pt at this time is court mandated to take medication while on the unit. Discharge plan is for placement to a skilled nursing facility vs Adult Home    Mental Status Exam:    Mood – irritable at times  Sleep - Good  Appetite - Fair  ADLs - Fair  Thought Process – Disorganized   Observation – [id number]    No barriers to discharge identified at this time.     At this time patient is not psychiatrically stable for discharge.

## 2020-07-31 NOTE — PROGRESS NOTE BEHAVIORAL HEALTH - SUMMARY
69M domiciled at [place], PPH: schizophrenia, PMH: Parkinson disease, COPD, sent to [facility]'s ED for agitated behavior. Pt presents agitated, hostile, disorganised, appears to be responding to internal stimuli and paranoid about medications in context of poor adherence to medication regimen. Pt continues to refuse medications. TOO to be requested.    #Schizophrenia  -TOO granted (7/28/20); as per TOO, if pt refuses Haldol PO, then Haldol 2.5 mg IM to be given  -change depakene syrup 500 mg bid to depakote sprinkles 500 mg bid to increase adherence --> valproic acid level 33, change to depakote dr 500 mg bid (7/28) --> taper depakote dr 250 mg bid (7/30) d/t possible drug-induced hyponatremia  -continue haldol concentrate 2 mg bid --> titrate haldol concentrate 5 mg qhs (7/20) --> titrate haldol concentrate 2 mg daily, haldol concentrate 5 mg qhs (7/23) --> titrate haldol concentrate 5 mg bid (7/27)  -c/w cogentin 1 mg bid  -c/w melatonin 5 mg qhs  -start haldol concentrate 2.5 mg q8h prn for agitation  -c/w hydroxyzine 50 mg q6h prn for anxiety or/and insomnia    #HTN, Parkinsons, COPD  -c/w home medications  -medicine consulted  -PT/OT consult pending    #Poor PO Intake  -RD consulted; Ensure with meals  -Monitor weight every 3 days  -SLP consulted; advance diet as per notes    #Mild hyponatremia/mild hyperkalemia  -Na 131, K 5.2 (7/28) --> Na 130, K 4.8 (7/29) --> Na 128 (7/30)  -medicine consulted, recs and labs in note  -taper depakote 250 mg bid for possible drug-induced hyponatremia  -RD consulted, no concentrated potassium modifier added to diet    Dispo: Sunrismagdiel vásquez 69M domiciled at [place], PPH: schizophrenia, PMH: Parkinson disease, COPD, sent to [facility]'s ED for agitated behavior. Pt presents agitated, hostile, disorganised, appears to be responding to internal stimuli and paranoid about medications in context of poor adherence to medication regimen. Pt continues to refuse medications. TOO to be requested.    #Schizophrenia  -TOO granted (7/28/20); as per TOO, if pt refuses Haldol PO, then Haldol 2.5 mg IM to be given  -change depakene syrup 500 mg bid to depakote sprinkles 500 mg bid to increase adherence --> valproic acid level 33, change to depakote dr 500 mg bid (7/28) --> taper depakote dr 250 mg bid (7/30) d/t possible drug-induced hyponatremia  -continue haldol concentrate 2 mg bid --> titrate haldol concentrate 5 mg qhs (7/20) --> titrate haldol concentrate 2 mg daily, haldol concentrate 5 mg qhs (7/23) --> titrate haldol concentrate 5 mg bid (7/27)  -c/w cogentin 1 mg bid  -c/w melatonin 5 mg qhs  -start haldol concentrate 2.5 mg q8h prn for agitation  -c/w hydroxyzine 50 mg q6h prn for anxiety or/and insomnia    #HTN, Parkinsons, COPD  -c/w home medications  -medicine consulted  -PT/OT consult pending    #Poor PO Intake  -RD consulted; Ensure with meals  -Monitor weight every 3 days  -SLP consulted; advance diet as per notes    #Mild hyponatremia/mild hyperkalemia  -Na 131, K 5.2 (7/28) --> Na 130, K 4.8 (7/29) --> Na 128 (7/30) --> Na 128 (7/31)  -medicine consulted, recs and labs in note; repeat BMP in 2 days  -taper depakote 250 mg bid for possible drug-induced hyponatremia  -RD consulted, no concentrated potassium modifier added to diet    Dispo: [facility]

## 2020-07-31 NOTE — PROGRESS NOTE BEHAVIORAL HEALTH - NSBHFUPINTERVALHXFT_PSY_A_CORE
Pt seen and evaluated, chart reviewed.  As per nursing staff, pt has been compliant with medications since TOO obtained (7/27/20), less verbally abusive and belligerent towards staff, verbally redirectable and less isolative. On evaluation, pt disorganized at times and difficult to understand, cooperative. Pt endorses good mood, sleep and appetite, states he has been drinking his Ensure, endorses regular BM (last yesterday) refusing need for stool softeners. Pt denies AVH, however is observed by staff to talk to self in room at times, pt does not appear in distress. Denies SI/HI, intent and plan. Na 131 (7/28) --> Na 130 (7/29) --> Na 128 (7/30), medicine consulted and labs placed. Pt seen and evaluated, chart reviewed.  As per nursing staff, pt has been compliant with medications since TOO obtained (7/27/20), less verbally abusive and belligerent towards staff, verbally redirectable and less isolative. On evaluation, pt disorganized at times and difficult to understand, cooperative. Pt endorses good mood, sleep and appetite, states he has been drinking his Ensure, endorses regular BM (last yesterday) refusing need for stool softeners. Pt denies AVH, however is observed by staff to talk to self in room at times, pt does not appear in distress. Denies SI/HI, intent and plan. Na 131 (7/28) --> Na 130 (7/29) --> Na 128 (7/30) --> Na 128 (7/31), medicine consulted and labs placed.

## 2020-08-01 RX ADMIN — ROPINIROLE 0.25 MILLIGRAM(S): 8 TABLET, FILM COATED, EXTENDED RELEASE ORAL at 08:51

## 2020-08-01 RX ADMIN — ALBUTEROL 2 PUFF(S): 90 AEROSOL, METERED ORAL at 00:08

## 2020-08-01 RX ADMIN — DIVALPROEX SODIUM 250 MILLIGRAM(S): 500 TABLET, DELAYED RELEASE ORAL at 20:02

## 2020-08-01 RX ADMIN — Medication 25 MILLIGRAM(S): at 20:31

## 2020-08-01 RX ADMIN — BUDESONIDE AND FORMOTEROL FUMARATE DIHYDRATE 2 PUFF(S): 160; 4.5 AEROSOL RESPIRATORY (INHALATION) at 20:00

## 2020-08-01 RX ADMIN — BUDESONIDE AND FORMOTEROL FUMARATE DIHYDRATE 2 PUFF(S): 160; 4.5 AEROSOL RESPIRATORY (INHALATION) at 07:17

## 2020-08-01 RX ADMIN — ALBUTEROL 2 PUFF(S): 90 AEROSOL, METERED ORAL at 23:43

## 2020-08-01 RX ADMIN — Medication 1 MILLIGRAM(S): at 20:01

## 2020-08-01 RX ADMIN — Medication 1 TABLET(S): at 08:50

## 2020-08-01 RX ADMIN — Medication 5 MILLIGRAM(S): at 20:02

## 2020-08-01 RX ADMIN — ALBUTEROL 2 PUFF(S): 90 AEROSOL, METERED ORAL at 14:01

## 2020-08-01 RX ADMIN — PANTOPRAZOLE SODIUM 40 MILLIGRAM(S): 20 TABLET, DELAYED RELEASE ORAL at 07:17

## 2020-08-01 RX ADMIN — ALBUTEROL 2 PUFF(S): 90 AEROSOL, METERED ORAL at 06:21

## 2020-08-01 RX ADMIN — HALOPERIDOL DECANOATE 5 MILLIGRAM(S): 100 INJECTION INTRAMUSCULAR at 20:00

## 2020-08-01 RX ADMIN — Medication 25 MILLIGRAM(S): at 08:51

## 2020-08-01 RX ADMIN — ROPINIROLE 0.25 MILLIGRAM(S): 8 TABLET, FILM COATED, EXTENDED RELEASE ORAL at 20:01

## 2020-08-01 RX ADMIN — Medication 1 MILLIGRAM(S): at 08:50

## 2020-08-01 RX ADMIN — DIVALPROEX SODIUM 250 MILLIGRAM(S): 500 TABLET, DELAYED RELEASE ORAL at 08:51

## 2020-08-01 RX ADMIN — HALOPERIDOL DECANOATE 5 MILLIGRAM(S): 100 INJECTION INTRAMUSCULAR at 08:51

## 2020-08-02 LAB
ANION GAP SERPL CALC-SCNC: 6 MMOL/L — LOW (ref 7–14)
BUN SERPL-MCNC: 29 MG/DL — HIGH (ref 10–20)
CALCIUM SERPL-MCNC: 9.4 MG/DL — SIGNIFICANT CHANGE UP (ref 8.5–10.1)
CHLORIDE SERPL-SCNC: 96 MMOL/L — LOW (ref 98–110)
CO2 SERPL-SCNC: 34 MMOL/L — HIGH (ref 17–32)
CREAT SERPL-MCNC: 0.8 MG/DL — SIGNIFICANT CHANGE UP (ref 0.7–1.5)
GLUCOSE SERPL-MCNC: 58 MG/DL — LOW (ref 70–99)
POTASSIUM SERPL-MCNC: 5 MMOL/L — SIGNIFICANT CHANGE UP (ref 3.5–5)
POTASSIUM SERPL-SCNC: 5 MMOL/L — SIGNIFICANT CHANGE UP (ref 3.5–5)
SODIUM SERPL-SCNC: 136 MMOL/L — SIGNIFICANT CHANGE UP (ref 135–146)

## 2020-08-02 RX ORDER — BUDESONIDE AND FORMOTEROL FUMARATE DIHYDRATE 160; 4.5 UG/1; UG/1
2 AEROSOL RESPIRATORY (INHALATION) ONCE
Refills: 0 | Status: DISCONTINUED | OUTPATIENT
Start: 2020-08-02 | End: 2020-08-02

## 2020-08-02 RX ORDER — ALBUTEROL 90 UG/1
2 AEROSOL, METERED ORAL ONCE
Refills: 0 | Status: COMPLETED | OUTPATIENT
Start: 2020-08-02 | End: 2020-08-02

## 2020-08-02 RX ADMIN — DIVALPROEX SODIUM 250 MILLIGRAM(S): 500 TABLET, DELAYED RELEASE ORAL at 20:18

## 2020-08-02 RX ADMIN — BUDESONIDE AND FORMOTEROL FUMARATE DIHYDRATE 2 PUFF(S): 160; 4.5 AEROSOL RESPIRATORY (INHALATION) at 20:17

## 2020-08-02 RX ADMIN — Medication 1 MILLIGRAM(S): at 08:13

## 2020-08-02 RX ADMIN — Medication 1 TABLET(S): at 08:12

## 2020-08-02 RX ADMIN — ALBUTEROL 2 PUFF(S): 90 AEROSOL, METERED ORAL at 04:18

## 2020-08-02 RX ADMIN — ALBUTEROL 2 PUFF(S): 90 AEROSOL, METERED ORAL at 20:18

## 2020-08-02 RX ADMIN — BUDESONIDE AND FORMOTEROL FUMARATE DIHYDRATE 2 PUFF(S): 160; 4.5 AEROSOL RESPIRATORY (INHALATION) at 08:12

## 2020-08-02 RX ADMIN — Medication 1 MILLIGRAM(S): at 20:18

## 2020-08-02 RX ADMIN — ROPINIROLE 0.25 MILLIGRAM(S): 8 TABLET, FILM COATED, EXTENDED RELEASE ORAL at 08:12

## 2020-08-02 RX ADMIN — Medication 5 MILLIGRAM(S): at 20:18

## 2020-08-02 RX ADMIN — HALOPERIDOL DECANOATE 5 MILLIGRAM(S): 100 INJECTION INTRAMUSCULAR at 08:14

## 2020-08-02 RX ADMIN — Medication 25 MILLIGRAM(S): at 08:17

## 2020-08-02 RX ADMIN — PANTOPRAZOLE SODIUM 40 MILLIGRAM(S): 20 TABLET, DELAYED RELEASE ORAL at 08:13

## 2020-08-02 RX ADMIN — ROPINIROLE 0.25 MILLIGRAM(S): 8 TABLET, FILM COATED, EXTENDED RELEASE ORAL at 20:18

## 2020-08-02 RX ADMIN — Medication 25 MILLIGRAM(S): at 20:18

## 2020-08-02 RX ADMIN — DIVALPROEX SODIUM 250 MILLIGRAM(S): 500 TABLET, DELAYED RELEASE ORAL at 08:13

## 2020-08-02 RX ADMIN — HALOPERIDOL DECANOATE 5 MILLIGRAM(S): 100 INJECTION INTRAMUSCULAR at 20:20

## 2020-08-02 RX ADMIN — ALBUTEROL 2 PUFF(S): 90 AEROSOL, METERED ORAL at 11:21

## 2020-08-03 RX ADMIN — Medication 25 MILLIGRAM(S): at 20:34

## 2020-08-03 RX ADMIN — HALOPERIDOL DECANOATE 5 MILLIGRAM(S): 100 INJECTION INTRAMUSCULAR at 08:22

## 2020-08-03 RX ADMIN — ROPINIROLE 0.25 MILLIGRAM(S): 8 TABLET, FILM COATED, EXTENDED RELEASE ORAL at 20:36

## 2020-08-03 RX ADMIN — ROPINIROLE 0.25 MILLIGRAM(S): 8 TABLET, FILM COATED, EXTENDED RELEASE ORAL at 08:21

## 2020-08-03 RX ADMIN — DIVALPROEX SODIUM 250 MILLIGRAM(S): 500 TABLET, DELAYED RELEASE ORAL at 08:21

## 2020-08-03 RX ADMIN — PANTOPRAZOLE SODIUM 40 MILLIGRAM(S): 20 TABLET, DELAYED RELEASE ORAL at 08:21

## 2020-08-03 RX ADMIN — HALOPERIDOL DECANOATE 5 MILLIGRAM(S): 100 INJECTION INTRAMUSCULAR at 20:35

## 2020-08-03 RX ADMIN — Medication 1 MILLIGRAM(S): at 08:21

## 2020-08-03 RX ADMIN — Medication 1 TABLET(S): at 08:21

## 2020-08-03 RX ADMIN — ALBUTEROL 2 PUFF(S): 90 AEROSOL, METERED ORAL at 16:32

## 2020-08-03 RX ADMIN — Medication 1 MILLIGRAM(S): at 20:34

## 2020-08-03 RX ADMIN — BUDESONIDE AND FORMOTEROL FUMARATE DIHYDRATE 2 PUFF(S): 160; 4.5 AEROSOL RESPIRATORY (INHALATION) at 20:34

## 2020-08-03 RX ADMIN — Medication 25 MILLIGRAM(S): at 08:21

## 2020-08-03 RX ADMIN — Medication 5 MILLIGRAM(S): at 20:34

## 2020-08-03 RX ADMIN — BUDESONIDE AND FORMOTEROL FUMARATE DIHYDRATE 2 PUFF(S): 160; 4.5 AEROSOL RESPIRATORY (INHALATION) at 08:22

## 2020-08-03 RX ADMIN — ALBUTEROL 2 PUFF(S): 90 AEROSOL, METERED ORAL at 05:57

## 2020-08-03 NOTE — CHART NOTE - NSCHARTNOTEFT_GEN_A_CORE
Treatment team meets with patient to discuss treatment plan, medications and discharge plan.  During the day patient is observed to be isolative to his room.  Patient is cooperative but speech is often incoherent.  Patient has not been consistent with medication adherence.  He has been observed to engage in self-dialogue by staff and has been verbally aggressive towards staff and peers.  Suicidal / homicidal ideation denied.      As patient has not been adherent to diagnostic testing or medication administration plan is for treatment over objection hearing which was conducted on Monday 7/27/2020. Pt at this time is court mandated to take medication while on the unit. Discharge plan is for placement to a skilled nursing facility vs Adult Home    Mental Status Exam:    Mood – irritable at times  Sleep - Good  Appetite - Fair  ADLs - Fair  Thought Process – Disorganized   Observation – [id number]    No barriers to discharge identified at this time.     At this time patient is not psychiatrically stable for discharge. Treatment team meets with patient to discuss treatment plan, medications and discharge plan.  During the day patient is observed to be isolative to his room.  Patient has not been consistent with medication adherence.  Patient encouraged to take medications as prescribed and to participate in the various unit activities. Patient expressed understanding. Patient remains with potential for agitation and is frequently irritable with staff. Patient at times talks to self. Patient denies SALINA HI and AVH. ADLS within normal limits but could use improvement.     As patient has not been adherent to diagnostic testing or medication administration plan is for treatment over objection hearing which was conducted on Monday 7/27/2020. Pt at this time is court mandated to take medication while on the unit. Discharge plan is for patient to return to the Adult Home he was at prior to admission.     Mental Status Exam:    Mood – Labile  Sleep - Good  Appetite - Fair  ADLs - Fair  Thought Process – Disorganized   Observation – [id number]    No barriers to discharge identified at this time.     At this time patient is not psychiatrically stable for discharge.

## 2020-08-03 NOTE — PROGRESS NOTE BEHAVIORAL HEALTH - SUMMARY
69M domiciled at [place], PPH: schizophrenia, PMH: Parkinson disease, COPD, sent to [facility]'s ED for agitated behavior. Pt presents agitated, hostile, disorganised, appears to be responding to internal stimuli and paranoid about medications in context of poor adherence to medication regimen. Pt continues to refuse medications. TOO to be requested.    #Schizophrenia  -TOO granted (7/28/20); as per TOO, if pt refuses Haldol PO, then Haldol 2.5 mg IM to be given  -change depakene syrup 500 mg bid to depakote sprinkles 500 mg bid to increase adherence --> valproic acid level 33, change to depakote dr 500 mg bid (7/28) --> taper depakote dr 250 mg bid (7/30) d/t possible drug-induced hyponatremia  -continue haldol concentrate 2 mg bid --> titrate haldol concentrate 5 mg qhs (7/20) --> titrate haldol concentrate 2 mg daily, haldol concentrate 5 mg qhs (7/23) --> titrate haldol concentrate 5 mg bid (7/27)  -c/w cogentin 1 mg bid  -c/w melatonin 5 mg qhs  -start haldol concentrate 2.5 mg q8h prn for agitation  -c/w hydroxyzine 50 mg q6h prn for anxiety or/and insomnia    #HTN, Parkinsons, COPD  -c/w home medications  -medicine consulted  -PT/OT consult pending    #Poor PO Intake  -RD consulted; Ensure with meals  -Monitor weight every 3 days  -SLP consulted; advance diet as per notes    #Mild hyponatremia/mild hyperkalemia  -Na 131, K 5.2 (7/28) --> Na 130, K 4.8 (7/29) --> Na 128 (7/30) --> Na 128 (7/31)  -medicine consulted, recs and labs in note; repeat BMP in 2 days  -taper depakote 250 mg bid for possible drug-induced hyponatremia  -RD consulted, no concentrated potassium modifier added to diet    Dispo: [facility]

## 2020-08-03 NOTE — PROGRESS NOTE ADULT - SUBJECTIVE AND OBJECTIVE BOX
pt stable alert in NAD  no new complaints    DEPRESSION  ^DEPRESSION  Yes  Handoff  HTN (hypertension)  Epilepsy  BPH (benign prostatic hyperplasia)  COPD, moderate  Parkinsonism  Hyponatremia  Essential hypertension  Parkinsonism, unspecified Parkinsonism type  Schizophrenia, unspecified type  Depression    HEALTH ISSUES - PROBLEM Dx:  Hyponatremia: Hyponatremia  Essential hypertension: Essential hypertension  Parkinsonism, unspecified Parkinsonism type: Parkinsonism, unspecified Parkinsonism type  Schizophrenia, unspecified type  Depression: Depression        PAST MEDICAL & SURGICAL HISTORY:  HTN (hypertension)  Epilepsy  BPH (benign prostatic hyperplasia)  COPD, moderate  Parkinsonism    No Known Allergies      FAMILY HISTORY:      ALBUTerol    90 MICROgram(s) HFA Inhaler 2 Puff(s) Inhalation every 6 hours PRN  benztropine 1 milliGRAM(s) Oral two times a day  budesonide  80 MICROgram(s)/formoterol 4.5 MICROgram(s) Inhaler 2 Puff(s) Inhalation two times a day  calcium carbonate    500 mG (Tums) Chewable 1 Tablet(s) Chew daily  diVALproex  milliGRAM(s) Oral two times a day  haloperidol    Concentrate 5 milliGRAM(s) Oral two times a day  haloperidol    Injectable 2.5 milliGRAM(s) IntraMuscular two times a day PRN  hydrOXYzine hydrochloride 50 milliGRAM(s) Oral every 6 hours PRN  melatonin. 5 milliGRAM(s) Oral at bedtime  metoprolol tartrate 25 milliGRAM(s) Oral every 12 hours  pantoprazole    Tablet 40 milliGRAM(s) Oral before breakfast  rOPINIRole 0.25 milliGRAM(s) Oral every 12 hours      T(C): 35.6 (08-03-20 @ 08:15), Max: 35.6 (08-03-20 @ 08:15)  HR: 93 (08-03-20 @ 08:15) (93 - 97)  BP: 119/73 (08-03-20 @ 08:15) (119/73 - 133/77)  RR: 16 (08-03-20 @ 08:15) (16 - 16)  SpO2: --    PE;  general:  no changes note d innad    Lungs:    Heart:    EXT:    Neuro:  aelrt no defciits      --  --  --  29  0.8  5.0  58  --  --  --  21  0.6  4.9  108      08-02    136  |  96<L>  |  29<H>  ----------------------------<  58<L>  5.0   |  34<H>  |  0.8    Ca    9.4      02 Aug 2020 07:01                CAPILLARY BLOOD GLUCOSE

## 2020-08-03 NOTE — PROGRESS NOTE ADULT - PROBLEM SELECTOR PLAN 3
continue present treatment as per psych plan as reviewed  Medically stable with no new changes in treatment  will continue to monitor medical status while being treated on psych
unsure of etilogy   possible drug indcued   no sxs  will repeat again and if contineu to declne will need reanl w/u with spot urine na and fractionla excreation

## 2020-08-04 LAB
ALBUMIN SERPL ELPH-MCNC: 3.6 G/DL — SIGNIFICANT CHANGE UP (ref 3.5–5.2)
ALP SERPL-CCNC: 54 U/L — SIGNIFICANT CHANGE UP (ref 30–115)
ALT FLD-CCNC: 20 U/L — SIGNIFICANT CHANGE UP (ref 0–41)
ANION GAP SERPL CALC-SCNC: 7 MMOL/L — SIGNIFICANT CHANGE UP (ref 7–14)
AST SERPL-CCNC: 30 U/L — SIGNIFICANT CHANGE UP (ref 0–41)
BILIRUB SERPL-MCNC: 0.3 MG/DL — SIGNIFICANT CHANGE UP (ref 0.2–1.2)
BUN SERPL-MCNC: 28 MG/DL — HIGH (ref 10–20)
CALCIUM SERPL-MCNC: 9.2 MG/DL — SIGNIFICANT CHANGE UP (ref 8.5–10.1)
CHLORIDE SERPL-SCNC: 98 MMOL/L — SIGNIFICANT CHANGE UP (ref 98–110)
CO2 SERPL-SCNC: 33 MMOL/L — HIGH (ref 17–32)
CREAT SERPL-MCNC: 0.7 MG/DL — SIGNIFICANT CHANGE UP (ref 0.7–1.5)
GLUCOSE SERPL-MCNC: 97 MG/DL — SIGNIFICANT CHANGE UP (ref 70–99)
HCT VFR BLD CALC: 34.9 % — LOW (ref 42–52)
HGB BLD-MCNC: 11.3 G/DL — LOW (ref 14–18)
MCHC RBC-ENTMCNC: 32.4 G/DL — SIGNIFICANT CHANGE UP (ref 32–37)
MCHC RBC-ENTMCNC: 32.4 PG — HIGH (ref 27–31)
MCV RBC AUTO: 100 FL — HIGH (ref 80–94)
NRBC # BLD: 0 /100 WBCS — SIGNIFICANT CHANGE UP (ref 0–0)
PLATELET # BLD AUTO: 387 K/UL — SIGNIFICANT CHANGE UP (ref 130–400)
POTASSIUM SERPL-MCNC: 4.5 MMOL/L — SIGNIFICANT CHANGE UP (ref 3.5–5)
POTASSIUM SERPL-SCNC: 4.5 MMOL/L — SIGNIFICANT CHANGE UP (ref 3.5–5)
PROT SERPL-MCNC: 6.4 G/DL — SIGNIFICANT CHANGE UP (ref 6–8)
RBC # BLD: 3.49 M/UL — LOW (ref 4.7–6.1)
RBC # FLD: 14.5 % — SIGNIFICANT CHANGE UP (ref 11.5–14.5)
SODIUM SERPL-SCNC: 138 MMOL/L — SIGNIFICANT CHANGE UP (ref 135–146)
WBC # BLD: 6.42 K/UL — SIGNIFICANT CHANGE UP (ref 4.8–10.8)
WBC # FLD AUTO: 6.42 K/UL — SIGNIFICANT CHANGE UP (ref 4.8–10.8)

## 2020-08-04 RX ORDER — SENNA PLUS 8.6 MG/1
2 TABLET ORAL AT BEDTIME
Refills: 0 | Status: DISCONTINUED | OUTPATIENT
Start: 2020-08-04 | End: 2020-08-06

## 2020-08-04 RX ORDER — HALOPERIDOL DECANOATE 100 MG/ML
5 INJECTION INTRAMUSCULAR
Refills: 0 | Status: DISCONTINUED | OUTPATIENT
Start: 2020-08-04 | End: 2020-08-07

## 2020-08-04 RX ORDER — HALOPERIDOL DECANOATE 100 MG/ML
25 INJECTION INTRAMUSCULAR ONCE
Refills: 0 | Status: COMPLETED | OUTPATIENT
Start: 2020-08-04 | End: 2020-08-04

## 2020-08-04 RX ADMIN — Medication 1 MILLIGRAM(S): at 20:47

## 2020-08-04 RX ADMIN — Medication 25 MILLIGRAM(S): at 08:19

## 2020-08-04 RX ADMIN — Medication 5 MILLIGRAM(S): at 20:47

## 2020-08-04 RX ADMIN — Medication 1 MILLIGRAM(S): at 08:19

## 2020-08-04 RX ADMIN — ROPINIROLE 0.25 MILLIGRAM(S): 8 TABLET, FILM COATED, EXTENDED RELEASE ORAL at 08:18

## 2020-08-04 RX ADMIN — PANTOPRAZOLE SODIUM 40 MILLIGRAM(S): 20 TABLET, DELAYED RELEASE ORAL at 08:19

## 2020-08-04 RX ADMIN — BUDESONIDE AND FORMOTEROL FUMARATE DIHYDRATE 2 PUFF(S): 160; 4.5 AEROSOL RESPIRATORY (INHALATION) at 08:13

## 2020-08-04 RX ADMIN — Medication 1 TABLET(S): at 08:18

## 2020-08-04 RX ADMIN — HALOPERIDOL DECANOATE 25 MILLIGRAM(S): 100 INJECTION INTRAMUSCULAR at 13:20

## 2020-08-04 RX ADMIN — BUDESONIDE AND FORMOTEROL FUMARATE DIHYDRATE 2 PUFF(S): 160; 4.5 AEROSOL RESPIRATORY (INHALATION) at 20:47

## 2020-08-04 RX ADMIN — HALOPERIDOL DECANOATE 5 MILLIGRAM(S): 100 INJECTION INTRAMUSCULAR at 09:15

## 2020-08-04 RX ADMIN — DIVALPROEX SODIUM 250 MILLIGRAM(S): 500 TABLET, DELAYED RELEASE ORAL at 20:47

## 2020-08-04 RX ADMIN — DIVALPROEX SODIUM 250 MILLIGRAM(S): 500 TABLET, DELAYED RELEASE ORAL at 08:19

## 2020-08-04 RX ADMIN — Medication 25 MILLIGRAM(S): at 20:47

## 2020-08-04 RX ADMIN — ROPINIROLE 0.25 MILLIGRAM(S): 8 TABLET, FILM COATED, EXTENDED RELEASE ORAL at 20:47

## 2020-08-04 RX ADMIN — ALBUTEROL 2 PUFF(S): 90 AEROSOL, METERED ORAL at 05:42

## 2020-08-04 RX ADMIN — ALBUTEROL 2 PUFF(S): 90 AEROSOL, METERED ORAL at 16:02

## 2020-08-04 RX ADMIN — HALOPERIDOL DECANOATE 5 MILLIGRAM(S): 100 INJECTION INTRAMUSCULAR at 20:47

## 2020-08-04 NOTE — SWALLOW BEDSIDE ASSESSMENT ADULT - SWALLOW EVAL: DIAGNOSIS
moderate oral impairment with cognitive impairment impacting safety no signs of pharyngeal impairment

## 2020-08-04 NOTE — SWALLOW BEDSIDE ASSESSMENT ADULT - SWALLOW EVAL: ORAL MUSCULATURE
anomalies present/unable to assess due to poor participation/comprehension
anomalies present/unable to assess due to poor participation/comprehension
weakness noted unable to evaluate with structured tasks/unable to assess due to poor participation/comprehension/anomalies present

## 2020-08-04 NOTE — SWALLOW BEDSIDE ASSESSMENT ADULT - SLP PERTINENT HISTORY OF CURRENT PROBLEM
admit for agitated behavior history of dysphagia; patient has been seen by SLP earlier in admission, difficult to assess poor participation agitated easily.

## 2020-08-04 NOTE — SWALLOW BEDSIDE ASSESSMENT ADULT - NS SPL SWALLOW CLINIC TRIAL FT
mild oral; + toleration observed without overt symptoms of penetration/aspiration
+ toleration observed without overt symptoms of penetration/aspiration
needed redirection not to speak while eating. Patient is a choking risk

## 2020-08-04 NOTE — SWALLOW BEDSIDE ASSESSMENT ADULT - SWALLOW EVAL: FEEDING ASSISTANCE
dependent
supervision to reduce risk behaviors/frequent cues/help required
minimal assistance required

## 2020-08-04 NOTE — PROGRESS NOTE BEHAVIORAL HEALTH - NSBHCHARTREVIEWINVESTIGATE_PSY_A_CORE FT
< from: 12 Lead ECG (07.23.20 @ 10:25) >      Ventricular Rate 80 BPM    Atrial Rate 80 BPM    P-R Interval 126 ms    QRS Duration 80 ms    Q-T Interval 370 ms    QTC Calculation(Bezet) 426 ms    P Axis 63 degrees    R Axis 69 degrees    T Axis 67 degrees    Diagnosis Line Normal sinus rhythm  Normal ECG    < end of copied text > < from: 12 Lead ECG (08.04.20 @ 09:05) >      Ventricular Rate 74 BPM    Atrial Rate 74 BPM    P-R Interval 140 ms    QRS Duration 80 ms    Q-T Interval 372 ms    QTC Calculation(Bezet) 412 ms    P Axis 59 degrees    R Axis 67 degrees    T Axis 74 degrees    Diagnosis Line Normal sinus rhythm  Normal ECG    < end of copied text >

## 2020-08-04 NOTE — SWALLOW BEDSIDE ASSESSMENT ADULT - SLP GENERAL OBSERVATIONS
In bed, stood to eat. Cooperative but disorganized. refused meat (ground)
in bed laying down; difficult to motivate
in room, standing, agitated; consuming peanut butter jelly sandwiches

## 2020-08-04 NOTE — SWALLOW BEDSIDE ASSESSMENT ADULT - SWALLOW EVAL: RECOMMENDED DIET
Dysphagia Diet II mechanical soft consistency with ground meat thin liquids; may have sandwiches such as peanut butter jelly, tuna salad, etc
Dysphagia Diet III Mechanical soft consistency with cut up meats thin liquids
Dysphagia Diet II mechanical soft consistency with ground meat thin liquids

## 2020-08-04 NOTE — SWALLOW BEDSIDE ASSESSMENT ADULT - NS ASR SWALLOW FINDINGS DISCUS
Patient/Patient unable to express understanding/Physician/Nursing
Patient/patient unable to expressed understand/Physician/Nursing
Patient/unable to get the MD on the phone/Nursing

## 2020-08-04 NOTE — SWALLOW BEDSIDE ASSESSMENT ADULT - ORAL PREPARATORY PHASE
Decreased mastication ability
reduced seal on cup; sips self regulated
Decreased mastication ability/Reduced oral grading

## 2020-08-04 NOTE — SWALLOW BEDSIDE ASSESSMENT ADULT - SPECIFY REASON(S)
coughing at meals per order, no specifics were listed patient is high risk as he talks while eating. and is impulsive

## 2020-08-04 NOTE — SWALLOW BEDSIDE ASSESSMENT ADULT - SWALLOW EVAL: RECOMMENDED FEEDING/EATING TECHNIQUES
oral hygiene
position upright (90 degrees)/small sips/bites/oral hygiene
oral hygiene/check mouth frequently for oral residue/pocketing

## 2020-08-04 NOTE — SWALLOW BEDSIDE ASSESSMENT ADULT - DIET PRIOR TO ADMI
Dysphagia diet I puree consistency Honey-thickened Liquids no objective assessment for this recommendation

## 2020-08-04 NOTE — PROGRESS NOTE BEHAVIORAL HEALTH - SUMMARY
69M domiciled at [place], PPH: schizophrenia, PMH: Parkinson disease, COPD, sent to [facility]'s ED for agitated behavior. Pt presents agitated, hostile, disorganised, appears to be responding to internal stimuli and paranoid about medications in context of poor adherence to medication regimen. Pt continues to refuse medications. TOO to be requested.    #Schizophrenia  -TOO granted (7/28/20); as per TOO, if pt refuses Haldol PO, then Haldol 2.5 mg IM to be given  -change depakene syrup 500 mg bid to depakote sprinkles 500 mg bid to increase adherence --> valproic acid level 33, change to depakote dr 500 mg bid (7/28) --> taper depakote dr 250 mg bid (7/30) d/t possible drug-induced hyponatremia  -continue haldol concentrate 2 mg bid --> titrate haldol concentrate 5 mg qhs (7/20) --> titrate haldol concentrate 2 mg daily, haldol concentrate 5 mg qhs (7/23) --> titrate haldol concentrate 5 mg bid (7/27) --> change to haldol 5 mg bid (8/4)  -start haldol decanoate 25 mg IM once (8/4)  -c/w cogentin 1 mg bid  -c/w melatonin 5 mg qhs  -start haldol concentrate 2.5 mg q8h prn for agitation  -c/w hydroxyzine 50 mg q6h prn for anxiety or/and insomnia    #HTN, Parkinsons, COPD  -c/w home medications  -medicine consulted  -PT/OT consult pending    #Poor PO Intake  -RD consulted; Ensure with meals  -Monitor weight every 3 days  -SLP consulted; advance diet as per notes    #Mild hyponatremia/mild hyperkalemia  -resolved; Na 138, K 4.5 (8/4)  -Na 131, K 5.2 (7/28) --> Na 130, K 4.8 (7/29) --> Na 128 (7/30) --> Na 128 (7/31) --> Na 136 (8/2)  -medicine consulted, recs and labs in note; repeat BMP in 2 days  -taper depakote 250 mg bid for possible drug-induced hyponatremia  -RD consulted, no concentrated potassium modifier added to diet    Dispo: [facility] 69M domiciled at [place], PPH: schizophrenia, PMH: Parkinson disease, COPD, sent to [facility]'s ED for agitated behavior. Pt presents agitated, hostile, disorganised, appears to be responding to internal stimuli and paranoid about medications in context of poor adherence to medication regimen. TOO obtained (7/27/20). On evaluation, pt presents disorganized and cooperative, less verbally abusive and belligerent towards staff, verbally redirectable and less isolative, medication compliant.     #Schizophrenia  -TOO granted; as per TOO, if pt refuses Haldol PO, then Haldol 2.5 mg IM to be given  -change depakene syrup 500 mg bid to depakote sprinkles 500 mg bid to increase adherence --> valproic acid level 33, change to depakote dr 500 mg bid (7/28) --> taper depakote dr 250 mg bid (7/30) d/t possible drug-induced hyponatremia  -continue haldol concentrate 2 mg bid --> titrate haldol concentrate 5 mg qhs (7/20) --> titrate haldol concentrate 2 mg daily, haldol concentrate 5 mg qhs (7/23) --> titrate haldol concentrate 5 mg bid (7/27) --> change to haldol 5 mg bid (8/4)  -start haldol decanoate 25 mg IM once (8/4)  -c/w cogentin 1 mg bid  -c/w melatonin 5 mg qhs  -start haldol concentrate 2.5 mg q8h prn for agitation  -c/w hydroxyzine 50 mg q6h prn for anxiety or/and insomnia    #HTN, Parkinsons, COPD  -c/w home medications  -medicine consulted  -PT/OT consult pending    #Poor PO Intake  -RD consulted; Ensure with meals  -Monitor weight every 3 days  -SLP consulted; advance diet as per notes    #Mild hyponatremia/mild hyperkalemia  -resolved; Na 138, K 4.5 (8/4)  -Na 131, K 5.2 (7/28) --> Na 130, K 4.8 (7/29) --> Na 128 (7/30) --> Na 128 (7/31) --> Na 136 (8/2)  -medicine consulted, recs and labs in note; repeat BMP in 2 days  -taper depakote 250 mg bid for possible drug-induced hyponatremia  -RD consulted, no concentrated potassium modifier added to diet    Dispo: [facility]

## 2020-08-05 RX ADMIN — HALOPERIDOL DECANOATE 5 MILLIGRAM(S): 100 INJECTION INTRAMUSCULAR at 08:31

## 2020-08-05 RX ADMIN — Medication 25 MILLIGRAM(S): at 20:23

## 2020-08-05 RX ADMIN — Medication 1 MILLIGRAM(S): at 20:23

## 2020-08-05 RX ADMIN — Medication 1 TABLET(S): at 08:32

## 2020-08-05 RX ADMIN — Medication 25 MILLIGRAM(S): at 08:32

## 2020-08-05 RX ADMIN — ALBUTEROL 2 PUFF(S): 90 AEROSOL, METERED ORAL at 11:20

## 2020-08-05 RX ADMIN — DIVALPROEX SODIUM 250 MILLIGRAM(S): 500 TABLET, DELAYED RELEASE ORAL at 08:32

## 2020-08-05 RX ADMIN — HALOPERIDOL DECANOATE 5 MILLIGRAM(S): 100 INJECTION INTRAMUSCULAR at 20:23

## 2020-08-05 RX ADMIN — Medication 1 MILLIGRAM(S): at 08:32

## 2020-08-05 RX ADMIN — BUDESONIDE AND FORMOTEROL FUMARATE DIHYDRATE 2 PUFF(S): 160; 4.5 AEROSOL RESPIRATORY (INHALATION) at 20:23

## 2020-08-05 RX ADMIN — BUDESONIDE AND FORMOTEROL FUMARATE DIHYDRATE 2 PUFF(S): 160; 4.5 AEROSOL RESPIRATORY (INHALATION) at 08:32

## 2020-08-05 RX ADMIN — ROPINIROLE 0.25 MILLIGRAM(S): 8 TABLET, FILM COATED, EXTENDED RELEASE ORAL at 08:32

## 2020-08-05 RX ADMIN — ALBUTEROL 2 PUFF(S): 90 AEROSOL, METERED ORAL at 16:37

## 2020-08-05 RX ADMIN — ROPINIROLE 0.25 MILLIGRAM(S): 8 TABLET, FILM COATED, EXTENDED RELEASE ORAL at 20:23

## 2020-08-05 RX ADMIN — PANTOPRAZOLE SODIUM 40 MILLIGRAM(S): 20 TABLET, DELAYED RELEASE ORAL at 08:32

## 2020-08-05 RX ADMIN — ALBUTEROL 2 PUFF(S): 90 AEROSOL, METERED ORAL at 01:16

## 2020-08-05 RX ADMIN — Medication 5 MILLIGRAM(S): at 20:23

## 2020-08-05 NOTE — PROGRESS NOTE BEHAVIORAL HEALTH - SUMMARY
69M domiciled at [place], PPH: schizophrenia, PMH: Parkinson disease, COPD, sent to [facility]'s ED for agitated behavior. Pt presents agitated, hostile, disorganised, appears to be responding to internal stimuli and paranoid about medications in context of poor adherence to medication regimen. TOO obtained (7/27/20). On evaluation, pt presents disorganized and cooperative, less verbally abusive and belligerent towards staff, verbally redirectable and less isolative, medication compliant. Started on haldol decanoate 8/4/2020.    #Schizophrenia  -TOO granted; as per TOO, if pt refuses Haldol PO, then Haldol 2.5 mg IM to be given  -change depakene syrup 500 mg bid to depakote sprinkles 500 mg bid to increase adherence --> valproic acid level 33, change to depakote dr 500 mg bid (7/28) --> taper depakote dr 250 mg bid (7/30) d/t possible drug-induced hyponatremia  -continue haldol concentrate 2 mg bid --> titrate haldol concentrate 5 mg qhs (7/20) --> titrate haldol concentrate 2 mg daily, haldol concentrate 5 mg qhs (7/23) --> titrate haldol concentrate 5 mg bid (7/27) --> change to haldol 5 mg bid (8/4)  -start haldol decanoate 25 mg IM once (8/4)  -c/w cogentin 1 mg bid  -c/w melatonin 5 mg qhs  -start haldol concentrate 2.5 mg q8h prn for agitation  -c/w hydroxyzine 50 mg q6h prn for anxiety or/and insomnia    #HTN, Parkinsons, COPD  -c/w home medications  -medicine consulted  -PT/OT consult pending    #Poor PO Intake  -RD consulted; Ensure with meals  -Monitor weight every 3 days  -SLP consulted; advance diet as per notes    #Mild hyponatremia/mild hyperkalemia  -resolved; Na 138, K 4.5 (8/4)  -Na 131, K 5.2 (7/28) --> Na 130, K 4.8 (7/29) --> Na 128 (7/30) --> Na 128 (7/31) --> Na 136 (8/2)  -medicine consulted, recs and labs in note; repeat BMP in 2 days  -taper depakote 250 mg bid for possible drug-induced hyponatremia  -RD consulted, no concentrated potassium modifier added to diet    Dispo: [facility]

## 2020-08-05 NOTE — PROGRESS NOTE BEHAVIORAL HEALTH - NSBHFUPINTERVALHXFT_PSY_A_CORE
Pt seen and evaluated, chart reviewed.  As per nursing staff, pt has been compliant with medications since TOO obtained (7/27/20), less verbally abusive and belligerent towards staff, verbally redirectable and less isolative. On evaluation, pt remains disorganized at times and difficult to understand, cooperative; as per collateral, Mecca (sister), pt's baseline. Pt endorses good mood, sleep and appetite, endorses regular BM (last yesterday) refusing need for stool softeners. Pt denies AVH, however is observed by staff to talk to self in room at times, pt does not appear in distress. Pt in good behavioral control. Denies SI/HI, intent and plan. Na 131 (7/28) --> Na 128 (7/31) --> Na 138 (8/4). Pt noted to have slight increased WOB, observed by staff with increased coughing during eating; medicine consulted, rec to continue to monitor, SpO2 to be added to VS; SLP consulted. Pt educated on medication regimen, verbalizes understanding and states he will take his medications.

## 2020-08-05 NOTE — PROGRESS NOTE ADULT - PROBLEM SELECTOR PLAN 1
continue present treatment as per psych plan as reviewed  Medically stable with no new changes in treatment  will continue to monitor medical status while being treated on psych  low na resolved  labs stable

## 2020-08-05 NOTE — PROGRESS NOTE ADULT - REASON FOR ADMISSION
70 YEARS OLD MALE COME FROM [facility] FOR PSYCHIATRIC ADMISSION .
70 YEARS OLD MALE COME FROM [facility] FOR PSYCHIATRIC ADMISSION .
70 YEARS OLD MALE COME FROM [facility] FOR PSYCHIATRIC ADMISSION .
70 YEARS OLD MALE COME FROM [facility] FOR PSYCHIATRIC ADMISSION .
70 YEARS OLD MALE COME FROM [facility] FOR PSYCHIATRIC ADMISSION .
70 YEARS OLD MALE COME FROM [facility] FOR PSYCHIATRIC ADMISSION .
70 YEARS OLD MALE COME FROM [facility] FOR PSYCHIATRIC ADMISSION .
70 YEARS OLD MALE COME FROM [facility] FOR PSYCHIATRIC ADMISSION .
70 YEARS OLD MALE COME FROM [facility] FOR PSYCHIATRIC ADMISSION .

## 2020-08-05 NOTE — CHART NOTE - NSCHARTNOTEFT_GEN_A_CORE
Writer met with patient; Pt assessed writer provided support and education. Treatment plan and discharge plan discussed. Patient is sporadic with compliance to treatment and unit protocol. Active Treatment Over Objection Order in file. Patient contracts for safety on the unit. Patient is oriented on all spheres, denies current suicidal and or homicidal ideations. Patient denies audio visual hallucinations but presents as internally preoccupied. Patient is in good behavioral control at this time. Activities of daily living are within normal limits. Patient remains with potential for agitation and is often times rude to staff.  Patient to remain on unit until cleared by attending for discharge. Patient referred for Health Home services. Patient will return to [facility] when stable for discharge.     Mental Status Exam:    Mood – Potential for agitation  Sleep - Fair  Appetite - Good  ADLs - Fair  Thought Process – Linear    Observation – [id number]    No barriers to discharge identified at this time.

## 2020-08-05 NOTE — PROGRESS NOTE BEHAVIORAL HEALTH - NSBHCHARTREVIEWLAB_PSY_A_CORE FT
Complete Blood Count in AM (08.04.20 @ 08:00)    Nucleated RBC: 0 /100 WBCs    WBC Count: 6.42 K/uL    RBC Count: 3.49 M/uL    Hemoglobin: 11.3 g/dL    Hematocrit: 34.9 %    Mean Cell Volume: 100.0 fL    Mean Cell Hemoglobin: 32.4 pg    Mean Cell Hemoglobin Conc: 32.4 g/dL    Red Cell Distrib Width: 14.5 %    Platelet Count - Automated: 387 K/uL  Comprehensive Metabolic Panel in AM (08.04.20 @ 08:00)    Sodium, Serum: 138 mmol/L    Potassium, Serum: 4.5 mmol/L    Chloride, Serum: 98 mmol/L    Carbon Dioxide, Serum: 33 mmol/L    Anion Gap, Serum: 7 mmol/L    Blood Urea Nitrogen, Serum: 28 mg/dL    Creatinine, Serum: 0.7 mg/dL    Glucose, Serum: 97 mg/dL    Calcium, Total Serum: 9.2 mg/dL    Protein Total, Serum: 6.4 g/dL    Albumin, Serum: 3.6 g/dL    Bilirubin Total, Serum: 0.3 mg/dL    Alkaline Phosphatase, Serum: 54 U/L    Aspartate Aminotransferase (AST/SGOT): 30 U/L    Alanine Aminotransferase (ALT/SGPT): 20 U/L    eGFR if Non : 96    eGFR if : 112 mL/min/1.73M2  A1C with Estimated Average Glucose in AM (07.10.20 @ 04:30)    A1C with Estimated Average Glucose Result: 4.9  Thyroid Stimulating Hormone, Serum in AM (07.16.20 @ 07:02)    Thyroid Stimulating Hormone, Serum: 2.88 uIU/mL

## 2020-08-05 NOTE — PROGRESS NOTE BEHAVIORAL HEALTH - NSBHCHARTREVIEWINVESTIGATE_PSY_A_CORE FT
< from: 12 Lead ECG (08.04.20 @ 09:05) >      Ventricular Rate 74 BPM    Atrial Rate 74 BPM    P-R Interval 140 ms    QRS Duration 80 ms    Q-T Interval 372 ms    QTC Calculation(Bezet) 412 ms    P Axis 59 degrees    R Axis 67 degrees    T Axis 74 degrees    Diagnosis Line Normal sinus rhythm  Normal ECG    < end of copied text >

## 2020-08-05 NOTE — PROGRESS NOTE ADULT - SUBJECTIVE AND OBJECTIVE BOX
pt stable alert in NAD  no new complaints    DEPRESSION  ^DEPRESSION  Yes  Handoff  HTN (hypertension)  Epilepsy  BPH (benign prostatic hyperplasia)  COPD, moderate  Parkinsonism  Hyponatremia  Essential hypertension  Parkinsonism, unspecified Parkinsonism type  Schizophrenia, unspecified type  Depression    HEALTH ISSUES - PROBLEM Dx:  Hyponatremia: Hyponatremia  Essential hypertension: Essential hypertension  Parkinsonism, unspecified Parkinsonism type: Parkinsonism, unspecified Parkinsonism type  Schizophrenia, unspecified type  Depression: Depression        PAST MEDICAL & SURGICAL HISTORY:  HTN (hypertension)  Epilepsy  BPH (benign prostatic hyperplasia)  COPD, moderate  Parkinsonism    No Known Allergies      FAMILY HISTORY:      ALBUTerol    90 MICROgram(s) HFA Inhaler 2 Puff(s) Inhalation every 6 hours PRN  benztropine 1 milliGRAM(s) Oral two times a day  budesonide  80 MICROgram(s)/formoterol 4.5 MICROgram(s) Inhaler 2 Puff(s) Inhalation two times a day  calcium carbonate    500 mG (Tums) Chewable 1 Tablet(s) Chew daily  diVALproex  milliGRAM(s) Oral two times a day  haloperidol     Tablet 5 milliGRAM(s) Oral two times a day  haloperidol    Injectable 2.5 milliGRAM(s) IntraMuscular two times a day PRN  hydrOXYzine hydrochloride 50 milliGRAM(s) Oral every 6 hours PRN  melatonin. 5 milliGRAM(s) Oral at bedtime  metoprolol tartrate 25 milliGRAM(s) Oral every 12 hours  pantoprazole    Tablet 40 milliGRAM(s) Oral before breakfast  rOPINIRole 0.25 milliGRAM(s) Oral every 12 hours  senna 2 Tablet(s) Oral at bedtime PRN      T(C): 36.3 (08-05-20 @ 05:40), Max: 36.3 (08-05-20 @ 05:40)  HR: 68 (08-05-20 @ 05:40) (68 - 68)  BP: 140/67 (08-05-20 @ 05:40) (140/67 - 140/67)  RR: 18 (08-05-20 @ 05:40) (18 - 18)  SpO2: --    PE;  general:  no acute cahgnes ntoed in nad    Lungs:    Heart:    EXT:    Neuro:  alert nod eficits      11.3  34.9  6.42  28  0.7  4.5  97      08-04    138  |  98  |  28<H>  ----------------------------<  97  4.5   |  33<H>  |  0.7    Ca    9.2      04 Aug 2020 08:00    TPro  6.4  /  Alb  3.6  /  TBili  0.3  /  DBili  x   /  AST  30  /  ALT  20  /  AlkPhos  54  08-04      LIVER FUNCTIONS - ( 04 Aug 2020 08:00 )  Alb: 3.6 g/dL / Pro: 6.4 g/dL / ALK PHOS: 54 U/L / ALT: 20 U/L / AST: 30 U/L / GGT: x                                   11.3   6.42  )-----------( 387      ( 04 Aug 2020 08:00 )             34.9       CAPILLARY BLOOD GLUCOSE

## 2020-08-06 RX ORDER — SENNA PLUS 8.6 MG/1
2 TABLET ORAL AT BEDTIME
Refills: 0 | Status: DISCONTINUED | OUTPATIENT
Start: 2020-08-06 | End: 2020-08-19

## 2020-08-06 RX ADMIN — Medication 1 MILLIGRAM(S): at 08:16

## 2020-08-06 RX ADMIN — ROPINIROLE 0.25 MILLIGRAM(S): 8 TABLET, FILM COATED, EXTENDED RELEASE ORAL at 08:16

## 2020-08-06 RX ADMIN — BUDESONIDE AND FORMOTEROL FUMARATE DIHYDRATE 2 PUFF(S): 160; 4.5 AEROSOL RESPIRATORY (INHALATION) at 20:10

## 2020-08-06 RX ADMIN — Medication 1 TABLET(S): at 08:16

## 2020-08-06 RX ADMIN — Medication 25 MILLIGRAM(S): at 08:16

## 2020-08-06 RX ADMIN — DIVALPROEX SODIUM 250 MILLIGRAM(S): 500 TABLET, DELAYED RELEASE ORAL at 08:16

## 2020-08-06 RX ADMIN — HALOPERIDOL DECANOATE 5 MILLIGRAM(S): 100 INJECTION INTRAMUSCULAR at 08:16

## 2020-08-06 RX ADMIN — BUDESONIDE AND FORMOTEROL FUMARATE DIHYDRATE 2 PUFF(S): 160; 4.5 AEROSOL RESPIRATORY (INHALATION) at 08:14

## 2020-08-06 RX ADMIN — PANTOPRAZOLE SODIUM 40 MILLIGRAM(S): 20 TABLET, DELAYED RELEASE ORAL at 08:16

## 2020-08-06 NOTE — PROGRESS NOTE BEHAVIORAL HEALTH - SUMMARY
69M domiciled at [place], PPH: schizophrenia, PMH: Parkinson disease, COPD, sent to [facility]'s ED for agitated behavior. Pt presents agitated, hostile, disorganised, appears to be responding to internal stimuli and paranoid about medications in context of poor adherence to medication regimen. TOO obtained (7/27/20). On evaluation, pt presents disorganized and cooperative, less verbally abusive and belligerent towards staff, verbally redirectable and less isolative, medication compliant. Started on haldol decanoate 8/4/2020.    #Schizophrenia  -TOO granted; as per TOO, if pt refuses Haldol PO, then Haldol 2.5 mg IM to be given  -change depakene syrup 500 mg bid to depakote sprinkles 500 mg bid to increase adherence --> valproic acid level 33, change to depakote dr 500 mg bid (7/28) --> taper depakote dr 250 mg bid (7/30) d/t possible drug-induced hyponatremia  -continue haldol concentrate 2 mg bid --> titrate haldol concentrate 5 mg qhs (7/20) --> titrate haldol concentrate 2 mg daily, haldol concentrate 5 mg qhs (7/23) --> titrate haldol concentrate 5 mg bid (7/27) --> change to haldol 5 mg bid (8/4)  -start haldol decanoate 25 mg IM once (8/4)  -c/w cogentin 1 mg bid  -c/w melatonin 5 mg qhs  -start haldol concentrate 2.5 mg q8h prn for agitation  -c/w hydroxyzine 50 mg q6h prn for anxiety or/and insomnia    #HTN, Parkinsons, COPD  -c/w home medications  -medicine consulted  -PT/OT consult pending    #Poor PO Intake  -RD consulted; Ensure with meals  -Monitor weight every 3 days  -SLP consulted; advance diet as per notes    #Mild hyponatremia/mild hyperkalemia  -resolved; Na 138, K 4.5 (8/4)  -Na 131, K 5.2 (7/28) --> Na 130, K 4.8 (7/29) --> Na 128 (7/30) --> Na 128 (7/31) --> Na 136 (8/2)  -medicine consulted, recs and labs in note; repeat BMP in 2 days  -taper depakote 250 mg bid for possible drug-induced hyponatremia  -RD consulted, no concentrated potassium modifier added to diet    Dispo: [facility]

## 2020-08-06 NOTE — PROGRESS NOTE BEHAVIORAL HEALTH - NSBHFUPINTERVALHXFT_PSY_A_CORE
Pt seen and evaluated during treatment team, chart reviewed. As per nursing staff, pt has been compliant with medications since TOO obtained (7/27/20), less verbally abusive and belligerent towards staff, verbally redirectable and less isolative. On evaluation, pt remains disorganized at times and difficult to understand, cooperative; as per collateral, Mecca (sister), pt's baseline. Pt endorses good mood, sleep and appetite, endorses regular BM (last yesterday). Pt denies AVH, however is observed by staff to talk to self in room at times, pt does not appear in distress. Pt in good behavioral control. Denies SI/HI, intent and plan. Na 131 (7/28) --> Na 128 (7/31) --> Na 138 (8/4). Pt noted to have slight increased WOB and coughing during eating; medicine consulted, rec to continue to monitor, SpO2 to be added to VS; SLP consulted. Pt educated on medication regimen, verbalizes understanding.

## 2020-08-06 NOTE — CHART NOTE - NSCHARTNOTEFT_GEN_A_CORE
Registered Dietitian Limited Follow-Up:    Pt receives a Dysphagia 2 Mechanical Soft diet with thin liquids + no concentrated potassium + Ensure Enlive q8hrs + Ensure Pudding q24hrs. Consumes 100% of most meals, indicating a good appetite & po tolerance. Latest wt 50.4 kg (8/1). Current BMI 17.9. Previous wt 41.8 kg (7/9) was a BMI 14.9. Wt gain observed is desirable to achieve BMI >18.5. Labs/meds reviewed. No new preferences reported at this time. Appears to be tolerating current nutrition regimen at this time.

## 2020-08-07 RX ORDER — VALPROIC ACID (AS SODIUM SALT) 250 MG/5ML
250 SOLUTION, ORAL ORAL
Refills: 0 | Status: DISCONTINUED | OUTPATIENT
Start: 2020-08-07 | End: 2020-08-12

## 2020-08-07 RX ORDER — HALOPERIDOL DECANOATE 100 MG/ML
5 INJECTION INTRAMUSCULAR
Refills: 0 | Status: DISCONTINUED | OUTPATIENT
Start: 2020-08-07 | End: 2020-08-07

## 2020-08-07 RX ORDER — HALOPERIDOL DECANOATE 100 MG/ML
5 INJECTION INTRAMUSCULAR
Refills: 0 | Status: DISCONTINUED | OUTPATIENT
Start: 2020-08-07 | End: 2020-08-14

## 2020-08-07 RX ORDER — HALOPERIDOL DECANOATE 100 MG/ML
25 INJECTION INTRAMUSCULAR ONCE
Refills: 0 | Status: COMPLETED | OUTPATIENT
Start: 2020-08-07 | End: 2020-08-07

## 2020-08-07 RX ORDER — VALPROIC ACID (AS SODIUM SALT) 250 MG/5ML
250 SOLUTION, ORAL ORAL
Refills: 0 | Status: DISCONTINUED | OUTPATIENT
Start: 2020-08-07 | End: 2020-08-07

## 2020-08-07 RX ORDER — BENZTROPINE MESYLATE 1 MG
0.5 TABLET ORAL
Refills: 0 | Status: DISCONTINUED | OUTPATIENT
Start: 2020-08-07 | End: 2020-08-19

## 2020-08-07 RX ORDER — BENZTROPINE MESYLATE 1 MG
1 TABLET ORAL
Refills: 0 | Status: DISCONTINUED | OUTPATIENT
Start: 2020-08-07 | End: 2020-08-19

## 2020-08-07 RX ADMIN — HALOPERIDOL DECANOATE 5 MILLIGRAM(S): 100 INJECTION INTRAMUSCULAR at 20:04

## 2020-08-07 RX ADMIN — SENNA PLUS 2 TABLET(S): 8.6 TABLET ORAL at 20:06

## 2020-08-07 RX ADMIN — BUDESONIDE AND FORMOTEROL FUMARATE DIHYDRATE 2 PUFF(S): 160; 4.5 AEROSOL RESPIRATORY (INHALATION) at 10:58

## 2020-08-07 RX ADMIN — Medication 25 MILLIGRAM(S): at 10:57

## 2020-08-07 RX ADMIN — HALOPERIDOL DECANOATE 25 MILLIGRAM(S): 100 INJECTION INTRAMUSCULAR at 18:30

## 2020-08-07 RX ADMIN — ROPINIROLE 0.25 MILLIGRAM(S): 8 TABLET, FILM COATED, EXTENDED RELEASE ORAL at 10:57

## 2020-08-07 RX ADMIN — Medication 25 MILLIGRAM(S): at 20:05

## 2020-08-07 RX ADMIN — Medication 1 MILLIGRAM(S): at 20:03

## 2020-08-07 RX ADMIN — ALBUTEROL 2 PUFF(S): 90 AEROSOL, METERED ORAL at 16:33

## 2020-08-07 RX ADMIN — ALBUTEROL 2 PUFF(S): 90 AEROSOL, METERED ORAL at 03:54

## 2020-08-07 RX ADMIN — Medication 5 MILLIGRAM(S): at 20:04

## 2020-08-07 RX ADMIN — Medication 1 TABLET(S): at 10:57

## 2020-08-07 RX ADMIN — Medication 1 MILLIGRAM(S): at 10:57

## 2020-08-07 RX ADMIN — ROPINIROLE 0.25 MILLIGRAM(S): 8 TABLET, FILM COATED, EXTENDED RELEASE ORAL at 20:05

## 2020-08-07 RX ADMIN — Medication 250 MILLIGRAM(S): at 20:06

## 2020-08-07 RX ADMIN — PANTOPRAZOLE SODIUM 40 MILLIGRAM(S): 20 TABLET, DELAYED RELEASE ORAL at 10:57

## 2020-08-07 RX ADMIN — HALOPERIDOL DECANOATE 5 MILLIGRAM(S): 100 INJECTION INTRAMUSCULAR at 10:56

## 2020-08-07 NOTE — CHART NOTE - NSCHARTNOTEFT_GEN_A_CORE
Writer met with patient; Pt assessed writer provided support and education. Treatment plan and discharge plan discussed. Patient is not compliant medications, treatment and unit protocol despite Treatment Over Objection Order. Patient remains agitated and with potential for unpredictable behavior. Patient encouraged to take medications and participate in blood draws. Patient refuses and insists that treatment team leave the room.        Mental Status Exam:    Mood – Low  Sleep - Fair  Appetite - Good  ADLs - Fair  Thought Process – Linear    Observation – [id number]    No barriers to discharge identified at this time. Plan is for referral to patient’s private community psychiatrist. Writer met with patient; Pt assessed writer provided support and education. Treatment plan and discharge plan discussed. Patient is not compliant medications, treatment and unit protocol despite Treatment Over Objection Order. Patient remains agitated and with potential for unpredictable behavior. Patient encouraged to take medications and participate in blood draws. Patient refuses and insists that treatment team leave the room.    Writer called [facility], [phone]  message left for Solange RN and nursing supervisor Betty ([phone]). Plan is for patient to return to [facility] [facility] when stable.   Writer left message for daughter as well to check in and provide information Mecca        Mental Status Exam:    Mood – Low  Sleep - Fair  Appetite - Good  ADLs - Fair  Thought Process – Linear    Observation – [id number]    No barriers to discharge identified at this time. Plan is for referral to patient’s private community psychiatrist. Writer met with patient; Pt assessed writer provided support and education. Treatment plan and discharge plan discussed. Patient is not compliant medications, treatment and unit protocol despite Treatment Over Objection Order. Patient remains agitated and with potential for unpredictable behavior. Patient encouraged to take medications and participate in blood draws. Patient refuses and insists that treatment team leave the room. Patient denies SI HI and AVH. Patient reports good appetite and good sleep. Activities of daily living are within normal limits.     Writer called [place], [phone]  message left for Solange TORRES and nursing supervisor Betty ([phone]). Plan is for patient to return to [place] when stable.   Writer left message for daughter as well to check in and provide information Mecca Cottrell [phone] however number not in service at this time.         Mental Status Exam:    Mood – Labile  Sleep - Good  Appetite - Good  ADLs - Within normal limits   Thought Process – Linear    Observation – [id number]    No barriers to discharge identified at this time. Plan is for referral to patient’s private community psychiatrist. Writer met with patient; Pt assessed writer provided support and education. Treatment plan and discharge plan discussed. Patient is not compliant medications, treatment and unit protocol despite Treatment Over Objection Order. Patient remains agitated and with potential for unpredictable behavior. Patient encouraged to take medications and participate in blood draws. Patient refuses and insists that treatment team leave the room. Patient denies SI HI and AVH. Patient reports good appetite and good sleep. Activities of daily living are within normal limits.     Writer called [facility], [phone]  message left for Solange TORRES and nursing supervisor Betty ([phone]). Plan is for patient to return to [facility] [place] when stable.   Writer left message for daughter as well to check in and provide information Mecca Cottrell [phone] however number not in service at this time.         Mental Status Exam:    Mood – Labile  Sleep - Good  Appetite - Good  ADLs - Within normal limits   Thought Process – Linear    Observation – [id number]    No barriers to discharge identified at this time.

## 2020-08-07 NOTE — PROGRESS NOTE BEHAVIORAL HEALTH - NSBHFUPINTERVALHXFT_PSY_A_CORE
Pt seen and evaluated, chart reviewed. As per nursing staff, pt has been compliant with medications since TOO obtained (7/27/20), however refused PM medications last night. Pt observed to be less verbally abusive and belligerent towards staff, verbally redirectable and less isolative. On evaluation, pt remains disorganized at times and difficult to understand, cooperative; as per collateral, Mecca (sister), pt's baseline. Pt endorses good mood, sleep and appetite, endorses regular BM (last yesterday). Pt denies AVH, however is observed by staff to talk to self in room at times, pt does not appear in distress. Pt in good behavioral control. Denies SI/HI, intent and plan. Na 131 (7/28) --> Na 128 (7/31) --> Na 138 (8/4). Pt noted to have slight increased WOB and coughing during eating; medicine consulted, rec to continue to monitor, SpO2 to be added to VS; SLP consulted. Pt educated on medication regimen, pt verbalizes understanding.

## 2020-08-07 NOTE — PROGRESS NOTE BEHAVIORAL HEALTH - SUMMARY
69M domiciled at [place], PPH: schizophrenia, PMH: Parkinson disease, COPD, sent to [facility]'s ED for agitated behavior. Pt presents agitated, hostile, disorganised, appears to be responding to internal stimuli and paranoid about medications in context of poor adherence to medication regimen. TOO obtained (7/27/20). On evaluation, pt presents disorganized and cooperative, less verbally abusive and belligerent towards staff, verbally redirectable and less isolative, medication compliant. Started on haldol decanoate 8/4/2020.    #Schizophrenia  -TOO granted; as per TOO, if pt refuses Haldol PO, then Haldol 2.5 mg IM to be given  -change depakene syrup 500 mg bid to depakote sprinkles 500 mg bid to increase adherence --> valproic acid level 33, change to depakote dr 500 mg bid (7/28) --> taper depakote dr 250 mg bid (7/30) d/t possible drug-induced hyponatremia --> change formulation to depakene 250 mg bid to increase adherence (8/7)  -continue haldol concentrate 2 mg bid --> titrate haldol concentrate 5 mg qhs (7/20) --> titrate haldol concentrate 2 mg daily, haldol concentrate 5 mg qhs (7/23) --> titrate haldol concentrate 5 mg bid (7/27) --> change to haldol 5 mg bid (8/4)  -start haldol decanoate 25 mg IM once (8/4) --> give haldol decanoate 25 mg IM once (8/7)  -c/w cogentin 1 mg bid  -c/w melatonin 5 mg qhs  -start haldol concentrate 2.5 mg q8h prn for agitation  -c/w hydroxyzine 50 mg q6h prn for anxiety or/and insomnia    #HTN, Parkinsons, COPD  -c/w home medications  -medicine consulted  -PT/OT consult pending    #Poor PO Intake  -improved  -RD consulted; Ensure with meals  -Monitor weight every 3 days  -SLP consulted; advance diet as per notes    #Mild hyponatremia/mild hyperkalemia  -resolved; Na 138, K 4.5 (8/4)  -Na 131, K 5.2 (7/28) --> Na 130, K 4.8 (7/29) --> Na 128 (7/30) --> Na 128 (7/31) --> Na 136 (8/2)  -medicine consulted, recs and labs in note; repeat BMP in 2 days  -taper depakote 250 mg bid for possible drug-induced hyponatremia  -RD consulted, no concentrated potassium modifier added to diet    Dispo: [facility]

## 2020-08-08 RX ADMIN — Medication 50 MILLIGRAM(S): at 20:00

## 2020-08-08 RX ADMIN — Medication 250 MILLIGRAM(S): at 20:02

## 2020-08-08 RX ADMIN — Medication 5 MILLIGRAM(S): at 20:01

## 2020-08-08 RX ADMIN — BUDESONIDE AND FORMOTEROL FUMARATE DIHYDRATE 2 PUFF(S): 160; 4.5 AEROSOL RESPIRATORY (INHALATION) at 09:56

## 2020-08-08 RX ADMIN — Medication 1 TABLET(S): at 09:56

## 2020-08-08 RX ADMIN — Medication 1 MILLIGRAM(S): at 20:01

## 2020-08-08 RX ADMIN — ROPINIROLE 0.25 MILLIGRAM(S): 8 TABLET, FILM COATED, EXTENDED RELEASE ORAL at 20:01

## 2020-08-08 RX ADMIN — HALOPERIDOL DECANOATE 5 MILLIGRAM(S): 100 INJECTION INTRAMUSCULAR at 09:56

## 2020-08-08 RX ADMIN — SENNA PLUS 2 TABLET(S): 8.6 TABLET ORAL at 20:01

## 2020-08-08 RX ADMIN — Medication 25 MILLIGRAM(S): at 20:01

## 2020-08-08 RX ADMIN — HALOPERIDOL DECANOATE 5 MILLIGRAM(S): 100 INJECTION INTRAMUSCULAR at 20:01

## 2020-08-08 RX ADMIN — BUDESONIDE AND FORMOTEROL FUMARATE DIHYDRATE 2 PUFF(S): 160; 4.5 AEROSOL RESPIRATORY (INHALATION) at 20:02

## 2020-08-09 RX ADMIN — Medication 5 MILLIGRAM(S): at 20:00

## 2020-08-09 RX ADMIN — Medication 25 MILLIGRAM(S): at 20:00

## 2020-08-09 RX ADMIN — HALOPERIDOL DECANOATE 5 MILLIGRAM(S): 100 INJECTION INTRAMUSCULAR at 20:00

## 2020-08-09 RX ADMIN — SENNA PLUS 2 TABLET(S): 8.6 TABLET ORAL at 20:00

## 2020-08-09 RX ADMIN — ROPINIROLE 0.25 MILLIGRAM(S): 8 TABLET, FILM COATED, EXTENDED RELEASE ORAL at 07:52

## 2020-08-09 RX ADMIN — BUDESONIDE AND FORMOTEROL FUMARATE DIHYDRATE 2 PUFF(S): 160; 4.5 AEROSOL RESPIRATORY (INHALATION) at 07:53

## 2020-08-09 RX ADMIN — Medication 1 MILLIGRAM(S): at 07:52

## 2020-08-09 RX ADMIN — Medication 1 TABLET(S): at 07:52

## 2020-08-09 RX ADMIN — ROPINIROLE 0.25 MILLIGRAM(S): 8 TABLET, FILM COATED, EXTENDED RELEASE ORAL at 20:01

## 2020-08-09 RX ADMIN — Medication 250 MILLIGRAM(S): at 20:02

## 2020-08-09 RX ADMIN — ALBUTEROL 2 PUFF(S): 90 AEROSOL, METERED ORAL at 19:32

## 2020-08-09 RX ADMIN — Medication 1 MILLIGRAM(S): at 20:00

## 2020-08-09 RX ADMIN — PANTOPRAZOLE SODIUM 40 MILLIGRAM(S): 20 TABLET, DELAYED RELEASE ORAL at 07:52

## 2020-08-09 RX ADMIN — BUDESONIDE AND FORMOTEROL FUMARATE DIHYDRATE 2 PUFF(S): 160; 4.5 AEROSOL RESPIRATORY (INHALATION) at 20:01

## 2020-08-09 RX ADMIN — HALOPERIDOL DECANOATE 5 MILLIGRAM(S): 100 INJECTION INTRAMUSCULAR at 07:52

## 2020-08-09 RX ADMIN — Medication 25 MILLIGRAM(S): at 07:52

## 2020-08-10 LAB
ALBUMIN SERPL ELPH-MCNC: 3.8 G/DL — SIGNIFICANT CHANGE UP (ref 3.5–5.2)
ALP SERPL-CCNC: 68 U/L — SIGNIFICANT CHANGE UP (ref 30–115)
ALT FLD-CCNC: 27 U/L — SIGNIFICANT CHANGE UP (ref 0–41)
ANION GAP SERPL CALC-SCNC: 8 MMOL/L — SIGNIFICANT CHANGE UP (ref 7–14)
AST SERPL-CCNC: 41 U/L — SIGNIFICANT CHANGE UP (ref 0–41)
BILIRUB SERPL-MCNC: 0.4 MG/DL — SIGNIFICANT CHANGE UP (ref 0.2–1.2)
BUN SERPL-MCNC: 31 MG/DL — HIGH (ref 10–20)
CALCIUM SERPL-MCNC: 9.9 MG/DL — SIGNIFICANT CHANGE UP (ref 8.5–10.1)
CHLORIDE SERPL-SCNC: 95 MMOL/L — LOW (ref 98–110)
CO2 SERPL-SCNC: 34 MMOL/L — HIGH (ref 17–32)
CREAT SERPL-MCNC: 0.7 MG/DL — SIGNIFICANT CHANGE UP (ref 0.7–1.5)
GLUCOSE SERPL-MCNC: 87 MG/DL — SIGNIFICANT CHANGE UP (ref 70–99)
HCT VFR BLD CALC: 41.3 % — LOW (ref 42–52)
HGB BLD-MCNC: 13.1 G/DL — LOW (ref 14–18)
MCHC RBC-ENTMCNC: 31.5 PG — HIGH (ref 27–31)
MCHC RBC-ENTMCNC: 31.7 G/DL — LOW (ref 32–37)
MCV RBC AUTO: 99.3 FL — HIGH (ref 80–94)
NRBC # BLD: 0 /100 WBCS — SIGNIFICANT CHANGE UP (ref 0–0)
PLATELET # BLD AUTO: 345 K/UL — SIGNIFICANT CHANGE UP (ref 130–400)
POTASSIUM SERPL-MCNC: 5.8 MMOL/L — HIGH (ref 3.5–5)
POTASSIUM SERPL-SCNC: 5.8 MMOL/L — HIGH (ref 3.5–5)
PROT SERPL-MCNC: 6.9 G/DL — SIGNIFICANT CHANGE UP (ref 6–8)
RBC # BLD: 4.16 M/UL — LOW (ref 4.7–6.1)
RBC # FLD: 13.8 % — SIGNIFICANT CHANGE UP (ref 11.5–14.5)
SODIUM SERPL-SCNC: 137 MMOL/L — SIGNIFICANT CHANGE UP (ref 135–146)
WBC # BLD: 7.98 K/UL — SIGNIFICANT CHANGE UP (ref 4.8–10.8)
WBC # FLD AUTO: 7.98 K/UL — SIGNIFICANT CHANGE UP (ref 4.8–10.8)

## 2020-08-10 RX ORDER — LACTULOSE 10 G/15ML
10 SOLUTION ORAL ONCE
Refills: 0 | Status: COMPLETED | OUTPATIENT
Start: 2020-08-10 | End: 2020-08-10

## 2020-08-10 RX ADMIN — Medication 1 MILLIGRAM(S): at 20:35

## 2020-08-10 RX ADMIN — ROPINIROLE 0.25 MILLIGRAM(S): 8 TABLET, FILM COATED, EXTENDED RELEASE ORAL at 11:17

## 2020-08-10 RX ADMIN — Medication 5 MILLIGRAM(S): at 20:35

## 2020-08-10 RX ADMIN — BUDESONIDE AND FORMOTEROL FUMARATE DIHYDRATE 2 PUFF(S): 160; 4.5 AEROSOL RESPIRATORY (INHALATION) at 07:16

## 2020-08-10 RX ADMIN — SENNA PLUS 2 TABLET(S): 8.6 TABLET ORAL at 20:35

## 2020-08-10 RX ADMIN — Medication 25 MILLIGRAM(S): at 20:35

## 2020-08-10 RX ADMIN — ROPINIROLE 0.25 MILLIGRAM(S): 8 TABLET, FILM COATED, EXTENDED RELEASE ORAL at 20:35

## 2020-08-10 RX ADMIN — PANTOPRAZOLE SODIUM 40 MILLIGRAM(S): 20 TABLET, DELAYED RELEASE ORAL at 07:17

## 2020-08-10 RX ADMIN — HALOPERIDOL DECANOATE 5 MILLIGRAM(S): 100 INJECTION INTRAMUSCULAR at 20:35

## 2020-08-10 RX ADMIN — Medication 1 TABLET(S): at 08:01

## 2020-08-10 RX ADMIN — Medication 25 MILLIGRAM(S): at 08:01

## 2020-08-10 RX ADMIN — HALOPERIDOL DECANOATE 5 MILLIGRAM(S): 100 INJECTION INTRAMUSCULAR at 08:01

## 2020-08-10 RX ADMIN — Medication 1 MILLIGRAM(S): at 08:01

## 2020-08-10 RX ADMIN — LACTULOSE 10 GRAM(S): 10 SOLUTION ORAL at 20:33

## 2020-08-10 RX ADMIN — Medication 250 MILLIGRAM(S): at 08:02

## 2020-08-10 RX ADMIN — BUDESONIDE AND FORMOTEROL FUMARATE DIHYDRATE 2 PUFF(S): 160; 4.5 AEROSOL RESPIRATORY (INHALATION) at 19:33

## 2020-08-10 NOTE — CHART NOTE - NSCHARTNOTEFT_GEN_A_CORE
Patient remains on unit for treatment safety and observation. Pt is compliant with medication and laboratory at this juncture under treatment over objection order. Patient reports improved mood, good sleep and good appetite. Patient engages with treatment team and shows us his ensure, indicating that he is eating and enjoying. Patient is in good behavioral control. Patient reports that sometimes he raises his voice when angry but that he would never hurt anyone. Patient encouraged to utilize coping skills when angry as opposed to yelling. Patient expressed understanding and willingness. Patient denies SI HI and AVH. Patient contracts for safety. Patient in good behavioral control. Writer left a message for Betty RN Supervisor @ [facility]. No return call as of yet.     Mental Status Exam:    Mood – Neutral  Sleep - Good  Appetite - Good  ADLs - Within normal limits   Thought Process – Linear    Observation – [id number]    No barriers to discharge identified at this time. Plan is for patient to return to [facility].     At this time patient is not psychiatrically stable for discharge. Patient remains on unit for treatment safety and observation. Pt is compliant with medication and laboratory at this juncture under treatment over objection order. Patient reports improved mood, good sleep and good appetite. Patient engages with treatment team and shows us his ensure, indicating that he is eating and enjoying. Patient is in good behavioral control. Patient reports that sometimes he raises his voice when angry but that he would never hurt anyone. Patient encouraged to utilize coping skills when angry as opposed to yelling. Patient expressed understanding and willingness. Patient denies SI HI and AVH. Patient contracts for safety. Patient in good behavioral control. Writer left a message for Betty RN Supervisor @ [facility]. No return call as of yet.  [phone] Betty [phone].     Mental Status Exam:    Mood – Neutral  Sleep - Good  Appetite - Good  ADLs - Within normal limits   Thought Process – Linear    Observation – [id number]    No barriers to discharge identified at this time. Plan is for patient to return to [facility].     At this time patient is not psychiatrically stable for discharge.

## 2020-08-10 NOTE — PROGRESS NOTE BEHAVIORAL HEALTH - SUMMARY
69M domiciled at [place], PPH: schizophrenia, PMH: Parkinson disease, COPD, sent to [facility]'s ED for agitated behavior. Pt presents agitated, hostile, disorganised, appears to be responding to internal stimuli and paranoid about medications in context of poor adherence to medication regimen. TOO obtained (7/27/20). On evaluation, pt presents disorganized and cooperative, less verbally abusive and belligerent towards staff, verbally redirectable and less isolative, medication compliant. Started on haldol decanoate 8/4/2020.    #Schizophrenia  -TOO granted; as per TOO, if pt refuses Haldol PO, then Haldol 2.5 mg IM to be given  -change depakene syrup 500 mg bid to depakote sprinkles 500 mg bid to increase adherence --> valproic acid level 33, change to depakote dr 500 mg bid (7/28) --> taper depakote dr 250 mg bid (7/30) d/t possible drug-induced hyponatremia --> change formulation to depakene 250 mg bid to increase adherence (8/7)  -continue haldol concentrate 2 mg bid --> titrate haldol concentrate 5 mg qhs (7/20) --> titrate haldol concentrate 2 mg daily, haldol concentrate 5 mg qhs (7/23) --> titrate haldol concentrate 5 mg bid (7/27) --> change to haldol 5 mg bid (8/4)  -start haldol decanoate 25 mg IM once (8/4) --> give haldol decanoate 25 mg IM once (8/7) --> give haldol decanoate 50 mg once (8/10)  -c/w cogentin 1 mg bid  -c/w melatonin 5 mg qhs  -start haldol concentrate 2.5 mg q8h prn for agitation  -c/w hydroxyzine 50 mg q6h prn for anxiety or/and insomnia    #HTN, Parkinsons, COPD  -c/w home medications  -medicine consulted  -PT/OT consult pending    #Poor PO Intake  -improved  -RD consulted; Ensure with meals  -Monitor weight every 3 days  -SLP consulted; advance diet as per notes    #Hyperkalemia  -K 5.8 (8/10)  -pt denies sx, ECG WNL  -medicine consulted, repeat BMP tomorrow    #Mild hyponatremia  -resolved  -Na 131, K 5.2 (7/28) --> Na 130, K 4.8 (7/29) --> Na 128 (7/30) --> Na 128 (7/31) --> Na 136 (8/2)  -medicine consulted, recs and labs in note  -taper depakote 250 mg bid for possible drug-induced hyponatremia  -RD consulted, no concentrated potassium modifier added to diet    Dispo: [facility] 69M domiciled at [place], PPH: schizophrenia, PMH: Parkinson disease, COPD, sent to [facility]'s ED for agitated behavior. Pt presents agitated, hostile, disorganised, appears to be responding to internal stimuli and paranoid about medications in context of poor adherence to medication regimen. TOO obtained (7/27/20). On evaluation, pt presents disorganized and cooperative, less verbally abusive and belligerent towards staff, verbally redirectable and less isolative, medication compliant. Started on haldol decanoate 8/4/2020.    #Schizophrenia  -TOO granted; as per TOO, if pt refuses Haldol PO, then Haldol 2.5 mg IM to be given  -change depakene syrup 500 mg bid to depakote sprinkles 500 mg bid to increase adherence --> valproic acid level 33, change to depakote dr 500 mg bid (7/28) --> taper depakote dr 250 mg bid (7/30) d/t possible drug-induced hyponatremia --> change formulation to depakene 250 mg bid to increase adherence (8/7)  -continue haldol concentrate 2 mg bid --> titrate haldol concentrate 5 mg qhs (7/20) --> titrate haldol concentrate 2 mg daily, haldol concentrate 5 mg qhs (7/23) --> titrate haldol concentrate 5 mg bid (7/27) --> change to haldol 5 mg bid (8/4)  -start haldol decanoate 25 mg IM once (8/4) --> give haldol decanoate 25 mg IM once (8/7)  -c/w cogentin 1 mg bid  -c/w melatonin 5 mg qhs  -start haldol concentrate 2.5 mg q8h prn for agitation  -c/w hydroxyzine 50 mg q6h prn for anxiety or/and insomnia    #HTN, Parkinsons, COPD  -c/w home medications  -medicine consulted  -PT/OT consult pending    #Poor PO Intake  -improved  -RD consulted; Ensure with meals  -Monitor weight every 3 days  -SLP consulted; advance diet as per notes    #Hyperkalemia  -K 5.8 (8/10)  -pt denies sx, ECG WNL  -medicine consulted, repeat BMP tomorrow    #Mild hyponatremia  -resolved  -Na 131, K 5.2 (7/28) --> Na 130, K 4.8 (7/29) --> Na 128 (7/30) --> Na 128 (7/31) --> Na 136 (8/2)  -medicine consulted, recs and labs in note  -taper depakote 250 mg bid for possible drug-induced hyponatremia  -RD consulted, no concentrated potassium modifier added to diet    Dispo: [facility]

## 2020-08-10 NOTE — CHART NOTE - NSCHARTNOTEFT_GEN_A_CORE
08-10    137  |  95<L>  |  31<H>  ----------------------------<  87  5.8<H>   |  34<H>  |  0.7    Ca    9.9      10 Aug 2020 07:44    TPro  6.9  /  Alb  3.8  /  TBili  0.4  /  DBili  x   /  AST  41  /  ALT  27  /  AlkPhos  68  08-10                            13.1   7.98  )-----------( 345      ( 10 Aug 2020 07:44 )             41.3     CAPILLARY BLOOD GLUCOSE          IPP staff would like recc on hyperkalemia , not induced by IPP meds  Pt on no medical meds that would cause increase in K  Pt has no medical complaints   EKG in am reviewed     - bmp in am with MG level   - lactulose 10g x 1   RN aware of plan

## 2020-08-10 NOTE — PROGRESS NOTE BEHAVIORAL HEALTH - NSBHCHARTREVIEWLAB_PSY_A_CORE FT
Complete Blood Count in AM (08.10.20 @ 07:44)    Nucleated RBC: 0 /100 WBCs    WBC Count: 7.98 K/uL    RBC Count: 4.16 M/uL    Hemoglobin: 13.1 g/dL    Hematocrit: 41.3 %    Mean Cell Volume: 99.3 fL    Mean Cell Hemoglobin: 31.5 pg    Mean Cell Hemoglobin Conc: 31.7 g/dL    Red Cell Distrib Width: 13.8 %    Platelet Count - Automated: 345 K/uL  Comprehensive Metabolic Panel in AM (08.10.20 @ 07:44)    Sodium, Serum: 137 mmol/L    Potassium, Serum: 5.8 mmol/L    Chloride, Serum: 95 mmol/L    Carbon Dioxide, Serum: 34 mmol/L    Anion Gap, Serum: 8 mmol/L    Blood Urea Nitrogen, Serum: 31 mg/dL    Creatinine, Serum: 0.7 mg/dL    Glucose, Serum: 87 mg/dL    Calcium, Total Serum: 9.9 mg/dL    Protein Total, Serum: 6.9 g/dL    Albumin, Serum: 3.8 g/dL    Bilirubin Total, Serum: 0.4 mg/dL    Alkaline Phosphatase, Serum: 68 U/L    Aspartate Aminotransferase (AST/SGOT): 41 U/L    Alanine Aminotransferase (ALT/SGPT): 27 U/L    eGFR if Non : 96    eGFR if : 112 mL/min/1.73M2  A1C with Estimated Average Glucose in AM (07.10.20 @ 04:30)    A1C with Estimated Average Glucose Result: 4.9  Thyroid Stimulating Hormone, Serum in AM (07.16.20 @ 07:02)    Thyroid Stimulating Hormone, Serum: 2.88 uIU/mL

## 2020-08-10 NOTE — PROGRESS NOTE BEHAVIORAL HEALTH - NSBHFUPINTERVALHXFT_PSY_A_CORE
Pt seen and evaluated, chart reviewed. As per nursing staff, pt has been compliant with medications since TOO obtained (7/27/20), at times resistant to depakote, verbally redirectable. On evaluation, pt remains disorganized at times and difficult to understand, cooperative; as per collateral, Mecca (sister), pt's baseline. Pt endorses good mood, sleep and appetite, endorses regular BM (last yesterday). Pt denies AVH, however is observed by staff to talk to self in room at times, pt does not appear in distress. Pt in good behavioral control. Denies SI/HI, intent and plan. ECG QTc 397 ms (8/10). Noted hyperkalemia 5.8, medicine consulted, pending orders. Pt seen and evaluated, chart reviewed. As per nursing staff, pt has been compliant with medications since TOO obtained (7/27/20), at times resistant to depakote, verbally redirectable. On evaluation, pt remains disorganized at times and difficult to understand, cooperative; as per collateral, Mecca (sister), pt's baseline. Pt endorses good mood, sleep and appetite, endorses regular BM (last yesterday). Pt denies AVH, however is observed by staff to talk to self in room at times, pt does not appear in distress. Pt in good behavioral control. Denies SI/HI, intent and plan. ECG QTc 397 ms (8/10). Noted hyperkalemia 5.8, medicine consulted, asymptomatic with repeat BMP in AM.

## 2020-08-11 LAB
ANION GAP SERPL CALC-SCNC: 7 MMOL/L — SIGNIFICANT CHANGE UP (ref 7–14)
BUN SERPL-MCNC: 37 MG/DL — HIGH (ref 10–20)
CALCIUM SERPL-MCNC: 10.2 MG/DL — HIGH (ref 8.5–10.1)
CHLORIDE SERPL-SCNC: 98 MMOL/L — SIGNIFICANT CHANGE UP (ref 98–110)
CO2 SERPL-SCNC: 35 MMOL/L — HIGH (ref 17–32)
CREAT SERPL-MCNC: 0.8 MG/DL — SIGNIFICANT CHANGE UP (ref 0.7–1.5)
GLUCOSE SERPL-MCNC: 93 MG/DL — SIGNIFICANT CHANGE UP (ref 70–99)
MAGNESIUM SERPL-MCNC: 2.1 MG/DL — SIGNIFICANT CHANGE UP (ref 1.8–2.4)
POTASSIUM SERPL-MCNC: 5.7 MMOL/L — HIGH (ref 3.5–5)
POTASSIUM SERPL-SCNC: 5.7 MMOL/L — HIGH (ref 3.5–5)
SODIUM SERPL-SCNC: 140 MMOL/L — SIGNIFICANT CHANGE UP (ref 135–146)

## 2020-08-11 RX ORDER — SODIUM POLYSTYRENE SULFONATE 4.1 MEQ/G
30 POWDER, FOR SUSPENSION ORAL ONCE
Refills: 0 | Status: COMPLETED | OUTPATIENT
Start: 2020-08-11 | End: 2020-08-11

## 2020-08-11 RX ADMIN — HALOPERIDOL DECANOATE 5 MILLIGRAM(S): 100 INJECTION INTRAMUSCULAR at 20:00

## 2020-08-11 RX ADMIN — SODIUM POLYSTYRENE SULFONATE 30 GRAM(S): 4.1 POWDER, FOR SUSPENSION ORAL at 14:36

## 2020-08-11 RX ADMIN — BUDESONIDE AND FORMOTEROL FUMARATE DIHYDRATE 2 PUFF(S): 160; 4.5 AEROSOL RESPIRATORY (INHALATION) at 08:05

## 2020-08-11 RX ADMIN — PANTOPRAZOLE SODIUM 40 MILLIGRAM(S): 20 TABLET, DELAYED RELEASE ORAL at 08:03

## 2020-08-11 RX ADMIN — ROPINIROLE 0.25 MILLIGRAM(S): 8 TABLET, FILM COATED, EXTENDED RELEASE ORAL at 08:03

## 2020-08-11 RX ADMIN — Medication 1 TABLET(S): at 08:03

## 2020-08-11 RX ADMIN — ALBUTEROL 2 PUFF(S): 90 AEROSOL, METERED ORAL at 00:56

## 2020-08-11 RX ADMIN — Medication 250 MILLIGRAM(S): at 08:05

## 2020-08-11 RX ADMIN — Medication 5 MILLIGRAM(S): at 20:00

## 2020-08-11 RX ADMIN — Medication 25 MILLIGRAM(S): at 20:00

## 2020-08-11 RX ADMIN — ROPINIROLE 0.25 MILLIGRAM(S): 8 TABLET, FILM COATED, EXTENDED RELEASE ORAL at 20:00

## 2020-08-11 RX ADMIN — Medication 1 MILLIGRAM(S): at 08:03

## 2020-08-11 RX ADMIN — Medication 1 MILLIGRAM(S): at 20:00

## 2020-08-11 RX ADMIN — BUDESONIDE AND FORMOTEROL FUMARATE DIHYDRATE 2 PUFF(S): 160; 4.5 AEROSOL RESPIRATORY (INHALATION) at 19:44

## 2020-08-11 RX ADMIN — Medication 25 MILLIGRAM(S): at 08:03

## 2020-08-11 RX ADMIN — HALOPERIDOL DECANOATE 5 MILLIGRAM(S): 100 INJECTION INTRAMUSCULAR at 08:03

## 2020-08-11 RX ADMIN — ALBUTEROL 2 PUFF(S): 90 AEROSOL, METERED ORAL at 17:48

## 2020-08-11 NOTE — PROGRESS NOTE BEHAVIORAL HEALTH - NSBHCHARTREVIEWINVESTIGATE_PSY_A_CORE FT
< from: 12 Lead ECG (08.04.20 @ 09:05) >      Ventricular Rate 74 BPM    Atrial Rate 74 BPM    P-R Interval 140 ms    QRS Duration 80 ms    Q-T Interval 372 ms    QTC Calculation(Bezet) 412 ms    P Axis 59 degrees    R Axis 67 degrees    T Axis 74 degrees    Diagnosis Line Normal sinus rhythm  Normal ECG    < end of copied text > < from: 12 Lead ECG (08.10.20 @ 08:18) >      Ventricular Rate 62 BPM    Atrial Rate 62 BPM    P-R Interval 112 ms    QRS Duration 66 ms    Q-T Interval 392 ms    QTC Calculation(Bezet) 397 ms    P Axis 62 degrees    R Axis 69 degrees    T Axis 74 degrees    Diagnosis Line Normal sinus rhythm  Normal ECG    < end of copied text >

## 2020-08-11 NOTE — PROGRESS NOTE BEHAVIORAL HEALTH - NSBHCHARTREVIEWLAB_PSY_A_CORE FT
Complete Blood Count in AM (08.10.20 @ 07:44)    Nucleated RBC: 0 /100 WBCs    WBC Count: 7.98 K/uL    RBC Count: 4.16 M/uL    Hemoglobin: 13.1 g/dL    Hematocrit: 41.3 %    Mean Cell Volume: 99.3 fL    Mean Cell Hemoglobin: 31.5 pg    Mean Cell Hemoglobin Conc: 31.7 g/dL    Red Cell Distrib Width: 13.8 %    Platelet Count - Automated: 345 K/uL  Comprehensive Metabolic Panel in AM (08.10.20 @ 07:44)    Sodium, Serum: 137 mmol/L    Potassium, Serum: 5.8 mmol/L    Chloride, Serum: 95 mmol/L    Carbon Dioxide, Serum: 34 mmol/L    Anion Gap, Serum: 8 mmol/L    Blood Urea Nitrogen, Serum: 31 mg/dL    Creatinine, Serum: 0.7 mg/dL    Glucose, Serum: 87 mg/dL    Calcium, Total Serum: 9.9 mg/dL    Protein Total, Serum: 6.9 g/dL    Albumin, Serum: 3.8 g/dL    Bilirubin Total, Serum: 0.4 mg/dL    Alkaline Phosphatase, Serum: 68 U/L    Aspartate Aminotransferase (AST/SGOT): 41 U/L    Alanine Aminotransferase (ALT/SGPT): 27 U/L    eGFR if Non : 96    eGFR if : 112 mL/min/1.73M2  Lipid Profile (07.10.20 @ 04:30)    Total Cholesterol/HDL Ratio Measurement: 4.1 Ratio    Cholesterol, Serum: 141 mg/dL    Triglycerides, Serum: 86 mg/dL    HDL Cholesterol, Serum: 34    Direct LDL: 96  A1C with Estimated Average Glucose in AM (07.10.20 @ 04:30)    A1C with Estimated Average Glucose Result: 4.9  Thyroid Stimulating Hormone, Serum in AM (07.16.20 @ 07:02)    Thyroid Stimulating Hormone, Serum: 2.88 uIU/mL Complete Blood Count in AM (08.10.20 @ 07:44)    Nucleated RBC: 0 /100 WBCs    WBC Count: 7.98 K/uL    RBC Count: 4.16 M/uL    Hemoglobin: 13.1 g/dL    Hematocrit: 41.3 %    Mean Cell Volume: 99.3 fL    Mean Cell Hemoglobin: 31.5 pg    Mean Cell Hemoglobin Conc: 31.7 g/dL    Red Cell Distrib Width: 13.8 %    Platelet Count - Automated: 345 K/uL  Basic Metabolic Panel in AM (08.11.20 @ 07:29)    Sodium, Serum: 140 mmol/L    Potassium, Serum: 5.7 mmol/L    Chloride, Serum: 98 mmol/L    Carbon Dioxide, Serum: 35 mmol/L    Anion Gap, Serum: 7 mmol/L    Blood Urea Nitrogen, Serum: 37 mg/dL    Creatinine, Serum: 0.8 mg/dL    Glucose, Serum: 93 mg/dL    Calcium, Total Serum: 10.2 mg/dL    eGFR if Non : 91    eGFR if : 106 mL/min/1.73M2  Lipid Profile (07.10.20 @ 04:30)    Total Cholesterol/HDL Ratio Measurement: 4.1 Ratio    Cholesterol, Serum: 141 mg/dL    Triglycerides, Serum: 86 mg/dL    HDL Cholesterol, Serum: 34    Direct LDL: 96  A1C with Estimated Average Glucose in AM (07.10.20 @ 04:30)    A1C with Estimated Average Glucose Result: 4.9  Thyroid Stimulating Hormone, Serum in AM (07.16.20 @ 07:02)    Thyroid Stimulating Hormone, Serum: 2.88 uIU/mL

## 2020-08-11 NOTE — PROGRESS NOTE BEHAVIORAL HEALTH - SUMMARY
69M domiciled at [place], PPH: schizophrenia, PMH: Parkinson disease, COPD, sent to [facility]'s ED for agitated behavior. Pt presents agitated, hostile, disorganised, appears to be responding to internal stimuli and paranoid about medications in context of poor adherence to medication regimen. TOO obtained (7/27/20). On evaluation, pt presents disorganized and cooperative, less verbally abusive and belligerent towards staff, verbally redirectable and less isolative, medication compliant. Started on haldol decanoate 8/4/2020.    #Schizophrenia  -TOO granted; as per TOO, if pt refuses Haldol PO, then Haldol 2.5 mg IM to be given  -change depakene syrup 500 mg bid to depakote sprinkles 500 mg bid to increase adherence --> valproic acid level 33, change to depakote dr 500 mg bid (7/28) --> taper depakote dr 250 mg bid (7/30) d/t possible drug-induced hyponatremia --> change formulation to depakene 250 mg bid to increase adherence (8/7)  -continue haldol concentrate 2 mg bid --> titrate haldol concentrate 5 mg qhs (7/20) --> titrate haldol concentrate 2 mg daily, haldol concentrate 5 mg qhs (7/23) --> titrate haldol concentrate 5 mg bid (7/27) --> change to haldol 5 mg bid (8/4)  -start haldol decanoate 25 mg IM once (8/4) --> give haldol decanoate 25 mg IM once (8/7)  -c/w cogentin 1 mg bid  -c/w melatonin 5 mg qhs  -start haldol concentrate 2.5 mg q8h prn for agitation  -c/w hydroxyzine 50 mg q6h prn for anxiety or/and insomnia    #HTN, Parkinsons, COPD  -c/w home medications  -medicine consulted  -PT/OT consult pending    #Poor PO Intake  -improved  -RD consulted; Ensure with meals  -Monitor weight every 3 days  -SLP consulted; advance diet as per notes    #Hyperkalemia  -K 5.8 (8/10)  -pt denies sx, ECG WNL  -medicine consulted, repeat BMP tomorrow    #Mild hyponatremia  -resolved  -Na 131, K 5.2 (7/28) --> Na 130, K 4.8 (7/29) --> Na 128 (7/30) --> Na 128 (7/31) --> Na 136 (8/2)  -medicine consulted, recs and labs in note  -taper depakote 250 mg bid for possible drug-induced hyponatremia  -RD consulted, no concentrated potassium modifier added to diet    Dispo: [facility] 69M domiciled at [place], PPH: schizophrenia, PMH: Parkinson disease, COPD, sent to [facility]'s ED for agitated behavior. Pt presents agitated, hostile, disorganised, appears to be responding to internal stimuli and paranoid about medications in context of poor adherence to medication regimen. TOO obtained (7/27/20). On evaluation, pt presents disorganized and cooperative, less verbally abusive and belligerent towards staff, verbally redirectable and less isolative, medication compliant. Started on haldol decanoate 8/4/2020.    #Schizophrenia  -TOO granted; as per TOO, if pt refuses Haldol PO, then Haldol 2.5 mg IM to be given  -change depakene syrup 500 mg bid to depakote sprinkles 500 mg bid to increase adherence --> valproic acid level 33, change to depakote dr 500 mg bid (7/28) --> taper depakote dr 250 mg bid (7/30) d/t possible drug-induced hyponatremia --> change formulation to depakene 250 mg bid to increase adherence (8/7)  -continue haldol concentrate 2 mg bid --> titrate haldol concentrate 5 mg qhs (7/20) --> titrate haldol concentrate 2 mg daily, haldol concentrate 5 mg qhs (7/23) --> titrate haldol concentrate 5 mg bid (7/27) --> change to haldol 5 mg bid (8/4)  -start haldol decanoate 25 mg IM once (8/4) --> give haldol decanoate 25 mg IM once (8/7)  -c/w cogentin 1 mg bid  -c/w melatonin 5 mg qhs  -start haldol concentrate 2.5 mg q8h prn for agitation  -c/w hydroxyzine 50 mg q6h prn for anxiety or/and insomnia    #HTN, Parkinsons, COPD  -c/w home medications  -medicine consulted  -PT/OT consult pending    #Poor PO Intake  -improved  -RD consulted; Ensure with meals  -Monitor weight every 3 days  -SLP consulted; advance diet as per notes    #Hyperkalemia  -K 5.8 (8/10) --> K 5.7 (8/11)  -pt denies sx, ECG WNL  -medicine consulted, kayexelate 30 mg PO x1, repeat BMP tomorrow   -RD consulted, no concentrated potassium modifier added to diet, Ensure switched to Neppro    #Mild hyponatremia  -resolved  -Na 131 (7/28) --> Na 130 (7/29) --> Na 128 (7/30) --> Na 128 (7/31) --> Na 136 (8/2)  -medicine consulted  -taper depakote 250 mg bid for possible drug-induced hyponatremia    Dispo: [facility]

## 2020-08-11 NOTE — PROGRESS NOTE BEHAVIORAL HEALTH - NSBHFUPINTERVALHXFT_PSY_A_CORE
Pt seen and evaluated, chart reviewed. As per nursing staff, pt has been compliant with medications since TOO obtained (7/27/20), at times resistant to depakote, verbally redirectable. On evaluation, pt remains disorganized at times and difficult to understand, cooperative; as per collateral, Mecca (sister), pt's baseline. Pt endorses good mood, sleep and appetite, endorses regular BM (last today). Pt denies AVH, however is observed by staff to talk to self in room at times, pt does not appear in distress. Pt in good behavioral control. Denies SI/HI, intent and plan. ECG QTc 397 ms (8/10). Noted hyperkalemia 5.8 (8/10), asymptomatic, lactulose 10 mg given --> potassium 5.7 (8/11), remains asymptomatic, medicine consulted, pending orders. Pt seen and evaluated, chart reviewed. As per nursing staff, pt has been compliant with medications since TOO obtained (7/27/20), at times resistant to depakote, verbally redirectable. On evaluation, pt remains disorganized at times and difficult to understand, cooperative; as per collateral, Mecca (sister), pt's baseline. Pt endorses good mood, sleep and appetite, endorses regular BM (last today). Pt denies AVH, however is observed by staff to talk to self in room at times, pt does not appear in distress. Pt in good behavioral control. Denies SI/HI, intent and plan. ECG QTc 397 ms (8/10). Noted hyperkalemia 5.8 (8/10), asymptomatic, lactulose 10 mg given --> potassium 5.7 (8/11), remains asymptomatic, medicine consulted.

## 2020-08-12 LAB
ANION GAP SERPL CALC-SCNC: 6 MMOL/L — LOW (ref 7–14)
BUN SERPL-MCNC: 30 MG/DL — HIGH (ref 10–20)
CALCIUM SERPL-MCNC: 9.8 MG/DL — SIGNIFICANT CHANGE UP (ref 8.5–10.1)
CHLORIDE SERPL-SCNC: 96 MMOL/L — LOW (ref 98–110)
CO2 SERPL-SCNC: 35 MMOL/L — HIGH (ref 17–32)
CREAT SERPL-MCNC: 0.8 MG/DL — SIGNIFICANT CHANGE UP (ref 0.7–1.5)
GLUCOSE SERPL-MCNC: 125 MG/DL — HIGH (ref 70–99)
POTASSIUM SERPL-MCNC: 4.3 MMOL/L — SIGNIFICANT CHANGE UP (ref 3.5–5)
POTASSIUM SERPL-SCNC: 4.3 MMOL/L — SIGNIFICANT CHANGE UP (ref 3.5–5)
SODIUM SERPL-SCNC: 137 MMOL/L — SIGNIFICANT CHANGE UP (ref 135–146)

## 2020-08-12 RX ORDER — DIVALPROEX SODIUM 500 MG/1
250 TABLET, DELAYED RELEASE ORAL
Refills: 0 | Status: DISCONTINUED | OUTPATIENT
Start: 2020-08-12 | End: 2020-08-18

## 2020-08-12 RX ADMIN — BUDESONIDE AND FORMOTEROL FUMARATE DIHYDRATE 2 PUFF(S): 160; 4.5 AEROSOL RESPIRATORY (INHALATION) at 07:52

## 2020-08-12 RX ADMIN — BUDESONIDE AND FORMOTEROL FUMARATE DIHYDRATE 2 PUFF(S): 160; 4.5 AEROSOL RESPIRATORY (INHALATION) at 20:13

## 2020-08-12 RX ADMIN — Medication 25 MILLIGRAM(S): at 07:51

## 2020-08-12 RX ADMIN — ALBUTEROL 2 PUFF(S): 90 AEROSOL, METERED ORAL at 07:52

## 2020-08-12 RX ADMIN — ROPINIROLE 0.25 MILLIGRAM(S): 8 TABLET, FILM COATED, EXTENDED RELEASE ORAL at 20:13

## 2020-08-12 RX ADMIN — Medication 1 MILLIGRAM(S): at 07:52

## 2020-08-12 RX ADMIN — Medication 25 MILLIGRAM(S): at 20:12

## 2020-08-12 RX ADMIN — Medication 5 MILLIGRAM(S): at 20:12

## 2020-08-12 RX ADMIN — DIVALPROEX SODIUM 250 MILLIGRAM(S): 500 TABLET, DELAYED RELEASE ORAL at 20:12

## 2020-08-12 RX ADMIN — ROPINIROLE 0.25 MILLIGRAM(S): 8 TABLET, FILM COATED, EXTENDED RELEASE ORAL at 07:52

## 2020-08-12 RX ADMIN — Medication 1 MILLIGRAM(S): at 20:12

## 2020-08-12 RX ADMIN — Medication 1 TABLET(S): at 07:51

## 2020-08-12 RX ADMIN — PANTOPRAZOLE SODIUM 40 MILLIGRAM(S): 20 TABLET, DELAYED RELEASE ORAL at 07:52

## 2020-08-12 RX ADMIN — HALOPERIDOL DECANOATE 5 MILLIGRAM(S): 100 INJECTION INTRAMUSCULAR at 20:13

## 2020-08-12 RX ADMIN — HALOPERIDOL DECANOATE 5 MILLIGRAM(S): 100 INJECTION INTRAMUSCULAR at 07:51

## 2020-08-12 NOTE — CHART NOTE - NSCHARTNOTEFT_GEN_A_CORE
pt c/o discomfort o right eye. Denies any change in vision.  On exam--conjunctiva appears normal.  Will order artifical tears od prn

## 2020-08-12 NOTE — CHART NOTE - NSCHARTNOTEFT_GEN_A_CORE
Patient remains on unit for continued treatment safety and observation. Patient is visible on unit and compliant with treatment as well as unit protocol. Writer meets with patient daily on rounds and or team meeting. Writer provides support and education to the patient daily. Patient to remain on unit until cleared by attending for discharge. Patient denies suicidal ideation, homicidal ideation and presents with no evidence of audio visual hallucinations.    This worker met with patient to discuss discharge plan.  MEKA completes and sent to [facility]. Screen complete, awaiting Level 2 assessment via IPRO. Discharge planning ensues.       Mental Status Exam:    Mood – Neutral  Sleep - Fair  Appetite - Good  ADLs - Fair  Thought Process – Linear    Observation – [id number]

## 2020-08-12 NOTE — PROGRESS NOTE BEHAVIORAL HEALTH - NSBHCHARTREVIEWINVESTIGATE_PSY_A_CORE FT
< from: 12 Lead ECG (08.10.20 @ 08:18) >      Ventricular Rate 62 BPM    Atrial Rate 62 BPM    P-R Interval 112 ms    QRS Duration 66 ms    Q-T Interval 392 ms    QTC Calculation(Bezet) 397 ms    P Axis 62 degrees    R Axis 69 degrees    T Axis 74 degrees    Diagnosis Line Normal sinus rhythm  Normal ECG    < end of copied text >

## 2020-08-12 NOTE — PROGRESS NOTE BEHAVIORAL HEALTH - SUMMARY
69M domiciled at [place], PPH: schizophrenia, PMH: Parkinson disease, COPD, sent to [facility]'s ED for agitated behavior. Pt presents agitated, hostile, disorganised, appears to be responding to internal stimuli and paranoid about medications in context of poor adherence to medication regimen. TOO obtained (7/27/20). On evaluation, pt presents disorganized and cooperative, less verbally abusive and belligerent towards staff, verbally redirectable and less isolative, medication compliant. Started on haldol decanoate 8/4/2020.    #Schizophrenia  -TOO granted; as per TOO, if pt refuses Haldol PO, then Haldol 2.5 mg IM to be given  -change depakene syrup 500 mg bid to depakote sprinkles 500 mg bid to increase adherence --> valproic acid level 33, change to depakote dr 500 mg bid (7/28) --> taper depakote dr 250 mg bid (7/30) d/t possible drug-induced hyponatremia --> change formulation to depakene 250 mg bid to increase adherence (8/7)  -continue haldol concentrate 2 mg bid --> titrate haldol concentrate 5 mg qhs (7/20) --> titrate haldol concentrate 2 mg daily, haldol concentrate 5 mg qhs (7/23) --> titrate haldol concentrate 5 mg bid (7/27) --> change to haldol 5 mg bid (8/4)  -start haldol decanoate 25 mg IM once (8/4) --> give haldol decanoate 25 mg IM once (8/7)  -c/w cogentin 1 mg bid  -c/w melatonin 5 mg qhs  -start haldol concentrate 2.5 mg q8h prn for agitation  -c/w hydroxyzine 50 mg q6h prn for anxiety or/and insomnia    #HTN, Parkinsons, COPD  -c/w home medications  -medicine consulted  -PT/OT consult pending    #Poor PO Intake  -improved  -RD consulted; Ensure with meals  -Monitor weight every 3 days  -SLP consulted; advance diet as per notes    #Hyperkalemia  -resolved  -K 5.8 (8/10) --> K 5.7 (8/11) --> K 4.3 (8/12)  -pt denies sx, ECG WNL  -medicine consulted, kayexelate 30 mg PO x1 (8/11)  -RD consulted, no concentrated potassium modifier added to diet, Ensure switched to Neppro    #Mild hyponatremia  -resolved  -Na 131 (7/28) --> Na 130 (7/29) --> Na 128 (7/30) --> Na 128 (7/31) --> Na 136 (8/2)  -medicine consulted  -taper depakote 250 mg bid for possible drug-induced hyponatremia    Dispo: [facility]

## 2020-08-12 NOTE — PROGRESS NOTE BEHAVIORAL HEALTH - NSBHCHARTREVIEWLAB_PSY_A_CORE FT
Complete Blood Count in AM (08.10.20 @ 07:44)    Nucleated RBC: 0 /100 WBCs    WBC Count: 7.98 K/uL    RBC Count: 4.16 M/uL    Hemoglobin: 13.1 g/dL    Hematocrit: 41.3 %    Mean Cell Volume: 99.3 fL    Mean Cell Hemoglobin: 31.5 pg    Mean Cell Hemoglobin Conc: 31.7 g/dL    Red Cell Distrib Width: 13.8 %    Platelet Count - Automated: 345 K/uL  Basic Metabolic Panel in AM (08.12.20 @ 07:00)    Sodium, Serum: 137 mmol/L    Potassium, Serum: 4.3 mmol/L    Chloride, Serum: 96 mmol/L    Carbon Dioxide, Serum: 35 mmol/L    Anion Gap, Serum: 6 mmol/L    Blood Urea Nitrogen, Serum: 30 mg/dL    Creatinine, Serum: 0.8 mg/dL    Glucose, Serum: 125 mg/dL    Calcium, Total Serum: 9.8 mg/dL    eGFR if Non : 91    eGFR if : 106 mL/min/1.73M2  Lipid Profile (07.10.20 @ 04:30)    Total Cholesterol/HDL Ratio Measurement: 4.1 Ratio    Cholesterol, Serum: 141 mg/dL    Triglycerides, Serum: 86 mg/dL    HDL Cholesterol, Serum: 34    Direct LDL: 96  A1C with Estimated Average Glucose in AM (07.10.20 @ 04:30)    A1C with Estimated Average Glucose Result: 4.9  Thyroid Stimulating Hormone, Serum in AM (07.16.20 @ 07:02)    Thyroid Stimulating Hormone, Serum: 2.88 uIU/mL

## 2020-08-13 RX ADMIN — PANTOPRAZOLE SODIUM 40 MILLIGRAM(S): 20 TABLET, DELAYED RELEASE ORAL at 08:41

## 2020-08-13 RX ADMIN — Medication 25 MILLIGRAM(S): at 08:41

## 2020-08-13 RX ADMIN — SENNA PLUS 2 TABLET(S): 8.6 TABLET ORAL at 20:30

## 2020-08-13 RX ADMIN — BUDESONIDE AND FORMOTEROL FUMARATE DIHYDRATE 2 PUFF(S): 160; 4.5 AEROSOL RESPIRATORY (INHALATION) at 20:03

## 2020-08-13 RX ADMIN — Medication 25 MILLIGRAM(S): at 20:30

## 2020-08-13 RX ADMIN — Medication 1 MILLIGRAM(S): at 20:30

## 2020-08-13 RX ADMIN — ROPINIROLE 0.25 MILLIGRAM(S): 8 TABLET, FILM COATED, EXTENDED RELEASE ORAL at 20:30

## 2020-08-13 RX ADMIN — Medication 1 TABLET(S): at 08:40

## 2020-08-13 RX ADMIN — ROPINIROLE 0.25 MILLIGRAM(S): 8 TABLET, FILM COATED, EXTENDED RELEASE ORAL at 08:41

## 2020-08-13 RX ADMIN — ALBUTEROL 2 PUFF(S): 90 AEROSOL, METERED ORAL at 04:04

## 2020-08-13 RX ADMIN — Medication 5 MILLIGRAM(S): at 20:30

## 2020-08-13 RX ADMIN — BUDESONIDE AND FORMOTEROL FUMARATE DIHYDRATE 2 PUFF(S): 160; 4.5 AEROSOL RESPIRATORY (INHALATION) at 08:41

## 2020-08-13 RX ADMIN — HALOPERIDOL DECANOATE 5 MILLIGRAM(S): 100 INJECTION INTRAMUSCULAR at 20:30

## 2020-08-13 RX ADMIN — Medication 1 MILLIGRAM(S): at 08:42

## 2020-08-13 RX ADMIN — DIVALPROEX SODIUM 250 MILLIGRAM(S): 500 TABLET, DELAYED RELEASE ORAL at 08:40

## 2020-08-13 RX ADMIN — DIVALPROEX SODIUM 250 MILLIGRAM(S): 500 TABLET, DELAYED RELEASE ORAL at 20:30

## 2020-08-13 RX ADMIN — HALOPERIDOL DECANOATE 5 MILLIGRAM(S): 100 INJECTION INTRAMUSCULAR at 08:41

## 2020-08-13 NOTE — CHART NOTE - NSCHARTNOTEFT_GEN_A_CORE
Limited reassessment    new meds: TUMS, lopressor, protonix  new labs (8/10) RBC 4.16, H/H 13.1/41.3; (8/12) BUN 30, glucose 125     No noted edema  Skin is WDL (8/12)    Wt trend: (7/9) 41.8 kg/92 lbs; (8/1) 50.4 kg/111 lbs; (8/8) 49.4 kg/109 lbs--- stable +/-2 lbs x1 week. Will continue to monitor.  BMI: 17.6 using CBW from 8/8    Diet order: Dysphagia 1 mechanical soft/thin liquids; no concentrated K+; Nepro supplement BID    Ongoing management for schizophrenia; note possible drug induced hyponatremia (7/30) formulation changed to depakene (8/6)    Pt continues to eat well. There are a few meal refusals noted since previous assessment where pt only drank supplement. Otherwise, po intake averages >75% throughout LOS. Last BM recorded on 8/10. No noted GI s/s. There are no further nutrition interventions at this time, pt remains not at risk. RD to f/u within the next 7 days.

## 2020-08-13 NOTE — PROGRESS NOTE BEHAVIORAL HEALTH - NSBHFUPINTERVALHXFT_PSY_A_CORE
Pt seen and evaluated during treatment team, chart reviewed. As per nursing staff, pt has been compliant with medications since TOO obtained (7/27/20), at times resistant to depakote, verbally redirectable. On evaluation, pt remains disorganized at times and difficult to understand, cooperative; as per collateral, Mecca (sister), pt's baseline. Pt endorses good mood, sleep and appetite "drank my Ensure", endorses regular BM (last yesterday). Pt denies AVH, however is observed by staff to talk to self in room at times, pt does not appear in distress. Pt in good behavioral control. Denies SI/HI, intent and plan. ECG QTc 397 ms (8/10). MEKA sent, discharge planning started.

## 2020-08-13 NOTE — PROGRESS NOTE BEHAVIORAL HEALTH - SUMMARY
69M domiciled at [place], PPH: schizophrenia, PMH: Parkinson disease, COPD, sent to [facility]'s ED for agitated behavior. Pt presents agitated, hostile, disorganised, appears to be responding to internal stimuli and paranoid about medications in context of poor adherence to medication regimen. TOO obtained (7/27/20). On evaluation, pt presents disorganized and cooperative, less verbally abusive and belligerent towards staff, verbally redirectable and less isolative, medication compliant. Started on haldol decanoate 8/4/2020.    #Schizophrenia  -TOO granted; as per TOO, if pt refuses Haldol PO, then Haldol 2.5 mg IM to be given  -change depakene syrup 500 mg bid to depakote sprinkles 500 mg bid to increase adherence --> valproic acid level 33, change to depakote dr 500 mg bid (7/28) --> taper depakote dr 250 mg bid (7/30) d/t possible drug-induced hyponatremia --> change formulation to depakene 250 mg bid to increase adherence (8/7)  -continue haldol concentrate 2 mg bid --> titrate haldol concentrate 5 mg qhs (7/20) --> titrate haldol concentrate 2 mg daily, haldol concentrate 5 mg qhs (7/23) --> titrate haldol concentrate 5 mg bid (7/27) --> change to haldol 5 mg bid (8/4)  -start haldol decanoate 25 mg IM once (8/4) --> give haldol decanoate 25 mg IM once (8/7) --> give haldol decanoate 50 mg IM once (8/13)  -c/w cogentin 1 mg bid  -c/w melatonin 5 mg qhs  -start haldol concentrate 2.5 mg q8h prn for agitation  -c/w hydroxyzine 50 mg q6h prn for anxiety or/and insomnia    #HTN, Parkinsons, COPD  -c/w home medications  -medicine consulted  -PT/OT consult pending    #Poor PO Intake  -improved  -RD consulted; Ensure with meals  -Monitor weight every 3 days  -SLP consulted; advance diet as per notes    #Hyperkalemia  -resolved  -K 5.8 (8/10) --> K 5.7 (8/11) --> K 4.3 (8/12)  -pt denies sx, ECG WNL  -medicine consulted, kayexelate 30 mg PO x1 (8/11)  -RD consulted, no concentrated potassium modifier added to diet, Ensure switched to Neppro    #Mild hyponatremia  -resolved  -Na 131 (7/28) --> Na 130 (7/29) --> Na 128 (7/30) --> Na 128 (7/31) --> Na 136 (8/2)  -medicine consulted  -taper depakote 250 mg bid for possible drug-induced hyponatremia    Dispo: [facility]

## 2020-08-14 RX ORDER — HALOPERIDOL DECANOATE 100 MG/ML
5 INJECTION INTRAMUSCULAR DAILY
Refills: 0 | Status: DISCONTINUED | OUTPATIENT
Start: 2020-08-15 | End: 2020-08-19

## 2020-08-14 RX ORDER — HALOPERIDOL DECANOATE 100 MG/ML
50 INJECTION INTRAMUSCULAR ONCE
Refills: 0 | Status: COMPLETED | OUTPATIENT
Start: 2020-08-14 | End: 2020-08-14

## 2020-08-14 RX ORDER — HALOPERIDOL DECANOATE 100 MG/ML
2.5 INJECTION INTRAMUSCULAR DAILY
Refills: 0 | Status: DISCONTINUED | OUTPATIENT
Start: 2020-08-14 | End: 2020-08-19

## 2020-08-14 RX ADMIN — Medication 1 TABLET(S): at 08:08

## 2020-08-14 RX ADMIN — Medication 5 MILLIGRAM(S): at 20:00

## 2020-08-14 RX ADMIN — BUDESONIDE AND FORMOTEROL FUMARATE DIHYDRATE 2 PUFF(S): 160; 4.5 AEROSOL RESPIRATORY (INHALATION) at 20:01

## 2020-08-14 RX ADMIN — HALOPERIDOL DECANOATE 5 MILLIGRAM(S): 100 INJECTION INTRAMUSCULAR at 08:07

## 2020-08-14 RX ADMIN — SENNA PLUS 2 TABLET(S): 8.6 TABLET ORAL at 20:00

## 2020-08-14 RX ADMIN — Medication 1 MILLIGRAM(S): at 20:00

## 2020-08-14 RX ADMIN — Medication 1 DROP(S): at 08:23

## 2020-08-14 RX ADMIN — PANTOPRAZOLE SODIUM 40 MILLIGRAM(S): 20 TABLET, DELAYED RELEASE ORAL at 08:07

## 2020-08-14 RX ADMIN — ALBUTEROL 2 PUFF(S): 90 AEROSOL, METERED ORAL at 11:36

## 2020-08-14 RX ADMIN — Medication 25 MILLIGRAM(S): at 08:07

## 2020-08-14 RX ADMIN — DIVALPROEX SODIUM 250 MILLIGRAM(S): 500 TABLET, DELAYED RELEASE ORAL at 08:07

## 2020-08-14 RX ADMIN — ROPINIROLE 0.25 MILLIGRAM(S): 8 TABLET, FILM COATED, EXTENDED RELEASE ORAL at 08:07

## 2020-08-14 RX ADMIN — Medication 25 MILLIGRAM(S): at 20:00

## 2020-08-14 RX ADMIN — BUDESONIDE AND FORMOTEROL FUMARATE DIHYDRATE 2 PUFF(S): 160; 4.5 AEROSOL RESPIRATORY (INHALATION) at 08:07

## 2020-08-14 RX ADMIN — HALOPERIDOL DECANOATE 50 MILLIGRAM(S): 100 INJECTION INTRAMUSCULAR at 15:32

## 2020-08-14 RX ADMIN — DIVALPROEX SODIUM 250 MILLIGRAM(S): 500 TABLET, DELAYED RELEASE ORAL at 20:00

## 2020-08-14 RX ADMIN — ROPINIROLE 0.25 MILLIGRAM(S): 8 TABLET, FILM COATED, EXTENDED RELEASE ORAL at 20:00

## 2020-08-14 RX ADMIN — Medication 1 MILLIGRAM(S): at 08:07

## 2020-08-14 NOTE — PROGRESS NOTE BEHAVIORAL HEALTH - SUMMARY
69M domiciled at [place], PPH: schizophrenia, PMH: Parkinson disease, COPD, sent to [facility]'s ED for agitated behavior. Pt presents agitated, hostile, disorganised, appears to be responding to internal stimuli and paranoid about medications in context of poor adherence to medication regimen. TOO obtained (7/27/20). On evaluation, pt presents disorganized and cooperative, less verbally abusive and belligerent towards staff, verbally redirectable and less isolative, medication compliant. Started on haldol decanoate 8/4/2020.    #Schizophrenia  -TOO granted; as per TOO, if pt refuses Haldol PO, then Haldol 2.5 mg IM to be given  -change depakene syrup 500 mg bid to depakote sprinkles 500 mg bid to increase adherence --> valproic acid level 33, change to depakote dr 500 mg bid (7/28) --> taper depakote dr 250 mg bid (7/30) d/t possible drug-induced hyponatremia --> change formulation to depakene 250 mg bid to increase adherence (8/7)  -continue haldol concentrate 2 mg bid --> titrate haldol concentrate 5 mg qhs (7/20) --> titrate haldol concentrate 2 mg daily, haldol concentrate 5 mg qhs (7/23) --> titrate haldol concentrate 5 mg bid (7/27) --> change to haldol 5 mg bid (8/4)  -start haldol decanoate 25 mg IM once (8/4) --> give haldol decanoate 25 mg IM once (8/7) --> give haldol decanoate 50 mg IM once (8/14)  -c/w cogentin 1 mg bid  -c/w melatonin 5 mg qhs  -start haldol concentrate 2.5 mg q8h prn for agitation  -c/w hydroxyzine 50 mg q6h prn for anxiety or/and insomnia    #HTN, Parkinsons, COPD  -c/w home medications  -medicine consulted  -PT/OT consult pending    #Poor PO Intake  -improved  -RD consulted; Ensure with meals  -Monitor weight every 3 days  -SLP consulted; advance diet as per notes    #Hyperkalemia  -resolved  -K 5.8 (8/10) --> K 5.7 (8/11) --> K 4.3 (8/12)  -pt denies sx, ECG WNL  -medicine consulted, kayexelate 30 mg PO x1 (8/11)  -RD consulted, no concentrated potassium modifier added to diet, Ensure switched to Neppro    #Mild hyponatremia  -resolved  -Na 131 (7/28) --> Na 130 (7/29) --> Na 128 (7/30) --> Na 128 (7/31) --> Na 136 (8/2)  -medicine consulted  -taper depakote 250 mg bid for possible drug-induced hyponatremia    Dispo: [facility] 69M domiciled at [place], PPH: schizophrenia, PMH: Parkinson disease, COPD, sent to [facility]'s ED for agitated behavior. Pt presents agitated, hostile, disorganised, appears to be responding to internal stimuli and paranoid about medications in context of poor adherence to medication regimen. TOO obtained (7/27/20). On evaluation, pt presents disorganized and cooperative, less verbally abusive and belligerent towards staff, verbally redirectable and less isolative, medication compliant. Started on haldol decanoate 8/4/2020.    #Schizophrenia  -TOO granted; as per TOO, if pt refuses Haldol PO, then Haldol 2.5 mg IM to be given  -change depakene syrup 500 mg bid to depakote sprinkles 500 mg bid to increase adherence --> valproic acid level 33, change to depakote dr 500 mg bid (7/28) --> taper depakote dr 250 mg bid (7/30) d/t possible drug-induced hyponatremia --> change formulation to depakene 250 mg bid to increase adherence (8/7)   -continue haldol concentrate 2 mg bid --> titrate haldol concentrate 5 mg qhs (7/20) --> titrate haldol concentrate 2 mg daily, haldol concentrate 5 mg qhs (7/23) --> titrate haldol concentrate 5 mg bid (7/27) --> change to haldol 5 mg bid (8/4) --> taper haldol 5 mg daily (8/14)  -start haldol decanoate 25 mg IM once (8/4) --> give haldol decanoate 25 mg IM once (8/7) --> give haldol decanoate 50 mg IM once (8/14)  -c/w cogentin 1 mg bid  -c/w melatonin 5 mg qhs  -start haldol concentrate 2.5 mg q8h prn for agitation  -c/w hydroxyzine 50 mg q6h prn for anxiety or/and insomnia    #HTN, Parkinsons, COPD  -c/w home medications  -medicine consulted  -PT/OT consult pending    #Poor PO Intake  -improved  -RD consulted; Ensure with meals  -Monitor weight every 3 days  -SLP consulted; advance diet as per notes    #Hyperkalemia  -resolved  -K 5.8 (8/10) --> K 5.7 (8/11) --> K 4.3 (8/12)  -pt denies sx, ECG WNL  -medicine consulted, kayexelate 30 mg PO x1 (8/11)  -RD consulted, no concentrated potassium modifier added to diet, Ensure switched to Neppro    #Mild hyponatremia  -resolved  -Na 131 (7/28) --> Na 130 (7/29) --> Na 128 (7/30) --> Na 128 (7/31) --> Na 136 (8/2)  -medicine consulted  -taper depakote 250 mg bid for possible drug-induced hyponatremia    Dispo: [facility]

## 2020-08-14 NOTE — CHART NOTE - NSCHARTNOTEFT_GEN_A_CORE
Patient remains on unit for continued treatment safety and observation. Patient is visible on unit and compliant with treatment as well as unit protocol. Writer meets with patient daily on rounds and or team meeting. Writer provides support and education to the patient daily. Patient to remain on unit until cleared by attending for discharge. Patient denies suicidal ideation, homicidal ideation and presents with no evidence of audio visual hallucinations. Patient reports good appetite and good sleep. Activities of daily living within normal limits. Patient in good behavioral control. Patient eager for discharge.     This worker met with patient to discuss discharge plan.  MEAK completed and sent to [facility]. Writer left message for Christine  x [phone] to discuss discharge date and plan, awaiting a call back. Screen complete, Level 2 assessment via IPRO requested. Writer heard from Carine mckeon Level 2 ( [phone]) requested additional documentation, writer provided. Awaiting determination,  Discharge planning ensues.       Mental Status Exam:    Mood – Neutral  Sleep - Fair  Appetite - Good  ADLs - Fair  Thought Process – Linear    Observation – [id number].

## 2020-08-14 NOTE — PROGRESS NOTE BEHAVIORAL HEALTH - NSBHFUPINTERVALHXFT_PSY_A_CORE
Pt seen and evaluated, chart reviewed. As per nursing staff, pt has been compliant with medications since TOO obtained (7/27/20), at times resistant to depakote, verbally redirectable, mostly isolative to room. On evaluation, pt remains disorganized at times and difficult to understand, cooperative; as per collateral, Mecca (sister), pt's baseline. Pt endorses good mood, sleep and appetite "ate ham last night, drank my Ensure", endorses regular BM (states last yesterday). Pt denies AVH, however is observed by staff to talk to self in room at times, pt does not appear in distress. Pt in good behavioral control. Denies SI/HI, intent and plan. MEKA sent, discharge planning started. Pt seen and evaluated, chart reviewed. As per nursing staff, pt has been compliant with medications since TOO obtained (7/27/20), at times resistant to depakote, verbally redirectable, mostly isolative to room. On evaluation, pt remains disorganized at times and difficult to understand, cooperative; as per collateral, Mecca (sister), pt's baseline. Pt endorses good mood, sleep and appetite "ate ham last night, drank my Ensure", endorses regular BM (states last yesterday). Pt denies AVH, however is observed by staff to talk to self in room at times, pt does not appear in distress. Pt in good behavioral control. Denies SI/HI, intent and plan. MEKA sent, discharge planning started.    Attempted to speak with pt's sister/HCP, Mecca Cottrell ([phone]) - as per her , she was hospitalized 4 days ago and pending surgery.

## 2020-08-15 RX ADMIN — ROPINIROLE 0.25 MILLIGRAM(S): 8 TABLET, FILM COATED, EXTENDED RELEASE ORAL at 08:14

## 2020-08-15 RX ADMIN — Medication 1 TABLET(S): at 08:15

## 2020-08-15 RX ADMIN — Medication 5 MILLIGRAM(S): at 20:11

## 2020-08-15 RX ADMIN — DIVALPROEX SODIUM 250 MILLIGRAM(S): 500 TABLET, DELAYED RELEASE ORAL at 08:15

## 2020-08-15 RX ADMIN — HALOPERIDOL DECANOATE 5 MILLIGRAM(S): 100 INJECTION INTRAMUSCULAR at 08:14

## 2020-08-15 RX ADMIN — BUDESONIDE AND FORMOTEROL FUMARATE DIHYDRATE 2 PUFF(S): 160; 4.5 AEROSOL RESPIRATORY (INHALATION) at 08:14

## 2020-08-15 RX ADMIN — Medication 1 MILLIGRAM(S): at 20:11

## 2020-08-15 RX ADMIN — Medication 25 MILLIGRAM(S): at 08:15

## 2020-08-15 RX ADMIN — PANTOPRAZOLE SODIUM 40 MILLIGRAM(S): 20 TABLET, DELAYED RELEASE ORAL at 08:14

## 2020-08-15 RX ADMIN — ROPINIROLE 0.25 MILLIGRAM(S): 8 TABLET, FILM COATED, EXTENDED RELEASE ORAL at 20:11

## 2020-08-15 RX ADMIN — Medication 1 DROP(S): at 19:18

## 2020-08-15 RX ADMIN — Medication 25 MILLIGRAM(S): at 20:11

## 2020-08-15 RX ADMIN — ALBUTEROL 2 PUFF(S): 90 AEROSOL, METERED ORAL at 11:42

## 2020-08-15 RX ADMIN — Medication 1 MILLIGRAM(S): at 08:15

## 2020-08-16 RX ADMIN — DIVALPROEX SODIUM 250 MILLIGRAM(S): 500 TABLET, DELAYED RELEASE ORAL at 20:01

## 2020-08-16 RX ADMIN — Medication 1 MILLIGRAM(S): at 08:13

## 2020-08-16 RX ADMIN — ROPINIROLE 0.25 MILLIGRAM(S): 8 TABLET, FILM COATED, EXTENDED RELEASE ORAL at 08:13

## 2020-08-16 RX ADMIN — ROPINIROLE 0.25 MILLIGRAM(S): 8 TABLET, FILM COATED, EXTENDED RELEASE ORAL at 20:01

## 2020-08-16 RX ADMIN — Medication 50 MILLIGRAM(S): at 20:02

## 2020-08-16 RX ADMIN — Medication 25 MILLIGRAM(S): at 08:13

## 2020-08-16 RX ADMIN — ALBUTEROL 2 PUFF(S): 90 AEROSOL, METERED ORAL at 18:12

## 2020-08-16 RX ADMIN — BUDESONIDE AND FORMOTEROL FUMARATE DIHYDRATE 2 PUFF(S): 160; 4.5 AEROSOL RESPIRATORY (INHALATION) at 08:13

## 2020-08-16 RX ADMIN — Medication 5 MILLIGRAM(S): at 20:01

## 2020-08-16 RX ADMIN — BUDESONIDE AND FORMOTEROL FUMARATE DIHYDRATE 2 PUFF(S): 160; 4.5 AEROSOL RESPIRATORY (INHALATION) at 20:01

## 2020-08-16 RX ADMIN — ALBUTEROL 2 PUFF(S): 90 AEROSOL, METERED ORAL at 11:38

## 2020-08-16 RX ADMIN — Medication 1 MILLIGRAM(S): at 20:01

## 2020-08-16 RX ADMIN — HALOPERIDOL DECANOATE 5 MILLIGRAM(S): 100 INJECTION INTRAMUSCULAR at 08:13

## 2020-08-16 RX ADMIN — Medication 25 MILLIGRAM(S): at 20:01

## 2020-08-16 RX ADMIN — PANTOPRAZOLE SODIUM 40 MILLIGRAM(S): 20 TABLET, DELAYED RELEASE ORAL at 08:13

## 2020-08-16 RX ADMIN — Medication 1 TABLET(S): at 08:13

## 2020-08-16 RX ADMIN — DIVALPROEX SODIUM 250 MILLIGRAM(S): 500 TABLET, DELAYED RELEASE ORAL at 08:13

## 2020-08-17 RX ADMIN — BUDESONIDE AND FORMOTEROL FUMARATE DIHYDRATE 2 PUFF(S): 160; 4.5 AEROSOL RESPIRATORY (INHALATION) at 20:36

## 2020-08-17 RX ADMIN — Medication 25 MILLIGRAM(S): at 08:16

## 2020-08-17 RX ADMIN — ROPINIROLE 0.25 MILLIGRAM(S): 8 TABLET, FILM COATED, EXTENDED RELEASE ORAL at 08:16

## 2020-08-17 RX ADMIN — Medication 1 DROP(S): at 07:24

## 2020-08-17 RX ADMIN — SENNA PLUS 2 TABLET(S): 8.6 TABLET ORAL at 20:36

## 2020-08-17 RX ADMIN — HALOPERIDOL DECANOATE 5 MILLIGRAM(S): 100 INJECTION INTRAMUSCULAR at 08:16

## 2020-08-17 RX ADMIN — ALBUTEROL 2 PUFF(S): 90 AEROSOL, METERED ORAL at 01:09

## 2020-08-17 RX ADMIN — Medication 1 TABLET(S): at 08:16

## 2020-08-17 RX ADMIN — Medication 1 MILLIGRAM(S): at 20:37

## 2020-08-17 RX ADMIN — Medication 5 MILLIGRAM(S): at 20:37

## 2020-08-17 RX ADMIN — Medication 1 MILLIGRAM(S): at 08:16

## 2020-08-17 RX ADMIN — DIVALPROEX SODIUM 250 MILLIGRAM(S): 500 TABLET, DELAYED RELEASE ORAL at 20:37

## 2020-08-17 RX ADMIN — ALBUTEROL 2 PUFF(S): 90 AEROSOL, METERED ORAL at 17:00

## 2020-08-17 RX ADMIN — BUDESONIDE AND FORMOTEROL FUMARATE DIHYDRATE 2 PUFF(S): 160; 4.5 AEROSOL RESPIRATORY (INHALATION) at 08:17

## 2020-08-17 RX ADMIN — ROPINIROLE 0.25 MILLIGRAM(S): 8 TABLET, FILM COATED, EXTENDED RELEASE ORAL at 20:37

## 2020-08-17 RX ADMIN — Medication 25 MILLIGRAM(S): at 20:36

## 2020-08-17 RX ADMIN — PANTOPRAZOLE SODIUM 40 MILLIGRAM(S): 20 TABLET, DELAYED RELEASE ORAL at 08:16

## 2020-08-17 NOTE — CHART NOTE - NSCHARTNOTEFT_GEN_A_CORE
Patient remains on unit for continued treatment safety and observation. Patient is visible on unit and compliant with treatment as well as unit protocol. Writer meets with patient daily on rounds and or team meeting. Writer provides support and education to the patient daily. Patient to remain on unit until cleared by attending for discharge. Patient denies suicidal ideation, homicidal ideation and presents with no evidence of audio visual hallucinations. Patient reports good appetite and good sleep. Activities of daily living within normal limits. Patient in good behavioral control. Patient eager for discharge.     This worker met with patient to discuss discharge plan.  MEKA completed and sent to [facility]. Writer discusses discharge with Christine  x 121. IPRO Level 2 phone screening completed today with Ms Mitch . Writer left message for JEANNIE Perry at [facility] . Writer sent [facility] SN[facility] referral to all  SNF. Discharge planning ensues.       Mental Status Exam:    Mood – Neutral  Sleep - Fair  Appetite - Good  ADLs - Fair  Thought Process – Linear    Observation – [id number].

## 2020-08-17 NOTE — PROGRESS NOTE BEHAVIORAL HEALTH - SUMMARY
69M domiciled at [place], PPH: schizophrenia, PMH: Parkinson disease, COPD, sent to [facility]'s ED for agitated behavior. Pt presents agitated, hostile, disorganised, appears to be responding to internal stimuli and paranoid about medications in context of poor adherence to medication regimen. TOO obtained (7/27/20). On evaluation, pt presents disorganized and cooperative, less verbally abusive and belligerent towards staff, verbally redirectable and less isolative, medication compliant. Started on haldol decanoate 8/4/2020.    #Schizophrenia  -TOO granted; as per TOO, if pt refuses Haldol PO, then Haldol 2.5 mg IM to be given  -change depakene syrup 500 mg bid to depakote sprinkles 500 mg bid to increase adherence --> valproic acid level 33, change to depakote dr 500 mg bid (7/28) --> taper depakote dr 250 mg bid (7/30) d/t possible drug-induced hyponatremia --> change formulation to depakene 250 mg bid to increase adherence (8/7)   -continue haldol concentrate 2 mg bid --> titrate haldol concentrate 5 mg qhs (7/20) --> titrate haldol concentrate 2 mg daily, haldol concentrate 5 mg qhs (7/23) --> titrate haldol concentrate 5 mg bid (7/27) --> change to haldol 5 mg bid (8/4) --> taper haldol 5 mg daily (8/14)  -start haldol decanoate 25 mg IM once (8/4) --> give haldol decanoate 25 mg IM once (8/7) --> give haldol decanoate 50 mg IM once (8/14)  -c/w cogentin 1 mg bid  -c/w melatonin 5 mg qhs  -start haldol concentrate 2.5 mg q8h prn for agitation  -c/w hydroxyzine 50 mg q6h prn for anxiety or/and insomnia    #HTN, Parkinsons, COPD  -c/w home medications  -medicine consulted  -PT/OT consult pending    #Poor PO Intake  -improved  -RD consulted; Ensure with meals  -Monitor weight every 3 days  -SLP consulted; advance diet as per notes    #Hyperkalemia  -resolved  -K 5.8 (8/10) --> K 5.7 (8/11) --> K 4.3 (8/12)  -pt denies sx, ECG WNL  -medicine consulted, kayexelate 30 mg PO x1 (8/11)  -RD consulted, no concentrated potassium modifier added to diet, Ensure switched to Neppro    #Mild hyponatremia  -resolved  -Na 131 (7/28) --> Na 130 (7/29) --> Na 128 (7/30) --> Na 128 (7/31) --> Na 136 (8/2)  -medicine consulted  -taper depakote 250 mg bid for possible drug-induced hyponatremia    Dispo: [facility]

## 2020-08-17 NOTE — PROGRESS NOTE BEHAVIORAL HEALTH - NSBHFUPINTERVALHXFT_PSY_A_CORE
Pt seen and evaluated, chart reviewed. As per nursing staff, pt has been compliant with medications since TOO obtained (7/27/20), at times resistant to depakote, verbally redirectable, mostly isolative to room. On evaluation, pt remains disorganized at times and difficult to understand, cooperative; as per collateral, Mecca (sister), pt's baseline. Pt endorses good mood, sleep and appetite, and regular BM (states last yesterday). Pt denies AVH, however is observed by staff to talk to self in room at times, pt does not appear in distress. Pt in good behavioral control. Denies SI/HI, intent and plan. MEKA sent, discharge planning started.

## 2020-08-18 RX ORDER — VALPROIC ACID (AS SODIUM SALT) 250 MG/5ML
250 SOLUTION, ORAL ORAL
Refills: 0 | Status: DISCONTINUED | OUTPATIENT
Start: 2020-08-18 | End: 2020-08-19

## 2020-08-18 RX ADMIN — ALBUTEROL 2 PUFF(S): 90 AEROSOL, METERED ORAL at 11:46

## 2020-08-18 RX ADMIN — Medication 5 MILLIGRAM(S): at 20:53

## 2020-08-18 RX ADMIN — HALOPERIDOL DECANOATE 5 MILLIGRAM(S): 100 INJECTION INTRAMUSCULAR at 08:17

## 2020-08-18 RX ADMIN — SENNA PLUS 2 TABLET(S): 8.6 TABLET ORAL at 20:53

## 2020-08-18 RX ADMIN — Medication 1 MILLIGRAM(S): at 08:17

## 2020-08-18 RX ADMIN — Medication 1 TABLET(S): at 08:18

## 2020-08-18 RX ADMIN — Medication 25 MILLIGRAM(S): at 20:53

## 2020-08-18 RX ADMIN — Medication 250 MILLIGRAM(S): at 20:53

## 2020-08-18 RX ADMIN — ALBUTEROL 2 PUFF(S): 90 AEROSOL, METERED ORAL at 17:46

## 2020-08-18 RX ADMIN — BUDESONIDE AND FORMOTEROL FUMARATE DIHYDRATE 2 PUFF(S): 160; 4.5 AEROSOL RESPIRATORY (INHALATION) at 20:51

## 2020-08-18 RX ADMIN — PANTOPRAZOLE SODIUM 40 MILLIGRAM(S): 20 TABLET, DELAYED RELEASE ORAL at 08:17

## 2020-08-18 RX ADMIN — ROPINIROLE 0.25 MILLIGRAM(S): 8 TABLET, FILM COATED, EXTENDED RELEASE ORAL at 08:17

## 2020-08-18 RX ADMIN — ROPINIROLE 0.25 MILLIGRAM(S): 8 TABLET, FILM COATED, EXTENDED RELEASE ORAL at 20:54

## 2020-08-18 RX ADMIN — Medication 250 MILLIGRAM(S): at 11:53

## 2020-08-18 RX ADMIN — Medication 25 MILLIGRAM(S): at 08:17

## 2020-08-18 RX ADMIN — BUDESONIDE AND FORMOTEROL FUMARATE DIHYDRATE 2 PUFF(S): 160; 4.5 AEROSOL RESPIRATORY (INHALATION) at 08:17

## 2020-08-18 RX ADMIN — Medication 1 MILLIGRAM(S): at 20:53

## 2020-08-18 NOTE — PROGRESS NOTE BEHAVIORAL HEALTH - NS ED BHA MED ROS ENT MOUTH
No complaints
No complaints
Unable to assess
No complaints
Unable to assess
No complaints
Unable to assess
No complaints
No complaints

## 2020-08-18 NOTE — PROGRESS NOTE BEHAVIORAL HEALTH - NSBHCHARTREVIEWVS_PSY_A_CORE FT
Vital Signs Last 24 Hrs  T(C): --  T(F): --  HR: --  BP: --  BP(mean): --  RR: --  SpO2: --
Vital Signs Last 24 Hrs  T(C): 36.7 (24 Jul 2020 10:22), Max: 36.7 (24 Jul 2020 10:22)  T(F): 98.1 (24 Jul 2020 10:22), Max: 98.1 (24 Jul 2020 10:22)  HR: 74 (24 Jul 2020 10:22) (74 - 74)  BP: 112/58 (24 Jul 2020 10:22) (112/58 - 112/58)  BP(mean): --  RR: 18 (24 Jul 2020 10:22) (18 - 18)  SpO2: --
Vital Signs Last 24 Hrs  T(C): 36.4 (21 Jul 2020 05:57), Max: 36.4 (21 Jul 2020 05:57)  T(F): 97.6 (21 Jul 2020 05:57), Max: 97.6 (21 Jul 2020 05:57)  HR: 59 (21 Jul 2020 05:57) (59 - 59)  BP: 104/70 (21 Jul 2020 05:57) (104/70 - 104/70)  BP(mean): --  RR: 18 (21 Jul 2020 05:57) (18 - 18)  SpO2: --
Vital Signs Last 24 Hrs  T(C): 35.6 (03 Aug 2020 08:15), Max: 35.6 (03 Aug 2020 08:15)  T(F): 96.1 (03 Aug 2020 08:15), Max: 96.1 (03 Aug 2020 08:15)  HR: 93 (03 Aug 2020 08:15) (93 - 97)  BP: 119/73 (03 Aug 2020 08:15) (119/73 - 133/77)  BP(mean): --  RR: 16 (03 Aug 2020 08:15) (16 - 16)  SpO2: --
ICU Vital Signs Last 24 Hrs  T(C): 37 (13 Jul 2020 16:30), Max: 37 (13 Jul 2020 16:30)  T(F): 98.6 (13 Jul 2020 16:30), Max: 98.6 (13 Jul 2020 16:30)  HR: 86 (14 Jul 2020 05:59) (71 - 86)  BP: 81/68 (14 Jul 2020 05:59) (81/68 - 112/72)  BP(mean): --  ABP: --  ABP(mean): --  RR: 15 (14 Jul 2020 05:59) (15 - 16)  SpO2: --
Vital Signs Last 24 Hrs  T(C): --  T(F): --  HR: --  BP: --  BP(mean): --  RR: --  SpO2: --
Vital Signs Last 24 Hrs  T(C): --  T(F): --  HR: --  BP: --  BP(mean): --  RR: --  SpO2: --
Vital Signs Last 24 Hrs  T(C): 35.8 (12 Aug 2020 08:34), Max: 36.1 (11 Aug 2020 15:44)  T(F): 96.4 (12 Aug 2020 08:34), Max: 97 (11 Aug 2020 15:44)  HR: 62 (12 Aug 2020 08:34) (62 - 69)  BP: 133/69 (12 Aug 2020 08:34) (120/73 - 133/69)  BP(mean): --  RR: 17 (12 Aug 2020 08:34) (16 - 17)  SpO2: --
Vital Signs Last 24 Hrs  T(C): 35.8 (14 Aug 2020 08:09), Max: 35.8 (14 Aug 2020 08:09)  T(F): 96.5 (14 Aug 2020 08:09), Max: 96.5 (14 Aug 2020 08:09)  HR: 84 (14 Aug 2020 08:09) (72 - 84)  BP: 118/71 (14 Aug 2020 08:09) (116/68 - 118/71)  BP(mean): --  RR: 20 (14 Aug 2020 08:09) (20 - 20)  SpO2: --
Vital Signs Last 24 Hrs  T(C): 36.3 (29 Jul 2020 20:30), Max: 36.3 (29 Jul 2020 20:30)  T(F): 97.4 (29 Jul 2020 20:30), Max: 97.4 (29 Jul 2020 20:30)  HR: 75 (29 Jul 2020 20:30) (75 - 75)  BP: 117/66 (29 Jul 2020 20:30) (117/66 - 117/66)  BP(mean): --  RR: 18 (29 Jul 2020 20:30) (18 - 18)  SpO2: --
Vital Signs Last 24 Hrs  T(C): 36.4 (10 Aug 2020 08:00), Max: 36.4 (10 Aug 2020 08:00)  T(F): 97.5 (10 Aug 2020 08:00), Max: 97.5 (10 Aug 2020 08:00)  HR: 68 (10 Aug 2020 08:00) (68 - 68)  BP: 129/77 (10 Aug 2020 08:00) (129/77 - 129/77)  BP(mean): --  RR: 16 (10 Aug 2020 08:00) (16 - 16)  SpO2: --
Vital Signs Last 24 Hrs  T(C): 36.4 (17 Aug 2020 05:50), Max: 36.4 (17 Aug 2020 05:50)  T(F): 97.6 (17 Aug 2020 05:50), Max: 97.6 (17 Aug 2020 05:50)  HR: 64 (17 Aug 2020 05:50) (64 - 70)  BP: 112/80 (17 Aug 2020 05:50) (112/80 - 125/73)  BP(mean): --  RR: 18 (17 Aug 2020 05:50) (16 - 18)  SpO2: --
Vital Signs Last 24 Hrs  T(C): 36.4 (28 Jul 2020 05:32), Max: 36.4 (28 Jul 2020 05:32)  T(F): 97.6 (28 Jul 2020 05:32), Max: 97.6 (28 Jul 2020 05:32)  HR: 84 (28 Jul 2020 08:50) (73 - 84)  BP: 114/82 (28 Jul 2020 08:50) (114/82 - 129/65)  BP(mean): --  RR: 16 (28 Jul 2020 05:32) (16 - 16)  SpO2: --
Vital Signs Last 24 Hrs  T(C): 36.7 (13 Aug 2020 06:01), Max: 36.7 (13 Aug 2020 06:01)  T(F): 98 (13 Aug 2020 06:01), Max: 98 (13 Aug 2020 06:01)  HR: 70 (13 Aug 2020 06:01) (70 - 70)  BP: 118/69 (13 Aug 2020 06:01) (118/69 - 118/69)  BP(mean): --  RR: 20 (13 Aug 2020 06:01) (20 - 20)  SpO2: --
Vital Signs Last 24 Hrs  T(C): 35.9 (07 Aug 2020 05:46), Max: 35.9 (07 Aug 2020 05:46)  T(F): 96.6 (07 Aug 2020 05:46), Max: 96.6 (07 Aug 2020 05:46)  HR: 93 (07 Aug 2020 05:46) (93 - 93)  BP: 109/67 (07 Aug 2020 05:46) (109/67 - 109/67)  BP(mean): --  RR: 18 (07 Aug 2020 05:46) (18 - 18)  SpO2: --
Vital Signs Last 24 Hrs  T(C): 36.3 (05 Aug 2020 05:40), Max: 36.3 (05 Aug 2020 05:40)  T(F): 97.4 (05 Aug 2020 05:40), Max: 97.4 (05 Aug 2020 05:40)  HR: 72 (05 Aug 2020 08:04) (68 - 72)  BP: 110/72 (05 Aug 2020 08:04) (110/72 - 140/67)  BP(mean): --  RR: 18 (05 Aug 2020 08:04) (18 - 18)  SpO2: 95% (05 Aug 2020 08:04) (95% - 95%)
Vital Signs Last 24 Hrs  T(C): 36 (27 Jul 2020 08:59), Max: 36.1 (27 Jul 2020 06:04)  T(F): 96.8 (27 Jul 2020 08:59), Max: 96.9 (27 Jul 2020 06:04)  HR: 74 (27 Jul 2020 08:59) (64 - 74)  BP: 107/67 (27 Jul 2020 08:59) (107/67 - 107/70)  BP(mean): --  RR: 18 (27 Jul 2020 08:59) (18 - 18)  SpO2: --
Vital Signs Last 24 Hrs  T(C): 36.2 (20 Jul 2020 06:00), Max: 36.2 (20 Jul 2020 06:00)  T(F): 97.1 (20 Jul 2020 06:00), Max: 97.1 (20 Jul 2020 06:00)  HR: 68 (20 Jul 2020 06:00) (68 - 68)  BP: 110/78 (20 Jul 2020 06:00) (110/78 - 110/78)  BP(mean): --  RR: 16 (20 Jul 2020 06:00) (16 - 16)  SpO2: --
Vital Signs Last 24 Hrs  T(C): 35.5 (23 Jul 2020 08:21), Max: 35.5 (23 Jul 2020 08:21)  T(F): 95.9 (23 Jul 2020 08:21), Max: 95.9 (23 Jul 2020 08:21)  HR: --  BP: --  BP(mean): --  RR: 16 (23 Jul 2020 08:21) (16 - 16)  SpO2: --
Vital Signs Last 24 Hrs  T(C): 35.6 (11 Aug 2020 05:33), Max: 36.9 (10 Aug 2020 15:37)  T(F): 96 (11 Aug 2020 05:33), Max: 98.5 (10 Aug 2020 15:37)  HR: 62 (11 Aug 2020 05:33) (62 - 65)  BP: 131/74 (11 Aug 2020 05:33) (131/74 - 141/65)  BP(mean): --  RR: 66 (11 Aug 2020 05:33) (16 - 66)  SpO2: --
Vital Signs Last 24 Hrs  T(C): 36.8 (04 Aug 2020 06:13), Max: 36.8 (04 Aug 2020 06:13)  T(F): 98.2 (04 Aug 2020 06:13), Max: 98.2 (04 Aug 2020 06:13)  HR: 87 (04 Aug 2020 06:13) (87 - 87)  BP: 103/65 (04 Aug 2020 06:13) (103/65 - 103/65)  BP(mean): --  RR: 18 (04 Aug 2020 06:13) (18 - 18)  SpO2: 96% (04 Aug 2020 06:13) (96% - 96%)
Vital Signs Last 24 Hrs  T(C): 36.6 (18 Aug 2020 05:54), Max: 36.6 (18 Aug 2020 05:54)  T(F): 97.9 (18 Aug 2020 05:54), Max: 97.9 (18 Aug 2020 05:54)  HR: 60 (18 Aug 2020 05:54) (60 - 68)  BP: 123/75 (18 Aug 2020 05:54) (123/75 - 132/78)  BP(mean): --  RR: 16 (18 Aug 2020 05:54) (16 - 16)  SpO2: --
Vital Signs Last 24 Hrs  T(C): 36.6 (10 Jul 2020 15:33), Max: 36.6 (10 Jul 2020 15:33)  T(F): 97.9 (10 Jul 2020 15:33), Max: 97.9 (10 Jul 2020 15:33)  HR: 70 (10 Jul 2020 15:33) (70 - 70)  BP: 130/78 (10 Jul 2020 15:33) (130/78 - 130/78)  BP(mean): --  RR: 16 (10 Jul 2020 15:33) (16 - 16)  SpO2: --
Vital Signs Last 24 Hrs  T(C): --  T(F): --  HR: --  BP: --  BP(mean): --  RR: --  SpO2: --
Vital Signs Last 24 Hrs  T(C): --  T(F): --  HR: --  BP: --  BP(mean): --  RR: --  SpO2: --
Vital Signs Last 24 Hrs  T(C): 35.7 (09 Jul 2020 19:22), Max: 35.7 (09 Jul 2020 19:22)  T(F): 96.3 (09 Jul 2020 19:22), Max: 96.3 (09 Jul 2020 19:22)  HR: 101 (09 Jul 2020 19:22) (101 - 101)  BP: 140/96 (09 Jul 2020 19:22) (140/96 - 140/96)  BP(mean): --  RR: 16 (09 Jul 2020 19:22) (16 - 16)  SpO2: --
Vital Signs Last 24 Hrs  T(C): 36.7 (29 Jul 2020 06:11), Max: 36.7 (29 Jul 2020 06:11)  T(F): 98.1 (29 Jul 2020 06:11), Max: 98.1 (29 Jul 2020 06:11)  HR: 65 (29 Jul 2020 06:11) (65 - 65)  BP: 130/71 (29 Jul 2020 06:11) (111/64 - 130/71)  BP(mean): --  RR: 18 (29 Jul 2020 06:11) (16 - 18)  SpO2: 96% (28 Jul 2020 15:35) (96% - 96%)
Vital Signs Last 24 Hrs  T(C): 36.5 (31 Jul 2020 06:15), Max: 36.5 (31 Jul 2020 06:15)  T(F): 97.7 (31 Jul 2020 06:15), Max: 97.7 (31 Jul 2020 06:15)  HR: 76 (31 Jul 2020 06:15) (76 - 81)  BP: 111/77 (31 Jul 2020 06:15) (111/77 - 154/91)  BP(mean): --  RR: 16 (31 Jul 2020 06:15) (16 - 18)  SpO2: --

## 2020-08-18 NOTE — PROGRESS NOTE BEHAVIORAL HEALTH - ORIENTED TO SITUATION
No
Other
No
Other
No
Other
No

## 2020-08-18 NOTE — PROGRESS NOTE BEHAVIORAL HEALTH - GAIT / STATION
Other
Other
Abnormal gait / station
Other
Other
Abnormal gait / station
Other
Abnormal gait / station
Other
Abnormal gait / station
Other
Abnormal gait / station
Other

## 2020-08-18 NOTE — PROGRESS NOTE BEHAVIORAL HEALTH - NSBHPTASSESSDT_PSY_A_CORE
05-Aug-2020 09:30
07-Aug-2020 09:30
10-Aug-2020 09:15
11-Jul-2020 10:44
12-Aug-2020 09:45
12-Jul-2020 10:04
13-Aug-2020 09:45
13-Jul-2020 11:59
14-Aug-2020 09:45
14-Jul-2020 11:41
17-Aug-2020 09:00
17-Jul-2020 09:15
22-Jul-2020 09:45
23-Jul-2020 10:00
28-Jul-2020 09:15
30-Jul-2020 09:45
27-Jul-2020 09:30
06-Aug-2020 10:30
20-Jul-2020 09:00
04-Aug-2020 10:15
15-Jul-2020 09:15
11-Aug-2020 09:30
24-Jul-2020 09:45
03-Aug-2020 08:45
16-Jul-2020 09:00
29-Jul-2020 09:15
31-Jul-2020 09:45
10-Jul-2020 10:00
18-Aug-2020 09:15
21-Jul-2020 09:00

## 2020-08-18 NOTE — PROGRESS NOTE BEHAVIORAL HEALTH - NSBHFUPTYPE_PSY_A_CORE
Inpatient
Inpatient-On Service Note
Inpatient
Inpatient

## 2020-08-18 NOTE — PROGRESS NOTE BEHAVIORAL HEALTH - MOOD
Other
Normal
Other
Normal
Other
Other
Normal

## 2020-08-18 NOTE — PROGRESS NOTE BEHAVIORAL HEALTH - NS ED BHA MSE SPEECH VOLUME
Normal
Loud
Normal
Loud

## 2020-08-18 NOTE — PROGRESS NOTE BEHAVIORAL HEALTH - BODY HABITUS
Underweight

## 2020-08-18 NOTE — PROGRESS NOTE BEHAVIORAL HEALTH - ORIENTATION OTHER
able to state year and month
unable to assess
able to state year and month
unable to assess
able to state year and month
able to state year and month; able to state in a hospital
able to state year and month
able to state year and month
unable to assess
unable to assess
able to state year and month; able to state in a hospital
able to state year and month
unable to assess

## 2020-08-18 NOTE — PROGRESS NOTE BEHAVIORAL HEALTH - RISK ASSESSMENT
Risk factors include poor medication and treatment adherence, resultant decompensation and active psychosis, current and past aggressive behaviors. Protective factors include residential stability, good treatment response.
Risk factors include hx of poor medication and treatment adherence, resultant decompensation and psychosis, past aggressive behaviors. Protective factors include residential stability, good treatment response when adherent to medication regimen, improved medication and treatment adherence, improved behavioral control.
Risk factors include poor medication and treatment adherence, resultant decompensation and active psychosis, current and past aggressive behaviors. Protective factors include residential stability, good treatment response.
Risk factors include poor medication and treatment adherence, resultant decompensation and active psychosis, current and past aggressive behaviors. Protective factors include residential stability, good treatment response.
Risk factors include poor medication and treatment adherence, resultant decompensation and psychosis, current and past aggressive behaviors. Protective factors include residential stability, good treatment response when adherent to medication regimen.
Risk factors include poor medication and treatment adherence, resultant decompensation and active psychosis, current and past aggressive behaviors. Protective factors include residential stability, good treatment response.
Risk factors include hx of poor medication and treatment adherence, resultant decompensation and psychosis, past aggressive behaviors. Protective factors include residential stability, good treatment response when adherent to medication regimen, improved medication and treatment adherence, improved behavioral control.
Risk factors include poor medication and treatment adherence, resultant decompensation and active psychosis, current and past aggressive behaviors. Protective factors include residential stability, good treatment response.

## 2020-08-18 NOTE — PROGRESS NOTE BEHAVIORAL HEALTH - REMOTE MEMORY
Impaired
Other
Impaired
Other
Impaired
Impaired
Other
Impaired
Other
Impaired
Other
Impaired
Other
Impaired
Other
Other

## 2020-08-18 NOTE — PROGRESS NOTE BEHAVIORAL HEALTH - AFFECT CONGRUENCE
Not congruent
Not congruent
Congruent
Not congruent
Congruent
Not congruent
Not congruent
Congruent
Not congruent

## 2020-08-18 NOTE — PROGRESS NOTE BEHAVIORAL HEALTH - NS ED BHA MSE SPEECH SPONTANEITY
Normal

## 2020-08-18 NOTE — PROGRESS NOTE BEHAVIORAL HEALTH - THOUGHT PROCESS
Illogical/Impaired reasoning
Illogical/Impaired reasoning
Linear
Impaired reasoning/Illogical
Impaired reasoning
Illogical/Impaired reasoning
Linear
Illogical/Impaired reasoning
Illogical/Impaired reasoning
Impaired reasoning
Impaired reasoning/Illogical
Illogical/Impaired reasoning
Other
Illogical/Impaired reasoning
Impaired reasoning

## 2020-08-18 NOTE — PROGRESS NOTE BEHAVIORAL HEALTH - NS ED BHA AXIS I PRIMARY CODE FT
F20.9

## 2020-08-18 NOTE — PROGRESS NOTE BEHAVIORAL HEALTH - JUDGMENT (REGARDING EVERYDAY EVENTS)
Poor
Fair
Poor
Fair
Poor
Fair
Poor
Fair
Poor
Fair
Poor
Fair
Poor

## 2020-08-18 NOTE — PROGRESS NOTE BEHAVIORAL HEALTH - ORIENTED TO TIME
Other
Yes
Other
No
No
Yes
No
Other
No
Yes
No
Yes

## 2020-08-18 NOTE — PROGRESS NOTE BEHAVIORAL HEALTH - THOUGHT ASSOCIATIONS
Other
Loose
Normal
Loose
Loose
Other
Loose
Normal
Loose
Loose
Other
Loose
Loose
Other
Normal
Other

## 2020-08-18 NOTE — PROGRESS NOTE BEHAVIORAL HEALTH - NS ED BHA MED ROS HEMATOLOGIC LYMPHATIC
No complaints
Unable to assess
No complaints
Unable to assess
No complaints
Unable to assess
No complaints
No complaints

## 2020-08-18 NOTE — PROGRESS NOTE BEHAVIORAL HEALTH - SUMMARY
69M domiciled at [place], PPH: schizophrenia, PMH: Parkinson disease, COPD, sent to [facility]'s ED for agitated behavior. Pt presents agitated, hostile, disorganised, appears to be responding to internal stimuli and paranoid about medications in context of poor adherence to medication regimen. TOO obtained (7/27/20). On evaluation, pt presents disorganized and cooperative, less verbally abusive and belligerent towards staff, verbally redirectable and less isolative, medication compliant however at times resistant to Depakote. Started on haldol decanoate 8/4/2020.    #Schizophrenia  -TOO granted; as per TOO, if pt refuses Haldol PO, then Haldol 2.5 mg IM to be given  -change depakene syrup 500 mg bid to depakote sprinkles 500 mg bid to increase adherence --> valproic acid level 33, change to depakote dr 500 mg bid (7/28) --> taper depakote dr 250 mg bid (7/30) d/t possible drug-induced hyponatremia --> change formulation to depakene 250 mg bid to increase adherence  -continue haldol concentrate 2 mg bid --> titrate haldol concentrate 5 mg qhs (7/20) --> titrate haldol concentrate 2 mg daily, haldol concentrate 5 mg qhs (7/23) --> titrate haldol concentrate 5 mg bid (7/27) --> change to haldol 5 mg bid (8/4) --> taper haldol 5 mg daily (8/14)  -start haldol decanoate 25 mg IM once (8/4) --> give haldol decanoate 25 mg IM once (8/7) --> give haldol decanoate 50 mg IM once (8/14)  -c/w cogentin 1 mg bid  -c/w melatonin 5 mg qhs  -start haldol concentrate 2.5 mg q8h prn for agitation  -c/w hydroxyzine 50 mg q6h prn for anxiety or/and insomnia    #HTN, Parkinsons, COPD  -c/w home medications  -medicine consulted  -PT/OT consult pending    #Poor PO Intake  -improved  -RD consulted; Ensure with meals  -Monitor weight every 3 days  -SLP consulted; advance diet as per notes    #Hyperkalemia  -resolved  -K 5.8 (8/10) --> K 5.7 (8/11) --> K 4.3 (8/12)  -pt denies sx, ECG WNL  -medicine consulted, kayexelate 30 mg PO x1 (8/11)  -RD consulted, no concentrated potassium modifier added to diet, Ensure switched to Neppro    #Mild hyponatremia  -resolved  -Na 131 (7/28) --> Na 130 (7/29) --> Na 128 (7/30) --> Na 128 (7/31) --> Na 136 (8/2)  -medicine consulted  -taper depakote 250 mg bid for possible drug-induced hyponatremia    Dispo: [place]

## 2020-08-18 NOTE — PROGRESS NOTE BEHAVIORAL HEALTH - ESTIMATED INTELLIGENCE
Below Average
Other
Below Average
Other
Below Average
Other
Below Average
Below Average
Other
Other

## 2020-08-18 NOTE — PROGRESS NOTE BEHAVIORAL HEALTH - EYE CONTACT
Fair
Poor
Fair

## 2020-08-18 NOTE — PROGRESS NOTE BEHAVIORAL HEALTH - NSBHFUPMEDSE_PSY_A_CORE
None known
Unable to assess
None known

## 2020-08-18 NOTE — PROGRESS NOTE BEHAVIORAL HEALTH - NSBHADMITIPOBSFT_PSY_A_CORE
As per unit protocol

## 2020-08-18 NOTE — PROGRESS NOTE BEHAVIORAL HEALTH - NSBHADMITIPREASON_PSY_A_CORE
Danger to others; mental illness expected to respond to inpatient care

## 2020-08-18 NOTE — PROGRESS NOTE BEHAVIORAL HEALTH - FUND OF KNOWLEDGE
Impaired
Other
Impaired

## 2020-08-18 NOTE — PROGRESS NOTE BEHAVIORAL HEALTH - AXIS III
Parkinsons disease, COPD

## 2020-08-18 NOTE — PROGRESS NOTE BEHAVIORAL HEALTH - AFFECT RANGE
Labile

## 2020-08-18 NOTE — PROGRESS NOTE BEHAVIORAL HEALTH - NS ED BHA MSE GENERAL APPEARANCE
No deformities present

## 2020-08-18 NOTE — PROGRESS NOTE BEHAVIORAL HEALTH - PRIMARY DX
Schizophrenia, unspecified type

## 2020-08-18 NOTE — PROGRESS NOTE BEHAVIORAL HEALTH - NSBHADMITDANGEROTHERS_PSY_A_CORE
high risk for assault

## 2020-08-18 NOTE — PROGRESS NOTE BEHAVIORAL HEALTH - PERCEPTIONS
Other
No abnormalities
Other
No abnormalities
Other

## 2020-08-18 NOTE — PROGRESS NOTE BEHAVIORAL HEALTH - NSBHFUPSUICINTERVAL_PSY_A_CORE
none known
none known
unable to assess
none known
unable to assess
none known
none known

## 2020-08-18 NOTE — PROGRESS NOTE BEHAVIORAL HEALTH - NSBHATTESTSEENBY_PSY_A_CORE
NP without Attending Psychiatrist
attending Psychiatrist without NP/Trainee
NP without Attending Psychiatrist
attending Psychiatrist without NP/Trainee
NP without Attending Psychiatrist
Attending Psychiatrist supervising NP/Trainee, meeting pt...
NP without Attending Psychiatrist
Attending Psychiatrist supervising NP/Trainee, meeting pt...
NP without Attending Psychiatrist

## 2020-08-18 NOTE — PROGRESS NOTE BEHAVIORAL HEALTH - INSIGHT (REGARDING PSYCHIATRIC ILLNESS)
Poor

## 2020-08-18 NOTE — PROGRESS NOTE BEHAVIORAL HEALTH - ORIENTED TO PLACE
Other
No
Other
Yes
No
Yes
No
No
Yes
Other
No
Yes
Yes
No

## 2020-08-18 NOTE — PROGRESS NOTE BEHAVIORAL HEALTH - RECENT MEMORY
Impaired
Other
Impaired
Other
Impaired
Other
Impaired
Other
Impaired
Impaired
Other
Other

## 2020-08-18 NOTE — PROGRESS NOTE BEHAVIORAL HEALTH - THOUGHT CONTENT
Other
Other/Unremarkable
Unremarkable/Other/Preoccupations
Other/Unremarkable
Delusions
Other
Other/Unremarkable
Other
Delusions
Other
Delusions
Delusions
Other
Delusions

## 2020-08-18 NOTE — PROGRESS NOTE BEHAVIORAL HEALTH - NSBHFUPINTERVALHXFT_PSY_A_CORE
Pt seen and evaluated, chart reviewed. As per nursing staff, pt has been compliant with medications since TOO obtained (7/27/20), at times resistant to depakote, verbally redirectable, mostly isolative to room. On evaluation, pt remains disorganized at times and difficult to understand, cooperative; as per collateral, [place] (sister), pt's baseline. Pt states "too many psychotropics", stating he is concerned of taking haldol and depakote at same time. Educated pt on benefits of medications, pt agreeable to continuing Haldol decanoate, remains resistant to Depakote at times. Pt endorses good mood, sleep and appetite, and regular BM (states last yesterday). Pt denies AVH, however is observed by staff to talk to self in room at times, pt does not appear in distress. Denies SI/HI, intent and plan. MEKA sent, discharge planning started.

## 2020-08-18 NOTE — PROGRESS NOTE BEHAVIORAL HEALTH - NS ED BHA MED ROS EYES
No complaints
Unable to assess
No complaints
Unable to assess
No complaints
Unable to assess
No complaints

## 2020-08-18 NOTE — PROGRESS NOTE BEHAVIORAL HEALTH - NSBHADMITIPDSM_PSY_A_CORE
see above for Axis I, II, III

## 2020-08-18 NOTE — PROGRESS NOTE BEHAVIORAL HEALTH - LANGUAGE
No abnormalities noted
Other
Other
No abnormalities noted
Other
No abnormalities noted
No abnormalities noted
Other
No abnormalities noted
Other
No abnormalities noted
Other
Other
No abnormalities noted
Other

## 2020-08-18 NOTE — PROGRESS NOTE BEHAVIORAL HEALTH - BEHAVIOR
Cooperative
Uncooperative
Uncooperative
Hostile/Uncooperative
Cooperative
Uncooperative/Hostile
Cooperative
Cooperative
Hostile
Cooperative
Uncooperative
Uncooperative/Hostile
Uncooperative/Hostile

## 2020-08-18 NOTE — PROGRESS NOTE BEHAVIORAL HEALTH - NSBHADMITIPOBS_PSY_A_CORE
Enhanced supervision

## 2020-08-18 NOTE — PROGRESS NOTE BEHAVIORAL HEALTH - ABNORMAL MOVEMENTS
Tremors
Tremors
Other
Tremors
Other
Tremors
Tremors
Other
Tremors
Other
Other
Tremors
Tremors

## 2020-08-18 NOTE — PROGRESS NOTE BEHAVIORAL HEALTH - GROOMING
Fair
Fair
Poor
Fair
Poor
Fair
Poor
Fair
Fair
Poor
Poor
Fair
Poor
Fair
Poor

## 2020-08-18 NOTE — PROGRESS NOTE BEHAVIORAL HEALTH - NS ED BHA MSE SPEECH ARTICULATION
Impaired

## 2020-08-18 NOTE — PROGRESS NOTE BEHAVIORAL HEALTH - ORIENTED TO PERSON
Yes

## 2020-08-18 NOTE — PROGRESS NOTE BEHAVIORAL HEALTH - NSBHCONSORIP_PSY_A_CORE
Inpatient Admission...

## 2020-08-18 NOTE — PROGRESS NOTE BEHAVIORAL HEALTH - NS ED BHA REVIEW OF ED CHART VITAL SIGNS REVIEWED
Yes
None available
None available
Yes

## 2020-08-19 VITALS — DIASTOLIC BLOOD PRESSURE: 63 MMHG | HEART RATE: 66 BPM | RESPIRATION RATE: 18 BRPM | SYSTOLIC BLOOD PRESSURE: 113 MMHG

## 2020-08-19 RX ORDER — VALPROIC ACID (AS SODIUM SALT) 250 MG/5ML
250 SOLUTION, ORAL ORAL
Qty: 0 | Refills: 0 | DISCHARGE
Start: 2020-08-19

## 2020-08-19 RX ORDER — LANOLIN ALCOHOL/MO/W.PET/CERES
1 CREAM (GRAM) TOPICAL
Qty: 0 | Refills: 0 | DISCHARGE

## 2020-08-19 RX ORDER — CALCIUM CARBONATE 500(1250)
1 TABLET ORAL
Qty: 0 | Refills: 0 | DISCHARGE
Start: 2020-08-19

## 2020-08-19 RX ORDER — VALPROIC ACID (AS SODIUM SALT) 250 MG/5ML
500 SOLUTION, ORAL ORAL
Qty: 0 | Refills: 0 | DISCHARGE

## 2020-08-19 RX ORDER — PANTOPRAZOLE SODIUM 20 MG/1
1 TABLET, DELAYED RELEASE ORAL
Qty: 0 | Refills: 0 | DISCHARGE
Start: 2020-08-19

## 2020-08-19 RX ORDER — BENZTROPINE MESYLATE 1 MG
1 TABLET ORAL
Qty: 0 | Refills: 0 | DISCHARGE
Start: 2020-08-19

## 2020-08-19 RX ORDER — ROPINIROLE 8 MG/1
1 TABLET, FILM COATED, EXTENDED RELEASE ORAL
Qty: 0 | Refills: 0 | DISCHARGE
Start: 2020-08-19

## 2020-08-19 RX ORDER — HALOPERIDOL DECANOATE 100 MG/ML
1 INJECTION INTRAMUSCULAR
Qty: 0 | Refills: 0 | DISCHARGE
Start: 2020-08-19

## 2020-08-19 RX ORDER — PANTOPRAZOLE SODIUM 20 MG/1
1 TABLET, DELAYED RELEASE ORAL
Qty: 0 | Refills: 0 | DISCHARGE

## 2020-08-19 RX ORDER — BENZTROPINE MESYLATE 1 MG
1 TABLET ORAL
Qty: 0 | Refills: 0 | DISCHARGE

## 2020-08-19 RX ORDER — ROPINIROLE 8 MG/1
0.25 TABLET, FILM COATED, EXTENDED RELEASE ORAL
Qty: 0 | Refills: 0 | DISCHARGE

## 2020-08-19 RX ORDER — HALOPERIDOL DECANOATE 100 MG/ML
1 INJECTION INTRAMUSCULAR
Qty: 0 | Refills: 0 | DISCHARGE

## 2020-08-19 RX ORDER — CALCIUM CARBONATE 500(1250)
1 TABLET ORAL
Qty: 0 | Refills: 0 | DISCHARGE

## 2020-08-19 RX ORDER — BUDESONIDE AND FORMOTEROL FUMARATE DIHYDRATE 160; 4.5 UG/1; UG/1
2 AEROSOL RESPIRATORY (INHALATION)
Qty: 0 | Refills: 0 | DISCHARGE
Start: 2020-08-19

## 2020-08-19 RX ORDER — METOPROLOL TARTRATE 50 MG
25 TABLET ORAL
Qty: 0 | Refills: 0 | DISCHARGE

## 2020-08-19 RX ORDER — ALBUTEROL 90 UG/1
2 AEROSOL, METERED ORAL
Qty: 0 | Refills: 0 | DISCHARGE
Start: 2020-08-19

## 2020-08-19 RX ORDER — SENNA PLUS 8.6 MG/1
2 TABLET ORAL
Qty: 0 | Refills: 0 | DISCHARGE
Start: 2020-08-19

## 2020-08-19 RX ORDER — METOPROLOL TARTRATE 50 MG
1 TABLET ORAL
Qty: 0 | Refills: 0 | DISCHARGE
Start: 2020-08-19

## 2020-08-19 RX ADMIN — Medication 1 TABLET(S): at 08:25

## 2020-08-19 RX ADMIN — ALBUTEROL 2 PUFF(S): 90 AEROSOL, METERED ORAL at 05:46

## 2020-08-19 RX ADMIN — BUDESONIDE AND FORMOTEROL FUMARATE DIHYDRATE 2 PUFF(S): 160; 4.5 AEROSOL RESPIRATORY (INHALATION) at 08:26

## 2020-08-19 RX ADMIN — Medication 25 MILLIGRAM(S): at 08:25

## 2020-08-19 RX ADMIN — Medication 250 MILLIGRAM(S): at 08:25

## 2020-08-19 RX ADMIN — PANTOPRAZOLE SODIUM 40 MILLIGRAM(S): 20 TABLET, DELAYED RELEASE ORAL at 08:25

## 2020-08-19 RX ADMIN — ROPINIROLE 0.25 MILLIGRAM(S): 8 TABLET, FILM COATED, EXTENDED RELEASE ORAL at 08:25

## 2020-08-19 RX ADMIN — HALOPERIDOL DECANOATE 5 MILLIGRAM(S): 100 INJECTION INTRAMUSCULAR at 08:25

## 2020-08-19 RX ADMIN — Medication 0.5 MILLIGRAM(S): at 11:50

## 2020-08-19 RX ADMIN — Medication 1 MILLIGRAM(S): at 08:25

## 2020-08-19 NOTE — DISCHARGE NOTE BEHAVIORAL HEALTH - NSBHDCRESPONSEFT_PSY_A_CORE
Pt has made significant progress over the course of hospitalization. With continuous psychotherapy from the treatment team and the medications, patient reports feeling better, sleeping and eating well. Thought process and insight improved. Pt has been mostly calm and cooperative, although at times observed to become verbally agitated, however verbally redirectable. Pt has remained in good behavioral control by discharge. Patient denied any suicidal or homicidal ideations. Patient denied any auditory or visual hallucinations, although observed at times to talk to self in room, no symptoms of distress. Pt was evaluated by treatment team, pt is stable for discharge and patient shows no imminent danger to self, others or property at this time. Pt understands and agrees with treatment plan and following up with outpatient. Psychoeducation provided regarding diagnosis, medications, treatment and follow up. Risks, benefits, alternatives discussed, all questions and concerns addressed and answered.

## 2020-08-19 NOTE — DISCHARGE NOTE BEHAVIORAL HEALTH - NSBHDCTHERAPYFT_PSY_A_CORE
Patient was provided with milieu therapy as well as daily supportive psychotherapy. Reinforced positive coping skills learned on the unit.

## 2020-08-19 NOTE — DISCHARGE NOTE BEHAVIORAL HEALTH - NSBHDCMEDSFT_PSY_A_CORE
Started pt on home medications - Depakene 500 mg BID, cogentin 1 mg bid, melatonin 5 mg qhs. Started pt on Haldol 2 PO mg bid. Pt refusing medication, TOO obtained 7/28/2020. Haldol PO titrated to Haldol 5 mg bid with Haldol 2.5 mg IM to be given if pt refused PO medication. Pt with good response to Haldol PO and no side effects, QTc WNL, transitioned to Haldol Decanoate (Haldol decanaote 25 mg IM on 8/4/2020; Haldol decanoate 25 mg IM on 8/7/2020; Haldol decanoate 50 mg IM on 8/14/2020). Haldol 5 mg bid tapered to Haldol 5 mg daily for overlap until next Haldol decanoate injection. D/t new onset hyponatremia Na 128, Depakene 500 mg bid tapered to Depakene 250 mg bid with resolution of hyponatremia. Pt tolerated medications well.

## 2020-08-19 NOTE — CHART NOTE - NSCHARTNOTEFT_GEN_A_CORE
Social Work Discharge Note:    Patient is for discharge today.   He is alert and oriented x3.  Mood is improved.  Anxiety has decreased.  Insight and judgment have improved.  Suicidal / homicidal ideation denied.  Patient in good behavioral control.     Patient will be discharged to [facility].  Patient will receive psychiatric services from psychiatrist at SNF. He is aware and agreeable to discharge.      Discharge huddle was held prior to discharge to discuss discharge plan. No safety concerns were verbalized at that time.

## 2020-08-19 NOTE — DISCHARGE NOTE BEHAVIORAL HEALTH - NSBHDCSIGEVENTSFT_PSY_A_CORE
Initially, pt hostile with potential for physical aggression, often aggressively posturing towards staff in context of medication nonadherence. S/p TOO with Haldol Decanoate and resumption of PO medication, pt had no further episodes of aggressive posturing or/and concerns of danger to self/others. Remains at times verbally agitated, however has been verbally redirectable.

## 2020-08-19 NOTE — DISCHARGE NOTE BEHAVIORAL HEALTH - MEDICATION SUMMARY - MEDICATIONS TO TAKE
I will START or STAY ON the medications listed below when I get home from the hospital:    calcium carbonate 500 mg (200 mg elemental calcium) oral tablet, chewable  -- 1 tab(s) by mouth once a day Continue to take as prescribed until told otherwise by your provider  -- Indication: For GERD    Depakene 250 mg/5 mL oral liquid  -- 250 milligram(s) by mouth 2 times a day  Continue to take as prescribed until told otherwise by your provider  -- Indication: For Schizophrenia    benztropine 1 mg oral tablet  -- 1 tab(s) by mouth 2 times a day  Continue to take as prescribed until told otherwise by your provider  -- Indication: For Eps    rOPINIRole 0.25 mg oral tablet  -- 1 tab(s) by mouth every 12 hours  Continue to take as prescribed until told otherwise by your provider  -- Indication: For Parkinsonism    haloperidol 5 mg oral tablet  -- 1 tab(s) by mouth once a day  Continue to take as prescribed until told otherwise by your provider  *Until next due Haldol Decanoate dose on 9/1/2020  -- Indication: For Schizophrenia    Haldol Decanoate 100 mg/mL intramuscular solution  -- 1 dose(s) intramuscular every 4 weeks Continue as prescribed until told otherwise by your provider  Initial dose given: 08/04/2020  Next dose due: 09/01/2020  -- Indication: For Schizophrenia    metoprolol tartrate 25 mg oral tablet  -- 1 tab(s) by mouth every 12 hours  Continue to take as prescribed until told otherwise by your provider  -- Indication: For HTN (hypertension)    albuterol 90 mcg/inh inhalation aerosol  -- 2 puff(s) inhaled every 6 hours, As needed, Bronchospasm  Continue to take as prescribed until told otherwise by your provider  -- Indication: For COPD    budesonide-formoterol 80 mcg-4.5 mcg/inh inhalation aerosol  -- 2 puff(s) inhaled 2 times a day  Continue to take as prescribed until told otherwise by your provider  -- Indication: For COPD    senna oral tablet  -- 2 tab(s) by mouth once a day (at bedtime)  Continue to take as prescribed until told otherwise by your provider  -- Indication: For Constipation    Melatonin 5 mg oral tablet  -- 1 tab(s) by mouth once a day (at bedtime)  Continue to take as prescribed until told otherwise by your provider  -- Indication: For Insomnia    ocular lubricant ophthalmic solution  -- 1 drop(s) to each affected eye 4 times a day, As needed, dry eyes  Continue to take as prescribed until told otherwise by your provider  -- Indication: For Dry eyes    pantoprazole 40 mg oral delayed release tablet  -- 1 tab(s) by mouth once a day (before a meal)  Continue to take as prescribed until told otherwise by your provider  -- Indication: For GERD

## 2020-08-19 NOTE — DISCHARGE NOTE BEHAVIORAL HEALTH - NSBHDCVIOLPROTECTFT_PSY_A_CORE
Supportive sister, medication adherence and starting of ARAGON, ability to state reasons for living, willingness to seek care in moment of crisis

## 2020-08-19 NOTE — DISCHARGE NOTE BEHAVIORAL HEALTH - MEDICATION SUMMARY - MEDICATIONS TO CHANGE
I will SWITCH the dose or number of times a day I take the medications listed below when I get home from the hospital:    Depakene 250 mg/5 mL oral liquid  -- 500 milligram(s) by mouth every 12 hours    benztropine 1 mg oral tablet  -- 1 tab(s) by mouth 2 times a day    calcium carbonate 500 mg (200 mg elemental calcium) oral tablet, chewable  -- 1 tab(s) by mouth once a day    Melatonin 5 mg oral tablet  -- 1 tab(s) by mouth once a day (at bedtime)    Lopressor  -- 25 milligram(s) by mouth every 12 hours    pantoprazole 40 mg oral delayed release tablet  -- 1 tab(s) by mouth once a day    rOPINIRole 0.25 mg oral tablet  -- 0.25 milligram(s) by mouth every 12 hours

## 2020-08-19 NOTE — DISCHARGE NOTE BEHAVIORAL HEALTH - NSBHDCVIOLFCTROTHERFT_PSY_A_CORE
Patient and provider identified future stressors as being arguments with sister/staff, nonadherence with medications Patient and provider identified future stressors as being arguments with sister/staff and nonadherence with medications.

## 2020-08-19 NOTE — CHART NOTE - NSCHARTNOTESELECT_GEN_ALL_CORE
Event Note
Event Note/Lab results
Event Note/PA Note
Event Note/RD follow up
Event Note/Social Work Note
PA note
RD follow up/Event Note
RD limited Follow-Up/Event Note
Social Work Note/Event Note
pa note - medical

## 2020-08-19 NOTE — DISCHARGE NOTE BEHAVIORAL HEALTH - NSBHDCREFERSUBST_PSY_A_CORE
Nursing Home Progress Note      Patient Name: Luba Landaverde   YOB: 1935    Date of Service: 10/24/2018     Morales Chris, DO [x]   Bridgette Montoya, APRN ?  [address] [place] [address]  Phone: [phone]  Fax: [phone] Ngoc Onofre MD ?  Shane Barber, DO ?  [address] [place] [address]  Phone: [phone]  Fax: [phone]       Facility: [place] []   [place] []   [facility] []   [facility] [x]   Other []       CHIEF COMPLAINT:  CMM/LTC     HISTORY OF PRESENT ILLNESS:  [x]  Follow Up visit for coordination of long term care issues and chronic medical management needs.    Hypertension/oral pharyngeal dysphasia/hemiparesis of right side secondary to CVA/aphasia secondary to CVA/impaired mobility and ADLs    PAST MEDICAL & SURGICAL HISTORY:  Past Medical History:   Diagnosis Date   • Cancer (CMS/HCC)     breast cancer, ~2000, surgery only and tamoxifen, otherwise no chemo/radiation   • Dysphagia     nothing by mouth since stroke in 2015; via feeding tube    • Dysphagia     since CVA 2015, strict NPO and PEG since. issues with aspiration if ever flat and not raised up. stomach issues with TF on higher rates, last hospitalization earlier in 2018 and cutting TF rate helped.   • H/O: hysterectomy    • Hypertension    • Stroke (CMS/HCC) 2015    L sided weakness       Past Surgical History:   Procedure Laterality Date   • CHOLECYSTECTOMY     • GASTROSTOMY FEEDING TUBE INSERTION     • MASTECTOMY          MEDICATIONS:  Medication administration record for Luba Landaverde reviewed and reconciled today.    ALLERGIES:  Allergies   Allergen Reactions   • Gadolinium Derivatives Anaphylaxis   • Codeine Unknown (See Comments)     Unknown, family thinks possibly trouble breathing   • Morphine Unknown (See Comments)     unknown   • Penicillins Hives   • Bactrim [Sulfamethoxazole-Trimethoprim] Rash   • Latex Rash   • Sulfa Antibiotics Rash        REVIEW OF  SYSTEMS:  • Appetite: Fair []   Good []   Poor []   Weight Loss []  [x]  Weight Stable   Unavoidable Weight Loss []  Tolerating Tube Feeding [x]    Supplements Provided []   • Constitutional: Negative for fever, chills, diaphoresis or fatigue and weight change.   • HENT: No dysphagia; no changes to vision/hearing/smell/taste; no epistaxis  • Eyes: Negative for redness and visual disturbance.   • Respiratory: Negative for shortness of breath, chest pain, cough or chest tightness.   • Cardiovascular: Negative for chest pain and palpitations.   • Gastrointestinal: Negative for abdominal distention, abdominal pain and blood in stool.   • Endocrine: Negative for cold intolerance and heat intolerance.   • Genitourinary: Negative for difficulty urinating, dysuria and frequency.Negative for hematuria   • Musculoskeletal: Chronic myalgias and arthralgias  • Integumentary: No open wounds, rash or concerning skin lesions  • Neurological: Negative for syncope, weakness and headaches.   • Hematological: Negative for adenopathy. Does not bruise/bleed easily.   • Immunological: Negative for reported allergies or immunological disorders  • Psychological: No depression, anxiety or suicidal ideation    PHYSICAL EXAMINATION:  VITAL SIGNS: /70  HR 70  RR 18  WEIGHT 139    General Appearance:  [x]  Alert   [x]  Oriented x person  [x]  No acute distress    [x]  Confused  []  Disoriented   []  Comatose   Head:  Atraumatic and normocephalic, without obvious abnormality.   Eyes:          PERRLA, conjunctivae and sclerae normal, no Icterus. No pallor. Extra-occular movements are within normal limits.   Ears:  Ears appear intact with no abnormalities noted.   Throat: No oral lesions, no thrush, oral mucosa moist.   Neck: Supple, trachea midline, no thyromegaly, no carotid bruit.   Back:   No kyphoscoliosis. No tenderness to palpation.   Lungs:   Chest shape is normal.  Audible air exchange noted all lung fields.  No wheezing.    Heart:   Normal S1 and S2, no murmur, no gallop, no rub. No JVD.   Abdomen:   Normal bowel sounds, no masses, no organomegaly. Soft, non-tender, non-distended, no guarding .  G-tube functioning and in good position.     Extremities: Chronic neuromuscular deficits of known CVA, cyanosis or clubbing.  Frail build.    Pulses: Pulses palpable and equal bilaterally.   Skin: No bleeding or rash.  Generalized dry skin noted.  Age-related atrophy of skin.   Neurologic: [] Normal speech []  Normal mental status    [x] Cranial nerves II through XII intact   [x]  No anosmia [x]  DTR 2+ [x]  Proprioception intact  []  No focal motor/sensory deficits  Chronic neuromuscular/aphasic deficits of CVA     Psych/Mood:        [x]  No acute changes []  Depressed  Urinary:        []  Continent  [x]  Incontinent  []  Retention  []  F/C     []  UTI w/treatment in progress  RECORD REVIEW:   • Consult notes []   • Admission and subsequent notes []   •  [x] I have personally reviewed and interpreted available lab data, radiology studies and ECG obtained at time of visit.  [x] Medication Review: I have reviewed the patients active and prn medications at [facility]     ASSESSMENT   Diagnoses and all orders for this visit:    Acute ischemic right MCA stroke (CMS/HCC)    Aphasia as late effect of cerebrovascular accident (CVA)    Essential hypertension    Gastrostomy status (CMS/HCC)    Impaired mobility and ADLs    Oropharyngeal dysphagia    Physical debility    Hemiparesis affecting right side as late effect of cerebrovascular accident (CVA) (CMS/HCC)        PLAN  Continue supportive care as needed for ADLs/mobility/transfers.  Tolerating tube feeding regimen without difficulty at this time, without reports of residuals per dietary/nursing.  No reports of any focal/recurrent aspirations, continue current aspiration precautions in place.  Blood pressure is at goal, asymptomatic.  Routine surveillance labs.  Noted weight gain of 7 pounds  since last visit.    [x]  Discussed Patient in detail with nursing/staff, addressed all needs today.     [x]  Plan of Care Reviewed   []   PT/OT Reviewed   []  Order Changes  []   Discharge Plans Reviewed         Total face to face time spent with patient 20 minutes, 15 min in counseling.    Morales Chris   10/24/2018             no

## 2020-08-19 NOTE — DISCHARGE NOTE BEHAVIORAL HEALTH - NSBHDCVIOLFCTRMIT_PSY_A_CORE
Patient can rely on his sister, Mecca, and SNF for social support. Patient has been medication adherent and currently on Haldol decanoate to increase medication adherence, not endorsing any side effects. Patient verbalizing reasons for living. Patient to use positive coping skills learned during the course of the inpatient hospitalization, ie ACCEPT.

## 2020-08-19 NOTE — DISCHARGE NOTE BEHAVIORAL HEALTH - HPI (INCLUDE ILLNESS QUALITY, SEVERITY, DURATION, TIMING, CONTEXT, MODIFYING FACTORS, ASSOCIATED SIGNS AND SYMPTOMS)
69M domiciled at [place], PPH: schizophrenia, PMH: Parkinson disease, COPD, sent to [facility]'s ED for agitated behavior. Pt previously sent on 5/20/20 for similar behavior. Pt with hx of intermittent non-compliance with medications with baseline mild paranoia and refusal to take medications if he does not recognize them. In the ED, pt with poorly articulated speech and physically resistant to care, difficult to understand, refusing medications. Pt denies SI/HI, states he feels safe in the home and wants to return there, has not been aggressive to staff, although easily irritable. UA/Ucx done. ECG QTc 433 ms. Valproic acid level <3.7. Collateral obtained from ED writer - BRIAN Hernández ([phone]) - has been a resident for 5 months, states pt has been taking his oral medications intermittently, however increasingly non-compliant resulting in increased agitation and yelling at people who pass by his room, observed to be talking to himself with increase in hallucinations, worsening for the last few weeks. States when pt takes medications, he does well. Pt was seen by psychiatrist last week who recommended pt to be given Haldol decanoate 100 mg, but staff unable to do so because pt refused. Collateral obtained from SW from RN at [facility] - pt transferred to [facility] for rehab after decompensation from medication nonadherence in previous group home. Most recent medication list includes depakote 500 mg bid, risperdal 4 mg daily and haldol 2 mg daily with start of haldol decanoate 100 mg IM once. Pt continues to refuse medication, applying for TOO.     In IPP, pt initially agitated, hostile, disorganised, appears to be responding to internal stimuli and paranoid about medications, refusing all medications. TOO obtained on 7/28/2020. Pt started on Haldol decanoate and began taking his PO medications with improvement to baseline.     Spoke with Mecca, pt's sister/HCP ([phone]) - states pt at baseline is disorganized, however cooperative and not a danger to self or/and others. States when pt is taking his medications he does well and is safe, agrees to transition to haldol decanoate when pt is stable and transfer back to [facility].

## 2020-08-19 NOTE — DISCHARGE NOTE BEHAVIORAL HEALTH - NSBHDCLABSFT_PSY_A_CORE
Continue to monitor weight, BP, CBC, CMP, Hemoglobin A1c, fasting glucose, Lipid profile and valproic acid level.

## 2020-08-19 NOTE — DISCHARGE NOTE BEHAVIORAL HEALTH - NSBHDCMEDICALFT_PSY_A_CORE
Drug-induced hyponatremia --> Depakote  mg BID tapered to Depakote  mg BID (which then switched formation to Depakene 250 mg BID)  Hyperkalemia --> Kayexalate x1, diet modified (no concentration potassium modifier added; Ensure discontinued, switched to Nepro)

## 2020-08-19 NOTE — CHART NOTE - NSCHARTNOTEFT_GEN_A_CORE
Received a phone call from XENIA Villareal about pt planning to be dc to [facility]. Pt needed to be COVID swabbed.    Pt was COVID-19 PCR swabbed and specimen dropped at 9:15.

## 2020-08-19 NOTE — DISCHARGE NOTE BEHAVIORAL HEALTH - NSBHDCTESTSFT_PSY_A_CORE
Na: 137  K: 4.3  Cr/BUN: 0.8/30  AST/ALT: 41/27  TSH: 3.77  Cr/BUN: 1.0/12   Cholesterol: 141  T  HDL: 34  LDL: 96  A1C: 4.8  Glu: 94    Valproic acid level: 33   QTc: 397 ms Na: 137  K: 4.3  Cr/BUN: 0.8/30  AST/ALT: 41/27  TSH: 3.77  Cholesterol: 141  T  HDL: 34  LDL: 96  A1C: 4.8  Glu: 94    Valproic acid level: 33   QTc: 397 ms

## 2020-08-20 LAB — SARS-COV-2 RNA SPEC QL NAA+PROBE: SIGNIFICANT CHANGE UP

## 2020-08-21 DIAGNOSIS — J44.9 CHRONIC OBSTRUCTIVE PULMONARY DISEASE, UNSPECIFIED: ICD-10-CM

## 2020-08-21 DIAGNOSIS — I10 ESSENTIAL (PRIMARY) HYPERTENSION: ICD-10-CM

## 2020-08-21 DIAGNOSIS — R47.1 DYSARTHRIA AND ANARTHRIA: ICD-10-CM

## 2020-08-21 DIAGNOSIS — G40.909 EPILEPSY, UNSPECIFIED, NOT INTRACTABLE, WITHOUT STATUS EPILEPTICUS: ICD-10-CM

## 2020-08-21 DIAGNOSIS — F41.9 ANXIETY DISORDER, UNSPECIFIED: ICD-10-CM

## 2020-08-21 DIAGNOSIS — N40.0 BENIGN PROSTATIC HYPERPLASIA WITHOUT LOWER URINARY TRACT SYMPTOMS: ICD-10-CM

## 2020-08-21 DIAGNOSIS — G20 PARKINSON'S DISEASE: ICD-10-CM

## 2020-08-21 DIAGNOSIS — L60.0 INGROWING NAIL: ICD-10-CM

## 2020-08-21 DIAGNOSIS — F32.9 MAJOR DEPRESSIVE DISORDER, SINGLE EPISODE, UNSPECIFIED: ICD-10-CM

## 2020-08-21 DIAGNOSIS — Z91.14 PATIENT'S OTHER NONCOMPLIANCE WITH MEDICATION REGIMEN: ICD-10-CM

## 2020-08-21 DIAGNOSIS — F20.9 SCHIZOPHRENIA, UNSPECIFIED: ICD-10-CM

## 2020-08-21 DIAGNOSIS — B35.1 TINEA UNGUIUM: ICD-10-CM

## 2020-08-21 DIAGNOSIS — Z66 DO NOT RESUSCITATE: ICD-10-CM

## 2020-08-21 DIAGNOSIS — E87.1 HYPO-OSMOLALITY AND HYPONATREMIA: ICD-10-CM

## 2020-08-21 DIAGNOSIS — R64 CACHEXIA: ICD-10-CM

## 2020-08-21 DIAGNOSIS — R45.1 RESTLESSNESS AND AGITATION: ICD-10-CM

## 2020-08-21 DIAGNOSIS — E87.5 HYPERKALEMIA: ICD-10-CM

## 2020-08-21 DIAGNOSIS — G47.00 INSOMNIA, UNSPECIFIED: ICD-10-CM

## 2020-08-21 DIAGNOSIS — L85.3 XEROSIS CUTIS: ICD-10-CM

## 2020-08-21 DIAGNOSIS — R63.6 UNDERWEIGHT: ICD-10-CM

## 2020-08-21 DIAGNOSIS — T42.6X5A ADVERSE EFFECT OF OTHER ANTIEPILEPTIC AND SEDATIVE-HYPNOTIC DRUGS, INITIAL ENCOUNTER: ICD-10-CM

## 2020-10-01 PROBLEM — F99 PSYCHIATRIC DISORDER: Status: ACTIVE | Noted: 2017-06-29

## 2020-10-21 NOTE — H&P ADULT - NSHPLABSRESULTS_GEN_ALL_CORE
Brisk,< 3 seconds   Peripheral Pulses: +2 palpable, equal bilaterally     Labs:   Recent Labs     10/19/20  0457 10/20/20  0507 10/21/20  0503   WBC 8.8 10.0 10.1   HGB 10.1* 11.1* 11.0*   HCT 33.0* 37.5* 36.6*    196 187     Recent Labs     10/19/20  0457 10/20/20  1805 10/21/20  0503    140 141   K 3.4 3.4 3.7   CL 95 94* 96   CO2 37* 40* 35*   BUN 13 16 15   CREATININE 0.38* 0.51* 0.43*   CALCIUM 8.2* 7.9* 8.2*   PHOS  --  2.8  --      Recent Labs     10/19/20  0457 10/21/20  0503   AST 22 32   ALT 6 20   BILITOT 0.5 0.4   ALKPHOS 113* 107*     Recent Labs     10/19/20  1845 10/20/20  0507 10/21/20  0503   INR 1.3 1.4 1.6     Recent Labs     10/18/20  1919 10/21/20  1111   TROPONINI 0.013* 0.016*     Urinalysis:      Lab Results   Component Value Date    NITRU Negative 06/18/2020    WBCUA 6-10 11/24/2019    BACTERIA Negative 11/24/2019    RBCUA 0-2 11/24/2019    BLOODU Negative 06/18/2020    SPECGRAV 1.019 06/18/2020    GLUCOSEU Negative 06/18/2020    GLUCOSEU . 1G/dL 11/21/2011     Radiology:  XR CHEST PORTABLE   Final Result   There is a persistent left chest tube in place and a small left apical pneumothorax. XR CHEST INSPIRATION AND EXPIRATION   Final Result      1. The left chest tube remains in place. There is a small left apical pneumothorax slightly more apparent on expiration. 2. There is a small right pleural effusion. XR CHEST PORTABLE   Final Result      STABLE POSTOPERATIVE CHEST. XR CHEST PORTABLE   Final Result   PERSISTENT PREDOMINANTLY LEFT LUNG AIRSPACE OPACITIES WITH NO IMPROVEMENT. XR CHEST PORTABLE   Final Result   PULMONARY VASCULAR IS CONGESTED INCREASED INTERSTITIAL MARKINGS SUGGESTING COMPONENT OF CHF. CORRELATE CLINICALLY   DEVELOPING AREA OF ATELECTASIS, GROUNDGLASS INFILTRATE RIGHT LOWER LOBE WITH TRACE RIGHT PLEURAL EFFUSION. AREA OF PATCHY TO COALESCENT INFILTRATE LEFT LOWER LOBE WITH TRACE LEFT PLEURAL EFFUSION.       XR CHEST PORTABLE   Final Result   Status post intubation and placement of 2 chest tubes on the left. There is evidence of partial left pneumonectomy. There is no pneumothorax. Persistent increased pulmonary markings are noted in the right upper lobe. Exam: XR CHEST PORTABLE, XR CHEST PORTABLE October 16, 2020 0459 hours      History:  resp failure       Technique: AP portable view of the chest obtained. Comparison: None        Findings:      The patient is intubated with the tip of the endotracheal tube at the thoracic inlet. The cardiomediastinal silhouette is within normal limits. There is volume loss and increased pulmonary markings in the right upper lobe unchanged since previous study    with a chronic appearance. There is no pneumothorax. There are 2 chest tubes on the left side and there are multiple surgical clips in the left hilum. There is mild subcutaneous adipose soft tissues of the left hemithorax. IMPRESSION:    Status post intubation and placement of 2 chest tubes on the left. There is evidence of partial left pneumonectomy. There is no pneumothorax. Persistent increased pulmonary markings are noted in the right upper lobe. XR CHEST PORTABLE   Final Result   Status post intubation and placement of 2 chest tubes on the left. There is evidence of partial left pneumonectomy. There is no pneumothorax. Persistent increased pulmonary markings are noted in the right upper lobe. Exam: XR CHEST PORTABLE, XR CHEST PORTABLE October 16, 2020 0459 hours      History:  resp failure       Technique: AP portable view of the chest obtained. Comparison: None        Findings:      The patient is intubated with the tip of the endotracheal tube at the thoracic inlet. The cardiomediastinal silhouette is within normal limits. There is volume loss and increased pulmonary markings in the right upper lobe unchanged since previous study    with a chronic appearance.  There is no pneumothorax. There are 2 chest tubes on the left side and there are multiple surgical clips in the left hilum. There is mild subcutaneous adipose soft tissues of the left hemithorax. IMPRESSION:    Status post intubation and placement of 2 chest tubes on the left. There is evidence of partial left pneumonectomy. There is no pneumothorax. Persistent increased pulmonary markings are noted in the right upper lobe. Assessment/Plan:    #Post op care and DVT ppx:  Per primary team  #A Fib:  Cardiology is managing  #Squamous cell carcnima:  S/p lobectomy by Dr Migel Kwon  #RLS:  Med resumed  #IBS:  Budenoside resumed  #Constipation:  Resolved  #Acute on chronic diastolic CHF:  Resolved    Active Hospital Problems    Diagnosis Date Noted    Severe malnutrition ([facility] [E43] 10/19/2020    COPD without exacerbation ([facility] [J44.9]     [facility] [C34.92] 10/15/2020     Additional work up or/and treatment plan may be added today or then after based on clinical progression. I am managing a portion of pt care. Some medical issues are handled by other specialists. Additional work up and treatment should be done in out pt setting by pt PCP and other out pt providers. In addition to examining and evaluating pt, I spent additional time explaining care, normal and abnormal findings, and treatment plan. All of pt questions were answered. Counseling, diet and education were  provided. Case will be discussed with nursing staff when appropriate. Family will be updated if and when appropriate. Diet: DIET CARB CONTROL;   Dietary Nutrition Supplements: Diabetic Oral Supplement    Code Status: Full Code    PT/OT Eval     Electronically signed by Miriam Carreon MD on 10/21/2020 at 12:27 PM EKG was reviewed, sinus rhythm at 83 beats per minute    Chest Xray was reviewed, hyperinflated, no obvious infiltrate

## 2020-11-05 NOTE — ED BEHAVIORAL HEALTH ASSESSMENT NOTE - OTHER PAST PSYCHIATRIC HISTORY (INCLUDE DETAILS REGARDING ONSET, COURSE OF ILLNESS, INPATIENT/OUTPATIENT TREATMENT)
Physical Therapy Daily Treatment    Visit Count: 38 for     Insurance Information: [employer] PPO      SUBJECTIVE   Present and reporting subjective information: patient and mother  Parent reports jumping improving at home  Current Pain/Behaviors: none   Functional Change: None    Objective Data:  Jumping:Miles able to clear bilateral feet during jumps this session without HHA 3/10 reps-demonstrate asymmetric clearance  Stairs: Step up/down 100% of reps on #1 step    Treatment     Therapeutic Activity:  Exercise Repetitions Sets Position/Cues   Platform swing 10  Severe fear of falling, gravitational insecurity   Climbing on [employer] ramp ladder, attempts to use knees/hands, requires facilitation to use feet 5  Requires MIN A to CGA   Step over 2, 4 inch obstacle 12 1 Always leading with L foot, able to safely complete 50% of reps   Squatting to ground to  food pieces 8  Intermittent use of alternate hand to steady self on ground   Catch and toss activity   Requires external assist to maintain sitting.   Negotiation of balance beam floor board \"step over\"   Able to complete 50-75% of reps   Stair climbing with 4 inch stair    CGA, leads with L LE   Sit>prone over peanut ball 8  MOD A to complete   Step up/step down on 4 inch green step   Able to complete with CGA or SBA   Step-up, jump down on \"#1\" nesting  bench 8 2 Able to complete jump down 2 reps   Jumping on trampoline   Sequence jump/bilateral clearance     Gait training:   -stair climbing with 1 HHA or no UE support, step-to pattern descending, attempting reciprocal pattern ascendin reps with 4 inch staircase     Skilled input: verbal instruction/cues, tactile instruction/cues        Suggestions for next session as indicated: progress per plan of care, stair climbing    ASSESSMENT   Andrew with improved tolerance of organized therapy for first portion of session today. Miles able to complete step up/down from #1 step with CGA or % of reps.  Andrew also improving with body awareness during jumping; difficulty with sequencing knee flexion, but improving mildly with two footed simultaneous clearance. Andrew continues to demonstrate developmental delay due to severe premature, requiring continued skilled physical therapy to assist in develop of appropriate gross motor skills and safety awareness.    Pain/behaviors after treatment: n/a  Result of above outlined education: Verbalizes understanding   hx of schizophrenia on Haldol, Cogentin, Risperdal, depakote hx of schizophrenia on Haldol decanoate 100 mg IM x 4 weeks (which he has been refusing), Depakote 500 mg twice daily   has been on congentin, Risperdal and Zyprexa,

## 2021-02-04 NOTE — PATIENT PROFILE ADULT - NSPROGENBLOODRESTRICT_GEN_A_NUR
No changes or discrepancies in medication or quantity.     Pharmacy set up and verified- [facility] in [place].   Subjective   Ravi Zabala is a 38 y.o. male who presents to [facility].    Chief Complaint:  Anxiety- Has had some increased stress at home and feels it has contributed to some anxiety. With anxiety he gets shallow breathing.  Sometimes feels his heart is beating harder than normal when he is laying down at night while thinking about things.  Occasionally has trouble sleeping at night, waking some throughout the night and has trouble returning to sleep.  He admits to dysphoric mood at times. Has been feeling fatigue and malaise for awhile.  Denies SI or HI.     3 migraine headaches within the last 3 weeks.  Previously had not experienced more than 1-2 per year since he was a teenager.  Migraines begin with blurred vision and resolve with sleep. Excedrin Migraine taken early enough will help minimize intensity of migraine.  Does not have nausea with migraine.   He has also had increased tension in the neck and bilateral shoulders, left worse than right.         The following portions of the patient's history were reviewed and updated as appropriate: He  has a past medical history of Migraine.  He  does not have any pertinent problems on file.  He  has a past surgical history that includes [place] tooth extraction.  His family history includes Heart attack in his father; Hypertension in his father; Pancreatic cancer in his paternal grandmother.  He  reports that he quit smoking about 16 years ago. He has never used smokeless tobacco. He reports that he drinks alcohol. He reports that he does not use illicit drugs.  Current Outpatient Prescriptions   Medication Sig Dispense Refill   • busPIRone (BUSPAR) 10 MG tablet Take 1 tablet by mouth 3 (Three) Times a Day As Needed (anxiety (may take 1/2 tab)). 90 tablet 1   • FLUoxetine (PROZAC) 10 MG capsule Take 1 capsule by mouth Daily. 30 capsule 2     No current facility-administered medications for this visit.        Review of Systems  Review of Systems    Constitutional: Positive for fatigue. Negative for activity change, appetite change, chills, fever and unexpected weight change.        Malaise   HENT: Negative for ear pain, hearing loss, nosebleeds, rhinorrhea, sore throat, trouble swallowing and voice change.    Eyes: Positive for photophobia (with migraines) and visual disturbance (with migraines). Negative for pain, discharge, redness and itching.        No dry eyes   Respiratory: Negative for cough, chest tightness, shortness of breath and wheezing.         No SOB with exertion  No SOB lying down   Cardiovascular: Negative for chest pain, palpitations and leg swelling.        No leg cramps   Gastrointestinal: Negative for abdominal pain, blood in stool, constipation, diarrhea, nausea and vomiting.        No frequent heartburn  No change in bowel habits   Endocrine: Negative for cold intolerance, heat intolerance, polydipsia, polyphagia and polyuria.        No Muscle weakness  No feeling of weakness  No Hot flashes   Genitourinary: Negative for decreased urine volume, difficulty urinating, discharge, dysuria, frequency, hematuria, penile pain, testicular pain and urgency.        No lump on testicles   Musculoskeletal: Negative for arthralgias, gait problem, joint swelling and myalgias.        No limb pain  No limb swelling   Skin: Negative for rash and wound.        No rash  No lesions  No Itching  No change in mole   Neurological: Positive for headaches. Negative for dizziness, tremors, seizures, syncope, weakness, light-headedness and numbness.        No trouble walking  No confusion  No tingling       Hematological: Negative for adenopathy. Does not bruise/bleed easily.   Psychiatric/Behavioral: Positive for decreased concentration, dysphoric mood and sleep disturbance. Negative for agitation, behavioral problems, confusion, hallucinations, self-injury and suicidal ideas. The patient is nervous/anxious. The patient is not hyperactive.         No change in  "personality         Objective    /70  Pulse 82  Temp 98.4 °F (36.9 °C)  Ht 185.4 cm (73\")  Wt 103 kg (228 lb)  SpO2 99%  BMI 30.08 kg/m2  Physical Exam   Constitutional: He is oriented to person, place, and time. He appears well-developed and well-nourished. No distress.   HENT:   Head: Normocephalic and atraumatic.   Right Ear: External ear normal.   Left Ear: External ear normal.   Mouth/Throat: Oropharynx is clear and moist. No oropharyngeal exudate.   Eyes: EOM are normal. Pupils are equal, round, and reactive to light. No scleral icterus.   Neck: Normal range of motion. Neck supple. No thyromegaly present.   Cardiovascular: Normal rate, regular rhythm and normal heart sounds.  Exam reveals no gallop and no friction rub.    No murmur heard.  Pulmonary/Chest: Effort normal and breath sounds normal. No respiratory distress. He has no wheezes. He has no rales. He exhibits no tenderness.   Abdominal: Soft. Bowel sounds are normal. He exhibits no distension. There is no tenderness. There is no rebound.   Musculoskeletal: Normal range of motion. He exhibits tenderness (bilateral trapezius tenderness). He exhibits no edema or deformity.   Lymphadenopathy:     He has no cervical adenopathy.   Neurological: He is alert and oriented to person, place, and time. He displays normal reflexes. No cranial nerve deficit. He exhibits normal muscle tone. Coordination normal.   Skin: Skin is warm and dry. He is not diaphoretic.   8 actinic keratoses scattered over bilateral lower arms.    Psychiatric: He has a normal mood and affect. His behavior is normal. Judgment and thought content normal.   Nursing note and vitals reviewed.    Destruction of Lesion  Date/Time: 2/22/2018 12:09 PM  Performed by: GUADALUPE VILLAGOMEZ  Authorized by: GUADALUPE VILLAGOMEZ   Consent: Verbal consent obtained. Written consent not obtained.  Risks and benefits: risks, benefits and alternatives were discussed  Consent given by: " "patient  Patient understanding: patient states understanding of the procedure being performed  Patient consent: the patient's understanding of the procedure matches consent given  Procedure consent: procedure consent matches procedure scheduled  Required items: required blood products, implants, devices, and special equipment available  Patient identity confirmed: verbally with patient  Time out: Immediately prior to procedure a \"time out\" was called to verify the correct patient, procedure, equipment, support staff and site/side marked as required.  Preparation: Patient was prepped and draped in the usual sterile fashion.  Local anesthesia used: no    Anesthesia:  Local anesthesia used: no    Sedation:  Patient sedated: no  Patient tolerance: Patient tolerated the procedure well with no immediate complications  Comments: Cryotherapy with 3 freeze-thaw cycles on each of 8 actinic keratosis lesions scattered on bilateral lower arms and hands.             Assessment/Plan      Diagnosis Plan   1. Encounter to establish care     2. Need for influenza vaccination  Flu Vaccine Quad PF 3YR+ (FLUARIX/FLUZONE [id number])     3. Preventative health care  Lipid Panel    Comprehensive Metabolic Panel    CBC (No Diff)     4. Depression with anxiety  TSH    Begin: FLUoxetine (PROZAC) 10 MG capsule daily  Discussed risks, benefits and possible side effects.     Begin: busPIRone (BUSPAR) 10 MG tablet tid prn     5. Sleep disturbances     6. Migraine with aura and without status migrainosus, not intractable  Vitamin D 25 Hydroxy  Continue Excedrin prn. Decreasing anxiety should help minimize headache frequency.      7. Malaise and fatigue  Vitamin D 25 Hydroxy     8. Actinic keratoses  Cryotherapy of 8 premalignant lesions. Advised to keep lesions clean and use OTC antibiotic ointment twice daily until fully resolved. Avoid sun exposure and wear sunscreen daily.            Return in about 1 month (around 3/22/2018) for Next scheduled " follow up.                  none

## 2021-03-12 NOTE — ED ADULT NURSE NOTE - CCCP TRG CHIEF CMPLNT
SUBJECTIVE:  Galina Olivarez is a 10 month old female who is accompanied by:mother     Chief Complaint   Patient presents with   • Office Visit     covid/sick visit       HPI   Galina is here for some hoarseness to voice that started about 2 days ago  She has not done any excessive crying   She has slight runny, congested nose  No fevers  Eating and drinking well  Eyes seem a little puffy to Mom    No vomit or diarrhea  Seems a little more fussy  Sleep is normal for her and up once a night to drink bottle    Mom felt poorly this week and had negative covid test   She has allergies and feels they are causing symptoms now    She did have balloon valvuplasty of pulmonic valve on 2/26 that went very well Dr Dsouza    Review of Systems   Constitutional: Negative for activity change, appetite change and fever.   HENT: Positive for congestion, rhinorrhea and sneezing.    Eyes: Negative for discharge.   Respiratory: Positive for cough.    Cardiovascular: Negative for fatigue with feeds and sweating with feeds.   Gastrointestinal: Negative for diarrhea and vomiting.   Skin: Negative for rash.       Patient's medications, allergies, past medical, surgical, social and family histories were reviewed and updated as appropriate.    Lives with moms  No     OBJECTIVE:  Visit Vitals  Pulse 126   Temp 99.1 °F (37.3 °C) (Temporal)   Resp 32   Wt 7.646 kg (16 lb 13.7 oz) Comment: weighed with clothes on   SpO2 98%     Physical Exam  Vitals signs and nursing note reviewed.   Constitutional:       General: She is not in acute distress.  HENT:      Head: Normocephalic.      Right Ear: Tympanic membrane normal.      Left Ear: Tympanic membrane normal.      Nose: No congestion or rhinorrhea.      Mouth/Throat:      Pharynx: No oropharyngeal exudate or posterior oropharyngeal erythema.   Eyes:      Conjunctiva/sclera: Conjunctivae normal.   Cardiovascular:      Comments: Murmur audible 3/6  Pulmonary:      Effort: No respiratory  distress.      Breath sounds: Normal breath sounds. No wheezing or rales.   Lymphadenopathy:      Cervical: No cervical adenopathy.   Neurological:      General: No focal deficit present.      Mental Status: She is alert.         ASSESSMENT/PLAN:  Galina was seen today for office visit.    Diagnoses and all orders for this visit:    Hoarseness of voice    Nasal congestion with rhinorrhea      No stridor or increases work of breathing  Looks very comfortable today  May have a viral process (not covid) or possibly starting some allergy symptoms  Would not treat with anything at this time  Hoarse voice just means irritation of the vocal cords and many times will go away in 2 weeks time    Continue supportive care.  Watch for worsening or symptoms that are not improving.  Call for any concerns or prolonged symptoms. Parents educated on signs/symptoms requiring immediate medical attention.     The mother indicated understanding of the diagnosis and agreed with the plan of care. All questions answered.         Shama Ruiz MD             difficulty breathing

## 2021-05-18 NOTE — ED PROVIDER NOTE - CLINICAL SUMMARY MEDICAL DECISION MAKING FREE TEXT BOX
Spontaneous, unlabored and symmetrical minor head injury; on prophylactic lkovenox, no other anticoagulation; pending ct imaging, if neg ok for d/c with ambulance back to [facility] minor head injury; on prophylactic lkovenox, no other anticoagulation; pending ct imaging, if neg ok for d/c with ambulance back to [facility]    d/w patient' sister who confirmed history, patient took a trip and fall and due to being on lovenox was concerned

## 2021-06-26 NOTE — ED BEHAVIORAL HEALTH ASSESSMENT NOTE - PATIENT SEEN BY
Current Calcium is on back order - need rx for alternate   NP with Telephonic supervision from Attending Psychiatrist

## 2021-06-28 NOTE — ED ADULT NURSE NOTE - CHPI ED NUR DURATION
I was present during the key portion of the procedure.
with assist/Walking-Outdoors allowed/Walking-Indoors allowed
today

## 2021-07-26 NOTE — ED PROVIDER NOTE - CONSTITUTIONAL, MLM
24 normal... Well appearing, well nourished, awake, alert, oriented to person, place, time/situation and in no apparent distress.

## 2021-09-02 NOTE — BEHAVIORAL HEALTH ASSESSMENT NOTE - NSBHCONSULTOBSREASON_PSY_A_CORE FT
pt is lethargic and mumbling incoherently to himself W Plasty Text: The lesion was extirpated to the level of the fat with a #15 scalpel blade.  Given the location of the defect, shape of the defect and the proximity to free margins a W-plasty was deemed most appropriate for repair.  Using a sterile surgical marker, the appropriate transposition arms of the W-plasty were drawn incorporating the defect and placing the expected incisions within the relaxed skin tension lines where possible.    The area thus outlined was incised deep to adipose tissue with a #15 scalpel blade.  The skin margins were undermined to an appropriate distance in all directions utilizing iris scissors.  The opposing transposition arms were then transposed into place in opposite direction and anchored with interrupted buried subcutaneous sutures.

## 2021-09-05 NOTE — BEHAVIORAL HEALTH ASSESSMENT NOTE - ABUSE / TRAUMA HISTORY
Night Shift Summary (9/4/21 into 09/05/21)    Pt in bed sleeping at start of shift. Breathing quiet and unlabored. Appears to have slept 6.5 hours.    Assault and Elopement precautions in addition to Single Room order; any related events noted above.     Will continue to monitor and assess.       Problem: Behavioral Health Plan of Care  Goal: Absence of New-Onset Illness or Injury  Outcome: Improving     Problem: Sleep Disturbance  Goal: Adequate Sleep/Rest  Outcome: Improving      No

## 2021-10-04 NOTE — PROGRESS NOTE BEHAVIORAL HEALTH - MUSCLE TONE / STRENGTH
Other
Normal
Other

## 2022-01-01 NOTE — ED PROVIDER NOTE - PATIENT PORTAL LINK FT
no You can access the FollowMyHealth Patient Portal offered by [facility] by registering at the following website: [email or web address]. By joining [employer]’s FollowMyHealth portal, you will also be able to view your health information using other applications (apps) compatible with our system.

## 2022-03-07 NOTE — DISCHARGE NOTE ADULT - PATIENT PORTAL LINK FT
“You can access the FollowHealth Patient Portal, offered by [facility], by registering with the following website: [email or web address]”
Yes

## 2022-03-30 NOTE — PHYSICAL THERAPY INITIAL EVALUATION ADULT - LEVEL OF INDEPENDENCE: GAIT, REHAB EVAL
minimum assist (75% patients effort) Ivermectin Counseling:  Patient instructed to take medication on an empty stomach with a full glass of water.  Patient informed of potential adverse effects including but not limited to nausea, diarrhea, dizziness, itching, and swelling of the extremities or lymph nodes.  The patient verbalized understanding of the proper use and possible adverse effects of ivermectin.  All of the patient's questions and concerns were addressed.

## 2022-07-10 NOTE — PROGRESS NOTE BEHAVIORAL HEALTH - NS ED BHA MED ROS PSYCHIATRIC
See HPI
denies pain/discomfort
See HPI

## 2022-07-22 NOTE — PROGRESS NOTE BEHAVIORAL HEALTH - ORIENTED TO PERSON
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 Pt doesn't need drug screen per     Yes

## 2022-08-02 NOTE — DIETITIAN INITIAL EVALUATION ADULT. - 20 CAL FROM
Is This A New Presentation, Or A Follow-Up?: Skin Lesions
How Severe Is Your Skin Lesion?: mild
Have Your Skin Lesions Been Treated?: not been treated
[id number]

## 2022-09-15 NOTE — DISCHARGE NOTE ADULT - FUNCTIONAL SCREEN CURRENT LEVEL: AMBULATION, MLM
Problem: At Risk for Falls  Goal: # Patient does not fall  Outcome: Outcome Met, Continue evaluating goal progress toward completion  Goal: # Takes action to control fall-related risks  Outcome: Outcome Met, Continue evaluating goal progress toward completion  Goal: # Verbalizes understanding of fall risk/precautions  Description: Document education using the patient education activity  Outcome: Outcome Met, Continue evaluating goal progress toward completion     Problem: Pain  Goal: #Acceptable pain level achieved/maintained at rest using NRS/Faces  Description: This goal is used for patients who can self-report.  Acceptable means the level is at or below the identified comfort/function goal.  Outcome: Outcome Met, Continue evaluating goal progress toward completion      (2) assistive person

## 2022-10-31 NOTE — BEHAVIORAL HEALTH ASSESSMENT NOTE - PRIMARY DX
The patient has been re-examined and I agree with the above assessment or I updated with my findings.
Schizophrenia, unspecified type

## 2022-11-03 NOTE — ED ADULT TRIAGE NOTE - MEANS OF ARRIVAL
Despite some risk factors, the patient does not demonstrate definitive evidence of glaucoma at this time. stretcher

## 2023-01-11 NOTE — ED PROVIDER NOTE - UNABLE TO OBTAIN
Dementia Protopic Counseling: Patient may experience a mild burning sensation during topical application. Protopic is not approved in children less than 2 years of age. There have been case reports of hematologic and skin malignancies in patients using topical calcineurin inhibitors although causality is questionable.

## 2023-04-13 NOTE — PATIENT PROFILE ADULT - HOW PATIENT ADDRESSED, PROFILE
August Detail Level: Detailed Quality 226: Preventive Care And Screening: Tobacco Use: Screening And Cessation Intervention: Patient screened for tobacco use and is an ex/non-smoker

## 2023-05-09 NOTE — ED PROVIDER NOTE - NS ED MD DISPO DIVISION OBS
[facility] Aklief Pregnancy And Lactation Text: It is unknown if this medication is safe to use during pregnancy.  It is unknown if this medication is excreted in breast milk.  Breastfeeding women should use the topical cream on the smallest area of the skin for the shortest time needed while breastfeeding.  Do not apply to nipple and areola.

## 2023-05-09 NOTE — PHYSICAL THERAPY INITIAL EVALUATION ADULT - PERTINENT HX OF CURRENT PROBLEM, REHAB EVAL
67y old  Male who presents with a chief complaint of Coughing and shortness of breath, Pt admitted for COPD exacerbation. Pt also has h/o parkinson's Purse String (Simple) Text: Given the location of the defect and the characteristics of the surrounding skin a purse string closure was deemed most appropriate.  Undermining was performed circumfirentially around the surgical defect.  A purse string suture was then placed and tightened.

## 2023-05-14 NOTE — H&P ADULT - NSHPPHYSICALEXAM_GEN_ALL_CORE
56
General appearance: NAD, Awake, Alert  HEENT: NCAT, Conjunctiva clear, EOMI, Pupils reactive  Neck: Supple, No JVD, No tenderness  Lungs: Decreased breath sounds B/L   Cardiovascular: S1S2, Regular rhythm, tachycardic   Abdomen: Soft, Nontender, Nondistended, No guarding/rebound, Positive bowel sounds  Extremities: No clubbing, No cyanosis, No edema, No calf tenderness  Neuro: Strength equal bilaterally, No tremors  Skin: No new Rash  Psychiatric: Appropriate mood, Normal affect

## 2023-05-30 NOTE — SWALLOW BEDSIDE ASSESSMENT ADULT - ASR SWALLOW ASPIRATION MONITOR
oral hygiene/change of breathing pattern/upper respiratory infection/pneumonia/position upright (90Y)/gurgly voice/fever/throat clearing/cough
oral hygiene/change of breathing pattern/fever/throat clearing/position upright (90Y)/cough/upper respiratory infection/gurgly voice/pneumonia
throat clearing/change of breathing pattern/position upright (90Y)/oral hygiene/fever/pneumonia/cough/upper respiratory infection/gurgly voice
Hydroxyzine Counseling: Patient advised that the medication is sedating and not to drive a car after taking this medication.  Patient informed of potential adverse effects including but not limited to dry mouth, urinary retention, and blurry vision.  The patient verbalized understanding of the proper use and possible adverse effects of hydroxyzine.  All of the patient's questions and concerns were addressed.

## 2023-06-20 NOTE — ED ADULT NURSE NOTE - TEMPLATE LIST FOR HEAD TO TOE ASSESSMENT
----- Message from Bonnie Farfan MD sent at 6/20/2023  9:42 AM CDT -----  Hep C negative. Microalbumin (protein in the urine) normal range. Repeat microalbumin next year with PCP.    Fluid/Electrolyte/Metabolic

## 2023-06-27 NOTE — H&P ADULT - PROBLEM SELECTOR PROBLEM 4
[de-identified] : WILL SEE ENDO THIS AFTERNOON AND WILL DO LABS THERE \par SEEN BY DERM FOR FACIAL RASH ,PRURITIC AND RECURRENT \par WAS TREATED AS HERPES WITH NO HELP ;AS PER DAUGHTER THEN WITH ORAL ANTIBIOTICS \par CC OF PARESTHESIAS TOES LEFT FOOT\par  Schizophrenia, unspecified type Parkinson disease Essential hypertension

## 2023-07-05 NOTE — ED ADULT NURSE NOTE - NSSEPSISNEWALTERMENTAL_ED_A_ED
Refill requested:  escitalopram (LEXAPRO) 20 MG tablet 90 tablet 1 10/24/2022    LOV: 06/09/2022  NOV: NONE scheduled      Last CPX 04/19/2022. Patient overdue for CPX.  PSR, please call pt and schedule appointment.   Once appt has been scheduled, pls route back to nurse pool.  
Same as previous message.  Letter sent.  Encounter closed.  
Unable to reach patient. LM to call back to schedule  
Yes

## 2023-08-01 NOTE — ED BEHAVIORAL HEALTH ASSESSMENT NOTE - AXIS III
Nasal Turnover Hinge Flap Text: The defect edges were debeveled with a #15 scalpel blade.  Given the size, depth, location of the defect and the defect being full thickness a nasal turnover hinge flap was deemed most appropriate. Using a sterile surgical marker, an appropriate hinge flap was drawn incorporating the defect. The area thus outlined was incised with a #15 scalpel blade. The flap was designed to recreate the nasal mucosal lining and the alar rim. The skin margins were undermined to an appropriate distance in all directions utilizing iris scissors. Following this, the designed flap was carried over into the primary defect and sutured into place COPD, parkinson's disease

## 2023-08-08 NOTE — PROGRESS NOTE ADULT - SUBJECTIVE AND OBJECTIVE BOX
Patient arrived to ambulatory for procedure.    PULMONARY PROGRESS NOTE      [id number]    Patient is a 69y old  Male who presents with a chief complaint of Acute on Chronic Hypercapnic Respiratory Failure, Acute COPD Exacerbation (2020 14:01)  Advance COPD  Schizophrenia at [facility]  Frequent admissions  Poor Rx adherence  Smoker     INTERVAL HPI/OVERNIGHT EVENTS:  Marginal on Bipap    MEDICATIONS  (STANDING):  albuterol/ipratropium for Nebulization 3 milliLiter(s) Nebulizer every 4 hours  albuterol/ipratropium for Nebulization. 3 milliLiter(s) Nebulizer once  azithromycin  IVPB      azithromycin  IVPB 500 milliGRAM(s) IV Intermittent every 24 hours  chlorhexidine 2% Cloths 1 Application(s) Topical daily  dexMEDEtomidine Infusion 0.5 MICROgram(s)/kG/Hr (7.825 mL/Hr) IV Continuous <Continuous>  enoxaparin Injectable 40 milliGRAM(s) SubCutaneous daily  haloperidol    Injectable 2.5 milliGRAM(s) IntraMuscular every 6 hours  influenza   Vaccine 0.5 milliLiter(s) IntraMuscular once  methylPREDNISolone sodium succinate Injectable 60 milliGRAM(s) IV Push every 6 hours  pantoprazole  Injectable      pantoprazole  Injectable 40 milliGRAM(s) IV Push daily  valproate sodium IVPB 250 milliGRAM(s) IV Intermittent every 6 hours      MEDICATIONS  (PRN):      Allergies    No Known Allergies    Intolerances        PAST MEDICAL & SURGICAL HISTORY:  Parkinson disease  Chronic obstructive pulmonary disease, unspecified COPD type  Schizophrenia, unspecified type  No significant past surgical history      SOCIAL HISTORY  Smoking History:       REVIEW OF SYSTEMS:    Poor informant    Vital Signs Last 24 Hrs  T(C): 35.5 (15 Maciej 2020 07:52), Max: 37.2 (2020 14:35)  T(F): 95.9 (15 Maciej 2020 07:52), Max: 99 (2020 14:35)  HR: 65 (15 Maciej 2020 09:00) (44 - 107)  BP: 122/77 (15 Maciej 2020 09:00) (101/66 - 161/99)  BP(mean): 94 (15 Maciej 2020 09:00) (65 - 125)  RR: 29 (15 Maciej 2020 09:00) (18 - 47)  SpO2: 96% (15 Maciej 2020 09:00) (82% - 99%)    PHYSICAL EXAMINATION:    GENERAL: The patient is awake and alert in no apparent distress.     HEENT: Head is normocephalic and atraumatic. Extraocular muscles are intact. Mucous membranes are moist.    NECK: Supple.    LUNGS: Exp rhonchi to auscultation without wheezing, rales; respirations unlabored on Bipap    HEART: Regular rate and rhythm without murmur.    ABDOMEN: Soft, nontender, and nondistended.      EXTREMITIES: Without any cyanosis, clubbing, rash, lesions or edema.    NEUROLOGIC: Grossly intact.    LABS:                        12.4   3.40  )-----------( 147      ( 15 Maciej 2020 05:13 )             38.5     01-15    135  |  96<L>  |  16.0  ----------------------------<  124<H>  5.0   |  29.0  |  0.55    Ca    9.0      15 Maciej 2020 05:13  Phos  4.4     15  Mg     2.4     01-15    TPro  7.0  /  Alb  4.0  /  TBili  0.6  /  DBili  x   /  AST  73<H>  /  ALT  44<H>  /  AlkPhos  142<H>  14    PT/INR - ( 2020 09:06 )   PT: 16.6 sec;   INR: 1.43 ratio         PTT - ( 2020 09:06 )  PTT:37.8 sec  Urinalysis Basic - ( 15 Maciej 2020 04:20 )    Color: Yellow / Appearance: Clear / S.025 / pH: x  Gluc: x / Ketone: Small  / Bili: Negative / Urobili: 1 mg/dL   Blood: x / Protein: 30 mg/dL / Nitrite: Negative   Leuk Esterase: Negative / RBC: 11-25 /HPF / WBC 0-2   Sq Epi: x / Non Sq Epi: Few / Bacteria: Few      ABG - ( 15 Maciej 2020 00:36 )  pH, Arterial: 7.32  pH, Blood: x     /  pCO2: 64    /  pO2: 82    / HCO3: 29    / Base Excess: 5.4   /  SaO2: 96            MICROBIOLOGY:  RVP neg        RADIOLOGY & ADDITIONAL STUDIES:    CXR neg on

## 2023-08-09 NOTE — DISCHARGE NOTE NURSING/CASE MANAGEMENT/SOCIAL WORK - PATIENT PORTAL LINK FT
You can access the FollowMyHealth Patient Portal offered by [facility] by registering at the following website: [email or web address]. By joining [facility] [employer]’s FollowMyHealth portal, you will also be able to view your health information using other applications (apps) compatible with our system.
negative

## 2023-09-07 NOTE — DISCHARGE NOTE BEHAVIORAL HEALTH - NSBHDCSUICPREVNU_PSY_A_CORE
[phone] Xeljanz Counseling: I discussed with the patient the risks of Xeljanz therapy including increased risk of infection, liver issues, headache, diarrhea, or cold symptoms. Live vaccines should be avoided. They were instructed to call if they have any problems.

## 2023-10-18 NOTE — H&P ADULT - NSHPLANGLIMITEDENGLISH_GEN_A_CORE
No Application Tool (Optional): Liquid Nitrogen Sprayer Render Post-Care Instructions In Note?: no Show Applicator Variable?: Yes Consent: The patient's consent was obtained including but not limited to risks of crusting, scabbing, blistering, scarring, darker or lighter pigmentary change, recurrence, incomplete removal and infection. Detail Level: Detailed Duration Of Freeze Thaw-Cycle (Seconds): 0 Post-Care Instructions: I reviewed with the patient in detail post-care instructions. Patient is to wear sunprotection, and avoid picking at any of the treated lesions. Pt may apply Vaseline to crusted or scabbing areas.

## 2023-12-26 NOTE — PATIENT PROFILE ADULT - HOW PATIENT ADDRESSED, PROFILE
August Subjective   Bradly Infante is a 76 y.o. male who presents for patient is here for follow-up and Medicare 1.  HPI  Bradly is 76-year-old male with known history of benign essential hypertension type 2 diabetes dyslipidemia erectile dysfunction and nocturia patient is here for follow-up voices no major complaint denies chest pain shortness of breath fever chills nausea vomiting constipation diarrhea dysuria urgency frequency, takes medication as prescribed.Complaining of syncope while helping cleaning after thanks giving at his sons house, Syncope  and collapse no recollection of event though was helped to the couch at his own accord.      Review of Systems  10 system review pertinent as above  Objective     Visit Vitals  /80   Pulse 78   Temp 36.7 °C (98.1 °F)   Resp 16        Physical Exam  HEENT: Atraumatic normocephalic the pupils are equal and round and reactive to light the sclerae nonicteric extraocular motion are intact.  Neck: Is supple without JVD no carotid bruits the trachea is midline there are no masses pulses are equal and bilateral with normal upstroke.  Skin: Normal.  Skin good texture.  Moist.  Good turgor.  No lesions, no rashes.  Lymph: No lymphadenopathy appreciated, no masses, no lesions  Lungs: Are clear to auscultation and percussion, good breath sounds bilaterally, no rhonchi, no wheezing, good diaphragmatic excursion.  Heart: Normal rate and normal rhythm S1, S2, no S3, no gallop, murmur or rub.  Abdomen: Soft, nontender, no organomegaly, good bowel sounds.    Extremities: Full range of motion, good pulses bilateral.  No cyanosis, no clubbing or edema.  Neuro: Cranial nerves II-XII are grossly intact there is no sensory or motor deficits.  Able to move all extremities.      Assessment/Plan     Is here for follow-up and fasting blood work     BMP    Syncope and collapse  Possible dehydrated in the setting  Of Polydipsia polyuria type 2 diabetes (occasionally forgets Metformin)  Out  for a few seconds  No recollection of event  TTE, ZIO, Carotid us, BMP Mag TSH Reviewd  Reviewed all test  Echo, normal maild MR  Holter pending  We spoke about cutting down etoh one glass per day  Long discussion with wife and patient    ECG sinus bradycardia  Holter monitor ordered pending    Losing weight  In the setting of polydipsia and polyuria  I suspect poorly controlled glucose   Will check BMP A1c  Metformin  BID  Jardiance 10 mg daily    Colonoscopy completed February 2021  Cardiac calcium score compared to January 2021 low    Immunizations    Continue with the low-fat, low-cholesterol diet,  I recommended Mediterranean diet, which include fish, chicken, vegetables and olive oil  Exercise daily for 30 minutes at least 3 times a week  Continue home medications    Diabetes  Continue your current medications  Remain on a Low carbohydrate diet, exercise daily  Check a sugar before breakfast and before dinner  On metformin take prn on his own   A1c 10.5% 12/01/2023    Hypertension  No added salt diet, do not and salt to your food  Try to exercise every other day for 30 minutes  Continue current medications    Poison Ivy dermatitis  Medrol dose pack  Triamcinolone cream     Problem List Items Addressed This Visit    None    Clay Otero MD

## 2024-02-09 NOTE — ED ADULT NURSE NOTE - NS ED NURSE RECORD ANOTHER VITAL SIGN
Patient: Torri Lowry    Procedure: Procedure(s):  OPEN REDUCTION INTERNAL FIXATION, FRACTURE, PATELLA       Diagnosis: Closed displaced transverse fracture of left patella with malunion, subsequent encounter [S82.910P]  Diagnosis Additional Information: No value filed.    Anesthesia Type:   General     Note:    Oropharynx: oropharynx clear of all foreign objects  Level of Consciousness: drowsy  Oxygen Supplementation: face mask  Level of Supplemental Oxygen (L/min / FiO2): 8  Independent Airway: airway patency satisfactory and stable  Dentition: dentition unchanged  Vital Signs Stable: post-procedure vital signs reviewed and stable  Report to RN Given: handoff report given  Patient transferred to: PACU    Handoff Report: Identifed the Patient, Identified the Reponsible Provider, Reviewed the pertinent medical history, Discussed the surgical course, Reviewed Intra-OP anesthesia mangement and issues during anesthesia, Set expectations for post-procedure period and Allowed opportunity for questions and acknowledgement of understanding      Vitals:  Vitals Value Taken Time   /68    Temp 37C    Pulse 64    Resp 14    SpO2 99        Electronically Signed By: JOCELYNE Vallejo CRNA  February 9, 2024  1:23 PM  
Yes

## 2024-03-20 NOTE — ED ADULT NURSE NOTE - CARDIO WDL
Spoke to pt. Reviewed results and advised medication was sent to pharmacy yesterday. Pt expressed understanding.    Normal rate, regular rhythm, normal S1, S2 heart sounds heard.

## 2024-04-03 NOTE — BEHAVIORAL HEALTH ASSESSMENT NOTE - LANGUAGE
Addended by: PAYTON MCBRIDE on: 4/3/2024 09:23 AM     Modules accepted: Orders    
No abnormalities noted

## 2024-04-12 NOTE — BEHAVIORAL HEALTH ASSESSMENT NOTE - LANGUAGE
April 15, 2024     Patient: Stephanie Saunders   YOB: 1975   Date of Visit: 4/12/2024       To Whom It May Concern:    Stephanie Saunders was seen in my clinic on 4/12/2024 at 7:45 am. Please excuse Stephanie for her absence from work on this day to make the appointment.  Also given her current medical concerns I think she should be excused from work through 4/26/2024 pending reevaluation and workup.     If you have any questions or concerns, please don't hesitate to call.         Sincerely,         Humphrey Alex DO        CC: No Recipients  
No abnormalities noted

## 2024-04-17 NOTE — DISCHARGE NOTE ADULT - FUNCTIONAL SCREEN CURRENT LEVEL: BATHING, MLM
[FreeTextEntry1] : outside xrays of the left knee UC from 8/26/21 reveals no fracture, or abnormalities.  OOW 1wk  Encouraged HEP and PT to improve mechanics and reduce pain. OOW until 9/27/21 2/9/22: Recommend restarting in PT, Mobic rx. OOW x 1 week. Follow up 4 weeks.  8/3/22: Left knee and leg pain flareup with calf pain about a week ago. Send for stat doppler LLE to r/o DVT. If doppler negative, will start PT. Mobic. fu 4 weeks  9/7/22: anticipating she will be able to return to work in 2 wks, if not she will contact me for options. Disability paperwork handed to office staff today. Apply ice to affected area. resume PT and HEP to improve mechanics and reduce pain. Has been beneficial for the patient. Questions addressed.  09/21/2022: Rtw full duty in 2 wks. resume PT and HEp as needed, Questions answered. Apply ice to affected area.  11/03/2022: Prescribed mdp to help alveated pain. confrims medial tenderness. resume pt Renewed. OOW until 12/1/22 12/1/22: CSI offered today - tolerated well. PT renewed OOW until 12/15/22 Questions addressed. Activity modifier as tolerated.  12-14-22- pt renewed mg 2 and variance for that continue out of work ambulate in the hkb due to no responsive treatment without structural damage on the mri advise rheumatology consult f/u 6 weeks  1/25/23: Treatment options discussed. Will continue PT. Rx for Mobic. OOW with HKB brace. Followup 4-6 weeks after rheum consult.  3/8/23: Continue PT. Will reschedule rheum consult. OOW with HKB. Return in 4-6 weeks.  4/19/23 41 year old female presents today for follow up.  Plan to continue Physical therapy, bracing. Will have patient follow up in 4-6 weeks after Rheum evaluation.  05/31/2023 pending blood work with rhematologist. will remain OOW work. RTO 4 wks or soon following result from rhematologist. Intends to drop off disability PPwork in the future. Questions addressed. Activity modifier as tolerated. Light duty is not an option aval to her.  07/05/23: Continue PT. Rheumatologist recommended an arthroscopy. I do not recommend going in due to lack of structural findings on MRI. She will get a second opinion. Questions addressed. Activity modifier as tolerated.  08/16/23: Treatment options reviewed. Continue PT. She has consult scheduled. Return in 4-6 weeks.  09/27/2023: Treatment options reviewed. She continues to have medial sided pain. Continue PT. Her 2nd opinion is scheduled. Follow up in 4-6 weeks.  11/01/2023: Continue PT. Recommend patient have second opinion.  Continue with brace, Patient is motivated to return to work, but no light duty is available. Return in 6 weeks. Questions answered.  12/13/2023: Treatment options reviewed. I reviewed the notes from her visit with Dr. Alfaro. She has a pain management appointment scheduled already. She has been making slow gains with PT. Patient would benefit from more PT. This is a formal request for 12-15 sessions of PT for increasing ROM, improving strength/mechanics, and decreasing pain.  Patient is motivated to return to work, but no light duty is available. Questions addressed.  Follow up in 6 weeks.  01/24/2024: Patient symptoms persist. Now awaiting auth for genicular block via Dr. Morel. Recommend continued PT for strengthening and mechanics. Follow up in 6 weeks. Questions answered.  03/06/2024: Symptoms persist. She has continued stiffness and pain.  We will obtain an updated MRI to evaluate. Patient is motivated to return to work, but no light duty is available. Activity modification as tolerated.  Questions addressed.   04/17/2024: Symptoms continue. Continue HEP. SLU performed. Return PRN. Questions answered.  The documentation recorded by the scribe accurately reflects the service I personally performed and the decisions made by me. I, Miguel Ángel Saez, attest that this documentation has been prepared under the direction and in the presence of Provider Jeff Osborn MD.   The patient was seen by Jeff Osborn MD.   (0) independent (2) assistive person

## 2024-04-24 NOTE — PATIENT PROFILE ADULT - DO YOU WANT TO COMPLETE THE HCP AND NAME A HEALTH CARE AGENT
Pt is a 35yo Female with PMH including asthma recent vaginal delivery 20d ago who presents to the hospital with sudden onset right flank pain x 2 hours. Patient found to have mild R hydro 2/2 a [id number] UVJ stone. Pt afebrile, WBC & Cr wnl.    PLAN:  -Extensive discussion had with patient &  at bedside regarding diagnosis and treatment options including trial of passage versus admission for trial of passage or possible cystoscopy & stent placement -- patient states that pain and nausea is well controlled at this time and would prefer discharge home for trial of passage with  follow-up  -Trial of passage  -Strain all urine  -Flomax if not contraindicated  -Pain control & antiemetics  -PO hydration encouraged  -Ambulation encouraged  -Strict return precautions discussed with patient &  including, but not limited to: development of fever, intractable pain/vomiting, overall worsening of symptoms -- patient verbalized understanding and agreement  -Close outpatient follow-up with Dr. Montenegro within 1 week  -Discussed with ED team
no

## 2024-06-11 NOTE — ED PROVIDER NOTE - NS ED MD DISPO ISOLATION TYPES
Test was performed.  :1947  AGE:76 year old  Ordering provider: Dr. Rmaírez  Diagnosis: No associated diagnoses for current labs      None

## 2024-07-01 NOTE — BEHAVIORAL HEALTH ASSESSMENT NOTE - THOUGHT PROCESS
Dr. Jaeger made aware of difficulty to catheterize pt and will be consulting urology. Per Dr. Jaeger, antibiotics are okay to start prior to collecting urine sample.    Impaired reasoning/Disorganized

## 2024-08-08 NOTE — PATIENT PROFILE ADULT - VISION (WITH CORRECTIVE LENSES IF THE PATIENT USUALLY WEARS THEM):
-- DO NOT REPLY / DO NOT REPLY ALL --  -- This inbox is not monitored. If this was sent to the wrong provider or department, reroute message to P ECO Reroute pool. --  -- Message is from Engagement Center Operations ([facility]) --    General Patient Message: Caro with [facility] is calling to notify the office that she will be faxing over patient's lab results. Also, Caro would like a call back regarding a medication that will possibly need adjustments due to his lab results. Sending as high priority per Caro's request.      Caller Information         Type Contact Phone/Fax    08/08/2024 02:36 PM CDT Phone (Incoming) Caro [phone]     [facility]            Alternative phone number: [phone]    Can a detailed message be left? Yes - Voicemail - only on the [phone] number       Patient has been advised the message will be addressed within 2-3 business days.             Normal vision: sees adequately in most situations; can see medication labels, newsprint

## 2024-08-14 NOTE — ED ADULT NURSE NOTE - CHPI ED SYMPTOMS POS
[FreeTextEntry1] : The condition was explained to the patient.  Discussed risks and benefits of treatment options for tenosynovitis - activity modification, NSAID, splint, steroid injection, or surgery. Patient would like to proceed with CSI for FCR tenosynovitis. MODERATE risk procedure due to patient risk factors - Diabetes. - Discussed risks, benefits, and alternatives as well as contents of injection. Risks include, but are not limited allergic reaction, flare reaction, injection site pain, bruising, numbness, increased blood sugar, skin discoloration, fat atrophy, tendon rupture, and infection. Risk of immune suppression and increased susceptibility to infection with steroid use. We discussed that too many injections may lead to weakening of the tendon and tendon rupture. Patient expressed understanding and would like to proceed with injection. - The skin over the RIGHT FCR tendon was cleansed with alcohol and anesthetized with ethyl chloride. The RIGHT FCR flexor sheath was injected with 3mg of celestone, 0.5cc of 1% lidocaine. Site was dressed with gauze and an ACE wrap. Patient tolerated the procedure well. - discussed that it may take up to 1 week for symptoms to improve after CSI.   - wear wrist brace PRN pain. - renewed PT for R wrist.  F/u 6 weeks. CHANGE IN LEVEL OF CONSCIOUSNESS

## 2025-01-03 NOTE — PATIENT PROFILE ADULT - NSTOBACCO QUIT READY_GEN_A_CORE_SD
"Physical Therapy                 Therapy Communication Note    Patient Name: Vincent Ramirez \"Car\"  MRN: [id number]  Department:   Room: Room/bed info not found  Today's Date: 1/3/2025     Discipline: Physical Therapy    Missed Time: Cancel    Comment: Pt cancelled stating that he needs to take his wife to an appointment. Stated he will be here for his next mark on 1/7/2025.  " not motivated to quit

## 2025-01-13 NOTE — DISCHARGE NOTE ADULT - DEVELOP COPING SKILLS. FOR EXAMPLE, STRATEGIES AND LIFESTYLE CHANGES THAT REDUCE NEGATIVE MOODS, STRESS, AND EXPOSURE TO SMOKING CUES
You were seen in the [facility] ED for back pain. You may have suffered a contusion. Use ibuprofen and tylenol for pain. You can also try using ice and/or heat. Follow-up with your primary care doctor. Return to the ED for severe worsening pain, difficulty breathing, severe lightheadedness/loss of consciousness, or other concerns.   
Statement Selected

## 2025-01-30 NOTE — H&P ADULT - REASON FOR ADMISSION
[facility]   UROLOGY HISTORY AND PHYSICAL    Patient Name: Roddy Steen  : 1959  MRN: [id number]  Primary Care Physician:  Snellen, Danielle, APRN  Date of admission: 2025    Subjective   Subjective     Chief Complaint:     Elevated PSA    HPI:    Roddy Steen is a 65 y.o. male elevated PSA    No change in H&P    Review of Systems     10 systems reviewed and are negative other than what is listed in HPI    Personal History     Past Medical History:   Diagnosis Date    Asthma     COPD (chronic obstructive pulmonary disease)     Emphysema of lung     Fibrosis lung     History of colonoscopy     Complete    Hypertension     IBS (irritable bowel syndrome)     Migraine     PTSD (post-traumatic stress disorder)     Rotator cuff tear     Sleep apnea        Past Surgical History:   Procedure Laterality Date    APPENDECTOMY  2005    PALATOPHARYNGOPLASTY  2010    ROTATOR CUFF REPAIR Right 2015    VASECTOMY  1963    Vas Deferens Vasectomy       Family History: family history includes Alcohol abuse in his brother and mother; Colon cancer in his father; Hypertension in his father. Otherwise pertinent FHx was reviewed and not pertinent to current issue.    Social History:  reports that he has quit smoking. His smoking use included cigarettes. He has been exposed to tobacco smoke. His smokeless tobacco use includes snuff. He reports current alcohol use. He reports that he does not use drugs.    Home Medications:  EPINEPHrine, Fluticasone-Salmeterol, PARoxetine, SUMAtriptan, albuterol sulfate HFA, amLODIPine, atorvastatin, celecoxib, cetirizine, ciprofloxacin, escitalopram, hydroCHLOROthiazide, losartan, and propranolol      Allergies:  Allergies   Allergen Reactions    Gabapentin Hallucinations and Other (See Comments)     Gabapentin mixed with hydrocodone, delusions hallucinations    Hallucinations- when combined with Hydrocodone       Objective   Objective     Vitals:   Temp:  [97.8 °F (36.6 °C)]  97.8 °F (36.6 °C)  Heart Rate:  [62] 62  Resp:  [18] 18  BP: (138)/(83) 138/83  Physical Exam    Constitutional: Awake, alert    Respiratory: Clear to auscultation bilaterally, nonlabored respirations    Cardiovascular: RRR, no murmurs, rubs, or gallops, palpable pedal pulses bilaterally   Gastrointestinal: Positive bowel sounds, soft, nontender, nondistended     Result Review    Result Review:  I have personally reviewed the results from the time of this admission to 1/30/2025 10:27 EST and agree with these findings:  []  Laboratory  []  Microbiology  []  Radiology  []  EKG/Telemetry   []  Cardiology/Vascular   []  Pathology  []  Old records  []  Other:    Assessment & Plan   Assessment / Plan     Brief Patient Summary:  Roddy Steen is a 65 y.o. male     Active Hospital Problems:  Active Hospital Problems    Diagnosis     **Elevated prostate specific antigen (PSA)        MRI fusion transrectal ultrasound-guided prostate biopsy.  Risks and benefits were discussed including bleeding, infection and damage to the urinary system.  We also discussed the risk of anesthesia up to and including death.  Patient voiced understanding and would like to proceed.    Electronically signed by Severo Garcia MD, 01/30/25, 10:27 AM EST.            SOB

## 2025-02-21 NOTE — SWALLOW BEDSIDE ASSESSMENT ADULT - SLP PERTINENT HISTORY OF CURRENT PROBLEM
Received request via: Patient    Was the patient seen in the last year in this department? Yes    Does the patient have an active prescription (recently filled or refills available) for medication(s) requested? No    Pharmacy Name: [facility] [address] [place] [address] [[id number]]     Does the patient have senior living Plus and need 100-day supply? (This applies to ALL medications) Patient does not have SCP   Pt known to this dept: MBS in August 2017 was negative for stasis or aspiration, rx. regular and thin liquids. Evaluated at bedside on 12/5/19 with rx for regular and thin liquids. Pt with aspiration PNA

## 2025-03-13 NOTE — CONSULT NOTE ADULT - PROBLEM/RECOMMENDATION-2
Detail Level: Detailed Add 02783 Cpt? (Important Note: In 2017 The Use Of 97655 Is Being Tracked By Cms To Determine Future Global Period Reimbursement For Global Periods): yes DISPLAY PLAN FREE TEXT

## 2025-07-23 NOTE — PROGRESS NOTE ADULT - I WAS PHYSICALLY PRESENT FOR THE KEY PORTIONS OF THE EVALUATION AND MANAGEMENT (E/M) SERVICE PROVIDED.  I AGREE WITH THE ABOVE HISTORY, PHYSICAL, AND PLAN WHICH I HAVE REVIEWED AND EDITED WHERE APPROPRIATE
HOSPITALIST DAILY PROGRESS NOTE    Patient: Shaheed Cruz Attending: Андрей Hi MD   : 1963 Date: 2025 2:01 PM   AGE: 61 year old Current Hospital Day: Hospital Day: 2   SEX:  male    HPI: 61-year-old male with PMH of type 2 diabetes mellitus, hypertension, hyperlipidemia, CAD s/p 5 stents and extensive history of recurrent surgical revision of the left leg(13 surgeries).  Initially patient underwent left total knee arthroplasty in , with subsequent a septic revision in , with an MRSA positive infection in  s/p postdebridement necrosis.  He was recently admitted at [facility] in early July for pain and redness to the left leg treated for cellulitis via IV antibiotics.  He was discharged on  doxycycline and Cipro that completed on 2025.  Patient was followed by ID previously and received 1 year of Bactrim therapy as well as fluconazole.  Patient states while recently admitted he received vancomycin which caused him to have throat swelling, no rashes or other complaints.  Patient states he received epinephrine with relief.  Patient now presents with a swelling, erythema, edema of left leg with drainage that he noticed on Monday.  Patient denies any fevers or chills.      On arrival patient remained afebrile on room air.  Labs significant to hemoglobin 10.8, glucose 133, sodium 134 A1c 9.3. Left tibia/fibula x-ray showed extensive postsurgical changes of the left knee arthroplasty, intramedullary merlin and metallic plates throughout the left tibia without hardware failure. No acute fracture is seen there is soft tissue swelling throughout the left lower extremity. No evidence for soft tissue emphysema is seen.  There is no radiopaque foreign bodies except for surgical clips at the upper lower leg level.    ED started cefepime, linezolid, consulted wound care, ID and patient was admitted at [place].    Subjective  Patient was seen and examined today.  Patient still complains of left leg  pain. He states that he does not want to go back to [facility] because of his recent experience, but he understands that our Ortho won't do any intervention on his leg if he needs it in a future. Patient denies any fevers or chills no nausea vomiting or abdominal pain no chest pain or palpitations.  Remains on room air.    ROS: 12 Point of review of system discussed with the patient in detail, pertinent positive findings are documented above.  Objective:    Vitals:   Vitals:    07/23/25 1345   BP:    Pulse:    Resp: 16   Temp:          Physical Exam:  CONSTITUTIONAL: No acute distress, pt is laying calmly in bed  HEENT: Normocephalic,  PERRLA EOMI, moist oral mucosa, neck supple  CHEST:  No chest wall tenderness to palpitation.  RESPIRATORY: Lungs clear to auscultation bilaterally with no wheezes, rhonchi or rails.  CARDIOVASCULAR:  Regular rate and rhythm with normal S1, S2 and no murmur.  GASTROINTESTINAL: Soft, nontender,+ bowel sounds x4 with no masses, guarding or rebound tenderness .  MUSCULOSKELETAL: no joint effusions; no asymmetry in RLE.  Left leg edema noted and wound covered with dressing, CDI.  EXTREMITIES: Pulses intact, no edema no rash.  NEURO: Cranial nerves II - XII are grossly intact without any definite acute deficits appreciated  SKIN: Skin turgor is within normal limits, no rash or edema evident.  PSYCH: Mood and affect is appropriate, patient is alert and oriented x3 with a linear thought process    Laboratory/Culture/Imaging Results:   Recent Labs   Lab 07/23/25  0617 07/22/25  0848   WBC 6.8 8.6   HCT 30.0* 34.3*   HGB 9.6* 10.8*    378   SODIUM 134* 134*   POTASSIUM 4.6 4.5   CHLORIDE 101 100   CO2 28 23   GLUCOSE 158* 133*   BUN 17 24*   CREATININE 0.84 1.13   RESR 70*  --    .7*  --        XR TIBIA FIBULA 2 VIEWS LEFT   Final Result   RESULTS/IMPRESSION: Again noted are extensive postsurgical changes of left   knee arthroplasty, intramedullary merlin and metallic plates throughout the    left tibia without hardware failure. No acute fracture is seen. There is   soft tissue swelling throughout the left lower extremity. No evidence for   soft tissue emphysema is seen. There is no radiopaque foreign bodies except   for surgical clips at the upper lower leg level.            Electronically Signed by: MARGOT LLOYD MD    Signed on: 7/22/2025 9:57 AM    Workstation ID: [id number]      NM BONE 3 PHASE    (Results Pending)          Assessment and Plan:  Left lower leg cellulitis  Left leg tenderness/edema due to above  Continue IV cefepime and Zyvox until ID evaluates the patient.  Blood cultures NTD.  Check MRSA PCR, check CRP/sed rate and procalcitonin.  Wound care consulted, pending evaluation.  Orthopedic surgery consulted but patient is aware in case of need of intervention, he has to go back to his orthopedic surgeon.  Since pain is uncontrolled we will schedule ibuprofen 4 mg 3 times daily and as needed Norco 10 mg every 4 hours.  Discussed above with the patient he is agreeable.    Hyperglycemia secondary to uncontrolled type 2 diabetes mellitus with recent A1c 9.3  Continue insulin sliding scale with Accu-Cheks  Start Lantus 5 units at bedtime  Hold home medications including metformin, glipizide and semaglutide.  Continue/resume Jardiance.  Goal of blood sugar 140-180.    Hypertension  Continue Coreg and losartan    Hyperlipidemia  CAD status post 5 stents  Continue aspirin and Crestor      Tentative Discharge/Disposition: Continue inpatient care     Current Active Medications for DVT Prophylaxis (From admission, onward)           Stop     enoxaparin (LOVENOX) injection 40 mg  40 mg,   Subcutaneous,   NIGHTLY         --                     Dietary Orders (From admission, onward)       Start     Ordered    07/22/25 1302  Consistent Carb Moderate (45-75 gm/meal); Yes, Medical Nutrition Management by RD (Registered Dietitian) Diet  DIET EFFECTIVE NOW        References:    [facility] Medical Nutrition Therapy   Question Answer Comment   Diet Modifiers Consistent Carb Moderate (45-75 gm/meal)    Medical Nutrition Management by RD (Registered Dietitian) Yes, Medical Nutrition Management by RD (Registered Dietitian)        07/22/25 1301                     Nutrition status: Does Not Meet Criteria for Malnutrition         Андрей Hi MD  [facility] Hospitalist                                                     Statement Selected